# Patient Record
Sex: FEMALE | Race: WHITE | Employment: OTHER | URBAN - METROPOLITAN AREA
[De-identification: names, ages, dates, MRNs, and addresses within clinical notes are randomized per-mention and may not be internally consistent; named-entity substitution may affect disease eponyms.]

---

## 2017-03-20 RX ORDER — ENALAPRIL MALEATE AND HYDROCHLOROTHIAZIDE 10; 25 MG/1; MG/1
TABLET ORAL
Qty: 30 TABLET | Refills: 1 | Status: SHIPPED | OUTPATIENT
Start: 2017-03-20 | End: 2017-05-05 | Stop reason: SDUPTHER

## 2017-03-20 RX ORDER — ALLOPURINOL 300 MG/1
TABLET ORAL
Qty: 30 TABLET | Refills: 1 | Status: SHIPPED | OUTPATIENT
Start: 2017-03-20 | End: 2017-05-05 | Stop reason: SDUPTHER

## 2017-03-20 RX ORDER — METFORMIN HYDROCHLORIDE 500 MG/1
TABLET, EXTENDED RELEASE ORAL
Qty: 120 TABLET | Refills: 1 | Status: SHIPPED | OUTPATIENT
Start: 2017-03-20 | End: 2017-05-05 | Stop reason: SDUPTHER

## 2017-03-20 RX ORDER — ATORVASTATIN CALCIUM 40 MG/1
TABLET, FILM COATED ORAL
Qty: 30 TABLET | Refills: 1 | Status: SHIPPED | OUTPATIENT
Start: 2017-03-20 | End: 2017-05-05 | Stop reason: SDUPTHER

## 2017-05-05 ENCOUNTER — OFFICE VISIT (OUTPATIENT)
Dept: INTERNAL MEDICINE CLINIC | Age: 72
End: 2017-05-05

## 2017-05-05 VITALS — HEIGHT: 61 IN | BODY MASS INDEX: 26.43 KG/M2 | WEIGHT: 140 LBS

## 2017-05-05 DIAGNOSIS — M85.80 OSTEOPENIA: ICD-10-CM

## 2017-05-05 DIAGNOSIS — E11.69 HYPERLIPIDEMIA ASSOCIATED WITH TYPE 2 DIABETES MELLITUS (HCC): ICD-10-CM

## 2017-05-05 DIAGNOSIS — I10 ESSENTIAL HYPERTENSION, BENIGN: ICD-10-CM

## 2017-05-05 DIAGNOSIS — M19.041 PRIMARY OSTEOARTHRITIS OF BOTH HANDS: ICD-10-CM

## 2017-05-05 DIAGNOSIS — E78.5 HYPERLIPIDEMIA ASSOCIATED WITH TYPE 2 DIABETES MELLITUS (HCC): ICD-10-CM

## 2017-05-05 DIAGNOSIS — F33.1 MAJOR DEPRESSIVE DISORDER, RECURRENT EPISODE, MODERATE (HCC): ICD-10-CM

## 2017-05-05 DIAGNOSIS — F51.01 PRIMARY INSOMNIA: ICD-10-CM

## 2017-05-05 DIAGNOSIS — M1A.09X0 IDIOPATHIC CHRONIC GOUT OF MULTIPLE SITES WITHOUT TOPHUS: ICD-10-CM

## 2017-05-05 DIAGNOSIS — R11.0 NAUSEA: ICD-10-CM

## 2017-05-05 DIAGNOSIS — E11.29 TYPE 2 DIABETES MELLITUS WITH MICROALBUMINURIA, WITHOUT LONG-TERM CURRENT USE OF INSULIN (HCC): Primary | ICD-10-CM

## 2017-05-05 DIAGNOSIS — R80.9 TYPE 2 DIABETES MELLITUS WITH MICROALBUMINURIA, WITHOUT LONG-TERM CURRENT USE OF INSULIN (HCC): Primary | ICD-10-CM

## 2017-05-05 DIAGNOSIS — M19.042 PRIMARY OSTEOARTHRITIS OF BOTH HANDS: ICD-10-CM

## 2017-05-05 PROCEDURE — 99214 OFFICE O/P EST MOD 30 MIN: CPT | Performed by: INTERNAL MEDICINE

## 2017-05-05 RX ORDER — ATORVASTATIN CALCIUM 40 MG/1
TABLET, FILM COATED ORAL
Qty: 30 TABLET | Refills: 2 | Status: SHIPPED | OUTPATIENT
Start: 2017-05-05 | End: 2017-10-06 | Stop reason: SDUPTHER

## 2017-05-05 RX ORDER — ZOLPIDEM TARTRATE 5 MG/1
TABLET ORAL
Qty: 30 TABLET | Refills: 2 | Status: SHIPPED | OUTPATIENT
Start: 2017-05-05 | End: 2017-10-06 | Stop reason: SDUPTHER

## 2017-05-05 RX ORDER — METFORMIN HYDROCHLORIDE 500 MG/1
TABLET, EXTENDED RELEASE ORAL
Qty: 120 TABLET | Refills: 2 | Status: SHIPPED | OUTPATIENT
Start: 2017-05-05 | End: 2017-10-06 | Stop reason: SDUPTHER

## 2017-05-05 RX ORDER — ALLOPURINOL 300 MG/1
TABLET ORAL
Qty: 30 TABLET | Refills: 2 | Status: SHIPPED | OUTPATIENT
Start: 2017-05-05 | End: 2017-10-06 | Stop reason: SDUPTHER

## 2017-05-05 RX ORDER — INDOMETHACIN 50 MG/1
50 CAPSULE ORAL 3 TIMES DAILY
Qty: 30 CAPSULE | Refills: 2 | Status: ON HOLD | OUTPATIENT
Start: 2017-05-05 | End: 2017-06-01 | Stop reason: HOSPADM

## 2017-05-05 RX ORDER — ENALAPRIL MALEATE AND HYDROCHLOROTHIAZIDE 10; 25 MG/1; MG/1
TABLET ORAL
Qty: 30 TABLET | Refills: 2 | Status: SHIPPED | OUTPATIENT
Start: 2017-05-05 | End: 2017-10-06 | Stop reason: SDUPTHER

## 2017-05-05 ASSESSMENT — ENCOUNTER SYMPTOMS
ABDOMINAL PAIN: 0
BACK PAIN: 0
SHORTNESS OF BREATH: 0
WHEEZING: 0
RHINORRHEA: 0
VOMITING: 0

## 2017-06-05 ENCOUNTER — OFFICE VISIT (OUTPATIENT)
Dept: INTERNAL MEDICINE CLINIC | Age: 72
End: 2017-06-05

## 2017-06-05 VITALS
WEIGHT: 137 LBS | HEIGHT: 61 IN | HEART RATE: 90 BPM | BODY MASS INDEX: 25.86 KG/M2 | DIASTOLIC BLOOD PRESSURE: 70 MMHG | SYSTOLIC BLOOD PRESSURE: 140 MMHG | RESPIRATION RATE: 16 BRPM

## 2017-06-05 DIAGNOSIS — J18.9 PNEUMONIA DUE TO INFECTIOUS ORGANISM, UNSPECIFIED LATERALITY, UNSPECIFIED PART OF LUNG: Primary | ICD-10-CM

## 2017-06-05 DIAGNOSIS — Z09 HOSPITAL DISCHARGE FOLLOW-UP: ICD-10-CM

## 2017-06-05 PROCEDURE — 99213 OFFICE O/P EST LOW 20 MIN: CPT | Performed by: NURSE PRACTITIONER

## 2017-06-05 ASSESSMENT — ENCOUNTER SYMPTOMS
SHORTNESS OF BREATH: 0
NAUSEA: 0
COUGH: 1
VOMITING: 0
WHEEZING: 0
BACK PAIN: 1

## 2017-06-22 ENCOUNTER — HOSPITAL ENCOUNTER (OUTPATIENT)
Dept: CT IMAGING | Age: 72
Discharge: HOME | End: 2017-06-22
Attending: INTERNAL MEDICINE | Admitting: INTERNAL MEDICINE

## 2017-06-22 ENCOUNTER — OFFICE VISIT (OUTPATIENT)
Dept: PULMONOLOGY | Age: 72
End: 2017-06-22

## 2017-06-22 VITALS
WEIGHT: 139.6 LBS | SYSTOLIC BLOOD PRESSURE: 138 MMHG | HEART RATE: 81 BPM | DIASTOLIC BLOOD PRESSURE: 86 MMHG | RESPIRATION RATE: 17 BRPM | HEIGHT: 60 IN | BODY MASS INDEX: 27.41 KG/M2 | OXYGEN SATURATION: 99 %

## 2017-06-22 DIAGNOSIS — R91.1 SOLITARY PULMONARY NODULE: ICD-10-CM

## 2017-06-22 DIAGNOSIS — R91.1 PULMONARY NODULE: Primary | ICD-10-CM

## 2017-06-22 DIAGNOSIS — Z72.0 TOBACCO ABUSE: ICD-10-CM

## 2017-06-22 DIAGNOSIS — R91.1 PULMONARY NODULE: ICD-10-CM

## 2017-06-22 PROCEDURE — 99213 OFFICE O/P EST LOW 20 MIN: CPT | Performed by: INTERNAL MEDICINE

## 2017-07-26 ENCOUNTER — HOSPITAL ENCOUNTER (OUTPATIENT)
Dept: OTHER | Age: 72
Discharge: HOME | End: 2017-07-26
Attending: INTERNAL MEDICINE | Admitting: INTERNAL MEDICINE

## 2017-07-26 ENCOUNTER — OFFICE VISIT (OUTPATIENT)
Dept: INTERNAL MEDICINE CLINIC | Age: 72
End: 2017-07-26

## 2017-07-26 VITALS
SYSTOLIC BLOOD PRESSURE: 140 MMHG | WEIGHT: 145 LBS | HEART RATE: 70 BPM | BODY MASS INDEX: 28.47 KG/M2 | RESPIRATION RATE: 14 BRPM | HEIGHT: 60 IN | DIASTOLIC BLOOD PRESSURE: 80 MMHG

## 2017-07-26 DIAGNOSIS — M79.89 PAIN AND SWELLING OF LEFT LOWER LEG: ICD-10-CM

## 2017-07-26 DIAGNOSIS — M79.672 LEFT FOOT PAIN: Primary | ICD-10-CM

## 2017-07-26 DIAGNOSIS — M79.662 PAIN AND SWELLING OF LEFT LOWER LEG: ICD-10-CM

## 2017-07-26 DIAGNOSIS — M79.672 LEFT FOOT PAIN: ICD-10-CM

## 2017-07-26 LAB — URIC ACID, SERUM: 3.7 MG/DL (ref 2.6–6)

## 2017-07-26 PROCEDURE — 99213 OFFICE O/P EST LOW 20 MIN: CPT | Performed by: INTERNAL MEDICINE

## 2017-07-27 ENCOUNTER — TELEPHONE (OUTPATIENT)
Dept: INTERNAL MEDICINE CLINIC | Age: 72
End: 2017-07-27

## 2017-07-27 RX ORDER — DICLOFENAC SODIUM 75 MG/1
75 TABLET, DELAYED RELEASE ORAL 2 TIMES DAILY
Qty: 30 TABLET | Refills: 0 | Status: SHIPPED | OUTPATIENT
Start: 2017-07-27 | End: 2019-08-20

## 2017-08-01 ENCOUNTER — HOSPITAL ENCOUNTER (OUTPATIENT)
Dept: ULTRASOUND IMAGING | Age: 72
Discharge: HOME | End: 2017-08-01
Attending: INTERNAL MEDICINE | Admitting: INTERNAL MEDICINE

## 2017-08-01 DIAGNOSIS — M79.89 PAIN AND SWELLING OF LEFT LOWER LEG: ICD-10-CM

## 2017-08-01 DIAGNOSIS — M79.672 PAIN OF LEFT FOOT: ICD-10-CM

## 2017-08-01 DIAGNOSIS — M79.662 PAIN AND SWELLING OF LEFT LOWER LEG: ICD-10-CM

## 2017-10-06 ENCOUNTER — OFFICE VISIT (OUTPATIENT)
Dept: INTERNAL MEDICINE CLINIC | Age: 72
End: 2017-10-06

## 2017-10-06 VITALS
SYSTOLIC BLOOD PRESSURE: 130 MMHG | DIASTOLIC BLOOD PRESSURE: 80 MMHG | RESPIRATION RATE: 14 BRPM | HEART RATE: 80 BPM | HEIGHT: 60 IN | BODY MASS INDEX: 27.88 KG/M2 | WEIGHT: 142 LBS

## 2017-10-06 DIAGNOSIS — E11.69 HYPERLIPIDEMIA ASSOCIATED WITH TYPE 2 DIABETES MELLITUS (HCC): ICD-10-CM

## 2017-10-06 DIAGNOSIS — Z23 NEED FOR INFLUENZA VACCINATION: ICD-10-CM

## 2017-10-06 DIAGNOSIS — I10 ESSENTIAL HYPERTENSION, BENIGN: ICD-10-CM

## 2017-10-06 DIAGNOSIS — E11.29 TYPE 2 DIABETES MELLITUS WITH MICROALBUMINURIA, WITHOUT LONG-TERM CURRENT USE OF INSULIN (HCC): ICD-10-CM

## 2017-10-06 DIAGNOSIS — E11.29 TYPE 2 DIABETES MELLITUS WITH MICROALBUMINURIA, WITHOUT LONG-TERM CURRENT USE OF INSULIN (HCC): Primary | ICD-10-CM

## 2017-10-06 DIAGNOSIS — M75.82 ROTATOR CUFF TENDINITIS, LEFT: ICD-10-CM

## 2017-10-06 DIAGNOSIS — R80.9 TYPE 2 DIABETES MELLITUS WITH MICROALBUMINURIA, WITHOUT LONG-TERM CURRENT USE OF INSULIN (HCC): ICD-10-CM

## 2017-10-06 DIAGNOSIS — E78.5 HYPERLIPIDEMIA ASSOCIATED WITH TYPE 2 DIABETES MELLITUS (HCC): ICD-10-CM

## 2017-10-06 DIAGNOSIS — F33.1 MAJOR DEPRESSIVE DISORDER, RECURRENT EPISODE, MODERATE (HCC): ICD-10-CM

## 2017-10-06 DIAGNOSIS — M1A.09X0 IDIOPATHIC CHRONIC GOUT OF MULTIPLE SITES WITHOUT TOPHUS: ICD-10-CM

## 2017-10-06 DIAGNOSIS — R80.9 TYPE 2 DIABETES MELLITUS WITH MICROALBUMINURIA, WITHOUT LONG-TERM CURRENT USE OF INSULIN (HCC): Primary | ICD-10-CM

## 2017-10-06 DIAGNOSIS — F51.01 PRIMARY INSOMNIA: ICD-10-CM

## 2017-10-06 PROBLEM — M75.80 ROTATOR CUFF TENDINITIS: Status: ACTIVE | Noted: 2017-10-06

## 2017-10-06 LAB
BILIRUBIN, POC: NORMAL
BLOOD URINE, POC: NORMAL
CHOLESTEROL, TOTAL: 130 MG/DL (ref 0–199)
CLARITY, POC: NORMAL
COLOR, POC: NORMAL
CREATININE URINE: 156.4 MG/DL (ref 28–259)
GLUCOSE URINE, POC: NORMAL
HDLC SERPL-MCNC: 26 MG/DL (ref 40–60)
KETONES, POC: NORMAL
LDL CHOLESTEROL CALCULATED: ABNORMAL MG/DL
LDL CHOLESTEROL DIRECT: 42 MG/DL
LEUKOCYTE EST, POC: NORMAL
MICROALBUMIN UR-MCNC: 122.1 MG/DL
MICROALBUMIN/CREAT UR-RTO: 780.7 MG/G (ref 0–30)
NITRITE, POC: NORMAL
PH, POC: NORMAL
PROTEIN, POC: NORMAL
SPECIFIC GRAVITY, POC: NORMAL
TRIGL SERPL-MCNC: 411 MG/DL (ref 0–150)
URIC ACID, SERUM: 4.3 MG/DL (ref 2.6–6)
UROBILINOGEN, POC: NORMAL
VLDLC SERPL CALC-MCNC: ABNORMAL MG/DL

## 2017-10-06 PROCEDURE — 90471 IMMUNIZATION ADMIN: CPT | Performed by: INTERNAL MEDICINE

## 2017-10-06 PROCEDURE — 81002 URINALYSIS NONAUTO W/O SCOPE: CPT | Performed by: INTERNAL MEDICINE

## 2017-10-06 PROCEDURE — 90662 IIV NO PRSV INCREASED AG IM: CPT | Performed by: INTERNAL MEDICINE

## 2017-10-06 PROCEDURE — 99214 OFFICE O/P EST MOD 30 MIN: CPT | Performed by: INTERNAL MEDICINE

## 2017-10-06 RX ORDER — ATORVASTATIN CALCIUM 40 MG/1
TABLET, FILM COATED ORAL
Qty: 30 TABLET | Refills: 2 | Status: SHIPPED | OUTPATIENT
Start: 2017-10-06 | End: 2018-04-19 | Stop reason: SDUPTHER

## 2017-10-06 RX ORDER — ALLOPURINOL 300 MG/1
TABLET ORAL
Qty: 30 TABLET | Refills: 2 | Status: SHIPPED | OUTPATIENT
Start: 2017-10-06 | End: 2018-04-19 | Stop reason: SDUPTHER

## 2017-10-06 RX ORDER — ZOLPIDEM TARTRATE 5 MG/1
TABLET ORAL
Qty: 30 TABLET | Refills: 2 | Status: SHIPPED | OUTPATIENT
Start: 2017-10-06 | End: 2019-08-20

## 2017-10-06 RX ORDER — METFORMIN HYDROCHLORIDE 500 MG/1
TABLET, EXTENDED RELEASE ORAL
Qty: 120 TABLET | Refills: 2 | Status: SHIPPED | OUTPATIENT
Start: 2017-10-06 | End: 2018-04-19 | Stop reason: SDUPTHER

## 2017-10-06 RX ORDER — ENALAPRIL MALEATE AND HYDROCHLOROTHIAZIDE 10; 25 MG/1; MG/1
TABLET ORAL
Qty: 30 TABLET | Refills: 2 | Status: SHIPPED | OUTPATIENT
Start: 2017-10-06 | End: 2018-05-10 | Stop reason: SDUPTHER

## 2017-10-06 ASSESSMENT — ENCOUNTER SYMPTOMS
SHORTNESS OF BREATH: 0
BACK PAIN: 0
RHINORRHEA: 0
VOMITING: 0
ABDOMINAL PAIN: 0
WHEEZING: 0

## 2017-10-06 NOTE — MR AVS SNAPSHOT
Penicillins Anaphylaxis    Penicillin and derivatives      We Ordered/Performed the following           INFLUENZA, HIGH DOSE, 65 YRS +, IM, PF, PREFILL SYR, 0.5ML (FLUZONE HD)     Microalbumin / Creatinine Urine Ratio     POCT Urinalysis no Micro          Additional Information        Basic Information     Date Of Birth Sex Race Ethnicity Preferred Language    1945 Female White Non-/Non  English      Problem List as of 10/6/2017  Date Reviewed: 10/6/2017                Chest pain    Essential hypertension, benign    Backache    Osteoarthrosis involving lower leg    Rotator cuff tendinitis    Type 2 diabetes mellitus with microalbuminuria, without long-term current use of insulin ([address]    Primary insomnia    Hyperlipidemia associated with type 2 diabetes mellitus ([address]    Major depressive disorder, recurrent episode, moderate ([address]    Osteopenia    Osteoarthritis, hand    Gout      Immunizations as of 10/6/2017     Name Date    Influenza Virus Vaccine 10/2/2015, 9/15/2014, 8/27/2012, 11/11/2011    Influenza Whole 9/23/2010    Influenza, High Dose 10/6/2017, 10/10/2016    Pneumococcal Conjugate 7-valent 10/5/2005    Pneumococcal Polysaccharide ([id number]) 12/1/2015      Preventive Care        Date Due    Hepatitis C screening is recommended for all adults regardless of risk factors born between [facility] at least once (lifetime) who have never been tested. 1945    Tetanus Combination Vaccine (1 - Tdap) 12/16/1964    Zoster Vaccine 12/16/2005    Mammograms are recommended every 2 years for low/average risk patients aged 48 - 69, and every year for high risk patients per updated national guidelines. However these guidelines can be individualized by your provider.  5/1/2016    Cholesterol Screening 12/1/2016    Pneumococcal Vaccines (two) for all adults aged 72 and over (2 of 2 - PCV13) 12/1/2016    Eye Exam By An Eye Doctor 3/15/2017    Diabetic Foot Exam 5/5/2018 Hemoglobin A1C (Test For Long-Term Glucose Control) 5/30/2018    Colonoscopy 6/10/2020            MyChart Signup           [employer] allows you to send messages to your doctor, view your test results, renew your prescriptions, schedule appointments, view visit notes, and more. How Do I Sign Up? 1. In your Internet browser, go to [email or web address]  2. Click on the Sign Up Now link in the Sign In box. You will see the New Member Sign Up page. 3. Enter your [employer] Access Code exactly as it appears below. You will not need to use this code after youve completed the sign-up process. If you do not sign up before the expiration date, you must request a new code. [employer] Access Code: [email or web address]  Expires: 12/5/2017 10:25 AM    4. Enter your Social Security Number ([id number]) and Date of Birth (mm/dd/yyyy) as indicated and click Submit. You will be taken to the next sign-up page. 5. Create a [employer] ID. This will be your [employer] login ID and cannot be changed, so think of one that is secure and easy to remember. 6. Create a [employer] password. You can change your password at any time. 7. Enter your Password Reset Question and Answer. This can be used at a later time if you forget your password. 8. Enter your e-mail address. You will receive e-mail notification when new information is available in [id number]. 9. Click Sign Up. You can now view your medical record. Additional Information  If you have questions, please contact the physician practice where you receive care. Remember, [employer] is NOT to be used for urgent needs. For medical emergencies, dial 911. For questions regarding your [employer] account call [phone]. If you have a clinical question, please call your doctor's office.

## 2017-10-06 NOTE — PROGRESS NOTES
goal.  Depression: stable. Gout: no attacks recently. Uses [place] prn. Osteopenia on calcium and D. Left rotator cuff tendinitis: does not have time for PT. Will just live with it. Discussed use, benefit, and side effects of prescribed medications. Barriers to medication compliance addressed. All patient questions answered. Pt voiced understanding. Decrease calorie intake. Exercise,weight loss recommended. The current medical regimen is effective;  continue present plan and medications. See orders.

## 2017-10-07 LAB
ESTIMATED AVERAGE GLUCOSE: 185.8 MG/DL
HBA1C MFR BLD: 8.1 %

## 2017-10-10 NOTE — TELEPHONE ENCOUNTER
----- Message from Maria Del Carmen Hicks MD sent at 10/9/2017 10:46 PM EDT -----  Her sugars are up considerably

## 2018-02-09 ENCOUNTER — OFFICE VISIT (OUTPATIENT)
Dept: INTERNAL MEDICINE CLINIC | Age: 73
End: 2018-02-09

## 2018-02-09 VITALS
SYSTOLIC BLOOD PRESSURE: 140 MMHG | HEART RATE: 80 BPM | RESPIRATION RATE: 14 BRPM | BODY MASS INDEX: 26.9 KG/M2 | DIASTOLIC BLOOD PRESSURE: 70 MMHG | WEIGHT: 137 LBS | HEIGHT: 60 IN

## 2018-02-09 DIAGNOSIS — B35.3 TINEA PEDIS OF LEFT FOOT: Primary | ICD-10-CM

## 2018-02-09 PROCEDURE — 99213 OFFICE O/P EST LOW 20 MIN: CPT | Performed by: NURSE PRACTITIONER

## 2018-02-09 RX ORDER — CLOTRIMAZOLE 1 %
CREAM (GRAM) TOPICAL
Qty: 24 G | Refills: 1 | Status: SHIPPED | OUTPATIENT
Start: 2018-02-09 | End: 2018-02-16

## 2018-02-09 ASSESSMENT — ENCOUNTER SYMPTOMS
VOMITING: 0
SHORTNESS OF BREATH: 0
NAUSEA: 0
COUGH: 0

## 2018-02-09 NOTE — PROGRESS NOTES
facility-administered medications for this visit. Past Medical History  Past Medical History:   Diagnosis Date    Anemia     Backache, unspecified 3/11/08    Depressive disorder, not elsewhere classified 11/8/07    Essential hypertension, benign 11/8/07    Gout 8/11/2011    Hyperlipidemia associated with type 2 diabetes mellitus ([address] 12/14/2015    Major depressive disorder, recurrent episode, moderate ([address] 12/14/2015    Osteoarthritis, hand 2/27/2012    Osteoarthrosis, unspecified whether generalized or localized, lower leg 11/8/07    Osteopenia 8/5/2015    Other and unspecified hyperlipidemia 11/8/07    Pain in joint, lower leg 1/29/07    Pneumonia     Rotator cuff tendinitis 10/6/2017    Tear of medial cartilage or meniscus of knee, current 1/29/07    Type 2 diabetes mellitus with microalbuminuria, without long-term current use of insulin ([facility] 5/5/2017    Type 2 diabetes mellitus without complication ([facility] 89/4/9911    Type II or unspecified type diabetes mellitus without mention of complication, not stated as uncontrolled 11/8/07    foot exam 7/16/08 normal       Social History  Social History     Social History    Marital status:      Spouse name: N/A    Number of children: N/A    Years of education: N/A     Occupational History    Not on file.      Social History Main Topics    Smoking status: Former Smoker     Packs/day: 1.50     Years: 20.00     Quit date: 11/23/2015    Smokeless tobacco: Never Used    Alcohol use No    Drug use: No    Sexual activity: No     Other Topics Concern    Not on file     Social History Narrative    No narrative on file       Surgical History  Past Surgical History:   Procedure Laterality Date    APPENDECTOMY      CHOLECYSTECTOMY      HYSTERECTOMY      total ab hyst    PARATHYROID GLAND SURGERY      explore parathyroid glands    TOTAL KNEE ARTHROPLASTY  03/29/10    left knee       Physical Exam  Physical Exam   Constitutional: She is

## 2018-04-19 RX ORDER — METFORMIN HYDROCHLORIDE 500 MG/1
TABLET, EXTENDED RELEASE ORAL
Qty: 120 TABLET | Refills: 2 | Status: SHIPPED | OUTPATIENT
Start: 2018-04-19 | End: 2018-05-10 | Stop reason: SDUPTHER

## 2018-04-19 RX ORDER — ATORVASTATIN CALCIUM 40 MG/1
TABLET, FILM COATED ORAL
Qty: 30 TABLET | Refills: 2 | Status: SHIPPED | OUTPATIENT
Start: 2018-04-19 | End: 2018-05-10 | Stop reason: SDUPTHER

## 2018-04-19 RX ORDER — ALLOPURINOL 300 MG/1
TABLET ORAL
Qty: 30 TABLET | Refills: 2 | Status: SHIPPED | OUTPATIENT
Start: 2018-04-19 | End: 2018-05-10 | Stop reason: SDUPTHER

## 2018-05-10 ENCOUNTER — OFFICE VISIT (OUTPATIENT)
Dept: INTERNAL MEDICINE CLINIC | Age: 73
End: 2018-05-10

## 2018-05-10 VITALS
BODY MASS INDEX: 26.43 KG/M2 | HEART RATE: 70 BPM | DIASTOLIC BLOOD PRESSURE: 75 MMHG | WEIGHT: 140 LBS | SYSTOLIC BLOOD PRESSURE: 115 MMHG | HEIGHT: 61 IN | RESPIRATION RATE: 18 BRPM

## 2018-05-10 DIAGNOSIS — E11.29 TYPE 2 DIABETES MELLITUS WITH MICROALBUMINURIA, WITHOUT LONG-TERM CURRENT USE OF INSULIN (HCC): ICD-10-CM

## 2018-05-10 DIAGNOSIS — R80.9 TYPE 2 DIABETES MELLITUS WITH MICROALBUMINURIA, WITHOUT LONG-TERM CURRENT USE OF INSULIN (HCC): ICD-10-CM

## 2018-05-10 DIAGNOSIS — E11.69 HYPERLIPIDEMIA ASSOCIATED WITH TYPE 2 DIABETES MELLITUS (HCC): ICD-10-CM

## 2018-05-10 DIAGNOSIS — E78.5 HYPERLIPIDEMIA ASSOCIATED WITH TYPE 2 DIABETES MELLITUS (HCC): ICD-10-CM

## 2018-05-10 DIAGNOSIS — R60.0 LEG EDEMA, LEFT: Primary | ICD-10-CM

## 2018-05-10 DIAGNOSIS — F33.1 MAJOR DEPRESSIVE DISORDER, RECURRENT EPISODE, MODERATE (HCC): ICD-10-CM

## 2018-05-10 LAB
BASOPHILS ABSOLUTE: 0 K/UL (ref 0–0.2)
BASOPHILS RELATIVE PERCENT: 0.7 %
EOSINOPHILS ABSOLUTE: 0.5 K/UL (ref 0–0.6)
EOSINOPHILS RELATIVE PERCENT: 7.3 %
HCT VFR BLD CALC: 31 % (ref 36–48)
HEMOGLOBIN: 10.5 G/DL (ref 12–16)
LYMPHOCYTES ABSOLUTE: 1.6 K/UL (ref 1–5.1)
LYMPHOCYTES RELATIVE PERCENT: 24.1 %
MCH RBC QN AUTO: 28.1 PG (ref 26–34)
MCHC RBC AUTO-ENTMCNC: 33.8 G/DL (ref 31–36)
MCV RBC AUTO: 82.9 FL (ref 80–100)
MONOCYTES ABSOLUTE: 0.4 K/UL (ref 0–1.3)
MONOCYTES RELATIVE PERCENT: 5.7 %
NEUTROPHILS ABSOLUTE: 4.2 K/UL (ref 1.7–7.7)
NEUTROPHILS RELATIVE PERCENT: 62.2 %
PDW BLD-RTO: 16.5 % (ref 12.4–15.4)
PLATELET # BLD: 296 K/UL (ref 135–450)
PMV BLD AUTO: 7.7 FL (ref 5–10.5)
RBC # BLD: 3.74 M/UL (ref 4–5.2)
WBC # BLD: 6.7 K/UL (ref 4–11)

## 2018-05-10 PROCEDURE — 99213 OFFICE O/P EST LOW 20 MIN: CPT | Performed by: INTERNAL MEDICINE

## 2018-05-10 RX ORDER — ATORVASTATIN CALCIUM 40 MG/1
TABLET, FILM COATED ORAL
Qty: 30 TABLET | Refills: 2 | Status: SHIPPED | OUTPATIENT
Start: 2018-05-10 | End: 2018-08-10 | Stop reason: SDUPTHER

## 2018-05-10 RX ORDER — ENALAPRIL MALEATE AND HYDROCHLOROTHIAZIDE 10; 25 MG/1; MG/1
TABLET ORAL
Qty: 30 TABLET | Refills: 2 | Status: SHIPPED | OUTPATIENT
Start: 2018-05-10 | End: 2018-08-10 | Stop reason: SDUPTHER

## 2018-05-10 RX ORDER — METFORMIN HYDROCHLORIDE 500 MG/1
TABLET, EXTENDED RELEASE ORAL
Qty: 120 TABLET | Refills: 2 | Status: SHIPPED | OUTPATIENT
Start: 2018-05-10 | End: 2018-08-10 | Stop reason: SDUPTHER

## 2018-05-10 RX ORDER — ALLOPURINOL 300 MG/1
TABLET ORAL
Qty: 30 TABLET | Refills: 2 | Status: SHIPPED | OUTPATIENT
Start: 2018-05-10 | End: 2018-08-10 | Stop reason: SDUPTHER

## 2018-05-10 ASSESSMENT — ENCOUNTER SYMPTOMS
ABDOMINAL PAIN: 0
VOMITING: 0
WHEEZING: 0
RHINORRHEA: 0
SHORTNESS OF BREATH: 0
BACK PAIN: 0

## 2018-05-11 LAB
A/G RATIO: 1.5 (ref 1.1–2.2)
ALBUMIN SERPL-MCNC: 3.9 G/DL (ref 3.4–5)
ALP BLD-CCNC: 93 U/L (ref 40–129)
ALT SERPL-CCNC: 9 U/L (ref 10–40)
ANION GAP SERPL CALCULATED.3IONS-SCNC: 18 MMOL/L (ref 3–16)
AST SERPL-CCNC: 11 U/L (ref 15–37)
BILIRUB SERPL-MCNC: <0.2 MG/DL (ref 0–1)
BUN BLDV-MCNC: 17 MG/DL (ref 7–20)
CALCIUM SERPL-MCNC: 9.5 MG/DL (ref 8.3–10.6)
CHLORIDE BLD-SCNC: 105 MMOL/L (ref 99–110)
CO2: 19 MMOL/L (ref 21–32)
CREAT SERPL-MCNC: 1.1 MG/DL (ref 0.6–1.2)
ESTIMATED AVERAGE GLUCOSE: 180 MG/DL
GFR AFRICAN AMERICAN: 59
GFR NON-AFRICAN AMERICAN: 49
GLOBULIN: 2.6 G/DL
GLUCOSE BLD-MCNC: 152 MG/DL (ref 70–99)
HBA1C MFR BLD: 7.9 %
POTASSIUM SERPL-SCNC: 4.5 MMOL/L (ref 3.5–5.1)
SODIUM BLD-SCNC: 142 MMOL/L (ref 136–145)
TOTAL PROTEIN: 6.5 G/DL (ref 6.4–8.2)

## 2018-06-11 PROBLEM — J96.00 ACUTE RESPIRATORY FAILURE (HCC): Status: ACTIVE | Noted: 2018-06-11

## 2018-06-13 PROBLEM — J96.00 ACUTE RESPIRATORY FAILURE (HCC): Status: RESOLVED | Noted: 2018-06-11 | Resolved: 2018-06-13

## 2018-07-02 ENCOUNTER — TELEPHONE (OUTPATIENT)
Dept: CASE MANAGEMENT | Age: 73
End: 2018-07-02

## 2018-07-12 ENCOUNTER — TELEPHONE (OUTPATIENT)
Dept: PULMONOLOGY | Age: 73
End: 2018-07-12

## 2018-07-12 DIAGNOSIS — Z87.891 PERSONAL HISTORY OF TOBACCO USE: Primary | ICD-10-CM

## 2018-07-12 PROCEDURE — G0296 VISIT TO DETERM LDCT ELIG: HCPCS | Performed by: INTERNAL MEDICINE

## 2018-07-13 ENCOUNTER — HOSPITAL ENCOUNTER (OUTPATIENT)
Dept: CT IMAGING | Age: 73
Discharge: HOME | End: 2018-07-14
Attending: INTERNAL MEDICINE | Admitting: INTERNAL MEDICINE

## 2018-07-13 DIAGNOSIS — Z87.891 PERSONAL HISTORY OF TOBACCO USE: ICD-10-CM

## 2018-07-13 DIAGNOSIS — R91.1 SOLITARY PULMONARY NODULE: ICD-10-CM

## 2018-07-17 ENCOUNTER — TELEPHONE (OUTPATIENT)
Dept: CASE MANAGEMENT | Age: 73
End: 2018-07-17

## 2018-07-17 NOTE — TELEPHONE ENCOUNTER
Baseline lung screen on 7-13-18. LRAD2. Recommended screen in one year. Reviewed by ordering physician and ordering office contacts pt with results. Per MD ordering note: 30 pack year hx. Per EPIC, former smoker, quit 2015.

## 2018-08-10 ENCOUNTER — OFFICE VISIT (OUTPATIENT)
Dept: INTERNAL MEDICINE CLINIC | Age: 73
End: 2018-08-10

## 2018-08-10 VITALS
WEIGHT: 134 LBS | HEIGHT: 60 IN | RESPIRATION RATE: 14 BRPM | BODY MASS INDEX: 26.31 KG/M2 | HEART RATE: 80 BPM | SYSTOLIC BLOOD PRESSURE: 130 MMHG | DIASTOLIC BLOOD PRESSURE: 80 MMHG

## 2018-08-10 DIAGNOSIS — E11.29 TYPE 2 DIABETES MELLITUS WITH MICROALBUMINURIA, WITHOUT LONG-TERM CURRENT USE OF INSULIN (HCC): ICD-10-CM

## 2018-08-10 DIAGNOSIS — E11.29 TYPE 2 DIABETES MELLITUS WITH MICROALBUMINURIA, WITHOUT LONG-TERM CURRENT USE OF INSULIN (HCC): Primary | ICD-10-CM

## 2018-08-10 DIAGNOSIS — R80.9 TYPE 2 DIABETES MELLITUS WITH MICROALBUMINURIA, WITHOUT LONG-TERM CURRENT USE OF INSULIN (HCC): ICD-10-CM

## 2018-08-10 DIAGNOSIS — R80.9 TYPE 2 DIABETES MELLITUS WITH MICROALBUMINURIA, WITHOUT LONG-TERM CURRENT USE OF INSULIN (HCC): Primary | ICD-10-CM

## 2018-08-10 DIAGNOSIS — I10 ESSENTIAL HYPERTENSION, BENIGN: ICD-10-CM

## 2018-08-10 DIAGNOSIS — Z12.31 ENCOUNTER FOR SCREENING MAMMOGRAM FOR BREAST CANCER: ICD-10-CM

## 2018-08-10 DIAGNOSIS — E11.69 HYPERLIPIDEMIA ASSOCIATED WITH TYPE 2 DIABETES MELLITUS (HCC): ICD-10-CM

## 2018-08-10 DIAGNOSIS — F51.01 PRIMARY INSOMNIA: ICD-10-CM

## 2018-08-10 DIAGNOSIS — E78.5 HYPERLIPIDEMIA ASSOCIATED WITH TYPE 2 DIABETES MELLITUS (HCC): ICD-10-CM

## 2018-08-10 DIAGNOSIS — Z72.89 OTHER PROBLEMS RELATED TO LIFESTYLE: ICD-10-CM

## 2018-08-10 DIAGNOSIS — M1A.09X0 IDIOPATHIC CHRONIC GOUT OF MULTIPLE SITES WITHOUT TOPHUS: ICD-10-CM

## 2018-08-10 DIAGNOSIS — F33.1 MAJOR DEPRESSIVE DISORDER, RECURRENT EPISODE, MODERATE (HCC): ICD-10-CM

## 2018-08-10 LAB
BASOPHILS ABSOLUTE: 0.1 K/UL (ref 0–0.2)
BASOPHILS RELATIVE PERCENT: 1.1 %
EOSINOPHILS ABSOLUTE: 0.4 K/UL (ref 0–0.6)
EOSINOPHILS RELATIVE PERCENT: 5.2 %
HCT VFR BLD CALC: 32.2 % (ref 36–48)
HEMOGLOBIN: 10.6 G/DL (ref 12–16)
LYMPHOCYTES ABSOLUTE: 1.4 K/UL (ref 1–5.1)
LYMPHOCYTES RELATIVE PERCENT: 18.1 %
MCH RBC QN AUTO: 27.2 PG (ref 26–34)
MCHC RBC AUTO-ENTMCNC: 33 G/DL (ref 31–36)
MCV RBC AUTO: 82.4 FL (ref 80–100)
MONOCYTES ABSOLUTE: 0.4 K/UL (ref 0–1.3)
MONOCYTES RELATIVE PERCENT: 5 %
NEUTROPHILS ABSOLUTE: 5.6 K/UL (ref 1.7–7.7)
NEUTROPHILS RELATIVE PERCENT: 70.6 %
PDW BLD-RTO: 17.2 % (ref 12.4–15.4)
PLATELET # BLD: 312 K/UL (ref 135–450)
PMV BLD AUTO: 7.9 FL (ref 5–10.5)
RBC # BLD: 3.9 M/UL (ref 4–5.2)
WBC # BLD: 7.9 K/UL (ref 4–11)

## 2018-08-10 PROCEDURE — 90471 IMMUNIZATION ADMIN: CPT | Performed by: INTERNAL MEDICINE

## 2018-08-10 PROCEDURE — 99214 OFFICE O/P EST MOD 30 MIN: CPT | Performed by: INTERNAL MEDICINE

## 2018-08-10 PROCEDURE — 90670 PCV13 VACCINE IM: CPT | Performed by: INTERNAL MEDICINE

## 2018-08-10 RX ORDER — ALLOPURINOL 300 MG/1
TABLET ORAL
Qty: 30 TABLET | Refills: 2 | Status: SHIPPED | OUTPATIENT
Start: 2018-08-10 | End: 2018-12-05 | Stop reason: SDUPTHER

## 2018-08-10 RX ORDER — METFORMIN HYDROCHLORIDE 500 MG/1
TABLET, EXTENDED RELEASE ORAL
Qty: 120 TABLET | Refills: 2 | Status: SHIPPED | OUTPATIENT
Start: 2018-08-10 | End: 2018-12-05 | Stop reason: SDUPTHER

## 2018-08-10 RX ORDER — ENALAPRIL MALEATE AND HYDROCHLOROTHIAZIDE 10; 25 MG/1; MG/1
TABLET ORAL
Qty: 30 TABLET | Refills: 2 | Status: SHIPPED | OUTPATIENT
Start: 2018-08-10 | End: 2018-10-05

## 2018-08-10 RX ORDER — ATORVASTATIN CALCIUM 40 MG/1
TABLET, FILM COATED ORAL
Qty: 30 TABLET | Refills: 2 | Status: SHIPPED | OUTPATIENT
Start: 2018-08-10 | End: 2018-12-05 | Stop reason: SDUPTHER

## 2018-08-10 ASSESSMENT — ENCOUNTER SYMPTOMS
SHORTNESS OF BREATH: 0
BACK PAIN: 0
ABDOMINAL PAIN: 0
WHEEZING: 0
VOMITING: 0
RHINORRHEA: 0

## 2018-08-10 NOTE — PROGRESS NOTES
Take 500 mg by mouth daily, Disp: , Rfl:     aspirin 81 MG EC tablet, Take 81 mg by mouth daily. , Disp: , Rfl:        Review of Systems   Constitutional: Negative for appetite change and fatigue. HENT: Negative for postnasal drip and rhinorrhea. Respiratory: Negative for shortness of breath and wheezing. Cardiovascular: Negative for chest pain and palpitations. Gastrointestinal: Negative for abdominal pain and vomiting. Musculoskeletal: Negative for back pain and joint swelling. Skin: Negative for rash. Neurological: Negative for light-headedness. Psychiatric/Behavioral: Positive for sleep disturbance. Negative for agitation, behavioral problems and suicidal ideas. The patient is not nervous/anxious. There are no changes to past medical history, family history, social history or review of systems(except as noted in the history section) since prior note (all reviewed with patient). /80 (Site: Right Arm, Position: Sitting, Cuff Size: Medium Adult)   Pulse 80   Resp 14   Ht 5' (1.524 m)   Wt 134 lb (60.8 kg)   BMI 26.17 kg/m²      Objective:   Physical Exam   Constitutional: She is oriented to person, place, and time. She appears well-developed and well-nourished. HENT:   Head: Normocephalic. Eyes: Conjunctivae and EOM are normal. Pupils are equal, round, and reactive to light. Neck: Trachea normal and normal range of motion. Neck supple. No JVD present. Carotid bruit is not present. No thyroid mass and no thyromegaly present. Cardiovascular: Normal rate, regular rhythm and normal heart sounds. Exam reveals no gallop. No murmur heard. Pulmonary/Chest: Effort normal and breath sounds normal. No respiratory distress. She has no wheezes. She has no rales. Abdominal: Soft. Bowel sounds are normal. She exhibits no distension and no mass. There is no splenomegaly or hepatomegaly. There is no tenderness. Musculoskeletal: She exhibits no edema.    Impingement test +

## 2018-08-11 LAB — HEPATITIS C ANTIBODY INTERPRETATION: NORMAL

## 2018-08-17 ENCOUNTER — OFFICE VISIT (OUTPATIENT)
Dept: PULMONOLOGY | Age: 73
End: 2018-08-17

## 2018-08-17 VITALS
RESPIRATION RATE: 21 BRPM | BODY MASS INDEX: 24.91 KG/M2 | HEART RATE: 100 BPM | HEIGHT: 62 IN | SYSTOLIC BLOOD PRESSURE: 118 MMHG | OXYGEN SATURATION: 98 % | DIASTOLIC BLOOD PRESSURE: 72 MMHG

## 2018-08-17 DIAGNOSIS — R91.1 PULMONARY NODULE: ICD-10-CM

## 2018-08-17 DIAGNOSIS — Z87.891 PERSONAL HISTORY OF TOBACCO USE: Primary | ICD-10-CM

## 2018-08-17 PROCEDURE — 99213 OFFICE O/P EST LOW 20 MIN: CPT | Performed by: INTERNAL MEDICINE

## 2018-08-17 PROCEDURE — 1090F PRES/ABSN URINE INCON ASSESS: CPT | Performed by: INTERNAL MEDICINE

## 2018-08-17 PROCEDURE — 4040F PNEUMOC VAC/ADMIN/RCVD: CPT | Performed by: INTERNAL MEDICINE

## 2018-08-17 PROCEDURE — G8420 CALC BMI NORM PARAMETERS: HCPCS | Performed by: INTERNAL MEDICINE

## 2018-08-17 PROCEDURE — G0296 VISIT TO DETERM LDCT ELIG: HCPCS | Performed by: INTERNAL MEDICINE

## 2018-08-17 PROCEDURE — G8427 DOCREV CUR MEDS BY ELIG CLIN: HCPCS | Performed by: INTERNAL MEDICINE

## 2018-08-17 PROCEDURE — G8399 PT W/DXA RESULTS DOCUMENT: HCPCS | Performed by: INTERNAL MEDICINE

## 2018-08-17 PROCEDURE — 1123F ACP DISCUSS/DSCN MKR DOCD: CPT | Performed by: INTERNAL MEDICINE

## 2018-08-17 PROCEDURE — 1036F TOBACCO NON-USER: CPT | Performed by: INTERNAL MEDICINE

## 2018-08-17 PROCEDURE — 1101F PT FALLS ASSESS-DOCD LE1/YR: CPT | Performed by: INTERNAL MEDICINE

## 2018-08-17 PROCEDURE — 3017F COLORECTAL CA SCREEN DOC REV: CPT | Performed by: INTERNAL MEDICINE

## 2018-08-17 NOTE — PROGRESS NOTES
[facility] Pulmonary, Critical Care, and Sleep    Outpatient Follow Up Note    CC: lung nodules, lung cancer screening  Consulting provider: Jad Corbett*    Interval History: 67 y.o. female no f/c/sweats. No weight loss. We discussed her CT results. 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 Current Medications:    Current Outpatient Prescriptions:     metFORMIN (GLUCOPHAGE-XR) 500 MG extended release tablet, TAKE TWO TABLETS BY MOUTH TWICE A DAY, Disp: 120 tablet, Rfl: 2    atorvastatin (LIPITOR) 40 MG tablet, TAKE ONE TABLET BY MOUTH DAILY, Disp: 30 tablet, Rfl: 2    allopurinol (ZYLOPRIM) 300 MG tablet, TAKE ONE TABLET BY MOUTH DAILY, Disp: 30 tablet, Rfl: 2    enalapril-hydrochlorothiazide (VASERETIC) 10-25 MG per tablet, TAKE ONE TABLET BY MOUTH DAILY, Disp: 30 tablet, Rfl: 2    diclofenac (VOLTAREN) 75 MG EC tablet, Take 1 tablet by mouth 2 times daily, Disp: 30 tablet, Rfl: 0    lactobacillus (CULTURELLE) capsule, Take 1 capsule by mouth 2 times daily, Disp: 30 capsule, Rfl: 0    zolpidem (AMBIEN) 5 MG tablet, TAKE 1 TABLET BY MOUTH NIGHTLY AS NEEDED FOR SLEEP, Disp: 30 tablet, Rfl: 2    vitamin B-12 (CYANOCOBALAMIN) 500 MCG tablet, Take 500 mcg by mouth daily, Disp: , Rfl:     Omega-3 Fatty Acids (FISH OIL) 1000 MG CAPS, Take 1,000 mg by mouth daily, Disp: , Rfl:     calcium carbonate (OSCAL) 500 MG TABS tablet, Take 500 mg by mouth daily, Disp: , Rfl:     aspirin 81 MG EC tablet, Take 81 mg by mouth daily. , Disp: , Rfl:     Objective:   PHYSICAL EXAM:    VITALS:  /72 (Site: Right Arm, Position: Sitting, Cuff Size: Medium Adult)   Pulse 100   Resp 21   Ht 5' 1.5\" (1.562 m)   SpO2 98% Comment: on RA  BMI 24.91 kg/m²   Constitutional: In no acute distress. Appears stated age. Well developed and nourished  Eyes: PERRL. No sclera icterus. No conjunctival injection. ENT: Oropharynx clear. Neck: Trachea midline. No thyroid tenderness. Lymph: No cervical LAD.  No supraclavicular LAD. Resp: No accessory muscle use. No crackles. No wheezes. No rhonchi. CV: Regular rate. Regular rhythm. No murmur or rub. No lower extremity edema. Skin: Warm and dry. No nodules on exposed extremities. No rash on exposed extremities. Musc: No clubbing. No cyanosis. No synovitis or joint deformity in digits. Psych: Oriented x 3. Mood and affect normal. Intact judgment and insight. LABS:  Reviewed any pertinent new labs that are available. PFTs Date  FVC  (%) FEV1  (%) FEV1/FVC ratio    TLC  (%)  RV  (%)   DLCO (%) Bronchodilator response:    IMAGING:  I personally reviewed and interpreted the following today in the office:   11/30/15 CTPA: mild atelectasis. Small irregular RUL nodule with peripheral calcification. Probably unchanged from 2014. However difficult to compare given irregular borders, possible more dense. 5/31/16 chest CT:   Mediastinum: Calcification of the thoracic aorta. Coronary artery   calcification. Small pericardial effusion. Calcified mediastinal lymph   nodes, in keeping with granulomatous disease. Noncalcified mediastinal lymph   nodes are predominantly subcentimeter in short axis. Clips are seen at the   left thyroid bed, status post left thyroidectomy. No evidence of axillary   adenopathy bilaterally. Lungs/pleura: Elongated 7 - 8 mm nodular opacity within the right upper lobe   with peripheral calcification as seen on series 601, image 22 is not markedly   changed from prior given differences in technique. 7 mm right lower lobe   pulmonary nodule on image 35 is not significantly changed. 4 mm left upper   lobe pulmonary nodule on image 11 is unchanged. 5 mm left lower lobe nodule   on image 38 is unchanged. No confluent airspace disease. Minimal dependent   bilateral atelectasis. No evidence of pleural effusion. No evidence of   pneumothorax.      6/22/17 chest CT:   Stable scattered pulmonary nodules.  Nodules are likely unchanged dating back   to 2015, however that study was limited by respiratory motion     8/17/18 stable lung nodules    ASSESSMENT:   Pulmonary nodules - stable and probably related to prior granulomatous disease  Tobacco abuse in remission    PLAN:   Lung cancer screening LDCT in  scan in 12 months   Continue smoking cessation  Screening CT scan was considered in a lung cancer screening counseling and shared decision making visit today that included the following elements:   Eligibility: Age: 67. There are no signs or symptoms of lung cancer. Tobacco History 30 pack-years. quit 2 year ago  Verbal counseling has been performed by me to include benefits and harms of screening, follow-up diagnostic testing, over-diagnosis, false positive rate, and total radiation exposure;   I have counseled on the importance of adherence to annual lung cancer LDCT screening, the impact of comorbidities and patient is willing to undergo diagnosis and treatment;   I have provided counseling on the importance of maintaining cigarette smoking abstinence if former smoker; or the importance of smoking cessation if current smoker and, if appropriate, furnishing of information about tobacco cessation interventions; and   I have furnished a written order for lung cancer screening with LDCT. Order for Screening chest CT scan should be placed with documentation as below:  Beneficiary date of birth;    Actual pack - year smoking history (number) from above;   Current smoking status, and for former smokers, the number of years since quitting smoking from above  Beneficiary is asymptomatic   National Provider Identifier (NPI) for Dr. Sam Franklin

## 2018-10-05 RX ORDER — ENALAPRIL MALEATE AND HYDROCHLOROTHIAZIDE 10; 25 MG/1; MG/1
TABLET ORAL
Qty: 30 TABLET | Refills: 2 | Status: SHIPPED | OUTPATIENT
Start: 2018-10-05 | End: 2018-12-05 | Stop reason: SDUPTHER

## 2018-10-24 ENCOUNTER — OFFICE VISIT (OUTPATIENT)
Dept: INTERNAL MEDICINE CLINIC | Age: 73
End: 2018-10-24

## 2018-10-24 ENCOUNTER — NURSE ONLY (OUTPATIENT)
Dept: INTERNAL MEDICINE CLINIC | Age: 73
End: 2018-10-24

## 2018-10-24 VITALS
BODY MASS INDEX: 26.81 KG/M2 | WEIGHT: 142 LBS | HEIGHT: 61 IN | RESPIRATION RATE: 14 BRPM | HEART RATE: 80 BPM | DIASTOLIC BLOOD PRESSURE: 80 MMHG | SYSTOLIC BLOOD PRESSURE: 120 MMHG

## 2018-10-24 DIAGNOSIS — E78.5 HYPERLIPIDEMIA ASSOCIATED WITH TYPE 2 DIABETES MELLITUS (HCC): ICD-10-CM

## 2018-10-24 DIAGNOSIS — F33.1 MAJOR DEPRESSIVE DISORDER, RECURRENT EPISODE, MODERATE (HCC): ICD-10-CM

## 2018-10-24 DIAGNOSIS — E11.29 TYPE 2 DIABETES MELLITUS WITH MICROALBUMINURIA, WITHOUT LONG-TERM CURRENT USE OF INSULIN (HCC): ICD-10-CM

## 2018-10-24 DIAGNOSIS — Z23 NEED FOR INFLUENZA VACCINATION: Primary | ICD-10-CM

## 2018-10-24 DIAGNOSIS — E11.69 HYPERLIPIDEMIA ASSOCIATED WITH TYPE 2 DIABETES MELLITUS (HCC): ICD-10-CM

## 2018-10-24 DIAGNOSIS — Z01.818 PREOP EXAM FOR INTERNAL MEDICINE: Primary | ICD-10-CM

## 2018-10-24 DIAGNOSIS — I10 ESSENTIAL HYPERTENSION, BENIGN: ICD-10-CM

## 2018-10-24 DIAGNOSIS — M1A.09X0 IDIOPATHIC CHRONIC GOUT OF MULTIPLE SITES WITHOUT TOPHUS: ICD-10-CM

## 2018-10-24 DIAGNOSIS — R80.9 TYPE 2 DIABETES MELLITUS WITH MICROALBUMINURIA, WITHOUT LONG-TERM CURRENT USE OF INSULIN (HCC): ICD-10-CM

## 2018-10-24 PROCEDURE — 2022F DILAT RTA XM EVC RTNOPTHY: CPT | Performed by: INTERNAL MEDICINE

## 2018-10-24 PROCEDURE — 3045F PR MOST RECENT HEMOGLOBIN A1C LEVEL 7.0-9.0%: CPT | Performed by: INTERNAL MEDICINE

## 2018-10-24 PROCEDURE — 90662 IIV NO PRSV INCREASED AG IM: CPT | Performed by: INTERNAL MEDICINE

## 2018-10-24 PROCEDURE — 1036F TOBACCO NON-USER: CPT | Performed by: INTERNAL MEDICINE

## 2018-10-24 PROCEDURE — 1101F PT FALLS ASSESS-DOCD LE1/YR: CPT | Performed by: INTERNAL MEDICINE

## 2018-10-24 PROCEDURE — G8419 CALC BMI OUT NRM PARAM NOF/U: HCPCS | Performed by: INTERNAL MEDICINE

## 2018-10-24 PROCEDURE — 99214 OFFICE O/P EST MOD 30 MIN: CPT | Performed by: INTERNAL MEDICINE

## 2018-10-24 PROCEDURE — 4040F PNEUMOC VAC/ADMIN/RCVD: CPT | Performed by: INTERNAL MEDICINE

## 2018-10-24 PROCEDURE — G0008 ADMIN INFLUENZA VIRUS VAC: HCPCS | Performed by: INTERNAL MEDICINE

## 2018-10-24 PROCEDURE — 1090F PRES/ABSN URINE INCON ASSESS: CPT | Performed by: INTERNAL MEDICINE

## 2018-10-24 PROCEDURE — 3017F COLORECTAL CA SCREEN DOC REV: CPT | Performed by: INTERNAL MEDICINE

## 2018-10-24 PROCEDURE — G8482 FLU IMMUNIZE ORDER/ADMIN: HCPCS | Performed by: INTERNAL MEDICINE

## 2018-10-24 PROCEDURE — 1123F ACP DISCUSS/DSCN MKR DOCD: CPT | Performed by: INTERNAL MEDICINE

## 2018-10-24 PROCEDURE — G8399 PT W/DXA RESULTS DOCUMENT: HCPCS | Performed by: INTERNAL MEDICINE

## 2018-10-24 PROCEDURE — G8428 CUR MEDS NOT DOCUMENT: HCPCS | Performed by: INTERNAL MEDICINE

## 2018-12-05 ENCOUNTER — OFFICE VISIT (OUTPATIENT)
Dept: INTERNAL MEDICINE CLINIC | Age: 73
End: 2018-12-05

## 2018-12-05 VITALS
DIASTOLIC BLOOD PRESSURE: 80 MMHG | WEIGHT: 140 LBS | HEIGHT: 60 IN | HEART RATE: 70 BPM | SYSTOLIC BLOOD PRESSURE: 128 MMHG | RESPIRATION RATE: 14 BRPM | BODY MASS INDEX: 27.48 KG/M2

## 2018-12-05 DIAGNOSIS — E11.29 TYPE 2 DIABETES MELLITUS WITH MICROALBUMINURIA, WITHOUT LONG-TERM CURRENT USE OF INSULIN (HCC): Primary | ICD-10-CM

## 2018-12-05 DIAGNOSIS — E78.5 HYPERLIPIDEMIA ASSOCIATED WITH TYPE 2 DIABETES MELLITUS (HCC): ICD-10-CM

## 2018-12-05 DIAGNOSIS — E11.29 TYPE 2 DIABETES MELLITUS WITH MICROALBUMINURIA, WITHOUT LONG-TERM CURRENT USE OF INSULIN (HCC): ICD-10-CM

## 2018-12-05 DIAGNOSIS — M19.042 PRIMARY OSTEOARTHRITIS OF BOTH HANDS: ICD-10-CM

## 2018-12-05 DIAGNOSIS — E11.69 HYPERLIPIDEMIA ASSOCIATED WITH TYPE 2 DIABETES MELLITUS (HCC): ICD-10-CM

## 2018-12-05 DIAGNOSIS — R80.9 TYPE 2 DIABETES MELLITUS WITH MICROALBUMINURIA, WITHOUT LONG-TERM CURRENT USE OF INSULIN (HCC): Primary | ICD-10-CM

## 2018-12-05 DIAGNOSIS — F51.01 PRIMARY INSOMNIA: ICD-10-CM

## 2018-12-05 DIAGNOSIS — M19.041 PRIMARY OSTEOARTHRITIS OF BOTH HANDS: ICD-10-CM

## 2018-12-05 DIAGNOSIS — I10 ESSENTIAL HYPERTENSION, BENIGN: ICD-10-CM

## 2018-12-05 DIAGNOSIS — F33.1 MAJOR DEPRESSIVE DISORDER, RECURRENT EPISODE, MODERATE (HCC): ICD-10-CM

## 2018-12-05 DIAGNOSIS — M1A.09X0 IDIOPATHIC CHRONIC GOUT OF MULTIPLE SITES WITHOUT TOPHUS: ICD-10-CM

## 2018-12-05 DIAGNOSIS — R80.9 TYPE 2 DIABETES MELLITUS WITH MICROALBUMINURIA, WITHOUT LONG-TERM CURRENT USE OF INSULIN (HCC): ICD-10-CM

## 2018-12-05 LAB
BILIRUBIN, POC: NORMAL
BLOOD URINE, POC: NORMAL
CLARITY, POC: NORMAL
COLOR, POC: NORMAL
GLUCOSE URINE, POC: NORMAL
KETONES, POC: NORMAL
LEUKOCYTE EST, POC: NORMAL
NITRITE, POC: NORMAL
PH, POC: NORMAL
PROTEIN, POC: NORMAL
SPECIFIC GRAVITY, POC: NORMAL
UROBILINOGEN, POC: NORMAL

## 2018-12-05 PROCEDURE — 81002 URINALYSIS NONAUTO W/O SCOPE: CPT | Performed by: INTERNAL MEDICINE

## 2018-12-05 PROCEDURE — 3045F PR MOST RECENT HEMOGLOBIN A1C LEVEL 7.0-9.0%: CPT | Performed by: INTERNAL MEDICINE

## 2018-12-05 PROCEDURE — 1123F ACP DISCUSS/DSCN MKR DOCD: CPT | Performed by: INTERNAL MEDICINE

## 2018-12-05 PROCEDURE — 99214 OFFICE O/P EST MOD 30 MIN: CPT | Performed by: INTERNAL MEDICINE

## 2018-12-05 PROCEDURE — 2022F DILAT RTA XM EVC RTNOPTHY: CPT | Performed by: INTERNAL MEDICINE

## 2018-12-05 PROCEDURE — 1090F PRES/ABSN URINE INCON ASSESS: CPT | Performed by: INTERNAL MEDICINE

## 2018-12-05 PROCEDURE — 1036F TOBACCO NON-USER: CPT | Performed by: INTERNAL MEDICINE

## 2018-12-05 PROCEDURE — G8427 DOCREV CUR MEDS BY ELIG CLIN: HCPCS | Performed by: INTERNAL MEDICINE

## 2018-12-05 PROCEDURE — G8399 PT W/DXA RESULTS DOCUMENT: HCPCS | Performed by: INTERNAL MEDICINE

## 2018-12-05 PROCEDURE — G8419 CALC BMI OUT NRM PARAM NOF/U: HCPCS | Performed by: INTERNAL MEDICINE

## 2018-12-05 PROCEDURE — 1101F PT FALLS ASSESS-DOCD LE1/YR: CPT | Performed by: INTERNAL MEDICINE

## 2018-12-05 PROCEDURE — 3017F COLORECTAL CA SCREEN DOC REV: CPT | Performed by: INTERNAL MEDICINE

## 2018-12-05 PROCEDURE — G8482 FLU IMMUNIZE ORDER/ADMIN: HCPCS | Performed by: INTERNAL MEDICINE

## 2018-12-05 PROCEDURE — 4040F PNEUMOC VAC/ADMIN/RCVD: CPT | Performed by: INTERNAL MEDICINE

## 2018-12-05 RX ORDER — METFORMIN HYDROCHLORIDE 500 MG/1
TABLET, EXTENDED RELEASE ORAL
Qty: 120 TABLET | Refills: 2 | Status: SHIPPED | OUTPATIENT
Start: 2018-12-05 | End: 2019-05-14 | Stop reason: SDUPTHER

## 2018-12-05 RX ORDER — ENALAPRIL MALEATE AND HYDROCHLOROTHIAZIDE 10; 25 MG/1; MG/1
TABLET ORAL
Qty: 30 TABLET | Refills: 2 | Status: SHIPPED | OUTPATIENT
Start: 2018-12-05 | End: 2019-04-19 | Stop reason: SDUPTHER

## 2018-12-05 RX ORDER — ATORVASTATIN CALCIUM 40 MG/1
TABLET, FILM COATED ORAL
Qty: 30 TABLET | Refills: 2 | Status: SHIPPED | OUTPATIENT
Start: 2018-12-05 | End: 2019-05-14 | Stop reason: SDUPTHER

## 2018-12-05 RX ORDER — ALLOPURINOL 300 MG/1
TABLET ORAL
Qty: 30 TABLET | Refills: 2 | Status: SHIPPED | OUTPATIENT
Start: 2018-12-05 | End: 2019-05-14 | Stop reason: SDUPTHER

## 2018-12-05 ASSESSMENT — ENCOUNTER SYMPTOMS
WHEEZING: 0
ABDOMINAL PAIN: 0
BACK PAIN: 0
RHINORRHEA: 0
SHORTNESS OF BREATH: 0
VOMITING: 0

## 2018-12-05 NOTE — PROGRESS NOTES
Chronic Disease Visit Information    BP Readings from Last 3 Encounters:   10/24/18 120/80   08/17/18 118/72   08/10/18 130/80          Hemoglobin A1C (%)   Date Value   06/12/2018 7.7   05/10/2018 7.9   10/06/2017 8.1     Microscopic Examination (no units)   Date Value   05/30/2017 YES     Microalbumin, Random Urine (mg/dL)   Date Value   10/06/2017 122.10 (H)     LDL Calculated (mg/dL)   Date Value   10/06/2017 see below     HDL (mg/dL)   Date Value   10/06/2017 26 (L)     BUN (mg/dL)   Date Value   06/12/2018 26 (H)     CREATININE (mg/dL)   Date Value   06/12/2018 1.2     Glucose (mg/dL)   Date Value   06/12/2018 282 (H)   02/27/2012 128 (H)            Have you changed or started any medications since your last visit including any over-the-counter medicines, vitamins, or herbal medicines? no   Are you having any side effects from any of your medications? -  no  Have you stopped taking any of your medications? Is so, why? -  no    Have you seen any other physician or provider since your last visit? No  Have you had any other diagnostic tests since your last visit? No  Have you been seen in the emergency room and/or had an admission to a hospital since we last saw you? No  Have you had your annual diabetic retinal (eye) exam? Yes - Records Obtained  Have you had your routine dental cleaning in the past 6 months? No-dentures    Have you activated your [employer] account? If not, what are your barriers?  No:      Patient Care Team:  Aleida Juarez MD as PCP - [address] [place] [address] Street, MD as Consulting Physician (Pulmonology)  Heather Serrano RN as Nurse Navigator         Medical History Review  Past Medical, Family, and Social History reviewed and does not contribute to the patient presenting condition    Health Maintenance   Topic Date Due    DTaP/Tdap/Td vaccine (1 - Tdap) 12/16/1964    Shingles Vaccine (1 of 2 - 2 Dose Series) 12/16/1995    Breast cancer screen  05/01/2016    Lipid screen  10/06/2018    A1C
understanding. Decrease calorie intake. Exercise,weight loss recommended. The current medical regimen is effective;  continue present plan and medications. See orders.

## 2018-12-06 LAB
A/G RATIO: 1.6 (ref 1.1–2.2)
ALBUMIN SERPL-MCNC: 4 G/DL (ref 3.4–5)
ALP BLD-CCNC: 108 U/L (ref 40–129)
ALT SERPL-CCNC: 7 U/L (ref 10–40)
ANION GAP SERPL CALCULATED.3IONS-SCNC: 14 MMOL/L (ref 3–16)
AST SERPL-CCNC: 10 U/L (ref 15–37)
BASOPHILS ABSOLUTE: 0 K/UL (ref 0–0.2)
BASOPHILS RELATIVE PERCENT: 0.6 %
BILIRUB SERPL-MCNC: 0.3 MG/DL (ref 0–1)
BUN BLDV-MCNC: 17 MG/DL (ref 7–20)
CALCIUM SERPL-MCNC: 9.5 MG/DL (ref 8.3–10.6)
CHLORIDE BLD-SCNC: 102 MMOL/L (ref 99–110)
CHOLESTEROL, TOTAL: 142 MG/DL (ref 0–199)
CO2: 24 MMOL/L (ref 21–32)
CREAT SERPL-MCNC: 1 MG/DL (ref 0.6–1.2)
CREATININE URINE: 50.7 MG/DL (ref 28–259)
EOSINOPHILS ABSOLUTE: 0.3 K/UL (ref 0–0.6)
EOSINOPHILS RELATIVE PERCENT: 4.3 %
ESTIMATED AVERAGE GLUCOSE: 208.7 MG/DL
GFR AFRICAN AMERICAN: >60
GFR NON-AFRICAN AMERICAN: 54
GLOBULIN: 2.5 G/DL
GLUCOSE BLD-MCNC: 182 MG/DL (ref 70–99)
HBA1C MFR BLD: 8.9 %
HCT VFR BLD CALC: 32 % (ref 36–48)
HDLC SERPL-MCNC: 28 MG/DL (ref 40–60)
HEMOGLOBIN: 10.3 G/DL (ref 12–16)
LDL CHOLESTEROL CALCULATED: ABNORMAL MG/DL
LDL CHOLESTEROL DIRECT: 64 MG/DL
LYMPHOCYTES ABSOLUTE: 1.3 K/UL (ref 1–5.1)
LYMPHOCYTES RELATIVE PERCENT: 19.3 %
MCH RBC QN AUTO: 27 PG (ref 26–34)
MCHC RBC AUTO-ENTMCNC: 32.3 G/DL (ref 31–36)
MCV RBC AUTO: 83.6 FL (ref 80–100)
MICROALBUMIN UR-MCNC: 38.2 MG/DL
MICROALBUMIN/CREAT UR-RTO: 753.5 MG/G (ref 0–30)
MONOCYTES ABSOLUTE: 0.3 K/UL (ref 0–1.3)
MONOCYTES RELATIVE PERCENT: 4.7 %
NEUTROPHILS ABSOLUTE: 4.9 K/UL (ref 1.7–7.7)
NEUTROPHILS RELATIVE PERCENT: 71.1 %
PDW BLD-RTO: 17 % (ref 12.4–15.4)
PLATELET # BLD: 292 K/UL (ref 135–450)
PMV BLD AUTO: 8 FL (ref 5–10.5)
POTASSIUM SERPL-SCNC: 4.4 MMOL/L (ref 3.5–5.1)
RBC # BLD: 3.83 M/UL (ref 4–5.2)
SODIUM BLD-SCNC: 140 MMOL/L (ref 136–145)
TOTAL PROTEIN: 6.5 G/DL (ref 6.4–8.2)
TRIGL SERPL-MCNC: 322 MG/DL (ref 0–150)
VLDLC SERPL CALC-MCNC: ABNORMAL MG/DL
WBC # BLD: 6.9 K/UL (ref 4–11)

## 2018-12-15 ENCOUNTER — APPOINTMENT (OUTPATIENT)
Dept: GENERAL RADIOLOGY | Age: 73
End: 2018-12-15
Payer: MEDICARE

## 2018-12-15 ENCOUNTER — HOSPITAL ENCOUNTER (EMERGENCY)
Age: 73
Discharge: HOME | End: 2018-12-15
Attending: EMERGENCY MEDICINE
Payer: MEDICARE

## 2018-12-15 VITALS
RESPIRATION RATE: 16 BRPM | WEIGHT: 145 LBS | DIASTOLIC BLOOD PRESSURE: 71 MMHG | TEMPERATURE: 97.7 F | OXYGEN SATURATION: 100 % | SYSTOLIC BLOOD PRESSURE: 142 MMHG | HEIGHT: 60 IN | HEART RATE: 93 BPM | BODY MASS INDEX: 28.47 KG/M2

## 2018-12-15 DIAGNOSIS — M10.022 ACUTE IDIOPATHIC GOUT OF LEFT ELBOW: Primary | ICD-10-CM

## 2018-12-15 PROCEDURE — 73070 X-RAY EXAM OF ELBOW: CPT

## 2018-12-15 PROCEDURE — 99283 EMERGENCY DEPT VISIT LOW MDM: CPT

## 2018-12-15 PROCEDURE — 6370000000 HC RX 637 (ALT 250 FOR IP): Performed by: EMERGENCY MEDICINE

## 2018-12-15 RX ORDER — PREDNISONE 20 MG/1
40 TABLET ORAL DAILY
Qty: 20 TABLET | Refills: 0 | Status: SHIPPED | OUTPATIENT
Start: 2018-12-15 | End: 2018-12-18

## 2018-12-15 RX ORDER — COLCHICINE 0.6 MG/1
0.6 TABLET ORAL DAILY
Status: DISCONTINUED | OUTPATIENT
Start: 2018-12-15 | End: 2018-12-15 | Stop reason: HOSPADM

## 2018-12-15 RX ORDER — OXYCODONE HYDROCHLORIDE AND ACETAMINOPHEN 5; 325 MG/1; MG/1
1 TABLET ORAL ONCE
Status: COMPLETED | OUTPATIENT
Start: 2018-12-15 | End: 2018-12-15

## 2018-12-15 RX ORDER — PREDNISONE 20 MG/1
40 TABLET ORAL ONCE
Status: COMPLETED | OUTPATIENT
Start: 2018-12-15 | End: 2018-12-15

## 2018-12-15 RX ADMIN — COLCHICINE 0.6 MG: 0.6 TABLET, FILM COATED ORAL at 02:16

## 2018-12-15 RX ADMIN — PREDNISONE 40 MG: 20 TABLET ORAL at 02:02

## 2018-12-15 RX ADMIN — OXYCODONE AND ACETAMINOPHEN 1 TABLET: 5; 325 TABLET ORAL at 02:02

## 2018-12-15 ASSESSMENT — PAIN SCALES - GENERAL
PAINLEVEL_OUTOF10: 9
PAINLEVEL_OUTOF10: 7
PAINLEVEL_OUTOF10: 9

## 2018-12-15 ASSESSMENT — PAIN DESCRIPTION - LOCATION: LOCATION: ELBOW

## 2018-12-15 ASSESSMENT — PAIN DESCRIPTION - ORIENTATION: ORIENTATION: LEFT

## 2018-12-15 ASSESSMENT — PAIN DESCRIPTION - DESCRIPTORS: DESCRIPTORS: STABBING;THROBBING

## 2018-12-15 ASSESSMENT — PAIN DESCRIPTION - PROGRESSION: CLINICAL_PROGRESSION: NOT CHANGED

## 2018-12-15 ASSESSMENT — PAIN DESCRIPTION - PAIN TYPE: TYPE: ACUTE PAIN

## 2018-12-15 NOTE — ED PROVIDER NOTES
10-25 MG per tablet TAKE ONE TABLET BY MOUTH DAILY 30 tablet 2    allopurinol (ZYLOPRIM) 300 MG tablet TAKE ONE TABLET BY MOUTH DAILY 30 tablet 2    lactobacillus (CULTURELLE) capsule Take 1 capsule by mouth 2 times daily 30 capsule 0    zolpidem (AMBIEN) 5 MG tablet TAKE 1 TABLET BY MOUTH NIGHTLY AS NEEDED FOR SLEEP 30 tablet 2    diclofenac (VOLTAREN) 75 MG EC tablet Take 1 tablet by mouth 2 times daily 30 tablet 0    vitamin B-12 (CYANOCOBALAMIN) 500 MCG tablet Take 500 mcg by mouth daily      Omega-3 Fatty Acids (FISH OIL) 1000 MG CAPS Take 1,000 mg by mouth daily      calcium carbonate (OSCAL) 500 MG TABS tablet Take 500 mg by mouth daily      aspirin 81 MG EC tablet Take 81 mg by mouth daily. Allergies   Allergen Reactions    Penicillins Anaphylaxis     Penicillin and derivatives       REVIEW OF SYSTEMS  10 systems reviewed, pertinent positives per HPI otherwise noted to be negative     PHYSICAL EXAM  BP (!) 142/71   Pulse 93   Temp 97.7 °F (36.5 °C) (Oral)   Resp 16   Ht 5' (1.524 m)   Wt 145 lb (65.8 kg)   SpO2 100%   Breastfeeding? No   BMI 28.32 kg/m²   GENERAL APPEARANCE: Awake and alert. Cooperative. In mild distress. HEAD: Normocephalic. Atraumatic. EYES: PERRL. EOM's grossly intact. ENT: Mucous membranes are pink and moist.   NECK: Supple. HEART: RRR. No murmurs. LUNGS: Respirations unlabored. CTAB. Good air exchange. ABDOMEN: Soft. Non-distended. Non-tender. No masses. No organomegaly. No guarding or rebound. EXTREMITIES: No peripheral edema. Moves all extremities equally. All extremities neurovascularly intact. She is able wiggle her fingers on her left hand. She gets swelling noted to the left elbow which is painful to touch. It is not warm. SKIN: Warm and dry. No acute rashes. NEUROLOGICAL: Alert and oriented. Strength 5/5, sensation intact. Gait normal.   PSYCHIATRIC: Normal mood and affect. No HI or SI expressed to me.     RADIOLOGY    Effusion noted that

## 2019-04-19 RX ORDER — ENALAPRIL MALEATE AND HYDROCHLOROTHIAZIDE 10; 25 MG/1; MG/1
TABLET ORAL
Qty: 90 TABLET | Refills: 0 | Status: SHIPPED | OUTPATIENT
Start: 2019-04-19 | End: 2019-09-28

## 2019-05-14 ENCOUNTER — OFFICE VISIT (OUTPATIENT)
Dept: INTERNAL MEDICINE CLINIC | Age: 74
End: 2019-05-14

## 2019-05-14 VITALS
HEIGHT: 61 IN | RESPIRATION RATE: 14 BRPM | DIASTOLIC BLOOD PRESSURE: 80 MMHG | SYSTOLIC BLOOD PRESSURE: 120 MMHG | WEIGHT: 136 LBS | HEART RATE: 70 BPM | BODY MASS INDEX: 25.68 KG/M2

## 2019-05-14 DIAGNOSIS — E11.29 TYPE 2 DIABETES MELLITUS WITH MICROALBUMINURIA, WITHOUT LONG-TERM CURRENT USE OF INSULIN (HCC): ICD-10-CM

## 2019-05-14 DIAGNOSIS — R80.9 TYPE 2 DIABETES MELLITUS WITH MICROALBUMINURIA, WITHOUT LONG-TERM CURRENT USE OF INSULIN (HCC): ICD-10-CM

## 2019-05-14 DIAGNOSIS — Z00.00 ENCOUNTER FOR INITIAL PREVENTIVE PHYSICAL EXAMINATION COVERED BY MEDICARE: Primary | ICD-10-CM

## 2019-05-14 DIAGNOSIS — Z00.00 ROUTINE GENERAL MEDICAL EXAMINATION AT A HEALTH CARE FACILITY: ICD-10-CM

## 2019-05-14 DIAGNOSIS — Z12.31 ENCOUNTER FOR SCREENING MAMMOGRAM FOR BREAST CANCER: ICD-10-CM

## 2019-05-14 LAB
BASOPHILS ABSOLUTE: 0.1 K/UL (ref 0–0.2)
BASOPHILS RELATIVE PERCENT: 0.8 %
EOSINOPHILS ABSOLUTE: 0.3 K/UL (ref 0–0.6)
EOSINOPHILS RELATIVE PERCENT: 4.4 %
HCT VFR BLD CALC: 37.4 % (ref 36–48)
HEMOGLOBIN: 13 G/DL (ref 12–16)
LYMPHOCYTES ABSOLUTE: 1.3 K/UL (ref 1–5.1)
LYMPHOCYTES RELATIVE PERCENT: 21.1 %
MCH RBC QN AUTO: 34 PG (ref 26–34)
MCHC RBC AUTO-ENTMCNC: 34.7 G/DL (ref 31–36)
MCV RBC AUTO: 98.2 FL (ref 80–100)
MONOCYTES ABSOLUTE: 0.3 K/UL (ref 0–1.3)
MONOCYTES RELATIVE PERCENT: 4.8 %
NEUTROPHILS ABSOLUTE: 4.4 K/UL (ref 1.7–7.7)
NEUTROPHILS RELATIVE PERCENT: 68.9 %
PDW BLD-RTO: 14.3 % (ref 12.4–15.4)
PLATELET # BLD: 241 K/UL (ref 135–450)
PMV BLD AUTO: 8.1 FL (ref 5–10.5)
RBC # BLD: 3.81 M/UL (ref 4–5.2)
WBC # BLD: 6.3 K/UL (ref 4–11)

## 2019-05-14 PROCEDURE — G0438 PPPS, INITIAL VISIT: HCPCS | Performed by: INTERNAL MEDICINE

## 2019-05-14 RX ORDER — ALLOPURINOL 300 MG/1
TABLET ORAL
Qty: 30 TABLET | Refills: 2 | Status: SHIPPED | OUTPATIENT
Start: 2019-05-14 | End: 2019-08-28 | Stop reason: SDUPTHER

## 2019-05-14 RX ORDER — ATORVASTATIN CALCIUM 40 MG/1
TABLET, FILM COATED ORAL
Qty: 30 TABLET | Refills: 2 | Status: SHIPPED | OUTPATIENT
Start: 2019-05-14 | End: 2019-06-20 | Stop reason: SDUPTHER

## 2019-05-14 RX ORDER — METFORMIN HYDROCHLORIDE 500 MG/1
TABLET, EXTENDED RELEASE ORAL
Qty: 120 TABLET | Refills: 2 | Status: SHIPPED | OUTPATIENT
Start: 2019-05-14 | End: 2019-06-20 | Stop reason: SDUPTHER

## 2019-05-14 ASSESSMENT — ANXIETY QUESTIONNAIRES: GAD7 TOTAL SCORE: 0

## 2019-05-14 ASSESSMENT — PATIENT HEALTH QUESTIONNAIRE - PHQ9
SUM OF ALL RESPONSES TO PHQ QUESTIONS 1-9: 0
SUM OF ALL RESPONSES TO PHQ QUESTIONS 1-9: 0

## 2019-05-14 ASSESSMENT — LIFESTYLE VARIABLES: HOW OFTEN DO YOU HAVE A DRINK CONTAINING ALCOHOL: 0

## 2019-05-14 NOTE — PROGRESS NOTES
Medicare Annual Wellness Visit  Name: Miriam Dave Date: 6653   MRN: [id number] Sex: Female   Age: 68 y.o. Ethnicity: Non-/Non    : 1945 Race: Per Byrne is here for Medicare AWV    Screenings for behavioral, psychosocial and functional/safety risks, and cognitive dysfunction are all negative except as indicated below. These results, as well as other patient data from the [address] [facility] [address] form, are documented in Flowsheets linked to this Encounter. Allergies   Allergen Reactions    Penicillins Anaphylaxis     Penicillin and derivatives     Prior to Visit Medications    Medication Sig Taking? Authorizing Provider   allopurinol (ZYLOPRIM) 300 MG tablet TAKE ONE TABLET BY MOUTH DAILY Yes Alejandro López MD   atorvastatin (LIPITOR) 40 MG tablet TAKE ONE TABLET BY MOUTH DAILY Yes Alejandro López MD   metFORMIN (GLUCOPHAGE-XR) 500 MG extended release tablet TAKE TWO TABLETS BY MOUTH TWICE A DAY Yes Alejandro López MD   enalapril-hydrochlorothiazide (VASERETIC) 10-25 MG per tablet TAKE ONE TABLET BY MOUTH DAILY  Alejandro López MD   lactobacillus (CULTURELLE) capsule Take 1 capsule by mouth 2 times daily  Taurus Kenyon MD   zolpidem (AMBIEN) 5 MG tablet TAKE 1 TABLET BY MOUTH NIGHTLY AS NEEDED FOR SLEEP  Alejandro López MD   diclofenac (VOLTAREN) 75 MG EC tablet Take 1 tablet by mouth 2 times daily  Alejandro López MD   vitamin B-12 (CYANOCOBALAMIN) 500 MCG tablet Take 500 mcg by mouth daily  Historical Provider, MD   Omega-3 Fatty Acids (FISH OIL) 1000 MG CAPS Take 1,000 mg by mouth daily  Historical Provider, MD   calcium carbonate (OSCAL) 500 MG TABS tablet Take 500 mg by mouth daily  Historical Provider, MD   aspirin 81 MG EC tablet Take 81 mg by mouth daily.   Historical Provider, MD     Past Medical History:   Diagnosis Date    Anemia     Backache, unspecified 3/11/08    Depressive disorder, not elsewhere 120/80   Site: Right Upper Arm   Position: Sitting   Cuff Size: Medium Adult   Pulse: 70   Resp: 14   Weight: 136 lb (61.7 kg)   Height: 5' 0.5\" (1.537 m)     Body mass index is 26.12 kg/m². Based upon direct observation of the patient, evaluation of cognition reveals recent and remote memory intact. General Appearance: alert and oriented to person, place and time, well developed and well- nourished, in no acute distress  Skin: warm and dry, no rash or erythema  Head: normocephalic and atraumatic  Eyes: pupils equal, round, and reactive to light, extraocular eye movements intact, conjunctivae normal  ENT: tympanic membrane, external ear and ear canal normal bilaterally, nose without deformity, nasal mucosa and turbinates normal without polyps  Neck: supple and non-tender without mass, no thyromegaly or thyroid nodules, no cervical lymphadenopathy  Pulmonary/Chest: clear to auscultation bilaterally- no wheezes, rales or rhonchi, normal air movement, no respiratory distress  Cardiovascular: normal rate, regular rhythm, normal S1 and S2, no murmurs, rubs, clicks, or gallops, distal pulses intact, no carotid bruits  Abdomen: soft, non-tender, non-distended, normal bowel sounds, no masses or organomegaly  Extremities: no cyanosis, clubbing or edema  Musculoskeletal: normal range of motion, no joint swelling, deformity or tenderness  Neurologic: reflexes normal and symmetric, no cranial nerve deficit, gait, coordination and speech normal    Patient's complete Health Risk Assessment and screening values have been reviewed and are found in Flowsheets. The following problems were reviewed today and where indicated follow up appointments were made and/or referrals ordered.     Positive Risk Factor Screenings with Interventions:     Health Habits/Nutrition:  Health Habits/Nutrition  Do you exercise for at least 20 minutes 2-3 times per week?: Yes  Have you lost any weight without trying in the past 3 months?: No  Do you eat fewer than 2 meals per day?: No  Have you seen a dentist within the past year?: (dentures)  Body mass index is 26.12 kg/m². Health Habits/Nutrition Interventions:  · Nutritional issues:  educational materials for healthy, well-balanced diet provided    Safety:  Safety  Do you have working smoke detectors?: Yes  Have all throw rugs been removed or fastened?: (!) No  Do you have non-slip mats in all bathtubs?: Yes  Do all of your stairways have a railing or banister?: (n/a)  Are your doorways, halls and stairs free of clutter?: Yes  Do you always fasten your seatbelt when you are in a car?: Yes  Safety Interventions:  · Home safety tips provided    Personalized Preventive Plan   Current Health Maintenance Status  Immunization History   Administered Date(s) Administered    Influenza Virus Vaccine 11/11/2011, 08/27/2012, 09/15/2014, 10/02/2015    Influenza Whole 09/23/2010    Influenza, High Dose (Fluzone 65 yrs and older) 10/10/2016, 10/06/2017, 10/24/2018    Pneumococcal 13-valent Conjugate ([id number]) 08/10/2018    Pneumococcal Conjugate 7-valent 10/05/2005    Pneumococcal Polysaccharide ([id number]) 12/01/2015        Health Maintenance   Topic Date Due    DTaP/Tdap/Td vaccine (1 - Tdap) 12/16/1964    Shingles Vaccine (1 of 2) 12/16/1995    Colon cancer screen colonoscopy  06/10/2015    Breast cancer screen  05/01/2016    Low dose CT lung screening  07/13/2019    Diabetic foot exam  08/10/2019    A1C test (Diabetic or Prediabetic)  12/05/2019    Lipid screen  12/05/2019    Potassium monitoring  12/05/2019    Creatinine monitoring  12/05/2019    Diabetic retinal exam  04/16/2021    DEXA (modify frequency per FRAX score)  Completed    Flu vaccine  Completed    Pneumococcal 65+ years Vaccine  Completed    Hepatitis C screen  Completed     Recommendations for Preventive Services Due: see orders and patient instructions/AVS.  .   Recommended screening schedule for the next 5-10 years is provided to the patient in written form: see Patient Instructions/AVS.    She will go to see Dr Sheba Serrano for anemia. She is due for a colonoscopy.

## 2019-05-14 NOTE — PATIENT INSTRUCTIONS
Personalized Preventive Plan for Albert Madrigal - [birth year]  Medicare offers a range of preventive health benefits. Some of the tests and screenings are paid in full while other may be subject to a deductible, co-insurance, and/or copay. Some of these benefits include a comprehensive review of your medical history including lifestyle, illnesses that may run in your family, and various assessments and screenings as appropriate. After reviewing your medical record and screening and assessments performed today your provider may have ordered immunizations, labs, imaging, and/or referrals for you. A list of these orders (if applicable) as well as your Preventive Care list are included within your After Visit Summary for your review. Other Preventive Recommendations:    · A preventive eye exam performed by an eye specialist is recommended every 1-2 years to screen for glaucoma; cataracts, macular degeneration, and other eye disorders. · A preventive dental visit is recommended every 6 months. · Try to get at least 150 minutes of exercise per week or 10,000 steps per day on a pedometer . · Order or download the FREE \"Exercise & Physical Activity: Your Everyday Guide\" from [employer]. Call 8-817.362.8253 or search [employer] online. · You need 3573-2341 mg of calcium and 3983-6710 IU of vitamin D per day. It is possible to meet your calcium requirement with diet alone, but a vitamin D supplement is usually necessary to meet this goal.  · When exposed to the sun, use a sunscreen that protects against both UVA and UVB radiation with an SPF of 30 or greater. Reapply every 2 to 3 hours or after sweating, drying off with a towel, or swimming. · Always wear a seat belt when traveling in a car. Always wear a helmet when riding a bicycle or motorcycle.     Keep Your Memory [place]       Many factors can affect your ability to remembera hectic lifestyle, aging, stress, chronic disease, and certain medicines. But, there are steps you can take to sharpen your mind and help preserve your memory. Challenge Your Brain   Regularly challenging your mind may help keeps it in top shape. Good mental exercises include:   Crossword puzzlesUse a dictionary if you need it; you will learn more that way. Brainteasers Try some! Crafts, such as wood working and sewing   Hobbies, such as gardening and building model airplanes   SocializingVisit old friends or join groups to meet new ones. Reading   Learning a new language   Taking a class, whether it be art history or len chi   TravelingExperience the food, history, and culture of your destination   Learning to use a computer   Going to museums, the theater, or thought-provoking movies   Changing things in your daily life, such as reversing your pattern in the grocery store or brushing your teeth using your nondominant hand   Use Memory Aids   There is no need to remember every detail on your own. These memory aids can help:   Calendars and day planners   Electronic organizers to store all sorts of helpful informationThese devices can \"beep\" to remind you of appointments. A book of days to record birthdays, anniversaries, and other occasions that occur on the same date every year   Detailed \"to-do\" lists and strategically placed sticky notes   Quick \"study\" sessionsBefore a gathering, review who will be there so their names will be fresh in your mind. Establish routinesFor example, keep your keys, wallet, and umbrella in the same place all the time or take medicine with your 8:00 AM glass of juice   Live a Healthy Life   Many actions that will keep your body strong will do the same for your mind. For example:   Talk to Your Doctor About Herbs and Supplements    Malnutrition and vitamin deficiencies can impair your mental function. For example, vitamin B12 deficiency can cause a range of symptoms, including confusion.  But, what if your decreased muscle strength, slowed reflexes)   Higher incidence of chronic health problems (eg, arthritis, diabetes) that may limit mobility, agility or sensory awareness   Side effects of medicine (eg, dizziness, blurred vision)especially medicines like prescription pain medicines and drugs used to treat mental health conditions   Depending on the brittleness of your bones, the consequences of a fall can be serious and long lasting. Home Life   Research by the Association of Aging [facility]) shows that some home accidents among older adults can be prevented by making simple lifestyle changes and basic modifications and repairs to the home environment. Here are some lifestyle changes that experts recommend:   Have your hearing and vision checked regularly. Be sure to wear prescription glasses that are right for you. Speak to your doctor or pharmacist about the possible side effects of your medicines. A number of medicines can cause dizziness. If you have problems with sleep, talk to your doctor. Limit your intake of alcohol. If necessary, use a cane or walker to help maintain your balance. Wear supportive, rubber-soled shoes, even at home. If you live in a region that gets wintry weather, you may want to put special cleats on your shoes to prevent you from slipping on the snow and ice. Exercise regularly to help maintain muscle tone, agility, and balance. Always hold the banister when going up or down stairs. Also, use  bars when getting in or out of the bath or shower, or using the toilet. To avoid dizziness, get up slowly from a lying down position. Sit up first, dangling your legs for a minute or two before rising to a standing position. Overall Home Safety Check   According to the Consumer Product Safety Commision's \"Older Consumer Home Safety Checklist,\" it is important to check for potential hazards in each room. And remember, proper lighting is an essential factor in home safety.  If you they do not hang over the sink, range, or working areas. Look for coffee pots, kettles and toaster ovens with automatic shut-offs. Keep a mop handy in the kitchen so you can wipe up spills instantly. You should also have a small fire extinguisher. Arrange your kitchen with frequently used items on lower shelves to avoid the need to stand on a stepstool to reach them. Make sure countertops are well-lit to avoid injuries while cutting and preparing food. In the Bathroom    Use a non-slip mat or decals in the tub and shower, since wet, soapy tile or porcelain surfaces are extremely slippery. Make sure bathroom rugs are non-skid or tape them firmly to the floor. Bathtubs should have at least one, preferably two, grab bars, firmly attached to structural supports in the wall. (Do not use built-in soap holders or glass shower doors as grab bars.)    Tub seats fitted with non-slip material on the legs allow you to wash sitting down. For people with limited mobility, bathtub transfer benches allow you to slide safely into the tub. Raised toilet seats and toilet safety rails are helpful for those with knee or hip problems. In the [facility]    Make sure you use a nightlight and that the area around your bed is clear of potential obstacles. Be careful with electric blankets and never go to sleep with a heating pad, which can cause serious burns even if on a low setting. Use fire-resistant mattress covers and pillows, and NEVER smoke in bed. Keep a phone next to the bed that is programmed to dial 911 at the push of a button. If you have a chronic condition, you may want to sign on with an automatic call-in service. Typically the system includes a small pendant that connects directly to an emergency medical voice-response system. You should also make arrangements to stay in contact with someonefriend, neighbor, family memberon a regular schedule.    Fire Prevention   According to the [employer]Smoke Alarms for Every) [facility], senior citizens are one of the two highest risk groups for death and serious injuries due to residential fires. When cooking, wear short-sleeved items, never a bulky long-sleeved robe. The [facility]'s Safety Checklist for Older Consumers emphasizes the importance of checking basements, garages, workshops and storage areas for fire hazards, such as volatile liquids, piles of old rags or clothing and overloaded circuits. Never smoke in bed or when lying down on a couch or recliner chair. Small portable electric or kerosene heaters are responsible for many home fires and should be used cautiously if at all. If you do use one, be sure to keep them away from flammable materials. In case of fire, make sure you have a pre-established emergency exit plan. Have a professional check your fireplace and other fuel-burning appliances yearly. [employer]   Baby boomers entering the schmitt years will continue to see the development of new products to help older adults live safely and independently in spite of age-related changes. Making Life More Livable  , by [employer], lists over 1,000 products for \"living well in the mature years,\" such as bathing and mobility aids, household security devices, ergonomically designed knives and peelers, and faucet valves and knobs for temperature control. Medical supply stores and organizations are good sources of information about products that improve your quality of life and insure your safety.      Last Reviewed: November 2009 Miguel Resendiz MD   Updated: 3/7/2011     ·

## 2019-05-15 LAB
A/G RATIO: 1.4 (ref 1.1–2.2)
ALBUMIN SERPL-MCNC: 3.9 G/DL (ref 3.4–5)
ALP BLD-CCNC: 111 U/L (ref 40–129)
ALT SERPL-CCNC: 13 U/L (ref 10–40)
ANION GAP SERPL CALCULATED.3IONS-SCNC: 17 MMOL/L (ref 3–16)
AST SERPL-CCNC: 13 U/L (ref 15–37)
BILIRUB SERPL-MCNC: 0.4 MG/DL (ref 0–1)
BUN BLDV-MCNC: 32 MG/DL (ref 7–20)
CALCIUM SERPL-MCNC: 9.3 MG/DL (ref 8.3–10.6)
CHLORIDE BLD-SCNC: 105 MMOL/L (ref 99–110)
CO2: 19 MMOL/L (ref 21–32)
CREAT SERPL-MCNC: 1.4 MG/DL (ref 0.6–1.2)
ESTIMATED AVERAGE GLUCOSE: 177.2 MG/DL
GFR AFRICAN AMERICAN: 45
GFR NON-AFRICAN AMERICAN: 37
GLOBULIN: 2.7 G/DL
GLUCOSE BLD-MCNC: 186 MG/DL (ref 70–99)
HBA1C MFR BLD: 7.8 %
POTASSIUM SERPL-SCNC: 4.4 MMOL/L (ref 3.5–5.1)
SODIUM BLD-SCNC: 141 MMOL/L (ref 136–145)
TOTAL PROTEIN: 6.6 G/DL (ref 6.4–8.2)
URIC ACID, SERUM: 5.9 MG/DL (ref 2.6–6)

## 2019-05-21 NOTE — TELEPHONE ENCOUNTER
----- Message from Viraj Moffett MD sent at 5/21/2019  2:51 PM EDT -----  Contact: pt [phone]  Colcrys 0.6 mg po bid # 30  ----- Message -----  From: Horace Barksdale  Sent: 5/21/2019   1:06 PM  To: Viraj Moffett MD    Pt called and said she has gout in her right leg, knee and ankle, she said she is in pain and wants to know if you could prescribe her something. She would like a call back at [phone]. [facility]   Last appt. 5-14-19.

## 2019-05-22 RX ORDER — COLCHICINE 0.6 MG/1
0.6 TABLET ORAL 2 TIMES DAILY
Qty: 30 TABLET | Refills: 0 | Status: SHIPPED | OUTPATIENT
Start: 2019-05-22 | End: 2019-09-28

## 2019-05-24 NOTE — TELEPHONE ENCOUNTER
----- Message from Narcisa Abebe MD sent at 5/21/2019  7:51 AM EDT -----  Add Januvia 10 mg po daily

## 2019-06-13 PROBLEM — Z00.00 ENCOUNTER FOR INITIAL PREVENTIVE PHYSICAL EXAMINATION COVERED BY MEDICARE: Status: RESOLVED | Noted: 2019-05-14 | Resolved: 2019-06-13

## 2019-06-20 RX ORDER — METFORMIN HYDROCHLORIDE 500 MG/1
TABLET, EXTENDED RELEASE ORAL
Qty: 360 TABLET | Refills: 0 | Status: ON HOLD | OUTPATIENT
Start: 2019-06-20 | End: 2020-01-13 | Stop reason: SDUPTHER

## 2019-06-20 RX ORDER — ATORVASTATIN CALCIUM 40 MG/1
TABLET, FILM COATED ORAL
Qty: 90 TABLET | Refills: 0 | Status: ON HOLD | OUTPATIENT
Start: 2019-06-20 | End: 2020-01-13 | Stop reason: HOSPADM

## 2019-07-17 ENCOUNTER — TELEPHONE (OUTPATIENT)
Dept: PULMONOLOGY | Age: 74
End: 2019-07-17

## 2019-07-17 NOTE — TELEPHONE ENCOUNTER
She is currently out of town with dying sister. Will f/u in about a week if we have not heard back from her.

## 2019-08-09 NOTE — TELEPHONE ENCOUNTER
Called ranjit with sister states will have her call once she is able she is moving her sister. Will f/u in about 2 wks.

## 2019-08-20 ENCOUNTER — HOSPITAL ENCOUNTER (EMERGENCY)
Age: 74
Discharge: HOME | End: 2019-08-20
Attending: EMERGENCY MEDICINE
Payer: MEDICARE

## 2019-08-20 VITALS
WEIGHT: 134 LBS | RESPIRATION RATE: 16 BRPM | HEART RATE: 79 BPM | BODY MASS INDEX: 24.66 KG/M2 | SYSTOLIC BLOOD PRESSURE: 189 MMHG | HEIGHT: 62 IN | OXYGEN SATURATION: 99 % | DIASTOLIC BLOOD PRESSURE: 98 MMHG | TEMPERATURE: 98.2 F

## 2019-08-20 DIAGNOSIS — S05.02XA ABRASION OF LEFT CORNEA, INITIAL ENCOUNTER: Primary | ICD-10-CM

## 2019-08-20 PROCEDURE — 99283 EMERGENCY DEPT VISIT LOW MDM: CPT

## 2019-08-20 PROCEDURE — 6370000000 HC RX 637 (ALT 250 FOR IP): Performed by: EMERGENCY MEDICINE

## 2019-08-20 RX ORDER — ERYTHROMYCIN 5 MG/G
OINTMENT OPHTHALMIC ONCE
Status: COMPLETED | OUTPATIENT
Start: 2019-08-20 | End: 2019-08-20

## 2019-08-20 RX ADMIN — ERYTHROMYCIN: 5 OINTMENT OPHTHALMIC at 22:24

## 2019-08-20 ASSESSMENT — PAIN SCALES - GENERAL: PAINLEVEL_OUTOF10: 4

## 2019-08-21 NOTE — ED PROVIDER NOTES
Efforts were made to edit the dictations but occasionally words are mis-transcribed.)    MD Amina Mart., MD  08/20/19 2021

## 2019-08-28 ENCOUNTER — OFFICE VISIT (OUTPATIENT)
Dept: INTERNAL MEDICINE CLINIC | Age: 74
End: 2019-08-28

## 2019-08-28 VITALS
BODY MASS INDEX: 25.86 KG/M2 | SYSTOLIC BLOOD PRESSURE: 140 MMHG | HEART RATE: 70 BPM | DIASTOLIC BLOOD PRESSURE: 80 MMHG | HEIGHT: 61 IN | WEIGHT: 137 LBS | RESPIRATION RATE: 14 BRPM

## 2019-08-28 DIAGNOSIS — R80.9 TYPE 2 DIABETES MELLITUS WITH MICROALBUMINURIA, WITHOUT LONG-TERM CURRENT USE OF INSULIN (HCC): ICD-10-CM

## 2019-08-28 DIAGNOSIS — R80.9 TYPE 2 DIABETES MELLITUS WITH MICROALBUMINURIA, WITHOUT LONG-TERM CURRENT USE OF INSULIN (HCC): Primary | ICD-10-CM

## 2019-08-28 DIAGNOSIS — E11.29 TYPE 2 DIABETES MELLITUS WITH MICROALBUMINURIA, WITHOUT LONG-TERM CURRENT USE OF INSULIN (HCC): ICD-10-CM

## 2019-08-28 DIAGNOSIS — E78.5 HYPERLIPIDEMIA ASSOCIATED WITH TYPE 2 DIABETES MELLITUS (HCC): ICD-10-CM

## 2019-08-28 DIAGNOSIS — F51.01 PRIMARY INSOMNIA: ICD-10-CM

## 2019-08-28 DIAGNOSIS — F33.1 MAJOR DEPRESSIVE DISORDER, RECURRENT EPISODE, MODERATE (HCC): ICD-10-CM

## 2019-08-28 DIAGNOSIS — E11.69 HYPERLIPIDEMIA ASSOCIATED WITH TYPE 2 DIABETES MELLITUS (HCC): ICD-10-CM

## 2019-08-28 DIAGNOSIS — I10 ESSENTIAL HYPERTENSION, BENIGN: ICD-10-CM

## 2019-08-28 DIAGNOSIS — M1A.09X0 IDIOPATHIC CHRONIC GOUT OF MULTIPLE SITES WITHOUT TOPHUS: ICD-10-CM

## 2019-08-28 DIAGNOSIS — E11.29 TYPE 2 DIABETES MELLITUS WITH MICROALBUMINURIA, WITHOUT LONG-TERM CURRENT USE OF INSULIN (HCC): Primary | ICD-10-CM

## 2019-08-28 PROCEDURE — 2022F DILAT RTA XM EVC RTNOPTHY: CPT | Performed by: INTERNAL MEDICINE

## 2019-08-28 PROCEDURE — G8427 DOCREV CUR MEDS BY ELIG CLIN: HCPCS | Performed by: INTERNAL MEDICINE

## 2019-08-28 PROCEDURE — G8419 CALC BMI OUT NRM PARAM NOF/U: HCPCS | Performed by: INTERNAL MEDICINE

## 2019-08-28 PROCEDURE — 1123F ACP DISCUSS/DSCN MKR DOCD: CPT | Performed by: INTERNAL MEDICINE

## 2019-08-28 PROCEDURE — 4004F PT TOBACCO SCREEN RCVD TLK: CPT | Performed by: INTERNAL MEDICINE

## 2019-08-28 PROCEDURE — 4040F PNEUMOC VAC/ADMIN/RCVD: CPT | Performed by: INTERNAL MEDICINE

## 2019-08-28 PROCEDURE — 3017F COLORECTAL CA SCREEN DOC REV: CPT | Performed by: INTERNAL MEDICINE

## 2019-08-28 PROCEDURE — 3045F PR MOST RECENT HEMOGLOBIN A1C LEVEL 7.0-9.0%: CPT | Performed by: INTERNAL MEDICINE

## 2019-08-28 PROCEDURE — G8399 PT W/DXA RESULTS DOCUMENT: HCPCS | Performed by: INTERNAL MEDICINE

## 2019-08-28 PROCEDURE — 1090F PRES/ABSN URINE INCON ASSESS: CPT | Performed by: INTERNAL MEDICINE

## 2019-08-28 PROCEDURE — 99214 OFFICE O/P EST MOD 30 MIN: CPT | Performed by: INTERNAL MEDICINE

## 2019-08-28 RX ORDER — ALLOPURINOL 300 MG/1
TABLET ORAL
Qty: 30 TABLET | Refills: 2 | Status: SHIPPED | OUTPATIENT
Start: 2019-08-28 | End: 2020-09-23

## 2019-08-28 ASSESSMENT — ENCOUNTER SYMPTOMS
ABDOMINAL PAIN: 0
RHINORRHEA: 0
BACK PAIN: 0
SHORTNESS OF BREATH: 0
WHEEZING: 0
VOMITING: 0

## 2019-08-29 LAB
ANION GAP SERPL CALCULATED.3IONS-SCNC: 16 MMOL/L (ref 3–16)
BUN BLDV-MCNC: 17 MG/DL (ref 7–20)
CALCIUM SERPL-MCNC: 9.7 MG/DL (ref 8.3–10.6)
CHLORIDE BLD-SCNC: 100 MMOL/L (ref 99–110)
CO2: 23 MMOL/L (ref 21–32)
CREAT SERPL-MCNC: 1.1 MG/DL (ref 0.6–1.2)
ESTIMATED AVERAGE GLUCOSE: 171.4 MG/DL
GFR AFRICAN AMERICAN: 59
GFR NON-AFRICAN AMERICAN: 49
GLUCOSE BLD-MCNC: 221 MG/DL (ref 70–99)
HBA1C MFR BLD: 7.6 %
POTASSIUM SERPL-SCNC: 4.3 MMOL/L (ref 3.5–5.1)
SODIUM BLD-SCNC: 139 MMOL/L (ref 136–145)

## 2019-09-06 ENCOUNTER — TELEPHONE (OUTPATIENT)
Dept: INTERNAL MEDICINE CLINIC | Age: 74
End: 2019-09-06

## 2019-09-09 ENCOUNTER — TELEPHONE (OUTPATIENT)
Dept: INTERNAL MEDICINE CLINIC | Age: 74
End: 2019-09-09

## 2019-09-09 NOTE — TELEPHONE ENCOUNTER
----- Message from Maynor Garza MD sent at 9/6/2019 11:20 AM EDT -----  Contact: pt- [phone]  Check with her who gave it. Tell her I dont do pain pills. ?for her   ----- Message -----  From: Sal Hernandez  Sent: 9/6/2019  10:46 AM EDT  To: Maynor Garza MD    I don't see norco in [facility] and in chart was last filled in 2017.   Please advise.  ----- Message -----  From: Belkis Mejias  Sent: 9/6/2019  10:20 AM EDT  To: Sal Hernandez    She needs a refill on her vicodin called into [facility] in [facility] at [phone] appt- [id number]-next appt- 12-11-19-

## 2019-09-09 NOTE — TELEPHONE ENCOUNTER
----- Message from Carline Ladd MD sent at 9/6/2019 11:20 AM EDT -----  Contact: pt- [phone]  Check with her who gave it. Tell her I dont do pain pills. ?for her   ----- Message -----  From: Colleen Ross  Sent: 9/6/2019  10:46 AM EDT  To: Carline Ladd MD    I don't see norco in [facility] and in chart was last filled in 2017.   Please advise.  ----- Message -----  From: Eddie [place] Randell  Sent: 9/6/2019  10:20 AM EDT  To: Colleen Ross    She needs a refill on her vicodin called into [facility] in [facility] at [phone] appt- 1-80-14-next appt- 12-11-19-lr

## 2019-09-18 ENCOUNTER — OFFICE VISIT (OUTPATIENT)
Dept: INTERNAL MEDICINE CLINIC | Age: 74
End: 2019-09-18

## 2019-09-18 VITALS
BODY MASS INDEX: 25.68 KG/M2 | DIASTOLIC BLOOD PRESSURE: 80 MMHG | HEIGHT: 61 IN | HEART RATE: 70 BPM | RESPIRATION RATE: 14 BRPM | TEMPERATURE: 98.3 F | SYSTOLIC BLOOD PRESSURE: 130 MMHG | WEIGHT: 136 LBS

## 2019-09-18 DIAGNOSIS — J20.9 ACUTE BRONCHITIS, UNSPECIFIED ORGANISM: Primary | ICD-10-CM

## 2019-09-18 DIAGNOSIS — Z12.39 BREAST CANCER SCREENING: ICD-10-CM

## 2019-09-18 PROCEDURE — 99213 OFFICE O/P EST LOW 20 MIN: CPT | Performed by: INTERNAL MEDICINE

## 2019-09-18 RX ORDER — DOXYCYCLINE HYCLATE 100 MG
100 TABLET ORAL 2 TIMES DAILY
Qty: 14 TABLET | Refills: 0 | Status: SHIPPED | OUTPATIENT
Start: 2019-09-18 | End: 2019-09-25

## 2019-09-18 RX ORDER — ALBUTEROL SULFATE 90 UG/1
2 AEROSOL, METERED RESPIRATORY (INHALATION) EVERY 6 HOURS PRN
Qty: 1 INHALER | Refills: 0 | Status: SHIPPED
Start: 2019-09-18 | End: 2020-02-10 | Stop reason: ALTCHOICE

## 2019-09-18 RX ORDER — PREDNISONE 20 MG/1
20 TABLET ORAL DAILY
Qty: 5 TABLET | Refills: 0 | Status: SHIPPED | OUTPATIENT
Start: 2019-09-18 | End: 2019-09-23

## 2019-09-18 RX ORDER — BENZONATATE 200 MG/1
200 CAPSULE ORAL 3 TIMES DAILY PRN
Qty: 30 CAPSULE | Refills: 0 | Status: SHIPPED | OUTPATIENT
Start: 2019-09-18 | End: 2019-09-28

## 2019-09-18 NOTE — PROGRESS NOTES
Take 500 mg by mouth daily, Disp: , Rfl:     aspirin 81 MG EC tablet, Take 81 mg by mouth daily. , Disp: , Rfl:     /80 (Site: Left Upper Arm, Position: Sitting, Cuff Size: Medium Adult)   Pulse 70   Temp 98.3 °F (36.8 °C) (Oral)   Resp 14   Ht 5' 0.5\" (1.537 m)   Wt 136 lb (61.7 kg)   BMI 26.12 kg/m²     Objective:   Physical Exam   Constitutional: She appears well-developed and well-nourished. She has a sickly appearance. No distress. HENT:   Head: Normocephalic. Right Ear: Tympanic membrane is not injected and not erythematous. Left Ear: Tympanic membrane is not injected and not erythematous. Nose: Rhinorrhea present. No septal deviation. Right sinus exhibits no maxillary sinus tenderness and no frontal sinus tenderness. Left sinus exhibits no maxillary sinus tenderness and no frontal sinus tenderness. Mouth/Throat: Oropharynx is clear and moist.   Eyes: Pupils are equal, round, and reactive to light. Conjunctivae and EOM are normal.   Neck: Trachea normal. Neck supple. Cardiovascular: Normal rate, regular rhythm, S1 normal, S2 normal and normal heart sounds. Pulmonary/Chest: No respiratory distress. She has decreased breath sounds. She has wheezes. Assessment:       Diagnosis Orders   1. Acute bronchitis, unspecified organism     2. Breast cancer screening  Mammography screening bilateral          Plan:      #  Acute bronchitis. Treat with Doxy/Prednisone/Tessalon and Ventolin. #  Mammogram ordered.         Joselyn Crowe MD

## 2019-09-28 ENCOUNTER — APPOINTMENT (OUTPATIENT)
Dept: GENERAL RADIOLOGY | Age: 74
End: 2019-09-28
Payer: MEDICARE

## 2019-09-28 ENCOUNTER — HOSPITAL ENCOUNTER (EMERGENCY)
Age: 74
Discharge: HOME | End: 2019-09-28
Attending: EMERGENCY MEDICINE
Payer: MEDICARE

## 2019-09-28 VITALS
WEIGHT: 134 LBS | BODY MASS INDEX: 26.31 KG/M2 | RESPIRATION RATE: 18 BRPM | OXYGEN SATURATION: 99 % | HEIGHT: 60 IN | TEMPERATURE: 98.7 F | SYSTOLIC BLOOD PRESSURE: 134 MMHG | HEART RATE: 108 BPM | DIASTOLIC BLOOD PRESSURE: 78 MMHG

## 2019-09-28 DIAGNOSIS — J20.9 BRONCHOSPASM WITH BRONCHITIS, ACUTE: ICD-10-CM

## 2019-09-28 DIAGNOSIS — J06.9 ACUTE UPPER RESPIRATORY INFECTION: Primary | ICD-10-CM

## 2019-09-28 PROCEDURE — 6370000000 HC RX 637 (ALT 250 FOR IP): Performed by: EMERGENCY MEDICINE

## 2019-09-28 PROCEDURE — 71046 X-RAY EXAM CHEST 2 VIEWS: CPT

## 2019-09-28 PROCEDURE — 99283 EMERGENCY DEPT VISIT LOW MDM: CPT

## 2019-09-28 RX ORDER — PREDNISONE 10 MG/1
10 TABLET ORAL SEE ADMIN INSTRUCTIONS
Qty: 30 TABLET | Refills: 0 | Status: ON HOLD | OUTPATIENT
Start: 2019-09-29 | End: 2020-01-13 | Stop reason: HOSPADM

## 2019-09-28 RX ORDER — AZITHROMYCIN 250 MG/1
TABLET, FILM COATED ORAL
Qty: 1 PACKET | Refills: 0 | Status: SHIPPED | OUTPATIENT
Start: 2019-09-28 | End: 2019-12-11

## 2019-09-28 RX ORDER — ALBUTEROL SULFATE 90 UG/1
2 AEROSOL, METERED RESPIRATORY (INHALATION) EVERY 6 HOURS PRN
Qty: 1 INHALER | Refills: 3 | Status: ON HOLD | OUTPATIENT
Start: 2019-09-28 | End: 2020-01-13 | Stop reason: CLARIF

## 2019-09-28 RX ORDER — PREDNISONE 20 MG/1
40 TABLET ORAL ONCE
Status: COMPLETED | OUTPATIENT
Start: 2019-09-28 | End: 2019-09-28

## 2019-09-28 RX ORDER — GUAIFENESIN AND CODEINE PHOSPHATE 100; 10 MG/5ML; MG/5ML
10 SOLUTION ORAL 3 TIMES DAILY PRN
Qty: 100 ML | Refills: 0 | Status: SHIPPED | OUTPATIENT
Start: 2019-09-28 | End: 2019-10-05

## 2019-09-28 RX ADMIN — PREDNISONE 40 MG: 20 TABLET ORAL at 16:59

## 2019-09-28 ASSESSMENT — ENCOUNTER SYMPTOMS
SHORTNESS OF BREATH: 0
WHEEZING: 1
CHOKING: 0
CHEST TIGHTNESS: 0
SINUS PAIN: 0
COUGH: 1
ABDOMINAL PAIN: 0
BACK PAIN: 0
APNEA: 0
STRIDOR: 0
SINUS PRESSURE: 0

## 2019-09-28 ASSESSMENT — PAIN DESCRIPTION - PROGRESSION: CLINICAL_PROGRESSION: NOT CHANGED

## 2019-09-28 ASSESSMENT — PAIN DESCRIPTION - FREQUENCY: FREQUENCY: INTERMITTENT

## 2019-09-28 ASSESSMENT — PAIN DESCRIPTION - DESCRIPTORS: DESCRIPTORS: DISCOMFORT

## 2019-09-28 ASSESSMENT — PAIN DESCRIPTION - PAIN TYPE: TYPE: ACUTE PAIN

## 2019-09-28 ASSESSMENT — PAIN DESCRIPTION - LOCATION: LOCATION: CHEST

## 2019-09-28 ASSESSMENT — PAIN DESCRIPTION - ONSET: ONSET: GRADUAL

## 2019-09-28 ASSESSMENT — PAIN SCALES - GENERAL: PAINLEVEL_OUTOF10: 5

## 2019-10-07 RX ORDER — ENALAPRIL MALEATE AND HYDROCHLOROTHIAZIDE 10; 25 MG/1; MG/1
TABLET ORAL
Qty: 90 TABLET | Refills: 0 | Status: SHIPPED | OUTPATIENT
Start: 2019-10-07 | End: 2019-12-03

## 2019-10-23 ENCOUNTER — OFFICE VISIT (OUTPATIENT)
Dept: INTERNAL MEDICINE CLINIC | Age: 74
End: 2019-10-23

## 2019-10-23 VITALS
HEIGHT: 60 IN | DIASTOLIC BLOOD PRESSURE: 70 MMHG | HEART RATE: 108 BPM | TEMPERATURE: 97.4 F | WEIGHT: 142 LBS | BODY MASS INDEX: 27.88 KG/M2 | SYSTOLIC BLOOD PRESSURE: 130 MMHG

## 2019-10-23 DIAGNOSIS — M79.89 SWELLING OF LEFT FOOT: Primary | ICD-10-CM

## 2019-10-23 DIAGNOSIS — L03.032 CELLULITIS OF SMALL TOE OF LEFT FOOT: ICD-10-CM

## 2019-10-23 DIAGNOSIS — R60.0 BILATERAL LEG EDEMA: ICD-10-CM

## 2019-10-23 DIAGNOSIS — R22.43 LOCALIZED SWELLING OF BOTH LOWER LEGS: ICD-10-CM

## 2019-10-23 DIAGNOSIS — R63.5 WEIGHT GAIN: ICD-10-CM

## 2019-10-23 PROCEDURE — 99213 OFFICE O/P EST LOW 20 MIN: CPT | Performed by: PHYSICIAN ASSISTANT

## 2019-10-23 RX ORDER — CEPHALEXIN 500 MG/1
500 CAPSULE ORAL 2 TIMES DAILY
Qty: 20 CAPSULE | Refills: 0 | Status: SHIPPED | OUTPATIENT
Start: 2019-10-23 | End: 2019-11-02

## 2019-10-23 ASSESSMENT — ENCOUNTER SYMPTOMS
COLOR CHANGE: 1
SHORTNESS OF BREATH: 1
COUGH: 1

## 2019-10-29 ENCOUNTER — HOSPITAL ENCOUNTER (OUTPATIENT)
Dept: NON INVASIVE DIAGNOSTICS | Age: 74
Discharge: HOME | End: 2019-10-29
Payer: MEDICARE

## 2019-10-29 DIAGNOSIS — R22.43 LOCALIZED SWELLING OF BOTH LOWER LEGS: ICD-10-CM

## 2019-10-29 DIAGNOSIS — R60.0 BILATERAL LEG EDEMA: ICD-10-CM

## 2019-10-29 DIAGNOSIS — R63.5 WEIGHT GAIN: ICD-10-CM

## 2019-10-29 LAB
LV EF: 25 %
LVEF MODALITY: NORMAL

## 2019-10-29 PROCEDURE — C8929 TTE W OR WO FOL WCON,DOPPLER: HCPCS

## 2019-10-29 PROCEDURE — 6360000004 HC RX CONTRAST MEDICATION: Performed by: PHYSICIAN ASSISTANT

## 2019-10-29 RX ADMIN — PERFLUTREN 2.2 MG: 6.52 INJECTION, SUSPENSION INTRAVENOUS at 13:46

## 2019-10-30 DIAGNOSIS — I50.9 NEW ONSET OF CONGESTIVE HEART FAILURE (HCC): Primary | ICD-10-CM

## 2019-11-13 PROBLEM — I42.5 OTHER RESTRICTIVE CARDIOMYOPATHY (HCC): Status: ACTIVE | Noted: 2019-11-13

## 2019-12-03 ENCOUNTER — OFFICE VISIT (OUTPATIENT)
Dept: INTERNAL MEDICINE CLINIC | Age: 74
End: 2019-12-03

## 2019-12-03 VITALS
HEIGHT: 60 IN | BODY MASS INDEX: 28.27 KG/M2 | SYSTOLIC BLOOD PRESSURE: 130 MMHG | HEART RATE: 84 BPM | DIASTOLIC BLOOD PRESSURE: 80 MMHG | WEIGHT: 144 LBS | TEMPERATURE: 98.1 F

## 2019-12-03 DIAGNOSIS — M79.89 SWELLING OF LOWER EXTREMITY: ICD-10-CM

## 2019-12-03 DIAGNOSIS — I50.9 NEW ONSET OF CONGESTIVE HEART FAILURE (HCC): Primary | ICD-10-CM

## 2019-12-03 DIAGNOSIS — I50.9 NEW ONSET OF CONGESTIVE HEART FAILURE (HCC): ICD-10-CM

## 2019-12-03 PROCEDURE — 99213 OFFICE O/P EST LOW 20 MIN: CPT | Performed by: PHYSICIAN ASSISTANT

## 2019-12-03 RX ORDER — METOPROLOL SUCCINATE 25 MG/1
25 TABLET, EXTENDED RELEASE ORAL DAILY
Qty: 30 TABLET | Refills: 3 | Status: ON HOLD | OUTPATIENT
Start: 2019-12-03 | End: 2020-01-13 | Stop reason: HOSPADM

## 2019-12-03 RX ORDER — FUROSEMIDE 40 MG/1
40 TABLET ORAL DAILY
Qty: 30 TABLET | Refills: 0 | Status: ON HOLD | OUTPATIENT
Start: 2019-12-03 | End: 2020-01-13 | Stop reason: HOSPADM

## 2019-12-03 RX ORDER — ENALAPRIL MALEATE 10 MG/1
10 TABLET ORAL DAILY
Qty: 30 TABLET | Refills: 3 | Status: ON HOLD | OUTPATIENT
Start: 2019-12-03 | End: 2020-01-13 | Stop reason: HOSPADM

## 2019-12-03 ASSESSMENT — ENCOUNTER SYMPTOMS
SHORTNESS OF BREATH: 1
COUGH: 0
COLOR CHANGE: 0

## 2019-12-04 LAB
ANION GAP SERPL CALCULATED.3IONS-SCNC: 14 MMOL/L (ref 3–16)
BUN BLDV-MCNC: 16 MG/DL (ref 7–20)
CALCIUM SERPL-MCNC: 9.5 MG/DL (ref 8.3–10.6)
CHLORIDE BLD-SCNC: 106 MMOL/L (ref 99–110)
CO2: 21 MMOL/L (ref 21–32)
CREAT SERPL-MCNC: 1.2 MG/DL (ref 0.6–1.2)
GFR AFRICAN AMERICAN: 53
GFR NON-AFRICAN AMERICAN: 44
GLUCOSE BLD-MCNC: 206 MG/DL (ref 70–99)
POTASSIUM SERPL-SCNC: 4.7 MMOL/L (ref 3.5–5.1)
SODIUM BLD-SCNC: 141 MMOL/L (ref 136–145)

## 2019-12-06 ENCOUNTER — OFFICE VISIT (OUTPATIENT)
Dept: CARDIOLOGY CLINIC | Age: 74
End: 2019-12-06
Payer: MEDICARE

## 2019-12-06 VITALS
HEART RATE: 92 BPM | DIASTOLIC BLOOD PRESSURE: 64 MMHG | HEIGHT: 60 IN | WEIGHT: 142 LBS | OXYGEN SATURATION: 96 % | BODY MASS INDEX: 27.88 KG/M2 | SYSTOLIC BLOOD PRESSURE: 106 MMHG

## 2019-12-06 DIAGNOSIS — I50.21 ACUTE SYSTOLIC CONGESTIVE HEART FAILURE (HCC): ICD-10-CM

## 2019-12-06 DIAGNOSIS — R60.9 EDEMA, UNSPECIFIED TYPE: Primary | ICD-10-CM

## 2019-12-06 DIAGNOSIS — I34.0 NONRHEUMATIC MITRAL VALVE REGURGITATION: ICD-10-CM

## 2019-12-06 PROCEDURE — 99204 OFFICE O/P NEW MOD 45 MIN: CPT | Performed by: INTERNAL MEDICINE

## 2019-12-06 PROCEDURE — 93000 ELECTROCARDIOGRAM COMPLETE: CPT | Performed by: INTERNAL MEDICINE

## 2019-12-11 ENCOUNTER — OFFICE VISIT (OUTPATIENT)
Dept: INTERNAL MEDICINE CLINIC | Age: 74
End: 2019-12-11

## 2019-12-11 VITALS
BODY MASS INDEX: 26.06 KG/M2 | DIASTOLIC BLOOD PRESSURE: 80 MMHG | HEIGHT: 61 IN | HEART RATE: 70 BPM | WEIGHT: 138 LBS | RESPIRATION RATE: 14 BRPM | SYSTOLIC BLOOD PRESSURE: 110 MMHG

## 2019-12-11 DIAGNOSIS — E11.69 HYPERLIPIDEMIA ASSOCIATED WITH TYPE 2 DIABETES MELLITUS (HCC): ICD-10-CM

## 2019-12-11 DIAGNOSIS — R80.9 TYPE 2 DIABETES MELLITUS WITH MICROALBUMINURIA, WITHOUT LONG-TERM CURRENT USE OF INSULIN (HCC): Primary | ICD-10-CM

## 2019-12-11 DIAGNOSIS — E78.5 HYPERLIPIDEMIA ASSOCIATED WITH TYPE 2 DIABETES MELLITUS (HCC): ICD-10-CM

## 2019-12-11 DIAGNOSIS — E11.29 TYPE 2 DIABETES MELLITUS WITH MICROALBUMINURIA, WITHOUT LONG-TERM CURRENT USE OF INSULIN (HCC): Primary | ICD-10-CM

## 2019-12-11 DIAGNOSIS — R80.9 TYPE 2 DIABETES MELLITUS WITH MICROALBUMINURIA, WITHOUT LONG-TERM CURRENT USE OF INSULIN (HCC): ICD-10-CM

## 2019-12-11 DIAGNOSIS — I50.22 CHRONIC SYSTOLIC CHF (CONGESTIVE HEART FAILURE) (HCC): ICD-10-CM

## 2019-12-11 DIAGNOSIS — E11.29 TYPE 2 DIABETES MELLITUS WITH MICROALBUMINURIA, WITHOUT LONG-TERM CURRENT USE OF INSULIN (HCC): ICD-10-CM

## 2019-12-11 DIAGNOSIS — F33.1 MAJOR DEPRESSIVE DISORDER, RECURRENT EPISODE, MODERATE (HCC): ICD-10-CM

## 2019-12-11 DIAGNOSIS — I34.0 NONRHEUMATIC MITRAL VALVE REGURGITATION: ICD-10-CM

## 2019-12-11 DIAGNOSIS — I10 ESSENTIAL HYPERTENSION, BENIGN: ICD-10-CM

## 2019-12-11 PROCEDURE — 99214 OFFICE O/P EST MOD 30 MIN: CPT | Performed by: INTERNAL MEDICINE

## 2019-12-11 ASSESSMENT — ENCOUNTER SYMPTOMS
WHEEZING: 0
SHORTNESS OF BREATH: 0
VOMITING: 0
ABDOMINAL PAIN: 0
RHINORRHEA: 0
BACK PAIN: 0

## 2019-12-12 ENCOUNTER — HOSPITAL ENCOUNTER (OUTPATIENT)
Dept: NON INVASIVE DIAGNOSTICS | Age: 74
Discharge: HOME | End: 2019-12-12
Payer: MEDICARE

## 2019-12-12 ENCOUNTER — HOSPITAL ENCOUNTER (OUTPATIENT)
Dept: NUCLEAR MEDICINE | Age: 74
Discharge: HOME | End: 2019-12-12
Payer: MEDICARE

## 2019-12-12 ENCOUNTER — TELEPHONE (OUTPATIENT)
Dept: CARDIOLOGY CLINIC | Age: 74
End: 2019-12-12

## 2019-12-12 DIAGNOSIS — I50.21 ACUTE SYSTOLIC CONGESTIVE HEART FAILURE (HCC): ICD-10-CM

## 2019-12-12 LAB
ESTIMATED AVERAGE GLUCOSE: 205.9 MG/DL
HBA1C MFR BLD: 8.8 %
LV EF: 24 %
LVEF MODALITY: NORMAL

## 2019-12-12 PROCEDURE — A9502 TC99M TETROFOSMIN: HCPCS | Performed by: INTERNAL MEDICINE

## 2019-12-12 PROCEDURE — 93017 CV STRESS TEST TRACING ONLY: CPT

## 2019-12-12 PROCEDURE — 6360000002 HC RX W HCPCS: Performed by: INTERNAL MEDICINE

## 2019-12-12 PROCEDURE — 3430000000 HC RX DIAGNOSTIC RADIOPHARMACEUTICAL: Performed by: INTERNAL MEDICINE

## 2019-12-12 PROCEDURE — 78452 HT MUSCLE IMAGE SPECT MULT: CPT

## 2019-12-12 RX ORDER — AMINOPHYLLINE DIHYDRATE 25 MG/ML
50 INJECTION, SOLUTION INTRAVENOUS ONCE
Status: COMPLETED | OUTPATIENT
Start: 2019-12-12 | End: 2019-12-12

## 2019-12-12 RX ADMIN — AMINOPHYLLINE 50 MG: 25 INJECTION, SOLUTION INTRAVENOUS at 10:58

## 2019-12-12 RX ADMIN — REGADENOSON 0.4 MG: 0.08 INJECTION, SOLUTION INTRAVENOUS at 10:45

## 2019-12-12 RX ADMIN — TETROFOSMIN 33.1 MILLICURIE: 1.38 INJECTION, POWDER, LYOPHILIZED, FOR SOLUTION INTRAVENOUS at 10:45

## 2019-12-12 RX ADMIN — TETROFOSMIN 11.5 MILLICURIE: 1.38 INJECTION, POWDER, LYOPHILIZED, FOR SOLUTION INTRAVENOUS at 08:55

## 2020-01-06 ENCOUNTER — APPOINTMENT (OUTPATIENT)
Dept: GENERAL RADIOLOGY | Age: 75
DRG: 216 | End: 2020-01-06
Attending: INTERNAL MEDICINE
Payer: MEDICARE

## 2020-01-06 ENCOUNTER — HOSPITAL ENCOUNTER (INPATIENT)
Dept: CARDIAC CATH/INVASIVE PROCEDURES | Age: 75
LOS: 7 days | Discharge: HOME HEALTH | DRG: 216 | End: 2020-01-13
Attending: INTERNAL MEDICINE | Admitting: INTERNAL MEDICINE
Payer: MEDICARE

## 2020-01-06 PROBLEM — I25.5 ISCHEMIC CARDIOMYOPATHY: Status: ACTIVE | Noted: 2020-01-06

## 2020-01-06 PROBLEM — I25.10 CAD, MULTIPLE VESSEL: Status: ACTIVE | Noted: 2020-01-06

## 2020-01-06 LAB
ANION GAP SERPL CALCULATED.3IONS-SCNC: 13 MMOL/L (ref 3–16)
BASE EXCESS VENOUS: 1 (ref -3–3)
BASE EXCESS VENOUS: 1 (ref -3–3)
BASE EXCESS VENOUS: 2 (ref -3–3)
BUN BLDV-MCNC: 33 MG/DL (ref 7–20)
CALCIUM SERPL-MCNC: 9 MG/DL (ref 8.3–10.6)
CHLORIDE BLD-SCNC: 101 MMOL/L (ref 99–110)
CHOLESTEROL, TOTAL: 141 MG/DL (ref 0–199)
CO2: 25 MMOL/L (ref 21–32)
CREAT SERPL-MCNC: 1.4 MG/DL (ref 0.6–1.2)
EKG ATRIAL RATE: 80 BPM
EKG DIAGNOSIS: NORMAL
EKG P AXIS: 65 DEGREES
EKG P-R INTERVAL: 148 MS
EKG Q-T INTERVAL: 394 MS
EKG QRS DURATION: 98 MS
EKG QTC CALCULATION (BAZETT): 454 MS
EKG R AXIS: 56 DEGREES
EKG T AXIS: -59 DEGREES
EKG VENTRICULAR RATE: 80 BPM
GFR AFRICAN AMERICAN: 44
GFR NON-AFRICAN AMERICAN: 37
GLUCOSE BLD-MCNC: 168 MG/DL (ref 70–99)
GLUCOSE BLD-MCNC: 200 MG/DL (ref 70–99)
GLUCOSE BLD-MCNC: 205 MG/DL (ref 70–99)
HCO3 VENOUS: 27 MMOL/L (ref 23–29)
HCO3 VENOUS: 27.3 MMOL/L (ref 23–29)
HCO3 VENOUS: 27.8 MMOL/L (ref 23–29)
HCT VFR BLD CALC: 39.8 % (ref 36–48)
HDLC SERPL-MCNC: 29 MG/DL (ref 40–60)
HEMOGLOBIN: 13.3 G/DL (ref 12–16)
INR BLD: 0.95 (ref 0.86–1.14)
LDL CHOLESTEROL CALCULATED: 72 MG/DL
MCH RBC QN AUTO: 31.6 PG (ref 26–34)
MCHC RBC AUTO-ENTMCNC: 33.4 G/DL (ref 31–36)
MCV RBC AUTO: 94.8 FL (ref 80–100)
O2 SAT, VEN: 58 %
O2 SAT, VEN: 66 %
O2 SAT, VEN: 68 %
PCO2, VEN: 50.9 MM HG (ref 40–50)
PCO2, VEN: 51.3 MM HG (ref 40–50)
PCO2, VEN: 52 MM HG (ref 40–50)
PDW BLD-RTO: 13.6 % (ref 12.4–15.4)
PERFORMED ON: ABNORMAL
PH VENOUS: 7.33 (ref 7.35–7.45)
PH VENOUS: 7.33 (ref 7.35–7.45)
PH VENOUS: 7.35 (ref 7.35–7.45)
PLATELET # BLD: 183 K/UL (ref 135–450)
PMV BLD AUTO: 8.4 FL (ref 5–10.5)
PO2, VEN: 33 MM HG
PO2, VEN: 37 MM HG
PO2, VEN: 39 MM HG
POC SAMPLE TYPE: ABNORMAL
POTASSIUM REFLEX MAGNESIUM: 4.1 MMOL/L (ref 3.5–5.1)
PROTHROMBIN TIME: 11 SEC (ref 10–13.2)
RBC # BLD: 4.2 M/UL (ref 4–5.2)
SODIUM BLD-SCNC: 139 MMOL/L (ref 136–145)
TCO2 CALC VENOUS: 29 MMOL/L
TRIGL SERPL-MCNC: 201 MG/DL (ref 0–150)
VLDLC SERPL CALC-MCNC: 40 MG/DL
WBC # BLD: 5.2 K/UL (ref 4–11)

## 2020-01-06 PROCEDURE — 6360000002 HC RX W HCPCS

## 2020-01-06 PROCEDURE — 2580000003 HC RX 258: Performed by: INTERNAL MEDICINE

## 2020-01-06 PROCEDURE — 2580000003 HC RX 258

## 2020-01-06 PROCEDURE — 6370000000 HC RX 637 (ALT 250 FOR IP): Performed by: INTERNAL MEDICINE

## 2020-01-06 PROCEDURE — 6370000000 HC RX 637 (ALT 250 FOR IP)

## 2020-01-06 PROCEDURE — P9047 ALBUMIN (HUMAN), 25%, 50ML: HCPCS

## 2020-01-06 PROCEDURE — 71045 X-RAY EXAM CHEST 1 VIEW: CPT

## 2020-01-06 PROCEDURE — 2709999900 HC NON-CHARGEABLE SUPPLY

## 2020-01-06 PROCEDURE — 93005 ELECTROCARDIOGRAM TRACING: CPT | Performed by: INTERNAL MEDICINE

## 2020-01-06 PROCEDURE — 2500000003 HC RX 250 WO HCPCS

## 2020-01-06 PROCEDURE — 99153 MOD SED SAME PHYS/QHP EA: CPT

## 2020-01-06 PROCEDURE — 80061 LIPID PANEL: CPT

## 2020-01-06 PROCEDURE — 94150 VITAL CAPACITY TEST: CPT

## 2020-01-06 PROCEDURE — 99152 MOD SED SAME PHYS/QHP 5/>YRS: CPT

## 2020-01-06 PROCEDURE — 93460 R&L HRT ART/VENTRICLE ANGIO: CPT | Performed by: INTERNAL MEDICINE

## 2020-01-06 PROCEDURE — 85610 PROTHROMBIN TIME: CPT

## 2020-01-06 PROCEDURE — 2060000000 HC ICU INTERMEDIATE R&B

## 2020-01-06 PROCEDURE — 85027 COMPLETE CBC AUTOMATED: CPT

## 2020-01-06 PROCEDURE — 85347 COAGULATION TIME ACTIVATED: CPT

## 2020-01-06 PROCEDURE — C1894 INTRO/SHEATH, NON-LASER: HCPCS

## 2020-01-06 PROCEDURE — 93880 EXTRACRANIAL BILAT STUDY: CPT

## 2020-01-06 PROCEDURE — 6360000004 HC RX CONTRAST MEDICATION

## 2020-01-06 PROCEDURE — 82803 BLOOD GASES ANY COMBINATION: CPT

## 2020-01-06 PROCEDURE — 4A023N8 MEASUREMENT OF CARDIAC SAMPLING AND PRESSURE, BILATERAL, PERCUTANEOUS APPROACH: ICD-10-PCS | Performed by: INTERNAL MEDICINE

## 2020-01-06 PROCEDURE — 99223 1ST HOSP IP/OBS HIGH 75: CPT | Performed by: THORACIC SURGERY (CARDIOTHORACIC VASCULAR SURGERY)

## 2020-01-06 PROCEDURE — C1887 CATHETER, GUIDING: HCPCS

## 2020-01-06 PROCEDURE — B2111ZZ FLUOROSCOPY OF MULTIPLE CORONARY ARTERIES USING LOW OSMOLAR CONTRAST: ICD-10-PCS | Performed by: INTERNAL MEDICINE

## 2020-01-06 PROCEDURE — 93971 EXTREMITY STUDY: CPT

## 2020-01-06 PROCEDURE — C1769 GUIDE WIRE: HCPCS

## 2020-01-06 PROCEDURE — 93460 R&L HRT ART/VENTRICLE ANGIO: CPT

## 2020-01-06 PROCEDURE — 80048 BASIC METABOLIC PNL TOTAL CA: CPT

## 2020-01-06 RX ORDER — SODIUM CHLORIDE 0.9 % (FLUSH) 0.9 %
10 SYRINGE (ML) INJECTION PRN
Status: DISCONTINUED | OUTPATIENT
Start: 2020-01-06 | End: 2020-01-08

## 2020-01-06 RX ORDER — FENTANYL CITRATE 50 UG/ML
INJECTION, SOLUTION INTRAMUSCULAR; INTRAVENOUS
Status: COMPLETED | OUTPATIENT
Start: 2020-01-06 | End: 2020-01-06

## 2020-01-06 RX ORDER — DEXTROSE MONOHYDRATE 25 G/50ML
12.5 INJECTION, SOLUTION INTRAVENOUS PRN
Status: DISCONTINUED | OUTPATIENT
Start: 2020-01-06 | End: 2020-01-08

## 2020-01-06 RX ORDER — MIDAZOLAM HYDROCHLORIDE 5 MG/ML
INJECTION INTRAMUSCULAR; INTRAVENOUS
Status: COMPLETED | OUTPATIENT
Start: 2020-01-06 | End: 2020-01-06

## 2020-01-06 RX ORDER — ALBUTEROL SULFATE 90 UG/1
2 AEROSOL, METERED RESPIRATORY (INHALATION) EVERY 6 HOURS PRN
Status: DISCONTINUED | OUTPATIENT
Start: 2020-01-06 | End: 2020-01-08

## 2020-01-06 RX ORDER — OMEGA-3-ACID ETHYL ESTERS 1 G/1
1000 CAPSULE, LIQUID FILLED ORAL DAILY
Status: DISCONTINUED | OUTPATIENT
Start: 2020-01-07 | End: 2020-01-08

## 2020-01-06 RX ORDER — ATORVASTATIN CALCIUM 40 MG/1
40 TABLET, FILM COATED ORAL DAILY
Status: DISCONTINUED | OUTPATIENT
Start: 2020-01-07 | End: 2020-01-08

## 2020-01-06 RX ORDER — SODIUM CHLORIDE 9 MG/ML
1000 INJECTION, SOLUTION INTRAVENOUS CONTINUOUS
Status: DISCONTINUED | OUTPATIENT
Start: 2020-01-06 | End: 2020-01-08

## 2020-01-06 RX ORDER — ASPIRIN 81 MG/1
81 TABLET, CHEWABLE ORAL ONCE
Status: COMPLETED | OUTPATIENT
Start: 2020-01-06 | End: 2020-01-06

## 2020-01-06 RX ORDER — SODIUM CHLORIDE 0.9 % (FLUSH) 0.9 %
10 SYRINGE (ML) INJECTION EVERY 12 HOURS SCHEDULED
Status: DISCONTINUED | OUTPATIENT
Start: 2020-01-06 | End: 2020-01-08

## 2020-01-06 RX ORDER — ENALAPRIL MALEATE 10 MG/1
10 TABLET ORAL DAILY
Status: DISCONTINUED | OUTPATIENT
Start: 2020-01-07 | End: 2020-01-08

## 2020-01-06 RX ORDER — ACETAMINOPHEN 325 MG/1
650 TABLET ORAL EVERY 4 HOURS PRN
Status: DISCONTINUED | OUTPATIENT
Start: 2020-01-06 | End: 2020-01-08

## 2020-01-06 RX ORDER — METOPROLOL SUCCINATE 25 MG/1
25 TABLET, EXTENDED RELEASE ORAL DAILY
Status: DISCONTINUED | OUTPATIENT
Start: 2020-01-07 | End: 2020-01-08

## 2020-01-06 RX ORDER — CHOLECALCIFEROL (VITAMIN D3) 125 MCG
5 CAPSULE ORAL NIGHTLY PRN
Status: DISCONTINUED | OUTPATIENT
Start: 2020-01-06 | End: 2020-01-08

## 2020-01-06 RX ORDER — NICOTINE POLACRILEX 4 MG
15 LOZENGE BUCCAL PRN
Status: DISCONTINUED | OUTPATIENT
Start: 2020-01-06 | End: 2020-01-08

## 2020-01-06 RX ORDER — INSULIN GLARGINE 100 [IU]/ML
6 INJECTION, SOLUTION SUBCUTANEOUS NIGHTLY
Status: DISCONTINUED | OUTPATIENT
Start: 2020-01-06 | End: 2020-01-08

## 2020-01-06 RX ORDER — CHOLECALCIFEROL (VITAMIN D3) 125 MCG
5 CAPSULE ORAL NIGHTLY PRN
Status: DISCONTINUED | OUTPATIENT
Start: 2020-01-07 | End: 2020-01-06

## 2020-01-06 RX ORDER — ALLOPURINOL 300 MG/1
300 TABLET ORAL DAILY
Status: DISCONTINUED | OUTPATIENT
Start: 2020-01-07 | End: 2020-01-13 | Stop reason: HOSPADM

## 2020-01-06 RX ORDER — DEXTROSE MONOHYDRATE 50 MG/ML
100 INJECTION, SOLUTION INTRAVENOUS PRN
Status: DISCONTINUED | OUTPATIENT
Start: 2020-01-06 | End: 2020-01-08

## 2020-01-06 RX ORDER — ONDANSETRON 2 MG/ML
4 INJECTION INTRAMUSCULAR; INTRAVENOUS EVERY 6 HOURS PRN
Status: DISCONTINUED | OUTPATIENT
Start: 2020-01-06 | End: 2020-01-08

## 2020-01-06 RX ORDER — CHOLECALCIFEROL (VITAMIN D3) 125 MCG
500 CAPSULE ORAL DAILY
Status: DISCONTINUED | OUTPATIENT
Start: 2020-01-07 | End: 2020-01-08

## 2020-01-06 RX ORDER — ASPIRIN 81 MG/1
81 TABLET, CHEWABLE ORAL DAILY
Status: DISCONTINUED | OUTPATIENT
Start: 2020-01-07 | End: 2020-01-08

## 2020-01-06 RX ORDER — FUROSEMIDE 40 MG/1
40 TABLET ORAL DAILY
Status: DISCONTINUED | OUTPATIENT
Start: 2020-01-07 | End: 2020-01-08

## 2020-01-06 RX ADMIN — INSULIN GLARGINE 6 UNITS: 100 INJECTION, SOLUTION SUBCUTANEOUS at 21:23

## 2020-01-06 RX ADMIN — Medication 10 ML: at 18:14

## 2020-01-06 RX ADMIN — Medication 10 ML: at 21:23

## 2020-01-06 RX ADMIN — INSULIN LISPRO 2 UNITS: 100 INJECTION, SOLUTION INTRAVENOUS; SUBCUTANEOUS at 21:23

## 2020-01-06 RX ADMIN — ASPIRIN 81 MG: 81 TABLET, CHEWABLE ORAL at 07:38

## 2020-01-06 RX ADMIN — SODIUM CHLORIDE 1000 ML: 9 INJECTION, SOLUTION INTRAVENOUS at 18:10

## 2020-01-06 RX ADMIN — FENTANYL CITRATE 25 MCG: 50 INJECTION, SOLUTION INTRAMUSCULAR; INTRAVENOUS at 09:24

## 2020-01-06 RX ADMIN — MIDAZOLAM HYDROCHLORIDE 1 MG: 5 INJECTION INTRAMUSCULAR; INTRAVENOUS at 09:26

## 2020-01-06 RX ADMIN — INSULIN LISPRO 4 UNITS: 100 INJECTION, SOLUTION INTRAVENOUS; SUBCUTANEOUS at 18:14

## 2020-01-06 RX ADMIN — SODIUM CHLORIDE 1000 ML: 9 INJECTION, SOLUTION INTRAVENOUS at 07:38

## 2020-01-06 NOTE — CONSULTS
05/14/2019     Hepatic Function Panel:    Lab Results   Component Value Date    ALKPHOS 111 05/14/2019    ALT 13 05/14/2019    AST 13 05/14/2019    PROT 6.6 05/14/2019    PROT 7.0 03/04/2013    BILITOT 0.4 05/14/2019    BILIDIR 0.14 03/04/2013    IBILI 0.28 03/04/2013    LABALBU 3.9 05/14/2019     PT/INR:    Lab Results   Component Value Date    PROTIME 11.0 01/06/2020    INR 0.95 01/06/2020       ECHO: 10/29/19 with Dr. Viviane Calderon    Findings:   Left Ventricle   The left ventricular systolic function is severely reduced with an ejection   fraction of 25 %. There is akinesis of the inferior, apical septal and inferolateral walls. There is hypokinesis of the Inferoseptal & anteroseptal wall. Grade II diastolic dysfunction with elevated left ventricular filling   pressure. Normal left ventricular size and wall thickness. Mitral Valve   Mild mitral annular calcification. Mild thickening of the leaflets of mitral valve. Moderate mitral regurgitation. Left Atrium   The left atrium is severely dilated. Aortic Valve   Aortic valve is structurally normal.   No aortic regurgitation. Aorta   The aortic root is normal in size. Right Ventricle   The right ventricle is normal in size and function. TAPSE = 18 mm. S' prime velocity is measured at 11.1 cm/sec. Tricuspid Valve   Tricuspid valve is structurally normal.   Mild tricuspid regurgitation. Systolic pulmonary artery pressure (SPAP) is normal estimated at 30 mmHg   (Right atrial pressure of 3 mmHg). Right Atrium   The right atrial size is normal.   Right atrial area is 12.9 cm2. Pulmonic Valve   The pulmonic valve is structurally normal.   Trivial pulmonic regurgitation. Pericardial Effusion   No pericardial effusion noted. Pleural Effusion   No pleural effusion.         LHC: with Dr. Vane Bowen 1/06/20     Left heart catheterization     LVEDP  27   GRADIENT ACROSS AORTIC VALVE  none      CORONARY ARTERIES     LM

## 2020-01-06 NOTE — H&P
Diagnosis Date    Anemia     Backache, unspecified 3/11/08    Chronic systolic CHF (congestive heart failure) ([facility] [address] 12/11/2019    Depressive disorder, not elsewhere classified 11/8/07    Essential hypertension, benign 11/8/07    Gout 8/11/2011    Hyperlipidemia associated with type 2 diabetes mellitus ([address] 12/14/2015    Major depressive disorder, recurrent episode, moderate ([address] 12/14/2015    Osteoarthritis, hand 2/27/2012    Osteoarthrosis, unspecified whether generalized or localized, lower leg 11/8/07    Osteopenia 8/5/2015    Other and unspecified hyperlipidemia 11/8/07    Pain in joint, lower leg 1/29/07    Pneumonia     Rotator cuff tendinitis 10/6/2017    Tear of medial cartilage or meniscus of knee, current 1/29/07    Type 2 diabetes mellitus with microalbuminuria, without long-term current use of insulin ([facility] [address] 5/5/2017    Type 2 diabetes mellitus without complication ([facility] [address] 32/7/2763    Type II or unspecified type diabetes mellitus without mention of complication, not stated as uncontrolled 11/8/07    foot exam 7/16/08 normal         Surgical History:  Past Surgical History:   Procedure Laterality Date    APPENDECTOMY      CHOLECYSTECTOMY      HYSTERECTOMY      total ab hyst    PARATHYROID GLAND SURGERY      explore parathyroid glands    TOTAL KNEE ARTHROPLASTY  03/29/10    left knee         Medications:  Current Facility-Administered Medications   Medication Dose Route Frequency Provider Last Rate Last Dose    0.9 % sodium chloride infusion  1,000 mL Intravenous Continuous Miguelito Cooley MD 75 mL/hr at 01/06/20 0738 1,000 mL at 01/06/20 6644           Pre-Sedation:    Pre-Sedation Documentation and Exam:  I have personally completed a history, physical exam & review of systems for this patient (see notes).     Prior History of Anesthesia Complications:   none    Modified Mallampati:  III (soft palate, base of uvula visible)    ASA Classification:  Class 3 - A patient with severe

## 2020-01-06 NOTE — H&P
Historical Provider, MD   Omega-3 Fatty Acids (FISH OIL) 1000 MG CAPS Take 1,000 mg by mouth daily   Yes Historical Provider, MD   calcium carbonate (OSCAL) 500 MG TABS tablet Take 500 mg by mouth daily   Yes Historical Provider, MD   aspirin 81 MG EC tablet Take 81 mg by mouth daily. Yes Historical Provider, MD       Allergies:  Penicillins    Social History:    TOBACCO:   reports that she quit smoking about 4 years ago. She has a 10.00 pack-year smoking history. She has never used smokeless tobacco.  ETOH:   reports no history of alcohol use. E-Cigarettes Vaping or Juuling     Questions Responses    E-Cigarette Use Never User    Start Date     Does device contain nicotine? Never    Quit Date     E-Cigarette Type             Family History:          Problem Relation Age of Onset    Cancer Mother 68        colon    Diabetes Mother     Other Father         gun shot wound    Hypertension Sister     Diabetes Sister     High Blood Pressure Sister     High Cholesterol Sister     Cancer Brother 59        mets everywhere    Cancer Brother 77        colon    Diabetes Sister     Other Sister         bipolar       REVIEW OF SYSTEMS:   Pertinent positives as noted in the HPI. All other systems reviewed and negative. Physical Exam Performed:    /82   Pulse 88   Temp 98.1 °F (36.7 °C) (Oral)   Resp 16   Ht 5' 0.5\" (1.537 m)   Wt 133 lb 8 oz (60.6 kg)   SpO2 96%   BMI 25.64 kg/m²     General appearance: No apparent distress, appears stated age and cooperative. HEENT:  Normal cephalic, atraumatic without obvious deformity. Pupils equal, round, and reactive to light. Extra ocular muscles intact. Conjunctivae/corneas clear. Neck: Supple, no jugular venous distention. Trachea midline with full range of motion. Respiratory:  Normal respiratory effort. Clear to auscultation, bilaterally without Rales/Wheezes/Rhonchi.   Cardiovascular: Regular rate and rhythm with normal S1/S2 without murmurs, rubs or gallops. Abdomen: Soft, non-tender, non-distended with normal bowel sounds. Musculoskelatal: No clubbing, cyanosis or edema bilaterally. Full range of motion without deformity. Neurologic:  Neurovascularly intact without any focal sensory/motor deficits. Cranial nerves: II-XII intact, grossly non-focal.  Psychiatric: Alert and oriented, thought content appropriate, normal insight  Skin: Skin color, texture, turgor normal.  No rashes or lesions. Capillary Refill: Brisk,< 3 seconds   Peripheral Pulses: +2 palpable, equal bilaterally       Labs:     Recent Labs     01/06/20  0710   WBC 5.2   HGB 13.3   HCT 39.8        Recent Labs     01/06/20  0710      K 4.1      CO2 25   BUN 33*   CREATININE 1.4*   CALCIUM 9.0     No results for input(s): AST, ALT, BILIDIR, BILITOT, ALKPHOS in the last 72 hours. Recent Labs     01/06/20  0710   INR 0.95     No results for input(s): Vince Shall in the last 72 hours. Radiology:     CXR: I have reviewed the CXR with the following interpretation: Vascular congestion. EKG:  I have reviewed the EKG with the following interpretation: Sinus rhythm, with T wave inversions in the inferior leads. Xr Chest Portable    Result Date: 1/6/2020  EXAMINATION: ONE XRAY VIEW OF THE CHEST 1/6/2020 2:44 pm COMPARISON: Prior study(s) most recent 09/28/2019. HISTORY: ORDERING SYSTEM PROVIDED HISTORY: pre-op CABG TECHNOLOGIST PROVIDED HISTORY: Reason for exam:->pre-op CABG FINDINGS: The heart is upper normal to borderline enlarged in size. Pulmonary vessels are upper normal as well. There is patchy airspace disease right lower lung. This may represent atelectasis. Bones are unremarkable. Borderline cardiomegaly and vascular congestion.      Vl Dup Carotid Bilateral    Result Date: 1/6/2020  Vascular Carotid Procedure -- PRELIMINARY SONOGRAPHER REPORT --   Demographics   Patient Name       West Labella   Date of Study      01/06/2020         Gender Female   Patient Number     [id number]         Date of Birth       1945   Visit Number       [id number]          Age                 76 year(s)   Accession Number   [id number]          Room Number         [facility]   Corporate ID       [id number]           Sonographer         Toya Mora RVT   Ordering Physician Francoise Martinez Vascular                     APRN - CNP         Physician           Readers  Procedure Type of Study:   Cerebral:Carotid, VL CAROTID DUPLEX BILATERAL. Tech Comments Right The right internal carotid artery is within normal limits. The right vertebral artery demonstrates normal antegrade flow. The right subclavian artery is visualized and demonstrates multiphasic flow. Left The left internal carotid artery is within normal limits. The left vertebral artery demonstrates normal antegrade flow. The left subclavian artery is visualized and demonstrates multiphasic flow. There were no previous studies to use for comparison. Velocities are measured in cm/s ; Diameters are measured in mm Carotid Right Measurements +---------------+----+----+-----+----+ ! Location       ! PSV ! EDV ! Angle! RI  ! +---------------+----+----+-----+----+ ! Prox CCA       !65. 7!21. 8!60   !0.74! +---------------+----+----+-----+----+ ! Mid CCA        !57. 6!16. 5!60   !0.71! +---------------+----+----+-----+----+ ! Dist CCA       !50.5!13. 7!60   !0.73! +---------------+----+----+-----+----+ ! Prox ICA       !43.5!12. 5!60   !0.71! +---------------+----+----+-----+----+ ! Mid ICA        !51. 2!70. 4!60   !0.64! +---------------+----+----+-----+----+ ! Dist ICA       !57. 4!15. 4!54   !0.73! +---------------+----+----+-----+----+ ! Prox ECA       !68.6!    !60   !    ! +---------------+----+----+-----+----+ ! Vertebral      !38.8!12. 9!60   !0.67! +---------------+----+----+-----+----+ ! Prox Subclavian! 105 !    !60   !    ! +---------------+----+----+-----+----+   - Additional Measurements:ICAPSV/CCAPSV 1. ICAEDV/CCAEDV 1.1. Carotid Left Measurements +---------------+----+----+-----+----+ ! Location       ! PSV ! EDV ! Angle! RI  ! +---------------+----+----+-----+----+ ! Prox CCA       !62  !15. 4!60   !0.75! +---------------+----+----+-----+----+ ! Mid CCA        !46. 2!14. 3!60   !0.69! +---------------+----+----+-----+----+ ! Dist CCA       !54. 1!15. 7!60   !0.71! +---------------+----+----+-----+----+ ! Prox ICA       !45. 2!14. 3!60   !0.68! +---------------+----+----+-----+----+ ! Mid ICA        !50. 1!17. 2! 60   !0.66! +---------------+----+----+-----+----+ ! Dist ICA       !40. 7!16. 9!36   !0.58! +---------------+----+----+-----+----+ ! Prox ECA       ! 75  !    !60   !    ! +---------------+----+----+-----+----+ ! Vertebral      !36. 8!10. 2! 60   !0.72! +---------------+----+----+-----+----+ ! Prox Subclavian! 118 !    !60   !    ! +---------------+----+----+-----+----+   - Additional Measurements:ICAPSV/CCAPSV 1.08. ICAEDV/CCAEDV 1.12. Vl Pre Op Vein Mapping    Result Date: 1/6/2020  Vascular Lower Extremity Vein Mapping Procedure -- PRELIMINARY SONOGRAPHER REPORT --   Demographics   Patient Name       Naeem Tpaia   Date of Study      01/06/2020         Gender              Female   Patient Number     [id number]         Date of Birth       1945   Visit Number       [id number]          Age                 76 year(s)   Accession Number   [id number]          Room Number         [facility]   Corporate ID       [id number]           Sonographer         Jareth Nunn RVT   Ordering Physician Patti Gross Vascular                     APRN - CNP         Physician           Readers  Procedure Type of Study:   Veins:Lower Extremity Vein Mapping, [address] Tech Comments Right There is no evidence of superficial venous thrombosis in the greater saphenous vein. Left There is no evidence of superficial venous thrombosis in the greater saphenous vein.     Nm Cardiac Stress Test Nuclear MD    Thank you Leni Mclaughlin MD for the opportunity to be involved in this patient's care. If you have any questions or concerns please feel free to contact me at [phone].

## 2020-01-07 ENCOUNTER — ANESTHESIA (OUTPATIENT)
Dept: CARDIAC CATH/INVASIVE PROCEDURES | Age: 75
DRG: 216 | End: 2020-01-07
Payer: MEDICARE

## 2020-01-07 ENCOUNTER — ANESTHESIA EVENT (OUTPATIENT)
Dept: CARDIAC CATH/INVASIVE PROCEDURES | Age: 75
DRG: 216 | End: 2020-01-07
Payer: MEDICARE

## 2020-01-07 ENCOUNTER — APPOINTMENT (OUTPATIENT)
Dept: CARDIAC CATH/INVASIVE PROCEDURES | Age: 75
DRG: 216 | End: 2020-01-07
Attending: INTERNAL MEDICINE
Payer: MEDICARE

## 2020-01-07 ENCOUNTER — ANESTHESIA EVENT (OUTPATIENT)
Dept: OPERATING ROOM | Age: 75
DRG: 216 | End: 2020-01-07
Payer: MEDICARE

## 2020-01-07 VITALS — OXYGEN SATURATION: 100 % | DIASTOLIC BLOOD PRESSURE: 55 MMHG | SYSTOLIC BLOOD PRESSURE: 86 MMHG

## 2020-01-07 LAB
ABO/RH: NORMAL
ANION GAP SERPL CALCULATED.3IONS-SCNC: 11 MMOL/L (ref 3–16)
ANTIBODY SCREEN: NORMAL
BACTERIA: ABNORMAL /HPF
BILIRUBIN URINE: NEGATIVE
BLOOD, URINE: NEGATIVE
BUN BLDV-MCNC: 27 MG/DL (ref 7–20)
CALCIUM SERPL-MCNC: 8.7 MG/DL (ref 8.3–10.6)
CHLORIDE BLD-SCNC: 105 MMOL/L (ref 99–110)
CLARITY: CLEAR
CO2: 23 MMOL/L (ref 21–32)
COLOR: YELLOW
CREAT SERPL-MCNC: 1.2 MG/DL (ref 0.6–1.2)
EPITHELIAL CELLS, UA: ABNORMAL /HPF
GFR AFRICAN AMERICAN: 53
GFR NON-AFRICAN AMERICAN: 44
GLUCOSE BLD-MCNC: 119 MG/DL (ref 70–99)
GLUCOSE BLD-MCNC: 120 MG/DL (ref 70–99)
GLUCOSE BLD-MCNC: 126 MG/DL (ref 70–99)
GLUCOSE BLD-MCNC: 131 MG/DL (ref 70–99)
GLUCOSE BLD-MCNC: 216 MG/DL (ref 70–99)
GLUCOSE URINE: NEGATIVE MG/DL
HCT VFR BLD CALC: 39.7 % (ref 36–48)
HEMOGLOBIN: 13.3 G/DL (ref 12–16)
KETONES, URINE: NEGATIVE MG/DL
LEUKOCYTE ESTERASE, URINE: ABNORMAL
LV EF: 28 %
LVEF MODALITY: NORMAL
MCH RBC QN AUTO: 31.4 PG (ref 26–34)
MCHC RBC AUTO-ENTMCNC: 33.5 G/DL (ref 31–36)
MCV RBC AUTO: 93.8 FL (ref 80–100)
MICROSCOPIC EXAMINATION: YES
NITRITE, URINE: NEGATIVE
PDW BLD-RTO: 13.9 % (ref 12.4–15.4)
PERFORMED ON: ABNORMAL
PH UA: 6 (ref 5–8)
PLATELET # BLD: 184 K/UL (ref 135–450)
PMV BLD AUTO: 8.6 FL (ref 5–10.5)
POC ACT LR: 196 SEC
POTASSIUM REFLEX MAGNESIUM: 4.2 MMOL/L (ref 3.5–5.1)
PROTEIN UA: ABNORMAL MG/DL
RBC # BLD: 4.23 M/UL (ref 4–5.2)
RBC UA: ABNORMAL /HPF (ref 0–2)
SODIUM BLD-SCNC: 139 MMOL/L (ref 136–145)
SPECIFIC GRAVITY UA: 1.01 (ref 1–1.03)
URINE REFLEX TO CULTURE: YES
URINE TYPE: ABNORMAL
UROBILINOGEN, URINE: 0.2 E.U./DL
WBC # BLD: 5.4 K/UL (ref 4–11)
WBC UA: ABNORMAL /HPF (ref 0–5)

## 2020-01-07 PROCEDURE — 86900 BLOOD TYPING SEROLOGIC ABO: CPT

## 2020-01-07 PROCEDURE — 6360000002 HC RX W HCPCS: Performed by: NURSE ANESTHETIST, CERTIFIED REGISTERED

## 2020-01-07 PROCEDURE — 6370000000 HC RX 637 (ALT 250 FOR IP): Performed by: NURSE PRACTITIONER

## 2020-01-07 PROCEDURE — 93460 R&L HRT ART/VENTRICLE ANGIO: CPT | Performed by: INTERNAL MEDICINE

## 2020-01-07 PROCEDURE — 93315 ECHO TRANSESOPHAGEAL: CPT

## 2020-01-07 PROCEDURE — 87086 URINE CULTURE/COLONY COUNT: CPT

## 2020-01-07 PROCEDURE — P9016 RBC LEUKOCYTES REDUCED: HCPCS

## 2020-01-07 PROCEDURE — 3700000000 HC ANESTHESIA ATTENDED CARE

## 2020-01-07 PROCEDURE — 2580000003 HC RX 258: Performed by: NURSE ANESTHETIST, CERTIFIED REGISTERED

## 2020-01-07 PROCEDURE — 94060 EVALUATION OF WHEEZING: CPT

## 2020-01-07 PROCEDURE — 80048 BASIC METABOLIC PNL TOTAL CA: CPT

## 2020-01-07 PROCEDURE — 93320 DOPPLER ECHO COMPLETE: CPT

## 2020-01-07 PROCEDURE — 86850 RBC ANTIBODY SCREEN: CPT

## 2020-01-07 PROCEDURE — 3700000001 HC ADD 15 MINUTES (ANESTHESIA)

## 2020-01-07 PROCEDURE — 86923 COMPATIBILITY TEST ELECTRIC: CPT

## 2020-01-07 PROCEDURE — 6370000000 HC RX 637 (ALT 250 FOR IP): Performed by: THORACIC SURGERY (CARDIOTHORACIC VASCULAR SURGERY)

## 2020-01-07 PROCEDURE — 94726 PLETHYSMOGRAPHY LUNG VOLUMES: CPT

## 2020-01-07 PROCEDURE — 93325 DOPPLER ECHO COLOR FLOW MAPG: CPT

## 2020-01-07 PROCEDURE — 86901 BLOOD TYPING SEROLOGIC RH(D): CPT

## 2020-01-07 PROCEDURE — 87186 SC STD MICRODIL/AGAR DIL: CPT

## 2020-01-07 PROCEDURE — 2580000003 HC RX 258: Performed by: INTERNAL MEDICINE

## 2020-01-07 PROCEDURE — 6370000000 HC RX 637 (ALT 250 FOR IP): Performed by: INTERNAL MEDICINE

## 2020-01-07 PROCEDURE — 94760 N-INVAS EAR/PLS OXIMETRY 1: CPT

## 2020-01-07 PROCEDURE — 2580000003 HC RX 258

## 2020-01-07 PROCEDURE — 87077 CULTURE AEROBIC IDENTIFY: CPT

## 2020-01-07 PROCEDURE — 36415 COLL VENOUS BLD VENIPUNCTURE: CPT

## 2020-01-07 PROCEDURE — 81001 URINALYSIS AUTO W/SCOPE: CPT

## 2020-01-07 PROCEDURE — 85027 COMPLETE CBC AUTOMATED: CPT

## 2020-01-07 PROCEDURE — 94729 DIFFUSING CAPACITY: CPT

## 2020-01-07 PROCEDURE — 2060000000 HC ICU INTERMEDIATE R&B

## 2020-01-07 PROCEDURE — 2500000003 HC RX 250 WO HCPCS: Performed by: NURSE ANESTHETIST, CERTIFIED REGISTERED

## 2020-01-07 RX ORDER — ASPIRIN 81 MG/1
81 TABLET ORAL ONCE
Status: COMPLETED | OUTPATIENT
Start: 2020-01-08 | End: 2020-01-08

## 2020-01-07 RX ORDER — SODIUM CHLORIDE 0.9 % (FLUSH) 0.9 %
10 SYRINGE (ML) INJECTION PRN
Status: DISCONTINUED | OUTPATIENT
Start: 2020-01-07 | End: 2020-01-08

## 2020-01-07 RX ORDER — SODIUM CHLORIDE 9 MG/ML
INJECTION, SOLUTION INTRAVENOUS CONTINUOUS
Status: DISCONTINUED | OUTPATIENT
Start: 2020-01-08 | End: 2020-01-08

## 2020-01-07 RX ORDER — SODIUM CHLORIDE 9 MG/ML
INJECTION, SOLUTION INTRAVENOUS CONTINUOUS PRN
Status: DISCONTINUED | OUTPATIENT
Start: 2020-01-07 | End: 2020-01-07 | Stop reason: SDUPTHER

## 2020-01-07 RX ORDER — MIDAZOLAM HYDROCHLORIDE 5 MG/ML
2 INJECTION INTRAMUSCULAR; INTRAVENOUS ONCE
Status: COMPLETED | OUTPATIENT
Start: 2020-01-08 | End: 2020-01-08

## 2020-01-07 RX ORDER — CHLORHEXIDINE GLUCONATE 0.12 MG/ML
15 RINSE ORAL ONCE
Status: COMPLETED | OUTPATIENT
Start: 2020-01-07 | End: 2020-01-08

## 2020-01-07 RX ORDER — CHLORHEXIDINE GLUCONATE 4 G/100ML
SOLUTION TOPICAL SEE ADMIN INSTRUCTIONS
Status: DISCONTINUED | OUTPATIENT
Start: 2020-01-07 | End: 2020-01-08

## 2020-01-07 RX ORDER — BISACODYL 10 MG
10 SUPPOSITORY, RECTAL RECTAL ONCE
Status: DISCONTINUED | OUTPATIENT
Start: 2020-01-07 | End: 2020-01-08

## 2020-01-07 RX ORDER — ALBUTEROL SULFATE 90 UG/1
4 AEROSOL, METERED RESPIRATORY (INHALATION) ONCE
Status: COMPLETED | OUTPATIENT
Start: 2020-01-07 | End: 2020-01-07

## 2020-01-07 RX ORDER — MIDAZOLAM HYDROCHLORIDE 1 MG/ML
2 INJECTION INTRAMUSCULAR; INTRAVENOUS ONCE
Status: DISCONTINUED | OUTPATIENT
Start: 2020-01-08 | End: 2020-01-07 | Stop reason: SDUPTHER

## 2020-01-07 RX ORDER — PROPOFOL 10 MG/ML
INJECTION, EMULSION INTRAVENOUS PRN
Status: DISCONTINUED | OUTPATIENT
Start: 2020-01-07 | End: 2020-01-07 | Stop reason: SDUPTHER

## 2020-01-07 RX ORDER — LIDOCAINE HYDROCHLORIDE 20 MG/ML
INJECTION, SOLUTION INFILTRATION; PERINEURAL PRN
Status: DISCONTINUED | OUTPATIENT
Start: 2020-01-07 | End: 2020-01-07 | Stop reason: SDUPTHER

## 2020-01-07 RX ORDER — SODIUM CHLORIDE 0.9 % (FLUSH) 0.9 %
10 SYRINGE (ML) INJECTION EVERY 12 HOURS SCHEDULED
Status: DISCONTINUED | OUTPATIENT
Start: 2020-01-07 | End: 2020-01-08

## 2020-01-07 RX ADMIN — ANTISEPTIC SURGICAL SCRUB 237 ML: 0.04 SOLUTION TOPICAL at 21:56

## 2020-01-07 RX ADMIN — ENALAPRIL MALEATE 10 MG: 10 TABLET ORAL at 09:19

## 2020-01-07 RX ADMIN — Medication 10 ML: at 09:12

## 2020-01-07 RX ADMIN — PROPOFOL 50 MG: 10 INJECTION, EMULSION INTRAVENOUS at 15:11

## 2020-01-07 RX ADMIN — ALLOPURINOL 300 MG: 300 TABLET ORAL at 09:11

## 2020-01-07 RX ADMIN — FUROSEMIDE 40 MG: 40 TABLET ORAL at 09:11

## 2020-01-07 RX ADMIN — PROPOFOL 50 MG: 10 INJECTION, EMULSION INTRAVENOUS at 15:05

## 2020-01-07 RX ADMIN — INSULIN GLARGINE 6 UNITS: 100 INJECTION, SOLUTION SUBCUTANEOUS at 21:56

## 2020-01-07 RX ADMIN — Medication 4 PUFF: at 08:52

## 2020-01-07 RX ADMIN — METOPROLOL SUCCINATE 25 MG: 25 TABLET, EXTENDED RELEASE ORAL at 09:11

## 2020-01-07 RX ADMIN — SODIUM CHLORIDE 1000 ML: 9 INJECTION, SOLUTION INTRAVENOUS at 11:59

## 2020-01-07 RX ADMIN — OMEGA-3-ACID ETHYL ESTERS 1000 MG: 1 CAPSULE, LIQUID FILLED ORAL at 09:11

## 2020-01-07 RX ADMIN — LIDOCAINE HYDROCHLORIDE 60 MG: 20 INJECTION, SOLUTION INFILTRATION; PERINEURAL at 14:38

## 2020-01-07 RX ADMIN — ASPIRIN 81 MG 81 MG: 81 TABLET ORAL at 09:11

## 2020-01-07 RX ADMIN — PROPOFOL 50 MG: 10 INJECTION, EMULSION INTRAVENOUS at 15:17

## 2020-01-07 RX ADMIN — ATORVASTATIN CALCIUM 40 MG: 40 TABLET, FILM COATED ORAL at 09:11

## 2020-01-07 RX ADMIN — SODIUM CHLORIDE: 9 INJECTION, SOLUTION INTRAVENOUS at 14:36

## 2020-01-07 RX ADMIN — INSULIN LISPRO 2 UNITS: 100 INJECTION, SOLUTION INTRAVENOUS; SUBCUTANEOUS at 21:57

## 2020-01-07 RX ADMIN — CYANOCOBALAMIN TAB 500 MCG 500 MCG: 500 TAB at 09:11

## 2020-01-07 RX ADMIN — MUPIROCIN: 20 OINTMENT TOPICAL at 21:56

## 2020-01-07 ASSESSMENT — LIFESTYLE VARIABLES: SMOKING_STATUS: 1

## 2020-01-07 ASSESSMENT — PAIN SCALES - GENERAL
PAINLEVEL_OUTOF10: 0
PAINLEVEL_OUTOF10: 0

## 2020-01-07 NOTE — ANESTHESIA PRE PROCEDURE
hypertension, benign    Osteoarthrosis involving lower leg    Gout    Osteoarthritis, hand    Osteopenia    Chest pain    Hyperlipidemia associated with type 2 diabetes mellitus ([address]    Major depressive disorder, recurrent episode, moderate (HCC)    Primary insomnia    Type 2 diabetes mellitus with microalbuminuria, without long-term current use of insulin (HCC)    Rotator cuff tendinitis    Other restrictive cardiomyopathy ([address]    Acute systolic congestive heart failure (HCC)    Nonrheumatic mitral valve regurgitation    Chronic systolic CHF (congestive heart failure) ([address]    CAD, multiple vessel    Ischemic cardiomyopathy     Past Medical History:    Anemia     Backache, unspecified 3/11/08    CAD, multiple vessel 1/6/2020    Chronic systolic CHF (congestive heart failure) ([address] 12/11/2019    Depressive disorder, not elsewhere classified 11/8/07    Essential hypertension, benign 11/8/07    Gout 8/11/2011    Hyperlipidemia associated with type 2 diabetes mellitus ([address] 12/14/2015    Major depressive disorder, recurrent episode, moderate ([address] 12/14/2015    Osteoarthritis, hand 2/27/2012    Osteoarthrosis, unspecified whether generalized or localized, lower leg 11/8/07    Osteopenia 8/5/2015    Other and unspecified hyperlipidemia 11/8/07    Pain in joint, lower leg 1/29/07    Pneumonia     Rotator cuff tendinitis 10/6/2017    Tear of medial cartilage or meniscus of knee, current 1/29/07    Type 2 diabetes mellitus with microalbuminuria, without long-term current use of insulin ([address] 5/5/2017    Type 2 diabetes mellitus without complication ([address] 85/0/8769    Type II or unspecified type diabetes mellitus without mention of complication, not stated as uncontrolled 11/8/07    foot exam 7/16/08 normal     Past Surgical History:     APPENDECTOMY      CHOLECYSTECTOMY      HYSTERECTOMY      total ab hyst    PARATHYROID GLAND SURGERY      explore parathyroid glands    TOTAL KNEE ARTHROPLASTY  03/29/10    left knee     Social History:    Tobacco Use    Smoking status: Former Smoker     Packs/day: 0.50     Years: 20.00     Pack years: 10.00     Last attempt to quit: 2015     Years since quittin.1    Smokeless tobacco: Never Used   Substance Use Topics    Alcohol use: No     Alcohol/week: 0.0 standard drinks     Vital Signs (Current):    20 2327 20 0427 20 0745   BP: (!) 144/86 125/78 (!) 145/91 (!) 149/90   Pulse: 86 76 80 88   Resp: 16 16 16 16   Temp: 97.8 °F (36.6 °C) 97.9 °F (36.6 °C) 98.1 °F (36.7 °C) 98 °F (36.7 °C)   TempSrc: Oral Oral Oral Oral   SpO2: 97% 97% 96% 96%   Weight:       Height:                                     BP Readings from Last 3 Encounters:   20 (!) 149/90   19 110/80   19 106/64     NPO Status: >8 hrs                         CBC:    WBC 5.4 2020    HGB 13.3 2020    HCT 39.7 2020     2020     CMP:     2020    K 4.2 2020     2020    CO2 23 2020    BUN 27 2020    CREATININE 1.2 2020    GLUCOSE 119 2020    PROT 6.6 2019    CALCIUM 8.7 2020    BILITOT 0.4 2019    ALKPHOS 111 2019    AST 13 2019    ALT 13 2019     POC Tests:      20  1144   POCGLU 131*     Coags:    PROTIME 11.0 2020    INR 0.95 2020     Anesthesia Evaluation  Patient summary reviewed and Nursing notes reviewed  Airway: Mallampati: II  TM distance: >3 FB   Neck ROM: full  Mouth opening: > = 3 FB Dental:    (+) upper dentures and lower dentures      Pulmonary:   (+) current smoker    (-) COPD and asthma                          ROS comment: Quit smoking <7 days ago   Cardiovascular:  Exercise tolerance: good (>4 METS),   (+) hypertension:, CAD: obstructive, CHF: diastolic, hyperlipidemia      ECG reviewed                     ROS comment: EK2020 Normal sinus rhythm 80; TW inversions in inferior leads and anterolateral leads. When compared with ECG of 30-MAY-2017: QRS duration has increased; T wave inversion now     Neuro/Psych:   (+) depression/anxiety    (-) seizures, TIA and CVA           GI/Hepatic/Renal:        (-) GERD, liver disease and no renal disease       Endo/Other:    (+) Diabetes, : arthritis: OA., .    (-) hypothyroidism                ROS comment: S/P L TKA Abdominal:           Vascular:     - DVT and PE. Anesthesia Plan      general and TIVA     ASA 3       Induction: intravenous. MIPS: Prophylactic antiemetics administered. Anesthetic plan and risks discussed with patient. Plan discussed with CRNA.             Edith Martínez MD

## 2020-01-07 NOTE — PLAN OF CARE
Problem: Falls - Risk of:  Goal: Will remain free from falls  Description  Will remain free from falls  1/7/2020 0952 by Zina Edouard RN  Outcome: Ongoing  1/6/2020 2227 by Juhi Tadeo RN  Outcome: Ongoing  Goal: Absence of physical injury  Description  Absence of physical injury  1/6/2020 2227 by Juhi Tadeo RN  Outcome: Ongoing

## 2020-01-07 NOTE — PROGRESS NOTES
RESPIRATORY THERAPY ASSESSMENT    Name:  Anabella Abernathy  Medical Record Number:  [id number]  Age: 76 y.o. Gender: female  : 1945  Today's Date:  2020  Room:  [facility]2/0422-01    Assessment     Is the patient being admitted for a COPD or Asthma exacerbation? No   (If yes the patient will be seen every 4 hours for the first 24 hours and then reassessed)    Patient Admission Diagnosis      Allergies  Allergies   Allergen Reactions    Penicillins Anaphylaxis     Penicillin and derivatives       Minimum Predicted Vital Capacity:     na          Actual Vital Capacity:      1500              Pulmonary History:No history and smoker  Home Oxygen Therapy:  room air  Home Respiratory Therapy:Albuterol   Current Respiratory Therapy:  Albuterol 2p q6prn  Treatment Type: IS       Respiratory Severity Index(RSI)   Patients with orders for inhalation medications, oxygen, or any therapeutic treatment modality will be placed on Respiratory Protocol. They will be assessed with the first treatment and at least every 72 hours thereafter. The following severity scale will be used to determine frequency of treatment intervention.     Smoking History: Pulmonary Disease or Smoking History, Greater than 15 pack year = 2    Social History  Social History     Tobacco Use    Smoking status: Former Smoker     Packs/day: 0.50     Years: 20.00     Pack years: 10.00     Last attempt to quit: 2015     Years since quittin.1    Smokeless tobacco: Never Used   Substance Use Topics    Alcohol use: No     Alcohol/week: 0.0 standard drinks    Drug use: No       Recent Surgical History: None = 0  Past Surgical History  Past Surgical History:   Procedure Laterality Date    APPENDECTOMY      CHOLECYSTECTOMY      HYSTERECTOMY      total ab hyst    PARATHYROID GLAND SURGERY      explore parathyroid glands    TOTAL KNEE ARTHROPLASTY  03/29/10    left knee       Level of Consciousness: Alert, Oriented, and Cooperative = 0    Level of Activity: Mostly sedentary, minimal walking = 2    Respiratory Pattern: Regular Pattern; RR 8-20 = 0    Breath Sounds: Clear = 0    Sputum   ,  ,    Cough: Strong, spontaneous, non-productive = 0    Vital Signs   BP (!) 144/86   Pulse 86   Temp 97.8 °F (36.6 °C) (Oral)   Resp 16   Ht 5' 0.5\" (1.537 m)   Wt 133 lb 8 oz (60.6 kg)   SpO2 97%   BMI 25.64 kg/m²   SPO2 (COPD values may differ): Greater than or equal to 92% on room air = 0    Peak Flow (asthma only): not applicable = 0    RSI: 0-4 = See once and convert to home regimen or discontinue        Plan       Goals: medication delivery, mobilize retained secretions, volume expansion and improve oxygenation    Patient/caregiver was educated on the proper method of use for Respiratory Care Devices:  Yes      Level of patient/caregiver understanding able to:   ? Verbalize understanding   ? Demonstrate understanding       ? Teach back        ? Needs reinforcement       ? No available caregiver               ? Other:     Response to education:  Excellent     Is patient being placed on Home Treatment Regimen? Yes     Does the patient have everything they need prior to discharge? NA     Comments: chart reviewed    Plan of Care: Alb Q6PRN    Electronically signed by Khushbu Dave RCP on 1/6/2020 at 9:40 PM    Respiratory Protocol Guidelines     1. Assessment and treatment by Respiratory Therapy will be initiated for medication and therapeutic interventions upon initiation of aerosolized medication. 2. Physician will be contacted for respiratory rate (RR) greater than 35 breaths per minute. Therapy will be held for heart rate (HR) greater than 140 beats per minute, pending direction from physician. 3. Bronchodilators will be administered via Metered Dose Inhaler (MDI) with spacer when the following criteria are met:  a.  Alert and cooperative     b. HR < 140 bpm  c. RR < 30 bpm                d. Can demonstrate a 2-3 second inspiratory

## 2020-01-07 NOTE — CARE COORDINATION
CASE MANAGEMENT INITIAL ASSESSMENT      Reviewed chart and met with patient today, re: assessed Pt for DCP needs  Explained Case Management role/services Pt. Family present: none  Primary contact information: Pt spouse Calvin Simon     Admit date/status: 1/6/2020 in Patient   Diagnosis: CAD, multiple vessel  Is this a Readmission?:  no    Insurance: BCBS Medicare  Precert required for SNF - Y     3 night stay required - N    Living arrangements, Adls, care needs, prior to admission: Pt reported that she lives with her spouse in a mobile home with 5 stairs to enter. IPTA \"I am completely self sufficient\". Transportation: self and spouse    Durable Medical Equipment at home: Walker__Cane__RTS__ BSC__Shower Chair__  02__ HHN__ CPAP__  BiPap__  Hospital Bed__ W/C___ Other__________    Services in the home and/or outpatient, prior to admission: none    Dialysis Facility (if applicable) none  · Name:  · Address:  · Dialysis Schedule:  · Phone:  · Fax:    PT/OT recs: none noted    Hospital Exemption Notification (HEN): not completed    Barriers to discharge: none    Plan/comments: Pt reports she plans to return home with with Spouse with no needs at that time. Pt was encouraged to contact CM if needs to arise.    DEJAH Laura      ECOC on chart for MD signature

## 2020-01-07 NOTE — PROGRESS NOTES
Hospitalist Progress Note    Date of Admission: 1/6/2020    Chief Complaint: Cardiomyopathy    Hospital Course:   76 y.o. female who presented to [facility] with cardiomyopathy. PMHx significant for diabetes, hypertension. She was previously found to have new cardiomyopathy in the past few months after she presented to her PCP with bilateral lower extremity edema. Work-up revealed LVEF of 25%. She was referred to cardiology and a nuclear stress test was found to show scar but no ischemia. She presented on 1/6/2020 for elective angiogram and was found to have multivessel disease. CT surgery was consulted. The patient denies any current symptoms of cardiomyopathy or CAD. Denies any chest pain, shortness of breath, lower extremity edema. Subjective: asymptomatic. Labs:   Recent Labs     01/06/20  0710 01/07/20  0501   WBC 5.2 5.4   HGB 13.3 13.3   HCT 39.8 39.7    184     Recent Labs     01/06/20  0710 01/07/20  0501    139   K 4.1 4.2    105   CO2 25 23   BUN 33* 27*   CREATININE 1.4* 1.2   CALCIUM 9.0 8.7     No results for input(s): AST, ALT, BILIDIR, BILITOT, ALKPHOS in the last 72 hours. Recent Labs     01/06/20  0710   INR 0.95       Physical Exam Performed:    BP (!) 149/90   Pulse 88   Temp 98 °F (36.7 °C) (Oral)   Resp 16   Ht 5' 0.5\" (1.537 m)   Wt 133 lb 8 oz (60.6 kg)   SpO2 96%   BMI 25.64 kg/m²     General appearance: No apparent distress, appears stated age and cooperative. HEENT:  Normal cephalic, atraumatic without obvious deformity. Pupils equal, round, and reactive to light. Extra ocular muscles intact. Conjunctivae/corneas clear. Neck: Supple, no jugular venous distention. Trachea midline with full range of motion. Respiratory:  Normal respiratory effort. Clear to auscultation, bilaterally without Rales/Wheezes/Rhonchi. Cardiovascular: Regular rate and rhythm with normal S1/S2 without murmurs, rubs or gallops.   Abdomen: Soft, non-tender,

## 2020-01-07 NOTE — PROGRESS NOTES
Brief Pre-Op Note/Sedation Assessment      Anabella Abernathy  [birth year]  Cath Pool Rm/NONE      [id number]  3:00 PM    Planned Procedure: IVÁN  Post Procedure Plan: Return to same level of care    Consent: I have discussed with the patient and/or the patient representative the indication, alternatives, and the possible risks and/or complications of the planned procedure and the anesthesia methods. The patient and/or patient representative appear to understand and agree to proceed. Chief Complaint: Fatigue      Indications for Cath Procedure:  Cardiomyopathy      Vital Signs:  BP (!) 149/90   Pulse 88   Temp 98 °F (36.7 °C) (Oral)   Resp 16   Ht 5' 0.5\" (1.537 m)   Wt 133 lb 8 oz (60.6 kg)   SpO2 96%   BMI 25.64 kg/m²     Allergies:   Allergies   Allergen Reactions    Penicillins Anaphylaxis     Penicillin and derivatives       Past Medical History:  Past Medical History:   Diagnosis Date    Anemia     Backache, unspecified 3/11/08    CAD, multiple vessel 1/6/2020    Chronic systolic CHF (congestive heart failure) ([address] 12/11/2019    Depressive disorder, not elsewhere classified 11/8/07    Essential hypertension, benign 11/8/07    Gout 8/11/2011    Hyperlipidemia associated with type 2 diabetes mellitus ([address] 12/14/2015    Major depressive disorder, recurrent episode, moderate ([address] 12/14/2015    Osteoarthritis, hand 2/27/2012    Osteoarthrosis, unspecified whether generalized or localized, lower leg 11/8/07    Osteopenia 8/5/2015    Other and unspecified hyperlipidemia 11/8/07    Pain in joint, lower leg 1/29/07    Pneumonia     Rotator cuff tendinitis 10/6/2017    Tear of medial cartilage or meniscus of knee, current 1/29/07    Type 2 diabetes mellitus with microalbuminuria, without long-term current use of insulin ([address] 5/5/2017    Type 2 diabetes mellitus without complication ([address] 03/3/3013    Type II or unspecified type diabetes mellitus without mention of complication, not stated tablet 25 mg  25 mg Oral Daily Pedro Luis Patel MD   25 mg at 01/07/20 0911    omega-3 acid ethyl esters (LOVAZA) capsule 1,000 mg  1,000 mg Oral Daily Pedro Luis Patel MD   1,000 mg at 01/07/20 0911    vitamin B-12 (CYANOCOBALAMIN) tablet 500 mcg  500 mcg Oral Daily Pedro Luis Patel MD   500 mcg at 01/07/20 0911    sodium chloride flush 0.9 % injection 10 mL  10 mL Intravenous 2 times per day Pedro Luis Paetl MD   10 mL at 01/07/20 0912    sodium chloride flush 0.9 % injection 10 mL  10 mL Intravenous PRN Pedro Luis Patel MD        magnesium hydroxide (MILK OF MAGNESIA) 400 MG/5ML suspension 30 mL  30 mL Oral Daily PRN Pedro Luis Patel MD        ondansetron [facility]) injection 4 mg  4 mg Intravenous Q6H PRN Pedro Luis Patel MD        aspirin chewable tablet 81 mg  81 mg Oral Daily Pedro Luis Patel MD   81 mg at 01/07/20 0911    glucose (GLUTOSE) 40 % oral gel 15 g  15 g Oral PRN Pedro Luis Patel MD        dextrose 50 % IV solution  12.5 g Intravenous PRN Pedro Luis Patel MD        glucagon (rDNA) injection 1 mg  1 mg Intramuscular PRN Pedro Luis Paetl MD        dextrose 5 % solution  100 mL/hr Intravenous PRN Pedro Luis Patel MD        insulin glargine (LANTUS) injection vial 6 Units  6 Units Subcutaneous Nightly Pedro Luis Patel MD   6 Units at 01/06/20 2123    insulin lispro (HUMALOG) injection vial 0-12 Units  0-12 Units Subcutaneous TID WC Pedro Luis Patel MD   4 Units at 01/06/20 1814    insulin lispro (HUMALOG) injection vial 0-6 Units  0-6 Units Subcutaneous Nightly Pedro Luis Patel MD   2 Units at 01/06/20 2123    enoxaparin (LOVENOX) injection 30 mg  30 mg Subcutaneous Daily Pedro Luis Patel MD        melatonin tablet 5 mg  5 mg Oral Nightly PRN LADAN Wakefield NP         Facility-Administered Medications Ordered in Other Encounters   Medication Dose Route Frequency Provider Last Rate Last Dose    0.9 % sodium chloride infusion    Continuous PRN Martin APRN - CRNA        lidocaine 2 % injection    PRN Darin Abel, APRN - CRNA   60 mg at 01/07/20 0862           Pre-Sedation:    Pre-Sedation Documentation and Exam:  I have personally completed a history, physical exam & review of systems for this patient (see notes). Prior History of Anesthesia Complications:   none    Modified Mallampati:  II    ASA Classification:  Class 3 - A patient with severe systemic disease that limits activity but is not incapacitating      Nathan Scale: Activity:  2 - Able to move 4 extremities voluntarily on command  Respiration:  2 - Able to breathe deeply and cough freely  Circulation:  2 - BP+/- 20mmHg of normal  Consciousness:  2 - Fully awake  Oxygen Saturation (color):  2 - Able to maintain oxygen saturation >92% on room air    Sedation/Anesthesia Plan:  Guard the patient's safety and welfare. Minimize physical discomfort and pain. Minimize negative psychological responses to treatment by providing sedation and analgesia and maximize the potential amnesia. Patient to meet pre-procedure discharge plan. Medication Planned:  As per anesthesiology service.     Electronically signed by Eric Bustos MD on 1/7/2020 at 3:00 PM

## 2020-01-07 NOTE — ANESTHESIA PRE PROCEDURE
Historical Provider, MD   Omega-3 Fatty Acids (FISH OIL) 1000 MG CAPS Take 1,000 mg by mouth daily   Yes Historical Provider, MD   calcium carbonate (OSCAL) 500 MG TABS tablet Take 500 mg by mouth daily   Yes Historical Provider, MD   aspirin 81 MG EC tablet Take 81 mg by mouth daily.    Yes Historical Provider, MD       Current medications:    Current Facility-Administered Medications   Medication Dose Route Frequency Provider Last Rate Last Dose    [START ON 1/8/2020] insulin regular (HUMULIN R;NOVOLIN R) 100 Units in sodium chloride 0.9 % 100 mL infusion  0.5 Units/hr Intravenous On Call LADAN Mae  CNP        bisacodyl (DULCOLAX) suppository 10 mg  10 mg Rectal [facility] Heath - CNP        [START ON 1/8/2020] aspirin EC tablet 81 mg  81 mg Oral Once Belvin KumarLADAN tam - CNP        [START ON 1/8/2020] 0.9 % sodium chloride infusion   Intravenous Continuous Belvin KumarLADAN tam - CNP        sodium chloride flush 0.9 % injection 10 mL  10 mL Intravenous 2 times per day Miguelito Cooley MD        sodium chloride flush 0.9 % injection 10 mL  10 mL Intravenous PRN Miguelito Cooley MD        0.9 % sodium chloride infusion  1,000 mL Intravenous Continuous Miguelito Cooley MD 75 mL/hr at 01/07/20 1159 1,000 mL at 01/07/20 1159    sodium chloride flush 0.9 % injection 10 mL  10 mL Intravenous 2 times per day Miguelito Cooley MD   10 mL at 01/07/20 0912    sodium chloride flush 0.9 % injection 10 mL  10 mL Intravenous PRN Miguelito Cooley MD        acetaminophen (TYLENOL) tablet 650 mg  650 mg Oral Q4H PRN Miguelito Cooley MD        albuterol sulfate  (90 Base) MCG/ACT inhaler 2 puff  2 puff Inhalation Q6H PRN Raúl Alvarado MD        allopurinol (ZYLOPRIM) tablet 300 mg  300 mg Oral Daily Raúl Alvarado MD   300 mg at 01/07/20 0911    atorvastatin (LIPITOR) tablet 40 mg  40 mg Oral Daily Raúl Alvarado MD   40 mg at 01/07/20 0911    enalapril (VASOTEC) tablet 10 mg  10 mg Oral Daily without complication ([facility]     Type II or unspecified type diabetes mellitus without mention of complication, not stated as uncontrolled 07    foot exam 08 normal       Past Surgical History:        Procedure Laterality Date    APPENDECTOMY      CHOLECYSTECTOMY      HYSTERECTOMY      total ab hyst    PARATHYROID GLAND SURGERY      explore parathyroid glands    TOTAL KNEE ARTHROPLASTY  03/29/10    left knee       Social History:    Social History     Tobacco Use    Smoking status: Former Smoker     Packs/day: 0.50     Years: 20.00     Pack years: 10.00     Last attempt to quit: 2015     Years since quittin.1    Smokeless tobacco: Never Used   Substance Use Topics    Alcohol use: No     Alcohol/week: 0.0 standard drinks                                Counseling given: Not Answered      Vital Signs (Current):   Vitals:    20 2003 20 2327 20 0427 20 0745   BP: (!) 144/86 125/78 (!) 145/91 (!) 149/90   Pulse: 86 76 80 88   Resp: 16 16 16 16   Temp: 97.8 °F (36.6 °C) 97.9 °F (36.6 °C) 98.1 °F (36.7 °C) 98 °F (36.7 °C)   TempSrc: Oral Oral Oral Oral   SpO2: 97% 97% 96% 96%   Weight:       Height:                                                  BP Readings from Last 3 Encounters:   20 (!) 149/90   20 (!) 86/55   19 110/80       NPO Status:                                                                                 BMI:   Wt Readings from Last 3 Encounters:   20 133 lb 8 oz (60.6 kg)   19 138 lb (62.6 kg)   19 142 lb (64.4 kg)     Body mass index is 25.64 kg/m².     CBC:   Lab Results   Component Value Date    WBC 5.4 2020    RBC 4.23 2020    HGB 13.3 2020    HCT 39.7 2020    MCV 93.8 2020    RDW 13.9 2020     2020       CMP:   Lab Results   Component Value Date     2020    K 4.2 2020     2020    CO2 23 2020    BUN 27 2020 transesophageal echocardiography and post-operative ventilation including risks and alternatives with the patient . The patient has no further questions. Matt Zaragoza MD  January 7, 2020  4:08 PM)  Induction: inhalational and intravenous. arterial line, BIS, central line, PA catheter and IVÁN    Anesthetic plan and risks discussed with patient.                       Matt Zaragoza MD   1/7/2020

## 2020-01-08 ENCOUNTER — APPOINTMENT (OUTPATIENT)
Dept: GENERAL RADIOLOGY | Age: 75
DRG: 216 | End: 2020-01-08
Attending: INTERNAL MEDICINE
Payer: MEDICARE

## 2020-01-08 ENCOUNTER — ANESTHESIA (OUTPATIENT)
Dept: OPERATING ROOM | Age: 75
DRG: 216 | End: 2020-01-08
Payer: MEDICARE

## 2020-01-08 VITALS
SYSTOLIC BLOOD PRESSURE: 100 MMHG | RESPIRATION RATE: 12 BRPM | DIASTOLIC BLOOD PRESSURE: 67 MMHG | OXYGEN SATURATION: 100 % | TEMPERATURE: 98.7 F

## 2020-01-08 LAB
ACTIVATED CLOTTING TIME: 114 SEC (ref 99–130)
ACTIVATED CLOTTING TIME: 474 SEC (ref 99–130)
ACTIVATED CLOTTING TIME: 474 SEC (ref 99–130)
ACTIVATED CLOTTING TIME: 535 SEC (ref 99–130)
ACTIVATED CLOTTING TIME: 544 SEC (ref 99–130)
ACTIVATED CLOTTING TIME: 566 SEC (ref 99–130)
ACTIVATED CLOTTING TIME: 569 SEC (ref 99–130)
ACTIVATED CLOTTING TIME: 94 SEC (ref 99–130)
ANION GAP SERPL CALCULATED.3IONS-SCNC: 10 MMOL/L (ref 3–16)
ANION GAP SERPL CALCULATED.3IONS-SCNC: 12 MMOL/L (ref 3–16)
ANION GAP SERPL CALCULATED.3IONS-SCNC: 12 MMOL/L (ref 3–16)
APTT: 28.7 SEC (ref 24.2–36.2)
BASE EXCESS ARTERIAL: -1 (ref -3–3)
BASE EXCESS ARTERIAL: -2 (ref -3–3)
BASE EXCESS ARTERIAL: -3 (ref -3–3)
BASE EXCESS ARTERIAL: -3 (ref -3–3)
BASE EXCESS ARTERIAL: -4 (ref -3–3)
BASE EXCESS ARTERIAL: 0 (ref -3–3)
BASE EXCESS ARTERIAL: 1 (ref -3–3)
BASE EXCESS ARTERIAL: 1 (ref -3–3)
BLOOD BANK DISPENSE STATUS: NORMAL
BLOOD BANK DISPENSE STATUS: NORMAL
BLOOD BANK PRODUCT CODE: NORMAL
BLOOD BANK PRODUCT CODE: NORMAL
BPU ID: NORMAL
BPU ID: NORMAL
BUN BLDV-MCNC: 23 MG/DL (ref 7–20)
BUN BLDV-MCNC: 23 MG/DL (ref 7–20)
BUN BLDV-MCNC: 26 MG/DL (ref 7–20)
CALCIUM IONIZED: 1.09 MMOL/L (ref 1.12–1.32)
CALCIUM IONIZED: 1.1 MMOL/L (ref 1.12–1.32)
CALCIUM IONIZED: 1.12 MMOL/L (ref 1.12–1.32)
CALCIUM IONIZED: 1.13 MMOL/L (ref 1.12–1.32)
CALCIUM IONIZED: 1.13 MMOL/L (ref 1.12–1.32)
CALCIUM IONIZED: 1.16 MMOL/L (ref 1.12–1.32)
CALCIUM IONIZED: 1.18 MMOL/L (ref 1.12–1.32)
CALCIUM IONIZED: 1.21 MMOL/L (ref 1.12–1.32)
CALCIUM IONIZED: 1.22 MMOL/L (ref 1.12–1.32)
CALCIUM SERPL-MCNC: 7.8 MG/DL (ref 8.3–10.6)
CALCIUM SERPL-MCNC: 7.9 MG/DL (ref 8.3–10.6)
CALCIUM SERPL-MCNC: 9.3 MG/DL (ref 8.3–10.6)
CHLORIDE BLD-SCNC: 102 MMOL/L (ref 99–110)
CHLORIDE BLD-SCNC: 104 MMOL/L (ref 99–110)
CHLORIDE BLD-SCNC: 105 MMOL/L (ref 99–110)
CO2: 20 MMOL/L (ref 21–32)
CO2: 23 MMOL/L (ref 21–32)
CO2: 27 MMOL/L (ref 21–32)
CREAT SERPL-MCNC: 1.1 MG/DL (ref 0.6–1.2)
CREAT SERPL-MCNC: 1.2 MG/DL (ref 0.6–1.2)
CREAT SERPL-MCNC: 1.3 MG/DL (ref 0.6–1.2)
DESCRIPTION BLOOD BANK: NORMAL
DESCRIPTION BLOOD BANK: NORMAL
GFR AFRICAN AMERICAN: 48
GFR AFRICAN AMERICAN: 53
GFR AFRICAN AMERICAN: 59
GFR NON-AFRICAN AMERICAN: 40
GFR NON-AFRICAN AMERICAN: 44
GFR NON-AFRICAN AMERICAN: 49
GLUCOSE BLD-MCNC: 104 MG/DL (ref 70–99)
GLUCOSE BLD-MCNC: 108 MG/DL (ref 70–99)
GLUCOSE BLD-MCNC: 109 MG/DL (ref 70–99)
GLUCOSE BLD-MCNC: 111 MG/DL (ref 70–99)
GLUCOSE BLD-MCNC: 117 MG/DL (ref 70–99)
GLUCOSE BLD-MCNC: 117 MG/DL (ref 70–99)
GLUCOSE BLD-MCNC: 131 MG/DL (ref 70–99)
GLUCOSE BLD-MCNC: 131 MG/DL (ref 70–99)
GLUCOSE BLD-MCNC: 159 MG/DL (ref 70–99)
GLUCOSE BLD-MCNC: 160 MG/DL (ref 70–99)
GLUCOSE BLD-MCNC: 168 MG/DL (ref 70–99)
GLUCOSE BLD-MCNC: 170 MG/DL (ref 70–99)
GLUCOSE BLD-MCNC: 172 MG/DL (ref 70–99)
GLUCOSE BLD-MCNC: 181 MG/DL (ref 70–99)
GLUCOSE BLD-MCNC: 183 MG/DL (ref 70–99)
GLUCOSE BLD-MCNC: 194 MG/DL (ref 70–99)
GLUCOSE BLD-MCNC: 207 MG/DL (ref 70–99)
GLUCOSE BLD-MCNC: 81 MG/DL (ref 70–99)
GLUCOSE BLD-MCNC: 84 MG/DL (ref 70–99)
GLUCOSE BLD-MCNC: 90 MG/DL (ref 70–99)
GLUCOSE BLD-MCNC: 91 MG/DL (ref 70–99)
HCO3 ARTERIAL: 21.1 MMOL/L (ref 21–29)
HCO3 ARTERIAL: 22.4 MMOL/L (ref 21–29)
HCO3 ARTERIAL: 22.6 MMOL/L (ref 21–29)
HCO3 ARTERIAL: 22.8 MMOL/L (ref 21–29)
HCO3 ARTERIAL: 22.9 MMOL/L (ref 21–29)
HCO3 ARTERIAL: 23.2 MMOL/L (ref 21–29)
HCO3 ARTERIAL: 24.7 MMOL/L (ref 21–29)
HCO3 ARTERIAL: 25 MMOL/L (ref 21–29)
HCO3 ARTERIAL: 25.2 MMOL/L (ref 21–29)
HCO3 ARTERIAL: 25.2 MMOL/L (ref 21–29)
HCO3 ARTERIAL: 25.6 MMOL/L (ref 21–29)
HCO3 ARTERIAL: 25.6 MMOL/L (ref 21–29)
HCO3 ARTERIAL: 25.7 MMOL/L (ref 21–29)
HCT VFR BLD CALC: 23 % (ref 36–48)
HCT VFR BLD CALC: 26.7 % (ref 36–48)
HEMOGLOBIN: 12.3 GM/DL (ref 12–16)
HEMOGLOBIN: 12.5 GM/DL (ref 12–16)
HEMOGLOBIN: 6 GM/DL (ref 12–16)
HEMOGLOBIN: 6.7 GM/DL (ref 12–16)
HEMOGLOBIN: 7.5 GM/DL (ref 12–16)
HEMOGLOBIN: 7.5 GM/DL (ref 12–16)
HEMOGLOBIN: 7.7 G/DL (ref 12–16)
HEMOGLOBIN: 7.7 GM/DL (ref 12–16)
HEMOGLOBIN: 7.8 GM/DL (ref 12–16)
HEMOGLOBIN: 8 GM/DL (ref 12–16)
HEMOGLOBIN: 8.2 GM/DL (ref 12–16)
HEMOGLOBIN: 9 G/DL (ref 12–16)
INR BLD: 1.46 (ref 0.86–1.14)
LACTATE: 0.8 MMOL/L (ref 0.4–2)
LACTATE: 0.99 MMOL/L (ref 0.4–2)
LACTATE: 0.99 MMOL/L (ref 0.4–2)
LACTATE: 1.13 MMOL/L (ref 0.4–2)
LACTATE: 1.16 MMOL/L (ref 0.4–2)
LACTATE: 1.33 MMOL/L (ref 0.4–2)
LACTATE: 1.93 MMOL/L (ref 0.4–2)
LACTATE: 3.28 MMOL/L (ref 0.4–2)
LACTATE: 3.94 MMOL/L (ref 0.4–2)
MAGNESIUM: 3.1 MG/DL (ref 1.8–2.4)
MAGNESIUM: 3.6 MG/DL (ref 1.8–2.4)
MCH RBC QN AUTO: 31.8 PG (ref 26–34)
MCH RBC QN AUTO: 32.1 PG (ref 26–34)
MCHC RBC AUTO-ENTMCNC: 33.5 G/DL (ref 31–36)
MCHC RBC AUTO-ENTMCNC: 33.8 G/DL (ref 31–36)
MCV RBC AUTO: 94.8 FL (ref 80–100)
MCV RBC AUTO: 94.9 FL (ref 80–100)
O2 SAT, ARTERIAL: 100 % (ref 93–100)
O2 SAT, ARTERIAL: 97 % (ref 93–100)
O2 SAT, ARTERIAL: 98 % (ref 93–100)
O2 SAT, ARTERIAL: 98 % (ref 93–100)
O2 SAT, ARTERIAL: 99 % (ref 93–100)
PCO2 ARTERIAL: 36.3 MM HG (ref 35–45)
PCO2 ARTERIAL: 37.5 MM HG (ref 35–45)
PCO2 ARTERIAL: 37.7 MM HG (ref 35–45)
PCO2 ARTERIAL: 39.3 MM HG (ref 35–45)
PCO2 ARTERIAL: 40 MM HG (ref 35–45)
PCO2 ARTERIAL: 40 MM HG (ref 35–45)
PCO2 ARTERIAL: 41.3 MM HG (ref 35–45)
PCO2 ARTERIAL: 42.3 MM HG (ref 35–45)
PCO2 ARTERIAL: 42.8 MM HG (ref 35–45)
PCO2 ARTERIAL: 43.3 MM HG (ref 35–45)
PCO2 ARTERIAL: 43.6 MM HG (ref 35–45)
PCO2 ARTERIAL: 43.8 MM HG (ref 35–45)
PCO2 ARTERIAL: 62.3 MM HG (ref 35–45)
PDW BLD-RTO: 13.6 % (ref 12.4–15.4)
PDW BLD-RTO: 13.6 % (ref 12.4–15.4)
PERFORMED ON: ABNORMAL
PERFORMED ON: NORMAL
PERFORMED ON: NORMAL
PH ARTERIAL: 7.22 (ref 7.35–7.45)
PH ARTERIAL: 7.34 (ref 7.35–7.45)
PH ARTERIAL: 7.36 (ref 7.35–7.45)
PH ARTERIAL: 7.36 (ref 7.35–7.45)
PH ARTERIAL: 7.37 (ref 7.35–7.45)
PH ARTERIAL: 7.38 (ref 7.35–7.45)
PH ARTERIAL: 7.39 (ref 7.35–7.45)
PH ARTERIAL: 7.41 (ref 7.35–7.45)
PH ARTERIAL: 7.41 (ref 7.35–7.45)
PLATELET # BLD: 110 K/UL (ref 135–450)
PLATELET # BLD: 90 K/UL (ref 135–450)
PLATELET SLIDE REVIEW: ABNORMAL
PMV BLD AUTO: 7.9 FL (ref 5–10.5)
PMV BLD AUTO: 8.3 FL (ref 5–10.5)
PO2 ARTERIAL: 100.2 MM HG (ref 75–108)
PO2 ARTERIAL: 107.6 MM HG (ref 75–108)
PO2 ARTERIAL: 128.3 MM HG (ref 75–108)
PO2 ARTERIAL: 165.1 MM HG (ref 75–108)
PO2 ARTERIAL: 361.3 MM HG (ref 75–108)
PO2 ARTERIAL: 373.9 MM HG (ref 75–108)
PO2 ARTERIAL: 387 MM HG (ref 75–108)
PO2 ARTERIAL: 449.5 MM HG (ref 75–108)
PO2 ARTERIAL: 461.1 MM HG (ref 75–108)
PO2 ARTERIAL: 508.3 MM HG (ref 75–108)
PO2 ARTERIAL: 588.2 MM HG (ref 75–108)
PO2 ARTERIAL: 624.5 MM HG (ref 75–108)
PO2 ARTERIAL: 93.3 MM HG (ref 75–108)
POC HEMATOCRIT: 18 % (ref 36–48)
POC HEMATOCRIT: 20 % (ref 36–48)
POC HEMATOCRIT: 22 % (ref 36–48)
POC HEMATOCRIT: 22 % (ref 36–48)
POC HEMATOCRIT: 23 % (ref 36–48)
POC HEMATOCRIT: 24 % (ref 36–48)
POC HEMATOCRIT: 36 % (ref 36–48)
POC HEMATOCRIT: 37 % (ref 36–48)
POC POTASSIUM: 3.8 MMOL/L (ref 3.5–5.1)
POC POTASSIUM: 3.9 MMOL/L (ref 3.5–5.1)
POC POTASSIUM: 4.3 MMOL/L (ref 3.5–5.1)
POC POTASSIUM: 4.4 MMOL/L (ref 3.5–5.1)
POC POTASSIUM: 4.7 MMOL/L (ref 3.5–5.1)
POC POTASSIUM: 4.9 MMOL/L (ref 3.5–5.1)
POC POTASSIUM: 5.1 MMOL/L (ref 3.5–5.1)
POC POTASSIUM: 5.4 MMOL/L (ref 3.5–5.1)
POC POTASSIUM: 6.4 MMOL/L (ref 3.5–5.1)
POC SAMPLE TYPE: ABNORMAL
POC SAMPLE TYPE: NORMAL
POC SAMPLE TYPE: NORMAL
POC SODIUM: 137 MMOL/L (ref 136–145)
POC SODIUM: 139 MMOL/L (ref 136–145)
POC SODIUM: 140 MMOL/L (ref 136–145)
POC SODIUM: 140 MMOL/L (ref 136–145)
POC SODIUM: 141 MMOL/L (ref 136–145)
POC SODIUM: 141 MMOL/L (ref 136–145)
POTASSIUM REFLEX MAGNESIUM: 4.2 MMOL/L (ref 3.5–5.1)
POTASSIUM SERPL-SCNC: 4.2 MMOL/L (ref 3.5–5.1)
POTASSIUM SERPL-SCNC: 4.7 MMOL/L (ref 3.5–5.1)
PROTHROMBIN TIME: 17 SEC (ref 10–13.2)
RBC # BLD: 2.42 M/UL (ref 4–5.2)
RBC # BLD: 2.81 M/UL (ref 4–5.2)
SLIDE REVIEW: ABNORMAL
SODIUM BLD-SCNC: 137 MMOL/L (ref 136–145)
SODIUM BLD-SCNC: 139 MMOL/L (ref 136–145)
SODIUM BLD-SCNC: 139 MMOL/L (ref 136–145)
TCO2 ARTERIAL: 22 MMOL/L
TCO2 ARTERIAL: 24 MMOL/L
TCO2 ARTERIAL: 25 MMOL/L
TCO2 ARTERIAL: 26 MMOL/L
TCO2 ARTERIAL: 27 MMOL/L
TCO2 ARTERIAL: 28 MMOL/L
WBC # BLD: 8 K/UL (ref 4–11)
WBC # BLD: 9.5 K/UL (ref 4–11)

## 2020-01-08 PROCEDURE — 02L70CK OCCLUSION OF LEFT ATRIAL APPENDAGE WITH EXTRALUMINAL DEVICE, OPEN APPROACH: ICD-10-PCS | Performed by: THORACIC SURGERY (CARDIOTHORACIC VASCULAR SURGERY)

## 2020-01-08 PROCEDURE — 2100000000 HC CCU R&B

## 2020-01-08 PROCEDURE — 2780000006 HC MISC HEART VALVE: Performed by: THORACIC SURGERY (CARDIOTHORACIC VASCULAR SURGERY)

## 2020-01-08 PROCEDURE — 2580000003 HC RX 258: Performed by: ANESTHESIOLOGY

## 2020-01-08 PROCEDURE — 02100Z9 BYPASS CORONARY ARTERY, ONE ARTERY FROM LEFT INTERNAL MAMMARY, OPEN APPROACH: ICD-10-PCS | Performed by: THORACIC SURGERY (CARDIOTHORACIC VASCULAR SURGERY)

## 2020-01-08 PROCEDURE — 3700000001 HC ADD 15 MINUTES (ANESTHESIA): Performed by: THORACIC SURGERY (CARDIOTHORACIC VASCULAR SURGERY)

## 2020-01-08 PROCEDURE — 2580000003 HC RX 258: Performed by: NURSE PRACTITIONER

## 2020-01-08 PROCEDURE — 2580000003 HC RX 258: Performed by: THORACIC SURGERY (CARDIOTHORACIC VASCULAR SURGERY)

## 2020-01-08 PROCEDURE — 7100000010 HC PHASE II RECOVERY - FIRST 15 MIN

## 2020-01-08 PROCEDURE — 85014 HEMATOCRIT: CPT

## 2020-01-08 PROCEDURE — 5A1221Z PERFORMANCE OF CARDIAC OUTPUT, CONTINUOUS: ICD-10-PCS | Performed by: THORACIC SURGERY (CARDIOTHORACIC VASCULAR SURGERY)

## 2020-01-08 PROCEDURE — 2709999900 HC NON-CHARGEABLE SUPPLY: Performed by: THORACIC SURGERY (CARDIOTHORACIC VASCULAR SURGERY)

## 2020-01-08 PROCEDURE — 5A1223Z PERFORMANCE OF CARDIAC PACING, CONTINUOUS: ICD-10-PCS | Performed by: THORACIC SURGERY (CARDIOTHORACIC VASCULAR SURGERY)

## 2020-01-08 PROCEDURE — 2500000003 HC RX 250 WO HCPCS: Performed by: ANESTHESIOLOGY

## 2020-01-08 PROCEDURE — 84132 ASSAY OF SERUM POTASSIUM: CPT

## 2020-01-08 PROCEDURE — 85027 COMPLETE CBC AUTOMATED: CPT

## 2020-01-08 PROCEDURE — 33533 CABG ARTERIAL SINGLE: CPT | Performed by: THORACIC SURGERY (CARDIOTHORACIC VASCULAR SURGERY)

## 2020-01-08 PROCEDURE — P9045 ALBUMIN (HUMAN), 5%, 250 ML: HCPCS | Performed by: ANESTHESIOLOGY

## 2020-01-08 PROCEDURE — 6370000000 HC RX 637 (ALT 250 FOR IP): Performed by: THORACIC SURGERY (CARDIOTHORACIC VASCULAR SURGERY)

## 2020-01-08 PROCEDURE — 83605 ASSAY OF LACTIC ACID: CPT

## 2020-01-08 PROCEDURE — 33508 ENDOSCOPIC VEIN HARVEST: CPT | Performed by: THORACIC SURGERY (CARDIOTHORACIC VASCULAR SURGERY)

## 2020-01-08 PROCEDURE — 2700000000 HC OXYGEN THERAPY PER DAY

## 2020-01-08 PROCEDURE — 82947 ASSAY GLUCOSE BLOOD QUANT: CPT

## 2020-01-08 PROCEDURE — C9290 INJ, BUPIVACAINE LIPOSOME: HCPCS | Performed by: THORACIC SURGERY (CARDIOTHORACIC VASCULAR SURGERY)

## 2020-01-08 PROCEDURE — 94770 HC ETCO2 MONITOR DAILY: CPT

## 2020-01-08 PROCEDURE — P9016 RBC LEUKOCYTES REDUCED: HCPCS

## 2020-01-08 PROCEDURE — 80048 BASIC METABOLIC PNL TOTAL CA: CPT

## 2020-01-08 PROCEDURE — 33426 REPAIR OF MITRAL VALVE: CPT | Performed by: THORACIC SURGERY (CARDIOTHORACIC VASCULAR SURGERY)

## 2020-01-08 PROCEDURE — 36430 TRANSFUSION BLD/BLD COMPNT: CPT

## 2020-01-08 PROCEDURE — 6360000002 HC RX W HCPCS: Performed by: THORACIC SURGERY (CARDIOTHORACIC VASCULAR SURGERY)

## 2020-01-08 PROCEDURE — 94761 N-INVAS EAR/PLS OXIMETRY MLT: CPT

## 2020-01-08 PROCEDURE — 94640 AIRWAY INHALATION TREATMENT: CPT

## 2020-01-08 PROCEDURE — 71045 X-RAY EXAM CHEST 1 VIEW: CPT

## 2020-01-08 PROCEDURE — 02Q50ZZ REPAIR ATRIAL SEPTUM, OPEN APPROACH: ICD-10-PCS | Performed by: THORACIC SURGERY (CARDIOTHORACIC VASCULAR SURGERY)

## 2020-01-08 PROCEDURE — 6370000000 HC RX 637 (ALT 250 FOR IP): Performed by: NURSE PRACTITIONER

## 2020-01-08 PROCEDURE — 94750 HC PULMONARY COMPLIANCE STUDY: CPT

## 2020-01-08 PROCEDURE — B24BZZ4 ULTRASONOGRAPHY OF HEART WITH AORTA, TRANSESOPHAGEAL: ICD-10-PCS | Performed by: THORACIC SURGERY (CARDIOTHORACIC VASCULAR SURGERY)

## 2020-01-08 PROCEDURE — 85347 COAGULATION TIME ACTIVATED: CPT

## 2020-01-08 PROCEDURE — 36415 COLL VENOUS BLD VENIPUNCTURE: CPT

## 2020-01-08 PROCEDURE — 2500000003 HC RX 250 WO HCPCS: Performed by: THORACIC SURGERY (CARDIOTHORACIC VASCULAR SURGERY)

## 2020-01-08 PROCEDURE — 2780000010 HC IMPLANT OTHER: Performed by: THORACIC SURGERY (CARDIOTHORACIC VASCULAR SURGERY)

## 2020-01-08 PROCEDURE — 6360000002 HC RX W HCPCS: Performed by: ANESTHESIOLOGY

## 2020-01-08 PROCEDURE — 94002 VENT MGMT INPAT INIT DAY: CPT

## 2020-01-08 PROCEDURE — 33641 REPAIR HEART SEPTUM DEFECT: CPT | Performed by: THORACIC SURGERY (CARDIOTHORACIC VASCULAR SURGERY)

## 2020-01-08 PROCEDURE — 33518 CABG ARTERY-VEIN TWO: CPT | Performed by: THORACIC SURGERY (CARDIOTHORACIC VASCULAR SURGERY)

## 2020-01-08 PROCEDURE — 85730 THROMBOPLASTIN TIME PARTIAL: CPT

## 2020-01-08 PROCEDURE — 7100000011 HC PHASE II RECOVERY - ADDTL 15 MIN

## 2020-01-08 PROCEDURE — 3600000018 HC SURGERY OHS ADDTL 15MIN: Performed by: THORACIC SURGERY (CARDIOTHORACIC VASCULAR SURGERY)

## 2020-01-08 PROCEDURE — 82803 BLOOD GASES ANY COMBINATION: CPT

## 2020-01-08 PROCEDURE — 021109W BYPASS CORONARY ARTERY, TWO ARTERIES FROM AORTA WITH AUTOLOGOUS VENOUS TISSUE, OPEN APPROACH: ICD-10-PCS | Performed by: THORACIC SURGERY (CARDIOTHORACIC VASCULAR SURGERY)

## 2020-01-08 PROCEDURE — 6370000000 HC RX 637 (ALT 250 FOR IP): Performed by: ANESTHESIOLOGY

## 2020-01-08 PROCEDURE — 3700000000 HC ANESTHESIA ATTENDED CARE: Performed by: THORACIC SURGERY (CARDIOTHORACIC VASCULAR SURGERY)

## 2020-01-08 PROCEDURE — 83735 ASSAY OF MAGNESIUM: CPT

## 2020-01-08 PROCEDURE — 2720000010 HC SURG SUPPLY STERILE: Performed by: THORACIC SURGERY (CARDIOTHORACIC VASCULAR SURGERY)

## 2020-01-08 PROCEDURE — P9045 ALBUMIN (HUMAN), 5%, 250 ML: HCPCS | Performed by: THORACIC SURGERY (CARDIOTHORACIC VASCULAR SURGERY)

## 2020-01-08 PROCEDURE — 85610 PROTHROMBIN TIME: CPT

## 2020-01-08 PROCEDURE — 02UG0JZ SUPPLEMENT MITRAL VALVE WITH SYNTHETIC SUBSTITUTE, OPEN APPROACH: ICD-10-PCS | Performed by: THORACIC SURGERY (CARDIOTHORACIC VASCULAR SURGERY)

## 2020-01-08 PROCEDURE — 06BQ4ZZ EXCISION OF LEFT SAPHENOUS VEIN, PERCUTANEOUS ENDOSCOPIC APPROACH: ICD-10-PCS | Performed by: THORACIC SURGERY (CARDIOTHORACIC VASCULAR SURGERY)

## 2020-01-08 PROCEDURE — C1729 CATH, DRAINAGE: HCPCS | Performed by: THORACIC SURGERY (CARDIOTHORACIC VASCULAR SURGERY)

## 2020-01-08 PROCEDURE — 3600000008 HC SURGERY OHS BASE: Performed by: THORACIC SURGERY (CARDIOTHORACIC VASCULAR SURGERY)

## 2020-01-08 PROCEDURE — 85018 HEMOGLOBIN: CPT

## 2020-01-08 PROCEDURE — 6360000002 HC RX W HCPCS: Performed by: NURSE PRACTITIONER

## 2020-01-08 PROCEDURE — 82330 ASSAY OF CALCIUM: CPT

## 2020-01-08 PROCEDURE — 84295 ASSAY OF SERUM SODIUM: CPT

## 2020-01-08 DEVICE — ZINACTIVE USE 2540330 DEVICE OCCL CLP L35MM PLUNG GRP FLX SHFT FOR GILLINOV: Type: IMPLANTABLE DEVICE | Site: HEART | Status: FUNCTIONAL

## 2020-01-08 DEVICE — IMPLANTABLE DEVICE: Type: IMPLANTABLE DEVICE | Site: HEART | Status: FUNCTIONAL

## 2020-01-08 RX ORDER — OXYCODONE HYDROCHLORIDE 5 MG/1
5 TABLET ORAL EVERY 4 HOURS PRN
Status: DISCONTINUED | OUTPATIENT
Start: 2020-01-08 | End: 2020-01-13 | Stop reason: HOSPADM

## 2020-01-08 RX ORDER — INSULIN GLARGINE 100 [IU]/ML
0.25 INJECTION, SOLUTION SUBCUTANEOUS NIGHTLY
Status: DISPENSED | OUTPATIENT
Start: 2020-01-09 | End: 2020-01-10

## 2020-01-08 RX ORDER — SODIUM CHLORIDE 9 MG/ML
INJECTION, SOLUTION INTRAVENOUS
Status: DISPENSED
Start: 2020-01-08 | End: 2020-01-09

## 2020-01-08 RX ORDER — MEPERIDINE HYDROCHLORIDE 50 MG/ML
25 INJECTION INTRAMUSCULAR; INTRAVENOUS; SUBCUTANEOUS
Status: ACTIVE | OUTPATIENT
Start: 2020-01-08 | End: 2020-01-08

## 2020-01-08 RX ORDER — SODIUM CHLORIDE 9 MG/ML
INJECTION, SOLUTION INTRAVENOUS CONTINUOUS PRN
Status: DISCONTINUED | OUTPATIENT
Start: 2020-01-08 | End: 2020-01-08 | Stop reason: SDUPTHER

## 2020-01-08 RX ORDER — ACETAMINOPHEN 650 MG/1
650 SUPPOSITORY RECTAL EVERY 4 HOURS PRN
Status: DISCONTINUED | OUTPATIENT
Start: 2020-01-08 | End: 2020-01-13 | Stop reason: HOSPADM

## 2020-01-08 RX ORDER — ACETAMINOPHEN 500 MG
1000 TABLET ORAL EVERY 8 HOURS
Status: DISPENSED | OUTPATIENT
Start: 2020-01-09 | End: 2020-01-10

## 2020-01-08 RX ORDER — ALBUTEROL SULFATE 2.5 MG/3ML
2.5 SOLUTION RESPIRATORY (INHALATION)
Status: DISCONTINUED | OUTPATIENT
Start: 2020-01-08 | End: 2020-01-13 | Stop reason: HOSPADM

## 2020-01-08 RX ORDER — MIDAZOLAM HYDROCHLORIDE 1 MG/ML
1 INJECTION INTRAMUSCULAR; INTRAVENOUS
Status: DISCONTINUED | OUTPATIENT
Start: 2020-01-08 | End: 2020-01-10

## 2020-01-08 RX ORDER — DOCUSATE SODIUM 100 MG/1
100 CAPSULE, LIQUID FILLED ORAL 2 TIMES DAILY
Status: DISCONTINUED | OUTPATIENT
Start: 2020-01-09 | End: 2020-01-13 | Stop reason: HOSPADM

## 2020-01-08 RX ORDER — ASPIRIN 81 MG/1
81 TABLET ORAL DAILY
Status: DISCONTINUED | OUTPATIENT
Start: 2020-01-09 | End: 2020-01-13 | Stop reason: HOSPADM

## 2020-01-08 RX ORDER — POTASSIUM CHLORIDE 750 MG/1
10 TABLET, EXTENDED RELEASE ORAL
Status: DISCONTINUED | OUTPATIENT
Start: 2020-01-09 | End: 2020-01-10

## 2020-01-08 RX ORDER — FAMOTIDINE 20 MG/1
20 TABLET, FILM COATED ORAL DAILY
Status: DISCONTINUED | OUTPATIENT
Start: 2020-01-09 | End: 2020-01-13 | Stop reason: HOSPADM

## 2020-01-08 RX ORDER — DEXTROSE MONOHYDRATE 50 MG/ML
100 INJECTION, SOLUTION INTRAVENOUS PRN
Status: DISCONTINUED | OUTPATIENT
Start: 2020-01-08 | End: 2020-01-13 | Stop reason: HOSPADM

## 2020-01-08 RX ORDER — ATORVASTATIN CALCIUM 10 MG/1
20 TABLET, FILM COATED ORAL NIGHTLY
Status: DISCONTINUED | OUTPATIENT
Start: 2020-01-09 | End: 2020-01-13 | Stop reason: HOSPADM

## 2020-01-08 RX ORDER — SODIUM CHLORIDE 0.9 % (FLUSH) 0.9 %
10 SYRINGE (ML) INJECTION EVERY 12 HOURS SCHEDULED
Status: DISCONTINUED | OUTPATIENT
Start: 2020-01-08 | End: 2020-01-13 | Stop reason: HOSPADM

## 2020-01-08 RX ORDER — SODIUM CHLORIDE, SODIUM LACTATE, POTASSIUM CHLORIDE, CALCIUM CHLORIDE 600; 310; 30; 20 MG/100ML; MG/100ML; MG/100ML; MG/100ML
INJECTION, SOLUTION INTRAVENOUS CONTINUOUS PRN
Status: DISCONTINUED | OUTPATIENT
Start: 2020-01-08 | End: 2020-01-08 | Stop reason: SDUPTHER

## 2020-01-08 RX ORDER — ACETAMINOPHEN 10 MG/ML
1000 INJECTION, SOLUTION INTRAVENOUS EVERY 8 HOURS
Status: COMPLETED | OUTPATIENT
Start: 2020-01-08 | End: 2020-01-09

## 2020-01-08 RX ORDER — FENTANYL CITRATE 50 UG/ML
INJECTION, SOLUTION INTRAMUSCULAR; INTRAVENOUS PRN
Status: DISCONTINUED | OUTPATIENT
Start: 2020-01-08 | End: 2020-01-08 | Stop reason: SDUPTHER

## 2020-01-08 RX ORDER — ACETAMINOPHEN 325 MG/1
650 TABLET ORAL EVERY 4 HOURS PRN
Status: DISCONTINUED | OUTPATIENT
Start: 2020-01-08 | End: 2020-01-13 | Stop reason: HOSPADM

## 2020-01-08 RX ORDER — HYDRALAZINE HYDROCHLORIDE 20 MG/ML
5 INJECTION INTRAMUSCULAR; INTRAVENOUS EVERY 5 MIN PRN
Status: DISCONTINUED | OUTPATIENT
Start: 2020-01-08 | End: 2020-01-13 | Stop reason: HOSPADM

## 2020-01-08 RX ORDER — ALBUMIN, HUMAN INJ 5% 5 %
SOLUTION INTRAVENOUS PRN
Status: DISCONTINUED | OUTPATIENT
Start: 2020-01-08 | End: 2020-01-08 | Stop reason: SDUPTHER

## 2020-01-08 RX ORDER — DEXTROSE MONOHYDRATE 25 G/50ML
12.5 INJECTION, SOLUTION INTRAVENOUS PRN
Status: DISCONTINUED | OUTPATIENT
Start: 2020-01-08 | End: 2020-01-13 | Stop reason: HOSPADM

## 2020-01-08 RX ORDER — MORPHINE SULFATE 2 MG/ML
2 INJECTION, SOLUTION INTRAMUSCULAR; INTRAVENOUS
Status: DISCONTINUED | OUTPATIENT
Start: 2020-01-08 | End: 2020-01-13 | Stop reason: HOSPADM

## 2020-01-08 RX ORDER — EPINEPHRINE 1 MG/ML
INJECTION, SOLUTION, CONCENTRATE INTRAVENOUS PRN
Status: DISCONTINUED | OUTPATIENT
Start: 2020-01-08 | End: 2020-01-08 | Stop reason: SDUPTHER

## 2020-01-08 RX ORDER — KETAMINE HYDROCHLORIDE 100 MG/ML
INJECTION, SOLUTION INTRAMUSCULAR; INTRAVENOUS PRN
Status: DISCONTINUED | OUTPATIENT
Start: 2020-01-08 | End: 2020-01-08 | Stop reason: SDUPTHER

## 2020-01-08 RX ORDER — DEXTROSE AND SODIUM CHLORIDE 5; .45 G/100ML; G/100ML
INJECTION, SOLUTION INTRAVENOUS CONTINUOUS
Status: DISCONTINUED | OUTPATIENT
Start: 2020-01-08 | End: 2020-01-10

## 2020-01-08 RX ORDER — ONDANSETRON 2 MG/ML
4 INJECTION INTRAMUSCULAR; INTRAVENOUS EVERY 8 HOURS PRN
Status: DISCONTINUED | OUTPATIENT
Start: 2020-01-08 | End: 2020-01-13 | Stop reason: HOSPADM

## 2020-01-08 RX ORDER — SODIUM CHLORIDE 0.9 % (FLUSH) 0.9 %
10 SYRINGE (ML) INJECTION PRN
Status: DISCONTINUED | OUTPATIENT
Start: 2020-01-08 | End: 2020-01-13 | Stop reason: HOSPADM

## 2020-01-08 RX ORDER — CALCIUM CHLORIDE 100 MG/ML
INJECTION INTRAVENOUS; INTRAVENTRICULAR PRN
Status: DISCONTINUED | OUTPATIENT
Start: 2020-01-08 | End: 2020-01-08 | Stop reason: SDUPTHER

## 2020-01-08 RX ORDER — CHLORHEXIDINE GLUCONATE 0.12 MG/ML
15 RINSE ORAL 2 TIMES DAILY
Status: DISCONTINUED | OUTPATIENT
Start: 2020-01-08 | End: 2020-01-13 | Stop reason: HOSPADM

## 2020-01-08 RX ORDER — METOPROLOL TARTRATE 5 MG/5ML
INJECTION INTRAVENOUS PRN
Status: DISCONTINUED | OUTPATIENT
Start: 2020-01-08 | End: 2020-01-08 | Stop reason: SDUPTHER

## 2020-01-08 RX ORDER — 0.9 % SODIUM CHLORIDE 0.9 %
250 INTRAVENOUS SOLUTION INTRAVENOUS ONCE
Status: DISCONTINUED | OUTPATIENT
Start: 2020-01-08 | End: 2020-01-13 | Stop reason: HOSPADM

## 2020-01-08 RX ORDER — CLOPIDOGREL BISULFATE 75 MG/1
75 TABLET ORAL DAILY
Status: DISCONTINUED | OUTPATIENT
Start: 2020-01-09 | End: 2020-01-13 | Stop reason: HOSPADM

## 2020-01-08 RX ORDER — MILRINONE LACTATE 0.2 MG/ML
INJECTION, SOLUTION INTRAVENOUS CONTINUOUS PRN
Status: DISCONTINUED | OUTPATIENT
Start: 2020-01-08 | End: 2020-01-08 | Stop reason: SDUPTHER

## 2020-01-08 RX ORDER — METOPROLOL TARTRATE 5 MG/5ML
2.5 INJECTION INTRAVENOUS EVERY 10 MIN PRN
Status: DISCONTINUED | OUTPATIENT
Start: 2020-01-08 | End: 2020-01-13 | Stop reason: HOSPADM

## 2020-01-08 RX ORDER — CISATRACURIUM BESYLATE 2 MG/ML
INJECTION, SOLUTION INTRAVENOUS PRN
Status: DISCONTINUED | OUTPATIENT
Start: 2020-01-08 | End: 2020-01-08 | Stop reason: SDUPTHER

## 2020-01-08 RX ORDER — FUROSEMIDE 10 MG/ML
40 INJECTION INTRAMUSCULAR; INTRAVENOUS 2 TIMES DAILY
Status: DISCONTINUED | OUTPATIENT
Start: 2020-01-09 | End: 2020-01-09

## 2020-01-08 RX ORDER — PROTAMINE SULFATE 10 MG/ML
50 INJECTION, SOLUTION INTRAVENOUS
Status: ACTIVE | OUTPATIENT
Start: 2020-01-08 | End: 2020-01-08

## 2020-01-08 RX ORDER — ACETAMINOPHEN 10 MG/ML
INJECTION, SOLUTION INTRAVENOUS PRN
Status: DISCONTINUED | OUTPATIENT
Start: 2020-01-08 | End: 2020-01-08 | Stop reason: SDUPTHER

## 2020-01-08 RX ORDER — MIDAZOLAM HYDROCHLORIDE 1 MG/ML
INJECTION INTRAMUSCULAR; INTRAVENOUS PRN
Status: DISCONTINUED | OUTPATIENT
Start: 2020-01-08 | End: 2020-01-08 | Stop reason: SDUPTHER

## 2020-01-08 RX ORDER — PROTAMINE SULFATE 10 MG/ML
INJECTION, SOLUTION INTRAVENOUS PRN
Status: DISCONTINUED | OUTPATIENT
Start: 2020-01-08 | End: 2020-01-08 | Stop reason: SDUPTHER

## 2020-01-08 RX ORDER — MILRINONE LACTATE 0.2 MG/ML
0.12 INJECTION, SOLUTION INTRAVENOUS CONTINUOUS
Status: DISCONTINUED | OUTPATIENT
Start: 2020-01-08 | End: 2020-01-10

## 2020-01-08 RX ORDER — AMINOCAPROIC ACID 250 MG/ML
INJECTION, SOLUTION INTRAVENOUS PRN
Status: DISCONTINUED | OUTPATIENT
Start: 2020-01-08 | End: 2020-01-08 | Stop reason: SDUPTHER

## 2020-01-08 RX ORDER — ALBUMIN, HUMAN INJ 5% 5 %
25 SOLUTION INTRAVENOUS PRN
Status: DISCONTINUED | OUTPATIENT
Start: 2020-01-08 | End: 2020-01-10

## 2020-01-08 RX ORDER — ETOMIDATE 2 MG/ML
INJECTION INTRAVENOUS PRN
Status: DISCONTINUED | OUTPATIENT
Start: 2020-01-08 | End: 2020-01-08 | Stop reason: SDUPTHER

## 2020-01-08 RX ORDER — MORPHINE SULFATE 4 MG/ML
4 INJECTION, SOLUTION INTRAMUSCULAR; INTRAVENOUS
Status: DISCONTINUED | OUTPATIENT
Start: 2020-01-08 | End: 2020-01-13 | Stop reason: HOSPADM

## 2020-01-08 RX ORDER — NOREPINEPHRINE BITARTRATE 1 MG/ML
INJECTION, SOLUTION INTRAVENOUS PRN
Status: DISCONTINUED | OUTPATIENT
Start: 2020-01-08 | End: 2020-01-08 | Stop reason: SDUPTHER

## 2020-01-08 RX ORDER — MAGNESIUM SULFATE IN WATER 40 MG/ML
2 INJECTION, SOLUTION INTRAVENOUS PRN
Status: DISCONTINUED | OUTPATIENT
Start: 2020-01-08 | End: 2020-01-13 | Stop reason: HOSPADM

## 2020-01-08 RX ORDER — HEPARIN SODIUM 1000 [USP'U]/ML
INJECTION, SOLUTION INTRAVENOUS; SUBCUTANEOUS PRN
Status: DISCONTINUED | OUTPATIENT
Start: 2020-01-08 | End: 2020-01-08 | Stop reason: SDUPTHER

## 2020-01-08 RX ORDER — POTASSIUM CHLORIDE 29.8 MG/ML
20 INJECTION INTRAVENOUS PRN
Status: DISCONTINUED | OUTPATIENT
Start: 2020-01-08 | End: 2020-01-13 | Stop reason: HOSPADM

## 2020-01-08 RX ORDER — NICOTINE POLACRILEX 4 MG
15 LOZENGE BUCCAL PRN
Status: DISCONTINUED | OUTPATIENT
Start: 2020-01-08 | End: 2020-01-13 | Stop reason: HOSPADM

## 2020-01-08 RX ORDER — OXYCODONE HYDROCHLORIDE 5 MG/1
10 TABLET ORAL EVERY 4 HOURS PRN
Status: DISCONTINUED | OUTPATIENT
Start: 2020-01-08 | End: 2020-01-13 | Stop reason: HOSPADM

## 2020-01-08 RX ADMIN — METOPROLOL TARTRATE 2 MG: 5 INJECTION, SOLUTION INTRAVENOUS at 13:43

## 2020-01-08 RX ADMIN — SODIUM CHLORIDE: 9 INJECTION, SOLUTION INTRAVENOUS at 07:45

## 2020-01-08 RX ADMIN — EPINEPHRINE 20 MCG: 1 INJECTION, SOLUTION INTRAMUSCULAR; SUBCUTANEOUS at 07:55

## 2020-01-08 RX ADMIN — DEXTROSE MONOHYDRATE 2 G: 50 INJECTION, SOLUTION INTRAVENOUS at 07:30

## 2020-01-08 RX ADMIN — CISATRACURIUM BESYLATE 10 MG: 2 INJECTION INTRAVENOUS at 09:35

## 2020-01-08 RX ADMIN — DEXTROSE MONOHYDRATE 8 MCG/MIN: 50 INJECTION, SOLUTION INTRAVENOUS at 11:40

## 2020-01-08 RX ADMIN — FENTANYL CITRATE 125 MCG: 50 INJECTION, SOLUTION INTRAMUSCULAR; INTRAVENOUS at 07:51

## 2020-01-08 RX ADMIN — MILRINONE LACTATE IN DEXTROSE 0.12 MCG/KG/MIN: 200 INJECTION, SOLUTION INTRAVENOUS at 14:41

## 2020-01-08 RX ADMIN — SODIUM CHLORIDE: 9 INJECTION, SOLUTION INTRAVENOUS at 05:35

## 2020-01-08 RX ADMIN — EPINEPHRINE 10 MCG: 1 INJECTION, SOLUTION INTRAMUSCULAR; SUBCUTANEOUS at 07:47

## 2020-01-08 RX ADMIN — SODIUM CHLORIDE 4.41 UNITS/HR: 9 INJECTION, SOLUTION INTRAVENOUS at 09:18

## 2020-01-08 RX ADMIN — MORPHINE SULFATE 2 MG: 2 INJECTION, SOLUTION INTRAMUSCULAR; INTRAVENOUS at 18:11

## 2020-01-08 RX ADMIN — ALBUMIN (HUMAN) 250 ML: 12.5 INJECTION, SOLUTION INTRAVENOUS at 12:55

## 2020-01-08 RX ADMIN — ALBUMIN (HUMAN) 250 ML: 12.5 INJECTION, SOLUTION INTRAVENOUS at 13:20

## 2020-01-08 RX ADMIN — ETOMIDATE 10 MG: 2 INJECTION INTRAVENOUS at 07:35

## 2020-01-08 RX ADMIN — METOPROLOL TARTRATE 2 MG: 5 INJECTION, SOLUTION INTRAVENOUS at 13:26

## 2020-01-08 RX ADMIN — Medication 15 ML: at 20:22

## 2020-01-08 RX ADMIN — AMINOCAPROIC ACID 5000 MG: 250 INJECTION, SOLUTION INTRAVENOUS at 09:15

## 2020-01-08 RX ADMIN — MUPIROCIN: 20 OINTMENT TOPICAL at 20:18

## 2020-01-08 RX ADMIN — FENTANYL CITRATE 500 MCG: 50 INJECTION, SOLUTION INTRAMUSCULAR; INTRAVENOUS at 08:58

## 2020-01-08 RX ADMIN — ETOMIDATE 5 MG: 2 INJECTION INTRAVENOUS at 08:28

## 2020-01-08 RX ADMIN — DEXTROSE MONOHYDRATE 2 G: 50 INJECTION, SOLUTION INTRAVENOUS at 18:12

## 2020-01-08 RX ADMIN — MILRINONE LACTATE 0.25 MCG/KG/MIN: 0.2 INJECTION, SOLUTION INTRAVENOUS at 07:45

## 2020-01-08 RX ADMIN — Medication 10 ML: at 20:19

## 2020-01-08 RX ADMIN — FENTANYL CITRATE 125 MCG: 50 INJECTION, SOLUTION INTRAMUSCULAR; INTRAVENOUS at 08:20

## 2020-01-08 RX ADMIN — Medication 15 ML: at 05:34

## 2020-01-08 RX ADMIN — ASPIRIN 81 MG: 81 TABLET, COATED ORAL at 05:30

## 2020-01-08 RX ADMIN — FENTANYL CITRATE 125 MCG: 50 INJECTION, SOLUTION INTRAMUSCULAR; INTRAVENOUS at 07:35

## 2020-01-08 RX ADMIN — ALBUMIN (HUMAN) 250 ML: 12.5 INJECTION, SOLUTION INTRAVENOUS at 12:48

## 2020-01-08 RX ADMIN — PROTAMINE SULFATE 300 MG: 10 INJECTION, SOLUTION INTRAVENOUS at 12:41

## 2020-01-08 RX ADMIN — METOPROLOL TARTRATE 1 MG: 5 INJECTION, SOLUTION INTRAVENOUS at 13:11

## 2020-01-08 RX ADMIN — DEXTROSE 4 MCG/MIN: 5 SOLUTION INTRAVENOUS at 14:40

## 2020-01-08 RX ADMIN — NOREPINEPHRINE BITARTRATE 8 MCG: 1 INJECTION INTRAVENOUS at 07:47

## 2020-01-08 RX ADMIN — ETOMIDATE 5 MG: 2 INJECTION INTRAVENOUS at 08:58

## 2020-01-08 RX ADMIN — CISATRACURIUM BESYLATE 10 MG: 2 INJECTION INTRAVENOUS at 07:36

## 2020-01-08 RX ADMIN — DEXTROSE AND SODIUM CHLORIDE: 5; 450 INJECTION, SOLUTION INTRAVENOUS at 14:37

## 2020-01-08 RX ADMIN — ALBUMIN (HUMAN) 12.5 G: 12.5 INJECTION, SOLUTION INTRAVENOUS at 17:20

## 2020-01-08 RX ADMIN — ACETAMINOPHEN 1000 MG: 10 INJECTION, SOLUTION INTRAVENOUS at 22:59

## 2020-01-08 RX ADMIN — CALCIUM CHLORIDE 0.5 G: 100 INJECTION, SOLUTION INTRAVENOUS at 12:30

## 2020-01-08 RX ADMIN — METOPROLOL TARTRATE 12.5 MG: 25 TABLET ORAL at 05:34

## 2020-01-08 RX ADMIN — ALBUMIN (HUMAN) 12.5 G: 12.5 INJECTION, SOLUTION INTRAVENOUS at 14:38

## 2020-01-08 RX ADMIN — MIDAZOLAM HYDROCHLORIDE 2 MG: 5 INJECTION, SOLUTION INTRAMUSCULAR; INTRAVENOUS at 05:57

## 2020-01-08 RX ADMIN — ACETAMINOPHEN 1000 MG: 10 INJECTION, SOLUTION INTRAVENOUS at 07:30

## 2020-01-08 RX ADMIN — DEXTROSE 6 MCG/MIN: 5 SOLUTION INTRAVENOUS at 14:50

## 2020-01-08 RX ADMIN — CISATRACURIUM BESYLATE 10 MG: 2 INJECTION INTRAVENOUS at 10:33

## 2020-01-08 RX ADMIN — VANCOMYCIN HYDROCHLORIDE 1 G: 10 INJECTION, POWDER, LYOPHILIZED, FOR SOLUTION INTRAVENOUS at 07:30

## 2020-01-08 RX ADMIN — ALBUTEROL SULFATE 2.5 MG: 2.5 SOLUTION RESPIRATORY (INHALATION) at 20:11

## 2020-01-08 RX ADMIN — ACETAMINOPHEN 1000 MG: 10 INJECTION, SOLUTION INTRAVENOUS at 15:57

## 2020-01-08 RX ADMIN — SODIUM BICARBONATE 50 MEQ: 84 INJECTION, SOLUTION INTRAVENOUS at 14:30

## 2020-01-08 RX ADMIN — CISATRACURIUM BESYLATE 20 MG: 2 INJECTION INTRAVENOUS at 08:28

## 2020-01-08 RX ADMIN — ALBUMIN (HUMAN) 250 ML: 12.5 INJECTION, SOLUTION INTRAVENOUS at 12:58

## 2020-01-08 RX ADMIN — CISATRACURIUM BESYLATE 10 MG: 2 INJECTION INTRAVENOUS at 11:30

## 2020-01-08 RX ADMIN — CISATRACURIUM BESYLATE 10 MG: 2 INJECTION INTRAVENOUS at 12:30

## 2020-01-08 RX ADMIN — PROTAMINE SULFATE 25 MG: 10 INJECTION, SOLUTION INTRAVENOUS at 12:55

## 2020-01-08 RX ADMIN — SODIUM BICARBONATE 50 MEQ: 84 INJECTION, SOLUTION INTRAVENOUS at 16:04

## 2020-01-08 RX ADMIN — HEPARIN SODIUM 26000 UNITS: 1000 INJECTION, SOLUTION INTRAVENOUS; SUBCUTANEOUS at 09:09

## 2020-01-08 RX ADMIN — DEXTROSE MONOHYDRATE 1 G: 50 INJECTION, SOLUTION INTRAVENOUS at 10:42

## 2020-01-08 RX ADMIN — SODIUM CHLORIDE, POTASSIUM CHLORIDE, SODIUM LACTATE AND CALCIUM CHLORIDE: 600; 310; 30; 20 INJECTION, SOLUTION INTRAVENOUS at 07:30

## 2020-01-08 RX ADMIN — EPINEPHRINE 0.02 MCG/KG/MIN: 1 INJECTION, SOLUTION, CONCENTRATE INTRAVENOUS at 07:45

## 2020-01-08 RX ADMIN — KETAMINE HYDROCHLORIDE 50 MG: 100 INJECTION, SOLUTION INTRAMUSCULAR; INTRAVENOUS at 07:35

## 2020-01-08 RX ADMIN — NOREPINEPHRINE BITARTRATE 8 MCG: 1 INJECTION INTRAVENOUS at 07:55

## 2020-01-08 RX ADMIN — MORPHINE SULFATE 2 MG: 2 INJECTION, SOLUTION INTRAMUSCULAR; INTRAVENOUS at 20:13

## 2020-01-08 RX ADMIN — FAMOTIDINE 20 MG: 10 INJECTION, SOLUTION INTRAVENOUS at 20:18

## 2020-01-08 RX ADMIN — FENTANYL CITRATE 125 MCG: 50 INJECTION, SOLUTION INTRAMUSCULAR; INTRAVENOUS at 08:28

## 2020-01-08 RX ADMIN — MIDAZOLAM HYDROCHLORIDE 2 MG: 2 INJECTION, SOLUTION INTRAMUSCULAR; INTRAVENOUS at 09:34

## 2020-01-08 ASSESSMENT — PULMONARY FUNCTION TESTS
PIF_VALUE: 1
PIF_VALUE: 15
PIF_VALUE: 1
PIF_VALUE: 25
PIF_VALUE: 27
PIF_VALUE: 1
PIF_VALUE: 27
PIF_VALUE: 28
PIF_VALUE: 26
PIF_VALUE: 1
PIF_VALUE: 33
PIF_VALUE: 27
PIF_VALUE: 25
PIF_VALUE: 1
PIF_VALUE: 24
PIF_VALUE: 28
PIF_VALUE: 1
PIF_VALUE: 26
PIF_VALUE: 23
PIF_VALUE: 1
PIF_VALUE: 28
PIF_VALUE: 35
PIF_VALUE: 1
PIF_VALUE: 27
PIF_VALUE: 25
PIF_VALUE: 24
PIF_VALUE: 32
PIF_VALUE: 25
PIF_VALUE: 31
PIF_VALUE: 1
PIF_VALUE: 24
PIF_VALUE: 1
PIF_VALUE: 13
PIF_VALUE: 39
PIF_VALUE: 37
PIF_VALUE: 26
PIF_VALUE: 1
PIF_VALUE: 1
PIF_VALUE: 26
PIF_VALUE: 26
PIF_VALUE: 1
PIF_VALUE: 25
PIF_VALUE: 27
PIF_VALUE: 25
PIF_VALUE: 1
PIF_VALUE: 1
PIF_VALUE: 25
PIF_VALUE: 0
PIF_VALUE: 29
PIF_VALUE: 1
PIF_VALUE: 28
PIF_VALUE: 1
PIF_VALUE: 28
PIF_VALUE: 21
PIF_VALUE: 27
PIF_VALUE: 0
PIF_VALUE: 1
PIF_VALUE: 24
PIF_VALUE: 1
PIF_VALUE: 28
PIF_VALUE: 1
PIF_VALUE: 18
PIF_VALUE: 1
PIF_VALUE: 25
PIF_VALUE: 29
PIF_VALUE: 27
PIF_VALUE: 1
PIF_VALUE: 27
PIF_VALUE: 28
PIF_VALUE: 26
PIF_VALUE: 28
PIF_VALUE: 1
PIF_VALUE: 27
PIF_VALUE: 1
PIF_VALUE: 30
PIF_VALUE: 25
PIF_VALUE: 0
PIF_VALUE: 29
PIF_VALUE: 1
PIF_VALUE: 25
PIF_VALUE: 29
PIF_VALUE: 26
PIF_VALUE: 28
PIF_VALUE: 1
PIF_VALUE: 26
PIF_VALUE: 28
PIF_VALUE: 26
PIF_VALUE: 1
PIF_VALUE: 38
PIF_VALUE: 1
PIF_VALUE: 26
PIF_VALUE: 35
PIF_VALUE: 1
PIF_VALUE: 27
PIF_VALUE: 1
PIF_VALUE: 36
PIF_VALUE: 32
PIF_VALUE: 1
PIF_VALUE: 1
PIF_VALUE: 27
PIF_VALUE: 1
PIF_VALUE: 26
PIF_VALUE: 25
PIF_VALUE: 1
PIF_VALUE: 26
PIF_VALUE: 1
PIF_VALUE: 28
PIF_VALUE: 1
PIF_VALUE: 28
PIF_VALUE: 1
PIF_VALUE: 1
PIF_VALUE: 34
PIF_VALUE: 40
PIF_VALUE: 1
PIF_VALUE: 26
PIF_VALUE: 27
PIF_VALUE: 1
PIF_VALUE: 27
PIF_VALUE: 28
PIF_VALUE: 26
PIF_VALUE: 1
PIF_VALUE: 28
PIF_VALUE: 1
PIF_VALUE: 26
PIF_VALUE: 25
PIF_VALUE: 1
PIF_VALUE: 1
PIF_VALUE: 28
PIF_VALUE: 28
PIF_VALUE: 1
PIF_VALUE: 25
PIF_VALUE: 26
PIF_VALUE: 1
PIF_VALUE: 28
PIF_VALUE: 28
PIF_VALUE: 30
PIF_VALUE: 1
PIF_VALUE: 26
PIF_VALUE: 28
PIF_VALUE: 25
PIF_VALUE: 28
PIF_VALUE: 1
PIF_VALUE: 0
PIF_VALUE: 25
PIF_VALUE: 31
PIF_VALUE: 1
PIF_VALUE: 28
PIF_VALUE: 1
PIF_VALUE: 28
PIF_VALUE: 24
PIF_VALUE: 1
PIF_VALUE: 27
PIF_VALUE: 26
PIF_VALUE: 33
PIF_VALUE: 34
PIF_VALUE: 1
PIF_VALUE: 27
PIF_VALUE: 1
PIF_VALUE: 0
PIF_VALUE: 1
PIF_VALUE: 27
PIF_VALUE: 38
PIF_VALUE: 30
PIF_VALUE: 25
PIF_VALUE: 28
PIF_VALUE: 28
PIF_VALUE: 15
PIF_VALUE: 1
PIF_VALUE: 1
PIF_VALUE: 24
PIF_VALUE: 27
PIF_VALUE: 23
PIF_VALUE: 26
PIF_VALUE: 1
PIF_VALUE: 25
PIF_VALUE: 1
PIF_VALUE: 1
PIF_VALUE: 28
PIF_VALUE: 25
PIF_VALUE: 28
PIF_VALUE: 1
PIF_VALUE: 25
PIF_VALUE: 0
PIF_VALUE: 1
PIF_VALUE: 28
PIF_VALUE: 28
PIF_VALUE: 26
PIF_VALUE: 22
PIF_VALUE: 1
PIF_VALUE: 28
PIF_VALUE: 1
PIF_VALUE: 1
PIF_VALUE: 28
PIF_VALUE: 1
PIF_VALUE: 1
PIF_VALUE: 28
PIF_VALUE: 26
PIF_VALUE: 27
PIF_VALUE: 28
PIF_VALUE: 1
PIF_VALUE: 32
PIF_VALUE: 26
PIF_VALUE: 29
PIF_VALUE: 27
PIF_VALUE: 24
PIF_VALUE: 1
PIF_VALUE: 27
PIF_VALUE: 22
PIF_VALUE: 1
PIF_VALUE: 26
PIF_VALUE: 1
PIF_VALUE: 24
PIF_VALUE: 13
PIF_VALUE: 23
PIF_VALUE: 1
PIF_VALUE: 39
PIF_VALUE: 1
PIF_VALUE: 26
PIF_VALUE: 1
PIF_VALUE: 29
PIF_VALUE: 26
PIF_VALUE: 1
PIF_VALUE: 26
PIF_VALUE: 1
PIF_VALUE: 1
PIF_VALUE: 28
PIF_VALUE: 25
PIF_VALUE: 26
PIF_VALUE: 26
PIF_VALUE: 1
PIF_VALUE: 1
PIF_VALUE: 26
PIF_VALUE: 1
PIF_VALUE: 36
PIF_VALUE: 1
PIF_VALUE: 32
PIF_VALUE: 29
PIF_VALUE: 1
PIF_VALUE: 1
PIF_VALUE: 12
PIF_VALUE: 28
PIF_VALUE: 1
PIF_VALUE: 26
PIF_VALUE: 1
PIF_VALUE: 3
PIF_VALUE: 1
PIF_VALUE: 25
PIF_VALUE: 32
PIF_VALUE: 29
PIF_VALUE: 27
PIF_VALUE: 0
PIF_VALUE: 23
PIF_VALUE: 20
PIF_VALUE: 1
PIF_VALUE: 29
PIF_VALUE: 1
PIF_VALUE: 25
PIF_VALUE: 1
PIF_VALUE: 0
PIF_VALUE: 28
PIF_VALUE: 27
PIF_VALUE: 25
PIF_VALUE: 29
PIF_VALUE: 1
PIF_VALUE: 24
PIF_VALUE: 1
PIF_VALUE: 26
PIF_VALUE: 27
PIF_VALUE: 1
PIF_VALUE: 36
PIF_VALUE: 27
PIF_VALUE: 25
PIF_VALUE: 1
PIF_VALUE: 32
PIF_VALUE: 22
PIF_VALUE: 27
PIF_VALUE: 28
PIF_VALUE: 25
PIF_VALUE: 25
PIF_VALUE: 1
PIF_VALUE: 28
PIF_VALUE: 19
PIF_VALUE: 31
PIF_VALUE: 25
PIF_VALUE: 26
PIF_VALUE: 1
PIF_VALUE: 40
PIF_VALUE: 1
PIF_VALUE: 30
PIF_VALUE: 28
PIF_VALUE: 29
PIF_VALUE: 16
PIF_VALUE: 1
PIF_VALUE: 29
PIF_VALUE: 23
PIF_VALUE: 28
PIF_VALUE: 27
PIF_VALUE: 1
PIF_VALUE: 26
PIF_VALUE: 1
PIF_VALUE: 1
PIF_VALUE: 27
PIF_VALUE: 1
PIF_VALUE: 28
PIF_VALUE: 27
PIF_VALUE: 1
PIF_VALUE: 33
PIF_VALUE: 1
PIF_VALUE: 27
PIF_VALUE: 14
PIF_VALUE: 1
PIF_VALUE: 26
PIF_VALUE: 1
PIF_VALUE: 29
PIF_VALUE: 1
PIF_VALUE: 26
PIF_VALUE: 26
PIF_VALUE: 25
PIF_VALUE: 40
PIF_VALUE: 13
PIF_VALUE: 1
PIF_VALUE: 1
PIF_VALUE: 25
PIF_VALUE: 26
PIF_VALUE: 25
PIF_VALUE: 28
PIF_VALUE: 34
PIF_VALUE: 0
PIF_VALUE: 23
PIF_VALUE: 26
PIF_VALUE: 1
PIF_VALUE: 23
PIF_VALUE: 1
PIF_VALUE: 28

## 2020-01-08 ASSESSMENT — PAIN SCALES - GENERAL
PAINLEVEL_OUTOF10: 4
PAINLEVEL_OUTOF10: 6
PAINLEVEL_OUTOF10: 0

## 2020-01-08 ASSESSMENT — PAIN DESCRIPTION - DESCRIPTORS
DESCRIPTORS: ACHING;BURNING
DESCRIPTORS: ACHING;BURNING

## 2020-01-08 ASSESSMENT — PAIN DESCRIPTION - ORIENTATION
ORIENTATION: MID
ORIENTATION: MID

## 2020-01-08 ASSESSMENT — PAIN DESCRIPTION - LOCATION
LOCATION: CHEST
LOCATION: CHEST

## 2020-01-08 ASSESSMENT — PAIN DESCRIPTION - PAIN TYPE
TYPE: SURGICAL PAIN
TYPE: SURGICAL PAIN

## 2020-01-08 ASSESSMENT — PAIN DESCRIPTION - ONSET
ONSET: AWAKENED FROM SLEEP
ONSET: AWAKENED FROM SLEEP

## 2020-01-08 ASSESSMENT — PAIN DESCRIPTION - FREQUENCY
FREQUENCY: CONTINUOUS
FREQUENCY: CONTINUOUS

## 2020-01-08 NOTE — ANESTHESIA POSTPROCEDURE EVALUATION
Department of Anesthesiology  Postprocedure Note    Patient: Roslyn Hicks  MRN: [id number]  YOB: 1945  Date of evaluation: 1/8/2020  Time:  2:01 PM     Procedure Summary     Date:  01/08/20 Room / Location:  [address]    Anesthesia Start:  4096 Anesthesia Stop:      Procedure:  CORONARY ARTERY BYPASS GRAFTING X3, INTERNAL MAMMARY ARTERY, SAPHENOUS VEIN GRAFTING, ON PUMP MITRAL VALVE RING REPAIR USING 26MM PELAEZ PHYSIO II RING, WITH LEFT ATRIAL APPENDAGE CLIP, PFO CLOSURETEE, 5 LEVEL BILATERAL INTERCOSTAL NERVE BLOCK (N/A Chest) Diagnosis:  (-)    Surgeon:  Glenroy Boyer MD Responsible Provider:  Monica Sauceda MD    Anesthesia Type:  general ASA Status:  4          Anesthesia Type: general    Nathan Phase I:      Nathan Phase II:      Last vitals: Reviewed and per EMR flowsheets. Anesthesia Post Evaluation    Patient location during evaluation: ICU  Patient participation: waiting for patient participation  Level of consciousness: sedated and ventilated  Pain scale: see nsg notes. Airway patency: patent  Nausea: see nsg notes.   Complications: no  Cardiovascular status: hemodynamically stable and vasoactive/inotropes  Respiratory status: intubated and ventilator  Hydration status: stable  Comments: IVÁN : ef~ 20-25% ; mariella-septal , anterior and mariella lateral almost normal kinesis ; the remainder of the segments are moderate hypo-kinesis ; no MR ; no LUIS E ; ; probe out intact

## 2020-01-08 NOTE — BRIEF OP NOTE
Brief Postoperative Note  ______________________________________________________________    Patient: Raina Manuel  Date of Birth: [birth year]  MRN: [id number]  Date of Procedure: 1/8/2020    Pre-Op Diagnosis: -    Post-Op Diagnosis: Same       Procedure(s):  CORONARY ARTERY BYPASS GRAFTING X3, INTERNAL MAMMARY ARTERY, SAPHENOUS VEIN GRAFTING, ON PUMP MITRAL VALVE RING REPAIR USING 26MM PELAEZ PHYSIO II RING, WITH LEFT ATRIAL APPENDAGE CLIP, PFO CLOSURETEE, 5 LEVEL BILATERAL INTERCOSTAL NERVE BLOCK    Anesthesia: General    Surgeon(s):  MD Nerissa Vilchis MD    Assistant:     Estimated Blood Loss (mL): less than 50     Complications: None    Specimens:   * No specimens in log *    Implants:  Implant Name Type Inv. Item Serial No.  Lot No. LRB No. Used   IMPL RING ANNULO 26MM - [id number] Heart IMPL RING ANNULO 26MM [id number]  N/A 1   CLIP ATRICLIP FLEX HNDL 35MM Heart CLIP ATRICLIP FLEX HNDL 35MM  ATRICURE [id number] N/A 1         Drains:   Chest Tube 1 Mediastinal (Active)       Chest Tube 2 Left Pleural 28 Malay (Active)       Urethral Catheter Straight-tip; Temperature probe; Latex 16 fr (Active)       Findings:     Jacinto Chaudhary MD  Date: 1/8/2020  Time: 2:03 PM

## 2020-01-08 NOTE — PROGRESS NOTES
Patient admitted to CVU from [place] [address] and attached to ventilator and monitors. Report received from anesthesiologist.  Chest x-ray ordered. Labs drawn and sent. Assessment complete. Hemodymanics stable and will continue to monitor. Family let back to see patient. Visiting hours reviewed and all questions answered. Family aware of discharge class. Primary RN SHIVA Bowden RN/BRITTANI Curry RN.     [email or web address]

## 2020-01-08 NOTE — PROCEDURES
[address], [place] [address]                               PULMONARY FUNCTION    PATIENT NAME: Madi Munoz                       :        1945  MED REC NO:   [id number]                          ROOM:       0422  ACCOUNT NO:   [de-identified]                           ADMIT DATE: 2020  PROVIDER:     Zaina Caballero MD    DATE OF PROCEDURE:  2020    This is a 69-year-old female. TEST PERFORMED:  1. Spirometry of flow-volume loops obtained before and after  bronchodilation. 2.  Lung volumes by plethysmography. 3.  Diffusion capacity of carbon monoxide. Test meets ATS criteria and the quality of flow-volume loops is  sufficient for interpretation. Good patient effort. The FEV1 is 1.57 L or 84% predicted. The FEV1 to FVC ratio is 83. Postbronchodilator, the FEV1 changed to 1.62 L or 87% predicted. Total  lung capacity is 80% predicted and diffusion is 88% predicted. INTERPRETATION:  1. No obstruction, no significant postbronchodilator improvement. 2.  Mildly impaired lung volumes consistent with restrictive defect. 3.  Normal diffusion capacity. 4.  Clinical correlation recommended, differential diagnosis for  restrictive lung disease includes, neuromuscular weakness, obesity,  interstitial lung disease.         Trevon Duong MD    D: 2020 15:25:49       T: 2020 22:43:48     [id number]  Job#: [id number]     Doc#: [id number]    CC:

## 2020-01-08 NOTE — PROGRESS NOTES
Pt responds easily to voice and is able to follow commands. Repeat ABG performed to assess SBT. OK for extubation. RT called to perform NIF and RSBI before further plans to extubate.

## 2020-01-08 NOTE — PROGRESS NOTES
01/08/20 1400   Vent Information   $Ventilation $Initial Day   Ventilator Started Yes   Vent Type 840   Vt Ordered 550 mL   Rate Set 14 bmp   Peak Flow 60 L/min   Pressure Support 10 cmH20   FiO2  50 %   Sensitivity 3   PEEP/CPAP 5   Cuff Pressure (cm H2O) 30 cm H2O   Humidification Source HME   Vent Patient Data   Peak Inspiratory Pressure 27 cmH2O   Mean Airway Pressure 10 cmH20   Rate Measured 14 br/min   Vt Exhaled 521 mL   Minute Volume 7.33 Liters   I:E Ratio 1:3.30   Cough/Sputum   Sputum How Obtained Endotracheal;Suctioned   Cough Productive   Sputum Amount Small   Sputum Color Cloudy   Tenacity Thick   Spontaneous Breathing Trial (SBT) RT Doc   Pulse 100   SpO2 100 %   Breath Sounds   Right Upper Lobe Rhonchi   Right Middle Lobe Rhonchi   Right Lower Lobe Diminished   Left Upper Lobe Rhonchi   Left Lower Lobe Diminished   Additional Respiratory  Assessments   Resp 14   End Tidal CO2 28 (%)   Position Semi-Tristan's   Alarm Settings   High Pressure Alarm 40 cmH2O   Low Minute Volume Alarm 2 L/min   High Respiratory Rate 40 br/min   Low Exhaled Vt  200 mL   Patient Observation   Observations 7.5 ETT, ambu bag @ bedside   ETT (adult)   Placement Date/Time: 01/08/20 0700   Type: Cuffed  Tube Size: 7.5 mm  Secured at: 21 cm  Measured From: Lips   Secured at 22 cm   Measured From Lips   ET Placement Right   Secured By Twill tape   Cuff Pressure 30 cm H2O

## 2020-01-09 ENCOUNTER — APPOINTMENT (OUTPATIENT)
Dept: GENERAL RADIOLOGY | Age: 75
DRG: 216 | End: 2020-01-09
Attending: INTERNAL MEDICINE
Payer: MEDICARE

## 2020-01-09 LAB
ANION GAP SERPL CALCULATED.3IONS-SCNC: 10 MMOL/L (ref 3–16)
ANION GAP SERPL CALCULATED.3IONS-SCNC: 12 MMOL/L (ref 3–16)
BASE EXCESS ARTERIAL: -4 (ref -3–3)
BUN BLDV-MCNC: 23 MG/DL (ref 7–20)
BUN BLDV-MCNC: 23 MG/DL (ref 7–20)
CALCIUM IONIZED: 1.07 MMOL/L (ref 1.12–1.32)
CALCIUM IONIZED: 1.16 MMOL/L (ref 1.12–1.32)
CALCIUM SERPL-MCNC: 7.8 MG/DL (ref 8.3–10.6)
CALCIUM SERPL-MCNC: 8.3 MG/DL (ref 8.3–10.6)
CHLORIDE BLD-SCNC: 108 MMOL/L (ref 99–110)
CHLORIDE BLD-SCNC: 111 MMOL/L (ref 99–110)
CO2: 20 MMOL/L (ref 21–32)
CO2: 21 MMOL/L (ref 21–32)
CREAT SERPL-MCNC: 1.3 MG/DL (ref 0.6–1.2)
CREAT SERPL-MCNC: 1.3 MG/DL (ref 0.6–1.2)
EKG ATRIAL RATE: 77 BPM
EKG DIAGNOSIS: NORMAL
EKG P AXIS: 66 DEGREES
EKG P-R INTERVAL: 168 MS
EKG Q-T INTERVAL: 406 MS
EKG QRS DURATION: 96 MS
EKG QTC CALCULATION (BAZETT): 459 MS
EKG R AXIS: 73 DEGREES
EKG T AXIS: 93 DEGREES
EKG VENTRICULAR RATE: 77 BPM
GFR AFRICAN AMERICAN: 48
GFR AFRICAN AMERICAN: 48
GFR NON-AFRICAN AMERICAN: 40
GFR NON-AFRICAN AMERICAN: 40
GLUCOSE BLD-MCNC: 100 MG/DL (ref 70–99)
GLUCOSE BLD-MCNC: 101 MG/DL (ref 70–99)
GLUCOSE BLD-MCNC: 107 MG/DL (ref 70–99)
GLUCOSE BLD-MCNC: 112 MG/DL (ref 70–99)
GLUCOSE BLD-MCNC: 114 MG/DL (ref 70–99)
GLUCOSE BLD-MCNC: 115 MG/DL (ref 70–99)
GLUCOSE BLD-MCNC: 120 MG/DL (ref 70–99)
GLUCOSE BLD-MCNC: 121 MG/DL (ref 70–99)
GLUCOSE BLD-MCNC: 124 MG/DL (ref 70–99)
GLUCOSE BLD-MCNC: 126 MG/DL (ref 70–99)
GLUCOSE BLD-MCNC: 129 MG/DL (ref 70–99)
GLUCOSE BLD-MCNC: 130 MG/DL (ref 70–99)
GLUCOSE BLD-MCNC: 134 MG/DL (ref 70–99)
GLUCOSE BLD-MCNC: 136 MG/DL (ref 70–99)
GLUCOSE BLD-MCNC: 141 MG/DL (ref 70–99)
GLUCOSE BLD-MCNC: 145 MG/DL (ref 70–99)
GLUCOSE BLD-MCNC: 165 MG/DL (ref 70–99)
GLUCOSE BLD-MCNC: 179 MG/DL (ref 70–99)
GLUCOSE BLD-MCNC: 181 MG/DL (ref 70–99)
GLUCOSE BLD-MCNC: 95 MG/DL (ref 70–99)
HCO3 ARTERIAL: 22.2 MMOL/L (ref 21–29)
HCT VFR BLD CALC: 23.1 % (ref 36–48)
HCT VFR BLD CALC: 25.8 % (ref 36–48)
HCT VFR BLD CALC: 26.4 % (ref 36–48)
HCT VFR BLD CALC: 29.9 % (ref 36–48)
HEMOGLOBIN: 10.1 G/DL (ref 12–16)
HEMOGLOBIN: 7.7 G/DL (ref 12–16)
HEMOGLOBIN: 7.7 GM/DL (ref 12–16)
HEMOGLOBIN: 8.7 G/DL (ref 12–16)
HEMOGLOBIN: 8.9 G/DL (ref 12–16)
HEPARIN INDUCED PLATELET ANTIBODY: NEGATIVE
LACTATE: 0.52 MMOL/L (ref 0.4–2)
MAGNESIUM: 2.8 MG/DL (ref 1.8–2.4)
MCH RBC QN AUTO: 32 PG (ref 26–34)
MCHC RBC AUTO-ENTMCNC: 33.6 G/DL (ref 31–36)
MCV RBC AUTO: 95.2 FL (ref 80–100)
O2 SAT, ARTERIAL: 100 % (ref 93–100)
PCO2 ARTERIAL: 44.7 MM HG (ref 35–45)
PDW BLD-RTO: 14 % (ref 12.4–15.4)
PERFORMED ON: ABNORMAL
PERFORMED ON: NORMAL
PH ARTERIAL: 7.3 (ref 7.35–7.45)
PH VENOUS: 7.34 (ref 7.35–7.45)
PLATELET # BLD: 73 K/UL (ref 135–450)
PMV BLD AUTO: 9.1 FL (ref 5–10.5)
PO2 ARTERIAL: 182.9 MM HG (ref 75–108)
POC HEMATOCRIT: 23 % (ref 36–48)
POC POTASSIUM: 4.5 MMOL/L (ref 3.5–5.1)
POC SAMPLE TYPE: ABNORMAL
POC SODIUM: 144 MMOL/L (ref 136–145)
POTASSIUM REFLEX MAGNESIUM: 5.1 MMOL/L (ref 3.5–5.1)
POTASSIUM SERPL-SCNC: 4.7 MMOL/L (ref 3.5–5.1)
RBC # BLD: 2.77 M/UL (ref 4–5.2)
SODIUM BLD-SCNC: 141 MMOL/L (ref 136–145)
SODIUM BLD-SCNC: 141 MMOL/L (ref 136–145)
TCO2 ARTERIAL: 24 MMOL/L
WBC # BLD: 5.1 K/UL (ref 4–11)

## 2020-01-09 PROCEDURE — 97162 PT EVAL MOD COMPLEX 30 MIN: CPT

## 2020-01-09 PROCEDURE — 6360000002 HC RX W HCPCS: Performed by: THORACIC SURGERY (CARDIOTHORACIC VASCULAR SURGERY)

## 2020-01-09 PROCEDURE — 2140000000 HC CCU INTERMEDIATE R&B

## 2020-01-09 PROCEDURE — 2580000003 HC RX 258: Performed by: THORACIC SURGERY (CARDIOTHORACIC VASCULAR SURGERY)

## 2020-01-09 PROCEDURE — 94761 N-INVAS EAR/PLS OXIMETRY MLT: CPT

## 2020-01-09 PROCEDURE — 82330 ASSAY OF CALCIUM: CPT

## 2020-01-09 PROCEDURE — 97530 THERAPEUTIC ACTIVITIES: CPT

## 2020-01-09 PROCEDURE — 97116 GAIT TRAINING THERAPY: CPT

## 2020-01-09 PROCEDURE — 85018 HEMOGLOBIN: CPT

## 2020-01-09 PROCEDURE — 2700000000 HC OXYGEN THERAPY PER DAY

## 2020-01-09 PROCEDURE — 71045 X-RAY EXAM CHEST 1 VIEW: CPT

## 2020-01-09 PROCEDURE — 37799 UNLISTED PX VASCULAR SURGERY: CPT

## 2020-01-09 PROCEDURE — P9045 ALBUMIN (HUMAN), 5%, 250 ML: HCPCS | Performed by: THORACIC SURGERY (CARDIOTHORACIC VASCULAR SURGERY)

## 2020-01-09 PROCEDURE — 94669 MECHANICAL CHEST WALL OSCILL: CPT

## 2020-01-09 PROCEDURE — 93005 ELECTROCARDIOGRAM TRACING: CPT | Performed by: THORACIC SURGERY (CARDIOTHORACIC VASCULAR SURGERY)

## 2020-01-09 PROCEDURE — 86022 PLATELET ANTIBODIES: CPT

## 2020-01-09 PROCEDURE — 94640 AIRWAY INHALATION TREATMENT: CPT

## 2020-01-09 PROCEDURE — 36430 TRANSFUSION BLD/BLD COMPNT: CPT

## 2020-01-09 PROCEDURE — 80048 BASIC METABOLIC PNL TOTAL CA: CPT

## 2020-01-09 PROCEDURE — 2580000003 HC RX 258

## 2020-01-09 PROCEDURE — 83735 ASSAY OF MAGNESIUM: CPT

## 2020-01-09 PROCEDURE — 6370000000 HC RX 637 (ALT 250 FOR IP): Performed by: INTERNAL MEDICINE

## 2020-01-09 PROCEDURE — P9016 RBC LEUKOCYTES REDUCED: HCPCS

## 2020-01-09 PROCEDURE — 6370000000 HC RX 637 (ALT 250 FOR IP): Performed by: THORACIC SURGERY (CARDIOTHORACIC VASCULAR SURGERY)

## 2020-01-09 PROCEDURE — 97166 OT EVAL MOD COMPLEX 45 MIN: CPT

## 2020-01-09 PROCEDURE — 85014 HEMATOCRIT: CPT

## 2020-01-09 PROCEDURE — 85027 COMPLETE CBC AUTOMATED: CPT

## 2020-01-09 PROCEDURE — 2500000003 HC RX 250 WO HCPCS: Performed by: THORACIC SURGERY (CARDIOTHORACIC VASCULAR SURGERY)

## 2020-01-09 PROCEDURE — 99232 SBSQ HOSP IP/OBS MODERATE 35: CPT | Performed by: INTERNAL MEDICINE

## 2020-01-09 RX ORDER — SODIUM CHLORIDE 9 MG/ML
INJECTION, SOLUTION INTRAVENOUS
Status: COMPLETED
Start: 2020-01-09 | End: 2020-01-09

## 2020-01-09 RX ORDER — DOBUTAMINE HYDROCHLORIDE 200 MG/100ML
3 INJECTION INTRAVENOUS CONTINUOUS
Status: DISCONTINUED | OUTPATIENT
Start: 2020-01-09 | End: 2020-01-11

## 2020-01-09 RX ORDER — FUROSEMIDE 10 MG/ML
20 INJECTION INTRAMUSCULAR; INTRAVENOUS EVERY 6 HOURS SCHEDULED
Status: DISCONTINUED | OUTPATIENT
Start: 2020-01-09 | End: 2020-01-10

## 2020-01-09 RX ORDER — 0.9 % SODIUM CHLORIDE 0.9 %
250 INTRAVENOUS SOLUTION INTRAVENOUS ONCE
Status: COMPLETED | OUTPATIENT
Start: 2020-01-09 | End: 2020-01-09

## 2020-01-09 RX ORDER — FUROSEMIDE 10 MG/ML
20 INJECTION INTRAMUSCULAR; INTRAVENOUS ONCE
Status: COMPLETED | OUTPATIENT
Start: 2020-01-09 | End: 2020-01-09

## 2020-01-09 RX ADMIN — ALBUMIN (HUMAN) 12.5 G: 12.5 INJECTION, SOLUTION INTRAVENOUS at 03:50

## 2020-01-09 RX ADMIN — DEXTROSE MONOHYDRATE 2 G: 50 INJECTION, SOLUTION INTRAVENOUS at 11:34

## 2020-01-09 RX ADMIN — SODIUM CHLORIDE: 9 INJECTION, SOLUTION INTRAVENOUS at 13:24

## 2020-01-09 RX ADMIN — VANCOMYCIN HYDROCHLORIDE 1000 MG: 10 INJECTION, POWDER, LYOPHILIZED, FOR SOLUTION INTRAVENOUS at 06:30

## 2020-01-09 RX ADMIN — OXYCODONE 5 MG: 5 TABLET ORAL at 02:54

## 2020-01-09 RX ADMIN — ALBUTEROL SULFATE 2.5 MG: 2.5 SOLUTION RESPIRATORY (INHALATION) at 07:19

## 2020-01-09 RX ADMIN — CALCIUM CHLORIDE 1 G: 100 INJECTION INTRAVENOUS; INTRAVENTRICULAR at 00:56

## 2020-01-09 RX ADMIN — POTASSIUM CHLORIDE 10 MEQ: 20 TABLET, EXTENDED RELEASE ORAL at 09:42

## 2020-01-09 RX ADMIN — ACETAMINOPHEN 1000 MG: 500 TABLET ORAL at 13:36

## 2020-01-09 RX ADMIN — FUROSEMIDE 20 MG: 10 INJECTION, SOLUTION INTRAMUSCULAR; INTRAVENOUS at 08:53

## 2020-01-09 RX ADMIN — MORPHINE SULFATE 2 MG: 2 INJECTION, SOLUTION INTRAMUSCULAR; INTRAVENOUS at 04:22

## 2020-01-09 RX ADMIN — POTASSIUM CHLORIDE 10 MEQ: 20 TABLET, EXTENDED RELEASE ORAL at 16:37

## 2020-01-09 RX ADMIN — DEXTROSE MONOHYDRATE 2 G: 50 INJECTION, SOLUTION INTRAVENOUS at 20:04

## 2020-01-09 RX ADMIN — Medication 15 ML: at 20:14

## 2020-01-09 RX ADMIN — FAMOTIDINE 20 MG: 20 TABLET, FILM COATED ORAL at 09:41

## 2020-01-09 RX ADMIN — DOCUSATE SODIUM 100 MG: 100 CAPSULE, LIQUID FILLED ORAL at 09:42

## 2020-01-09 RX ADMIN — SODIUM CHLORIDE 4.25 UNITS/HR: 9 INJECTION, SOLUTION INTRAVENOUS at 09:55

## 2020-01-09 RX ADMIN — OXYCODONE 10 MG: 5 TABLET ORAL at 16:37

## 2020-01-09 RX ADMIN — DOCUSATE SODIUM 100 MG: 100 CAPSULE, LIQUID FILLED ORAL at 20:14

## 2020-01-09 RX ADMIN — DOBUTAMINE HYDROCHLORIDE 3 MCG/KG/MIN: 200 INJECTION INTRAVENOUS at 08:56

## 2020-01-09 RX ADMIN — ALLOPURINOL 300 MG: 300 TABLET ORAL at 09:42

## 2020-01-09 RX ADMIN — MORPHINE SULFATE 4 MG: 4 INJECTION, SOLUTION INTRAMUSCULAR; INTRAVENOUS at 13:50

## 2020-01-09 RX ADMIN — Medication 10 ML: at 20:14

## 2020-01-09 RX ADMIN — MUPIROCIN: 20 OINTMENT TOPICAL at 09:42

## 2020-01-09 RX ADMIN — CLOPIDOGREL BISULFATE 75 MG: 75 TABLET ORAL at 09:42

## 2020-01-09 RX ADMIN — FUROSEMIDE 20 MG: 10 INJECTION, SOLUTION INTRAMUSCULAR; INTRAVENOUS at 13:37

## 2020-01-09 RX ADMIN — ATORVASTATIN CALCIUM 20 MG: 10 TABLET, FILM COATED ORAL at 20:14

## 2020-01-09 RX ADMIN — ALBUTEROL SULFATE 2.5 MG: 2.5 SOLUTION RESPIRATORY (INHALATION) at 15:29

## 2020-01-09 RX ADMIN — POTASSIUM CHLORIDE 10 MEQ: 20 TABLET, EXTENDED RELEASE ORAL at 12:07

## 2020-01-09 RX ADMIN — ACETAMINOPHEN 1000 MG: 10 INJECTION, SOLUTION INTRAVENOUS at 06:29

## 2020-01-09 RX ADMIN — OXYCODONE 5 MG: 5 TABLET ORAL at 12:12

## 2020-01-09 RX ADMIN — DEXTROSE MONOHYDRATE 2 G: 50 INJECTION, SOLUTION INTRAVENOUS at 02:04

## 2020-01-09 RX ADMIN — ALBUMIN (HUMAN) 12.5 G: 12.5 INJECTION, SOLUTION INTRAVENOUS at 08:43

## 2020-01-09 RX ADMIN — Medication 15 ML: at 09:42

## 2020-01-09 RX ADMIN — ALBUTEROL SULFATE 2.5 MG: 2.5 SOLUTION RESPIRATORY (INHALATION) at 20:41

## 2020-01-09 RX ADMIN — MUPIROCIN: 20 OINTMENT TOPICAL at 20:14

## 2020-01-09 RX ADMIN — Medication 10 ML: at 09:42

## 2020-01-09 RX ADMIN — ALBUMIN (HUMAN) 12.5 G: 12.5 INJECTION, SOLUTION INTRAVENOUS at 07:30

## 2020-01-09 RX ADMIN — METOPROLOL TARTRATE 12.5 MG: 25 TABLET ORAL at 20:13

## 2020-01-09 RX ADMIN — ASPIRIN 81 MG: 81 TABLET, COATED ORAL at 09:42

## 2020-01-09 RX ADMIN — SODIUM CHLORIDE 250 ML: 9 INJECTION, SOLUTION INTRAVENOUS at 13:23

## 2020-01-09 ASSESSMENT — PAIN SCALES - GENERAL
PAINLEVEL_OUTOF10: 0
PAINLEVEL_OUTOF10: 8
PAINLEVEL_OUTOF10: 8
PAINLEVEL_OUTOF10: 4
PAINLEVEL_OUTOF10: 4
PAINLEVEL_OUTOF10: 6
PAINLEVEL_OUTOF10: 0
PAINLEVEL_OUTOF10: 4
PAINLEVEL_OUTOF10: 0

## 2020-01-09 ASSESSMENT — PAIN DESCRIPTION - FREQUENCY: FREQUENCY: CONTINUOUS

## 2020-01-09 ASSESSMENT — PAIN DESCRIPTION - PAIN TYPE: TYPE: SURGICAL PAIN

## 2020-01-09 ASSESSMENT — PAIN DESCRIPTION - ORIENTATION: ORIENTATION: MID

## 2020-01-09 ASSESSMENT — PAIN DESCRIPTION - LOCATION: LOCATION: STERNUM;CHEST

## 2020-01-09 ASSESSMENT — PAIN DESCRIPTION - DESCRIPTORS: DESCRIPTORS: ACHING

## 2020-01-09 NOTE — ADT AUTH CERT
Coronary Artery Bypass Graft (CABG) - Care Day 4 (1/9/2020) by Adrian Pinon RN         Review Status Review Entered   Completed 1/9/2020 13:07       Criteria Review      Care Day: 4 Care Date: 1/9/2020 Level of Care:    Guideline Day 4    Clinical Status    ( ) * No evidence of postoperative or surgical site infection    ( ) * Oxygenation normal or at preoperative baseline    Activity    ( ) * Progressive assisted ambulation    Routes    ( ) * Oral hydration, medications, and diet    Interventions    ( ) * Oxygen absent    * Milestone   Additional Notes   1/9/2020 Care day 4      Patient in ICU      VS: T 97.1 P 86 r 18 bp 84/53 spo2 99% NC3l      Labs:  Hemoglobin 7.7Low g/dL   Hematocrit 23.1Low    Sodium 141 mmol/L    Potassium 4.7 mmol/L   Chloride 111High mmol/L   CO2 20Low mmol/L   Anion Gap 10   Glucose 134High mg/dL   BUN 23High mg/dL   CREATININE 1.3High mg/dL   GFR Non- 40Abnormal    GFR  48Abnormal       Magnesium 2.80    Urine culture:  <10,000 CFU/ml mixed skin/urogenital jadon.  No further workupAbnormal    Organism Escherichia coliAbnormal             Orders:  Remove chest tube   PT/OT   Acapella q2h   POCT glucose q1h   Transfuse PRBC   Ofirmev 1000 mg IV q8h   Ancef IV q8h   Lasix 20 mg IV 4x daily   Vanco 44003 mg IV q24h   IV D5 1/2 NS 75 ml/h   IV Dobutrex gtt   IV regular insulin gtt   IV Primacor gtt   IV levophed gtt   IV Albumin 25 g prn given x3 doses    Calcium Cl g1 IV   Morphine q2h prn       Cardiology note:  Assessment and Plan           Patient Active Problem List   Diagnosis   · Backache   · Essential hypertension, benign   · Osteoarthrosis involving lower leg   · Gout   · Osteoarthritis, hand   · Osteopenia   · Chest pain   · Hyperlipidemia associated with type 2 diabetes mellitus (HCC)   · Major depressive disorder, recurrent episode, moderate (HCC)   · Primary insomnia   · Type 2 diabetes mellitus with microalbuminuria, without long-term current to D51/2NS 75 ml/h   Epinephrine gtt   Regular insulin gtt   Primacor gtt   Versed 2 mg IV once   Levophed gtt   Albumin 25 g IV prn given x2 doses    Morphine IV q2h prn given x2 doses   Sodium bicarb 50 meq IV given x2       Brief Op note:  Procedure(s):   CORONARY ARTERY BYPASS GRAFTING X3, INTERNAL MAMMARY ARTERY, SAPHENOUS VEIN GRAFTING, ON PUMP MITRAL VALVE RING REPAIR USING 26MM PELAEZ PHYSIO II RING, WITH LEFT ATRIAL APPENDAGE CLIP, PFO CLOSURETEE, 5 LEVEL BILATERAL INTERCOSTAL NERVE BLOCK       Anesthesia: General       Surgeon(s):   MD Lily Solorzano MD       Assistant:        Estimated Blood Loss (mL): less than 50        Complications: None       Specimens:    * No specimens in log *       Implants:   Implant Name Type Inv. Item Serial No.  Lot No. LRB No. Used   IMPL RING ANNULO 26MM - [id number] Heart IMPL RING ANNULO 26MM [id number]   N/A 1   CLIP ATRICLIP FLEX HNDL 35MM Heart CLIP ATRICLIP FLEX HNDL 35MM   ATRICURE [id number] N/A 1            Drains:    Chest Tube 1 Mediastinal (Active)       Chest Tube 2 Left Pleural 28 Greenlandic (Active)       Urethral Catheter Straight-tip; Temperature probe; Latex 16 fr (Active)

## 2020-01-09 NOTE — PROGRESS NOTES
No results for input(s): CKTOTAL, CKMB, CKMBINDEX, TROPONINI in the last 72 hours. Cardiac Studies:    Cath                         CARDIAC CATHETERIZATION REPORT      Procedure Date:  2020  Patient Name: Oneil Vizcarra  MRN: [id number]      : 1945        INDICATION      Cardiomyopathy     PROCEDURES PERFORMED      Right heart catheterization  Left heart catheterization  Coronary angiogam  Coronary cath                 PROCEDURE DESCRIPTION   Risks/benefits/alternatives/outcomes were discussed with patient and/or family and informed consent was obtained. Using the [facility] scale, the patient's right radial artery was found to be acceptable for cannulation. Patient was prepped draped in the usual sterile fashion. Local anaesthetic was applied over puncture site. A right antecubital peripheral IV was placed for the procedure and this was exchanged using a micropuncture kit for a 5 Western Suyapa sheath. 5 Western Su[place]apa PA catheter was used for hemodynamics. Venous sheath was removed at the end of the case. Using a front wall technique, a 4/5 Hungarian Terumo sheath was inserted into right radial artery. Verapamil, nitroglycerin, nicardipine were administered through the sheath. Heparin was administered. Diagnostic [employer] ultra catheter were used for diagnostic angiograms. Limited angiograms were obtained due to elevated creatinine. At the conclusion of the procedure, a TR band was placed over the puncture site and hemostasis was obtained. There were no immediate complications. I supervised sedation with versed 1 mg/fentanyl 25 Mcg during the procedure. 50 cc contrast was utilized. <20cc EBL.         FINDINGS      HEMODYNAMICS     CHAMBER PRESSURE SATURATION   RA  11  65 %   RV  43/13     PA  37/20  65 %   PW  24 with a V wave to 28     AORTA  117/63  97 %      ERNESTINA CARDIAC OUTPUT  3.6 L/min   SVR  1618   PVR  89               Left heart catheterization     LVEDP  27   GRADIENT ACROSS AORTIC VALVE  none

## 2020-01-09 NOTE — PROGRESS NOTES
were no encounter diagnoses. has a past medical history of Anemia, Backache, unspecified, CAD, multiple vessel, Chronic systolic CHF (congestive heart failure) ([address] Depressive disorder, not elsewhere classified, Essential hypertension, benign, Gout, Hyperlipidemia associated with type 2 diabetes mellitus ([address] Major depressive disorder, recurrent episode, moderate ([address] Osteoarthritis, hand, Osteoarthrosis, unspecified whether generalized or localized, lower leg, Osteopenia, Other and unspecified hyperlipidemia, Pain in joint, lower leg, Pneumonia, Rotator cuff tendinitis, Tear of medial cartilage or meniscus of knee, current, Type 2 diabetes mellitus with microalbuminuria, without long-term current use of insulin ([address] Type 2 diabetes mellitus without complication ([address] and Type II or unspecified type diabetes mellitus without mention of complication, not stated as uncontrolled. has a past surgical history that includes Parathyroid gland surgery; Total knee arthroplasty (03/29/10); Hysterectomy; Appendectomy; Cholecystectomy; and Coronary artery bypass graft (N/A, 1/8/2020).     Restrictions  Restrictions/Precautions  Restrictions/Precautions: General Precautions, Fall Risk, Cardiac  Position Activity Restriction  Sternal Precautions: No Pushing, No Pulling, 5# Lifting Restrictions  Other position/activity restrictions: High fall risk per nursing assessment, ambulate pt, 2L O2, 2 chest tubes, [place], telemetry with ICU monitoring, arterial line  Vision/Hearing  Vision: Impaired  Vision Exceptions: Wears glasses at all times  Hearing: Within functional limits     Subjective  General  Chart Reviewed: Yes  Patient assessed for rehabilitation services?: Yes  Family / Caregiver Present: No  Referring Practitioner: Dr. Tip Salas  Referral Date : 01/09/20  Diagnosis: CAD, s/p CABG x 3 on 1/08/20  Follows Commands: Within Functional Limits(extra cueing needed at times to safely follow commands & maintain sternal

## 2020-01-09 NOTE — PROGRESS NOTES
unspecified whether generalized or localized, lower leg, Osteopenia, Other and unspecified hyperlipidemia, Pain in joint, lower leg, Pneumonia, Rotator cuff tendinitis, Tear of medial cartilage or meniscus of knee, current, Type 2 diabetes mellitus with microalbuminuria, without long-term current use of insulin ([facility] Type 2 diabetes mellitus without complication ([facility] [address] and Type II or unspecified type diabetes mellitus without mention of complication, not stated as uncontrolled. has a past surgical history that includes Parathyroid gland surgery; Total knee arthroplasty (03/29/10); Hysterectomy; Appendectomy; Cholecystectomy; and Coronary artery bypass graft (N/A, 1/8/2020). Restrictions  Restrictions/Precautions  Restrictions/Precautions: General Precautions, Fall Risk, Cardiac  Position Activity Restriction  Sternal Precautions: No Pushing, No Pulling, 5# Lifting Restrictions  Other position/activity restrictions: High fall risk per nursing assessment, ambulate pt, 2L O2, 2 chest tubes, Gagnon, telemetry with ICU monitoring, arterial line    Subjective   General  Chart Reviewed: Yes  Patient assessed for rehabilitation services?: Yes  Family / Caregiver Present: No  Referring Practitioner: Ce Reyes MD 1/09/2020  Diagnosis: CAD, s/p CABG x 3 on 1/08/20  Subjective  Subjective: Pt initially declining OOB with therapy d/t fatigue. With encouragement from nurse and therapist patient agreeable. Pt perseverating on doing laundry during session. Patient Currently in Pain: Yes(pt did not rate on 0-10 scale)  Pain Assessment  Pain Level: 8  Pain Type: Surgical pain  Pain Location: Sternum; Chest  Pain Orientation: Mid  Pain Descriptors: Aching  Pain Frequency: Continuous  Non-Pharmaceutical Pain Intervention(s): Ambulation/Increased Activity;Repositioned; Therapeutic presence; Emotional support;Distraction    Social/Functional History  Social/Functional History  Lives With: Spouse  Type of Home: [facility] Layout: One level  Home Access: Stairs to enter with rails  Entrance Stairs - Number of Steps: 5 TAD  Entrance Stairs - Rails: Both  Bathroom Shower/Tub: Walk-in shower  Bathroom Toilet: Handicap height  Bathroom Equipment: Grab bars in shower, Shower chair, Hand-held shower  Home Equipment: 4 wheeled walker  ADL Assistance: [address]: Independent  Ambulation Assistance: Independent  Transfer Assistance: Independent  Active : Yes  Occupation: Part time employment  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. [place]  Leisure & Hobbies: laundry       Objective   Vision: Impaired  Vision Exceptions: Wears glasses at all times  Hearing: Within functional limits    Orientation  Overall Orientation Status: Within Functional Limits  Observation/Palpation  Posture: Fair  Observation: Kyphotic trunk with forward flexion at EOB, rounded shoulders. Balance  Sitting Balance: Supervision  Standing Balance: Dependent/Total(Izaiah of 2 with 4ww )    Functional Mobility  Functional - Mobility Device: 4-Wheeled Walker  Activity: Other(room to hallway)  Assist Level: Maximum assistance(Izaiah of 2)     ADL  Feeding: Setup  LE Dressing: Maximum assistance  Toileting: Maximum assistance     Tone RUE  RUE Tone: Normotonic  Tone LUE  LUE Tone: Normotonic  Coordination  Movements Are Fluid And Coordinated: Yes     Bed mobility  Supine to Sit: 2 Person assistance;Maximum assistance(to L with HOB elevated, cues for sternal precautions)  Sit to Supine: Unable to assess(up to chair at end of session)     Transfers  Sit to stand: 2 Person assistance;Minimal assistance(up to 4ww)  Stand to sit: Minimal assistance;2 Person assistance     Cognition  Overall Cognitive Status: Exceptions  Arousal/Alertness: Delayed responses to stimuli  Following Commands:  Follows one step commands with repetition  Memory: Decreased short term memory  Safety Judgement: Decreased awareness of need for assistance;Decreased awareness of need for safety  Initiation: Requires cues for some  Sequencing: Requires cues for some     LUE AROM (degrees)  LUE AROM : WFL  RUE AROM (degrees)  RUE AROM : WFL  LUE Strength  LUE Strength Comment: NT formally appears at least 3+/5 grossly as pt to hold onto 4ww  RUE Strength  RUE Strength Comment: NT formally appears at least 3+/5 grossly as pt able to feed self      Plan   Plan  Times per week: 4-5x's a week while in ICU      AM-PAC Score        AM-PAC Inpatient Daily Activity Raw Score: 15 (01/09/20 1651)  AM-PAC Inpatient ADL T-Scale Score : 34.69 (01/09/20 1651)  ADL Inpatient CMS 0-100% Score: 56.46 (01/09/20 1651)  ADL Inpatient CMS G-Code Modifier : CK (01/09/20 1651)    Goals  Short term goals  Time Frame for Short term goals: 1 week unless otherwise specified 1/16/20  Short term goal 1: Pt. will complete LE dressing with Izaiah  Short term goal 2: Pt will complete toilet transfers with CGA by 1/14  Short term goal 3: Pt will complete standing level ADLs with CGA for balnace  Patient Goals   Patient goals : \"to get back to work\"       Therapy Time   Individual Concurrent Group Co-treatment   Time In 1434         Time Out 1507         Minutes 33         Timed Code Treatment Minutes: 23 Minutes       Autumn Damon OTR/L

## 2020-01-09 NOTE — PROGRESS NOTES
CVTS Cardiothoracic Progress Note:                                CC:  Post op follow up     Surgery: 1/08/2020 CORONARY ARTERY BYPASS GRAFTING X3, INTERNAL MAMMARY ARTERY, SAPHENOUS VEIN GRAFTING, ON PUMP MITRAL VALVE RING REPAIR USING 26MM PELAEZ PHYSIO II RING, WITH LEFT ATRIAL APPENDAGE CLIP, PFO CLOSURETEE, [address] course:   1/09 sitting up in bed, awake and talkative, in NAD    Past Medical History:   Diagnosis Date    Anemia     Backache, unspecified 3/11/08    CAD, multiple vessel 1/6/2020    Chronic systolic CHF (congestive heart failure) ([address] 12/11/2019    Depressive disorder, not elsewhere classified 11/8/07    Essential hypertension, benign 11/8/07    Gout 8/11/2011    Hyperlipidemia associated with type 2 diabetes mellitus ([address] 12/14/2015    Major depressive disorder, recurrent episode, moderate ([address] 12/14/2015    Osteoarthritis, hand 2/27/2012    Osteoarthrosis, unspecified whether generalized or localized, lower leg 11/8/07    Osteopenia 8/5/2015    Other and unspecified hyperlipidemia 11/8/07    Pain in joint, lower leg 1/29/07    Pneumonia     Rotator cuff tendinitis 10/6/2017    Tear of medial cartilage or meniscus of knee, current 1/29/07    Type 2 diabetes mellitus with microalbuminuria, without long-term current use of insulin ([address] 5/5/2017    Type 2 diabetes mellitus without complication ([address] 15/8/0420    Type II or unspecified type diabetes mellitus without mention of complication, not stated as uncontrolled 11/8/07    foot exam 7/16/08 normal        Past Surgical History:   Procedure Laterality Date    APPENDECTOMY      CHOLECYSTECTOMY      HYSTERECTOMY      total ab hyst    PARATHYROID GLAND SURGERY      explore parathyroid glands    TOTAL KNEE ARTHROPLASTY  03/29/10    left knee        Allergies as of 01/06/2020 - Review Complete 01/06/2020   Allergen Reaction Noted    Penicillins Anaphylaxis 01/30/2011 Recent Labs     01/08/20  1400 01/08/20  1828 01/09/20  0533   MG 3.60* 3.10* 2.80*      PT/INR:   Recent Labs     01/08/20  1400   PROTIME 17.0*   INR 1.46*     APTT:   Recent Labs     01/08/20  1400   APTT 28.7     CXR: 1/09/20 pulmonary edema with increasing left basilar atelectasis  _______________________________________________________________    Subjective:   Dietary Intake: improving   no Nausea   Pain Control: controlled, prn meds  Complaints: none  Bowels: have not moved     Objective:   General appearance: awake and joking, sitting up in bed  Lungs: diminished  Heart: S1S2 RRR; SR on monitor  Chest: symmetrical expansion with inspiration and expirations; No rocking of sternum noted   Abdomen: soft, nontender  Bowel sounds: hypoactive  Kidneys: /595/215= 1460 ml in 24 hrs; Cr   Wound/Incisions: Midsternal incision with dressing CDI; RLE incisions with dressings CDI; Pacing wires taped and secured   Extremities: BLE pulses palpable; minimal tibial swelling noted   Neurological: intact, non focal exam, speech strong  Chest tubes/Drains: Chest tube # 1 with 130/110/40= 280 ml serosanguinous drainage in 24 hours overnight; Chest tube # 2 with 76/47/20= 143 ml serosanguinous drainage in 24 hours overnight; no airleaks noted in either tube   _______________________________________________________________    SCIP Measures:     · Gagnon catheter for:  ? IV Diuresis  ? Accurate I/O  ? D/C today  ·   · Antibiotics:  ? Have been stopped  ? Prophylaxis (POD #1 only)  ? Continued for:   ________________________________________________________________________    Assessment:   Post-op: 1 days. Condition: In stable condition. Plan:   1. Cardiovascular: s/p CABG- BB, ASA, Plavix, Statin  Remains SR. Keeping arterial line at this time. Will change from milrinone to dobutamine, parked at 3 mck/kg/hr, and transition patient an hour later.     2. Pulmonary: needs aggressive pulmonary expansion maneuvers- IS, post-op  ________________________________________________________________________    Adelaida Olea CNP  1/9/2020  11:02 AM

## 2020-01-09 NOTE — PROGRESS NOTES
Results for Adiel Singh (MRN [id number]) as of 1/9/2020 00:27   Ref.  Range 1/8/2020 23:55   Hemoglobin Quant Latest Ref Range: 12.0 - 16.0 g/dL 8.7 (L)   Hematocrit Latest Ref Range: 36.0 - 48.0 % 25.8 (L)

## 2020-01-10 ENCOUNTER — APPOINTMENT (OUTPATIENT)
Dept: GENERAL RADIOLOGY | Age: 75
DRG: 216 | End: 2020-01-10
Attending: INTERNAL MEDICINE
Payer: MEDICARE

## 2020-01-10 LAB
ANION GAP SERPL CALCULATED.3IONS-SCNC: 11 MMOL/L (ref 3–16)
BLOOD BANK DISPENSE STATUS: NORMAL
BLOOD BANK PRODUCT CODE: NORMAL
BPU ID: NORMAL
BUN BLDV-MCNC: 23 MG/DL (ref 7–20)
CALCIUM SERPL-MCNC: 9.3 MG/DL (ref 8.3–10.6)
CHLORIDE BLD-SCNC: 106 MMOL/L (ref 99–110)
CO2: 21 MMOL/L (ref 21–32)
CREAT SERPL-MCNC: 1.7 MG/DL (ref 0.6–1.2)
DESCRIPTION BLOOD BANK: NORMAL
GFR AFRICAN AMERICAN: 36
GFR NON-AFRICAN AMERICAN: 29
GLUCOSE BLD-MCNC: 112 MG/DL (ref 70–99)
GLUCOSE BLD-MCNC: 129 MG/DL (ref 70–99)
GLUCOSE BLD-MCNC: 129 MG/DL (ref 70–99)
GLUCOSE BLD-MCNC: 135 MG/DL (ref 70–99)
GLUCOSE BLD-MCNC: 139 MG/DL (ref 70–99)
GLUCOSE BLD-MCNC: 149 MG/DL (ref 70–99)
GLUCOSE BLD-MCNC: 180 MG/DL (ref 70–99)
GLUCOSE BLD-MCNC: 188 MG/DL (ref 70–99)
GLUCOSE BLD-MCNC: 191 MG/DL (ref 70–99)
GLUCOSE BLD-MCNC: 215 MG/DL (ref 70–99)
HCT VFR BLD CALC: 31.5 % (ref 36–48)
HEMOGLOBIN: 10.6 G/DL (ref 12–16)
MAGNESIUM: 2.2 MG/DL (ref 1.8–2.4)
MCH RBC QN AUTO: 31.3 PG (ref 26–34)
MCHC RBC AUTO-ENTMCNC: 33.6 G/DL (ref 31–36)
MCV RBC AUTO: 93.2 FL (ref 80–100)
ORGANISM: ABNORMAL
PDW BLD-RTO: 14.6 % (ref 12.4–15.4)
PERFORMED ON: ABNORMAL
PLATELET # BLD: 69 K/UL (ref 135–450)
PMV BLD AUTO: 8.7 FL (ref 5–10.5)
POTASSIUM REFLEX MAGNESIUM: 4.2 MMOL/L (ref 3.5–5.1)
POTASSIUM SERPL-SCNC: 4.2 MMOL/L (ref 3.5–5.1)
RBC # BLD: 3.38 M/UL (ref 4–5.2)
SODIUM BLD-SCNC: 138 MMOL/L (ref 136–145)
URINE CULTURE, ROUTINE: ABNORMAL
URINE CULTURE, ROUTINE: ABNORMAL
WBC # BLD: 6.7 K/UL (ref 4–11)

## 2020-01-10 PROCEDURE — 6370000000 HC RX 637 (ALT 250 FOR IP): Performed by: THORACIC SURGERY (CARDIOTHORACIC VASCULAR SURGERY)

## 2020-01-10 PROCEDURE — 6370000000 HC RX 637 (ALT 250 FOR IP): Performed by: NURSE PRACTITIONER

## 2020-01-10 PROCEDURE — 2140000000 HC CCU INTERMEDIATE R&B

## 2020-01-10 PROCEDURE — 97116 GAIT TRAINING THERAPY: CPT

## 2020-01-10 PROCEDURE — 6370000000 HC RX 637 (ALT 250 FOR IP): Performed by: INTERNAL MEDICINE

## 2020-01-10 PROCEDURE — 6360000002 HC RX W HCPCS: Performed by: NURSE PRACTITIONER

## 2020-01-10 PROCEDURE — 97530 THERAPEUTIC ACTIVITIES: CPT

## 2020-01-10 PROCEDURE — 2580000003 HC RX 258: Performed by: THORACIC SURGERY (CARDIOTHORACIC VASCULAR SURGERY)

## 2020-01-10 PROCEDURE — 71045 X-RAY EXAM CHEST 1 VIEW: CPT

## 2020-01-10 PROCEDURE — 99024 POSTOP FOLLOW-UP VISIT: CPT | Performed by: THORACIC SURGERY (CARDIOTHORACIC VASCULAR SURGERY)

## 2020-01-10 PROCEDURE — 94640 AIRWAY INHALATION TREATMENT: CPT

## 2020-01-10 PROCEDURE — 80048 BASIC METABOLIC PNL TOTAL CA: CPT

## 2020-01-10 PROCEDURE — 6360000002 HC RX W HCPCS: Performed by: THORACIC SURGERY (CARDIOTHORACIC VASCULAR SURGERY)

## 2020-01-10 PROCEDURE — 94669 MECHANICAL CHEST WALL OSCILL: CPT

## 2020-01-10 PROCEDURE — 83735 ASSAY OF MAGNESIUM: CPT

## 2020-01-10 PROCEDURE — 2580000003 HC RX 258: Performed by: NURSE PRACTITIONER

## 2020-01-10 PROCEDURE — 85027 COMPLETE CBC AUTOMATED: CPT

## 2020-01-10 RX ORDER — HYDRALAZINE HYDROCHLORIDE 10 MG/1
10 TABLET, FILM COATED ORAL EVERY 8 HOURS SCHEDULED
Status: DISCONTINUED | OUTPATIENT
Start: 2020-01-10 | End: 2020-01-12

## 2020-01-10 RX ORDER — POLYETHYLENE GLYCOL 3350 17 G/17G
17 POWDER, FOR SOLUTION ORAL DAILY
Status: DISCONTINUED | OUTPATIENT
Start: 2020-01-10 | End: 2020-01-13 | Stop reason: HOSPADM

## 2020-01-10 RX ORDER — FUROSEMIDE 10 MG/ML
20 INJECTION INTRAMUSCULAR; INTRAVENOUS 2 TIMES DAILY
Status: DISCONTINUED | OUTPATIENT
Start: 2020-01-10 | End: 2020-01-10

## 2020-01-10 RX ORDER — FUROSEMIDE 10 MG/ML
20 INJECTION INTRAMUSCULAR; INTRAVENOUS 2 TIMES DAILY
Status: DISCONTINUED | OUTPATIENT
Start: 2020-01-11 | End: 2020-01-13

## 2020-01-10 RX ADMIN — ALBUTEROL SULFATE 2.5 MG: 2.5 SOLUTION RESPIRATORY (INHALATION) at 16:02

## 2020-01-10 RX ADMIN — DOCUSATE SODIUM 100 MG: 100 CAPSULE, LIQUID FILLED ORAL at 21:16

## 2020-01-10 RX ADMIN — ACETAMINOPHEN 1000 MG: 500 TABLET ORAL at 05:03

## 2020-01-10 RX ADMIN — ALLOPURINOL 300 MG: 300 TABLET ORAL at 09:39

## 2020-01-10 RX ADMIN — Medication 10 ML: at 10:06

## 2020-01-10 RX ADMIN — METOPROLOL TARTRATE 12.5 MG: 25 TABLET ORAL at 09:35

## 2020-01-10 RX ADMIN — Medication 15 ML: at 10:05

## 2020-01-10 RX ADMIN — FUROSEMIDE 20 MG: 10 INJECTION, SOLUTION INTRAMUSCULAR; INTRAVENOUS at 05:03

## 2020-01-10 RX ADMIN — FUROSEMIDE 20 MG: 10 INJECTION, SOLUTION INTRAMUSCULAR; INTRAVENOUS at 00:16

## 2020-01-10 RX ADMIN — OXYCODONE 10 MG: 5 TABLET ORAL at 18:18

## 2020-01-10 RX ADMIN — INSULIN LISPRO 1 UNITS: 100 INJECTION, SOLUTION INTRAVENOUS; SUBCUTANEOUS at 21:17

## 2020-01-10 RX ADMIN — POTASSIUM CHLORIDE 10 MEQ: 20 TABLET, EXTENDED RELEASE ORAL at 09:37

## 2020-01-10 RX ADMIN — ALBUTEROL SULFATE 2.5 MG: 2.5 SOLUTION RESPIRATORY (INHALATION) at 12:22

## 2020-01-10 RX ADMIN — POLYETHYLENE GLYCOL 3350 17 G: 17 POWDER, FOR SOLUTION ORAL at 10:05

## 2020-01-10 RX ADMIN — METOPROLOL TARTRATE 12.5 MG: 25 TABLET ORAL at 21:16

## 2020-01-10 RX ADMIN — DEXTROSE AND SODIUM CHLORIDE: 5; 450 INJECTION, SOLUTION INTRAVENOUS at 02:53

## 2020-01-10 RX ADMIN — HYDRALAZINE HYDROCHLORIDE 10 MG: 10 TABLET, FILM COATED ORAL at 15:16

## 2020-01-10 RX ADMIN — ATORVASTATIN CALCIUM 20 MG: 10 TABLET, FILM COATED ORAL at 21:16

## 2020-01-10 RX ADMIN — MUPIROCIN: 20 OINTMENT TOPICAL at 10:05

## 2020-01-10 RX ADMIN — Medication 10 ML: at 21:17

## 2020-01-10 RX ADMIN — INSULIN LISPRO 1 UNITS: 100 INJECTION, SOLUTION INTRAVENOUS; SUBCUTANEOUS at 17:09

## 2020-01-10 RX ADMIN — OXYCODONE 10 MG: 5 TABLET ORAL at 05:03

## 2020-01-10 RX ADMIN — DEXTROSE MONOHYDRATE 2 G: 50 INJECTION, SOLUTION INTRAVENOUS at 02:53

## 2020-01-10 RX ADMIN — Medication 15 ML: at 21:16

## 2020-01-10 RX ADMIN — INSULIN LISPRO 1 UNITS: 100 INJECTION, SOLUTION INTRAVENOUS; SUBCUTANEOUS at 12:02

## 2020-01-10 RX ADMIN — DOCUSATE SODIUM 100 MG: 100 CAPSULE, LIQUID FILLED ORAL at 09:38

## 2020-01-10 RX ADMIN — FAMOTIDINE 20 MG: 20 TABLET, FILM COATED ORAL at 09:38

## 2020-01-10 RX ADMIN — HYDRALAZINE HYDROCHLORIDE 10 MG: 10 TABLET, FILM COATED ORAL at 23:00

## 2020-01-10 RX ADMIN — CEFTRIAXONE SODIUM 1 G: 1 INJECTION, POWDER, FOR SOLUTION INTRAMUSCULAR; INTRAVENOUS at 09:57

## 2020-01-10 RX ADMIN — CLOPIDOGREL BISULFATE 75 MG: 75 TABLET ORAL at 09:53

## 2020-01-10 RX ADMIN — ASPIRIN 81 MG: 81 TABLET, COATED ORAL at 09:53

## 2020-01-10 ASSESSMENT — PAIN SCALES - GENERAL
PAINLEVEL_OUTOF10: 0
PAINLEVEL_OUTOF10: 2
PAINLEVEL_OUTOF10: 8
PAINLEVEL_OUTOF10: 0
PAINLEVEL_OUTOF10: 7

## 2020-01-10 NOTE — PROGRESS NOTES
CVTS Cardiothoracic Progress Note:                                CC:  Post op follow up     Surgery: 1/08/2020 CORONARY ARTERY BYPASS GRAFTING X3, INTERNAL MAMMARY ARTERY, SAPHENOUS VEIN GRAFTING, ON PUMP MITRAL VALVE RING REPAIR USING 26MM PELAEZ PHYSIO II RING, WITH LEFT ATRIAL APPENDAGE CLIP, PFO CLOSURETEE, [address] course:   1/09 sitting up in bed, awake and talkative, in NAD  1/10 up in chair, eating breakfast, had some mild confusion this morning.     Past Medical History:   Diagnosis Date    Anemia     Backache, unspecified 3/11/08    CAD, multiple vessel 1/6/2020    Chronic systolic CHF (congestive heart failure) ([address] 12/11/2019    Depressive disorder, not elsewhere classified 11/8/07    Essential hypertension, benign 11/8/07    Gout 8/11/2011    Hyperlipidemia associated with type 2 diabetes mellitus ([address] 12/14/2015    Major depressive disorder, recurrent episode, moderate ([address] 12/14/2015    Osteoarthritis, hand 2/27/2012    Osteoarthrosis, unspecified whether generalized or localized, lower leg 11/8/07    Osteopenia 8/5/2015    Other and unspecified hyperlipidemia 11/8/07    Pain in joint, lower leg 1/29/07    Pneumonia     Rotator cuff tendinitis 10/6/2017    Tear of medial cartilage or meniscus of knee, current 1/29/07    Type 2 diabetes mellitus with microalbuminuria, without long-term current use of insulin ([address] 5/5/2017    Type 2 diabetes mellitus without complication ([address] 89/1/3666    Type II or unspecified type diabetes mellitus without mention of complication, not stated as uncontrolled 11/8/07    foot exam 7/16/08 normal        Past Surgical History:   Procedure Laterality Date    APPENDECTOMY      CHOLECYSTECTOMY      CORONARY ARTERY BYPASS GRAFT N/A 1/8/2020    CORONARY ARTERY BYPASS GRAFTING X3, INTERNAL MAMMARY ARTERY, SAPHENOUS VEIN GRAFTING, ON PUMP MITRAL VALVE RING REPAIR USING 26MM PELAEZ PHYSIO II RING, WITH LEFT ATRIAL APPENDAGE CLIP, PFO CLOSURETEE, 5 LEVEL BILATERAL INTERCOSTAL NERVE BLOCK performed by Boby Pompa MD at [address]      total ab hyst    PARATHYROID GLAND SURGERY      explore parathyroid glands    TOTAL KNEE ARTHROPLASTY  03/29/10    left knee        Allergies as of 01/06/2020 - Review Complete 01/06/2020   Allergen Reaction Noted    Penicillins Anaphylaxis 01/30/2011        Patient Active Problem List   Diagnosis    Backache    Essential hypertension, benign    Osteoarthrosis involving lower leg    Gout    Osteoarthritis, hand    Osteopenia    Chest pain    Hyperlipidemia associated with type 2 diabetes mellitus ([facility]    Major depressive disorder, recurrent episode, moderate (HCC)    Primary insomnia    Type 2 diabetes mellitus with microalbuminuria, without long-term current use of insulin (HCC)    Rotator cuff tendinitis    Other restrictive cardiomyopathy (HCC)    Acute systolic congestive heart failure (HCC)    Nonrheumatic mitral valve regurgitation    Chronic systolic CHF (congestive heart failure) (HCC)    CAD, multiple vessel    Ischemic cardiomyopathy        Vital Signs: /70   Pulse 93   Temp 98.9 °F (37.2 °C) (Oral)   Resp 16   Ht 5' 1.5\" (1.562 m)   Wt 143 lb 14.4 oz (65.3 kg)   SpO2 97%   BMI 26.75 kg/m²  O2 Flow Rate (L/min): 1 L/min     Admission Weight: Weight: 133 lb 8 oz (60.6 kg)    Weight on 1/08 (59.4 kg) Pre-op   Weight on 1/09 (58.9 kg)  1/10  65.3 kg    Intake/Output:     Intake/Output Summary (Last 24 hours) at 1/10/2020 1252  Last data filed at 1/10/2020 1112  Gross per 24 hour   Intake 2434.75 ml   Output 2650 ml   Net -215.25 ml        GTTS: Dobutamine at 2 mcg/kg/hr    Extubation Time: 1/08 @ 16:24  Transition Time: 1/09 @ 13:00    LABORATORY DATA:     CBC:   Recent Labs     01/08/20  1828  01/09/20  0533 01/09/20  1210 01/09/20  1747 01/10/20  0510   WBC 8.0  --  5.1  --   --  6.7   HGB 7.7*   < > 8.9* 7.7* 10.1* 10.6* HCT 23.0*   < > 26.4* 23.1* 29.9* 31.5*   MCV 94.9  --  95.2  --   --  93.2   PLT 90*  --  73*  --   --  69*    < > = values in this interval not displayed. BMP:   Recent Labs     01/08/20  2355 01/09/20  0533 01/10/20  0510    141 138   K 5.1 4.7 4.2  4.2    111* 106   CO2 21 20* 21   BUN 23* 23* 23*   CREATININE 1.3* 1.3* 1.7*     MG:    Recent Labs     01/08/20  1828 01/09/20  0533 01/10/20  0510   MG 3.10* 2.80* 2.20      PT/INR:   Recent Labs     01/08/20  1400   PROTIME 17.0*   INR 1.46*     APTT:   Recent Labs     01/08/20  1400   APTT 28.7     CXR: 1/09/20 pulmonary edema with increasing left basilar atelectasis  _______________________________________________________________    Subjective:   Dietary Intake: improving   no Nausea   Pain Control: controlled, prn meds  Complaints: none  Bowels: have not moved     Objective:   General appearance: awake, alert, up in chair  Lungs: diminished in bases  Heart: S1S2 RRR; SR on monitor  Chest: symmetrical expansion with inspiration and expirations; No rocking of sternum noted   Abdomen: soft, nontender  Bowel sounds: normoactive  Kidneys: /1020/705= 2425 ml in 24 hrs; Cr 1.7  Wound/Incisions: Midsternal incision with dressing CDI; RLE incisions with dressings CDI; Pacing wires removed 1/09  Extremities: BLE pulses palpable; minimal tibial swelling noted   Neurological: intact, non focal exam, speech strong  Chest tubes/Drains: Chest tube # 1 with 130/100/50= 280 ml serosanguinous drainage in 24 hours overnight; Chest tube # 2 with 20/90/20= 130 ml serosanguinous drainage in 24 hours overnight; no airleaks noted in either tube   _______________________________________________________________    SCIP Measures:     · Gagnon catheter for:  ? IV Diuresis  ? Accurate I/O  ? D/C today  ·   · Antibiotics:  ? Have been stopped  ? Prophylaxis (POD #1 only)  ?  Continued for:

## 2020-01-10 NOTE — PROGRESS NOTES
Occupational Therapy  Facility/Department: [facility] C2 CARD TELEMETRY  Daily Treatment Note  NAME: Tonny Lopes  :   MRN: [id number]    Date of Service: 1/10/2020    Discharge Recommendations:  IP Rehab       Assessment   Performance deficits / Impairments: Decreased functional mobility ; Decreased ADL status; Decreased balance;Decreased strength;Decreased safe awareness;Decreased endurance  Assessment: Pt pleasently confused at times this date. Pt displays safety concerns due to not following precautions and for mobility. Pt required verbal cues to perform AROM this date. Pt performed functional mobility in the vivas this date then attempted to use the restroom. Once in the restroom pt became very fatigued and unable to complete toilet transfer due to pt closing eyes and and posterior sway. Prognosis: Good    REQUIRES OT FOLLOW UP: Yes  Activity Tolerance  Activity Tolerance: Patient Tolerated treatment well  Safety Devices  Type of devices: Left in chair;Call light within reach;Nurse notified;Gait belt         Patient Diagnosis(es): There were no encounter diagnoses.       has a past medical history of Anemia, Backache, unspecified, CAD, multiple vessel, Chronic systolic CHF (congestive heart failure) ([address] Depressive disorder, not elsewhere classified, Essential hypertension, benign, Gout, Hyperlipidemia associated with type 2 diabetes mellitus ([address] Major depressive disorder, recurrent episode, moderate ([address] Osteoarthritis, hand, Osteoarthrosis, unspecified whether generalized or localized, lower leg, Osteopenia, Other and unspecified hyperlipidemia, Pain in joint, lower leg, Pneumonia, Rotator cuff tendinitis, Tear of medial cartilage or meniscus of knee, current, Type 2 diabetes mellitus with microalbuminuria, without long-term current use of insulin ([address] Type 2 diabetes mellitus without complication ([address] and Type II or unspecified type diabetes mellitus without mention of complication, not stated as uncontrolled. has a past surgical history that includes Parathyroid gland surgery; Total knee arthroplasty (03/29/10); Hysterectomy; Appendectomy; Cholecystectomy; and Coronary artery bypass graft (N/A, 1/8/2020). Restrictions  Restrictions/Precautions  Restrictions/Precautions: General Precautions, Fall Risk, Cardiac  Position Activity Restriction  Sternal Precautions: No Pushing, No Pulling, 5# Lifting Restrictions  Other position/activity restrictions: High fall risk per nursing assessment, ambulate pt, 2L O2, 2 chest tubes, Gagnon, telemetry with ICU monitoring, arterial line     Subjective   General  Chart Reviewed: Yes  Patient assessed for rehabilitation services?: Yes  Response to previous treatment: Patient with no complaints from previous session  Family / Caregiver Present: No  Referring Practitioner: Glenroy Boyer MD 1/09/2020  Diagnosis: CAD, s/p CABG x 3 on 1/08/20  Subjective  Subjective: Pt willing to participate  General Comment  Comments: RN cleared    Vital Signs  Patient Currently in Pain: Denies     Orientation  Orientation  Overall Orientation Status: Impaired  Orientation Level: Oriented to person;Oriented to situation;Disoriented to time;Disoriented to place     Objective     Balance  Sitting Balance: Supervision  Standing Balance: Minimal assistance(CGA-Min A)  Functional Mobility  Functional - Mobility Device: 4-Wheeled Walker  Activity: Other(within room and hallway)  Assist Level: Minimal assistance  Functional Mobility Comments: CGA for straight aways, Min A for obsticals as pt was running into the wall, chair, and door. Pt required assistance to move the walker.     Bed mobility  Supine to Sit: Unable to assess  Sit to Supine: Unable to assess  Comment: Pt was in the chair and returned to the chair at the end of session    Transfers  Sit to stand: Minimal assistance  Stand to sit: Minimal assistance     Cognition  Overall Cognitive Status: Exceptions  Arousal/Alertness: Delayed responses to stimuli  Following Commands: Follows one step commands with repetition  Memory: Decreased short term memory;Decreased recall of precautions  Safety Judgement: Decreased awareness of need for assistance;Decreased awareness of need for safety  Problem Solving: Assistance required to identify errors made;Assistance required to generate solutions;Assistance required to implement solutions;Assistance required to correct errors made  Insights: Not aware of deficits  Initiation: Requires cues for some     Type of ROM/Therapeutic Exercise  Type of ROM/Therapeutic Exercise: AROM  Comment: B UE seated   Exercises  Wrist Flexion: x10  Wrist Extension: x10  Grasp/Release: x10  Other: Pt required constant verbal cues to stay on task this date.       Plan   Plan  Times per week: 4-5x's a week while in ICU    AM-PAC Score  AM-PAC Inpatient Daily Activity Raw Score: 15 (01/10/20 0906)  AM-PAC Inpatient ADL T-Scale Score : 34.69 (01/10/20 0906)  ADL Inpatient CMS 0-100% Score: 56.46 (01/10/20 0906)  ADL Inpatient CMS G-Code Modifier : CK (01/10/20 0906)    Goals  Short term goals  Time Frame for Short term goals: 1 week unless otherwise specified 1/16/20  Short term goal 1: Pt. will complete LE dressing with Izaiah  Short term goal 2: Pt will complete toilet transfers with CGA by 1/14  Short term goal 3: Pt will complete standing level ADLs with CGA for balnace  Patient Goals   Patient goals : \"to get back to work\"       Therapy Time   Individual Concurrent Group Co-treatment   Time In 0835         Time Out 0902         Minutes 27         Timed Code Treatment Minutes: [address], [facility]

## 2020-01-10 NOTE — CONSULTS
26MM PELAEZ PHYSIO II RING, WITH LEFT ATRIAL APPENDAGE CLIP, PFO CLOSURETEE, 5 LEVEL BILATERAL INTERCOSTAL NERVE BLOCK performed by Namrata Jones MD at [address] [facility] [address]      total ab hyst    PARATHYROID GLAND SURGERY      explore parathyroid glands    TOTAL KNEE ARTHROPLASTY  03/29/10    left knee       Home Medications:    No current facility-administered medications on file prior to encounter. Current Outpatient Medications on File Prior to Encounter   Medication Sig Dispense Refill    dapagliflozin (FARXIGA) 5 MG tablet Take 5 mg by mouth every morning      furosemide (LASIX) 40 MG tablet Take 1 tablet by mouth daily 30 tablet 0    enalapril (VASOTEC) 10 MG tablet Take 1 tablet by mouth daily 30 tablet 3    metoprolol succinate (TOPROL XL) 25 MG extended release tablet Take 1 tablet by mouth daily 30 tablet 3    albuterol sulfate HFA (PROAIR HFA) 108 (90 Base) MCG/ACT inhaler Inhale 2 puffs into the lungs every 6 hours as needed for Wheezing 1 Inhaler 3    predniSONE (DELTASONE) 10 MG tablet Take 1 tablet by mouth See Admin Instructions Take 4 tablets ×4 days  Take 3 tablets ×3 days  Take 2 tablets ×2 days  Take 1 tablet ×1 day 30 tablet 0    albuterol sulfate HFA (VENTOLIN HFA) 108 (90 Base) MCG/ACT inhaler Inhale 2 puffs into the lungs every 6 hours as needed for Wheezing 1 Inhaler 0    allopurinol (ZYLOPRIM) 300 MG tablet TAKE ONE TABLET BY MOUTH DAILY 30 tablet 2    atorvastatin (LIPITOR) 40 MG tablet TAKE ONE TABLET BY MOUTH DAILY 90 tablet 0    metFORMIN (GLUCOPHAGE-XR) 500 MG extended release tablet TAKE TWO TABLETS BY MOUTH TWICE A  tablet 0    vitamin B-12 (CYANOCOBALAMIN) 500 MCG tablet Take 500 mcg by mouth daily      Omega-3 Fatty Acids (FISH OIL) 1000 MG CAPS Take 1,000 mg by mouth daily      calcium carbonate (OSCAL) 500 MG TABS tablet Take 500 mg by mouth daily      aspirin 81 MG EC tablet Take 81 mg by mouth daily.          Allergies:  Penicillins    Social strict i/o  -renal dose medications   -avoid nephrotoxins        Thank you for the consultation. Please do not hesitate to call with questions.     Magdalene Kurtz  [facility]  Office: [phone]  Fax:    [phone]

## 2020-01-10 NOTE — PROGRESS NOTES
Physical Therapy  Facility/Department: [facility] C2 CARD TELEMETRY  Daily Treatment Note  NAME: Oneil Vizcarra  :   MRN: [id number]    Date of Service: 1/10/2020    Discharge Recommendations:  IP Rehab        Assessment   Body structures, Functions, Activity limitations: Decreased functional mobility ; Decreased strength;Decreased balance;Decreased endurance;Decreased posture;Decreased safe awareness  Assessment: Pt confused this AM, but progressing with mobility, ambulate 180 ft with 4WW and CGA to Izaiah with adddtional assist for line management. Pt did need some VC's to maintain sternal precautions, with poor safety awareness. Treatment Diagnosis: Impaired balance, decreased (I) with mobility  Specific instructions for Next Treatment: Progress ther ex and mobility as tolerated  Prognosis: Good  Decision Making: Medium Complexity  PT Education: Goals;PT Role;Plan of Care;Gait Training;Functional Mobility Training;Disease Specific Education;Equipment;Transfer Training;Precautions; Energy Conservation;Weight-bearing Education;General Safety;Orientation  Patient Education: Pt verbalizes understanding but requires reinforcement. Barriers to Learning: confusion, difficulty following commands for ther ex  REQUIRES PT FOLLOW UP: Yes  Activity Tolerance  Activity Tolerance: Patient limited by endurance; Patient limited by fatigue  Activity Tolerance: Vital signs stable during therapy session. Pt on RA this date     Patient Diagnosis(es): There were no encounter diagnoses.      has a past medical history of Anemia, Backache, unspecified, CAD, multiple vessel, Chronic systolic CHF (congestive heart failure) ([address] Depressive disorder, not elsewhere classified, Essential hypertension, benign, Gout, Hyperlipidemia associated with type 2 diabetes mellitus ([address] Major depressive disorder, recurrent episode, moderate ([address] Osteoarthritis, hand, Osteoarthrosis, unspecified whether generalized or localized, lower leg, Osteopenia, Other and unspecified hyperlipidemia, Pain in joint, lower leg, Pneumonia, Rotator cuff tendinitis, Tear of medial cartilage or meniscus of knee, current, Type 2 diabetes mellitus with microalbuminuria, without long-term current use of insulin ([facility] [address] Type 2 diabetes mellitus without complication ([facility] [address] and Type II or unspecified type diabetes mellitus without mention of complication, not stated as uncontrolled. has a past surgical history that includes Parathyroid gland surgery; Total knee arthroplasty (03/29/10); Hysterectomy; Appendectomy; Cholecystectomy; and Coronary artery bypass graft (N/A, 1/8/2020). Restrictions  Restrictions/Precautions  Restrictions/Precautions: General Precautions, Fall Risk, Cardiac  Position Activity Restriction  Sternal Precautions: No Pushing, No Pulling, 5# Lifting Restrictions  Other position/activity restrictions: High fall risk per nursing assessment, ambulate pt, 2L O2, 2 chest tubes, Gagnon, telemetry with ICU monitoring, arterial line  Subjective   General  Response To Previous Treatment: Patient with no complaints from previous session. Family / Caregiver Present: No  Referring Practitioner: Dr. Ren Brito: Pt sittng up in chair upon entry, RN cleared pt for therapy, pt pleasantly confused  General Comment  Comments: RN cleared pt for therapy, pt agreeable   Pain Screening  Patient Currently in Pain: Denies  Vital Signs  Patient Currently in Pain: Denies       Orientation  Orientation  Overall Orientation Status: Impaired  Orientation Level: Oriented to situation;Oriented to person;Disoriented to place; Disoriented to time(was able to be redirected and become aware that she was in the hospital )        Objective   Bed mobility  Supine to Sit: Unable to assess(pt up in chair upon entry and at EOS)  Sit to Supine: Unable to assess  Scooting: Stand by assistance(to edge of chair)  Transfers  Sit to Stand: Minimal Assistance  Stand to sit: Minimal Assistance  Ambulation  Ambulation?: Yes  Ambulation 1  Surface: level tile  Device: Rollator  Assistance: Minimal assistance;Contact guard assistance(CGA with additional person for line management in open straight a way, Izaiah x 2 in close areas with obstacles, therapist guiding walker away from obstacles)  Quality of Gait: Pt with improved tolerance for gait this date with improved posture, became fatigued last 10 to 20 ft in room, closing eyes. VSS throughout  Gait Deviations: Slow Nohemi;Decreased step length;Decreased step height;Shuffles;Decreased head and trunk rotation;Deviated path  Distance: 180 ft     Balance  Posture: Fair  Sitting - Static: Good;-  Sitting - Dynamic: Fair  Standing - Static: Fair;+  Standing - Dynamic: Fair  Exercises  Knee Long Arc Quad: x 10 B with redirection needed  Ankle Pumps: x 20 B with constant redirection  Comments: Pt confused, distracted, difficulty following commands for ther ex                       AM-PAC Score  AM-PAC Inpatient Mobility Raw Score : 14 (01/10/20 1117)  AM-PAC Inpatient T-Scale Score : 38.1 (01/10/20 1117)  Mobility Inpatient CMS 0-100% Score: 61.29 (01/10/20 1117)  Mobility Inpatient CMS G-Code Modifier : CL (01/10/20 1117)          Goals  Short term goals  Time Frame for Short term goals: 1 week, 1/16/20 (unless otherwise specified)  Short term goal 1: Pt will transfer supine <-> sit with Izaiah x 1  Short term goal 2: Pt will transfer sit <-> stand and bed>chair using 4WW/RW with CGA x 1  Short term goal 3: Pt will ambulate x 150 feet using 4WW/RW with CGA x 1  Short term goal 4: By 1/11/20: Pt will tolerate 12-15 reps BLE exercise for strengthening, balance, and endurance  Patient Goals   Patient goals : \"To go home if I can. \"    Plan    Plan  Times per week: 5-7x/week in acute care  Times per day: Daily  Specific instructions for Next Treatment: Progress ther ex and mobility as tolerated  Current Treatment Recommendations: Strengthening, Gait

## 2020-01-10 NOTE — FLOWSHEET NOTE
Incision  Location: Leg  Wound Location Orientation: Right  Wound Description (Comments): ENDOVEIN HARVEST X 3   Wound Assessment Other (Comment)  (christiano)   Aparna-wound Assessment Other (Comment)  (christiano)   Drainage Amount None   Odor None   Dressing/Treatment Dry dressing   Dressing Status Clean;Dry; Intact   Dressing Change Due 01/10/20   Incision 01/08/20 Sternum   Date First Assessed/Time First Assessed: 01/08/20 0858   Primary Wound Type: Incision  Location: Sternum   Wound Assessment Clean;Dry; Intact   Aparna-wound Assessment Clean;Dry; Intact   Drainage Amount None   Odor None   Dressing/Treatment Open to air   Dressing Status Clean;Dry; Intact   Chest Tube 1 Mediastinal   Placement Date/Time: 01/08/20 1259   Inserted by: Tres Carr MD  Tube Number: 1  Chest Tube Location: Mediastinal   Suction -20 cm H2O   Chest Tube Airleak No   Drainage Description Serosanguinous   Dressing Status Clean; Intact;Dry   Dressing Type Dry dressing   Dressing Change Due 01/10/20   Site Assessment Not assessed   Surrounding Skin Unable to view   Chest Tube 2 Left Pleural 28 [place]   Placement Date/Time: 01/08/20 1259   Inserted by: Tres Carr MD  Tube Number: 2  Chest Tube Orientation: Left  Chest Tube Location: Pleural  Size (fr): 28 [place]  Chest Tube Drainage System: Suction   Suction -20 cm H2O   Chest Tube Airleak No   Drainage Description Serosanguinous   Dressing Status Clean;Dry; Intact   Dressing Type Dry dressing   Dressing Change Due 01/10/20   Site Assessment Not assessed   Surrounding Skin Unable to view   Psychosocial   Psychosocial (WDL) WDL

## 2020-01-11 LAB
ANION GAP SERPL CALCULATED.3IONS-SCNC: 13 MMOL/L (ref 3–16)
BUN BLDV-MCNC: 29 MG/DL (ref 7–20)
CALCIUM SERPL-MCNC: 9.1 MG/DL (ref 8.3–10.6)
CHLORIDE BLD-SCNC: 100 MMOL/L (ref 99–110)
CO2: 23 MMOL/L (ref 21–32)
CREAT SERPL-MCNC: 1.2 MG/DL (ref 0.6–1.2)
GFR AFRICAN AMERICAN: 53
GFR NON-AFRICAN AMERICAN: 44
GLUCOSE BLD-MCNC: 169 MG/DL (ref 70–99)
GLUCOSE BLD-MCNC: 184 MG/DL (ref 70–99)
GLUCOSE BLD-MCNC: 247 MG/DL (ref 70–99)
GLUCOSE BLD-MCNC: 298 MG/DL (ref 70–99)
GLUCOSE BLD-MCNC: 308 MG/DL (ref 70–99)
HCT VFR BLD CALC: 33.1 % (ref 36–48)
HEMOGLOBIN: 11.2 G/DL (ref 12–16)
MAGNESIUM: 1.8 MG/DL (ref 1.8–2.4)
MCH RBC QN AUTO: 31.6 PG (ref 26–34)
MCHC RBC AUTO-ENTMCNC: 33.8 G/DL (ref 31–36)
MCV RBC AUTO: 93.5 FL (ref 80–100)
PDW BLD-RTO: 14.3 % (ref 12.4–15.4)
PERFORMED ON: ABNORMAL
PLATELET # BLD: 84 K/UL (ref 135–450)
PMV BLD AUTO: 8.7 FL (ref 5–10.5)
POTASSIUM REFLEX MAGNESIUM: 3.8 MMOL/L (ref 3.5–5.1)
POTASSIUM SERPL-SCNC: 3.8 MMOL/L (ref 3.5–5.1)
RBC # BLD: 3.54 M/UL (ref 4–5.2)
SODIUM BLD-SCNC: 136 MMOL/L (ref 136–145)
WBC # BLD: 8.7 K/UL (ref 4–11)

## 2020-01-11 PROCEDURE — 6360000002 HC RX W HCPCS: Performed by: INTERNAL MEDICINE

## 2020-01-11 PROCEDURE — 6360000002 HC RX W HCPCS: Performed by: THORACIC SURGERY (CARDIOTHORACIC VASCULAR SURGERY)

## 2020-01-11 PROCEDURE — 83735 ASSAY OF MAGNESIUM: CPT

## 2020-01-11 PROCEDURE — 2580000003 HC RX 258: Performed by: THORACIC SURGERY (CARDIOTHORACIC VASCULAR SURGERY)

## 2020-01-11 PROCEDURE — 6370000000 HC RX 637 (ALT 250 FOR IP): Performed by: INTERNAL MEDICINE

## 2020-01-11 PROCEDURE — 97530 THERAPEUTIC ACTIVITIES: CPT

## 2020-01-11 PROCEDURE — 6370000000 HC RX 637 (ALT 250 FOR IP): Performed by: NURSE PRACTITIONER

## 2020-01-11 PROCEDURE — 6360000002 HC RX W HCPCS: Performed by: NURSE PRACTITIONER

## 2020-01-11 PROCEDURE — 2140000000 HC CCU INTERMEDIATE R&B

## 2020-01-11 PROCEDURE — 97116 GAIT TRAINING THERAPY: CPT

## 2020-01-11 PROCEDURE — 80048 BASIC METABOLIC PNL TOTAL CA: CPT

## 2020-01-11 PROCEDURE — 6370000000 HC RX 637 (ALT 250 FOR IP): Performed by: THORACIC SURGERY (CARDIOTHORACIC VASCULAR SURGERY)

## 2020-01-11 PROCEDURE — 85027 COMPLETE CBC AUTOMATED: CPT

## 2020-01-11 PROCEDURE — 99024 POSTOP FOLLOW-UP VISIT: CPT | Performed by: THORACIC SURGERY (CARDIOTHORACIC VASCULAR SURGERY)

## 2020-01-11 PROCEDURE — 94640 AIRWAY INHALATION TREATMENT: CPT

## 2020-01-11 PROCEDURE — 94669 MECHANICAL CHEST WALL OSCILL: CPT

## 2020-01-11 PROCEDURE — 2580000003 HC RX 258: Performed by: NURSE PRACTITIONER

## 2020-01-11 RX ORDER — POTASSIUM CHLORIDE 20 MEQ/1
40 TABLET, EXTENDED RELEASE ORAL ONCE
Status: COMPLETED | OUTPATIENT
Start: 2020-01-11 | End: 2020-01-11

## 2020-01-11 RX ADMIN — ASPIRIN 81 MG: 81 TABLET, COATED ORAL at 09:29

## 2020-01-11 RX ADMIN — CLOPIDOGREL BISULFATE 75 MG: 75 TABLET ORAL at 09:29

## 2020-01-11 RX ADMIN — MUPIROCIN: 20 OINTMENT TOPICAL at 10:03

## 2020-01-11 RX ADMIN — OXYCODONE 10 MG: 5 TABLET ORAL at 03:41

## 2020-01-11 RX ADMIN — MORPHINE SULFATE 2 MG: 2 INJECTION, SOLUTION INTRAMUSCULAR; INTRAVENOUS at 04:30

## 2020-01-11 RX ADMIN — DOBUTAMINE HYDROCHLORIDE 2 MCG/KG/MIN: 200 INJECTION INTRAVENOUS at 05:53

## 2020-01-11 RX ADMIN — METOPROLOL TARTRATE 12.5 MG: 25 TABLET ORAL at 09:29

## 2020-01-11 RX ADMIN — METOPROLOL TARTRATE 12.5 MG: 25 TABLET ORAL at 20:28

## 2020-01-11 RX ADMIN — HYDRALAZINE HYDROCHLORIDE 5 MG: 20 INJECTION INTRAMUSCULAR; INTRAVENOUS at 03:34

## 2020-01-11 RX ADMIN — ALLOPURINOL 300 MG: 300 TABLET ORAL at 09:29

## 2020-01-11 RX ADMIN — Medication 10 ML: at 09:33

## 2020-01-11 RX ADMIN — ALBUTEROL SULFATE 2.5 MG: 2.5 SOLUTION RESPIRATORY (INHALATION) at 20:15

## 2020-01-11 RX ADMIN — ALBUTEROL SULFATE 2.5 MG: 2.5 SOLUTION RESPIRATORY (INHALATION) at 12:08

## 2020-01-11 RX ADMIN — ALBUTEROL SULFATE 2.5 MG: 2.5 SOLUTION RESPIRATORY (INHALATION) at 16:18

## 2020-01-11 RX ADMIN — Medication 15 ML: at 20:29

## 2020-01-11 RX ADMIN — HYDRALAZINE HYDROCHLORIDE 10 MG: 10 TABLET, FILM COATED ORAL at 22:23

## 2020-01-11 RX ADMIN — HYDRALAZINE HYDROCHLORIDE 10 MG: 10 TABLET, FILM COATED ORAL at 06:07

## 2020-01-11 RX ADMIN — DOCUSATE SODIUM 100 MG: 100 CAPSULE, LIQUID FILLED ORAL at 20:28

## 2020-01-11 RX ADMIN — OXYCODONE 5 MG: 5 TABLET ORAL at 20:28

## 2020-01-11 RX ADMIN — ATORVASTATIN CALCIUM 20 MG: 10 TABLET, FILM COATED ORAL at 20:28

## 2020-01-11 RX ADMIN — MUPIROCIN: 20 OINTMENT TOPICAL at 20:29

## 2020-01-11 RX ADMIN — DOCUSATE SODIUM 100 MG: 100 CAPSULE, LIQUID FILLED ORAL at 09:29

## 2020-01-11 RX ADMIN — FUROSEMIDE 20 MG: 10 INJECTION, SOLUTION INTRAMUSCULAR; INTRAVENOUS at 09:30

## 2020-01-11 RX ADMIN — FAMOTIDINE 20 MG: 20 TABLET, FILM COATED ORAL at 09:29

## 2020-01-11 RX ADMIN — FUROSEMIDE 20 MG: 10 INJECTION, SOLUTION INTRAMUSCULAR; INTRAVENOUS at 18:18

## 2020-01-11 RX ADMIN — CEFTRIAXONE SODIUM 1 G: 1 INJECTION, POWDER, FOR SOLUTION INTRAMUSCULAR; INTRAVENOUS at 09:30

## 2020-01-11 RX ADMIN — INSULIN LISPRO 1 UNITS: 100 INJECTION, SOLUTION INTRAVENOUS; SUBCUTANEOUS at 08:57

## 2020-01-11 RX ADMIN — INSULIN LISPRO 2 UNITS: 100 INJECTION, SOLUTION INTRAVENOUS; SUBCUTANEOUS at 20:34

## 2020-01-11 RX ADMIN — ALBUTEROL SULFATE 2.5 MG: 2.5 SOLUTION RESPIRATORY (INHALATION) at 08:34

## 2020-01-11 RX ADMIN — INSULIN LISPRO 4 UNITS: 100 INJECTION, SOLUTION INTRAVENOUS; SUBCUTANEOUS at 17:26

## 2020-01-11 RX ADMIN — INSULIN LISPRO 2 UNITS: 100 INJECTION, SOLUTION INTRAVENOUS; SUBCUTANEOUS at 12:31

## 2020-01-11 RX ADMIN — MORPHINE SULFATE 2 MG: 2 INJECTION, SOLUTION INTRAMUSCULAR; INTRAVENOUS at 10:55

## 2020-01-11 RX ADMIN — POTASSIUM CHLORIDE 40 MEQ: 20 TABLET, EXTENDED RELEASE ORAL at 17:10

## 2020-01-11 RX ADMIN — Medication 10 ML: at 20:28

## 2020-01-11 ASSESSMENT — PAIN DESCRIPTION - PROGRESSION: CLINICAL_PROGRESSION: RAPIDLY WORSENING

## 2020-01-11 ASSESSMENT — PAIN SCALES - GENERAL
PAINLEVEL_OUTOF10: 2
PAINLEVEL_OUTOF10: 8
PAINLEVEL_OUTOF10: 3
PAINLEVEL_OUTOF10: 0
PAINLEVEL_OUTOF10: 8
PAINLEVEL_OUTOF10: 6
PAINLEVEL_OUTOF10: 4
PAINLEVEL_OUTOF10: 0
PAINLEVEL_OUTOF10: 9
PAINLEVEL_OUTOF10: 8
PAINLEVEL_OUTOF10: 8

## 2020-01-11 ASSESSMENT — PAIN DESCRIPTION - ONSET: ONSET: AWAKENED FROM SLEEP

## 2020-01-11 ASSESSMENT — PAIN DESCRIPTION - PAIN TYPE
TYPE: SURGICAL PAIN

## 2020-01-11 ASSESSMENT — PAIN DESCRIPTION - FREQUENCY: FREQUENCY: INTERMITTENT

## 2020-01-11 ASSESSMENT — PAIN DESCRIPTION - DESCRIPTORS
DESCRIPTORS: ACHING;DISCOMFORT
DESCRIPTORS: ACHING

## 2020-01-11 ASSESSMENT — PAIN DESCRIPTION - LOCATION
LOCATION: STERNUM
LOCATION: STERNUM

## 2020-01-11 ASSESSMENT — PAIN DESCRIPTION - ORIENTATION: ORIENTATION: MID

## 2020-01-11 NOTE — PLAN OF CARE
Excellent effort with cough/deep breathe. Progressive improvement with ambulation. Patient appears very sensitive to pain medications. Problem: Falls - Risk of:  Goal: Will remain free from falls  Description  Will remain free from falls  1/11/2020 1232 by Kylee Rosales  Outcome: Ongoing     Problem: Falls - Risk of:  Goal: Absence of physical injury  Description  Absence of physical injury  1/11/2020 1232 by Kylee Rosales  Outcome: Ongoing     Problem: Activity Intolerance:  Goal: Able to perform prescribed physical activity  Description  Able to perform prescribed physical activity  1/11/2020 1232 by Kylee Rosales  Outcome: Ongoing     Problem:  Activity Intolerance:  Goal: Ability to tolerate increased activity will improve  Description  Ability to tolerate increased activity will improve  1/11/2020 1232 by Kylee Rosales  Outcome: Ongoing     Problem: Cardiac Output - Decreased:  Goal: Cardiac output within specified parameters  Description  Cardiac output within specified parameters  Outcome: Ongoing     Problem: Cardiac Output - Decreased:  Goal: Hemodynamic stability will improve  Description  Hemodynamic stability will improve  Outcome: Ongoing     Problem: Gas Exchange - Impaired:  Goal: Levels of oxygenation will improve  Description  Levels of oxygenation will improve  Outcome: Ongoing     Problem: Pain:  Goal: Pain level will decrease  Description  Pain level will decrease  Outcome: Ongoing

## 2020-01-11 NOTE — CONSULTS
Distance 08+33+06+25+99   /69      O2 92% on RA      Time: 35 Minutes    Discussed with RN to see patient. Per RN-okay to work with pt. Assisted pt from sitting to standing position from chair. Pt ambulated in hallway with stand by assist and 4ww assistive device. Assisted pt back to chair and placed phone and call light within reach. Pt has no needs at this time. Touched base with RN after pt's session, discussed pt toleration. Will continue to follow up during the duration of the CR order.      JOSSELINE Ellis 01/11/20 10:00 AM    Exercise Physiologist    Phone: [phone]

## 2020-01-11 NOTE — PROGRESS NOTES
MITRAL VALVE RING REPAIR USING 26MM PELAEZ PHYSIO II RING, WITH LEFT ATRIAL APPENDAGE CLIP, PFO CLOSURETEE, 5 LEVEL BILATERAL INTERCOSTAL NERVE BLOCK performed by Thuan Briseno MD at [id number] [facility]      total ab hyst    PARATHYROID GLAND SURGERY      explore parathyroid glands    TOTAL KNEE ARTHROPLASTY  03/29/10    left knee        Allergies as of 01/06/2020 - Review Complete 01/06/2020   Allergen Reaction Noted    Penicillins Anaphylaxis 01/30/2011        Patient Active Problem List   Diagnosis    Backache    Essential hypertension, benign    Osteoarthrosis involving lower leg    Gout    Osteoarthritis, hand    Osteopenia    Chest pain    Hyperlipidemia associated with type 2 diabetes mellitus ([facility] [address]    Major depressive disorder, recurrent episode, moderate (HCC)    Primary insomnia    Type 2 diabetes mellitus with microalbuminuria, without long-term current use of insulin (HCC)    Rotator cuff tendinitis    Other restrictive cardiomyopathy (HCC)    Acute systolic congestive heart failure (HCC)    Nonrheumatic mitral valve regurgitation    Chronic systolic CHF (congestive heart failure) (HCC)    CAD, multiple vessel    Ischemic cardiomyopathy        Vital Signs: BP (!) 142/73   Pulse 102   Temp 98.9 °F (37.2 °C)   Resp 20   Ht 5' 1.5\" (1.562 m)   Wt 140 lb 3.4 oz (63.6 kg)   SpO2 96%   BMI 26.06 kg/m²  O2 Flow Rate (L/min): 1 L/min     Admission Weight: Weight: 133 lb 8 oz (60.6 kg)    Weight on 1/08 (59.4 kg) Pre-op   Weight on 1/09 (58.9 kg)  1/10  65.3 kg    Intake/Output:     Intake/Output Summary (Last 24 hours) at 1/11/2020 0924  Last data filed at 1/11/2020 0750  Gross per 24 hour   Intake 1208.6 ml   Output 1595 ml   Net -386.4 ml        GTTS: Dobutamine at 2 mcg/kg/hr    Extubation Time: 1/08 @ 16:24  Transition Time: 1/09 @ 13:00    LABORATORY DATA:     CBC:   Recent Labs     01/09/20  0533  01/09/20  1747 01/10/20  0510 01/11/20  0730   WBC 5.1  --   --  6.7 8.7 HGB 8.9*   < > 10.1* 10.6* 11.2*   HCT 26.4*   < > 29.9* 31.5* 33.1*   MCV 95.2  --   --  93.2 93.5   PLT 73*  --   --  69* 84*    < > = values in this interval not displayed. BMP:   Recent Labs     01/09/20  0533 01/10/20  0510 01/11/20  0730    138 136   K 4.7 4.2  4.2 3.8  3.8   * 106 100   CO2 20* 21 23   BUN 23* 23* 29*   CREATININE 1.3* 1.7* 1.2     MG:    Recent Labs     01/09/20  0533 01/10/20  0510 01/11/20  0730   MG 2.80* 2.20 1.80      PT/INR:   Recent Labs     01/08/20  1400   PROTIME 17.0*   INR 1.46*     APTT:   Recent Labs     01/08/20  1400   APTT 28.7     CXR: 1/09/20 pulmonary edema with increasing left basilar atelectasis  _______________________________________________________________    Subjective:   Dietary Intake: improving   no Nausea   Pain Control: controlled, prn meds  Complaints: none  Bowels: have not moved     Objective:   General appearance: awake, alert, up in chair  Lungs: diminished in bases  Heart: S1S2 RRR; SR on monitor  Chest: symmetrical expansion with inspiration and expirations; No rocking of sternum noted   Abdomen: soft, nontender  Bowel sounds: normoactive  Kidneys: /1020/705= 2425 ml in 24 hrs; Cr 1.7  Wound/Incisions: Midsternal incision with dressing CDI; RLE incisions with dressings CDI; Pacing wires removed 1/09  Extremities: BLE pulses palpable; minimal tibial swelling noted   Neurological: intact, non focal exam, speech strong  Chest tubes/Drains: Chest tube # 1 with 130/100/50= 280 ml serosanguinous drainage in 24 hours overnight; Chest tube # 2 with 20/90/20= 130 ml serosanguinous drainage in 24 hours overnight; no airleaks noted in either tube   _______________________________________________________________    SCIP Measures:     · Gagnon catheter for:  ? IV Diuresis  ? Accurate I/O  ? D/C today  ·   · Antibiotics:  ? Have been stopped  ? Prophylaxis (POD #1 only)  ?  Continued for:

## 2020-01-11 NOTE — PROGRESS NOTES
or meniscus of knee, current, Type 2 diabetes mellitus with microalbuminuria, without long-term current use of insulin ([facility] [address] Type 2 diabetes mellitus without complication ([facility] [address] and Type II or unspecified type diabetes mellitus without mention of complication, not stated as uncontrolled. has a past surgical history that includes Parathyroid gland surgery; Total knee arthroplasty (03/29/10); Hysterectomy; Appendectomy; Cholecystectomy; and Coronary artery bypass graft (N/A, 1/8/2020). Restrictions  Restrictions/Precautions  Restrictions/Precautions: General Precautions, Fall Risk, Cardiac  Position Activity Restriction  Sternal Precautions: No Pushing, No Pulling, 5# Lifting Restrictions  Other position/activity restrictions: High fall risk per nursing assessment, ambulate pt, 2L O2, 2 chest tubes, Gagnon, telemetry with ICU monitoring, arterial line  Subjective   General  Chart Reviewed: Yes  Patient assessed for rehabilitation services?: Yes  Response to previous treatment: Patient with no complaints from previous session  Family / Caregiver Present: No  Referring Practitioner: Apolonia Avery MD 1/09/2020  Diagnosis: CAD, s/p CABG x 3 on 1/08/20  Subjective  Subjective: Patient up in chair and agreeable to OT with encouragement. Patient declining ADLs this date. General Comment  Comments: RN cleared  Pain Assessment  Pain Assessment: 0-10  Pain Level: 8  Pain Type: Surgical pain  Pre Treatment Pain Screening  Intervention List: Nurse/Physician notified; Patient able to continue with treatment;Patient declined any intervention  Vital Signs  Patient Currently in Pain: Yes        Objective    ADL  Additional Comments: Declined the need for ADLs        Balance  Sitting Balance: Supervision  Standing Balance: Contact guard assistance  Standing Balance  Activity: functional mobility in hallway   Functional Mobility  Functional - Mobility Device: 4-Wheeled Walker  Activity: (250ft; hallway)  Assist Level: Minimal assistance  Functional Mobility Comments: Min A for cueing due to obstacles; Toilet Transfers  Toilet Transfers Comments: Declined the need  Bed mobility  Supine to Sit: Unable to assess  Sit to Supine: Moderate assistance  Comment: Patient unable to safely maintain sternal precautions   Transfers  Sit to stand: Contact guard assistance  Stand to sit: Contact guard assistance         Cognition  Overall Cognitive Status: Exceptions  Arousal/Alertness: Delayed responses to stimuli  Following Commands:  Follows one step commands with repetition  Memory: Decreased short term memory;Decreased recall of precautions  Safety Judgement: Decreased awareness of need for assistance;Decreased awareness of need for safety  Problem Solving: Assistance required to identify errors made;Assistance required to generate solutions;Assistance required to implement solutions;Assistance required to correct errors made  Insights: Not aware of deficits  Initiation: Requires cues for some  Sequencing: Requires cues for some  Cognition Comment: VC to safely maintain sternal precautions             Plan   Plan  Times per week: 4-5x's a week while in ICU  Plan Comment: no longer needs a cotx    AM-PAC Score        AM-PAC Inpatient Daily Activity Raw Score: 17 (01/11/20 1406)  AM-PAC Inpatient ADL T-Scale Score : 37.26 (01/11/20 1406)  ADL Inpatient CMS 0-100% Score: 50.11 (01/11/20 1406)  ADL Inpatient CMS G-Code Modifier : CK (01/11/20 1406)    Goals  Short term goals  Time Frame for Short term goals: 1 week unless otherwise specified 1/16/20  Short term goal 1: Pt. will complete LE dressing with Izaiah  Short term goal 2: Pt will complete toilet transfers with CGA by 1/14- GOAL MET 1/11/20  Short term goal 3: Pt will complete standing level ADLs with CGA for balnace  Patient Goals   Patient goals : \"to get back to work\"       Therapy Time   Individual Concurrent Group Co-treatment   Time In 1307         Time Out 1337         Minutes 30 Timed Code Treatment Minutes: [address] OTR/L

## 2020-01-11 NOTE — CONSULTS
Department of Cardiovascular & Thoracic Surgery  History and Physical          DIAGNOSIS: multivessel CAD    CHIEF COMPLAINT:  Shortness of breath    History Obtained From:  patient, spouse, family member - son, electronic medical record    HISTORY OF PRESENT ILLNESS:      The patient is a 76 y.o. female smoker with significant past medical history of HTN, T2DM, OA, arthritis, Gout, depression and newly diagnosed systolic HF who presented 1/06 for a scheduled cardiac cath with Dr. Magdi Bowman. In October of 2019 she had new swelling to BLE and went to her PCP. She was then referred for an echocardiogram (10/29/19) that revealed a reduced EF of 25%. Her cath revealed multivessel CAD and we have been consulted for surgical intervention.      Past Medical History:        Diagnosis Date    Anemia     Backache, unspecified 3/11/08    CAD, multiple vessel 1/6/2020    Chronic systolic CHF (congestive heart failure) ([address] 12/11/2019    Depressive disorder, not elsewhere classified 11/8/07    Essential hypertension, benign 11/8/07    Gout 8/11/2011    Hyperlipidemia associated with type 2 diabetes mellitus ([address] 12/14/2015    Major depressive disorder, recurrent episode, moderate ([address] 12/14/2015    Osteoarthritis, hand 2/27/2012    Osteoarthrosis, unspecified whether generalized or localized, lower leg 11/8/07    Osteopenia 8/5/2015    Other and unspecified hyperlipidemia 11/8/07    Pain in joint, lower leg 1/29/07    Pneumonia     Rotator cuff tendinitis 10/6/2017    Tear of medial cartilage or meniscus of knee, current 1/29/07    Type 2 diabetes mellitus with microalbuminuria, without long-term current use of insulin ([address] 5/5/2017    Type 2 diabetes mellitus without complication ([address] 84/1/6881    Type II or unspecified type diabetes mellitus without mention of complication, not stated as uncontrolled 11/8/07    foot exam 7/16/08 normal       Past Surgical History:        Procedure Laterality Date    APPENDECTOMY      CHOLECYSTECTOMY      CORONARY ARTERY BYPASS GRAFT N/A 1/8/2020    CORONARY ARTERY BYPASS GRAFTING X3, INTERNAL MAMMARY ARTERY, SAPHENOUS VEIN GRAFTING, ON PUMP MITRAL VALVE RING REPAIR USING 26MM PELAEZ PHYSIO II RING, WITH LEFT ATRIAL APPENDAGE CLIP, PFO CLOSURETEE, 5 LEVEL BILATERAL INTERCOSTAL NERVE BLOCK performed by Zita Draper MD at [address] [place] [address]      total ab hyst    PARATHYROID GLAND SURGERY      explore parathyroid glands    TOTAL KNEE ARTHROPLASTY  03/29/10    left knee       Medications Prior to Admission:   Medications Prior to Admission: dapagliflozin (FARXIGA) 5 MG tablet, Take 5 mg by mouth every morning  furosemide (LASIX) 40 MG tablet, Take 1 tablet by mouth daily  enalapril (VASOTEC) 10 MG tablet, Take 1 tablet by mouth daily  metoprolol succinate (TOPROL XL) 25 MG extended release tablet, Take 1 tablet by mouth daily  albuterol sulfate HFA (PROAIR HFA) 108 (90 Base) MCG/ACT inhaler, Inhale 2 puffs into the lungs every 6 hours as needed for Wheezing  predniSONE (DELTASONE) 10 MG tablet, Take 1 tablet by mouth See Admin Instructions Take 4 tablets ×4 days Take 3 tablets ×3 days Take 2 tablets ×2 days Take 1 tablet ×1 day  albuterol sulfate HFA (VENTOLIN HFA) 108 (90 Base) MCG/ACT inhaler, Inhale 2 puffs into the lungs every 6 hours as needed for Wheezing  allopurinol (ZYLOPRIM) 300 MG tablet, TAKE ONE TABLET BY MOUTH DAILY  atorvastatin (LIPITOR) 40 MG tablet, TAKE ONE TABLET BY MOUTH DAILY  metFORMIN (GLUCOPHAGE-XR) 500 MG extended release tablet, TAKE TWO TABLETS BY MOUTH TWICE A DAY  vitamin B-12 (CYANOCOBALAMIN) 500 MCG tablet, Take 500 mcg by mouth daily  Omega-3 Fatty Acids (FISH OIL) 1000 MG CAPS, Take 1,000 mg by mouth daily  calcium carbonate (OSCAL) 500 MG TABS tablet, Take 500 mg by mouth daily  aspirin 81 MG EC tablet, Take 81 mg by mouth daily. Allergies:  Penicillins    Social History:    TOBACCO:  Currently smokes.   Type of tobacco used: lymphadenopathy, no jugular venous distension, no carotid bruits and MASSES:  no masses. No thyromegaly. Lungs:  no increased work of breathing, good air exchange, no retractions and clear to auscultation, no crackles or wheezing. No tactile fremitus. Cardiovascular: RRR, S1, S2, normal, no R/G. +1/6 FRANCIE. Apical impulse in 5th intercostal space. Pulses:  Right dorsalis pedis 2, Left dorsalis pedis 2, Right posterior tibial 1, Left Posterior tibial 1, Right radial 2, and Left radial 2. Abdomen:  normal bowel sounds, non-tender, aorta normal and bruits absent. No hepatosplenomegaly or masses. Musculoskeletal:  Back is straight and non-tender, full ROM of upper and lower extremities. No kyphosis or scoliosis. Extremities:  Warm, pink, no clubbing, cyanosis, petechiae, ischemia, or deformities. No peripheral edema.     Skin: no rashes, no ecchymoses, no petechiae, no nodules, no jaundice    Neurological/Psychiatric: oriented, normal mood, CN II-XII intact    DATA:  EK20 Normal sinus rhythmT wave abnormality, consider inferior ischemiaAbnormal ECGWhen compared with ECG of 30-MAY-2017 21:16,QRS duration has increasedT wave inversion now evident in Inferior leadsNonspecific T wave abnormality now evident in Lateral leads      CBC with Differential:    Lab Results   Component Value Date    WBC 8.7 2020    RBC 3.54 2020    HGB 11.2 2020    HCT 33.1 2020    PLT 84 2020    MCV 93.5 2020    MCH 31.6 2020    MCHC 33.8 2020    RDW 14.3 2020    SEGSPCT 53.4 2015    SEGSPCT 53.4 2015    LYMPHOPCT 21.1 2019    MONOPCT 4.8 2019    EOSPCT 7.6 2012    BASOPCT 0.8 2019    MONOSABS 0.3 2019    LYMPHSABS 1.3 2019    EOSABS 0.3 2019    BASOSABS 0.1 2019    DIFFTYPE Auto 2011     CMP:    Lab Results   Component Value Date     2020    K 3.8 2020    K 3.8 2020    CL 100 01/11/2020    CO2 23 01/11/2020    BUN 29 01/11/2020    CREATININE 1.2 01/11/2020    GFRAA 53 01/11/2020    GFRAA >60 06/04/2011    AGRATIO 1.4 05/14/2019    LABGLOM 44 01/11/2020    LABGLOM 59 04/29/2015    GLUCOSE 169 01/11/2020    GLUCOSE 128 02/27/2012    PROT 6.6 05/14/2019    PROT 7.0 03/04/2013    LABALBU 3.9 05/14/2019    CALCIUM 9.1 01/11/2020    BILITOT 0.4 05/14/2019    ALKPHOS 111 05/14/2019    AST 13 05/14/2019    ALT 13 05/14/2019     Hepatic Function Panel:    Lab Results   Component Value Date    ALKPHOS 111 05/14/2019    ALT 13 05/14/2019    AST 13 05/14/2019    PROT 6.6 05/14/2019    PROT 7.0 03/04/2013    BILITOT 0.4 05/14/2019    BILIDIR 0.14 03/04/2013    IBILI 0.28 03/04/2013    LABALBU 3.9 05/14/2019     PT/INR:    Lab Results   Component Value Date    PROTIME 17.0 01/08/2020    INR 1.46 01/08/2020       ECHO: 10/29/19 with Dr. Nikki Novak    Findings:   Left Ventricle   The left ventricular systolic function is severely reduced with an ejection   fraction of 25 %. There is akinesis of the inferior, apical septal and inferolateral walls. There is hypokinesis of the Inferoseptal & anteroseptal wall. Grade II diastolic dysfunction with elevated left ventricular filling   pressure. Normal left ventricular size and wall thickness. Mitral Valve   Mild mitral annular calcification. Mild thickening of the leaflets of mitral valve. Moderate mitral regurgitation. Left Atrium   The left atrium is severely dilated. Aortic Valve   Aortic valve is structurally normal.   No aortic regurgitation. Aorta   The aortic root is normal in size. Right Ventricle   The right ventricle is normal in size and function. TAPSE = 18 mm. S' prime velocity is measured at 11.1 cm/sec. Tricuspid Valve   Tricuspid valve is structurally normal.   Mild tricuspid regurgitation.    Systolic pulmonary artery pressure (SPAP) is normal estimated at 30 mmHg   (Right atrial pressure of 3 testing and risk stratification. Plan for surgery Wednesday morning. Will discuss IVÁN, for further evaluation of MV, with cardiology. Carotid duplex. Vein mapping. PFTs.   CXR  Urine    Olivia Hoffmann CNP   1/11/2020  9:22 AM

## 2020-01-11 NOTE — PROGRESS NOTES
Physical Therapy  Facility/Department: [facility] C2 CARD TELEMETRY  Daily Treatment Note  NAME: Alma Delia Fonseca  : [birth year]  MRN: [id number]    Date of Service: 2020    Discharge Recommendations:  IP Rehab   PT Equipment Recommendations  Equipment Needed: No(TBD at facility)    Assessment   Body structures, Functions, Activity limitations: Decreased functional mobility ; Decreased strength;Decreased balance;Decreased endurance;Decreased posture;Decreased safe awareness  Assessment: Pt seen in pm for PT tx, pleasant and agreeable to tx but fatigued and drowsy during session. Pt demonstrates good progress towards stated goals completing bed mobility with modA, t/f with CGA and ambulates up to 250' with 4WW and CGA to Izaiah. Pt demonstrates decreased safety awareness and inconsistently follows sternal precautions despite maximal cues throughout session. Pt will benefit from continued skilled PT in acute care setting to address above deficits. Gait training provided to increase safety and I within the home and community and decrease gait deviations. Therapeutic activities completed to increase safety and I with functional mobility and decrase risk of falls. Treatment Diagnosis: Impaired balance, decreased (I) with mobility  Specific instructions for Next Treatment: Progress ther ex and mobility as tolerated  Prognosis: Good  Decision Making: Medium Complexity  PT Education: Goals;PT Role;Plan of Care;Gait Training;Functional Mobility Training;Disease Specific Education;Equipment;Transfer Training;Precautions; Energy Conservation;Weight-bearing Education;General Safety  Patient Education: Pt verbalizes understanding but requires reinforcement. Barriers to Learning: confusion, difficulty following commands for ther ex  REQUIRES PT FOLLOW UP: Yes  Activity Tolerance  Activity Tolerance: Patient limited by endurance; Patient limited by fatigue  Activity Tolerance: BP 98/63, HR up to 96 with mobility, SpO2 maintained >96% on RA     Patient Diagnosis(es): There were no encounter diagnoses. has a past medical history of Anemia, Backache, unspecified, CAD, multiple vessel, Chronic systolic CHF (congestive heart failure) ([address] Depressive disorder, not elsewhere classified, Essential hypertension, benign, Gout, Hyperlipidemia associated with type 2 diabetes mellitus ([address] Major depressive disorder, recurrent episode, moderate ([address] Osteoarthritis, hand, Osteoarthrosis, unspecified whether generalized or localized, lower leg, Osteopenia, Other and unspecified hyperlipidemia, Pain in joint, lower leg, Pneumonia, Rotator cuff tendinitis, Tear of medial cartilage or meniscus of knee, current, Type 2 diabetes mellitus with microalbuminuria, without long-term current use of insulin ([address] Type 2 diabetes mellitus without complication ([address] and Type II or unspecified type diabetes mellitus without mention of complication, not stated as uncontrolled. has a past surgical history that includes Parathyroid gland surgery; Total knee arthroplasty (03/29/10); Hysterectomy; Appendectomy; Cholecystectomy; and Coronary artery bypass graft (N/A, 1/8/2020). Restrictions  Restrictions/Precautions  Restrictions/Precautions: General Precautions, Fall Risk, Cardiac  Position Activity Restriction  Sternal Precautions: No Pushing, No Pulling, 5# Lifting Restrictions  Other position/activity restrictions: High fall risk per nursing assessment, ambulate pt, telemetry with ICU monitoring, IJ  Subjective   General  Chart Reviewed: Yes  Response To Previous Treatment: Patient with no complaints from previous session.   Family / Caregiver Present: Yes(, son)  Referring Practitioner: Dr. Wolfgang Kelley: Pt sitting in chair on arrival, drowsy/fatigued but pleasant and agreeable to PT and wanting to ambulate  General Comment  Comments: RN cleared pt for therapy, pt agreeable   Pain Screening  Patient Currently in Pain: Yes  Pain Assessment  Pain Assessment: 0-10  Pain Level: 8  Pain Type: Surgical pain  Pain Location: Sternum  Pain Descriptors: Aching  Non-Pharmaceutical Pain Intervention(s): Ambulation/Increased Activity;Repositioned; Therapeutic presence  Vital Signs  Patient Currently in Pain: Yes       Orientation  Orientation  Orientation Level: Oriented to situation;Oriented to person;Disoriented to place; Disoriented to time    Objective   Bed mobility  Supine to Sit: Unable to assess(Pt seated in chair at start of session)  Sit to Supine: Moderate assistance(modA for BLE, cues for sequence)  Scooting: Stand by assistance  Comment: Consistent tactile and verbal cues with bed mobility to maintain sternal precautions with poor carryover     Transfers  Sit to Stand: Contact guard assistance  Stand to sit: Contact guard assistance  Comment: Sit to stand recliner to 4WW CGA, increased time to complete and cues to maintain sternal precautions     Ambulation 1  Surface: level tile  Device: Rollator  Other Apparatus: (RA)  Assistance: Minimal assistance;Contact guard assistance  Quality of Gait: Pt ambulates with decreased piedad, narrow MINDY, B decreased step length/height, B decreased toe clearance, LLE slight decreased stance time, forward flexed trunk. Pt requires Izaiah at times for management of RW, CGA for balance and safety. Gait Deviations: Slow Piedad;Decreased step length;Decreased step height;Shuffles;Decreased head and trunk rotation;Deviated path  Distance: 250'  Comments: Pt takes two standing rest breaks with gait to recover from fatigue. HR up to 96 with gait, Spo2 96% on RA.  Seated rest break to recover from fatigue following gait activities  Stairs/Curb  Stairs?: No(Deferred d/t safety)        Balance  Posture: Fair  Sitting - Static: Good;-  Sitting - Dynamic: Fair;+  Standing - Static: Fair;+  Standing - Dynamic: Fair                           AM-PAC Score  AM-PAC Inpatient Mobility Raw Score : 14 (01/11/20 6194)  AM-PAC

## 2020-01-11 NOTE — PROGRESS NOTES
[facility]    Follow up  Note           Reason for Consult:  LANCE on CKD  Requesting Physician:  Dr. Draper Sender    Chief Complaint:  Gabriele Ann history  Pt is resting on bed  Off dobutamine gtt this am  Received 20 mg iv lasix and hydralazine this am  BP since then has been 110s    Last 24 h uop 1.8 l, 560 ml since  wt 140 lbs (admission wt 129)    ROS: No chest pain/shortness of breath/fever/nausea/vomiting  PSFH: No visitor    Scheduled Meds:   potassium chloride  40 mEq Oral Once    polyethylene glycol  17 g Oral Daily    cefTRIAXone (ROCEPHIN) IV  1 g Intravenous Q24H    furosemide  20 mg Intravenous BID    hydrALAZINE  10 mg Oral 3 times per day    sodium chloride flush  10 mL Intravenous 2 times per day    docusate sodium  100 mg Oral BID    famotidine  20 mg Oral Daily    metoprolol tartrate  12.5 mg Oral BID    chlorhexidine  15 mL Mouth/Throat BID    mupirocin   Nasal BID    atorvastatin  20 mg Oral Nightly    aspirin  81 mg Oral Daily    clopidogrel  75 mg Oral Daily    albuterol  2.5 mg Nebulization Q4H WA    insulin lispro  0-6 Units Subcutaneous TID WC    insulin lispro  0-3 Units Subcutaneous Nightly    sodium chloride  250 mL Intravenous Once    allopurinol  300 mg Oral Daily     Continuous Infusions:   dextrose Stopped (01/10/20 0745)     PRN Meds:.sodium chloride flush, potassium chloride, magnesium sulfate, acetaminophen, acetaminophen, oxyCODONE **OR** oxyCODONE, morphine **OR** morphine, ondansetron, hydrALAZINE, metoprolol, glucose, dextrose, glucagon (rDNA), dextrose      History of Present Illness on 1/9/20:    76 y.o. yo female with PMH of DM, CHFrEF , Gout , HTN who is admitted for cath  Pt had elective cath on 1/7 and showed MV CAD and underwent cabg on 1/8  ckd w cr 1-1.4 in the past  Cr pre CABG was 1.3 and increased to 1.7 on 1/10 and nephrology has been consulted  Pt on dobutamine gtt w good pressors; was on lasix 20 mg iv q6h on 1/8    Physical exam:   Constitutional:  VITALS:  /69   Pulse 93   Temp 99 °F (37.2 °C) (Oral)   Resp 14   Ht 5' 1.5\" (1.562 m)   Wt 140 lb 3.4 oz (63.6 kg)   SpO2 96%   BMI 26.06 kg/m²   Gen: alert, awake, nad  Skin: no rash, turgor wnl  Heent:  eomi, mmm  Neck: no bruits or jvd noted, thyroid normal  Cardiovascular:  S1, S2 without m/r/g  Respiratory: CTA B without w/r/r; respiratory effort normal  Abdomen:  +bs, soft, nt, nd, no hepatosplenomegaly  Ext: trace lower extremity edema  Neuro/Psy: AAoriented times 3 ; moves all 4 ext  Musculoskeletal:  Rom, muscular strength intact; digits, nails normal    Data/  Recent Labs     01/09/20  0533  01/09/20  1747 01/10/20  0510 01/11/20  0730   WBC 5.1  --   --  6.7 8.7   HGB 8.9*   < > 10.1* 10.6* 11.2*   HCT 26.4*   < > 29.9* 31.5* 33.1*   MCV 95.2  --   --  93.2 93.5   PLT 73*  --   --  69* 84*    < > = values in this interval not displayed. Recent Labs     01/09/20  0533 01/10/20  0510 01/11/20  0730    138 136   K 4.7 4.2  4.2 3.8  3.8   * 106 100   CO2 20* 21 23   GLUCOSE 134* 129* 169*   MG 2.80* 2.20 1.80   BUN 23* 23* 29*   CREATININE 1.3* 1.7* 1.2   LABGLOM 40* 29* 44*   GFRAA 48* 36* 53*     UA w micro on 1/7 trace protein 10-20 wbc 5-10 rbcs UC 75 k ecoli   on 12/2018    Assessment  -LANCE    Etiology:  post CABG , cardiorenal syndrome   -CKD III likely from DM   Baseline cr of low 1-1.4 and has micoralbuminuria  -CAD s/p 3v CABG on 1/8/20  -DM  -HTN  -CHFrEF, acute on chronic  -ecoli UTI on rocephin    Plan  -Keep SBP> 110  -hold BP meds if SBP<110  -kcl 40 meq po times one  -lasix 20 mg BID IV  -CKD work up soon  -Serial renal panel  -daily wts and strict i/o  -renal dose medications   -avoid nephrotoxins        Thank you for the consultation. Please do not hesitate to call with questions.     Kelvin Keith  [facility]  Office: [phone]  Fax:    [phone]

## 2020-01-11 NOTE — PROGRESS NOTES
Chest tubes meet criteria to remove per open heart protocol. If chest tubes do not meet criteria, a specific order has been entered to remove. No air leak. No crepitus. Pt instructed on procedure. Pt premedicated with 2mg morphine see MAR. Site cleansed and prepped per protocol. Chest tube 2 removed without difficulty. Dry sterile dressing and vaseline gauze applied. Bilateral breath sounds audible. O2Sats 96% on room air. Incision site within normal limits. Patient tolerated well. Gagnon catheter discontinued at this time.

## 2020-01-11 NOTE — PROGRESS NOTES
Paired Blood Cultures and lactic acid drawn and sent to lab by Charge Yashira MCCLOUD; IVF increased to 100mL/hr; Oral Temperature rechecked and 99.4 so Tylenol not given at this time; Nephrostomy tube drained of 300mL and flushed with 5mL normal saline per MD order; Will obtain a complete set of vitals within one hour; Patient is in bed resting quietly with no complaints at this time.  
 
Grant Alejandro RN 
10/19/18 
10:52 PM 
 
 Pt refuses to get up at this time, even after explaining Dr Eduardo Lake wants pts up at 0600. Pt states \"I don't feel good right now and I'm not getting up. He doesn't want to p*ss me off. \"  Informed Charge RN, who stated to leave pt in bed at this time. Told pt we will try again in a little while.

## 2020-01-12 LAB
ANION GAP SERPL CALCULATED.3IONS-SCNC: 14 MMOL/L (ref 3–16)
BUN BLDV-MCNC: 41 MG/DL (ref 7–20)
CALCIUM SERPL-MCNC: 9.3 MG/DL (ref 8.3–10.6)
CHLORIDE BLD-SCNC: 100 MMOL/L (ref 99–110)
CO2: 24 MMOL/L (ref 21–32)
CREAT SERPL-MCNC: 1.3 MG/DL (ref 0.6–1.2)
GFR AFRICAN AMERICAN: 48
GFR NON-AFRICAN AMERICAN: 40
GLUCOSE BLD-MCNC: 182 MG/DL (ref 70–99)
GLUCOSE BLD-MCNC: 185 MG/DL (ref 70–99)
GLUCOSE BLD-MCNC: 262 MG/DL (ref 70–99)
GLUCOSE BLD-MCNC: 284 MG/DL (ref 70–99)
GLUCOSE BLD-MCNC: 299 MG/DL (ref 70–99)
HCT VFR BLD CALC: 33.2 % (ref 36–48)
HEMOGLOBIN: 11.2 G/DL (ref 12–16)
MAGNESIUM: 1.8 MG/DL (ref 1.8–2.4)
MCH RBC QN AUTO: 31.2 PG (ref 26–34)
MCHC RBC AUTO-ENTMCNC: 33.6 G/DL (ref 31–36)
MCV RBC AUTO: 92.8 FL (ref 80–100)
PDW BLD-RTO: 13.9 % (ref 12.4–15.4)
PERFORMED ON: ABNORMAL
PLATELET # BLD: 126 K/UL (ref 135–450)
PMV BLD AUTO: 8.8 FL (ref 5–10.5)
POTASSIUM REFLEX MAGNESIUM: 4.1 MMOL/L (ref 3.5–5.1)
POTASSIUM SERPL-SCNC: 4.1 MMOL/L (ref 3.5–5.1)
RBC # BLD: 3.58 M/UL (ref 4–5.2)
SODIUM BLD-SCNC: 138 MMOL/L (ref 136–145)
WBC # BLD: 7.3 K/UL (ref 4–11)

## 2020-01-12 PROCEDURE — 6370000000 HC RX 637 (ALT 250 FOR IP): Performed by: INTERNAL MEDICINE

## 2020-01-12 PROCEDURE — 99024 POSTOP FOLLOW-UP VISIT: CPT | Performed by: THORACIC SURGERY (CARDIOTHORACIC VASCULAR SURGERY)

## 2020-01-12 PROCEDURE — 2140000000 HC CCU INTERMEDIATE R&B

## 2020-01-12 PROCEDURE — 6370000000 HC RX 637 (ALT 250 FOR IP): Performed by: THORACIC SURGERY (CARDIOTHORACIC VASCULAR SURGERY)

## 2020-01-12 PROCEDURE — 6360000002 HC RX W HCPCS: Performed by: THORACIC SURGERY (CARDIOTHORACIC VASCULAR SURGERY)

## 2020-01-12 PROCEDURE — 94669 MECHANICAL CHEST WALL OSCILL: CPT

## 2020-01-12 PROCEDURE — 6360000002 HC RX W HCPCS: Performed by: NURSE PRACTITIONER

## 2020-01-12 PROCEDURE — 85027 COMPLETE CBC AUTOMATED: CPT

## 2020-01-12 PROCEDURE — 2580000003 HC RX 258: Performed by: THORACIC SURGERY (CARDIOTHORACIC VASCULAR SURGERY)

## 2020-01-12 PROCEDURE — 6360000002 HC RX W HCPCS: Performed by: INTERNAL MEDICINE

## 2020-01-12 PROCEDURE — 2580000003 HC RX 258: Performed by: NURSE PRACTITIONER

## 2020-01-12 PROCEDURE — 97110 THERAPEUTIC EXERCISES: CPT

## 2020-01-12 PROCEDURE — 80048 BASIC METABOLIC PNL TOTAL CA: CPT

## 2020-01-12 PROCEDURE — 83735 ASSAY OF MAGNESIUM: CPT

## 2020-01-12 PROCEDURE — 94640 AIRWAY INHALATION TREATMENT: CPT

## 2020-01-12 PROCEDURE — 97116 GAIT TRAINING THERAPY: CPT

## 2020-01-12 RX ORDER — LISINOPRIL 2.5 MG/1
2.5 TABLET ORAL DAILY
Status: DISCONTINUED | OUTPATIENT
Start: 2020-01-12 | End: 2020-01-13 | Stop reason: HOSPADM

## 2020-01-12 RX ADMIN — INSULIN LISPRO 2 UNITS: 100 INJECTION, SOLUTION INTRAVENOUS; SUBCUTANEOUS at 21:14

## 2020-01-12 RX ADMIN — ALBUTEROL SULFATE 2.5 MG: 2.5 SOLUTION RESPIRATORY (INHALATION) at 08:39

## 2020-01-12 RX ADMIN — METOPROLOL TARTRATE 25 MG: 25 TABLET ORAL at 21:14

## 2020-01-12 RX ADMIN — ASPIRIN 81 MG: 81 TABLET, COATED ORAL at 08:37

## 2020-01-12 RX ADMIN — Medication 10 ML: at 17:54

## 2020-01-12 RX ADMIN — Medication 10 ML: at 21:22

## 2020-01-12 RX ADMIN — CEFTRIAXONE SODIUM 1 G: 1 INJECTION, POWDER, FOR SOLUTION INTRAMUSCULAR; INTRAVENOUS at 08:37

## 2020-01-12 RX ADMIN — FAMOTIDINE 20 MG: 20 TABLET, FILM COATED ORAL at 08:37

## 2020-01-12 RX ADMIN — ATORVASTATIN CALCIUM 20 MG: 10 TABLET, FILM COATED ORAL at 21:14

## 2020-01-12 RX ADMIN — INSULIN LISPRO 1 UNITS: 100 INJECTION, SOLUTION INTRAVENOUS; SUBCUTANEOUS at 08:42

## 2020-01-12 RX ADMIN — INSULIN LISPRO 3 UNITS: 100 INJECTION, SOLUTION INTRAVENOUS; SUBCUTANEOUS at 17:49

## 2020-01-12 RX ADMIN — METFORMIN HYDROCHLORIDE 500 MG: 500 TABLET ORAL at 17:48

## 2020-01-12 RX ADMIN — ALLOPURINOL 300 MG: 300 TABLET ORAL at 08:37

## 2020-01-12 RX ADMIN — INSULIN LISPRO 3 UNITS: 100 INJECTION, SOLUTION INTRAVENOUS; SUBCUTANEOUS at 13:01

## 2020-01-12 RX ADMIN — Medication 15 ML: at 09:56

## 2020-01-12 RX ADMIN — CLOPIDOGREL BISULFATE 75 MG: 75 TABLET ORAL at 08:37

## 2020-01-12 RX ADMIN — ALBUTEROL SULFATE 2.5 MG: 2.5 SOLUTION RESPIRATORY (INHALATION) at 16:01

## 2020-01-12 RX ADMIN — ALBUTEROL SULFATE 2.5 MG: 2.5 SOLUTION RESPIRATORY (INHALATION) at 20:25

## 2020-01-12 RX ADMIN — MUPIROCIN: 20 OINTMENT TOPICAL at 09:56

## 2020-01-12 RX ADMIN — LISINOPRIL 2.5 MG: 2.5 TABLET ORAL at 16:07

## 2020-01-12 RX ADMIN — Medication 10 ML: at 09:57

## 2020-01-12 RX ADMIN — Medication 10 ML: at 18:47

## 2020-01-12 RX ADMIN — Medication 15 ML: at 21:14

## 2020-01-12 RX ADMIN — METOPROLOL TARTRATE 12.5 MG: 25 TABLET ORAL at 08:37

## 2020-01-12 RX ADMIN — FUROSEMIDE 20 MG: 10 INJECTION, SOLUTION INTRAMUSCULAR; INTRAVENOUS at 17:54

## 2020-01-12 RX ADMIN — MUPIROCIN: 20 OINTMENT TOPICAL at 21:14

## 2020-01-12 RX ADMIN — ALBUTEROL SULFATE 2.5 MG: 2.5 SOLUTION RESPIRATORY (INHALATION) at 12:25

## 2020-01-12 RX ADMIN — DOCUSATE SODIUM 100 MG: 100 CAPSULE, LIQUID FILLED ORAL at 21:14

## 2020-01-12 RX ADMIN — FUROSEMIDE 20 MG: 10 INJECTION, SOLUTION INTRAMUSCULAR; INTRAVENOUS at 09:29

## 2020-01-12 ASSESSMENT — PAIN SCALES - GENERAL
PAINLEVEL_OUTOF10: 2
PAINLEVEL_OUTOF10: 0
PAINLEVEL_OUTOF10: 2

## 2020-01-12 ASSESSMENT — PAIN DESCRIPTION - LOCATION: LOCATION: STERNUM

## 2020-01-12 ASSESSMENT — PAIN DESCRIPTION - PAIN TYPE: TYPE: SURGICAL PAIN

## 2020-01-12 NOTE — CARE COORDINATION
CM met with patient and her  regarding ARU recommendations. Patient does not want to go to the ARU and does not want home care. She states \" I can get along on my own and my  is always home if I need assistance\".

## 2020-01-12 NOTE — PLAN OF CARE
[address] REHAB PHASE I  CABG x3    Thalia Lund   [birth year]  [birth year]    Previous cardiac rehab notes: ambulate 175*ft\"      Activity limitations:  shortness of breath  fatigue  needing several rest breaks d/t fatigue    Patient and family's stated goal of care prior to discharge:  Patient goal(s): reduce shortness of breath  increase activity tolerance  complete cardiac rehabilitation program  ambulate independently without any assist device      History:  Past Medical History:   Diagnosis Date    Anemia     Backache, unspecified 3/11/08    CAD, multiple vessel 1/6/2020    Chronic systolic CHF (congestive heart failure) ([address] 12/11/2019    Depressive disorder, not elsewhere classified 11/8/07    Essential hypertension, benign 11/8/07    Gout 8/11/2011    Hyperlipidemia associated with type 2 diabetes mellitus ([address] 12/14/2015    Major depressive disorder, recurrent episode, moderate ([address] 12/14/2015    Osteoarthritis, hand 2/27/2012    Osteoarthrosis, unspecified whether generalized or localized, lower leg 11/8/07    Osteopenia 8/5/2015    Other and unspecified hyperlipidemia 11/8/07    Pain in joint, lower leg 1/29/07    Pneumonia     Rotator cuff tendinitis 10/6/2017    Tear of medial cartilage or meniscus of knee, current 1/29/07    Type 2 diabetes mellitus with microalbuminuria, without long-term current use of insulin ([address] 5/5/2017    Type 2 diabetes mellitus without complication ([address] 29/7/8549    Type II or unspecified type diabetes mellitus without mention of complication, not stated as uncontrolled 11/8/07    foot exam 7/16/08 normal         ACTIVITY TOLERANCE    Patient's baseline reported from RN prior to activity:  RPE: 6/20   RPD: 1/10   O2: 96% on RA   HR:  95   BP:  131/88       Patient's tolerance during activity:  Stage 3  Ambulate:   Patient assistance level: stand by   Assist Devices: four-wheel walker       RPE: 14/20   RPD: 3/10   Rest breaks 2

## 2020-01-12 NOTE — PLAN OF CARE
Problem: Falls - Risk of:  Goal: Will remain free from falls  Description  Will remain free from falls  Outcome: Ongoing  Goal: Absence of physical injury  Description  Absence of physical injury  Outcome: Ongoing     Problem:  Activity Intolerance:  Goal: Able to perform prescribed physical activity  Description  Able to perform prescribed physical activity  Outcome: Ongoing  Goal: Ability to tolerate increased activity will improve  Description  Ability to tolerate increased activity will improve  Outcome: Ongoing     Problem: ACTIVITY INTOLERANCE/IMPAIRED MOBILITY  Goal: Mobility/activity is maintained at optimum level for patient  Outcome: Ongoing

## 2020-01-12 NOTE — PLAN OF CARE
[address] REHAB PHASE I  CABG x3    Debbi Marcos   [birth year]    Previous cardiac rehab notes: ambulate 200+175 feet    Activity limitations:  shortness of breath  fatigue  needing several rest breaks d/t fatigue    Patient and family's stated goal of care prior to discharge:  Patient goal(s): reduce shortness of breath  increase activity tolerance  complete cardiac rehabilitation program  ambulate independently without any assist device      History:  Past Medical History:   Diagnosis Date    Anemia     Backache, unspecified 3/11/08    CAD, multiple vessel 1/6/2020    Chronic systolic CHF (congestive heart failure) ([address] 12/11/2019    Depressive disorder, not elsewhere classified 11/8/07    Essential hypertension, benign 11/8/07    Gout 8/11/2011    Hyperlipidemia associated with type 2 diabetes mellitus ([address] 12/14/2015    Major depressive disorder, recurrent episode, moderate ([address] 12/14/2015    Osteoarthritis, hand 2/27/2012    Osteoarthrosis, unspecified whether generalized or localized, lower leg 11/8/07    Osteopenia 8/5/2015    Other and unspecified hyperlipidemia 11/8/07    Pain in joint, lower leg 1/29/07    Pneumonia     Rotator cuff tendinitis 10/6/2017    Tear of medial cartilage or meniscus of knee, current 1/29/07    Type 2 diabetes mellitus with microalbuminuria, without long-term current use of insulin ([address] 5/5/2017    Type 2 diabetes mellitus without complication ([address] 38/7/6248    Type II or unspecified type diabetes mellitus without mention of complication, not stated as uncontrolled 11/8/07    foot exam 7/16/08 normal         ACTIVITY TOLERANCE    Patient's baseline reported from RN prior to activity:  RPE: 6/20   RPD: 1/10   O2: 97% on RA   HR:  104   BP:  124/78       Patient's tolerance during activity:  Stage 3  Ambulate:   Patient assistance level: 1x assist  Assist Devices: four-wheel walker       RPE: 14/20   RPD: 3/10   Rest breaks 3 standing rest breaks   Distance 200+175   Steps 5 steps   /60      O2 93% on RA      Time: 25+20 Minutes    Discussed with RN to see patient. Per RN-okay to work with pt. Assisted pt from sitting to standing position from chair. Pt ambulated in hallway with 1x assist and 4ww assistive device. Pt needed 3 standing rest breaks d/t increased fatigue. Assisted pt back to bed and placed phone and call light within reach. Pt has no needs at this time. Touched base with RN after pt's session, discussed pt toleration. Will continue to follow up during the duration of the CR order.       JOSSELINE Ellis 01/12/20 2:00 PM    Exercise Physiologist    Phone: [phone]

## 2020-01-12 NOTE — PLAN OF CARE
Patient tolerating activity. Just up in halls and bathroom. Steady on feet. No SOB with activity. Back in bed now for AM nap.

## 2020-01-12 NOTE — PROGRESS NOTES
Physical Therapy  Facility/Department: [facility] C2 CARD TELEMETRY  Daily Treatment Note  NAME: Magdi Crespo  :   MRN: [id number]    Date of Service: 2020    Discharge Recommendations:  24 hour supervision or assist, Outpatient PT(Cardiac rehab when appropriate)   PT Equipment Recommendations  Equipment Needed: No  Other: Pt has 4WW at home, recommend use of 4WW for all gait activities, pt agreeable    Assessment   Body structures, Functions, Activity limitations: Decreased functional mobility ; Decreased strength;Decreased balance;Decreased endurance;Decreased posture;Decreased safe awareness  Assessment: Pt seen in PM for PT tx, pleasant and agreeable and much more alert today as well as no confusion noted during this session. Pt demonstrates significant progress with mobility and good progres towards stated goals. Pt completes t/f with SBA, ambulates up to 475' with 4WW with SBA and without AD with CGA to Iza[place]. Pt also completes stair activities with CGA to SBA. VSS throughout session although HR running high today. Pt requires intermittent cues to maintain strenal precautions. D/t pts progress with mobility and decreased assistance required recommend pt d/c home with 24 hr assist and OP PT/Cardiac rehab when appropriate. Gait training provided to increase safety and I within the home and community and decrease risk of falls. Therapeutic exercises completed to increase strength and increase safety and I with functional mobility. Treatment Diagnosis: Impaired balance, decreased (I) with mobility  Specific instructions for Next Treatment: Progress ther ex and mobility as tolerated  Prognosis: Good  Decision Making: Medium Complexity  PT Education: Goals;PT Role;Plan of Care;Gait Training;Functional Mobility Training;Disease Specific Education;Equipment;Transfer Training;Precautions; Energy Conservation;Weight-bearing Education;General Safety  Patient Education: Pt verbalizes understanding   Barriers to Goals   Patient goals : \"To go home if I can. \"    Plan    Plan  Times per week: 5-7x/week in acute care  Times per day: Daily  Specific instructions for Next Treatment: Progress ther ex and mobility as tolerated  Current Treatment Recommendations: Strengthening, Gait Training, Balance Training, Functional Mobility Training, Endurance Training, Transfer Training, Safety Education & Training, Patient/Caregiver Education & Training, Equipment Evaluation, Education, & procurement, Positioning  Safety Devices  Type of devices:  All fall risk precautions in place, Call light within reach, Nurse notified, Gait belt, Patient at risk for falls, Left in bed  Restraints  Initially in place: No     Therapy Time   Individual Concurrent Group Co-treatment   Time In 1503         Time Out 1542         Minutes 39         Timed Code Treatment Minutes: Hilton 106, PT, DPT

## 2020-01-12 NOTE — PLAN OF CARE
[address] REHAB PHASE I  CABG x3    Karuna Cedeño   [birth year]  [birth year]    Previous cardiac rehab notes: ambulate 375 feet and several rest breaks    Activity limitations:  shortness of breath  fatigue  needing several rest breaks d/t fatigue    Patient and family's stated goal of care prior to discharge:  Patient goal(s): reduce shortness of breath  increase activity tolerance  complete cardiac rehabilitation program  ambulate independently without any assist device      History:  Past Medical History:   Diagnosis Date    Anemia     Backache, unspecified 3/11/08    CAD, multiple vessel 1/6/2020    Chronic systolic CHF (congestive heart failure) ([address] 12/11/2019    Depressive disorder, not elsewhere classified 11/8/07    Essential hypertension, benign 11/8/07    Gout 8/11/2011    Hyperlipidemia associated with type 2 diabetes mellitus ([address] 12/14/2015    Major depressive disorder, recurrent episode, moderate ([address] 12/14/2015    Osteoarthritis, hand 2/27/2012    Osteoarthrosis, unspecified whether generalized or localized, lower leg 11/8/07    Osteopenia 8/5/2015    Other and unspecified hyperlipidemia 11/8/07    Pain in joint, lower leg 1/29/07    Pneumonia     Rotator cuff tendinitis 10/6/2017    Tear of medial cartilage or meniscus of knee, current 1/29/07    Type 2 diabetes mellitus with microalbuminuria, without long-term current use of insulin ([address] 5/5/2017    Type 2 diabetes mellitus without complication ([address] 03/3/6746    Type II or unspecified type diabetes mellitus without mention of complication, not stated as uncontrolled 11/8/07    foot exam 7/16/08 normal         ACTIVITY TOLERANCE    Patient's baseline reported from RN prior to activity:  RPE: 6/20   RPD: 0/10   O2: 97% on RA   HR:  99   BP:  122/66       Patient's tolerance during activity:  Stage 3  Ambulate:   Patient assistance level: stand by   Assist Devices: four-wheel walker       RPE: 13/20

## 2020-01-13 VITALS
RESPIRATION RATE: 16 BRPM | WEIGHT: 134.9 LBS | DIASTOLIC BLOOD PRESSURE: 66 MMHG | SYSTOLIC BLOOD PRESSURE: 123 MMHG | BODY MASS INDEX: 24.82 KG/M2 | HEIGHT: 62 IN | HEART RATE: 102 BPM | TEMPERATURE: 98.8 F | OXYGEN SATURATION: 97 %

## 2020-01-13 PROBLEM — I25.5 ISCHEMIC CARDIOMYOPATHY: Status: RESOLVED | Noted: 2020-01-06 | Resolved: 2020-01-13

## 2020-01-13 LAB
ANION GAP SERPL CALCULATED.3IONS-SCNC: 12 MMOL/L (ref 3–16)
BUN BLDV-MCNC: 51 MG/DL (ref 7–20)
CALCIUM SERPL-MCNC: 9.1 MG/DL (ref 8.3–10.6)
CHLORIDE BLD-SCNC: 97 MMOL/L (ref 99–110)
CO2: 27 MMOL/L (ref 21–32)
CREAT SERPL-MCNC: 1.2 MG/DL (ref 0.6–1.2)
GFR AFRICAN AMERICAN: 53
GFR NON-AFRICAN AMERICAN: 44
GLUCOSE BLD-MCNC: 181 MG/DL (ref 70–99)
GLUCOSE BLD-MCNC: 192 MG/DL (ref 70–99)
HCT VFR BLD CALC: 33.6 % (ref 36–48)
HEMOGLOBIN: 11.4 G/DL (ref 12–16)
MAGNESIUM: 1.7 MG/DL (ref 1.8–2.4)
MCH RBC QN AUTO: 31.2 PG (ref 26–34)
MCHC RBC AUTO-ENTMCNC: 34 G/DL (ref 31–36)
MCV RBC AUTO: 91.8 FL (ref 80–100)
PDW BLD-RTO: 14.2 % (ref 12.4–15.4)
PERFORMED ON: ABNORMAL
PLATELET # BLD: 155 K/UL (ref 135–450)
PMV BLD AUTO: 8.2 FL (ref 5–10.5)
POTASSIUM REFLEX MAGNESIUM: 4 MMOL/L (ref 3.5–5.1)
POTASSIUM SERPL-SCNC: 4 MMOL/L (ref 3.5–5.1)
RBC # BLD: 3.66 M/UL (ref 4–5.2)
SODIUM BLD-SCNC: 136 MMOL/L (ref 136–145)
WBC # BLD: 7 K/UL (ref 4–11)

## 2020-01-13 PROCEDURE — 83735 ASSAY OF MAGNESIUM: CPT

## 2020-01-13 PROCEDURE — 6370000000 HC RX 637 (ALT 250 FOR IP): Performed by: NURSE PRACTITIONER

## 2020-01-13 PROCEDURE — 97530 THERAPEUTIC ACTIVITIES: CPT

## 2020-01-13 PROCEDURE — 97535 SELF CARE MNGMENT TRAINING: CPT

## 2020-01-13 PROCEDURE — 97110 THERAPEUTIC EXERCISES: CPT

## 2020-01-13 PROCEDURE — 6370000000 HC RX 637 (ALT 250 FOR IP): Performed by: INTERNAL MEDICINE

## 2020-01-13 PROCEDURE — 80048 BASIC METABOLIC PNL TOTAL CA: CPT

## 2020-01-13 PROCEDURE — 99024 POSTOP FOLLOW-UP VISIT: CPT | Performed by: THORACIC SURGERY (CARDIOTHORACIC VASCULAR SURGERY)

## 2020-01-13 PROCEDURE — 94669 MECHANICAL CHEST WALL OSCILL: CPT

## 2020-01-13 PROCEDURE — 6370000000 HC RX 637 (ALT 250 FOR IP): Performed by: THORACIC SURGERY (CARDIOTHORACIC VASCULAR SURGERY)

## 2020-01-13 PROCEDURE — 6360000002 HC RX W HCPCS: Performed by: THORACIC SURGERY (CARDIOTHORACIC VASCULAR SURGERY)

## 2020-01-13 PROCEDURE — 6360000002 HC RX W HCPCS: Performed by: INTERNAL MEDICINE

## 2020-01-13 PROCEDURE — 2580000003 HC RX 258: Performed by: NURSE PRACTITIONER

## 2020-01-13 PROCEDURE — 6360000002 HC RX W HCPCS: Performed by: NURSE PRACTITIONER

## 2020-01-13 PROCEDURE — 94640 AIRWAY INHALATION TREATMENT: CPT

## 2020-01-13 PROCEDURE — 85027 COMPLETE CBC AUTOMATED: CPT

## 2020-01-13 RX ORDER — CLOPIDOGREL BISULFATE 75 MG/1
75 TABLET ORAL DAILY
Qty: 30 TABLET | Refills: 0 | Status: SHIPPED | OUTPATIENT
Start: 2020-01-14 | End: 2020-01-13

## 2020-01-13 RX ORDER — LISINOPRIL 2.5 MG/1
2.5 TABLET ORAL DAILY
Qty: 30 TABLET | Refills: 0 | Status: SHIPPED | OUTPATIENT
Start: 2020-01-14 | End: 2020-01-27 | Stop reason: SDUPTHER

## 2020-01-13 RX ORDER — FUROSEMIDE 20 MG/1
20 TABLET ORAL DAILY
Status: DISCONTINUED | OUTPATIENT
Start: 2020-01-14 | End: 2020-01-13 | Stop reason: HOSPADM

## 2020-01-13 RX ORDER — CEFUROXIME AXETIL 250 MG/1
250 TABLET ORAL EVERY 12 HOURS SCHEDULED
Qty: 2 TABLET | Refills: 0 | Status: SHIPPED | OUTPATIENT
Start: 2020-01-14 | End: 2020-01-15

## 2020-01-13 RX ORDER — FUROSEMIDE 20 MG/1
20 TABLET ORAL DAILY
Qty: 5 TABLET | Refills: 0 | Status: SHIPPED | OUTPATIENT
Start: 2020-01-14 | End: 2020-01-27 | Stop reason: ALTCHOICE

## 2020-01-13 RX ORDER — CLOPIDOGREL BISULFATE 75 MG/1
75 TABLET ORAL DAILY
Qty: 30 TABLET | Refills: 0 | Status: SHIPPED | OUTPATIENT
Start: 2020-01-14 | End: 2020-02-10 | Stop reason: SDUPTHER

## 2020-01-13 RX ORDER — ADHESIVE TAPE 3"X 2.3 YD
200 TAPE, NON-MEDICATED TOPICAL DAILY
Qty: 5 TABLET | Refills: 0 | Status: SHIPPED | OUTPATIENT
Start: 2020-01-13 | End: 2020-01-27 | Stop reason: ALTCHOICE

## 2020-01-13 RX ORDER — POTASSIUM CHLORIDE 750 MG/1
10 TABLET, EXTENDED RELEASE ORAL DAILY
Qty: 5 TABLET | Refills: 0 | Status: SHIPPED | OUTPATIENT
Start: 2020-01-13 | End: 2020-01-27 | Stop reason: ALTCHOICE

## 2020-01-13 RX ORDER — OXYCODONE HYDROCHLORIDE 5 MG/1
5 TABLET ORAL EVERY 6 HOURS PRN
Qty: 28 TABLET | Refills: 0 | Status: SHIPPED | OUTPATIENT
Start: 2020-01-13 | End: 2020-01-20

## 2020-01-13 RX ORDER — CEFUROXIME AXETIL 250 MG/1
250 TABLET ORAL EVERY 12 HOURS SCHEDULED
Status: DISCONTINUED | OUTPATIENT
Start: 2020-01-13 | End: 2020-01-13

## 2020-01-13 RX ORDER — FAMOTIDINE 20 MG/1
20 TABLET, FILM COATED ORAL DAILY
Qty: 30 TABLET | Refills: 0 | Status: SHIPPED | OUTPATIENT
Start: 2020-01-14 | End: 2020-02-17

## 2020-01-13 RX ORDER — ATORVASTATIN CALCIUM 20 MG/1
20 TABLET, FILM COATED ORAL NIGHTLY
Qty: 30 TABLET | Refills: 0 | Status: ON HOLD | OUTPATIENT
Start: 2020-01-13 | End: 2020-07-06

## 2020-01-13 RX ORDER — CEFUROXIME AXETIL 250 MG/1
250 TABLET ORAL ONCE
Status: COMPLETED | OUTPATIENT
Start: 2020-01-13 | End: 2020-01-13

## 2020-01-13 RX ORDER — CEFUROXIME AXETIL 250 MG/1
250 TABLET ORAL EVERY 12 HOURS SCHEDULED
Status: DISCONTINUED | OUTPATIENT
Start: 2020-01-14 | End: 2020-01-13 | Stop reason: HOSPADM

## 2020-01-13 RX ORDER — PSEUDOEPHEDRINE HCL 30 MG
100 TABLET ORAL 2 TIMES DAILY
Qty: 14 CAPSULE | Refills: 0 | Status: SHIPPED | OUTPATIENT
Start: 2020-01-13 | End: 2020-01-16 | Stop reason: SINTOL

## 2020-01-13 RX ORDER — METFORMIN HYDROCHLORIDE 500 MG/1
TABLET, EXTENDED RELEASE ORAL
Qty: 360 TABLET | Refills: 0 | Status: SHIPPED | OUTPATIENT
Start: 2020-01-13 | End: 2020-05-19

## 2020-01-13 RX ADMIN — CEFUROXIME AXETIL 250 MG: 250 TABLET ORAL at 11:44

## 2020-01-13 RX ADMIN — ALBUTEROL SULFATE 2.5 MG: 2.5 SOLUTION RESPIRATORY (INHALATION) at 11:49

## 2020-01-13 RX ADMIN — ACETAMINOPHEN 650 MG: 325 TABLET ORAL at 03:12

## 2020-01-13 RX ADMIN — ALLOPURINOL 300 MG: 300 TABLET ORAL at 07:58

## 2020-01-13 RX ADMIN — METOPROLOL TARTRATE 25 MG: 25 TABLET ORAL at 07:58

## 2020-01-13 RX ADMIN — METFORMIN HYDROCHLORIDE 500 MG: 500 TABLET ORAL at 07:58

## 2020-01-13 RX ADMIN — FUROSEMIDE 20 MG: 10 INJECTION, SOLUTION INTRAMUSCULAR; INTRAVENOUS at 08:27

## 2020-01-13 RX ADMIN — LISINOPRIL 2.5 MG: 2.5 TABLET ORAL at 07:58

## 2020-01-13 RX ADMIN — ASPIRIN 81 MG: 81 TABLET, COATED ORAL at 07:58

## 2020-01-13 RX ADMIN — ALBUTEROL SULFATE 2.5 MG: 2.5 SOLUTION RESPIRATORY (INHALATION) at 07:21

## 2020-01-13 RX ADMIN — CLOPIDOGREL BISULFATE 75 MG: 75 TABLET ORAL at 07:58

## 2020-01-13 RX ADMIN — INSULIN LISPRO 1 UNITS: 100 INJECTION, SOLUTION INTRAVENOUS; SUBCUTANEOUS at 07:57

## 2020-01-13 RX ADMIN — FAMOTIDINE 20 MG: 20 TABLET, FILM COATED ORAL at 07:58

## 2020-01-13 RX ADMIN — CEFTRIAXONE SODIUM 1 G: 1 INJECTION, POWDER, FOR SOLUTION INTRAMUSCULAR; INTRAVENOUS at 08:27

## 2020-01-13 ASSESSMENT — PAIN SCALES - GENERAL
PAINLEVEL_OUTOF10: 0
PAINLEVEL_OUTOF10: 3
PAINLEVEL_OUTOF10: 0
PAINLEVEL_OUTOF10: 0

## 2020-01-13 ASSESSMENT — PAIN DESCRIPTION - ORIENTATION: ORIENTATION: MID

## 2020-01-13 ASSESSMENT — PAIN DESCRIPTION - LOCATION: LOCATION: INCISION

## 2020-01-13 ASSESSMENT — PAIN DESCRIPTION - DESCRIPTORS: DESCRIPTORS: ACHING

## 2020-01-13 ASSESSMENT — PAIN DESCRIPTION - FREQUENCY: FREQUENCY: CONTINUOUS

## 2020-01-13 ASSESSMENT — PAIN DESCRIPTION - PAIN TYPE: TYPE: SURGICAL PAIN

## 2020-01-13 ASSESSMENT — PAIN DESCRIPTION - ONSET: ONSET: GRADUAL

## 2020-01-13 ASSESSMENT — PAIN DESCRIPTION - PROGRESSION: CLINICAL_PROGRESSION: GRADUALLY WORSENING

## 2020-01-13 NOTE — PLAN OF CARE
Problem: Falls - Risk of:  Goal: Will remain free from falls  Description  Will remain free from falls  1/13/2020 0020 by Ian Panchal RN  Outcome: Ongoing  1/12/2020 1115 by Angie Rosales  Outcome: Ongoing  Goal: Absence of physical injury  Description  Absence of physical injury  1/13/2020 0020 by Ian Panchal RN  Outcome: Ongoing  1/12/2020 1115 by Angie Rosales  Outcome: Ongoing     Problem: Cardiac:  Goal: Ability to maintain vital signs within normal range will improve  Description  Ability to maintain vital signs within normal range will improve  Outcome: Ongoing  Goal: Cardiovascular alteration will improve  Description  Cardiovascular alteration will improve  Outcome: Ongoing     Problem: Health Behavior:  Goal: Will modify at least one risk factor affecting health status  Description  Will modify at least one risk factor affecting health status  Outcome: Ongoing  Goal: Identification of resources available to assist in meeting health care needs will improve  Description  Identification of resources available to assist in meeting health care needs will improve  Outcome: Ongoing     Problem: Physical Regulation:  Goal: Complications related to the disease process, condition or treatment will be avoided or minimized  Description  Complications related to the disease process, condition or treatment will be avoided or minimized  Outcome: Ongoing     Problem: Discharge Planning:  Goal: Discharged to appropriate level of care  Description  Discharged to appropriate level of care  Outcome: Ongoing     Problem:  Activity Intolerance:  Goal: Able to perform prescribed physical activity  Description  Able to perform prescribed physical activity  1/13/2020 0020 by Ian Panchal RN  Outcome: Ongoing  1/12/2020 1124 by Ulysses Aguirre RN  Outcome: Met This Shift  1/12/2020 1115 by Angie Rosales  Outcome: Ongoing  Goal: Ability to tolerate increased activity will improve  Description  Ability to tolerate increased

## 2020-01-13 NOTE — CARE COORDINATION
Writer met with patient and  again at bedside to discuss [place] [phone] needs. Patient agreeable and without preference of [place] [phone] agency. CASE MANAGEMENT DISCHARGE SUMMARY      Discharge to: home    New Durable Medical Equipment ordered/agency: none    Transportation: private   Family/car:  to transport home. Notified: patient aware of discharge plans and in agreement. Family: at bedside and aware of discharge plans. Facility/Agency: [facility] faxed to  Cinthya Stuart    RN: Allie Ruiz RN aware of discharge plans.         Hieu Callejas RN

## 2020-01-13 NOTE — PROGRESS NOTES
[facility]       Progress Note           Subjective/   76y.o. year old female who we are seeing in consultation for A/CKD. HPI:  Renal function stable with diuresis, urine output of 3.7 liters over last 24 hours. Intake improved. ROS:  No sob or weakness.     Objective/   GEN:  Chronically ill, /66   Pulse 102   Temp 98.8 °F (37.1 °C) (Oral)   Resp 16   Ht 5' 1.5\" (1.562 m)   Wt 134 lb 14.4 oz (61.2 kg)   SpO2 97%   BMI 25.08 kg/m²   HEENT: non-icteric, no JVD  CV: S1, S2 without m/r/g; no LE edema  RESP: CTA B without w/r/r; breathing wnl  ABD: +bs, soft, nt, no hsm  SKIN: warm, no rashes    Data/  Recent Labs     01/11/20  0730 01/12/20  0345 01/13/20  0407   WBC 8.7 7.3 7.0   HGB 11.2* 11.2* 11.4*   HCT 33.1* 33.2* 33.6*   MCV 93.5 92.8 91.8   PLT 84* 126* 155     Recent Labs     01/11/20  0730 01/12/20  0345 01/13/20  0407    138 136   K 3.8  3.8 4.1  4.1 4.0  4.0    100 97*   CO2 23 24 27   GLUCOSE 169* 185* 192*   MG 1.80 1.80 1.70*   BUN 29* 41* 51*   CREATININE 1.2 1.3* 1.2   LABGLOM 44* 40* 40*   GFRAA 53* 48* 53*       Assessment/     - LANCE               Etiology: ATN post CABG +/- Cardiorenal syndrome     -CKD III likely from DM              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 Kia Ibarra arranged

## 2020-01-13 NOTE — PROGRESS NOTES
Dr. Jazmine Dawn recommended that pt make an appointment with PCP and have another BMP drawn and reviewed. Also noted that CTS would like one drawn prior to appointment on January 20th. Instructed pt that she should have this done by Friday this week.  Pooja Carrasquillo

## 2020-01-13 NOTE — DISCHARGE SUMMARY
well while inpatient. Will discharge home on Ceftin (initial dose given and monitored inpatient - she tolerated well) to complete 5 day course of abx. She discharged home on 1/13/20 with [facility]. Discharged Condition: stable    Disposition:  Home with home health     Medications:  Aspirin:start  ADP Inhibitor (plavix/brillinta/effient):start  ACE/ARB:changed to low dose lisinopril  Betablocker:increase  Statin:start    Discharge Medications:   Janet Gallardo   Home Medication Instructions [id number]    Printed on:01/13/20 1250   Medication Information                      albuterol sulfate HFA (VENTOLIN HFA) 108 (90 Base) MCG/ACT inhaler  Inhale 2 puffs into the lungs every 6 hours as needed for Wheezing             allopurinol (ZYLOPRIM) 300 MG tablet  TAKE ONE TABLET BY MOUTH DAILY             aspirin 81 MG EC tablet  Take 81 mg by mouth daily.              atorvastatin (LIPITOR) 20 MG tablet  Take 1 tablet by mouth nightly             calcium carbonate (OSCAL) 500 MG TABS tablet  Take 500 mg by mouth daily             cefUROXime (CEFTIN) 250 MG tablet  Take 1 tablet by mouth every 12 hours for 1 day             clopidogrel (PLAVIX) 75 MG tablet  Take 1 tablet by mouth daily             dapagliflozin (FARXIGA) 5 MG tablet  Take 5 mg by mouth every morning             docusate sodium (COLACE, DULCOLAX) 100 MG CAPS  Take 100 mg by mouth 2 times daily for 7 days             famotidine (PEPCID) 20 MG tablet  Take 1 tablet by mouth daily             furosemide (LASIX) 20 MG tablet  Take 1 tablet by mouth daily for 5 days             lisinopril (PRINIVIL;ZESTRIL) 2.5 MG tablet  Take 1 tablet by mouth daily Hold this medication for systolic BP <639             Magnesium Oxide (MAG-OXIDE) 200 MG TABS  Take 200 mg by mouth daily for 5 days             metFORMIN (GLUCOPHAGE-XR) 500 MG extended release tablet  TAKE ONE TABLET BY MOUTH TWICE A DAY             metoprolol tartrate (LOPRESSOR) 25 MG tablet  Take 1 tablet by mouth 2 times daily Hold this medication for pulse <13, systolic BP <095             oxyCODONE (ROXICODONE) 5 MG immediate release tablet  Take 1 tablet by mouth every 6 hours as needed (acute post op pain) for up to 7 days. potassium chloride (KLOR-CON M) 10 MEQ extended release tablet  Take 1 tablet by mouth daily for 5 days             vitamin B-12 (CYANOCOBALAMIN) 500 MCG tablet  Take 500 mcg by mouth daily                 Patient Instructions: Activity: DO NOT LIFT, PUSH, OR PULL ANYTHING OVER 5 POUNDS FOR 6 WEEK from the day of surgery  Diet:  cardiac diet and diabetic diet  Wound Care:  [facility] YOUR INCISIONS DAILY WITH A CLEAN WASHCLOTH AND ANTIBACTERIAL SOAP. Do not wash your incisions after you have cleansed other parts of your body    Follow up with Cardiothoracic Surgeon, Dr. Ed Larson in one week. Follow up with Cardiology as directed.      Torey Larios, APRN-CNP

## 2020-01-13 NOTE — PROGRESS NOTES
Met by Chalo Gilliam NP on way out the door to discharge home. CTS would like for pt to have ceftin pill and wait 1 hour prior to leaving hospital. Medicated with 250mg ceftin po. Pt waiting for d/c in room. Declined lunch.  Aretha Gonzalez

## 2020-01-13 NOTE — DISCHARGE INSTR - COC
Continuity of Care Form    Patient Name: Karuna Cedeño   :    MRN:  [id number]    Admit date:  2020  Discharge date:  2019    Code Status Order: Full Code   Advance Directives:   Advance Care Flowsheet Documentation     Date/Time Healthcare Directive Type of Healthcare Directive Copy in [address] Agent's Name Healthcare Agent's Phone Number    [phone]  Yes, patient has an advance directive for healthcare treatment  Living will  --  --  --  --          Admitting Physician:  Boby Pompa MD  PCP: Julee Moon MD    Discharging Nurse: [address] Unit/Room#: 7803/1838-70  Discharging Unit Phone Number: [phone]    Emergency Contact:   Extended Emergency Contact Information  Primary Emergency Contact: Stanford Fuentes  [address] [facility] [address] Phone: [phone]  Relation: Spouse  Secondary Emergency Contact: Nikolai Sequeira  Home Phone: [phone]  Mobile Phone: [phone]  Relation: Child    Past Surgical History:  Past Surgical History:   Procedure Laterality Date    APPENDECTOMY      CHOLECYSTECTOMY      CORONARY ARTERY BYPASS GRAFT N/A 2020    CORONARY ARTERY BYPASS GRAFTING X3, INTERNAL MAMMARY ARTERY, SAPHENOUS VEIN GRAFTING, ON PUMP MITRAL VALVE RING REPAIR USING 26MM PELAEZ PHYSIO II RING, WITH LEFT ATRIAL APPENDAGE CLIP, [place], 5 LEVEL BILATERAL INTERCOSTAL NERVE BLOCK performed by Boby Pompa MD at [address]      total ab hyst    PARATHYROID GLAND SURGERY      explore parathyroid glands    TOTAL KNEE ARTHROPLASTY  03/29/10    left knee       Immunization History:   Immunization History   Administered Date(s) Administered    Influenza 2011, 2012    Influenza Virus Vaccine 09/15/2014, 10/02/2015    Influenza Whole 2010    Influenza, High Dose (Fluzone 65 yrs and older) 10/10/2016, 10/06/2017, 10/24/2018    Pneumococcal Conjugate 13-valent ([id number]) 08/10/2018    Pneumococcal Conjugate 7-valent (Prevnar7) 10/05/2005    Pneumococcal Polysaccharide ([id number]) 12/01/2015       Active Problems:  Patient Active Problem List   Diagnosis Code    Backache M54.9    Essential hypertension, benign I10    Osteoarthrosis involving lower leg M17.10    Gout M10.9    Osteoarthritis, hand M19.049    Osteopenia M85.80    Chest pain R07.9    Hyperlipidemia associated with type 2 diabetes mellitus ([facility] E11.69, E78.5    Major depressive disorder, recurrent episode, moderate ([facility]) F33.1    Primary insomnia F51.01    Type 2 diabetes mellitus with microalbuminuria, without long-term current use of insulin ([facility]) E11.29, R80.9    Rotator cuff tendinitis M75.80    Other restrictive cardiomyopathy ([facility]) O84.7    Acute systolic congestive heart failure ([facility]) I50.21    Nonrheumatic mitral valve regurgitation I34.0    Chronic systolic CHF (congestive heart failure) ([facility]) I50.22    CAD, multiple vessel I25.10    Ischemic cardiomyopathy I25.5       Isolation/Infection:   Isolation          No Isolation        Patient Infection Status     None to display          Nurse Assessment:  Last Vital Signs: /66   Pulse 102   Temp 98.8 °F (37.1 °C) (Oral)   Resp 16   Ht 5' 1.5\" (1.562 m)   Wt 134 lb 14.4 oz (61.2 kg)   SpO2 97%   BMI 25.08 kg/m²     Last documented pain score (0-10 scale): Pain Level: 0  Last Weight:   Wt Readings from Last 1 Encounters:   01/13/20 134 lb 14.4 oz (61.2 kg)     Mental Status:  oriented, alert, coherent, logical, thought processes intact and able to concentrate and follow conversation    IV Access:  - None    Nursing Mobility/ADLs:  Walking   Independent  Transfer  Independent  Bathing  Independent  Dressing  Independent  Toileting  Independent  Feeding  Independent  Med Admin  Independent  Med Delivery   none    Wound Care Documentation and Therapy: days.     Update Admission H&P: No change in H&P    PHYSICIAN SIGNATURE:  Electronically signed by Alexx Guevara MD on 1/13/20 at 8:53 AM

## 2020-01-13 NOTE — PROGRESS NOTES
current, Type 2 diabetes mellitus with microalbuminuria, without long-term current use of insulin ([facility] [address] Type 2 diabetes mellitus without complication ([facility] [address] and Type II or unspecified type diabetes mellitus without mention of complication, not stated as uncontrolled. has a past surgical history that includes Parathyroid gland surgery; Total knee arthroplasty (03/29/10); Hysterectomy; Appendectomy; Cholecystectomy; and Coronary artery bypass graft (N/A, 1/8/2020). Restrictions  Restrictions/Precautions  Restrictions/Precautions: General Precautions, Fall Risk, Cardiac  Position Activity Restriction  Sternal Precautions: No Pushing, No Pulling, 5# Lifting Restrictions  Other position/activity restrictions: High fall risk per nursing assessment, ambulate pt, telemetry with ICU monitoring, IJ  Subjective   General  Chart Reviewed: Yes  Patient assessed for rehabilitation services?: Yes  Response to previous treatment: Patient with no complaints from previous session  Family / Caregiver Present: No  Referring Practitioner: Pat Gomez MD 1/09/2020  Diagnosis: CAD, s/p CABG x 3 on 1/08/20  Subjective  Subjective: Pt walking out of bathroom upon entry, stool stuck on leg. General Comment  Comments: RN cleared  Vital Signs  Patient Currently in Pain: Denies   Orientation  Orientation  Overall Orientation Status: Impaired  Orientation Level: Oriented to person;Oriented to situation;Disoriented to time  Objective    ADL  Grooming: Supervision(at sink for hand hygiene)  LE Dressing: Stand by assistance(donning briefs/ vic/doff socks)  Toileting: Maximum assistance(for thoroughness )  Additional Comments: Pt with stool down legs as patient ambulating out of bathroom. Pt with no awareness that she was dirty.      Balance  Sitting Balance: Supervision  Standing Balance: Stand by assistance  Functional Mobility  Functional - Mobility Device: No device  Activity: To/from bathroom  Assist Level: Stand by assistance  Functional Mobility Comments: holding onto IV pole    Toilet Transfers  Toilet - Technique: Ambulating  Equipment Used: Standard toilet  Toilet Transfer: Stand by assistance    Bed mobility  Supine to Sit: Unable to assess(in bathroom upon entry)  Sit to Supine: Unable to assess(up to chair at end of session)  Transfers  Sit to stand: Stand by assistance  Stand to sit: Stand by assistance      Cognition  Overall Cognitive Status: Exceptions  Arousal/Alertness: Delayed responses to stimuli  Following Commands:  Follows one step commands with repetition  Memory: Decreased short term memory;Decreased recall of precautions  Safety Judgement: Decreased awareness of need for assistance;Decreased awareness of need for safety  Problem Solving: Assistance required to identify errors made;Assistance required to generate solutions;Assistance required to implement solutions;Assistance required to correct errors made  Insights: Decreased awareness of deficits  Initiation: Requires cues for some  Sequencing: Requires cues for some        Type of ROM/Therapeutic Exercise  Type of ROM/Therapeutic Exercise: AROM  Comment: B UE seated   Exercises  Shoulder Elevation: x 12 reps  Shoulder Flexion: x 12 reps to 90*  Shoulder ABduction: x 12 reps  Shoulder ADduction: x 12 reps  Elbow Flexion: x 12 reps  Elbow Extension: x 12 reps  Supination: x 12 reps  Pronation: x 12 reps  Wrist Flexion: x 12 reps  Wrist Extension: x 12 reps  Finger Flexion: x 12 reps  Finger Extension: x 12 reps         Plan   Plan  Times per week: 4-5x's a week while in ICU  Plan Comment: no longer needs a cotx    AM-PAC Score        AM-PAC Inpatient Daily Activity Raw Score: 18 (01/13/20 1039)  AM-PAC Inpatient ADL T-Scale Score : 38.66 (01/13/20 1039)  ADL Inpatient CMS 0-100% Score: 46.65 (01/13/20 1039)  ADL Inpatient CMS G-Code Modifier : CK (01/13/20 1039)    Goals  Short term goals  Time Frame for Short term goals: 1 week unless otherwise specified 1/16/20  Short term goal 1: Pt. will complete LE dressing with Izaiah-STG met 1/13/2020  Short term goal 2: Pt will complete toilet transfers with CGA by 1/14- GOAL MET 1/11/20  Short term goal 3: Pt will complete standing level ADLs with CGA for balnace-STG met 1/13/2020  Short term goal 4: Pt will complete item retrival with SBA for balance  Short term goal 5: Pt will complete UE dressing with SBA  Patient Goals   Patient goals : \"to get back to work\"       Therapy Time   Individual Concurrent Group Co-treatment   Time In 0835         Time Out 0914         Minutes 39         Timed Code Treatment Minutes: [address]       Janene Michael OTR/L

## 2020-01-13 NOTE — PROGRESS NOTES
Assumed care of pt. Received report via off going RN. VSS. Pt currently on no gtt's. Lines verified. Resting comfortably in the bed and expresses no needs at this time. Shift assessment to follow, see flowsheets for details. Will continue to monitor.

## 2020-01-13 NOTE — CARE COORDINATION
[facility]  Spoke with patient regarding SN homecare services. Demographics verified, and patient is agreeable to home care services. Will continue to follow patient for needs. Home care to see patient by: 24-48 hr post hosp DC. DC order noted, all docs needed have been faxed to [facility] for home care services.     Kusum Garces RN, BSN CTN  [facility] [phone]

## 2020-01-13 NOTE — PROGRESS NOTES
MITRAL VALVE RING REPAIR USING 26MM PELAEZ PHYSIO II RING, WITH LEFT ATRIAL APPENDAGE CLIP, PFO CLOSURETEE, 5 LEVEL BILATERAL INTERCOSTAL NERVE BLOCK performed by Syd Reid MD at [address] [facility] [address]      total ab hyst    PARATHYROID GLAND SURGERY      explore parathyroid glands    TOTAL KNEE ARTHROPLASTY  03/29/10    left knee        Allergies as of 01/06/2020 - Review Complete 01/06/2020   Allergen Reaction Noted    Penicillins Anaphylaxis 01/30/2011        Patient Active Problem List   Diagnosis    Backache    Essential hypertension, benign    Osteoarthrosis involving lower leg    Gout    Osteoarthritis, hand    Osteopenia    Chest pain    Hyperlipidemia associated with type 2 diabetes mellitus ([facility] [address]    Major depressive disorder, recurrent episode, moderate (HCC)    Primary insomnia    Type 2 diabetes mellitus with microalbuminuria, without long-term current use of insulin (HCC)    Rotator cuff tendinitis    Other restrictive cardiomyopathy (HCC)    Acute systolic congestive heart failure (HCC)    Nonrheumatic mitral valve regurgitation    Chronic systolic CHF (congestive heart failure) (HCC)    CAD, multiple vessel    Ischemic cardiomyopathy        Vital Signs: /66   Pulse 102   Temp 98.8 °F (37.1 °C) (Oral)   Resp 16   Ht 5' 1.5\" (1.562 m)   Wt 134 lb 14.4 oz (61.2 kg)   SpO2 97%   BMI 25.08 kg/m²  O2 Flow Rate (L/min): 1 L/min     Admission Weight: Weight: 133 lb 8 oz (60.6 kg)    Weight on 1/08 (59.4 kg) Pre-op   Weight on 1/09 (58.9 kg)  1/10  65.3 kg  1/11  63.6 kg  1/12  Not recorded  1/13  61.2 kg     Intake/Output:     Intake/Output Summary (Last 24 hours) at 1/13/2020 0833  Last data filed at 1/13/2020 0600  Gross per 24 hour   Intake 2018 ml   Output 3100 ml   Net -1082 ml      Extubation Time: 1/08 @ 16:24  Transition Time: 1/09 @ 13:00    LABORATORY DATA:     CBC:   Recent Labs     01/11/20  0730 01/12/20  0345 01/13/20  0407   WBC 8.7 7.3 7.0   HGB 11.2* 11.2* 11.4*   HCT 33.1* 33.2* 33.6*   MCV 93.5 92.8 91.8   PLT 84* 126* 155     BMP:   Recent Labs     01/11/20  0730 01/12/20  0345 01/13/20  0407    138 136   K 3.8  3.8 4.1  4.1 4.0  4.0    100 97*   CO2 23 24 27   BUN 29* 41* 51*   CREATININE 1.2 1.3* 1.2     MG:    Recent Labs     01/11/20  0730 01/12/20 0345 01/13/20  0407   MG 1.80 1.80 1.70*      CXR: 1/09/20 pulmonary edema with increasing left basilar atelectasis  _______________________________________________________________    Subjective:   Dietary Intake: improving   no Nausea   Pain Control: controlled, prn meds  Complaints: none  Bowels: have moved     Objective:   General appearance: awake, alert, up in chair  Lungs: diminished in bases  Heart: S1S2 RRR; SR on monitor  Chest: symmetrical expansion with inspiration and expirations; No rocking of sternum noted   Abdomen: soft, nontender  Bowel sounds: normoactive  Kidneys: UOP 1900/1200/600= 3700 ml in 24 hrs; Cr 1.2  Wound/Incisions: Midsternal incision with dressing CDI; RLE incisions with dressings CDI; Pacing wires removed 1/09  Extremities: BLE pulses palpable; minimal tibial swelling noted   Neurological: intact, non focal exam, speech strong  Chest tubes/Drains: all out  _______________________________________________________________    SCIP Measures:     · Gagnon catheter for:  ? IV Diuresis  ? Accurate I/O  ? D/C today  ·   · Antibiotics:  ? Have been stopped  ? Prophylaxis (POD #1 only)  ? Continued for:   ________________________________________________________________________    Assessment:   Post-op: 5 days. Condition: In stable condition. Plan:   1. Cardiovascular: s/p CABG- BB, ASA, Plavix, Statin  Remains SR.     2. Pulmonary: needs aggressive pulmonary expansion maneuvers- IS, acapella, oobtc and PT/OT. 3. Neurology: analgesia as needed. 4. Nephrology: fluid management- diurese as tolerated. Changing lasix to PO  LANCE- resolved. Nephrology following.  Cr Moderate (prealbumin <15, albumin <3)  ? SIRS due to non-infectious process (temp > 100.5, wbc > 12, HR > 90, RR>20)  ? Coagulopathy (consumptive, HIT, fibrinolysis) ?  FFP or platelets given post-op  ________________________________________________________________________    Slim Cabrera CNP  1/13/2020  8:33 AM

## 2020-01-13 NOTE — PROGRESS NOTES
Pt walked 400 ft around the unit along with completing 5 steps. Pt tolerated well. Pt cleaned up for the day with a gown and socks. Chest tube dressing changed. Pt currently in the chair resting. Will continue to monitor.

## 2020-01-14 ENCOUNTER — TELEPHONE (OUTPATIENT)
Dept: INTERNAL MEDICINE CLINIC | Age: 75
End: 2020-01-14

## 2020-01-14 NOTE — ADT AUTH CERT
use of insulin (HCC)   · Rotator cuff tendinitis   · Other restrictive cardiomyopathy (HCC)   · Acute systolic congestive heart failure (HCC)   · Nonrheumatic mitral valve regurgitation   · Chronic systolic CHF (congestive heart failure) (HCC)   · CAD, multiple vessel   · Ischemic cardiomyopathy       ICM, cad/ashd, s/p cabg, plans/mgtment as per ct surgery, will defer to surgical svc.              Coronary Artery Bypass Graft (CABG) - Care Day 3 (1/8/2020) by Anjum Jordan RN         Review Status Review Entered   Completed 1/9/2020 12:52       Criteria Review      Care Day: 3 Care Date: 1/8/2020 Level of Care:    Guideline Day 3    Clinical Status    ( ) * Dangerous arrhythmia absent    ( ) * Pain absent or managed    Activity    ( ) * Minimal ambulatory activity    Routes    ( ) * Oral medications    ( ) * Advance diet    Interventions    ( ) * Chest tube absent    ( ) * Central lines absent    Medications    ( ) * Epidural analgesia absent [M]    * Milestone   Additional Notes   1/8/2020 Care day 3      Patient in ICU      VS: T 98 P 100 r 16 bp 93/47 spo2 99 Ventilator         Labs:  Glucose 181High mg/dL    BUN 23High mg/dL   CREATININE 1.3High mg/dL   GFR Non- 40Abnormal    GFR  48Abnormal    Calcium 7.8Low       Hemoglobin 8.7Low g/dL   Hematocrit 25.8Low       POC Sodium 144 mmol/L    POC Potassium 4.5 mmol/L   POC Glucose 165High mg/dl   Calcium, Ion 1.07Low mmol/L   pH, Arterial 7.305Low    pCO2, Arterial 44.7 mm Hg   pO2, Arterial 182. 9High mm Hg   HCO3, Arterial 22.2 mmol/L   Base Excess, Arterial -4Low    O2 Sat, Arterial 100 %   TCO2, Arterial 24 mmol/L   Lactate 0.52 mmol/L   POC Hematocrit 23. 0Low %   Hemoglobin 7.7Low       Orders:   Mechanical Vent-extubated on 1/8  POCT glucose q hour   Daily labs   Chest XR   Telemetry   Consult cardiothoracic surgery   Orifmev  1000 mg IV q8h   Albuterol neb q4h   Ancef g2 IV q8h   Vanco 1000 mg IV    IV NS 50 ml/h changed

## 2020-01-14 NOTE — TELEPHONE ENCOUNTER
[facility]in [facility] Transitions Initial Follow Up Call    Outreach made within 2 business days of discharge: Yes    Patient: Salvador Haro Patient :    MRN: [id number]  Reason for Admission: There are no discharge diagnoses documented for the most recent discharge. Discharge Date: 20       Spoke with: Patient     Discharge department/facility: [facility] Interactive Patient Contact:  Was patient able to fill all prescriptions: Yes  Was patient instructed to bring all medications to the follow-up visit: Yes  Is patient taking all medications as directed in the discharge summary?  Yes  Does patient understand their discharge instructions: Yes  Does patient have questions or concerns that need addressed prior to 7-14 day follow up office visit: no    Scheduled appointment with PCP within 7-14 days    Follow Up  Future Appointments   Date Time Provider Dimas Curry   2020 11:30 AM CHARLETTE Benitez Int None   2020  2:15 PM Olivia Rodriguez MD [facility]   2/10/2020 11:00 AM LADAN Duron - CNP [facility] CLER CAR [facility]   3/17/2020 10:20 AM Makeda Franklin MD [facility] Int None       Mare Georges

## 2020-01-16 ENCOUNTER — OFFICE VISIT (OUTPATIENT)
Dept: INTERNAL MEDICINE CLINIC | Age: 75
End: 2020-01-16

## 2020-01-16 VITALS
WEIGHT: 131 LBS | HEIGHT: 60 IN | DIASTOLIC BLOOD PRESSURE: 65 MMHG | BODY MASS INDEX: 25.72 KG/M2 | TEMPERATURE: 97.6 F | HEART RATE: 80 BPM | SYSTOLIC BLOOD PRESSURE: 120 MMHG

## 2020-01-16 PROBLEM — I50.21 ACUTE SYSTOLIC CONGESTIVE HEART FAILURE (HCC): Status: RESOLVED | Noted: 2019-12-06 | Resolved: 2020-01-16

## 2020-01-16 PROCEDURE — 99496 TRANSJ CARE MGMT HIGH F2F 7D: CPT | Performed by: PHYSICIAN ASSISTANT

## 2020-01-16 NOTE — PATIENT INSTRUCTIONS
Please check these medications: You told me she is taking metoprolol succinate (Metoprolol XL) 25 mg daily. If the bottle says metoprolol tartrate 25 mg twice daily- please call us so we can update her med list    You told me she is not taking pepcid.   If you find that she is taking pepcid at home, please call us so we can update the med list [id number]

## 2020-01-16 NOTE — PROGRESS NOTES
Adult   Pulse: 80   Temp: 97.6 °F (36.4 °C)   Weight: 131 lb (59.4 kg)   Height: 5' (1.524 m)     Body mass index is 25.58 kg/m². Wt Readings from Last 3 Encounters:   01/16/20 131 lb (59.4 kg)   01/13/20 134 lb 14.4 oz (61.2 kg)   12/11/19 138 lb (62.6 kg)     BP Readings from Last 3 Encounters:   01/16/20 120/65   01/13/20 123/66   01/08/20 100/67        Patient was admitted to [facility] from 1/6/2020 to 1/13/2020 for CAD with CABG. Inpatient course: Discharge summary reviewed- see chart. Ms. Rafi Samano underwent left heart catheterization on January 6, 2020 to evaluate systolic heart failure. She was found to have multivessel coronary artery disease and was admitted to inpatient for cardiothoracic surgery evaluation and CABG. She subsequently underwent CABG x3. Her postop course was complicated by CKD and UTI. She was seen by nephrology. She was treated for E. coli UTI with Rocephin and discharged on Ceftin. She is seeing nephrology in follow-up tomorrow. She has an appointment with her cardiothoracic surgeon on January 20 for follow-up. Since returning to home she has felt fatigued and tired but otherwise she is doing well. She denies chest pain, shortness of breath, nausea vomiting. She had some diarrhea when given stool softener but that resolved after discontinuing the stool softener. Swelling in her legs is at baseline. She is compliant with her medications. She has [place] [phone] coming to the house. Current status: Stable     Review of Systems:  A comprehensive review of systems was negative except for: Constitutional: positive for fatigue    Physical Exam:    Gen: No distress. Alert. Eyes: PERRL. No sclera icterus. No conjunctival injection. ENT: No discharge. Pharynx clear. Neck: No JVD. Trachea midline. Resp: No accessory muscle use. No crackles. No wheezes. No rhonchi. CV: Regular rate. Regular rhythm. No murmur. No rub. L>R BLE pitting edema.    Sternotomy

## 2020-01-17 ENCOUNTER — TELEPHONE (OUTPATIENT)
Dept: INTERNAL MEDICINE CLINIC | Age: 75
End: 2020-01-17

## 2020-01-17 NOTE — TELEPHONE ENCOUNTER
----- Message from Ayah Dennis MD sent at 1/17/2020  8:48 AM EST -----  Contact: [phone]  Ok then have them give the tartrate and not the succinate  ----- Message -----  From: Raven Arias  Sent: 1/17/2020   8:43 AM EST  To: Ayah Dennis MD    Spouse wants to make sure that is correct. States the heart doctor prescribed her the tartrate and she is suppose to take it twice a day, and to hold it if her pulse rate is less than 70 and her systolic bp is 055 or less. Please advise.   ----- Message -----  From: Ayah Dennis MD  Sent: 1/16/2020   5:34 PM EST  To: Valenciajared Oliverossona    Take metoprolol succinate.  ----- Message -----  From: Dav Neal  Sent: 1/16/2020   2:14 PM EST  To: Ayah Dennis MD    Spouse called because pt was given metoprolol tartrate 25 mg and metoprolol succinate 25 mg. She wants to know if she should be taking both? If not which one should she be taking? Please advise.     Spouse Brigida Daughters [phone]

## 2020-01-17 NOTE — TELEPHONE ENCOUNTER
----- Message from Chrystine Aschoff, MD sent at 1/16/2020  5:34 PM EST -----  Contact: [phone]  Take metoprolol succinate.  ----- Message -----  From: Macho Neal  Sent: 1/16/2020   2:14 PM EST  To: Chrystine Aschoff, MD    Spouse called because pt was given metoprolol tartrate 25 mg and metoprolol succinate 25 mg. She wants to know if she should be taking both? If not which one should she be taking? Please advise.     Spouse Shun Edward [phone]

## 2020-01-20 ENCOUNTER — TELEPHONE (OUTPATIENT)
Dept: CARDIOTHORACIC SURGERY | Age: 75
End: 2020-01-20

## 2020-01-20 RX ORDER — ENALAPRIL MALEATE 10 MG/1
10 TABLET ORAL DAILY
Qty: 30 TABLET | Refills: 1 | Status: SHIPPED | OUTPATIENT
Start: 2020-01-20 | End: 2020-01-27

## 2020-01-23 NOTE — OP NOTE
[facility]                                OPERATIVE REPORT    PATIENT NAME: Trinity Mendoza                       :        1945  MED REC NO:   [id number]                          ROOM:       2915  ACCOUNT NO:   [de-identified]                           ADMIT DATE: 2020  PROVIDER:     Apolonia Avery MD    DATE OF PROCEDURE:  20    PREOPERATIVE DIAGNOSES:  1. Complex coronary artery disease. 2.  Severe mitral regurge. 3.  Diabetes mellitus. 4.  Hyperlipidemia. 5.  Depression. 6.  Gout. 7.  Essential hypertension. 8.  Chronic systolic congestive heart failure. OPERATION PERFORMED:  1. Coronary artery bypass grafting x3, LIMA to LAD, reverse saphenous  vein to OM, reverse saphenous vein to diagonal.  2.  Mitral valve repair using Physio ring size 26.  3.  Left atrial appendage clip. 4.  Closure of the PFO. 5.  Intercostal nerve block. 6.  Transesophageal echo. 7.  Placement of temporary atrial and ventricular pacing wire. SURGEON:  Apolonia Avery MD    FIRST ASSIST:  Dr. Martha Petersen requested due to the complexity of the  procedure and assessment and help with mitral valve repair, request of  Dr. Beverley Hodges to be present for his expertise. ANESTHESIA:  General.    Transesophageal echo was performed preoperatively showed severe mitral  regurge with hypokinetic dilated ventricle. Postop showed zero leak  with hyperdynamic ventricle, improvement of ventricular function to 30%. PROCEDURE DESCRIPTION:  The patient was taken to the operating room  after informed written consent was obtained, lying supine under general  anesthesia. ET tube was placed with no difficulty. [place] catheter,  arterial line was placed with no complication. Next, two-team approach  was started. Endo vein harvest was obtained through the left leg. Tunnel was developed.   All side branches were transected and vein was  delivered through the encounter incision. Good quality vein was seen. A midline incision was made. Bovie was used to cut through the  subcutaneous tissue and fat. Sternotomy was performed. Mammary was  harvested and good quality mammary with good flow was seen. The patient  was placed on cardiopulmonary bypass through an ascending aortic cannula  and superior vena cava cannula and inferior vena cava cannula. Cardioplegia was administered in antegrade and retrograde fashion for  total time of 178 pump time and 132 cross-clamp time. Next, reverse  saphenous vein was used to create anastomosis in an end-to-side fashion  to the posterior descending to the PDA. Good quality vein was seen. Cardioplegia was administered through the vein to the entire procedure. Proximal was performed after the mitral valve repair. Next, reverse  saphenous vein was used to reconstruct the anastomosis to OM. Good flow  with no leakage was seen and hooked to the cardioplegia line for  continuous administration of cardioplegia. Next, snare was pulled up  and right atrium was opened. Good size PFO was seen. We extended  cephalad and caudad. Traction was performed. We could clearly see  dilations of the mitral annulus. Measurement with assessment of  transesophageal echo preoperatively and intraoperatively, we decided to  place a 26 Physio ring II. Interrupted suture using nonpledgeted small  needle 2-0 Ethibond suture through the annulus surrounding _____ suture. Twelve sutures were placed, cinched to the frame. Ring was seated well  and cinched and was positioned by using Cor-Knot. Next, transatrial  septum was closed by using 4-0 Prolene in two layers. Atrium was closed  by using 4-0 Prolene in two layers.   Next LIMA was used to bypass the LAD   in end-to-side fashion using 7-0 Prolene good flow with no leak was seen   next, two proximal was performed to the ascending aorta by using 6-0 Prolene in

## 2020-01-24 ENCOUNTER — HOSPITAL ENCOUNTER (OUTPATIENT)
Age: 75
Setting detail: SPECIMEN
Discharge: HOME | End: 2020-01-24
Payer: MEDICARE

## 2020-01-24 LAB
ANION GAP SERPL CALCULATED.3IONS-SCNC: 13 MMOL/L (ref 3–16)
BUN BLDV-MCNC: 17 MG/DL (ref 7–20)
CALCIUM SERPL-MCNC: 9.4 MG/DL (ref 8.3–10.6)
CHLORIDE BLD-SCNC: 105 MMOL/L (ref 99–110)
CO2: 24 MMOL/L (ref 21–32)
CREAT SERPL-MCNC: 1.1 MG/DL (ref 0.6–1.2)
GFR AFRICAN AMERICAN: 59
GFR NON-AFRICAN AMERICAN: 49
GLUCOSE BLD-MCNC: 104 MG/DL (ref 70–99)
POTASSIUM SERPL-SCNC: 3.9 MMOL/L (ref 3.5–5.1)
SODIUM BLD-SCNC: 142 MMOL/L (ref 136–145)

## 2020-01-24 PROCEDURE — 36415 COLL VENOUS BLD VENIPUNCTURE: CPT

## 2020-01-24 PROCEDURE — 80048 BASIC METABOLIC PNL TOTAL CA: CPT

## 2020-01-27 ENCOUNTER — OFFICE VISIT (OUTPATIENT)
Dept: CARDIOTHORACIC SURGERY | Age: 75
End: 2020-01-27

## 2020-01-27 VITALS
OXYGEN SATURATION: 96 % | DIASTOLIC BLOOD PRESSURE: 86 MMHG | HEIGHT: 61 IN | SYSTOLIC BLOOD PRESSURE: 138 MMHG | WEIGHT: 127 LBS | BODY MASS INDEX: 23.98 KG/M2 | HEART RATE: 90 BPM

## 2020-01-27 PROCEDURE — 99024 POSTOP FOLLOW-UP VISIT: CPT | Performed by: THORACIC SURGERY (CARDIOTHORACIC VASCULAR SURGERY)

## 2020-01-27 RX ORDER — FUROSEMIDE 20 MG/1
20 TABLET ORAL DAILY
Qty: 60 TABLET | Refills: 3 | Status: SHIPPED | OUTPATIENT
Start: 2020-01-27 | End: 2020-05-13 | Stop reason: SDUPTHER

## 2020-01-27 RX ORDER — OXYCODONE HYDROCHLORIDE 5 MG/1
5 TABLET ORAL EVERY 6 HOURS PRN
COMMUNITY
End: 2020-02-17 | Stop reason: SDUPTHER

## 2020-01-27 RX ORDER — OXYCODONE HYDROCHLORIDE AND ACETAMINOPHEN 5; 325 MG/1; MG/1
1 TABLET ORAL EVERY 6 HOURS PRN
Qty: 28 TABLET | Refills: 0 | Status: SHIPPED | OUTPATIENT
Start: 2020-01-27 | End: 2020-02-03

## 2020-01-27 RX ORDER — LISINOPRIL 2.5 MG/1
5 TABLET ORAL DAILY
Qty: 30 TABLET | Refills: 0 | Status: SHIPPED | OUTPATIENT
Start: 2020-01-27 | End: 2020-01-29 | Stop reason: DRUGHIGH

## 2020-01-29 RX ORDER — LISINOPRIL 2.5 MG/1
2.5 TABLET ORAL 2 TIMES DAILY
COMMUNITY
End: 2021-12-03

## 2020-02-10 ENCOUNTER — OFFICE VISIT (OUTPATIENT)
Dept: CARDIOLOGY CLINIC | Age: 75
End: 2020-02-10
Payer: MEDICARE

## 2020-02-10 VITALS
DIASTOLIC BLOOD PRESSURE: 68 MMHG | HEART RATE: 78 BPM | BODY MASS INDEX: 24.94 KG/M2 | HEIGHT: 60 IN | WEIGHT: 127 LBS | OXYGEN SATURATION: 98 % | SYSTOLIC BLOOD PRESSURE: 126 MMHG

## 2020-02-10 PROCEDURE — 99214 OFFICE O/P EST MOD 30 MIN: CPT | Performed by: NURSE PRACTITIONER

## 2020-02-10 RX ORDER — CLOPIDOGREL BISULFATE 75 MG/1
75 TABLET ORAL DAILY
Qty: 30 TABLET | Refills: 0 | Status: ON HOLD | OUTPATIENT
Start: 2020-02-10 | End: 2022-06-03

## 2020-02-10 RX ORDER — METOPROLOL SUCCINATE 50 MG/1
50 TABLET, EXTENDED RELEASE ORAL DAILY
Qty: 30 TABLET | Refills: 5 | Status: SHIPPED | OUTPATIENT
Start: 2020-02-10 | End: 2020-07-09 | Stop reason: SDUPTHER

## 2020-02-10 RX ORDER — METOPROLOL SUCCINATE 25 MG/1
50 TABLET, EXTENDED RELEASE ORAL DAILY
Qty: 30 TABLET | Refills: 5 | Status: SHIPPED | OUTPATIENT
Start: 2020-02-10 | End: 2020-02-10

## 2020-02-10 ASSESSMENT — ENCOUNTER SYMPTOMS
COUGH: 0
SHORTNESS OF BREATH: 0
CHEST TIGHTNESS: 0

## 2020-02-10 NOTE — LETTER
INDICATION      Cardiomyopathy     PROCEDURES PERFORMED      Right heart catheterization  Left heart catheterization  Coronary angiogam  Coronary cath                 PROCEDURE DESCRIPTION   Risks/benefits/alternatives/outcomes were discussed with patient and/or family and informed consent was obtained. Using the [employer] scale, the patient's right radial artery was found to be acceptable for cannulation. Patient was prepped draped in the usual sterile fashion. Local anaesthetic was applied over puncture site. A right antecubital peripheral IV was placed for the procedure and this was exchanged using a micropuncture kit for a 5 [employer] [place] sheath. 5 [employer] [place] PA catheter was used for hemodynamics. Venous sheath was removed at the end of the case. Using a front wall technique, a 4/5 [place] sheath was inserted into right radial artery. Verapamil, nitroglycerin, nicardipine were administered through the sheath. Heparin was administered. Diagnostic [employer] ultra catheter were used for diagnostic angiograms. Limited angiograms were obtained due to elevated creatinine. At the conclusion of the procedure, a TR band was placed over the puncture site and hemostasis was obtained. There were no immediate complications. I supervised sedation with versed 1 mg/fentanyl 25 Mcg during the procedure. 50 cc contrast was utilized. <20cc EBL. FINDINGS      HEMODYNAMICS     CHAMBER PRESSURE SATURATION   RA  11  65 %   RV  43/13     PA  37/20  65 %   PW  24 with a V wave to 28     AORTA  117/63  97 %      ERNESTINA CARDIAC OUTPUT  3.6 L/min   SVR  1618   PVR  89               Left heart catheterization     LVEDP  27   GRADIENT ACROSS AORTIC VALVE  none            CORONARY ARTERIES     LM  Calcified, proximal less than 10% stenosis, mid to distal 40% stenosis. LAD  Heavily calcified in the proximal and mid segments with 99% stenosis. D1 is a very large vessel and is larger than the LAD.   There is

## 2020-02-10 NOTE — PROGRESS NOTES
[address]   Cardiology Note              Date:  February 10, 2020  Patientname: Esvin Sousa  Date of Birth: [birth year]   Chief Complaint   Patient presents with    Follow-Up from Hospital     S/P 1/08/2020 CABG Dr. Guerrero Swenson        Primary Care physician: Kylah Gamble MD    HISTORY OF PRESENT ILLNESS: Esvin Sousa is a 76 y.o. female who had elective cath on 1/6/2020 that showed multivessel CAD. She underwent a IVÁN on 1/7. CABG x3 with mitral valve ring repair, ZAHRAA clip, and PFO closure on 1/8/19. Today she presents for follow up as mentioned above. This is the first time seeing this patient. Overall she is feeling well. She stated that her energy level has improved. She plans on going back to work. She has not been weighing herself due not having a scale. Blood pressures at home have been running 128-130/64-82. HR has been running 80-90. Denies chest pain, shortness of breath, palpitations, or fatigue. Only complains of dizziness when she gets up too fast.  Some mid sternal chest pain from incision. Denies any edema to BLE. Taking all medications as prescribed, and needs refills needed at this time. Esvin Sousa describes symptoms including fatigue (improved) but denies chest pain, dyspnea, palpitations, orthopnea, PND, exertional chest pressure/discomfort, early saiety, edema, syncope.      Past Medical History:   has a past medical history of Anemia, Backache, unspecified, CAD, multiple vessel, Chronic systolic CHF (congestive heart failure) ([address] Depressive disorder, not elsewhere classified, Essential hypertension, benign, Gout, Hyperlipidemia associated with type 2 diabetes mellitus ([address] Major depressive disorder, recurrent episode, moderate ([address] Osteoarthritis, hand, Osteoarthrosis, unspecified whether generalized or localized, lower leg, Osteopenia, Other and unspecified hyperlipidemia, Pain in joint, lower leg, Pneumonia, Rotator cuff tendinitis, Tear of medial cartilage or meniscus of knee, current, Type 2 diabetes mellitus with microalbuminuria, without long-term current use of insulin ([facility] Type 2 diabetes mellitus without complication ([facility] [address] and Type II or unspecified type diabetes mellitus without mention of complication, not stated as uncontrolled. Past Surgical History:   has a past surgical history that includes Parathyroid gland surgery; Total knee arthroplasty (03/29/10); Hysterectomy; Appendectomy; Cholecystectomy; and Coronary artery bypass graft (N/A, 1/8/2020). Home Medications:    Prior to Admission medications    Medication Sig Start Date End Date Taking? Authorizing Provider   lisinopril (PRINIVIL;ZESTRIL) 5 MG tablet Take 2.5 mg by mouth 2 times daily    Yes Historical Provider, MD   oxyCODONE (ROXICODONE) 5 MG immediate release tablet Take 5 mg by mouth every 6 hours as needed for Pain.    Yes Historical Provider, MD   furosemide (LASIX) 20 MG tablet Take 1 tablet by mouth daily 1/27/20  Yes Namrata Jones MD   metoprolol tartrate (LOPRESSOR) 25 MG tablet Take 1 tablet by mouth 2 times daily Take 25 mg by mouth 2 times daily 1/20/20  Yes Bell Johansen MD   metFORMIN (GLUCOPHAGE-XR) 500 MG extended release tablet TAKE ONE TABLET BY MOUTH TWICE A DAY 1/13/20  Yes LADAN Palacio CNP   atorvastatin (LIPITOR) 20 MG tablet Take 1 tablet by mouth nightly 1/13/20  Yes LADAN Palacio CNP   famotidine (PEPCID) 20 MG tablet Take 1 tablet by mouth daily 1/14/20 2/13/20 Yes LADAN Palacio CNP   clopidogrel (PLAVIX) 75 MG tablet Take 1 tablet by mouth daily 1/14/20  Yes LADAN Palacio CNP   dapagliflozin (FARXIGA) 5 MG tablet Take 5 mg by mouth every morning   Yes Historical Provider, MD   allopurinol (ZYLOPRIM) 300 MG tablet TAKE ONE TABLET BY MOUTH DAILY 8/28/19  Yes Bell Johansen MD   vitamin B-12 (CYANOCOBALAMIN) 500 MCG tablet Take 500 mcg by mouth daily   Yes Historical Provider, MD   calcium carbonate 2020:  Summary   Ejection fraction is visually estimated to be 25-30 %. Moderate mitral regurgitation. There is no evidence of mass or thrombus in the left atrium or appendage. Mild tricuspid regurgitation. ECHO 10/29/2019: The left ventricular systolic function is severely reduced with an ejection   fraction of 25 %. There is akinesis of the inferior, apical septal and inferolateral walls. There is hypokinesis of the Inferoseptal & anteroseptal wall. Grade II diastolic dysfunction with elevated left ventricular filling   pressure. Normal left ventricular size and wall thickness. Mild mitral annular calcification. Mild thickening of the leaflets of mitral valve. Moderate mitral regurgitation. 2020:  OPERATION PERFORMED:  1. Coronary artery bypass grafting x3, LIMA to LAD, reverse saphenous  vein to OM, reverse saphenous vein to diagonal.  2.  Mitral valve repair using Physio ring size 26.  3.  Left atrial appendage clip. 4.  Closure of the PFO. 5.  Intercostal nerve block. 6.  Transesophageal echo. 7.  Placement of temporary atrial and ventricular pacing wire. Coronary angiogram 2020:  CARDIAC CATHETERIZATION REPORT      Procedure Date:  2020  Patient Name: Derick Varner  MRN: [id number]      : 1945        INDICATION      Cardiomyopathy     PROCEDURES PERFORMED      Right heart catheterization  Left heart catheterization  Coronary angiogam  Coronary cath                 PROCEDURE DESCRIPTION   Risks/benefits/alternatives/outcomes were discussed with patient and/or family and informed consent was obtained. Using the [facility] scale, the patient's right radial artery was found to be acceptable for cannulation. Patient was prepped draped in the usual sterile fashion. Local anaesthetic was applied over puncture site. A right antecubital peripheral IV was placed for the procedure and this was exchanged using a micropuncture kit for a 5 [employer] sheath.   5 for primary prevention of sudden cardiac death is recommended if EF remains 35% or less   3. HTN: controlled  4. HLD: almost at goal, LDL 72  5. Mitral valve repair with Physio ring size 26  6. DM      Plan:   1. Change metoprolol tartrate 25 mg twice a day to evidenced BB Toprol XL 50 mg daily continue to monitor blood pressure at home  2. Clarify Plavix with Dr. Chantell Cai- will send in refill for 30 days  3. Cardiac rehab order placed once released from Dr. Chantell Cai  4. Daily weights, low sodium diet, 2000 ml fluid restriction, if you gain 3 lbs in a day 5 lbs in a week, increased swelling, or increased shortness of breath please call the office. 5.  Follow up with Dr. Barrett Casillas in 3 months- echocardiogram in 3 months call  [phone])     LADAN Voss-SHADI  [facility]  [phone]      QUALITY MEASURES  1. Tobacco Cessation Counseling: NA  2. Retake of BP if >140/90:   NA  3. Documentation to PCP/referring for new patient:  Sent to PCP at close of office visit  4. CAD patient on anti-platelet: Yes  5. CAD patient on STATIN therapy:  Yes  6.  Patient with CHF and aFib on anticoagulation:  NA

## 2020-02-11 ENCOUNTER — HOSPITAL ENCOUNTER (OUTPATIENT)
Age: 75
Discharge: HOME | End: 2020-02-11
Payer: MEDICARE

## 2020-02-11 LAB
ALBUMIN SERPL-MCNC: 3.6 G/DL (ref 3.4–5)
ANION GAP SERPL CALCULATED.3IONS-SCNC: 12 MMOL/L (ref 3–16)
BUN BLDV-MCNC: 19 MG/DL (ref 7–20)
CALCIUM SERPL-MCNC: 9.1 MG/DL (ref 8.3–10.6)
CHLORIDE BLD-SCNC: 103 MMOL/L (ref 99–110)
CO2: 26 MMOL/L (ref 21–32)
CREAT SERPL-MCNC: 1.1 MG/DL (ref 0.6–1.2)
GFR AFRICAN AMERICAN: 59
GFR NON-AFRICAN AMERICAN: 49
GLUCOSE BLD-MCNC: 163 MG/DL (ref 70–99)
PHOSPHORUS: 3.3 MG/DL (ref 2.5–4.9)
POTASSIUM SERPL-SCNC: 4.2 MMOL/L (ref 3.5–5.1)
SODIUM BLD-SCNC: 141 MMOL/L (ref 136–145)

## 2020-02-11 PROCEDURE — 36415 COLL VENOUS BLD VENIPUNCTURE: CPT

## 2020-02-11 PROCEDURE — 80069 RENAL FUNCTION PANEL: CPT

## 2020-02-17 ENCOUNTER — OFFICE VISIT (OUTPATIENT)
Dept: CARDIOTHORACIC SURGERY | Age: 75
End: 2020-02-17

## 2020-02-17 VITALS
BODY MASS INDEX: 24.92 KG/M2 | DIASTOLIC BLOOD PRESSURE: 80 MMHG | WEIGHT: 132 LBS | SYSTOLIC BLOOD PRESSURE: 146 MMHG | OXYGEN SATURATION: 98 % | HEIGHT: 61 IN | HEART RATE: 96 BPM | TEMPERATURE: 97.4 F

## 2020-02-17 PROCEDURE — 99024 POSTOP FOLLOW-UP VISIT: CPT | Performed by: THORACIC SURGERY (CARDIOTHORACIC VASCULAR SURGERY)

## 2020-02-17 RX ORDER — OXYCODONE HYDROCHLORIDE 5 MG/1
5 TABLET ORAL EVERY 6 HOURS PRN
Qty: 28 TABLET | Refills: 0 | Status: SHIPPED | OUTPATIENT
Start: 2020-02-17 | End: 2020-02-24

## 2020-02-17 NOTE — PROGRESS NOTES
[facility]  Progress Note    2/17/2020 12:37 PM  Surgeon:  Dr. Tip Salas       S/P :  2nd post op CABGx 3 repair, ZAHRAA clip and PFO closure on 1/8//2020    Chief complaint:NONE    Subjective:  Ms. Destiny Blackman   Complain: NONE  Event: NONE  Pain: Well-controlled   Bowel movements: positive    Vital Signs: BP (!) 146/80 (Site: Left Upper Arm, Position: Sitting, Cuff Size: Small Adult)   Pulse 96   Temp 97.4 °F (36.3 °C)   Ht 5' 0.5\" (1.537 m)   Wt 132 lb (59.9 kg)   SpO2 98%   BMI 25.36 kg/m²        I/O:  No intake or output data in the 24 hours ending 02/17/20 1237    Exam:   Cardiovascular: S1 + S2 + 0  Pulmonary: bilateral clear  CT: Output              airleak    Lower extremity: none  Incision:clean and dry     Chest X-Ray:  normal     Labs:   CBC: No results for input(s): WBC, HGB, HCT, MCV, PLT in the last 72 hours. BMP: No results for input(s): NA, K, CL, CO2, PHOS, BUN, CREATININE in the last 72 hours. Invalid input(s): CA  PT/INR: No results for input(s): PROTIME, INR in the last 72 hours. APTT: No results for input(s): APTT in the last 72 hours. Patient Active Problem List   Diagnosis    Backache    Essential hypertension, benign    Osteoarthrosis involving lower leg    Gout    Osteoarthritis, hand    Osteopenia    Chest pain    Hyperlipidemia associated with type 2 diabetes mellitus ([facility]    Major depressive disorder, recurrent episode, moderate (HCC)    Primary insomnia    Type 2 diabetes mellitus with microalbuminuria, without long-term current use of insulin (HCC)    Rotator cuff tendinitis    Other restrictive cardiomyopathy (HCC)    Nonrheumatic mitral valve regurgitation    Chronic systolic CHF (congestive heart failure) (HCC)    CAD, multiple vessel    Ischemic cardiomyopathy         Assessment/Plan:     1. The patient was instructed to follow up as needed. 2. Prescription for pain was given today.   3. Patient is cleared to go to cardiac rehab.      Elizabeth Epperson MA am scribing for and in the presence of Namrata Jones MD on this date of 02/17/20 at 12:37 PM    Cydney Godwin MD personally performed the services described in this documentation as scribed by the Certified Medical Assistant Jignesh Montez in my presence and it is both accurate and complete.     Jennifer Butt MD [place] [id number] [facility]

## 2020-05-12 NOTE — PROGRESS NOTES
(LIPITOR) 20 MG tablet Take 1 tablet by mouth nightly 1/13/20  Yes [employer], APRN - CNP   allopurinol (ZYLOPRIM) 300 MG tablet TAKE ONE TABLET BY MOUTH DAILY 8/28/19  Yes Wanda Calvillo MD   vitamin B-12 (CYANOCOBALAMIN) 500 MCG tablet Take 500 mcg by mouth daily   Yes Historical Provider, MD   calcium carbonate (OSCAL) 500 MG TABS tablet Take 500 mg by mouth daily   Yes Historical Provider, MD   aspirin 81 MG EC tablet Take 81 mg by mouth daily. Yes Historical Provider, MD   clopidogrel (PLAVIX) 75 MG tablet Take 1 tablet by mouth daily  Patient not taking: Reported on 5/13/2020 2/10/20   LADAN Matrinez - CNP   dapagliflozin (FARXIGA) 5 MG tablet Take 5 mg by mouth every morning    Historical Provider, MD          Allergies:  Penicillins     Review of Systems:   A 14 point review of symptoms completed. Pertinent positives identified in the HPI, all other review of symptoms negative as below.       Objective   PHYSICAL EXAM:    Vitals:    05/13/20 0855   BP: 124/82   Pulse: 99   SpO2: 98%    Weight: 144 lb 8 oz (65.5 kg)         General Appearance:  Alert, cooperative, no distress, appears stated age   Head:  Normocephalic, without obvious abnormality, atraumatic   Eyes:  PERRL, conjunctiva/corneas clear   Nose: Nares normal, no drainage or sinus tenderness   Throat: Lips, mucosa, and tongue normal   Neck: Supple, symmetrical, trachea midline, no adenopathy, thyroid: not enlarged, symmetric, no tenderness/mass/nodules, elev jvp   Lungs:   Clear to auscultation bilaterally, respirations unlabored   Chest Wall:  No deformity or tenderness   Heart:  Regular rate and rhythm, S1, S2 normal, 2-3/6 sm   Abdomen:   Soft, non-tender, bowel sounds active all four quadrants,  no masses, no organomegaly   Extremities: Extremities normal, atraumatic, no cyanosis, + 1 to +2 BLE edema   Pulses: 2+ and symmetric   Skin: Skin color, texture, turgor normal, no rashes or lesions   Pysch: Normal mood and affect ischemia 2. Ejection fraction of 67% 3. No focal wall motion abnormality     Stress test: 12/12/19  Summary Severely reduced LV function. Inferior, apical, lateral scar  No ischemia      Cath: 01/06/20  FINDINGS      HEMODYNAMICS     CHAMBER PRESSURE SATURATION   RA  11  65 %   RV  43/13     PA  37/20  65 %   PW  24 with a V wave to 28     AORTA  117/63  97 %      ERNESTINA CARDIAC OUTPUT  3.6 L/min   SVR  1618   PVR  89               Left heart catheterization     LVEDP  27   GRADIENT ACROSS AORTIC VALVE  none            CORONARY ARTERIES     LM  Calcified, proximal less than 10% stenosis, mid to distal 40% stenosis. LAD  Heavily calcified in the proximal and mid segments with 99% stenosis. D1 is a very large vessel and is larger than the LAD. There is proximal-mid 30 to 40% stenosis within D1. There is a branch of D1 which is a small to medium size vessel and has an ostial 99% stenosis. LCX  Proximal-mid 75% stenosis. Mid distal 80 to 90% stenosis. OM1 and OM 2 are small vessels, OM 3 is the largest OM and is free of disease. RI Proximal-mid 50% stenosis. Distal less than 10% stenosis. RCA Dominant, calcified, subtotally occluded with 99% proximal-mid stenosis followed by mid distal 60% stenosis. There are well-developed left to right collaterals. CONCLUSIONS:      Multivessel CAD/ASHD  Refer for consideration of CABG  Elevated filling pressures, will need diuresis pending recovery from the catheterization and will likely need further assessment of mitral regurgitation with IVÁN pending further evaluation. Most likely mitral regurgitation is functional            Studies:     CTA chest 2015  Mediastinum: Coronary arterial calcifications are identified. A tear tiny pericardial effusion is present. The thoracic and visualized abdominal aorta are within normal limits in caliber with out evidence of dissection. Calcified mediastinal lymph nodes are noted.

## 2020-05-13 ENCOUNTER — HOSPITAL ENCOUNTER (OUTPATIENT)
Dept: GENERAL RADIOLOGY | Age: 75
Discharge: HOME | End: 2020-05-13
Payer: MEDICARE

## 2020-05-13 ENCOUNTER — OFFICE VISIT (OUTPATIENT)
Dept: CARDIOLOGY CLINIC | Age: 75
End: 2020-05-13
Payer: MEDICARE

## 2020-05-13 ENCOUNTER — HOSPITAL ENCOUNTER (OUTPATIENT)
Age: 75
Discharge: HOME | End: 2020-05-13
Payer: MEDICARE

## 2020-05-13 VITALS
WEIGHT: 144.5 LBS | BODY MASS INDEX: 28.37 KG/M2 | OXYGEN SATURATION: 98 % | HEIGHT: 60 IN | SYSTOLIC BLOOD PRESSURE: 124 MMHG | HEART RATE: 99 BPM | DIASTOLIC BLOOD PRESSURE: 82 MMHG

## 2020-05-13 PROCEDURE — 71046 X-RAY EXAM CHEST 2 VIEWS: CPT

## 2020-05-13 PROCEDURE — 99214 OFFICE O/P EST MOD 30 MIN: CPT | Performed by: INTERNAL MEDICINE

## 2020-05-13 RX ORDER — FUROSEMIDE 40 MG/1
40 TABLET ORAL DAILY
Qty: 30 TABLET | Refills: 5 | Status: SHIPPED | OUTPATIENT
Start: 2020-05-13 | End: 2021-01-11 | Stop reason: SDUPTHER

## 2020-05-13 NOTE — LETTER
[address]   CARDIAC EVALUATION NOTE  [phone]      PCP:  Priscilla Zhu MD    Reason for Consultation/Chief Complaint: follow up    Subjective   History of Present Illness:  Cayla Villegas is a 76 y.o. patient who presents by referral of Priscilla Zhu MD for SOB, edema, and an abnormal echocardiogram (EF 25%) on 10/29/19 showing an EF of 25% moderate mitral regurgitation. On 12/06/19 she states her SOB has decreased since she was started on Toprol. 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 No significant family history of heart disease. She smokes daily. She has ankle swelling  That has decreased since she started Lasix. Patient currently denies any  palpitations, chest pain,  dizziness, and syncope. She smokes daily. Her stress test from 12/12/19 showed no ischemia. Her carotid doppler from 01/06/20 showed normal arteries. Her cardiac cath from 01/06/20 showed multivessel CAD. Refer to CVTS. Her IVÁN 01/07/20 showed her EF 25-30% with moderate MR. No evidence of mass or thrombus. Mild TR. She underwent CABG x3, mitral valve repair, ZAHRAA clip, and closure of the PFO. Today she reports she had had weakness and increased SOB. She believes she had the flu about 5 days ago. Her surgical site is healing well. She reports she has BLE edema. Her weight is up about 10 lbs. She is still taking lasix 20 mg daily. She states she is not active. She states she does not eat sodium. She denies CP, palpitations, dizziness or syncope.       Past Medical History:   has a past medical history of Anemia, Backache, unspecified, CAD, multiple vessel, Chronic systolic CHF (congestive heart failure) ([address] Depressive disorder, not elsewhere classified, Essential hypertension, benign, Gout, Hyperlipidemia associated with type 2 diabetes mellitus ([address] Major depressive disorder, recurrent episode, moderate ([address] Skin: Skin color, texture, turgor normal, no rashes or lesions   Pysch: Normal mood and affect   Neurologic: Normal gross motor and sensory exam.         Labs   CBC:   Lab Results   Component Value Date    WBC 7.0 01/13/2020    RBC 3.66 01/13/2020    HGB 11.4 01/13/2020    HCT 33.6 01/13/2020    MCV 91.8 01/13/2020    RDW 14.2 01/13/2020     01/13/2020     CMP:  Lab Results   Component Value Date     02/11/2020    K 4.2 02/11/2020    K 4.0 01/13/2020     02/11/2020    CO2 26 02/11/2020    BUN 19 02/11/2020    CREATININE 1.1 02/11/2020    GFRAA 59 02/11/2020    GFRAA >60 06/04/2011    AGRATIO 1.4 05/14/2019    LABGLOM 49 02/11/2020    LABGLOM 59 04/29/2015    GLUCOSE 163 02/11/2020    GLUCOSE 128 02/27/2012    PROT 6.6 05/14/2019    PROT 7.0 03/04/2013    CALCIUM 9.1 02/11/2020    BILITOT 0.4 05/14/2019    ALKPHOS 111 05/14/2019    AST 13 05/14/2019    ALT 13 05/14/2019     PT/INR:  No results found for: PTINR  HgBA1c:  Lab Results   Component Value Date    LABA1C 8.8 12/11/2019     Lab Results   Component Value Date    TROPONINI <0.01 05/30/2017         Cardiac Data     Last EKG: Sinus tachycardia 1/2020  Pac  ivcd  nonspec twi, anterolat, infer leads     Echo: 10/29/19  Summary   The left ventricular systolic function is severely reduced with an ejection   fraction of 25 %. There is akinesis of the inferior, apical septal and inferolateral walls. There is hypokinesis of the Inferoseptal & anteroseptal wall. Grade II diastolic dysfunction with elevated left ventricular filling   pressure. Normal left ventricular size and wall thickness. Mild mitral annular calcification. Mild thickening of the leaflets of mitral valve. Moderate mitral regurgitation. IVÁN: 01/07/20  Summary   Ejection fraction is visually estimated to be 25-30 %. Moderate mitral regurgitation. There is no evidence of mass or thrombus in the left atrium or appendage. Mild tricuspid regurgitation. aorta are within normal limits in caliber with out evidence of dissection. Calcified mediastinal lymph nodes are noted. Carotid: 01/06/20   The internal carotid arteries are within normal limits bilaterally. The vertebral arteries are patent with antegrade flow bilaterally. CABGx3 01/13/20  OPERATION PERFORMED:  1. Coronary artery bypass grafting x3, LIMA to LAD, reverse saphenous  vein to OM, reverse saphenous vein to diagonal.  2.  Mitral valve repair using Physio ring size 26.  3.  Left atrial appendage clip. 4.  Closure of the PFO. 5.  Intercostal nerve block. 6.  Transesophageal echo. 7.  Placement of temporary atrial and ventricular pacing wire. I have reviewed labs and imaging/xray/diagnostic testing in this note. Assessment        Patient Active Problem List   Diagnosis    Backache    Essential hypertension, benign    Osteoarthrosis involving lower leg    Gout    Osteoarthritis, hand    Osteopenia    Chest pain    Hyperlipidemia associated with type 2 diabetes mellitus ([facility]    Major depressive disorder, recurrent episode, moderate (HCC)    Primary insomnia    Type 2 diabetes mellitus with microalbuminuria, without long-term current use of insulin (HCC)    Rotator cuff tendinitis    Other restrictive cardiomyopathy (HCC)    Nonrheumatic mitral valve regurgitation    Chronic systolic CHF (congestive heart failure) (HCC)    CAD, multiple vessel    Ischemic cardiomyopathy                Plan      1. Increase lasix to 40 mg daily  2. Liver, lipids, BNP and BMP in 1 week  3. Echo to evaluate mitral valve, suspect recurrent MR s/p MVR  4. Lexiscan myoview for shortness of breath and abnl ekg   5. Chest xray for shortness of breath   6. Cardiac Rehab referral  7. Follow up with Anthony Wise in 2 months    D/w pt and  may need f/u cath/surgery pending above       Scribe's attestation:   This note was scribed in the presence of Dr. Iris Chavarria by Magdy Marks RN

## 2020-05-14 ENCOUNTER — TELEPHONE (OUTPATIENT)
Dept: CARDIOLOGY CLINIC | Age: 75
End: 2020-05-14

## 2020-05-15 ENCOUNTER — HOSPITAL ENCOUNTER (OUTPATIENT)
Age: 75
Discharge: HOME | End: 2020-05-15
Payer: MEDICARE

## 2020-05-15 ENCOUNTER — HOSPITAL ENCOUNTER (OUTPATIENT)
Dept: GENERAL RADIOLOGY | Age: 75
Discharge: HOME | End: 2020-05-15
Payer: MEDICARE

## 2020-05-15 LAB
ALBUMIN SERPL-MCNC: 3.2 G/DL (ref 3.4–5)
ALP BLD-CCNC: 170 U/L (ref 40–129)
ALT SERPL-CCNC: 54 U/L (ref 10–40)
ANION GAP SERPL CALCULATED.3IONS-SCNC: 12 MMOL/L (ref 3–16)
AST SERPL-CCNC: 31 U/L (ref 15–37)
BILIRUB SERPL-MCNC: 1 MG/DL (ref 0–1)
BILIRUBIN DIRECT: 0.3 MG/DL (ref 0–0.3)
BILIRUBIN, INDIRECT: 0.7 MG/DL (ref 0–1)
BUN BLDV-MCNC: 30 MG/DL (ref 7–20)
CALCIUM SERPL-MCNC: 8.4 MG/DL (ref 8.3–10.6)
CHLORIDE BLD-SCNC: 107 MMOL/L (ref 99–110)
CHOLESTEROL, TOTAL: 79 MG/DL (ref 0–199)
CO2: 21 MMOL/L (ref 21–32)
CREAT SERPL-MCNC: 1.1 MG/DL (ref 0.6–1.2)
GFR AFRICAN AMERICAN: 59
GFR NON-AFRICAN AMERICAN: 48
GLUCOSE BLD-MCNC: 235 MG/DL (ref 70–99)
HDLC SERPL-MCNC: 25 MG/DL (ref 40–60)
LDL CHOLESTEROL CALCULATED: 26 MG/DL
POTASSIUM SERPL-SCNC: 3.9 MMOL/L (ref 3.5–5.1)
PRO-BNP: ABNORMAL PG/ML (ref 0–449)
SODIUM BLD-SCNC: 140 MMOL/L (ref 136–145)
TOTAL PROTEIN: 5.7 G/DL (ref 6.4–8.2)
TRIGL SERPL-MCNC: 140 MG/DL (ref 0–150)
VLDLC SERPL CALC-MCNC: 28 MG/DL

## 2020-05-15 PROCEDURE — 80048 BASIC METABOLIC PNL TOTAL CA: CPT

## 2020-05-15 PROCEDURE — 80076 HEPATIC FUNCTION PANEL: CPT

## 2020-05-15 PROCEDURE — 71046 X-RAY EXAM CHEST 2 VIEWS: CPT

## 2020-05-15 PROCEDURE — 80061 LIPID PANEL: CPT

## 2020-05-15 PROCEDURE — 83880 ASSAY OF NATRIURETIC PEPTIDE: CPT

## 2020-05-15 PROCEDURE — 36415 COLL VENOUS BLD VENIPUNCTURE: CPT

## 2020-05-19 RX ORDER — METFORMIN HYDROCHLORIDE 500 MG/1
TABLET, EXTENDED RELEASE ORAL
Qty: 360 TABLET | Refills: 0 | Status: ON HOLD | OUTPATIENT
Start: 2020-05-19 | End: 2020-07-06 | Stop reason: SDUPTHER

## 2020-05-20 ENCOUNTER — TELEPHONE (OUTPATIENT)
Dept: CARDIOLOGY CLINIC | Age: 75
End: 2020-05-20

## 2020-05-20 RX ORDER — METOLAZONE 2.5 MG/1
2.5 TABLET ORAL WEEKLY
Qty: 20 TABLET | Refills: 1 | Status: SHIPPED
Start: 2020-05-20 | End: 2021-01-19 | Stop reason: CLARIF

## 2020-05-26 ENCOUNTER — TELEPHONE (OUTPATIENT)
Dept: CARDIOLOGY CLINIC | Age: 75
End: 2020-05-26

## 2020-05-26 NOTE — TELEPHONE ENCOUNTER
Pt states she took furosemide and then a half hr later she took two lasix and the next day (Friday), her leg was very swollen and has continued to be. She states her toes are painful and she has to use a cane. Pt states she took lasix again this morning, but that is the only other time she has taken it. Please call to advise.

## 2020-05-26 NOTE — TELEPHONE ENCOUNTER
Lets have her get a BMP and BNP and take extra metolazone with her taking the 5 mg metolazone dosage Monday Wednesdays and Fridays (30 minutes before Lasix dose that day), lets have her check her vital signs including blood pressure, heart rate and weight (she should call us with those numbers) and also she should get a follow-up chest x-ray as well as bilateral venous Dopplers. Thank you.

## 2020-05-27 ENCOUNTER — TELEPHONE (OUTPATIENT)
Dept: CARDIOLOGY CLINIC | Age: 75
End: 2020-05-27

## 2020-05-28 ENCOUNTER — HOSPITAL ENCOUNTER (OUTPATIENT)
Age: 75
Discharge: HOME | End: 2020-05-28
Payer: MEDICARE

## 2020-05-28 ENCOUNTER — HOSPITAL ENCOUNTER (OUTPATIENT)
Dept: GENERAL RADIOLOGY | Age: 75
Discharge: HOME | End: 2020-05-28
Payer: MEDICARE

## 2020-05-28 LAB
ANION GAP SERPL CALCULATED.3IONS-SCNC: 10 MMOL/L (ref 3–16)
BUN BLDV-MCNC: 28 MG/DL (ref 7–20)
CALCIUM SERPL-MCNC: 9 MG/DL (ref 8.3–10.6)
CHLORIDE BLD-SCNC: 97 MMOL/L (ref 99–110)
CO2: 31 MMOL/L (ref 21–32)
CREAT SERPL-MCNC: 1.2 MG/DL (ref 0.6–1.2)
GFR AFRICAN AMERICAN: 53
GFR NON-AFRICAN AMERICAN: 44
GLUCOSE BLD-MCNC: 254 MG/DL (ref 70–99)
POTASSIUM SERPL-SCNC: 2.8 MMOL/L (ref 3.5–5.1)
PRO-BNP: ABNORMAL PG/ML (ref 0–449)
SODIUM BLD-SCNC: 138 MMOL/L (ref 136–145)

## 2020-05-28 PROCEDURE — 36415 COLL VENOUS BLD VENIPUNCTURE: CPT

## 2020-05-28 PROCEDURE — 83880 ASSAY OF NATRIURETIC PEPTIDE: CPT

## 2020-05-28 PROCEDURE — 80048 BASIC METABOLIC PNL TOTAL CA: CPT

## 2020-05-28 PROCEDURE — 71046 X-RAY EXAM CHEST 2 VIEWS: CPT

## 2020-05-29 ENCOUNTER — TELEPHONE (OUTPATIENT)
Dept: CARDIOLOGY CLINIC | Age: 75
End: 2020-05-29

## 2020-05-29 RX ORDER — POTASSIUM CHLORIDE 750 MG/1
40 TABLET, EXTENDED RELEASE ORAL 2 TIMES DAILY
Qty: 240 TABLET | Refills: 3 | Status: SHIPPED | OUTPATIENT
Start: 2020-05-29 | End: 2021-12-28 | Stop reason: ALTCHOICE

## 2020-05-31 ENCOUNTER — HOSPITAL ENCOUNTER (OUTPATIENT)
Age: 75
Discharge: HOME | End: 2020-05-31
Payer: MEDICARE

## 2020-05-31 DIAGNOSIS — I50.22 CHRONIC SYSTOLIC CHF (CONGESTIVE HEART FAILURE) (HCC): ICD-10-CM

## 2020-05-31 PROCEDURE — 83880 ASSAY OF NATRIURETIC PEPTIDE: CPT

## 2020-05-31 PROCEDURE — 36415 COLL VENOUS BLD VENIPUNCTURE: CPT

## 2020-05-31 PROCEDURE — 80048 BASIC METABOLIC PNL TOTAL CA: CPT

## 2020-06-01 LAB
ANION GAP SERPL CALCULATED.3IONS-SCNC: 12 MMOL/L (ref 3–16)
BUN BLDV-MCNC: 35 MG/DL (ref 7–20)
CALCIUM SERPL-MCNC: 8.9 MG/DL (ref 8.3–10.6)
CHLORIDE BLD-SCNC: 98 MMOL/L (ref 99–110)
CO2: 28 MMOL/L (ref 21–32)
CREAT SERPL-MCNC: 1.3 MG/DL (ref 0.6–1.2)
GFR AFRICAN AMERICAN: 48
GFR NON-AFRICAN AMERICAN: 40
GLUCOSE BLD-MCNC: 197 MG/DL (ref 70–99)
POTASSIUM SERPL-SCNC: 4.3 MMOL/L (ref 3.5–5.1)
PRO-BNP: ABNORMAL PG/ML (ref 0–449)
SODIUM BLD-SCNC: 138 MMOL/L (ref 136–145)

## 2020-06-02 ENCOUNTER — TELEPHONE (OUTPATIENT)
Dept: CARDIOLOGY CLINIC | Age: 75
End: 2020-06-02

## 2020-06-02 NOTE — TELEPHONE ENCOUNTER
Spoke with Korina Arteaga, relayed message per [facility]. Pt v/u, and will get labs and chest x ray later this week. She is scheduled for echo and stress test later this month.

## 2020-06-05 ENCOUNTER — HOSPITAL ENCOUNTER (OUTPATIENT)
Age: 75
Discharge: HOME | End: 2020-06-05
Payer: MEDICARE

## 2020-06-05 ENCOUNTER — TELEPHONE (OUTPATIENT)
Dept: CARDIOLOGY CLINIC | Age: 75
End: 2020-06-05

## 2020-06-05 ENCOUNTER — HOSPITAL ENCOUNTER (OUTPATIENT)
Dept: GENERAL RADIOLOGY | Age: 75
Discharge: HOME | End: 2020-06-05
Payer: MEDICARE

## 2020-06-05 LAB
ANION GAP SERPL CALCULATED.3IONS-SCNC: 11 MMOL/L (ref 3–16)
BUN BLDV-MCNC: 43 MG/DL (ref 7–20)
CALCIUM SERPL-MCNC: 9 MG/DL (ref 8.3–10.6)
CHLORIDE BLD-SCNC: 100 MMOL/L (ref 99–110)
CO2: 29 MMOL/L (ref 21–32)
CREAT SERPL-MCNC: 1.4 MG/DL (ref 0.6–1.2)
GFR AFRICAN AMERICAN: 44
GFR NON-AFRICAN AMERICAN: 37
GLUCOSE BLD-MCNC: 136 MG/DL (ref 70–99)
POTASSIUM SERPL-SCNC: 4.3 MMOL/L (ref 3.5–5.1)
PRO-BNP: ABNORMAL PG/ML (ref 0–449)
SODIUM BLD-SCNC: 140 MMOL/L (ref 136–145)

## 2020-06-05 PROCEDURE — 83880 ASSAY OF NATRIURETIC PEPTIDE: CPT

## 2020-06-05 PROCEDURE — 71046 X-RAY EXAM CHEST 2 VIEWS: CPT

## 2020-06-05 PROCEDURE — 80048 BASIC METABOLIC PNL TOTAL CA: CPT

## 2020-06-05 PROCEDURE — 36415 COLL VENOUS BLD VENIPUNCTURE: CPT

## 2020-06-08 ENCOUNTER — TELEPHONE (OUTPATIENT)
Dept: CARDIOLOGY CLINIC | Age: 75
End: 2020-06-08

## 2020-06-08 NOTE — TELEPHONE ENCOUNTER
Created telephone encounter. Spoke with Alvaro Mitchell relayed message per [facility] regarding chest x ray. Pt verbalized understanding.

## 2020-06-11 ENCOUNTER — TELEPHONE (OUTPATIENT)
Dept: INTERNAL MEDICINE CLINIC | Age: 75
End: 2020-06-11

## 2020-06-16 ENCOUNTER — TELEPHONE (OUTPATIENT)
Dept: CARDIOLOGY CLINIC | Age: 75
End: 2020-06-16

## 2020-06-16 ENCOUNTER — HOSPITAL ENCOUNTER (OUTPATIENT)
Dept: VASCULAR LAB | Age: 75
Discharge: HOME | End: 2020-06-16
Payer: MEDICARE

## 2020-06-16 ENCOUNTER — HOSPITAL ENCOUNTER (OUTPATIENT)
Dept: NON INVASIVE DIAGNOSTICS | Age: 75
Discharge: HOME | End: 2020-06-16
Payer: MEDICARE

## 2020-06-16 ENCOUNTER — HOSPITAL ENCOUNTER (OUTPATIENT)
Dept: VASCULAR LAB | Age: 75
End: 2020-06-16
Payer: MEDICARE

## 2020-06-16 LAB
LV EF: 18 %
LVEF MODALITY: NORMAL

## 2020-06-16 PROCEDURE — 93306 TTE W/DOPPLER COMPLETE: CPT

## 2020-06-16 PROCEDURE — 93970 EXTREMITY STUDY: CPT

## 2020-06-16 NOTE — TELEPHONE ENCOUNTER
Hussain Medina from vascular called pt has acute dvt. Please call Hussain Medina so he can know what to do with pt. Hussain Medina can be reached at [phone].

## 2020-06-17 ENCOUNTER — HOSPITAL ENCOUNTER (OUTPATIENT)
Dept: NUCLEAR MEDICINE | Age: 75
Discharge: HOME | End: 2020-06-17
Payer: MEDICARE

## 2020-06-17 ENCOUNTER — TELEPHONE (OUTPATIENT)
Dept: CARDIOLOGY CLINIC | Age: 75
End: 2020-06-17

## 2020-06-17 ENCOUNTER — HOSPITAL ENCOUNTER (OUTPATIENT)
Dept: NON INVASIVE DIAGNOSTICS | Age: 75
Discharge: HOME | End: 2020-06-17
Payer: MEDICARE

## 2020-06-17 LAB
LV EF: 18 %
LVEF MODALITY: NORMAL

## 2020-06-17 PROCEDURE — 6360000002 HC RX W HCPCS: Performed by: INTERNAL MEDICINE

## 2020-06-17 PROCEDURE — 3430000000 HC RX DIAGNOSTIC RADIOPHARMACEUTICAL: Performed by: INTERNAL MEDICINE

## 2020-06-17 PROCEDURE — 93017 CV STRESS TEST TRACING ONLY: CPT

## 2020-06-17 PROCEDURE — A9502 TC99M TETROFOSMIN: HCPCS | Performed by: INTERNAL MEDICINE

## 2020-06-17 PROCEDURE — 78452 HT MUSCLE IMAGE SPECT MULT: CPT

## 2020-06-17 RX ADMIN — REGADENOSON 0.4 MG: 0.08 INJECTION, SOLUTION INTRAVENOUS at 10:32

## 2020-06-17 RX ADMIN — TETROFOSMIN 10.3 MILLICURIE: 1.38 INJECTION, POWDER, LYOPHILIZED, FOR SOLUTION INTRAVENOUS at 09:21

## 2020-06-17 RX ADMIN — TETROFOSMIN 30.2 MILLICURIE: 1.38 INJECTION, POWDER, LYOPHILIZED, FOR SOLUTION INTRAVENOUS at 10:31

## 2020-06-17 NOTE — TELEPHONE ENCOUNTER
Created telephone encounter. [facility] requesting a call back to the office. Will relay echo results and VL extremity venous bilateral. I placed lab orders in epic. Let patient know echo test shows worsening heart function, rec: rt/lt ht cath, lets set that up if pt willing to proceed.

## 2020-06-17 NOTE — TELEPHONE ENCOUNTER
Spoke to pt. Relayed message. Labs ordered. Will need to ask about jackelineis once DR Lucia Neither is back in office. Silvia, please call to schedule.

## 2020-06-17 NOTE — TELEPHONE ENCOUNTER
----- Message from Odalis Johansen MD sent at 6/16/2020  4:18 PM EDT -----  Sarah Pierce make sure patient started eliquis 10mg po bid for first week and then should take 5mg po bid, should have enough for at least 3mo total.  Also lets get bmp, cbc next week, Thank you.

## 2020-06-18 ENCOUNTER — TELEPHONE (OUTPATIENT)
Dept: CARDIOLOGY CLINIC | Age: 75
End: 2020-06-18

## 2020-06-19 NOTE — TELEPHONE ENCOUNTER
Attempted to call pt. Phone only rings. Ran eliquis through [employer] and she does not need a prior auth.

## 2020-06-19 NOTE — TELEPHONE ENCOUNTER
Spoke with patient. Patient is scheduled with Dr. Usama Yarbrough for Right and Left Heart Cath on 7/6/20 at [facility], arrival time of 7:30am to the Cath Lab. Please have patient arrive to the main entrance of [facility] and check in with the registration desk. Please call patient regarding medication instructions. Remind patient to be NPO after midnight (8 hours prior). Do not apply lotions/creams on skin the day of procedure. COVID scheduled 7/1.

## 2020-06-22 NOTE — TELEPHONE ENCOUNTER
Pt is scheduled for BLE venous doppler on 07/02/20. Will discuss with Dr Jennifer Graf regarding whether or not to hold the Eliquis for the cardiac cath procedure.

## 2020-06-24 ENCOUNTER — TELEPHONE (OUTPATIENT)
Dept: CARDIOLOGY CLINIC | Age: 75
End: 2020-06-24

## 2020-06-24 NOTE — TELEPHONE ENCOUNTER
Pt was called and offered 2 different appointment dates with Dr. Bhumika Srinivasan. She didn't want either, she is requesting that someone contact her to explain why she needs to see the CHF specialist.  She is upset with all the different appointments and tests. Please call to advise. Thank you.

## 2020-06-24 NOTE — TELEPHONE ENCOUNTER
----- Message from Ludy Ortiz MD sent at 6/18/2020  5:15 PM EDT -----  Please inform the patient that the test is abnormal.   The test shows -for severe cardiomyopathy and scar tissue within the heart.     I recommend that the patient referred to our heart failure program.  She will need advanced heart failure care

## 2020-07-01 ENCOUNTER — OFFICE VISIT (OUTPATIENT)
Dept: PRIMARY CARE CLINIC | Age: 75
End: 2020-07-01
Payer: MEDICARE

## 2020-07-01 PROCEDURE — 99211 OFF/OP EST MAY X REQ PHY/QHP: CPT | Performed by: NURSE PRACTITIONER

## 2020-07-01 NOTE — TELEPHONE ENCOUNTER
Spoke to pt. Hold metformin 48 hours prior to procedure and am of procedure. Hold lasix the am of procedure. Per DR Brien Raymond, hold eliquis the night prior and the am of procedure.  Pt VU

## 2020-07-01 NOTE — PROGRESS NOTES
Patient is having a/an heart cath with Dr. Belen Chavez on 7/6/2020 and was seen at clinic for pre op covid test. . Patient instructed to self quarantine until the procedure.

## 2020-07-02 ENCOUNTER — HOSPITAL ENCOUNTER (OUTPATIENT)
Dept: VASCULAR LAB | Age: 75
Discharge: HOME | End: 2020-07-02
Payer: MEDICARE

## 2020-07-02 PROCEDURE — 93970 EXTREMITY STUDY: CPT

## 2020-07-03 LAB
SARS-COV-2: NOT DETECTED
SOURCE: NORMAL

## 2020-07-06 ENCOUNTER — HOSPITAL ENCOUNTER (OUTPATIENT)
Dept: CARDIAC CATH/INVASIVE PROCEDURES | Age: 75
Discharge: HOME | End: 2020-07-06
Attending: INTERNAL MEDICINE | Admitting: INTERNAL MEDICINE
Payer: MEDICARE

## 2020-07-06 ENCOUNTER — TELEPHONE (OUTPATIENT)
Dept: CARDIOLOGY | Age: 75
End: 2020-07-06

## 2020-07-06 VITALS — WEIGHT: 125 LBS | HEIGHT: 60 IN | BODY MASS INDEX: 24.54 KG/M2

## 2020-07-06 LAB
ANION GAP SERPL CALCULATED.3IONS-SCNC: 11 MMOL/L (ref 3–16)
BUN BLDV-MCNC: 26 MG/DL (ref 7–20)
CALCIUM SERPL-MCNC: 10 MG/DL (ref 8.3–10.6)
CHLORIDE BLD-SCNC: 103 MMOL/L (ref 99–110)
CHOLESTEROL, TOTAL: 130 MG/DL (ref 0–199)
CO2: 27 MMOL/L (ref 21–32)
CREAT SERPL-MCNC: 1.1 MG/DL (ref 0.6–1.2)
EKG ATRIAL RATE: 96 BPM
EKG DIAGNOSIS: NORMAL
EKG P AXIS: 70 DEGREES
EKG P-R INTERVAL: 152 MS
EKG Q-T INTERVAL: 366 MS
EKG QRS DURATION: 108 MS
EKG QTC CALCULATION (BAZETT): 462 MS
EKG R AXIS: 46 DEGREES
EKG T AXIS: 56 DEGREES
EKG VENTRICULAR RATE: 96 BPM
GFR AFRICAN AMERICAN: 59
GFR NON-AFRICAN AMERICAN: 48
GLUCOSE BLD-MCNC: 166 MG/DL (ref 70–99)
HCT VFR BLD CALC: 37.8 % (ref 36–48)
HDLC SERPL-MCNC: 40 MG/DL (ref 40–60)
HEMOGLOBIN: 12.4 G/DL (ref 12–16)
INR BLD: 0.99 (ref 0.86–1.14)
LDL CHOLESTEROL CALCULATED: 63 MG/DL
MCH RBC QN AUTO: 30.6 PG (ref 26–34)
MCHC RBC AUTO-ENTMCNC: 32.8 G/DL (ref 31–36)
MCV RBC AUTO: 93.3 FL (ref 80–100)
PDW BLD-RTO: 15.5 % (ref 12.4–15.4)
PLATELET # BLD: 196 K/UL (ref 135–450)
PMV BLD AUTO: 8.4 FL (ref 5–10.5)
POTASSIUM SERPL-SCNC: 4 MMOL/L (ref 3.5–5.1)
PROTHROMBIN TIME: 11.5 SEC (ref 10–13.2)
RBC # BLD: 4.05 M/UL (ref 4–5.2)
SODIUM BLD-SCNC: 141 MMOL/L (ref 136–145)
TRIGL SERPL-MCNC: 136 MG/DL (ref 0–150)
VLDLC SERPL CALC-MCNC: 27 MG/DL
WBC # BLD: 6.2 K/UL (ref 4–11)

## 2020-07-06 PROCEDURE — 93010 ELECTROCARDIOGRAM REPORT: CPT | Performed by: INTERNAL MEDICINE

## 2020-07-06 PROCEDURE — 99153 MOD SED SAME PHYS/QHP EA: CPT

## 2020-07-06 PROCEDURE — 85610 PROTHROMBIN TIME: CPT

## 2020-07-06 PROCEDURE — 80061 LIPID PANEL: CPT

## 2020-07-06 PROCEDURE — 6360000002 HC RX W HCPCS

## 2020-07-06 PROCEDURE — 99152 MOD SED SAME PHYS/QHP 5/>YRS: CPT

## 2020-07-06 PROCEDURE — 80048 BASIC METABOLIC PNL TOTAL CA: CPT

## 2020-07-06 PROCEDURE — 85027 COMPLETE CBC AUTOMATED: CPT

## 2020-07-06 PROCEDURE — 93461 R&L HRT ART/VENTRICLE ANGIO: CPT

## 2020-07-06 PROCEDURE — 2709999900 HC NON-CHARGEABLE SUPPLY

## 2020-07-06 PROCEDURE — 6370000000 HC RX 637 (ALT 250 FOR IP)

## 2020-07-06 PROCEDURE — 93005 ELECTROCARDIOGRAM TRACING: CPT | Performed by: INTERNAL MEDICINE

## 2020-07-06 PROCEDURE — C1894 INTRO/SHEATH, NON-LASER: HCPCS

## 2020-07-06 PROCEDURE — 93567 NJX CAR CTH SPRVLV AORTGRPHY: CPT

## 2020-07-06 PROCEDURE — 6360000004 HC RX CONTRAST MEDICATION

## 2020-07-06 PROCEDURE — 2580000003 HC RX 258

## 2020-07-06 PROCEDURE — 2500000003 HC RX 250 WO HCPCS

## 2020-07-06 PROCEDURE — C1769 GUIDE WIRE: HCPCS

## 2020-07-06 PROCEDURE — 85347 COAGULATION TIME ACTIVATED: CPT

## 2020-07-06 PROCEDURE — 93461 R&L HRT ART/VENTRICLE ANGIO: CPT | Performed by: INTERNAL MEDICINE

## 2020-07-06 PROCEDURE — C1887 CATHETER, GUIDING: HCPCS

## 2020-07-06 RX ORDER — ASPIRIN 81 MG/1
81 TABLET, CHEWABLE ORAL ONCE
Status: COMPLETED | OUTPATIENT
Start: 2020-07-06 | End: 2020-07-06

## 2020-07-06 RX ORDER — SODIUM CHLORIDE 0.9 % (FLUSH) 0.9 %
10 SYRINGE (ML) INJECTION PRN
Status: CANCELLED | OUTPATIENT
Start: 2020-07-06

## 2020-07-06 RX ORDER — FENTANYL CITRATE 50 UG/ML
INJECTION, SOLUTION INTRAMUSCULAR; INTRAVENOUS
Status: COMPLETED | OUTPATIENT
Start: 2020-07-06 | End: 2020-07-06

## 2020-07-06 RX ORDER — SODIUM CHLORIDE 0.9 % (FLUSH) 0.9 %
10 SYRINGE (ML) INJECTION EVERY 12 HOURS SCHEDULED
Status: CANCELLED | OUTPATIENT
Start: 2020-07-06

## 2020-07-06 RX ORDER — ACETAMINOPHEN 325 MG/1
650 TABLET ORAL EVERY 4 HOURS PRN
Status: CANCELLED | OUTPATIENT
Start: 2020-07-06

## 2020-07-06 RX ORDER — CLOPIDOGREL BISULFATE 75 MG/1
75 TABLET ORAL ONCE
Status: COMPLETED | OUTPATIENT
Start: 2020-07-06 | End: 2020-07-06

## 2020-07-06 RX ORDER — SODIUM CHLORIDE 9 MG/ML
INJECTION INTRAVENOUS
Status: COMPLETED | OUTPATIENT
Start: 2020-07-06 | End: 2020-07-06

## 2020-07-06 RX ORDER — MIDAZOLAM HYDROCHLORIDE 5 MG/ML
INJECTION INTRAMUSCULAR; INTRAVENOUS
Status: COMPLETED | OUTPATIENT
Start: 2020-07-06 | End: 2020-07-06

## 2020-07-06 RX ORDER — METFORMIN HYDROCHLORIDE 500 MG/1
TABLET, EXTENDED RELEASE ORAL
Qty: 360 TABLET | Refills: 0 | Status: SHIPPED | OUTPATIENT
Start: 2020-07-06 | End: 2020-09-21

## 2020-07-06 RX ORDER — SODIUM CHLORIDE 9 MG/ML
INJECTION, SOLUTION INTRAVENOUS CONTINUOUS
Status: DISCONTINUED | OUTPATIENT
Start: 2020-07-06 | End: 2020-07-06 | Stop reason: HOSPADM

## 2020-07-06 RX ADMIN — FENTANYL CITRATE 25 MCG: 50 INJECTION, SOLUTION INTRAMUSCULAR; INTRAVENOUS at 09:50

## 2020-07-06 RX ADMIN — CLOPIDOGREL BISULFATE 75 MG: 75 TABLET ORAL at 08:08

## 2020-07-06 RX ADMIN — FENTANYL CITRATE 25 MCG: 50 INJECTION, SOLUTION INTRAMUSCULAR; INTRAVENOUS at 09:42

## 2020-07-06 RX ADMIN — ASPIRIN 81 MG: 81 TABLET, CHEWABLE ORAL at 08:08

## 2020-07-06 RX ADMIN — MIDAZOLAM HYDROCHLORIDE 1 MG: 5 INJECTION INTRAMUSCULAR; INTRAVENOUS at 09:42

## 2020-07-06 RX ADMIN — SODIUM CHLORIDE 250 ML: 9 INJECTION INTRAVENOUS at 09:50

## 2020-07-06 RX ADMIN — SODIUM CHLORIDE: 9 INJECTION, SOLUTION INTRAVENOUS at 07:39

## 2020-07-06 NOTE — TELEPHONE ENCOUNTER
----- Message from Salazar Zavala MD sent at 7/2/2020 11:59 AM EDT -----  Let patient know that patient has cyst in rt leg, needs to f/u w/ pcp for this. No clots noted, Thank you.

## 2020-07-06 NOTE — TELEPHONE ENCOUNTER
Silvia can you please schedule the patient for a PCI of LAD and CX with [employer] rep and anesthesia with Dr. Jimy Rome. Thank You.

## 2020-07-06 NOTE — H&P
Medical History:  Past Medical History:   Diagnosis Date    Anemia     Backache, unspecified 3/11/08    CAD, multiple vessel 1/6/2020    Chronic systolic CHF (congestive heart failure) ([address] 12/11/2019    Depressive disorder, not elsewhere classified 11/8/07    Essential hypertension, benign 11/8/07    Gout 8/11/2011    Hyperlipidemia associated with type 2 diabetes mellitus ([address] 12/14/2015    Major depressive disorder, recurrent episode, moderate ([address] 12/14/2015    Osteoarthritis, hand 2/27/2012    Osteoarthrosis, unspecified whether generalized or localized, lower leg 11/8/07    Osteopenia 8/5/2015    Other and unspecified hyperlipidemia 11/8/07    Pain in joint, lower leg 1/29/07    Pneumonia     Rotator cuff tendinitis 10/6/2017    Tear of medial cartilage or meniscus of knee, current 1/29/07    Type 2 diabetes mellitus with microalbuminuria, without long-term current use of insulin ([address] 5/5/2017    Type 2 diabetes mellitus without complication ([facility] 50/3/3881    Type II or unspecified type diabetes mellitus without mention of complication, not stated as uncontrolled 11/8/07    foot exam 7/16/08 normal         Surgical History:  Past Surgical History:   Procedure Laterality Date    APPENDECTOMY      CHOLECYSTECTOMY      CORONARY ARTERY BYPASS GRAFT N/A 1/8/2020    CORONARY ARTERY BYPASS GRAFTING X3, INTERNAL MAMMARY ARTERY, SAPHENOUS VEIN GRAFTING, ON PUMP MITRAL VALVE RING REPAIR USING 26MM PELAEZ PHYSIO II RING, WITH LEFT ATRIAL APPENDAGE CLIP, PFO CLOSURETEE, 5 LEVEL BILATERAL INTERCOSTAL NERVE BLOCK performed by Bushra Braxton MD at [address] [place] [address]      total ab hyst    PARATHYROID GLAND SURGERY      explore parathyroid glands    TOTAL KNEE ARTHROPLASTY  03/29/10    left knee         Medications:  Current Facility-Administered Medications   Medication Dose Route Frequency Provider Last Rate Last Dose    0.9 % sodium chloride infusion   Intravenous Continuous Aida Williamson Humera Booker MD 75 mL/hr at 07/06/20 3730             Pre-Sedation:    Pre-Sedation Documentation and Exam:  I have personally completed a history, physical exam & review of systems for this patient (see notes). Saw pt in office recently. Pt seen in office, had wt gain, diuresed, also had noninvasive w/u which was abnl for CM. Has improved w/ regard to wt and swelling, f/u u/s was neg for clot. Prior History of Anesthesia Complications:   none    Modified Mallampati:  III (soft palate, base of uvula visible)    ASA Classification:  Class 3 - A patient with severe systemic disease that limits activity but is not incapacitating      Nathan Scale: Activity:  2 - Able to move 4 extremities voluntarily on command  Respiration:  2 - Able to breathe deeply and cough freely  Circulation:  2 - BP+/- 20mmHg of normal  Consciousness:  2 - Fully awake  Oxygen Saturation (color):  2 - Able to maintain oxygen saturation >92% on room air    Sedation/Anesthesia Plan:  Guard the patient's safety and welfare. Minimize physical discomfort and pain. Minimize negative psychological responses to treatment by providing sedation and analgesia and maximize the potential amnesia. Patient to meet pre-procedure discharge plan.     Medication Planned:  midazolam intravenously and fentanyl intravenously    Patient is an appropriate candidate for plan of sedation: yes      Electronically signed by Jayla Avery MD on 7/6/2020 at 9:09 AM

## 2020-07-06 NOTE — PROCEDURES
CARDIAC CATHETERIZATION REPORT     Procedure Date:  2020  Patient Name: Simran Fenton  MRN: [id number] : 1945      INDICATION     Cardiomyopathy    PROCEDURES PERFORMED     Right heart catheterization  Left heart catheterization  LVgram  Aortogram  Coronary angiogam  Coronary cath  SVG angiogram  LIMA angiogram          PROCEDURE DESCRIPTION   Risks/benefits/alternatives/outcomes were discussed with patient and/or family and informed consent was obtained. Using the [facility] scale, the patient's right radial artery was found to be a level B. Patient was prepped draped in the usual sterile fashion. Local anaesthetic was applied over puncture site. Using a front wall technique, a 4/5 [place] Terumo sheath was inserted into right radial artery. Verapamil, nitroglycerin were administered through the sheath. Heparin was administered. Diagnostic 5 [place] Medtronic pigtail, ultra, B-C catheters were used for diagnostic angiograms. At the conclusion of the procedure, a TR band was placed over the puncture site and hemostasis was obtained. There were no immediate complications. I supervised sedation with versed 1 mg/fentanyl 50 Mcg during the procedure. 220 cc contrast was utilized. <20cc EBL.       FINDINGS     HEMODYNAMICS    CHAMBER PRESSURE SATURATION   RA  1  65 %   RV  40/2    PA  40/12  62 %   PW  11  95 %   AORTA  158/66  95 %     ERNESTINA CARDIAC OUTPUT  4 L/min   SVR  14 Wood units   PVR  4.5 Wood units            LVGRAM    LVEDP  9   GRADIENT ACROSS AORTIC VALVE  none   LV FUNCTION EF 15 %   WALL MOTION  severe global hypokinesis with regional variation   MITRAL REGURGITATION  mild to moderate     AORTAGRAM    CALIBER  normal caliber   AORTIC INSUFFICIENCY  artificial AI was created with catheter projecting slightly across aortic valve   BYPASS GRAFTS  no bypass grafts are visualized         CORONARY ARTERIES    LM  Proximal less than 10% stenosis, mid-distal 20% stenosis       LAD Medium sized vessel, calcified, proximal 40% stenosis followed by mid vessel 99% stenosis, distal 20% stenosis. There are left-to-right collaterals noted. These extend up to the crux of the RCA. LCX Sharply angulated as it originates from left main, vessel is calcified with ostial/proximal 50 to 60% stenosis followed by mid 80% stenosis that extends into a medium to large sized first obtuse marginal branch. RI  Large vessel, calcified, proximal-mid 60% stenosis, distal less than 10% stenosis, there is a small branch proximally from the ramus that has a ostial/proximal 99% stenosis. RCA Large vessel, dominant, proximal 60 to 70% stenosis followed by mid-distal 99% subtotal occlusion with faint antegrade flow. Vessel is heavily calcified. There are left to right collaterals noted. BYPASS GRAFTS    GUO-LAD  Atretic, 100% eweyqeif-vby-iojebg stenosis, chronic total occlusion       SVG-OM  100% proximal        SVG-PDA  100% proximal                CONCLUSIONS:     Normal filling pressures  Occluded bypass grafts    Severe LV dysfunction, continue medical therapy with beta-blocker and ACE inhibitor and diuretics, will need EP referral for consideration of ICD    Given occluded bypass grafts, will plan staged PCI, high risk, with Impella support as well as anesthesia support for multivessel PCI. Likely will need atherectomy as well.     Continue dual antiplatelet therapy

## 2020-07-06 NOTE — TELEPHONE ENCOUNTER
Patient is scheduled with Dr. James Walker for PCI LAD & CX with Impella and anesthesia on 7/23/20 at [facility], arrival time of 7:30am to the Cath Lab. Please have patient arrive to the main entrance of [facility] and check in with the registration desk. Please review medication instructions. Remind patient to be NPO after midnight (8 hours prior). Do not apply lotions/creams on skin the day of procedure.     COVID test - 7/17 @ 9:55    [address] [employer] [facility]  (Via [address], [address] [place] [address])

## 2020-07-06 NOTE — TELEPHONE ENCOUNTER
Did you still want the patient to only hold Eliquis the night prior and am of procedure or do you want her to hold for 48 hours as per protocol?

## 2020-07-07 LAB
LV EF: 15 %
LVEF MODALITY: NORMAL
POC ACT LR: 206 SEC
POC ACT LR: 219 SEC

## 2020-07-07 NOTE — TELEPHONE ENCOUNTER
She wasn't sure of doses of aspirin, plavix, elquis this week when she came in for cath, lets call to re-reconcile meds. If she is taking eliquis, would hold it for 48hrs before ht cath procedure, Thank you.

## 2020-07-08 ENCOUNTER — TELEPHONE (OUTPATIENT)
Dept: CARDIOLOGY CLINIC | Age: 75
End: 2020-07-08

## 2020-07-08 NOTE — TELEPHONE ENCOUNTER
Per DR James Walker, ok for pt to be off eliquis. Need repeat venous dopplers to assure the DVT has resolved. Pt should be taking metoprolol succinate 50 mg every day. Continue aspirin and plavix.

## 2020-07-08 NOTE — TELEPHONE ENCOUNTER
Patient called requesting a new prescription reflecting the new dosage of one tablet 3x a week for metoprolol to be sent to [facility]

## 2020-07-09 RX ORDER — METOPROLOL SUCCINATE 50 MG/1
50 TABLET, EXTENDED RELEASE ORAL DAILY
Qty: 30 TABLET | Refills: 5 | Status: ON HOLD
Start: 2020-07-09 | End: 2020-07-25 | Stop reason: HOSPADM

## 2020-07-14 ENCOUNTER — HOSPITAL ENCOUNTER (OUTPATIENT)
Dept: VASCULAR LAB | Age: 75
Discharge: HOME | End: 2020-07-14
Payer: MEDICARE

## 2020-07-14 PROCEDURE — 93970 EXTREMITY STUDY: CPT

## 2020-07-15 ENCOUNTER — TELEPHONE (OUTPATIENT)
Dept: CARDIOLOGY CLINIC | Age: 75
End: 2020-07-15

## 2020-07-15 NOTE — TELEPHONE ENCOUNTER
Created telephone encounter. Spoke with Kalani Chapin relayed message per [facility] regarding leg US. Pt verbalized understanding. Pt has PCP appt in August and will discuss it at that appt.

## 2020-07-15 NOTE — TELEPHONE ENCOUNTER
----- Message from Salazar Zavala MD sent at 7/14/2020  3:41 PM EDT -----  Let patient know that Doppler of legs did not show any clots but does show a Baker's cyst, she should follow-up with her PCP for that. If she would like, it would be reasonable to refer her to orthopedics for this as well. Thank you.

## 2020-07-20 ENCOUNTER — ANESTHESIA EVENT (OUTPATIENT)
Dept: CARDIAC CATH/INVASIVE PROCEDURES | Age: 75
DRG: 215 | End: 2020-07-20
Payer: MEDICARE

## 2020-07-20 RX ORDER — SODIUM CHLORIDE, SODIUM LACTATE, POTASSIUM CHLORIDE, CALCIUM CHLORIDE 600; 310; 30; 20 MG/100ML; MG/100ML; MG/100ML; MG/100ML
INJECTION, SOLUTION INTRAVENOUS CONTINUOUS
Status: CANCELLED | OUTPATIENT
Start: 2020-07-20

## 2020-07-20 RX ORDER — SODIUM CHLORIDE 0.9 % (FLUSH) 0.9 %
10 SYRINGE (ML) INJECTION PRN
Status: CANCELLED | OUTPATIENT
Start: 2020-07-20

## 2020-07-20 RX ORDER — SODIUM CHLORIDE 0.9 % (FLUSH) 0.9 %
10 SYRINGE (ML) INJECTION EVERY 12 HOURS SCHEDULED
Status: CANCELLED | OUTPATIENT
Start: 2020-07-20

## 2020-07-21 NOTE — TELEPHONE ENCOUNTER
LORETTA for return call. Patient did not get her COVID test done. Please transfer call to me when she returns call.

## 2020-07-23 ENCOUNTER — HOSPITAL ENCOUNTER (INPATIENT)
Dept: CARDIAC CATH/INVASIVE PROCEDURES | Age: 75
LOS: 2 days | Discharge: HOME | DRG: 215 | End: 2020-07-25
Attending: INTERNAL MEDICINE
Payer: MEDICARE

## 2020-07-23 ENCOUNTER — ANESTHESIA (OUTPATIENT)
Dept: CARDIAC CATH/INVASIVE PROCEDURES | Age: 75
DRG: 215 | End: 2020-07-23
Payer: MEDICARE

## 2020-07-23 VITALS — DIASTOLIC BLOOD PRESSURE: 69 MMHG | SYSTOLIC BLOOD PRESSURE: 112 MMHG | TEMPERATURE: 98.4 F | OXYGEN SATURATION: 98 %

## 2020-07-23 PROBLEM — Z95.820 S/P ANGIOPLASTY WITH STENT: Status: ACTIVE | Noted: 2020-07-23

## 2020-07-23 LAB
A/G RATIO: 1.3 (ref 1.1–2.2)
ALBUMIN SERPL-MCNC: 3.9 G/DL (ref 3.4–5)
ALP BLD-CCNC: 115 U/L (ref 40–129)
ALT SERPL-CCNC: 19 U/L (ref 10–40)
ANION GAP SERPL CALCULATED.3IONS-SCNC: 9 MMOL/L (ref 3–16)
AST SERPL-CCNC: 15 U/L (ref 15–37)
BILIRUB SERPL-MCNC: 0.6 MG/DL (ref 0–1)
BUN BLDV-MCNC: 23 MG/DL (ref 7–20)
CALCIUM SERPL-MCNC: 9.8 MG/DL (ref 8.3–10.6)
CHLORIDE BLD-SCNC: 106 MMOL/L (ref 99–110)
CHOLESTEROL, TOTAL: 95 MG/DL (ref 0–199)
CO2: 24 MMOL/L (ref 21–32)
CREAT SERPL-MCNC: 1.1 MG/DL (ref 0.6–1.2)
EKG ATRIAL RATE: 83 BPM
EKG DIAGNOSIS: NORMAL
EKG P AXIS: 79 DEGREES
EKG P-R INTERVAL: 150 MS
EKG Q-T INTERVAL: 384 MS
EKG QRS DURATION: 112 MS
EKG QTC CALCULATION (BAZETT): 451 MS
EKG R AXIS: 71 DEGREES
EKG T AXIS: 124 DEGREES
EKG VENTRICULAR RATE: 83 BPM
GFR AFRICAN AMERICAN: 59
GFR NON-AFRICAN AMERICAN: 48
GLOBULIN: 2.9 G/DL
GLUCOSE BLD-MCNC: 154 MG/DL (ref 70–99)
GLUCOSE BLD-MCNC: 240 MG/DL (ref 70–99)
GLUCOSE BLD-MCNC: 252 MG/DL (ref 70–99)
GLUCOSE BLD-MCNC: 256 MG/DL (ref 70–99)
HCT VFR BLD CALC: 34.5 % (ref 36–48)
HDLC SERPL-MCNC: 29 MG/DL (ref 40–60)
HEMOGLOBIN: 11.5 G/DL (ref 12–16)
INR BLD: 1.04 (ref 0.86–1.14)
LDL CHOLESTEROL CALCULATED: 39 MG/DL
MCH RBC QN AUTO: 31.5 PG (ref 26–34)
MCHC RBC AUTO-ENTMCNC: 33.3 G/DL (ref 31–36)
MCV RBC AUTO: 94.6 FL (ref 80–100)
PDW BLD-RTO: 16.7 % (ref 12.4–15.4)
PERFORMED ON: ABNORMAL
PLATELET # BLD: 206 K/UL (ref 135–450)
PMV BLD AUTO: 7.9 FL (ref 5–10.5)
POC ACT LR: 226 SEC
POC ACT LR: 227 SEC
POC ACT LR: 230 SEC
POC ACT LR: 232 SEC
POC ACT LR: 243 SEC
POC ACT LR: 267 SEC
POC ACT LR: 268 SEC
POC ACT LR: 270 SEC
POC ACT LR: 275 SEC
POC ACT LR: 289 SEC
POC ACT LR: 297 SEC
POC ACT LR: 307 SEC
POC ACT LR: 308 SEC
POC ACT LR: 315 SEC
POC ACT LR: 322 SEC
POC ACT LR: 323 SEC
POC ACT LR: 325 SEC
POC ACT LR: 339 SEC
POC ACT LR: 380 SEC
POTASSIUM SERPL-SCNC: 4.2 MMOL/L (ref 3.5–5.1)
PROTHROMBIN TIME: 12.1 SEC (ref 10–13.2)
RBC # BLD: 3.65 M/UL (ref 4–5.2)
SODIUM BLD-SCNC: 139 MMOL/L (ref 136–145)
TOTAL PROTEIN: 6.8 G/DL (ref 6.4–8.2)
TRIGL SERPL-MCNC: 133 MG/DL (ref 0–150)
VLDLC SERPL CALC-MCNC: 27 MG/DL
WBC # BLD: 6 K/UL (ref 4–11)

## 2020-07-23 PROCEDURE — 85027 COMPLETE CBC AUTOMATED: CPT

## 2020-07-23 PROCEDURE — C1760 CLOSURE DEV, VASC: HCPCS

## 2020-07-23 PROCEDURE — 027137Z DILATION OF CORONARY ARTERY, TWO ARTERIES WITH FOUR OR MORE DRUG-ELUTING INTRALUMINAL DEVICES, PERCUTANEOUS APPROACH: ICD-10-PCS | Performed by: INTERNAL MEDICINE

## 2020-07-23 PROCEDURE — 2580000003 HC RX 258

## 2020-07-23 PROCEDURE — 6370000000 HC RX 637 (ALT 250 FOR IP): Performed by: INTERNAL MEDICINE

## 2020-07-23 PROCEDURE — 80053 COMPREHEN METABOLIC PANEL: CPT

## 2020-07-23 PROCEDURE — 2500000003 HC RX 250 WO HCPCS: Performed by: NURSE ANESTHETIST, CERTIFIED REGISTERED

## 2020-07-23 PROCEDURE — 02C03ZZ EXTIRPATION OF MATTER FROM CORONARY ARTERY, ONE ARTERY, PERCUTANEOUS APPROACH: ICD-10-PCS | Performed by: INTERNAL MEDICINE

## 2020-07-23 PROCEDURE — 2709999900 HC NON-CHARGEABLE SUPPLY

## 2020-07-23 PROCEDURE — 2700000000 HC OXYGEN THERAPY PER DAY

## 2020-07-23 PROCEDURE — C9602 PERC D-E COR STENT ATHER S: HCPCS

## 2020-07-23 PROCEDURE — 85347 COAGULATION TIME ACTIVATED: CPT

## 2020-07-23 PROCEDURE — 3700000001 HC ADD 15 MINUTES (ANESTHESIA)

## 2020-07-23 PROCEDURE — C1753 CATH, INTRAVAS ULTRASOUND: HCPCS

## 2020-07-23 PROCEDURE — 2720000010 HC SURG SUPPLY STERILE

## 2020-07-23 PROCEDURE — 80061 LIPID PANEL: CPT

## 2020-07-23 PROCEDURE — 2060000000 HC ICU INTERMEDIATE R&B

## 2020-07-23 PROCEDURE — 92978 ENDOLUMINL IVUS OCT C 1ST: CPT

## 2020-07-23 PROCEDURE — 92933 PRQ TRLML C ATHRC ST ANGIOP1: CPT | Performed by: INTERNAL MEDICINE

## 2020-07-23 PROCEDURE — 6360000002 HC RX W HCPCS

## 2020-07-23 PROCEDURE — 2500000003 HC RX 250 WO HCPCS

## 2020-07-23 PROCEDURE — 3700000000 HC ANESTHESIA ATTENDED CARE

## 2020-07-23 PROCEDURE — 7100000001 HC PACU RECOVERY - ADDTL 15 MIN

## 2020-07-23 PROCEDURE — 92978 ENDOLUMINL IVUS OCT C 1ST: CPT | Performed by: INTERNAL MEDICINE

## 2020-07-23 PROCEDURE — 7100000000 HC PACU RECOVERY - FIRST 15 MIN

## 2020-07-23 PROCEDURE — C1724 CATH, TRANS ATHEREC,ROTATION: HCPCS

## 2020-07-23 PROCEDURE — 2580000003 HC RX 258: Performed by: NURSE ANESTHETIST, CERTIFIED REGISTERED

## 2020-07-23 PROCEDURE — B240ZZ3 ULTRASONOGRAPHY OF SINGLE CORONARY ARTERY, INTRAVASCULAR: ICD-10-PCS | Performed by: INTERNAL MEDICINE

## 2020-07-23 PROCEDURE — 92928 PRQ TCAT PLMT NTRAC ST 1 LES: CPT | Performed by: INTERNAL MEDICINE

## 2020-07-23 PROCEDURE — 2580000003 HC RX 258: Performed by: INTERNAL MEDICINE

## 2020-07-23 PROCEDURE — C1769 GUIDE WIRE: HCPCS

## 2020-07-23 PROCEDURE — C1894 INTRO/SHEATH, NON-LASER: HCPCS

## 2020-07-23 PROCEDURE — 33990 INSJ PERQ VAD L HRT ARTERIAL: CPT

## 2020-07-23 PROCEDURE — C1887 CATHETER, GUIDING: HCPCS

## 2020-07-23 PROCEDURE — C1725 CATH, TRANSLUMIN NON-LASER: HCPCS

## 2020-07-23 PROCEDURE — C1874 STENT, COATED/COV W/DEL SYS: HCPCS

## 2020-07-23 PROCEDURE — 94761 N-INVAS EAR/PLS OXIMETRY MLT: CPT

## 2020-07-23 PROCEDURE — 92979 ENDOLUMINL IVUS OCT C EA: CPT

## 2020-07-23 PROCEDURE — 5A0221D ASSISTANCE WITH CARDIAC OUTPUT USING IMPELLER PUMP, CONTINUOUS: ICD-10-PCS | Performed by: INTERNAL MEDICINE

## 2020-07-23 PROCEDURE — C9600 PERC DRUG-EL COR STENT SING: HCPCS

## 2020-07-23 PROCEDURE — 6360000002 HC RX W HCPCS: Performed by: NURSE ANESTHETIST, CERTIFIED REGISTERED

## 2020-07-23 PROCEDURE — 85610 PROTHROMBIN TIME: CPT

## 2020-07-23 PROCEDURE — 02HA3RJ INSERTION OF SHORT-TERM EXTERNAL HEART ASSIST SYSTEM INTO HEART, INTRAOPERATIVE, PERCUTANEOUS APPROACH: ICD-10-PCS | Performed by: INTERNAL MEDICINE

## 2020-07-23 PROCEDURE — 6360000004 HC RX CONTRAST MEDICATION

## 2020-07-23 RX ORDER — SODIUM CHLORIDE 0.9 % (FLUSH) 0.9 %
10 SYRINGE (ML) INJECTION PRN
Status: DISCONTINUED | OUTPATIENT
Start: 2020-07-23 | End: 2020-07-25 | Stop reason: HOSPADM

## 2020-07-23 RX ORDER — ACETAMINOPHEN 650 MG/1
650 SUPPOSITORY RECTAL EVERY 6 HOURS PRN
Status: DISCONTINUED | OUTPATIENT
Start: 2020-07-23 | End: 2020-07-25 | Stop reason: HOSPADM

## 2020-07-23 RX ORDER — OXYCODONE HYDROCHLORIDE AND ACETAMINOPHEN 5; 325 MG/1; MG/1
2 TABLET ORAL PRN
Status: ACTIVE | OUTPATIENT
Start: 2020-07-23 | End: 2020-07-23

## 2020-07-23 RX ORDER — CLOPIDOGREL BISULFATE 75 MG/1
75 TABLET ORAL DAILY
Status: DISCONTINUED | OUTPATIENT
Start: 2020-07-24 | End: 2020-07-25 | Stop reason: HOSPADM

## 2020-07-23 RX ORDER — SODIUM CHLORIDE 9 MG/ML
INJECTION, SOLUTION INTRAVENOUS ONCE
Status: COMPLETED | OUTPATIENT
Start: 2020-07-23 | End: 2020-07-23

## 2020-07-23 RX ORDER — SODIUM CHLORIDE 0.9 % (FLUSH) 0.9 %
10 SYRINGE (ML) INJECTION EVERY 12 HOURS SCHEDULED
Status: DISCONTINUED | OUTPATIENT
Start: 2020-07-23 | End: 2020-07-25 | Stop reason: HOSPADM

## 2020-07-23 RX ORDER — HEPARIN SODIUM 1000 [USP'U]/ML
INJECTION, SOLUTION INTRAVENOUS; SUBCUTANEOUS
Status: COMPLETED | OUTPATIENT
Start: 2020-07-23 | End: 2020-07-23

## 2020-07-23 RX ORDER — ONDANSETRON 2 MG/ML
4 INJECTION INTRAMUSCULAR; INTRAVENOUS EVERY 10 MIN PRN
Status: DISCONTINUED | OUTPATIENT
Start: 2020-07-23 | End: 2020-07-25 | Stop reason: HOSPADM

## 2020-07-23 RX ORDER — ATORVASTATIN CALCIUM 40 MG/1
40 TABLET, FILM COATED ORAL DAILY
Status: DISCONTINUED | OUTPATIENT
Start: 2020-07-23 | End: 2020-07-25 | Stop reason: HOSPADM

## 2020-07-23 RX ORDER — ROCURONIUM BROMIDE 10 MG/ML
INJECTION, SOLUTION INTRAVENOUS PRN
Status: DISCONTINUED | OUTPATIENT
Start: 2020-07-23 | End: 2020-07-23 | Stop reason: SDUPTHER

## 2020-07-23 RX ORDER — FENTANYL CITRATE 50 UG/ML
INJECTION, SOLUTION INTRAMUSCULAR; INTRAVENOUS PRN
Status: DISCONTINUED | OUTPATIENT
Start: 2020-07-23 | End: 2020-07-23 | Stop reason: SDUPTHER

## 2020-07-23 RX ORDER — MEPERIDINE HYDROCHLORIDE 50 MG/ML
12.5 INJECTION INTRAMUSCULAR; INTRAVENOUS; SUBCUTANEOUS EVERY 5 MIN PRN
Status: DISCONTINUED | OUTPATIENT
Start: 2020-07-23 | End: 2020-07-25 | Stop reason: HOSPADM

## 2020-07-23 RX ORDER — SODIUM CHLORIDE 9 MG/ML
INJECTION, SOLUTION INTRAVENOUS
Status: COMPLETED
Start: 2020-07-23 | End: 2020-07-23

## 2020-07-23 RX ORDER — ONDANSETRON 2 MG/ML
INJECTION INTRAMUSCULAR; INTRAVENOUS PRN
Status: DISCONTINUED | OUTPATIENT
Start: 2020-07-23 | End: 2020-07-23 | Stop reason: SDUPTHER

## 2020-07-23 RX ORDER — DEXAMETHASONE SODIUM PHOSPHATE 4 MG/ML
INJECTION, SOLUTION INTRA-ARTICULAR; INTRALESIONAL; INTRAMUSCULAR; INTRAVENOUS; SOFT TISSUE PRN
Status: DISCONTINUED | OUTPATIENT
Start: 2020-07-23 | End: 2020-07-23 | Stop reason: SDUPTHER

## 2020-07-23 RX ORDER — ASPIRIN 81 MG/1
81 TABLET ORAL DAILY
Status: DISCONTINUED | OUTPATIENT
Start: 2020-07-24 | End: 2020-07-25 | Stop reason: HOSPADM

## 2020-07-23 RX ORDER — PROMETHAZINE HYDROCHLORIDE 25 MG/1
12.5 TABLET ORAL EVERY 6 HOURS PRN
Status: DISCONTINUED | OUTPATIENT
Start: 2020-07-23 | End: 2020-07-25 | Stop reason: HOSPADM

## 2020-07-23 RX ORDER — ACETAMINOPHEN 325 MG/1
650 TABLET ORAL EVERY 4 HOURS PRN
Status: DISCONTINUED | OUTPATIENT
Start: 2020-07-23 | End: 2020-07-25 | Stop reason: HOSPADM

## 2020-07-23 RX ORDER — METOPROLOL SUCCINATE 50 MG/1
50 TABLET, EXTENDED RELEASE ORAL DAILY
Status: DISCONTINUED | OUTPATIENT
Start: 2020-07-23 | End: 2020-07-24

## 2020-07-23 RX ORDER — ACETAMINOPHEN 325 MG/1
650 TABLET ORAL EVERY 6 HOURS PRN
Status: DISCONTINUED | OUTPATIENT
Start: 2020-07-23 | End: 2020-07-25 | Stop reason: HOSPADM

## 2020-07-23 RX ORDER — ALLOPURINOL 300 MG/1
300 TABLET ORAL DAILY
Status: DISCONTINUED | OUTPATIENT
Start: 2020-07-24 | End: 2020-07-25 | Stop reason: HOSPADM

## 2020-07-23 RX ORDER — POTASSIUM CHLORIDE 20 MEQ/1
40 TABLET, EXTENDED RELEASE ORAL 2 TIMES DAILY
Status: DISCONTINUED | OUTPATIENT
Start: 2020-07-23 | End: 2020-07-25

## 2020-07-23 RX ORDER — EPHEDRINE SULFATE 50 MG/ML
INJECTION INTRAVENOUS PRN
Status: DISCONTINUED | OUTPATIENT
Start: 2020-07-23 | End: 2020-07-23 | Stop reason: SDUPTHER

## 2020-07-23 RX ORDER — SODIUM CHLORIDE 9 MG/ML
INJECTION, SOLUTION INTRAVENOUS CONTINUOUS PRN
Status: DISCONTINUED | OUTPATIENT
Start: 2020-07-23 | End: 2020-07-23 | Stop reason: SDUPTHER

## 2020-07-23 RX ORDER — HYDRALAZINE HYDROCHLORIDE 20 MG/ML
5 INJECTION INTRAMUSCULAR; INTRAVENOUS EVERY 10 MIN PRN
Status: DISCONTINUED | OUTPATIENT
Start: 2020-07-23 | End: 2020-07-25 | Stop reason: HOSPADM

## 2020-07-23 RX ORDER — LABETALOL HYDROCHLORIDE 5 MG/ML
5 INJECTION, SOLUTION INTRAVENOUS EVERY 10 MIN PRN
Status: DISCONTINUED | OUTPATIENT
Start: 2020-07-23 | End: 2020-07-25 | Stop reason: HOSPADM

## 2020-07-23 RX ORDER — DEXTROSE MONOHYDRATE 25 G/50ML
12.5 INJECTION, SOLUTION INTRAVENOUS PRN
Status: DISCONTINUED | OUTPATIENT
Start: 2020-07-23 | End: 2020-07-25 | Stop reason: HOSPADM

## 2020-07-23 RX ORDER — NICOTINE POLACRILEX 4 MG
15 LOZENGE BUCCAL PRN
Status: DISCONTINUED | OUTPATIENT
Start: 2020-07-23 | End: 2020-07-25 | Stop reason: HOSPADM

## 2020-07-23 RX ORDER — NITROGLYCERIN 20 MG/100ML
INJECTION INTRAVENOUS CONTINUOUS PRN
Status: DISCONTINUED | OUTPATIENT
Start: 2020-07-23 | End: 2020-07-23

## 2020-07-23 RX ORDER — PROPOFOL 10 MG/ML
INJECTION, EMULSION INTRAVENOUS PRN
Status: DISCONTINUED | OUTPATIENT
Start: 2020-07-23 | End: 2020-07-23 | Stop reason: SDUPTHER

## 2020-07-23 RX ORDER — ONDANSETRON 2 MG/ML
4 INJECTION INTRAMUSCULAR; INTRAVENOUS EVERY 6 HOURS PRN
Status: DISCONTINUED | OUTPATIENT
Start: 2020-07-23 | End: 2020-07-25 | Stop reason: HOSPADM

## 2020-07-23 RX ORDER — FUROSEMIDE 40 MG/1
40 TABLET ORAL DAILY
Status: DISCONTINUED | OUTPATIENT
Start: 2020-07-23 | End: 2020-07-24

## 2020-07-23 RX ORDER — POLYETHYLENE GLYCOL 3350 17 G/17G
17 POWDER, FOR SOLUTION ORAL DAILY PRN
Status: DISCONTINUED | OUTPATIENT
Start: 2020-07-23 | End: 2020-07-25 | Stop reason: HOSPADM

## 2020-07-23 RX ORDER — LIDOCAINE HYDROCHLORIDE 20 MG/ML
INJECTION, SOLUTION INFILTRATION; PERINEURAL PRN
Status: DISCONTINUED | OUTPATIENT
Start: 2020-07-23 | End: 2020-07-23 | Stop reason: SDUPTHER

## 2020-07-23 RX ORDER — OXYCODONE HYDROCHLORIDE AND ACETAMINOPHEN 5; 325 MG/1; MG/1
1 TABLET ORAL PRN
Status: ACTIVE | OUTPATIENT
Start: 2020-07-23 | End: 2020-07-23

## 2020-07-23 RX ORDER — DEXTROSE MONOHYDRATE 50 MG/ML
100 INJECTION, SOLUTION INTRAVENOUS PRN
Status: DISCONTINUED | OUTPATIENT
Start: 2020-07-23 | End: 2020-07-25 | Stop reason: HOSPADM

## 2020-07-23 RX ADMIN — HEPARIN SODIUM 2000 UNITS: 1000 INJECTION, SOLUTION INTRAVENOUS; SUBCUTANEOUS at 10:36

## 2020-07-23 RX ADMIN — EPHEDRINE SULFATE 5 MG: 50 INJECTION INTRAVENOUS at 09:17

## 2020-07-23 RX ADMIN — ATORVASTATIN CALCIUM 40 MG: 40 TABLET, FILM COATED ORAL at 22:45

## 2020-07-23 RX ADMIN — POTASSIUM CHLORIDE 40 MEQ: 20 TABLET, EXTENDED RELEASE ORAL at 22:45

## 2020-07-23 RX ADMIN — DEXAMETHASONE SODIUM PHOSPHATE 8 MG: 4 INJECTION, SOLUTION INTRAMUSCULAR; INTRAVENOUS at 09:29

## 2020-07-23 RX ADMIN — ROCURONIUM BROMIDE 10 MG: 10 SOLUTION INTRAVENOUS at 13:07

## 2020-07-23 RX ADMIN — SODIUM CHLORIDE: 9 INJECTION, SOLUTION INTRAVENOUS at 09:18

## 2020-07-23 RX ADMIN — HEPARIN SODIUM 2000 UNITS: 1000 INJECTION, SOLUTION INTRAVENOUS; SUBCUTANEOUS at 09:45

## 2020-07-23 RX ADMIN — LIDOCAINE HYDROCHLORIDE 30 MG: 20 INJECTION, SOLUTION INFILTRATION; PERINEURAL at 09:11

## 2020-07-23 RX ADMIN — ROCURONIUM BROMIDE 10 MG: 10 SOLUTION INTRAVENOUS at 11:06

## 2020-07-23 RX ADMIN — SUGAMMADEX 200 MG: 100 INJECTION, SOLUTION INTRAVENOUS at 13:50

## 2020-07-23 RX ADMIN — EPHEDRINE SULFATE 5 MG: 50 INJECTION INTRAVENOUS at 09:19

## 2020-07-23 RX ADMIN — ONDANSETRON 4 MG: 2 INJECTION INTRAMUSCULAR; INTRAVENOUS at 09:28

## 2020-07-23 RX ADMIN — ROCURONIUM BROMIDE 10 MG: 10 SOLUTION INTRAVENOUS at 12:02

## 2020-07-23 RX ADMIN — FENTANYL CITRATE 100 MCG: 50 INJECTION INTRAMUSCULAR; INTRAVENOUS at 09:10

## 2020-07-23 RX ADMIN — SODIUM CHLORIDE: 9 INJECTION, SOLUTION INTRAVENOUS at 08:40

## 2020-07-23 RX ADMIN — Medication 10 ML: at 20:22

## 2020-07-23 RX ADMIN — PHENYLEPHRINE HYDROCHLORIDE 50 MCG/MIN: 10 INJECTION INTRAVENOUS at 09:17

## 2020-07-23 RX ADMIN — HEPARIN SODIUM 4000 UNITS: 1000 INJECTION, SOLUTION INTRAVENOUS; SUBCUTANEOUS at 09:37

## 2020-07-23 RX ADMIN — PROPOFOL 40 MG: 10 INJECTION, EMULSION INTRAVENOUS at 09:11

## 2020-07-23 RX ADMIN — METOPROLOL SUCCINATE 50 MG: 50 TABLET, EXTENDED RELEASE ORAL at 22:45

## 2020-07-23 RX ADMIN — HEPARIN SODIUM 2000 UNITS: 1000 INJECTION, SOLUTION INTRAVENOUS; SUBCUTANEOUS at 10:47

## 2020-07-23 RX ADMIN — HEPARIN SODIUM 1000 UNITS: 1000 INJECTION, SOLUTION INTRAVENOUS; SUBCUTANEOUS at 10:48

## 2020-07-23 RX ADMIN — ROCURONIUM BROMIDE 10 MG: 10 SOLUTION INTRAVENOUS at 10:08

## 2020-07-23 RX ADMIN — HEPARIN SODIUM 2000 UNITS: 1000 INJECTION, SOLUTION INTRAVENOUS; SUBCUTANEOUS at 12:11

## 2020-07-23 RX ADMIN — SODIUM CHLORIDE 1000 ML: 9 INJECTION, SOLUTION INTRAVENOUS at 20:36

## 2020-07-23 RX ADMIN — ROCURONIUM BROMIDE 50 MG: 10 SOLUTION INTRAVENOUS at 09:11

## 2020-07-23 RX ADMIN — FUROSEMIDE 40 MG: 40 TABLET ORAL at 22:44

## 2020-07-23 RX ADMIN — Medication 10 ML: at 20:10

## 2020-07-23 RX ADMIN — HEPARIN SODIUM 2000 UNITS: 1000 INJECTION, SOLUTION INTRAVENOUS; SUBCUTANEOUS at 09:53

## 2020-07-23 RX ADMIN — HEPARIN SODIUM 2000 UNITS: 1000 INJECTION, SOLUTION INTRAVENOUS; SUBCUTANEOUS at 11:12

## 2020-07-23 RX ADMIN — PROPOFOL 20 MG: 10 INJECTION, EMULSION INTRAVENOUS at 09:13

## 2020-07-23 RX ADMIN — HEPARIN SODIUM 3000 UNITS: 1000 INJECTION, SOLUTION INTRAVENOUS; SUBCUTANEOUS at 12:23

## 2020-07-23 RX ADMIN — HEPARIN SODIUM 1000 UNITS: 1000 INJECTION, SOLUTION INTRAVENOUS; SUBCUTANEOUS at 10:01

## 2020-07-23 RX ADMIN — HEPARIN SODIUM 1000 UNITS: 1000 INJECTION, SOLUTION INTRAVENOUS; SUBCUTANEOUS at 10:59

## 2020-07-23 ASSESSMENT — PULMONARY FUNCTION TESTS
PIF_VALUE: 20
PIF_VALUE: 23
PIF_VALUE: 24
PIF_VALUE: 22
PIF_VALUE: 24
PIF_VALUE: 22
PIF_VALUE: 25
PIF_VALUE: 0
PIF_VALUE: 24
PIF_VALUE: 22
PIF_VALUE: 23
PIF_VALUE: 24
PIF_VALUE: 24
PIF_VALUE: 0
PIF_VALUE: 24
PIF_VALUE: 23
PIF_VALUE: 22
PIF_VALUE: 0
PIF_VALUE: 23
PIF_VALUE: 23
PIF_VALUE: 24
PIF_VALUE: 23
PIF_VALUE: 23
PIF_VALUE: 22
PIF_VALUE: 24
PIF_VALUE: 22
PIF_VALUE: 24
PIF_VALUE: 23
PIF_VALUE: 22
PIF_VALUE: 23
PIF_VALUE: 22
PIF_VALUE: 24
PIF_VALUE: 22
PIF_VALUE: 23
PIF_VALUE: 24
PIF_VALUE: 24
PIF_VALUE: 0
PIF_VALUE: 23
PIF_VALUE: 23
PIF_VALUE: 24
PIF_VALUE: 23
PIF_VALUE: 22
PIF_VALUE: 24
PIF_VALUE: 22
PIF_VALUE: 0
PIF_VALUE: 13
PIF_VALUE: 23
PIF_VALUE: 24
PIF_VALUE: 20
PIF_VALUE: 22
PIF_VALUE: 23
PIF_VALUE: 24
PIF_VALUE: 22
PIF_VALUE: 23
PIF_VALUE: 23
PIF_VALUE: 2
PIF_VALUE: 23
PIF_VALUE: 24
PIF_VALUE: 23
PIF_VALUE: 23
PIF_VALUE: 24
PIF_VALUE: 24
PIF_VALUE: 22
PIF_VALUE: 24
PIF_VALUE: 0
PIF_VALUE: 23
PIF_VALUE: 20
PIF_VALUE: 23
PIF_VALUE: 24
PIF_VALUE: 23
PIF_VALUE: 23
PIF_VALUE: 24
PIF_VALUE: 23
PIF_VALUE: 2
PIF_VALUE: 23
PIF_VALUE: 20
PIF_VALUE: 23
PIF_VALUE: 23
PIF_VALUE: 22
PIF_VALUE: 24
PIF_VALUE: 23
PIF_VALUE: 23
PIF_VALUE: 26
PIF_VALUE: 23
PIF_VALUE: 20
PIF_VALUE: 23
PIF_VALUE: 23
PIF_VALUE: 1
PIF_VALUE: 23
PIF_VALUE: 27
PIF_VALUE: 23
PIF_VALUE: 24
PIF_VALUE: 24
PIF_VALUE: 23
PIF_VALUE: 22
PIF_VALUE: 24
PIF_VALUE: 23
PIF_VALUE: 22
PIF_VALUE: 24
PIF_VALUE: 2
PIF_VALUE: 23
PIF_VALUE: 24
PIF_VALUE: 23
PIF_VALUE: 0
PIF_VALUE: 23
PIF_VALUE: 23
PIF_VALUE: 24
PIF_VALUE: 22
PIF_VALUE: 23
PIF_VALUE: 24
PIF_VALUE: 22
PIF_VALUE: 18
PIF_VALUE: 23
PIF_VALUE: 24
PIF_VALUE: 22
PIF_VALUE: 23
PIF_VALUE: 24
PIF_VALUE: 22
PIF_VALUE: 23
PIF_VALUE: 22
PIF_VALUE: 23
PIF_VALUE: 19
PIF_VALUE: 2
PIF_VALUE: 22
PIF_VALUE: 24
PIF_VALUE: 23
PIF_VALUE: 23
PIF_VALUE: 24
PIF_VALUE: 23
PIF_VALUE: 20
PIF_VALUE: 23
PIF_VALUE: 22
PIF_VALUE: 23
PIF_VALUE: 24
PIF_VALUE: 23
PIF_VALUE: 24
PIF_VALUE: 23
PIF_VALUE: 22
PIF_VALUE: 23
PIF_VALUE: 22
PIF_VALUE: 22
PIF_VALUE: 24
PIF_VALUE: 23
PIF_VALUE: 22
PIF_VALUE: 23
PIF_VALUE: 0
PIF_VALUE: 23
PIF_VALUE: 22
PIF_VALUE: 23
PIF_VALUE: 23
PIF_VALUE: 24
PIF_VALUE: 23
PIF_VALUE: 20
PIF_VALUE: 23
PIF_VALUE: 25
PIF_VALUE: 23
PIF_VALUE: 23
PIF_VALUE: 24
PIF_VALUE: 23
PIF_VALUE: 23
PIF_VALUE: 22
PIF_VALUE: 23
PIF_VALUE: 24
PIF_VALUE: 23
PIF_VALUE: 24
PIF_VALUE: 22
PIF_VALUE: 22
PIF_VALUE: 24
PIF_VALUE: 20
PIF_VALUE: 23
PIF_VALUE: 23
PIF_VALUE: 22
PIF_VALUE: 24
PIF_VALUE: 30
PIF_VALUE: 21
PIF_VALUE: 24
PIF_VALUE: 24
PIF_VALUE: 23
PIF_VALUE: 20
PIF_VALUE: 23
PIF_VALUE: 22
PIF_VALUE: 24
PIF_VALUE: 24
PIF_VALUE: 23
PIF_VALUE: 0
PIF_VALUE: 0
PIF_VALUE: 23
PIF_VALUE: 23
PIF_VALUE: 24
PIF_VALUE: 22
PIF_VALUE: 23
PIF_VALUE: 23
PIF_VALUE: 24
PIF_VALUE: 24
PIF_VALUE: 23
PIF_VALUE: 22
PIF_VALUE: 23
PIF_VALUE: 24
PIF_VALUE: 24
PIF_VALUE: 22
PIF_VALUE: 23
PIF_VALUE: 24
PIF_VALUE: 24
PIF_VALUE: 23
PIF_VALUE: 19
PIF_VALUE: 22
PIF_VALUE: 23
PIF_VALUE: 23
PIF_VALUE: 24
PIF_VALUE: 24
PIF_VALUE: 22
PIF_VALUE: 23
PIF_VALUE: 24
PIF_VALUE: 23
PIF_VALUE: 23
PIF_VALUE: 19
PIF_VALUE: 23
PIF_VALUE: 23
PIF_VALUE: 24
PIF_VALUE: 22
PIF_VALUE: 22

## 2020-07-23 ASSESSMENT — PAIN SCALES - GENERAL
PAINLEVEL_OUTOF10: 0

## 2020-07-23 NOTE — ANESTHESIA PRE PROCEDURE
Department of Anesthesiology  Preprocedure Note       Name:  Esdras Hayden   Age:  76 y.o.  :  1945                                          MRN:  [id number]         Date:  2020      Surgeon: * Surgery not found *    Procedure:     Medications prior to admission:   Prior to Admission medications    Medication Sig Start Date End Date Taking? Authorizing Provider   metoprolol succinate (TOPROL XL) 50 MG extended release tablet Take 1 tablet by mouth daily 20  Yes Jarrell Reed MD   metFORMIN (GLUCOPHAGE-XR) 500 MG extended release tablet TAKE TWO TABLETS BY MOUTH TWICE A DAY    Hold med, resume it 2020  Yes Jarrell Reed MD   atorvastatin (LIPITOR) 40 MG tablet TAKE ONE TABLET BY MOUTH DAILY 20  Yes Lamont Hall MD   potassium chloride (KLOR-CON M) 10 MEQ extended release tablet Take 4 tablets by mouth 2 times daily 20  Yes Jarrell Reed MD   metOLazone (ZAROXOLYN) 2.5 MG tablet Take 1 tablet by mouth once a week Take once a week 30 minutes prior to furosemide 20  Yes Jarrell Reed MD   furosemide (LASIX) 40 MG tablet Take 1 tablet by mouth daily 20  Yes Jarrell Reed MD   clopidogrel (PLAVIX) 75 MG tablet Take 1 tablet by mouth daily 2/10/20  Yes LADAN Tavarez CNP   lisinopril (PRINIVIL;ZESTRIL) 2.5 MG tablet Take 2.5 mg by mouth 2 times daily    Yes Historical Provider, MD   dapagliflozin (FARXIGA) 5 MG tablet Take 5 mg by mouth every morning   Yes Historical Provider, MD   allopurinol (ZYLOPRIM) 300 MG tablet TAKE ONE TABLET BY MOUTH DAILY 19  Yes Lamont Hall MD   vitamin B-12 (CYANOCOBALAMIN) 500 MCG tablet Take 500 mcg by mouth daily   Yes Historical Provider, MD   calcium carbonate (OSCAL) 500 MG TABS tablet Take 500 mg by mouth daily   Yes Historical Provider, MD   aspirin 81 MG EC tablet Take 81 mg by mouth daily.    Yes Historical Provider, MD       Current medications:    Current Facility-Administered Medications Medication Dose Route Frequency Provider Last Rate Last Dose    0.9 % sodium chloride infusion   Intravenous Once Robin Alegre MD           Allergies:     Allergies   Allergen Reactions    Penicillins Anaphylaxis     Penicillin and derivatives       Problem List:    Patient Active Problem List   Diagnosis Code    Backache M54.9    Essential hypertension, benign I10    Osteoarthrosis involving lower leg M17.10    Gout M10.9    Osteoarthritis, hand M19.049    Osteopenia M85.80    Chest pain R07.9    Hyperlipidemia associated with type 2 diabetes mellitus ([facility] E11.69, E78.5    Major depressive disorder, recurrent episode, moderate ([facility]) F33.1    Primary insomnia F51.01    Type 2 diabetes mellitus with microalbuminuria, without long-term current use of insulin ([facility]) E11.29, R80.9    Rotator cuff tendinitis M75.80    Other restrictive cardiomyopathy ([facility] I42.5    Nonrheumatic mitral valve regurgitation I34.0    Chronic systolic CHF (congestive heart failure) ([facility]) I50.22    CAD, multiple vessel I25.10    Ischemic cardiomyopathy I25.5       Past Medical History:        Diagnosis Date    Anemia     Backache, unspecified 3/11/08    CAD, multiple vessel 1/6/2020    Chronic systolic CHF (congestive heart failure) ([facility] 12/11/2019    Depressive disorder, not elsewhere classified 11/8/07    Essential hypertension, benign 11/8/07    Gout 8/11/2011    Hyperlipidemia associated with type 2 diabetes mellitus ([address] 12/14/2015    Major depressive disorder, recurrent episode, moderate ([facility] 12/14/2015    Osteoarthritis, hand 2/27/2012    Osteoarthrosis, unspecified whether generalized or localized, lower leg 11/8/07    Osteopenia 8/5/2015    Other and unspecified hyperlipidemia 11/8/07    Pain in joint, lower leg 1/29/07    Pneumonia     Rotator cuff tendinitis 10/6/2017    Tear of medial cartilage or meniscus of knee, current 1/29/07    Type 2 diabetes mellitus with microalbuminuria, without long-term current use of insulin ([facility] 2017    Type 2 diabetes mellitus without complication ([facility]     Type II or unspecified type diabetes mellitus without mention of complication, not stated as uncontrolled 07    foot exam 08 normal       Past Surgical History:        Procedure Laterality Date    APPENDECTOMY      CHOLECYSTECTOMY      CORONARY ARTERY BYPASS GRAFT N/A 2020    CORONARY ARTERY BYPASS GRAFTING X3, INTERNAL MAMMARY ARTERY, SAPHENOUS VEIN GRAFTING, ON PUMP MITRAL VALVE RING REPAIR USING 26MM PELAEZ PHYSIO II RING, WITH LEFT ATRIAL APPENDAGE CLIP, PFO CLOSURETEE, 5 LEVEL BILATERAL INTERCOSTAL NERVE BLOCK performed by Beryl Soliman MD at [address] [facility] [address]      total ab hyst    PARATHYROID GLAND SURGERY      explore parathyroid glands    TOTAL KNEE ARTHROPLASTY  03/29/10    left knee       Social History:    Social History     Tobacco Use    Smoking status: Former Smoker     Packs/day: 0.50     Years: 20.00     Pack years: 10.00     Last attempt to quit: 2015     Years since quittin.6    Smokeless tobacco: Never Used   Substance Use Topics    Alcohol use: No     Alcohol/week: 0.0 standard drinks                                Counseling given: Not Answered      Vital Signs (Current):   Vitals:    20 0730   Weight: 125 lb 12.8 oz (57.1 kg)   Height: 5' (1.524 m)                                              BP Readings from Last 3 Encounters:   20 124/82   20 (!) 146/80   02/10/20 126/68       NPO Status:                                                                                 BMI:   Wt Readings from Last 3 Encounters:   20 125 lb 12.8 oz (57.1 kg)   20 125 lb (56.7 kg)   20 144 lb 8 oz (65.5 kg)     Body mass index is 24.57 kg/m².     CBC:   Lab Results   Component Value Date    WBC 6.0 2020    RBC 3.65 2020    HGB 11.5 2020    HCT 34.5 2020    MCV 94.6 2020    RDW 16.7 GERD, liver disease and no renal disease       Endo/Other:    (+) DiabetesType II DM, , .                 Abdominal:           Vascular: negative vascular ROS. Anesthesia Plan      general     ASA 4     (I discussed with the patient the risks and benefits of PIV, general anesthesia, IV Narcotics, PACU. All questions were answered the patient agrees with the plan)  Induction: intravenous. MIPS: Prophylactic antiemetics administered. Anesthetic plan and risks discussed with patient and spouse. Plan discussed with CRNA.                   Nayla Li MD   7/23/2020

## 2020-07-23 NOTE — PROCEDURES
CARDIAC CATHETERIZATION REPORT     Procedure Date:  2020  Patient Name: Baldomero Wyatt  MRN: [id number] : 1945      INDICATION     Ischemic  Cardiomyopathy    High risk ([facility]) PCI    PROCEDURES PERFORMED     Left heart catheterization  Coronary angiogam  Coronary cath  Temporary transvenous pacer insertion removal    Insertion of short term external heart assist system into heart, intraoperative, percutaneous approach  Assistance with cardiac output using impeller pump, continuous    Rotational atherectomy of LAD  IVUS of LAD  PCI of LAD with single drug-eluting stent    PCI of circumflex with 3 drug-eluting stents          PROCEDURE DESCRIPTION   Risks/benefits/alternatives/outcomes were discussed with patient and/or family and informed consent was obtained. Using ultrasound and fluoroscopic guidance, a 7 Algerian sheath was inserted into both common femoral arteries and an 8 Algerian sheath was inserted into the left common femoral vein. In the left femoral artery the short 7 [place]ian sheath was exchanged for a 45 cm destination 7 [place] sheath. Temporary transvenous pacer was inserted into the 8 [place]ian venous sheath and capture was demonstrated this was placed in backup mode for the procedure and then this was then removed at the end of the case. Attention turned towards the right groin where a pre-close technique was used with 2 Perclose devices into the right common femoral artery and then the right femoral artery was exchanged for the Impella CP sheath. A pigtail was advanced across the left ventricle and left heart catheterization was performed and then a 0.018 inch wire was advanced into the left ventricle and using this wire, the Impella device was inserted into the left ventricle and good cardiac outputs were noted. Attention turned towards PCI as noted below.   At the end of the case the Impella was removed and then the Impella sheath was also removed using a dry close technique with an 8 and then 9 mm balloon. .  There were no immediate complications. Sedation was provided by the anesthesiology service. 410 cc contrast was utilized. <20cc EBL. FINDINGS     Please see prior diagnostic cath report for findings, would add that there is severe LAD tortuosity and that the proximal circumflex was also to be severely diseased with 80 to 90% stenosis that extends in the mid vessel. Additionally would add that VIOLETTA was performed of the distal left main of the proximal LAD and this area was noted be diseased with a 50% stenosis. PERCUTANEOUS INTERVENTION DESCRIPTION     Heparin was used for anticoagulation, initially a 7 [place] XB 3.5 guide cath was used to intubate the left main. The LAD lesion was crossed with a [employer]  balloon catheter which was an over-the-wire system along with a choice floppy wire. The  balloon catheter was then advanced distally and the choice wire was removed in favor of a Rotafloppy wire. Rotational atherectomy was then performed with a 1.5 mm eugene. There was difficulty advancing the eugene and this was ultimately accomplished using Sammie glide mode. Lesion was then assessed with IVUS. It was felt to be a 2.5 mm vessel and as such a [employer] synergy 2.5 x 16 mm drug-eluting stent was implanted across the mid LAD lesion. Stent was then postdilated with a 2.5 mm noncompliant balloon. There was 0% residual stenosis. There was HAYDEN 3 flow before and after PCI. Attention then turned towards the circumflex and a choice floppy wire was taken but the circumflex could not be entered a lesion cannot be crossed due to severe angulation at the distal left main into the circumflex which was felt to be a 90 degree bend. Ultimately using a thread or microcatheter along with a PT2 mod support wire, the circumflex was entered and lesion was crossed.   Circumflex lesions were then dilated with a 2.5 mm balloon and then lesions in the proximal and mid circumflex were then stented with a [employer] synergy stents, these were from distal to proximal with 2.75 x 16 mm, 3 x 12 mm and 2.5 x 12 mm drug-eluting stents. There was significant difficulty with advancing stents into the circumflex due to the severe angulation beyond the left main and 2 stents did not cross and were discarded. Initially a 7 [place] guide extension catheter was utilized to assist with stent delivery but ultimately a 6 [place] guide extension catheter was utilized and this allowed for advancement of stents. Stents were postdilated with 3 mm noncompliant balloon. There was 0% residual stenosis. There was HAYDEN 3 flow before and after PCI. Attention then turned towards the RCA and a 7 [place] AL 0.75 guiding catheter was used to engage the RCA. RCA lesion could not be crossed using multiple wires including Fielder XT, PT 2 months support and  200 wires. It was felt that this lesion would require  intervention as a staged PCI.               CONCLUSIONS:     Successful rotational atherectomy and PCI of LAD with single drug-eluting stent  Successful PCI of circumflex with 3 drug-eluting stents

## 2020-07-23 NOTE — H&P
Hospital Medicine History & Physical      PCP: Ev Cardenas MD    Date of Admission: 7/23/2020    Date of Service: Pt seen/examined on 07/23/20 and Admitted to Inpatient with expected LOS greater than two midnights due to medical therapy. Chief Complaint:  [facility] for staged PCI      History Of Present Illness:    76 y.o. female who presented to [facility] for planned [facility]. Patient had a C several weeks ago with severe disease. A stage PCI was planned for today due to need for arterectomy. She had a total of 4 LULY placed. Patient had a CABG in January and has had declining heart function since then which prompted the [facility] a few weeks ago. Other than worsening WILKINSON, she has been largely asymptomatic. She denies any chest pain, nausea or diaphoresis.      Past Medical History:          Diagnosis Date    Anemia     Backache, unspecified 3/11/08    CAD, multiple vessel 1/6/2020    Chronic systolic CHF (congestive heart failure) ([address] 12/11/2019    Depressive disorder, not elsewhere classified 11/8/07    Essential hypertension, benign 11/8/07    Gout 8/11/2011    Hyperlipidemia associated with type 2 diabetes mellitus ([address] 12/14/2015    Major depressive disorder, recurrent episode, moderate ([address] 12/14/2015    Osteoarthritis, hand 2/27/2012    Osteoarthrosis, unspecified whether generalized or localized, lower leg 11/8/07    Osteopenia 8/5/2015    Other and unspecified hyperlipidemia 11/8/07    Pain in joint, lower leg 1/29/07    Pneumonia     Rotator cuff tendinitis 10/6/2017    Tear of medial cartilage or meniscus of knee, current 1/29/07    Type 2 diabetes mellitus with microalbuminuria, without long-term current use of insulin ([address] 5/5/2017    Type 2 diabetes mellitus without complication ([address] 48/0/5307    Type II or unspecified type diabetes mellitus without mention of complication, not stated as uncontrolled 11/8/07    foot exam 7/16/08 normal       Past Surgical History: Procedure Laterality Date    APPENDECTOMY      CHOLECYSTECTOMY      CORONARY ARTERY BYPASS GRAFT N/A 1/8/2020    CORONARY ARTERY BYPASS GRAFTING X3, INTERNAL MAMMARY ARTERY, SAPHENOUS VEIN GRAFTING, ON PUMP MITRAL VALVE RING REPAIR USING 26MM PELAEZ PHYSIO II RING, WITH LEFT ATRIAL APPENDAGE CLIP, PFO CLOSURETEE, 5 LEVEL BILATERAL INTERCOSTAL NERVE BLOCK performed by Gabino Thakur MD at [id number] [place] [address]      total ab hyst    PARATHYROID GLAND SURGERY      explore parathyroid glands    TOTAL KNEE ARTHROPLASTY  03/29/10    left knee       Medications Prior to Admission:      Prior to Admission medications    Medication Sig Start Date End Date Taking?  Authorizing Provider   metoprolol succinate (TOPROL XL) 50 MG extended release tablet Take 1 tablet by mouth daily 7/9/20  Yes Payal Gamlbe MD   metFORMIN (GLUCOPHAGE-XR) 500 MG extended release tablet TAKE TWO TABLETS BY MOUTH TWICE A DAY    Hold med, resume it 7/9/2020 7/6/20  Yes Payal Gamble MD   atorvastatin (LIPITOR) 40 MG tablet TAKE ONE TABLET BY MOUTH DAILY 6/22/20  Yes Maurice Ceron MD   potassium chloride (KLOR-CON M) 10 MEQ extended release tablet Take 4 tablets by mouth 2 times daily 5/29/20  Yes Payal Gamble MD   metOLazone (ZAROXOLYN) 2.5 MG tablet Take 1 tablet by mouth once a week Take once a week 30 minutes prior to furosemide 5/20/20  Yes Payal Gamble MD   furosemide (LASIX) 40 MG tablet Take 1 tablet by mouth daily 5/13/20  Yes Payal Gamble MD   clopidogrel (PLAVIX) 75 MG tablet Take 1 tablet by mouth daily 2/10/20  Yes LADAN Ashby CNP   lisinopril (PRINIVIL;ZESTRIL) 2.5 MG tablet Take 2.5 mg by mouth 2 times daily    Yes Historical Provider, MD   dapagliflozin (FARXIGA) 5 MG tablet Take 5 mg by mouth every morning   Yes Historical Provider, MD   allopurinol (ZYLOPRIM) 300 MG tablet TAKE ONE TABLET BY MOUTH DAILY 8/28/19  Yes Maurice Ceron MD   vitamin B-12 (CYANOCOBALAMIN) 500 MCG tablet Take 500 mcg by mouth daily   Yes Historical Provider, MD   calcium carbonate (OSCAL) 500 MG TABS tablet Take 500 mg by mouth daily   Yes Historical Provider, MD   aspirin 81 MG EC tablet Take 81 mg by mouth daily. Yes Historical Provider, MD       Allergies:  Penicillins    Social History:      The patient currently lives at home    TOBACCO:   reports that she quit smoking about 4 years ago. She has a 10.00 pack-year smoking history. She has never used smokeless tobacco.  ETOH:   reports no history of alcohol use. E-Cigarettes Vaping or Juuling     Questions Responses    Vaping Use Never User    Start Date     Does device contain nicotine? Never    Quit Date     Vaping Type             Family History:      Reviewed in detail and negative for CAD, CVA. Positive as follows:        Problem Relation Age of Onset   Donald Cancer Mother 68        colon    Diabetes Mother     Other Father         gun shot wound    Hypertension Sister     Diabetes Sister     High Blood Pressure Sister     High Cholesterol Sister     Cancer Brother 59        mets everywhere    Cancer Brother 77        colon    Diabetes Sister     Other Sister         bipolar       REVIEW OF SYSTEMS:   Pertinent positives as noted in the HPI. All other systems reviewed and negative. PHYSICAL EXAM PERFORMED:    /70   Pulse 94   Temp 97.2 °F (36.2 °C) (Temporal)   Resp (!) 0   Ht 5' (1.524 m)   Wt 125 lb 12.8 oz (57.1 kg)   SpO2 99%   BMI 24.57 kg/m²     General appearance:  No apparent distress, appears stated age and cooperative. HEENT:  Normal cephalic, atraumatic without obvious deformity. Pupils equal, round, and reactive to light. Extra ocular muscles intact. Conjunctivae/corneas clear. Neck: Supple, with full range of motion. No jugular venous distention. Trachea midline. Respiratory:  Normal respiratory effort. Clear to auscultation, bilaterally without Rales/Wheezes/Rhonchi.   Cardiovascular:  Regular rate and rhythm with normal S1/S2 without murmurs, rubs or gallops. Abdomen: Soft, non-tender, non-distended with normal bowel sounds. Musculoskeletal:  No clubbing, cyanosis or edema bilaterally. Full range of motion without deformity. Skin: Skin color, texture, turgor normal.  No rashes or lesions. Neurologic:  Neurovascularly intact without any focal sensory/motor deficits. Cranial nerves: II-XII intact, grossly non-focal.  Psychiatric:  Alert and oriented, thought content appropriate, normal insight  Capillary Refill: Brisk,< 3 seconds   Peripheral Pulses: +2 palpable, equal bilaterally       Labs:     Recent Labs     07/23/20  0732   WBC 6.0   HGB 11.5*   HCT 34.5*        Recent Labs     07/23/20  0732      K 4.2      CO2 24   BUN 23*   CREATININE 1.1   CALCIUM 9.8     Recent Labs     07/23/20  0732   AST 15   ALT 19   BILITOT 0.6   ALKPHOS 115     Recent Labs     07/23/20  0732   INR 1.04     No results for input(s): Towana Jose Maria in the last 72 hours. Urinalysis:      Lab Results   Component Value Date    NITRU Negative 01/07/2020    WBCUA 10-20 01/07/2020    BACTERIA 1+ 01/07/2020    RBCUA 5-10 01/07/2020    BLOODU Negative 01/07/2020    SPECGRAV 1.015 01/07/2020    GLUCOSEU Negative 01/07/2020       Radiology:     CXR: I have reviewed the CXR with the following interpretation: none  EKG:  I have reviewed the EKG with the following interpretation: NSR, LBBB    No orders to display       ASSESSMENT:    Active Hospital Problems    Diagnosis Date Noted    S/P angioplasty with stent [Z95.820] 07/23/2020         PLAN:  CAD  - LHC today with 4 LULY  - cardiology consulted  - prior CABG in January  - continue ASA, plavix, statin, BB    Chronic systolic heart failure  - appears euvolemic  - last echo 6/20 with EF 15-20%, inferior akinesis  - continue home lasix, metoprolol. Holding ACE due to recent LHC.  Will likely restart tomorrow if Cr normal    DMII  - well controlled  - holding home oral meds  - SSI ordered    HTN  - well controlled  - continue home regimen    HLD  - continue home statin    Gout  - no flare noted  - continue allopurinol    DVT Prophylaxis: lovenox  Diet: DIET CARDIAC;  Code Status: Full Code    PT/OT Eval Status: not ordered    Dispo - 1-2 days       Azeem Reyes MD    Thank you Fabby Roche MD for the opportunity to be involved in this patient's care. If you have any questions or concerns please feel free to contact me at [phone].

## 2020-07-23 NOTE — PROGRESS NOTES
4 Eyes Skin Assessment     The patient is being assess for   Admission    I agree that 2 RN's have performed a thorough Head to Toe Skin Assessment on the patient. ALL assessment sites listed below have been assessed. Areas assessed by both nurses:   [x]   Head, Face, and Ears   [x]   Shoulders, Back, and Chest, Abdomen  [x]   Arms, Elbows, and Hands   [x]   Coccyx, Sacrum, and Ischium  [x]   Legs, Feet, and Heels        ****    **SHARE this note so that the co-signing nurse is able to place an eSignature**    Co-signer eSignature: {Esignature:[id number]}    Does the Patient have Skin Breakdown?   No          Alek Prevention initiated:  No   Wound Care Orders initiated:  No      WOC nurse consulted for Pressure Injury (Stage 3,4, Unstageable, DTI, NWPT, Complex wounds)and New or Established Ostomies:  No      Primary Nurse eSignature: Electronically signed by Joann Mcbride RN on 7/23/20 at 6:26 PM EDT

## 2020-07-23 NOTE — H&P
Brief Pre-Op Note/Sedation Assessment      Cheryll Sandifer  [birth year]  Cath Pool Rm/NONE      [id number]  7:39 AM    Planned Procedure: Cardiac Catheterization Procedure    Post Procedure Plan: Return to same level of care    Consent: I have discussed with the patient and/or the patient representative the indication, alternatives, and the possible risks and/or complications of the planned procedure and the anesthesia methods. The patient and/or patient representative appear to understand and agree to proceed. DISCUSSION OF CARDIAC CATHETERIZATION PROCEDURES: The procedures, indications, risks and alternatives have been discussed with the patient and, as appropriate, with the patient's guardian . Risks discussed included, but are not limited to, bleeding, development of blood clots/emboli, damage to blood vessels, renal failure, malignant cardiac arrhythmias, stroke, heart attack, emergent coronary bypass surgery, death, dye allergy. The patient (and guardian as appropriate) expressed understanding of the aforementioned and wished to proceed. Pt and family understand this is a high risk procedure. Requiring specialized cardiac devices/intervention such as atherectomy and anaesthesia support and that there are high risks for complications, they understand r/b/a/o and wish to proceed. Pt and family understand this is an urgent procedure, she has advanced HF/cad, ongoing sob, I do not think her procedure is elective but rather is urgent due to these findings. Chief Complaint: Dyspnea      Chief Complaint: Dyspnea, may be anginal equivalent        Indications for Cath Procedure:  Cardiomyopathy  Anginal Classification within 2 weeks:  CCS III - Symptoms with everyday living activities, i.e., moderate limitation  NYHA Heart Failure Class within 2 weeks: Class III - Symptoms of HF on less-than-ordinary exertion, Newly Diagnosed?  No, Heart Failure Type: Systolic  Is Cath Lab Visit Valve-related?: INTERCOSTAL NERVE BLOCK performed by Levon Drpaer MD at [address]      total ab hyst    PARATHYROID GLAND SURGERY      explore parathyroid glands    TOTAL KNEE ARTHROPLASTY  03/29/10    left knee         Medications:  Current Facility-Administered Medications   Medication Dose Route Frequency Provider Last Rate Last Dose    0.9 % sodium chloride infusion   Intravenous Once Sierra Meredith MD             Pre-Sedation:     Pre-Sedation Documentation and Exam:  I have personally completed a history, physical exam & review of systems for this patient (see notes). Saw pt in office recently.     Pt seen in office, had wt gain, diuresed, also had noninvasive w/u which was abnl for CM. Has improved w/ regard to wt and swelling, f/u u/s was neg for clot.        Prior History of Anesthesia Complications:   none     Modified Mallampati:  III (soft palate, base of uvula visible)     ASA Classification:  Class 3 - A patient with severe systemic disease that limits activity but is not incapacitating        Nathan Scale: Activity:  2 - Able to move 4 extremities voluntarily on command  Respiration:  2 - Able to breathe deeply and cough freely  Circulation:  2 - BP+/- 20mmHg of normal  Consciousness:  2 - Fully awake  Oxygen Saturation (color):  2 - Able to maintain oxygen saturation >92% on room air     Sedation/Anesthesia Plan:  Guard the patient's safety and welfare. Minimize physical discomfort and pain. Minimize negative psychological responses to treatment by providing sedation and analgesia and maximize the potential amnesia. Patient to meet pre-procedure discharge plan.     Medication Planned:  As per anaesthesia svc    Electronically signed by Sierra Meredith MD on 7/23/2020 at 7:39 AM

## 2020-07-23 NOTE — PROGRESS NOTES
Received report from Sandro Morales CRNA and Dinora Yañez, [address] [place] [address]. VSS. Sp02 100 %. Rt groin with dressing c,d, & I. Left groin with arterial and venous sheaths intact with dressing c,d, & I. Pulses doppler to rt/lt post tibs and pedals. Will continue to monitor.

## 2020-07-23 NOTE — FLOWSHEET NOTE
07/23/20 1630   Assessment   Charting Type Admission   Neurological   Neuro (WDL) WDL   Level of Consciousness 0   Elio Coma Scale   Eye Opening 4   Best Verbal Response 5   Best Motor Response 6   [place] Coma Scale Score 15   HEENT   HEENT (WDL) X   Right Eye Intact; Impaired vision   Left Eye Intact; Impaired vision   Mucous Membrane Moist;Pink; Intact   Teeth Dentures upper;Dentures lower;Edentulous   Voice Normal   Respiratory   Respiratory (WDL) WDL   Cardiac   Cardiac (WDL) X  (Staged PCI)   Cardiac Regularity Regular   Heart Sounds S1, S2   Cardiac Rhythm NSR   Gastrointestinal   Abdominal (WDL) WDL   Peripheral Vascular   Peripheral Vascular (WDL) WDL   Edema None   RLE Neurovascular Assessment   Capillary Refill Less than/equal to 3 seconds   Color Appropriate for ethnicity   Temperature Warm   R Pedal Pulse Doppler   LLE Neurovascular Assessment   Capillary Refill Less than/equal to 3 seconds   Color Appropriate for ethnicity   Temperature Warm   L Pedal Pulse Doppler   Puncture Site Assessment 1   Location Femoral - right   Site Assessment No redness, drainage, swelling or hematoma   Dressing Applied Transparent occlusive dressing   Multiple puncture sites Yes   Location 2 Femoral-left   Site Assessment 2 Oozing  (Slow, arterial and venous sheaths in place)   Skin Color/Condition   Skin Color/Condition (WDL) WDL   Skin Integrity   Skin Integrity (WDL) WDL   Musculoskeletal   Musculoskeletal (WDL) WDL   Genitourinary   Genitourinary (WDL) X  (Gagnon)   Anus/Rectum   Anus/Rectum (WDL) WDL   Psychosocial   Psychosocial (WDL) WDL

## 2020-07-23 NOTE — PROGRESS NOTES
Report given to Néstor Barrow RN for transfer of care. Pt had oozing site to left groin. No  Hematoma present. Manual pressure placed over site. R/L doppler to pedals and post tib. Pt has no c/o any.

## 2020-07-23 NOTE — PROGRESS NOTES
Patient admitted to room 441 from PACU, report received from [facility] in cath lab and Sue Jc RN in PACU. Pt in supine position with left arterial and venous sheaths in place. ACT to be checked. Pt in stable condition. Patient oriented to room, call light, bed rails, phone, lights and bathroom. Patient instructed about the schedule of the day including: vital sign frequency, lab draws, possible tests, frequency of MD and staff rounds, including RN/MD rounding together at bedside, daily weights, and I &O's. Patient instructed about prescribed diet, how to use 8MENU, and television. Bed alarm in place, patient aware of placement and reason. Telemetry box in place, patient aware of placement and reason. Bed locked, in lowest position, side rails up 2/4, call light within reach. Will continue to monitor.

## 2020-07-23 NOTE — PROGRESS NOTES
. Will continue to monitor. Groin sites soft, no evidence of oozing, bruising, hematoma, or swelling.

## 2020-07-24 LAB
ANION GAP SERPL CALCULATED.3IONS-SCNC: 10 MMOL/L (ref 3–16)
ANION GAP SERPL CALCULATED.3IONS-SCNC: 11 MMOL/L (ref 3–16)
BUN BLDV-MCNC: 28 MG/DL (ref 7–20)
BUN BLDV-MCNC: 31 MG/DL (ref 7–20)
CALCIUM SERPL-MCNC: 8.8 MG/DL (ref 8.3–10.6)
CALCIUM SERPL-MCNC: 8.8 MG/DL (ref 8.3–10.6)
CHLORIDE BLD-SCNC: 106 MMOL/L (ref 99–110)
CHLORIDE BLD-SCNC: 110 MMOL/L (ref 99–110)
CO2: 17 MMOL/L (ref 21–32)
CO2: 19 MMOL/L (ref 21–32)
CREAT SERPL-MCNC: 1.3 MG/DL (ref 0.6–1.2)
CREAT SERPL-MCNC: 1.5 MG/DL (ref 0.6–1.2)
EKG ATRIAL RATE: 99 BPM
EKG DIAGNOSIS: NORMAL
EKG P AXIS: 78 DEGREES
EKG P-R INTERVAL: 150 MS
EKG Q-T INTERVAL: 362 MS
EKG QRS DURATION: 114 MS
EKG QTC CALCULATION (BAZETT): 464 MS
EKG R AXIS: 42 DEGREES
EKG T AXIS: 155 DEGREES
EKG VENTRICULAR RATE: 99 BPM
ESTIMATED AVERAGE GLUCOSE: 157.1 MG/DL
GFR AFRICAN AMERICAN: 41
GFR AFRICAN AMERICAN: 48
GFR NON-AFRICAN AMERICAN: 34
GFR NON-AFRICAN AMERICAN: 40
GLUCOSE BLD-MCNC: 160 MG/DL (ref 70–99)
GLUCOSE BLD-MCNC: 175 MG/DL (ref 70–99)
GLUCOSE BLD-MCNC: 195 MG/DL (ref 70–99)
GLUCOSE BLD-MCNC: 197 MG/DL (ref 70–99)
GLUCOSE BLD-MCNC: 203 MG/DL (ref 70–99)
GLUCOSE BLD-MCNC: 277 MG/DL (ref 70–99)
HBA1C MFR BLD: 7.1 %
HCT VFR BLD CALC: 29.5 % (ref 36–48)
HEMOGLOBIN: 9.8 G/DL (ref 12–16)
MCH RBC QN AUTO: 31.8 PG (ref 26–34)
MCHC RBC AUTO-ENTMCNC: 33.2 G/DL (ref 31–36)
MCV RBC AUTO: 95.6 FL (ref 80–100)
PDW BLD-RTO: 17.2 % (ref 12.4–15.4)
PERFORMED ON: ABNORMAL
PLATELET # BLD: 191 K/UL (ref 135–450)
PMV BLD AUTO: 8.1 FL (ref 5–10.5)
POC ACT LR: 152 SEC
POC ACT LR: 185 SEC
POC ACT LR: 194 SEC
POC ACT LR: 195 SEC
POC ACT LR: >400 SEC
POTASSIUM REFLEX MAGNESIUM: 5.7 MMOL/L (ref 3.5–5.1)
POTASSIUM SERPL-SCNC: 5.3 MMOL/L (ref 3.5–5.1)
POTASSIUM SERPL-SCNC: 5.7 MMOL/L (ref 3.5–5.1)
RBC # BLD: 3.08 M/UL (ref 4–5.2)
SODIUM BLD-SCNC: 136 MMOL/L (ref 136–145)
SODIUM BLD-SCNC: 137 MMOL/L (ref 136–145)
WBC # BLD: 9.5 K/UL (ref 4–11)

## 2020-07-24 PROCEDURE — 2580000003 HC RX 258: Performed by: INTERNAL MEDICINE

## 2020-07-24 PROCEDURE — 99232 SBSQ HOSP IP/OBS MODERATE 35: CPT | Performed by: INTERNAL MEDICINE

## 2020-07-24 PROCEDURE — 36415 COLL VENOUS BLD VENIPUNCTURE: CPT

## 2020-07-24 PROCEDURE — 80048 BASIC METABOLIC PNL TOTAL CA: CPT

## 2020-07-24 PROCEDURE — 6370000000 HC RX 637 (ALT 250 FOR IP): Performed by: INTERNAL MEDICINE

## 2020-07-24 PROCEDURE — 83036 HEMOGLOBIN GLYCOSYLATED A1C: CPT

## 2020-07-24 PROCEDURE — 6360000002 HC RX W HCPCS: Performed by: INTERNAL MEDICINE

## 2020-07-24 PROCEDURE — 93005 ELECTROCARDIOGRAM TRACING: CPT | Performed by: INTERNAL MEDICINE

## 2020-07-24 PROCEDURE — 85027 COMPLETE CBC AUTOMATED: CPT

## 2020-07-24 PROCEDURE — 2060000000 HC ICU INTERMEDIATE R&B

## 2020-07-24 RX ORDER — SODIUM CHLORIDE 9 MG/ML
INJECTION, SOLUTION INTRAVENOUS CONTINUOUS
Status: DISCONTINUED | OUTPATIENT
Start: 2020-07-24 | End: 2020-07-25 | Stop reason: HOSPADM

## 2020-07-24 RX ORDER — SODIUM CHLORIDE 9 MG/ML
INJECTION, SOLUTION INTRAVENOUS CONTINUOUS
Status: DISPENSED | OUTPATIENT
Start: 2020-07-24 | End: 2020-07-25

## 2020-07-24 RX ORDER — DIGOXIN 125 MCG
125 TABLET ORAL EVERY OTHER DAY
Status: DISCONTINUED | OUTPATIENT
Start: 2020-07-24 | End: 2020-07-25 | Stop reason: HOSPADM

## 2020-07-24 RX ADMIN — INSULIN LISPRO 1 UNITS: 100 INJECTION, SOLUTION INTRAVENOUS; SUBCUTANEOUS at 20:28

## 2020-07-24 RX ADMIN — ENOXAPARIN SODIUM 40 MG: 40 INJECTION SUBCUTANEOUS at 09:39

## 2020-07-24 RX ADMIN — ALLOPURINOL 300 MG: 300 TABLET ORAL at 09:33

## 2020-07-24 RX ADMIN — DIGOXIN 125 MCG: 125 TABLET ORAL at 09:33

## 2020-07-24 RX ADMIN — INSULIN LISPRO 3 UNITS: 100 INJECTION, SOLUTION INTRAVENOUS; SUBCUTANEOUS at 12:00

## 2020-07-24 RX ADMIN — Medication 10 ML: at 20:27

## 2020-07-24 RX ADMIN — INSULIN LISPRO 1 UNITS: 100 INJECTION, SOLUTION INTRAVENOUS; SUBCUTANEOUS at 09:39

## 2020-07-24 RX ADMIN — ASPIRIN 81 MG: 81 TABLET ORAL at 09:33

## 2020-07-24 RX ADMIN — Medication 10 ML: at 09:38

## 2020-07-24 RX ADMIN — CLOPIDOGREL BISULFATE 75 MG: 75 TABLET ORAL at 09:33

## 2020-07-24 RX ADMIN — SODIUM CHLORIDE: 9 INJECTION, SOLUTION INTRAVENOUS at 16:38

## 2020-07-24 RX ADMIN — INSULIN LISPRO 1 UNITS: 100 INJECTION, SOLUTION INTRAVENOUS; SUBCUTANEOUS at 16:39

## 2020-07-24 RX ADMIN — ATORVASTATIN CALCIUM 40 MG: 40 TABLET, FILM COATED ORAL at 09:33

## 2020-07-24 RX ADMIN — POTASSIUM CHLORIDE 40 MEQ: 20 TABLET, EXTENDED RELEASE ORAL at 20:27

## 2020-07-24 RX ADMIN — METOPROLOL SUCCINATE 75 MG: 50 TABLET, EXTENDED RELEASE ORAL at 09:33

## 2020-07-24 RX ADMIN — Medication 10 ML: at 20:28

## 2020-07-24 RX ADMIN — Medication 10 ML: at 09:37

## 2020-07-24 ASSESSMENT — PAIN SCALES - GENERAL
PAINLEVEL_OUTOF10: 0

## 2020-07-24 NOTE — PROGRESS NOTES
BMI 23.30 kg/m²     General appearance:  No apparent distress, appears stated age and cooperative. HEENT:  Normal cephalic, atraumatic without obvious deformity. Pupils equal, round, and reactive to light. Extra ocular muscles intact. Conjunctivae/corneas clear. Neck: Supple, with full range of motion. No jugular venous distention. Trachea midline. Respiratory:  Normal respiratory effort. Clear to auscultation, bilaterally without Rales/Wheezes/Rhonchi. Cardiovascular:  Regular rate and rhythm with normal S1/S2 without murmurs, rubs or gallops. Abdomen: Soft, non-tender, non-distended with normal bowel sounds. Musculoskeletal:  No clubbing, cyanosis or edema bilaterally. Full range of motion without deformity. Skin: Skin color, texture, turgor normal.  No rashes or lesions. Neurologic:  Neurovascularly intact without any focal sensory/motor deficits. Cranial nerves: II-XII intact, grossly non-focal.  Psychiatric:  Alert and oriented, thought content appropriate, normal insight  Capillary Refill: Brisk,< 3 seconds   Peripheral Pulses: +2 palpable, equal bilaterally     Labs:   Recent Labs     07/23/20  0732 07/24/20  0529   WBC 6.0 9.5   HGB 11.5* 9.8*   HCT 34.5* 29.5*    191     Recent Labs     07/23/20  0732 07/24/20  0529 07/24/20  1400    137 136   K 4.2 5.7*  5.7* 5.3*    110 106   CO2 24 17* 19*   BUN 23* 28* 31*   CREATININE 1.1 1.3* 1.5*   CALCIUM 9.8 8.8 8.8     Recent Labs     07/23/20  0732   AST 15   ALT 19   BILITOT 0.6   ALKPHOS 115     Recent Labs     07/23/20  0732   INR 1.04     No results for input(s): CKTOTAL, TROPONINI in the last 72 hours.     Urinalysis:      Lab Results   Component Value Date    NITRU Negative 01/07/2020    WBCUA 10-20 01/07/2020    BACTERIA 1+ 01/07/2020    RBCUA 5-10 01/07/2020    BLOODU Negative 01/07/2020    SPECGRAV 1.015 01/07/2020    GLUCOSEU Negative 01/07/2020       Radiology:  No orders to display           Assessment/Plan:    Active Hospital Problems    Diagnosis    S/P angioplasty with stent [Z95.820]     CAD  - [facility] today with 4 LULY  - cardiology consulted  - prior CABG in January  - continue ASA, plavix, statin, BB     LANCE  - mild, 2/2 contrast from [facility]  - restarted IVF at low rate  - holding home lasix, lisinopril    Chronic systolic heart failure  - appears euvolemic  - last echo 6/20 with EF 15-20%, inferior akinesis  - continue home metoprolol.  Holding ACE, lasix due to LANCE     DMII  - well controlled  - holding home oral meds  - SSI ordered     HTN  - well controlled  - continue home regimen     HLD  - continue home statin     Gout  - no flare noted  - continue allopurinol    DVT Prophylaxis: lovenox  Diet: DIET CARDIAC;  Code Status: Full Code    PT/OT Eval Status: not ordered    Dispo - home tomorrow if Cr improved    Azeem Reyes MD

## 2020-07-24 NOTE — CARE COORDINATION
CASE MANAGEMENT INITIAL ASSESSMENT      Reviewed chart and completed assessment with: patient   Explained Case Management role/services. Primary contact information: Yas Bryan [phone]    Admit date/status: 7/23/20 Inpatient   Diagnosis: LHC for staged PCI  Is this a Readmission?: no    Insurance: [address] required for SNF - Y        3 night stay required - N    Living arrangements, Adls, care needs, prior to admission: lives in a mobile home with her      Transportation: patient     [address] at home: Walker__Cane_X (only uses occasionally due to hip pain)_RTS__ BSC__Shower Chair__  02__ HHN__ CPAP__  BiPap__  Hospital Bed__ W/C___ Other__________    Services in the home and/or outpatient, prior to admission: none    Dialysis Facility (if applicable)   · Name:  · Address:  · Dialysis Schedule:  · Phone:  · Fax:    PT/OT recs: none    Hospital Exemption Notification (HEN): not initiated     Barriers to discharge: none    Plan/comments: Patient is independent at home and normally still works at [employer]. She states she has had home care in the past but does not feel the need to have it again. Will continue to follow should any needs arise.

## 2020-07-24 NOTE — PROGRESS NOTES
Pt did not eat any of her breakfast today. She drank her coffee and milk. I offered to order her something else and she declined stating that she is not hungry and she will eat when she is.

## 2020-07-24 NOTE — PROGRESS NOTES
[facility]   Daily Cardiovascular Progress Note    Admit Date: 7/23/2020    Chief complaint: Dyspnea  HPI:     Patient presented for heart catheterization yesterday on July 23, 2020, this was a high risk PCI due to severe LV dysfunction and she required Impella support. She underwent two-vessel intervention successfully and today has no complaints of chest pain or shortness of breath and feels well and wants to go home.       Medications/Labs all Reviewed:  Patient Active Problem List   Diagnosis    Backache    Essential hypertension, benign    Osteoarthrosis involving lower leg    Gout    Osteoarthritis, hand    Osteopenia    Chest pain    Hyperlipidemia associated with type 2 diabetes mellitus ([facility] [address]    Major depressive disorder, recurrent episode, moderate ([facility])    Primary insomnia    Type 2 diabetes mellitus with microalbuminuria, without long-term current use of insulin ([facility])    Rotator cuff tendinitis    Other restrictive cardiomyopathy ([facility])    Nonrheumatic mitral valve regurgitation    Chronic systolic CHF (congestive heart failure) ([facility])    CAD, multiple vessel    Ischemic cardiomyopathy    S/P angioplasty with stent       Medications:  metoprolol succinate (TOPROL XL) extended release tablet 75 mg, Daily  digoxin (LANOXIN) tablet 125 mcg, Every Other Day  HYDROmorphone (DILAUDID) injection 0.25 mg, Q5 Min PRN  HYDROmorphone (DILAUDID) injection 0.5 mg, Q5 Min PRN  HYDROmorphone (DILAUDID) injection 0.25 mg, Q5 Min PRN  HYDROmorphone (DILAUDID) injection 0.5 mg, Q5 Min PRN  ondansetron (ZOFRAN) injection 4 mg, Q10 Min PRN  labetalol (NORMODYNE;TRANDATE) injection 5 mg, Q10 Min PRN  hydrALAZINE (APRESOLINE) injection 5 mg, Q10 Min PRN  meperidine (DEMEROL) injection 12.5 mg, Q5 Min PRN  sodium chloride flush 0.9 % injection 10 mL, 2 times per day  sodium chloride flush 0.9 % injection 10 mL, PRN  acetaminophen (TYLENOL) tablet 650 mg, Q4H PRN  allopurinol (ZYLOPRIM) tablet 300 mg, Daily  aspirin EC tablet 81 mg, Daily  atorvastatin (LIPITOR) tablet 40 mg, Daily  clopidogrel (PLAVIX) tablet 75 mg, Daily  potassium chloride (KLOR-CON M) extended release tablet 40 mEq, BID  sodium chloride flush 0.9 % injection 10 mL, 2 times per day  sodium chloride flush 0.9 % injection 10 mL, PRN  acetaminophen (TYLENOL) tablet 650 mg, Q6H PRN    Or  acetaminophen (TYLENOL) suppository 650 mg, Q6H PRN  polyethylene glycol (GLYCOLAX) packet 17 g, Daily PRN  promethazine (PHENERGAN) tablet 12.5 mg, Q6H PRN    Or  ondansetron (ZOFRAN) injection 4 mg, Q6H PRN  enoxaparin (LOVENOX) injection 40 mg, Daily  glucose (GLUTOSE) 40 % oral gel 15 g, PRN  dextrose 50 % IV solution, PRN  glucagon (rDNA) injection 1 mg, PRN  dextrose 5 % solution, PRN  insulin lispro (HUMALOG) injection vial 0-6 Units, TID WC  insulin lispro (HUMALOG) injection vial 0-3 Units, Nightly           PHYSICAL EXAM   /71   Pulse 101   Temp 98.1 °F (36.7 °C) (Oral)   Resp 16   Ht 5' (1.524 m)   Wt 119 lb 4.8 oz (54.1 kg)   SpO2 97%   BMI 23.30 kg/m²    Vitals:    07/24/20 0130 07/24/20 0215 07/24/20 0230 07/24/20 0516   BP: 111/74 115/74 112/71    Pulse: 98 97 101    Resp: 16 16 16    Temp:       TempSrc:       SpO2: 99% 97% 97%    Weight:    119 lb 4.8 oz (54.1 kg)   Height:             Intake/Output Summary (Last 24 hours) at 7/24/2020 0849  Last data filed at 7/24/2020 0804  Gross per 24 hour   Intake 1883.93 ml   Output 1550 ml   Net 333.93 ml     Wt Readings from Last 3 Encounters:   07/24/20 119 lb 4.8 oz (54.1 kg)   07/06/20 125 lb (56.7 kg)   05/13/20 144 lb 8 oz (65.5 kg)         Gen: Patient in NAD, resting comfortably  Neck: No JVD or bruits  Respiratory: CTAB no WRR  Chest: normal without deformity  Cardiovascular:RRR, S1S2, distant heart sounds  Abdomen: Soft, NTND, Normal BS  Extremities: No clubbing, cyanosis, or edema  Neurological/Psychiatric:  AxO x4, No gross motors/sensory deficits  Skin:  Warm and dry  Bilateral groin sites are bandaged, there is no hematoma or bleeding noted, pulses are normal in groins, distal pulses are strongly dopplerable    Labs:  CBC:   Recent Labs     20  0732 20  0529   WBC 6.0 9.5   HGB 11.5* 9.8*   HCT 34.5* 29.5*   MCV 94.6 95.6    191     BMP:   Recent Labs     20  0732 20  0529    137   K 4.2 5.7*  5.7*    110   CO2 24 17*   BUN 23* 28*   CREATININE 1.1 1.3*     MG:  No results for input(s): MG in the last 72 hours. PT/INR:   Recent Labs     20  0732   PROTIME 12.1   INR 1.04     APTT: No results for input(s): APTT in the last 72 hours. Cardiac Enzymes: No results for input(s): CKTOTAL, CKMB, CKMBINDEX, TROPONINI in the last 72 hours. Cardiac Studies:    ekg today nsr ivcd, lat st changes    Cardiac catheterization 2020:      CARDIAC CATHETERIZATION REPORT     Procedure Date:  2020   Patient Name: Denae Preciado   MRN: [id number] : 1945       INDICATION     Ischemic  Cardiomyopathy     High risk (CHIP) PCI     PROCEDURES PERFORMED     Left heart catheterization   Coronary angiogam   Coronary cath   Temporary transvenous pacer insertion removal     Insertion of short term external heart assist system into heart,   intraoperative, percutaneous approach   Assistance with cardiac output using impeller pump, continuous     Rotational atherectomy of LAD   IVUS of LAD   PCI of LAD with single drug-eluting stent     PCI of circumflex with 3 drug-eluting stents           PROCEDURE DESCRIPTION   Risks/benefits/alternatives/outcomes were discussed with patient   and/or family and informed consent was obtained.  Using   ultrasound and fluoroscopic guidance, a 7 Thai sheath was   inserted into both common femoral arteries and an 8 [employer] [place] sheath   was inserted into the left common femoral vein.  In the left   femoral artery the short 7 Thai sheath was exchanged for a 45   cm destination 7 [employer] [place] sheath. with a 1.5 mm eugene.     There was difficulty advancing the eugene and this was ultimately   accomplished using Sammie glide mode.  Lesion was then assessed   with IVUS.   It was felt to be a 2.5 mm vessel and as such a   [employer] synergy 2.5 x 16 mm drug-eluting stent was   implanted across the mid LAD lesion.  Stent was then postdilated   with a 2.5 mm noncompliant balloon. Luis Fernando Lapping was 0% residual   stenosis.  There was HAYDEN 3 flow before and after PCI.       Attention then turned towards the circumflex and a choice floppy   wire was taken but the circumflex could not be entered a lesion   cannot be crossed due to severe angulation at the distal left   main into the circumflex which was felt to be a 90 degree bend.     Ultimately using a thread or microcatheter along with a PT2 mod   support wire, the circumflex was entered and lesion was crossed.     Circumflex lesions were then dilated with a 2.5 mm balloon and   then lesions in the proximal and mid circumflex were then stented   with a [employer] synergy stents, these were from distal   to proximal with 2.75 x 16 mm, 3 x 12 mm and 2.5 x 12 mm   drug-eluting stents.  There was significant difficulty with   advancing stents into the circumflex due to the severe angulation   beyond the left main and 2 stents did not cross and were   discarded.  Initially a 7 [employer] [place] guide extension catheter was   utilized to assist with stent delivery but ultimately a 6 [employer] [place]   guide extension catheter was utilized and this allowed for   advancement of stents.  Stents were postdilated with 3 mm   noncompliant balloon. Luis Fernando Lapping was 0% residual stenosis.  There was   HAYDEN 3 flow before and after PCI.         Attention then turned towards the RCA and a 7 [employer] [place] AL 0.75   guiding catheter was used to engage the RCA.  RCA lesion could   not be crossed using multiple wires including Fielder XT, PT 2   months support and  200 wires.  It was felt that this lesion   would require  intervention as a staged PCI. CONCLUSIONS:     Successful rotational atherectomy and PCI of LAD with single   drug-eluting stent   Successful PCI of circumflex with 3 drug-eluting stents              Last Resulted: 07/23/20 13:48  Order Details View Encounter Lab and Collection Details Routing Result               I have reviewed labs and imaging/xray/diagnostic testing in this note. Assessment and Plan       Patient Active Problem List   Diagnosis    Backache    Essential hypertension, benign    Osteoarthrosis involving lower leg    Gout    Osteoarthritis, hand    Osteopenia    Chest pain    Hyperlipidemia associated with type 2 diabetes mellitus ([facility] [address]    Major depressive disorder, recurrent episode, moderate ([facility])    Primary insomnia    Type 2 diabetes mellitus with microalbuminuria, without long-term current use of insulin ([facility])    Rotator cuff tendinitis    Other restrictive cardiomyopathy ([facility])    Nonrheumatic mitral valve regurgitation    Chronic systolic CHF (congestive heart failure) ([facility])    CAD, multiple vessel    Ischemic cardiomyopathy    S/P angioplasty with stent     CAD/ASHD, multivessel disease noted with successful high risk PCI with Impella support performed on July 23, 2020, continue dual antiplatelet therapy, will plan on staged RCA intervention pending recovery from this admission    Ischemic cardiomyopathy, continue beta-blocker, holding lisinopril due to elevated potassium and elevated BUN and creatinine today. We will also hold Lasix for this reason and have encouraged patient to increase fluid intake. Tachycardia noted so will increase Toprol from 50 mg daily to 75 mg daily and add digoxin 0.125 mg every other day. Hypercholesterolemia, continue statin    Hyperkalemia with azotemia, will order repeat labs for this afternoon and if these are stable, patient can likely be discharged later today.   If they remain significantly abnormal, patient will need to be observed overnight. Patient is anxious to be discharged today as her 's birthday is this weekend. At discharge, I would have her hold Lasix and lisinopril through the weekend and only resume those on Monday, July 27, 2020. Addend:    F/u labs show azotemia, will add iv labs, and check bmp tomorrow, expect if similar values tomorrow, can be dc'ed and would plan on f/u labs this coming week to reassess    Thank you for allowing me to participate in the care of your patient. Please call me with any questions [phone].       Lady Clancy MD, [facility]   Interventional Cardiologist  [facility]  [phone] [facility]  [phone] [address] [place] [address]  7/24/2020 8:49 AM

## 2020-07-24 NOTE — PROGRESS NOTES
Nutrition Education    Patient and  stated watching sodium very closely and avoid large amounts of meat. RD provided CHF nutrition therapy to review further. Patient and  verbalized understanding and appreciated the visit. · Verbally reviewed information with Patient  · Educated on heart healthy diet   · Written educational materials provided. · Contact name and number provided. · Refer to Patient Education activity for more details.     Electronically signed by Carson Ryder [address] [place] [address] on 7/24/20 at 3:37 PM EDT    Contact: Office: [phone]; [address]: [phone]

## 2020-07-25 VITALS
SYSTOLIC BLOOD PRESSURE: 115 MMHG | OXYGEN SATURATION: 99 % | WEIGHT: 129.6 LBS | HEART RATE: 81 BPM | HEIGHT: 60 IN | TEMPERATURE: 97.7 F | DIASTOLIC BLOOD PRESSURE: 70 MMHG | RESPIRATION RATE: 18 BRPM | BODY MASS INDEX: 25.45 KG/M2

## 2020-07-25 LAB
ANION GAP SERPL CALCULATED.3IONS-SCNC: 7 MMOL/L (ref 3–16)
BUN BLDV-MCNC: 34 MG/DL (ref 7–20)
CALCIUM SERPL-MCNC: 8.6 MG/DL (ref 8.3–10.6)
CHLORIDE BLD-SCNC: 111 MMOL/L (ref 99–110)
CO2: 20 MMOL/L (ref 21–32)
CREAT SERPL-MCNC: 1.5 MG/DL (ref 0.6–1.2)
GFR AFRICAN AMERICAN: 41
GFR NON-AFRICAN AMERICAN: 34
GLUCOSE BLD-MCNC: 175 MG/DL (ref 70–99)
GLUCOSE BLD-MCNC: 188 MG/DL (ref 70–99)
GLUCOSE BLD-MCNC: 289 MG/DL (ref 70–99)
PERFORMED ON: ABNORMAL
PERFORMED ON: ABNORMAL
POTASSIUM REFLEX MAGNESIUM: 5.5 MMOL/L (ref 3.5–5.1)
SODIUM BLD-SCNC: 138 MMOL/L (ref 136–145)

## 2020-07-25 PROCEDURE — 6360000002 HC RX W HCPCS: Performed by: INTERNAL MEDICINE

## 2020-07-25 PROCEDURE — 6370000000 HC RX 637 (ALT 250 FOR IP): Performed by: INTERNAL MEDICINE

## 2020-07-25 PROCEDURE — 36415 COLL VENOUS BLD VENIPUNCTURE: CPT

## 2020-07-25 PROCEDURE — 2580000003 HC RX 258: Performed by: INTERNAL MEDICINE

## 2020-07-25 PROCEDURE — 80048 BASIC METABOLIC PNL TOTAL CA: CPT

## 2020-07-25 RX ORDER — METOPROLOL SUCCINATE 25 MG/1
75 TABLET, EXTENDED RELEASE ORAL DAILY
Qty: 90 TABLET | Refills: 3 | Status: SHIPPED | OUTPATIENT
Start: 2020-07-26 | End: 2021-01-11 | Stop reason: SDUPTHER

## 2020-07-25 RX ORDER — DIGOXIN 125 MCG
125 TABLET ORAL EVERY OTHER DAY
Qty: 15 TABLET | Refills: 3 | Status: SHIPPED | OUTPATIENT
Start: 2020-07-26 | End: 2021-01-11 | Stop reason: SDUPTHER

## 2020-07-25 RX ADMIN — ATORVASTATIN CALCIUM 40 MG: 40 TABLET, FILM COATED ORAL at 09:15

## 2020-07-25 RX ADMIN — Medication 10 ML: at 09:16

## 2020-07-25 RX ADMIN — ALLOPURINOL 300 MG: 300 TABLET ORAL at 09:15

## 2020-07-25 RX ADMIN — ASPIRIN 81 MG: 81 TABLET ORAL at 09:15

## 2020-07-25 RX ADMIN — ENOXAPARIN SODIUM 40 MG: 40 INJECTION SUBCUTANEOUS at 09:15

## 2020-07-25 RX ADMIN — CLOPIDOGREL BISULFATE 75 MG: 75 TABLET ORAL at 09:15

## 2020-07-25 RX ADMIN — INSULIN LISPRO 1 UNITS: 100 INJECTION, SOLUTION INTRAVENOUS; SUBCUTANEOUS at 09:22

## 2020-07-25 RX ADMIN — METOPROLOL SUCCINATE 75 MG: 50 TABLET, EXTENDED RELEASE ORAL at 09:15

## 2020-07-25 RX ADMIN — INSULIN LISPRO 3 UNITS: 100 INJECTION, SOLUTION INTRAVENOUS; SUBCUTANEOUS at 11:55

## 2020-07-25 ASSESSMENT — PAIN SCALES - GENERAL: PAINLEVEL_OUTOF10: 0

## 2020-07-25 NOTE — DISCHARGE SUMMARY
Hospital Medicine Discharge Summary    Patient ID: Tin Ramon      Patient's PCP: Fabby Roche MD    Admit Date: 7/23/2020     Discharge Date: 7/25/2020      Admitting Physician: No admitting provider for patient encounter. Discharge Physician: Azeem Reyes MD     Discharge Diagnoses: Active Hospital Problems    Diagnosis    S/P angioplasty with stent [Z95.820]       The patient was seen and examined on day of discharge and this discharge summary is in conjunction with any daily progress note from day of discharge. Hospital Course:   76 y. o. female who presented to [facility] for planned [address]. Patient had a LHC several weeks ago with severe disease. A stage PCI was planned for today due to need for arterectomy. She had a total of 4 LULY placed. Patient had a CABG in January and has had declining heart function since then which prompted the [address] a few weeks ago. Other than worsening WILKINSON, she has been largely asymptomatic. She denies any chest pain, nausea or diaphoresis.     CAD  - s/p LHC with 4 LULY  - cardiology consulted  - prior CABG in January  - continue ASA, plavix, statin, BB     LANCE  - mild, 2/2 contrast from [address]  - stabilized with gentle IVF  - continue to hold home lasix, lisinopril til Monday  - repeat BMP next week     Chronic systolic heart failure  - appears euvolemic  - last echo 6/20 with EF 15-20%, inferior akinesis  - continue home metoprolol. Holding ACE, lasix due to LANCE     DMII  - well controlled  - resume home regimen     HTN  - well controlled  - continue home regimen     HLD  - continue home statin     Gout  - no flare noted  - continue allopurinol    Physical Exam Performed:     /70   Pulse 81   Temp 97.7 °F (36.5 °C)   Resp 18   Ht 5' (1.524 m)   Wt 129 lb 9.6 oz (58.8 kg)   SpO2 99%   BMI 25.31 kg/m²       General appearance:  No apparent distress, appears stated age and cooperative.   HEENT:  Normal cephalic, atraumatic without obvious deformity. Pupils equal, round, and reactive to light.  Extra ocular muscles intact. Conjunctivae/corneas clear. Neck: Supple, with full range of motion. No jugular venous distention. Trachea midline. Respiratory:  Normal respiratory effort. Clear to auscultation, bilaterally without Rales/Wheezes/Rhonchi. Cardiovascular:  Regular rate and rhythm with normal S1/S2 without murmurs, rubs or gallops. Abdomen: Soft, non-tender, non-distended with normal bowel sounds. Musculoskeletal:  No clubbing, cyanosis or edema bilaterally.  Full range of motion without deformity. Skin: Skin color, texture, turgor normal.  No rashes or lesions. Neurologic:  Neurovascularly intact without any focal sensory/motor deficits. Cranial nerves: II-XII intact, grossly non-focal.  Psychiatric:  Alert and oriented, thought content appropriate, normal insight  Capillary Refill: Brisk,< 3 seconds   Peripheral Pulses: +2 palpable, equal bilaterally       Labs:  For convenience and continuity at follow-up the following most recent labs are provided:      CBC:    Lab Results   Component Value Date    WBC 9.5 07/24/2020    HGB 9.8 07/24/2020    HCT 29.5 07/24/2020     07/24/2020       Renal:    Lab Results   Component Value Date     07/25/2020    K 5.5 07/25/2020     07/25/2020    CO2 20 07/25/2020    BUN 34 07/25/2020    CREATININE 1.5 07/25/2020    CALCIUM 8.6 07/25/2020    PHOS 3.3 02/11/2020         Significant Diagnostic Studies    Radiology:   No orders to display          Consults:     IP CONSULT TO DIETITIAN    Disposition:  home     Condition at Discharge: Stable    Discharge Instructions/Follow-up:  Follow up with PCP within 1-2 weeks    Code Status:  Full Code     Activity: activity as tolerated    Diet: cardiac diet      Discharge Medications:     Discharge Medication List as of 7/25/2020  1:15 PM           Details   digoxin (LANOXIN) 125 MCG tablet Take 1 tablet by mouth every other day, Disp-15 tablet,R-3Normal Details   metoprolol succinate (TOPROL XL) 25 MG extended release tablet Take 3 tablets by mouth daily, Disp-90 tablet,R-3Normal              Details   metFORMIN (GLUCOPHAGE-XR) 500 MG extended release tablet TAKE TWO TABLETS BY MOUTH TWICE A DAY    Hold med, resume it 7/9/2020, Disp-360 tablet, R-0Print      atorvastatin (LIPITOR) 40 MG tablet TAKE ONE TABLET BY MOUTH DAILY, Disp-30 tablet, R-2Normal      potassium chloride (KLOR-CON M) 10 MEQ extended release tablet Take 4 tablets by mouth 2 times daily, Disp-240 tablet, R-3Normal      metOLazone (ZAROXOLYN) 2.5 MG tablet Take 1 tablet by mouth once a week Take once a week 30 minutes prior to furosemide, Disp-20 tablet, R-1Normal      furosemide (LASIX) 40 MG tablet Take 1 tablet by mouth daily, Disp-30 tablet, R-5Normal      clopidogrel (PLAVIX) 75 MG tablet Take 1 tablet by mouth daily, Disp-30 tablet, R-0Normal      lisinopril (PRINIVIL;ZESTRIL) 2.5 MG tablet Take 2.5 mg by mouth 2 times daily Historical Med      dapagliflozin (FARXIGA) 5 MG tablet Take 5 mg by mouth every morningHistorical Med      allopurinol (ZYLOPRIM) 300 MG tablet TAKE ONE TABLET BY MOUTH DAILY, Disp-30 tablet, R-2Normal      vitamin B-12 (CYANOCOBALAMIN) 500 MCG tablet Take 500 mcg by mouth dailyHistorical Med      calcium carbonate (OSCAL) 500 MG TABS tablet Take 500 mg by mouth daily      aspirin 81 MG EC tablet Take 81 mg by mouth daily. Time Spent on discharge is more than 30 minutes in the examination, evaluation, counseling and review of medications and discharge plan. Signed:    Spenser Nathan MD   7/25/2020      Thank you Saralyn Denver, MD for the opportunity to be involved in this patient's care. If you have any questions or concerns please feel free to contact me at [phone].

## 2020-07-25 NOTE — PROGRESS NOTES
Reviewed avs with pt,  at bedside, pt verbalized understanding and was able to teach back. Pt packed dressed and ready to go. She is eager to get home to her fur babies.

## 2020-07-25 NOTE — FLOWSHEET NOTE
07/24/20 2027   Assessment   Charting Type Shift assessment   Neurological   Neuro (WDL) WDL   Level of Consciousness 0   [place] Coma Scale   Eye Opening 4   Best Verbal Response 5   Best Motor Response 6   Elio Coma Scale Score 15   NIH/MNHISS Stroke Scale   NIH/MNIHSS Stroke Scale Assessed No   HEENT   HEENT (WDL) X   Right Eye Impaired vision   Left Eye Impaired vision   Mucous Membrane Moist;Pink; Intact   Teeth Dentures upper;Dentures lower   Voice Normal   Respiratory   Respiratory (WDL) WDL   Cardiac   Cardiac (WDL) WDL   Cardiac Regularity Regular   Heart Sounds S1, S2   Cardiac Rhythm NSR   Rhythm Interpretation   Pulse 88   Cardiac Monitor   Telemetry Monitor On Yes   Telemetry Audible Yes   Telemetry Alarms Set Yes   Telemetry Box Number 30   Gastrointestinal   Abdominal (WDL) WDL   Abdomen Inspection Soft;Rounded   Tenderness No guarding;Nontender   RUQ Bowel Sounds Active   LUQ Bowel Sounds Active   RLQ Bowel Sounds Active   LLQ Bowel Sounds Active   Peripheral Vascular   Peripheral Vascular (WDL) WDL   Edema None   RLE Neurovascular Assessment   Capillary Refill Less than/equal to 3 seconds   Color Appropriate for ethnicity   Temperature Cool   Sensation RLE Full sensation; No numbness; No pain; No tingling   R Femoral Pulse +2   R Post Tibial Pulse +1;Doppler   R Pedal Pulse +1;Doppler   R Calf Tenderness  Negative   LLE Neurovascular Assessment   Capillary Refill Less than/equal to 3 seconds   Color Appropriate for ethnicity   Temperature Warm   Sensation LLE Full sensation; No numbness; No pain; No tingling   L Femoral Pulse +3   L Post Tibial Pulse +1;Doppler   L Pedal Pulse +1;Doppler   L Calf Tenderness Negative   Puncture Site Assessment 1   Location Femoral - right   Site Assessment No redness, drainage, swelling or hematoma   Hemostasis Intervention Discontinued   Dressing Applied Transparent occlusive dressing   Multiple puncture sites Yes   Location 2 Femoral-left   Site Assessment 2 No redness,drainage,swelling or hematoma   Location 3 Femoral-left   Site Assessment 3 Bruising; No hematoma; No oozing; No redness   Hemostasis Intervention 2 Discontinued    Dressing Applied 2 Transparent occlusive   Hemostasis Intervention 3 Discontinued   Skin Color/Condition   Skin Color/Condition (WDL) WDL   Skin Integrity   Skin Integrity (WDL) WDL   Musculoskeletal   Musculoskeletal (WDL) WDL   Genitourinary   Genitourinary (WDL) WDL   Flank Tenderness No   Suprapubic Tenderness No   Dysuria No   Urine Assessment   Incontinence No   Urine Color Yellow/straw   Urine Appearance Clear   Urine Odor No odor   Anus/Rectum   Anus/Rectum (WDL) WDL   Psychosocial   Psychosocial (WDL) WDL   Patient Behaviors Appropriate for age;Calm; Cooperative

## 2020-07-25 NOTE — PLAN OF CARE
Problem: Falls - Risk of:  Goal: Will remain free from falls  Description: Will remain free from falls  7/25/2020 1105 by Rolando Dempsey RN  Outcome: Ongoing  7/24/2020 2228 by Spenser Bloom RN  Outcome: Ongoing  Goal: Absence of physical injury  Description: Absence of physical injury  7/25/2020 1105 by Rolando Dempsey RN  Outcome: Ongoing  7/24/2020 2228 by Spenser Bloom RN  Outcome: Ongoing     Problem: Tissue Perfusion - Renal, Altered:  Goal: Electrolytes within specified parameters  Description: Electrolytes within specified parameters  Outcome: Ongoing  Goal: Urine creatinine clearance will be within specified parameters  Description: Urine creatinine clearance will be within specified parameters  Outcome: Ongoing  Goal: Serum creatinine will be within specified parameters  Description: Serum creatinine will be within specified parameters  Outcome: Ongoing  Goal: Ability to achieve a balanced intake and output will improve  Description: Ability to achieve a balanced intake and output will improve  Outcome: Ongoing

## 2020-07-27 ENCOUNTER — TELEPHONE (OUTPATIENT)
Dept: INTERNAL MEDICINE CLINIC | Age: 75
End: 2020-07-27

## 2020-07-27 NOTE — TELEPHONE ENCOUNTER
[facility] Initial Follow Up Call    Outreach made within 2 business days of discharge: Yes    Patient: Nirav Jaime Patient :    MRN: [id number]  Reason for Admission: There are no discharge diagnoses documented for the most recent discharge. Discharge Date: 20       Spoke with: Patient     Discharge department/facility: [facility] Interactive Patient Contact:  Was patient able to fill all prescriptions: Yes  Was patient instructed to bring all medications to the follow-up visit: Yes  Is patient taking all medications as directed in the discharge summary?  Yes  Does patient understand their discharge instructions: Yes  Does patient have questions or concerns that need addressed prior to 7-14 day follow up office visit: no    Scheduled appointment with PCP within 7-14 days    Follow Up  Future Appointments   Date Time Provider Dimas Curry   2020  3:50 Ellie Cutler MD [facility] None   2020  8:30 AM MD Alma Estradatravis Dee

## 2020-07-29 ENCOUNTER — TELEPHONE (OUTPATIENT)
Dept: CARDIOLOGY CLINIC | Age: 75
End: 2020-07-29

## 2020-07-29 DIAGNOSIS — I25.10 CAD, MULTIPLE VESSEL: ICD-10-CM

## 2020-07-29 DIAGNOSIS — I50.22 CHRONIC SYSTOLIC CHF (CONGESTIVE HEART FAILURE) (HCC): Primary | ICD-10-CM

## 2020-07-29 NOTE — TELEPHONE ENCOUNTER
----- Message from Ruslan Cobb MD sent at 7/27/2020  1:09 PM EDT -----  Lets have pt do f/u labs this week, cbc and bmp and bnp and lets get her set up for RCA  PCI, Thank you.

## 2020-08-17 ENCOUNTER — OFFICE VISIT (OUTPATIENT)
Dept: INTERNAL MEDICINE CLINIC | Age: 75
End: 2020-08-17

## 2020-08-17 VITALS
SYSTOLIC BLOOD PRESSURE: 110 MMHG | DIASTOLIC BLOOD PRESSURE: 70 MMHG | BODY MASS INDEX: 23.41 KG/M2 | HEIGHT: 61 IN | RESPIRATION RATE: 12 BRPM | HEART RATE: 70 BPM | WEIGHT: 124 LBS

## 2020-08-17 PROCEDURE — 99214 OFFICE O/P EST MOD 30 MIN: CPT | Performed by: INTERNAL MEDICINE

## 2020-08-17 PROCEDURE — 3051F HG A1C>EQUAL 7.0%<8.0%: CPT | Performed by: INTERNAL MEDICINE

## 2020-08-17 PROCEDURE — 1111F DSCHRG MED/CURRENT MED MERGE: CPT | Performed by: INTERNAL MEDICINE

## 2020-08-17 RX ORDER — ATORVASTATIN CALCIUM 40 MG/1
TABLET, FILM COATED ORAL
Qty: 30 TABLET | Refills: 2 | Status: SHIPPED | OUTPATIENT
Start: 2020-08-17 | End: 2021-01-11 | Stop reason: SDUPTHER

## 2020-08-17 ASSESSMENT — ENCOUNTER SYMPTOMS
ABDOMINAL PAIN: 0
NAUSEA: 1
RHINORRHEA: 0
WHEEZING: 0
SHORTNESS OF BREATH: 1
VOMITING: 0

## 2020-08-17 NOTE — PROGRESS NOTES
MOUTH DAILY             calcium carbonate (OSCAL) 500 MG TABS tablet  Take 500 mg by mouth daily             clopidogrel (PLAVIX) 75 MG tablet  Take 1 tablet by mouth daily             dapagliflozin (FARXIGA) 5 MG tablet  Take 5 mg by mouth every morning             digoxin (LANOXIN) 125 MCG tablet  Take 1 tablet by mouth every other day             furosemide (LASIX) 40 MG tablet  Take 1 tablet by mouth daily             lisinopril (PRINIVIL;ZESTRIL) 2.5 MG tablet  Take 2.5 mg by mouth 2 times daily              metFORMIN (GLUCOPHAGE-XR) 500 MG extended release tablet  TAKE TWO TABLETS BY MOUTH TWICE A DAY    Hold med, resume it 7/9/2020             metOLazone (ZAROXOLYN) 2.5 MG tablet  Take 1 tablet by mouth once a week Take once a week 30 minutes prior to furosemide             metoprolol succinate (TOPROL XL) 25 MG extended release tablet  Take 3 tablets by mouth daily             potassium chloride (KLOR-CON M) 10 MEQ extended release tablet  Take 4 tablets by mouth 2 times daily             vitamin B-12 (CYANOCOBALAMIN) 500 MCG tablet  Take 500 mcg by mouth daily                   Medications marked \"taking\" at this time  No outpatient medications have been marked as taking for the 8/17/20 encounter (Office Visit) with Mayte Patino MD.        Medications patient taking as of now reconciled against medications ordered at time of hospital discharge: Yes    Chief Complaint   Patient presents with    Follow-Up from Hospital       HPI     Patient is here for follow up on diabetes, hypertension, hyperlipidemia, depression and arthritis. She was in the hospital in July 202. She has CAD and had CABG. She had chest pain and then had a left cath and 4 LULY. She has chronic systolic CHF. She is better. No pedal edema. She is due to see Dr Harleen Martins for CHF. Patient's BGs range between low 100s mg/dl. She does not have hypoglycemia, increase appetite, paresthesia of the feet, polydipsia and polyuria.  It is well controlled. She has had her eye exam.   Her depression has been stable. Patient's BP is controlled on current medications. No chest pain or dyspnea. Her arthritis is stable. She gets occasional attacks of gout. No recent issues. She has osteopenia. She is taking calcium and vitamin d but could not tolerate Fosamax. Inpatient course: Discharge summary reviewed- see chart. Interval history/Current status: improved    Review of Systems   Constitutional: Negative for appetite change and fatigue. HENT: Negative for postnasal drip and rhinorrhea. Respiratory: Positive for shortness of breath. Negative for wheezing. Cardiovascular: Negative for chest pain and palpitations. Gastrointestinal: Positive for nausea. Negative for abdominal pain and vomiting. Musculoskeletal: Negative for joint swelling. Skin: Negative for rash. Neurological: Negative for light-headedness. Psychiatric/Behavioral: Negative for sleep disturbance. Vitals:    08/17/20 1533   BP: 110/70   Site: Left Upper Arm   Position: Sitting   Cuff Size: Medium Adult   Pulse: 70   Resp: 12   Weight: 124 lb (56.2 kg)   Height: 5' 1\" (1.549 m)     Body mass index is 24.22 kg/m². Wt Readings from Last 3 Encounters:   08/17/20 124 lb (56.2 kg)   07/25/20 129 lb 9.6 oz (58.8 kg)   07/06/20 125 lb (56.7 kg)     BP Readings from Last 3 Encounters:   07/25/20 115/70   07/23/20 112/69   05/13/20 124/82       Physical Exam  Constitutional:       Appearance: She is well-developed. HENT:      Head: Normocephalic. Eyes:      Conjunctiva/sclera: Conjunctivae normal.      Pupils: Pupils are equal, round, and reactive to light. Neck:      Musculoskeletal: Normal range of motion and neck supple. Thyroid: No thyroid mass or thyromegaly. Vascular: No carotid bruit or JVD. Trachea: Trachea normal.   Cardiovascular:      Rate and Rhythm: Normal rate and regular rhythm. Heart sounds: Normal heart sounds.  No murmur. No gallop. Pulmonary:      Effort: Pulmonary effort is normal. No respiratory distress. Breath sounds: Normal breath sounds. No wheezing or rales. Abdominal:      General: Bowel sounds are normal. There is no distension. Palpations: Abdomen is soft. There is no hepatomegaly, splenomegaly or mass. Tenderness: There is no abdominal tenderness. Musculoskeletal: Normal range of motion. Lymphadenopathy:      Cervical: No cervical adenopathy. Skin:     General: Skin is warm and dry. Findings: No rash. Neurological:      Mental Status: She is alert and oriented to person, place, and time. Cranial Nerves: No cranial nerve deficit. Deep Tendon Reflexes: Reflexes are normal and symmetric. Psychiatric:         Behavior: Behavior normal.         Thought Content: Thought content normal.         Judgment: Judgment normal.             Assessment/Plan:  1. Hospital discharge follow-up  - doing well. 2. Essential hypertension, benign  - controlled. 3. Type 2 diabetes mellitus with microalbuminuria, without long-term current use of insulin ([facility])  A1C 7.1 in July 2020. 4. Chronic systolic CHF (congestive heart failure) ([address]  - will see CHF specialist.    5. CAD, multiple vessel  - stable    6. Primary insomnia  - stable. 7. Hyperlipidemia associated with type 2 diabetes mellitus ([address]  - on Lipitor.         Medical Decision Making: moderate complexity

## 2020-08-25 ENCOUNTER — OFFICE VISIT (OUTPATIENT)
Dept: CARDIOLOGY CLINIC | Age: 75
End: 2020-08-25
Payer: MEDICARE

## 2020-08-25 ENCOUNTER — HOSPITAL ENCOUNTER (OUTPATIENT)
Age: 75
Discharge: HOME | End: 2020-08-25
Payer: MEDICARE

## 2020-08-25 VITALS
SYSTOLIC BLOOD PRESSURE: 110 MMHG | BODY MASS INDEX: 23.75 KG/M2 | OXYGEN SATURATION: 98 % | WEIGHT: 125.8 LBS | HEIGHT: 61 IN | HEART RATE: 82 BPM | DIASTOLIC BLOOD PRESSURE: 56 MMHG

## 2020-08-25 LAB
ANION GAP SERPL CALCULATED.3IONS-SCNC: 15 MMOL/L (ref 3–16)
BUN BLDV-MCNC: 28 MG/DL (ref 7–20)
CALCIUM SERPL-MCNC: 9.7 MG/DL (ref 8.3–10.6)
CHLORIDE BLD-SCNC: 98 MMOL/L (ref 99–110)
CO2: 24 MMOL/L (ref 21–32)
CREAT SERPL-MCNC: 1.2 MG/DL (ref 0.6–1.2)
GFR AFRICAN AMERICAN: 53
GFR NON-AFRICAN AMERICAN: 44
GLUCOSE BLD-MCNC: 121 MG/DL (ref 70–99)
HCT VFR BLD CALC: 33.7 % (ref 36–48)
HEMOGLOBIN: 11.3 G/DL (ref 12–16)
MCH RBC QN AUTO: 31.1 PG (ref 26–34)
MCHC RBC AUTO-ENTMCNC: 33.4 G/DL (ref 31–36)
MCV RBC AUTO: 92.9 FL (ref 80–100)
PDW BLD-RTO: 16 % (ref 12.4–15.4)
PLATELET # BLD: 256 K/UL (ref 135–450)
PMV BLD AUTO: 8.8 FL (ref 5–10.5)
POTASSIUM SERPL-SCNC: 4.4 MMOL/L (ref 3.5–5.1)
PRO-BNP: 8548 PG/ML (ref 0–449)
RBC # BLD: 3.63 M/UL (ref 4–5.2)
SODIUM BLD-SCNC: 137 MMOL/L (ref 136–145)
WBC # BLD: 6.8 K/UL (ref 4–11)

## 2020-08-25 PROCEDURE — 83880 ASSAY OF NATRIURETIC PEPTIDE: CPT

## 2020-08-25 PROCEDURE — 85027 COMPLETE CBC AUTOMATED: CPT

## 2020-08-25 PROCEDURE — 36415 COLL VENOUS BLD VENIPUNCTURE: CPT

## 2020-08-25 PROCEDURE — 80048 BASIC METABOLIC PNL TOTAL CA: CPT

## 2020-08-25 PROCEDURE — 99214 OFFICE O/P EST MOD 30 MIN: CPT | Performed by: INTERNAL MEDICINE

## 2020-08-25 NOTE — PROGRESS NOTES
[address]  Advanced CHF/Pulmonary Hypertension   Cardiac Evaluation      Wiliam Daniel  YOB: 1945    Date of Visit:  8/25/20      Chief Complaint   Patient presents with    New Patient    Fatigue        History of Present Illness:  Wiliam Daniel is a 76 y.o. female who presents from referral from Dr James Walker for consultation and management of cardiomyopathy and CHF. She has a history of CAD with CABG, CHF, cardiomyopathy, and HLD. She had an abnormal echo on 10/29/19 showing an EF of 25% moderate mitral regurgitation. Her stress test from 12/12/19 showed no ischemia. Her carotid doppler from 01/06/20 showed normal arteries. Her cardiac cath from 01/06/20 showed multivessel CAD. Refer to CVTS. Her IVÁN 01/07/20 showed her EF 25-30% with moderate MR. No evidence of mass or thrombus. Mild TR. She underwent CABG x3 01/13/20, mitral valve repair, ZAHRAA clip, and closure of the PFO. On 07/23/20 her cardiac cath resulted in PCI and atherectomy with single LULY and PCI of circumflex with 3 LULY. She states she takes metolazone once a week. She is now urinating more frequently. She reports she has occasional weakness and dizziness with position changes from laying to sitting. She reports she has some vision changes with the dizziness. She denies CP, SOB, palpitations or syncope.            Allergies   Allergen Reactions    Penicillins Anaphylaxis     Penicillin and derivatives     Current Outpatient Medications   Medication Sig Dispense Refill    Ferrous Sulfate (IRON PO) Take by mouth      atorvastatin (LIPITOR) 40 MG tablet TAKE ONE TABLET BY MOUTH DAILY 30 tablet 2    digoxin (LANOXIN) 125 MCG tablet Take 1 tablet by mouth every other day 15 tablet 3    metoprolol succinate (TOPROL XL) 25 MG extended release tablet Take 3 tablets by mouth daily 90 tablet 3    metFORMIN (GLUCOPHAGE-XR) 500 MG extended release tablet TAKE TWO TABLETS BY MOUTH TWICE A DAY    Hold med, resume it 7/9/2020 360 tablet 0  metOLazone (ZAROXOLYN) 2.5 MG tablet Take 1 tablet by mouth once a week Take once a week 30 minutes prior to furosemide 20 tablet 1    furosemide (LASIX) 40 MG tablet Take 1 tablet by mouth daily 30 tablet 5    clopidogrel (PLAVIX) 75 MG tablet Take 1 tablet by mouth daily 30 tablet 0    lisinopril (PRINIVIL;ZESTRIL) 2.5 MG tablet Take 2.5 mg by mouth 2 times daily       allopurinol (ZYLOPRIM) 300 MG tablet TAKE ONE TABLET BY MOUTH DAILY 30 tablet 2    vitamin B-12 (CYANOCOBALAMIN) 500 MCG tablet Take 500 mcg by mouth daily      calcium carbonate (OSCAL) 500 MG TABS tablet Take 500 mg by mouth daily      aspirin 81 MG EC tablet Take 81 mg by mouth daily.  potassium chloride (KLOR-CON M) 10 MEQ extended release tablet Take 4 tablets by mouth 2 times daily 240 tablet 3    dapagliflozin (FARXIGA) 5 MG tablet Take 5 mg by mouth every morning       No current facility-administered medications for this visit.         Past Medical History:   Diagnosis Date    Anemia     Backache, unspecified 3/11/08    CAD, multiple vessel 1/6/2020    Chronic systolic CHF (congestive heart failure) ([address] 12/11/2019    Depressive disorder, not elsewhere classified 11/8/07    Essential hypertension, benign 11/8/07    Gout 8/11/2011    Hyperlipidemia associated with type 2 diabetes mellitus ([address] 12/14/2015    Major depressive disorder, recurrent episode, moderate ([address] 12/14/2015    Osteoarthritis, hand 2/27/2012    Osteoarthrosis, unspecified whether generalized or localized, lower leg 11/8/07    Osteopenia 8/5/2015    Other and unspecified hyperlipidemia 11/8/07    Pain in joint, lower leg 1/29/07    Pneumonia     Rotator cuff tendinitis 10/6/2017    Tear of medial cartilage or meniscus of knee, current 1/29/07    Type 2 diabetes mellitus with microalbuminuria, without long-term current use of insulin ([address] 5/5/2017    Type 2 diabetes mellitus without complication ([address] 54/9/7471    Type II or activity     Days per week: Not on file     Minutes per session: Not on file    Stress: Not on file   Relationships    Social connections     Talks on phone: Not on file     Gets together: Not on file     Attends Muslim service: Not on file     Active member of club or organization: Not on file     Attends meetings of clubs or organizations: Not on file     Relationship status: Not on file    Intimate partner violence     Fear of current or ex partner: Not on file     Emotionally abused: Not on file     Physically abused: Not on file     Forced sexual activity: Not on file   Other Topics Concern    Not on file   Social History Narrative    Not on file       Review of Systems:   · Constitutional: there has been no unanticipated weight loss. There's been no change in energy level, sleep pattern, or activity level. · Eyes: No visual changes or diplopia. No scleral icterus. · ENT: No Headaches, hearing loss or vertigo. No mouth sores or sore throat. · Cardiovascular: Reviewed in HPI  · Respiratory: No cough or wheezing, no sputum production. No hematemesis. · Gastrointestinal: No abdominal pain, appetite loss, blood in stools. No change in bowel or bladder habits. · Genitourinary: No dysuria, trouble voiding, or hematuria. · Musculoskeletal:  No gait disturbance, weakness or joint complaints. · Integumentary: No rash or pruritis. · Neurological: No headache, diplopia, change in muscle strength, numbness or tingling. No change in gait, balance, coordination, mood, affect, memory, mentation, behavior. · Psychiatric: No anxiety, no depression. · Endocrine: No malaise, fatigue or temperature intolerance. No excessive thirst, fluid intake, or urination. No tremor. · Hematologic/Lymphatic: No abnormal bruising or bleeding, blood clots or swollen lymph nodes. · Allergic/Immunologic: No nasal congestion or hives.     Physical Examination:    Vitals:    08/25/20 0829 08/25/20 0833   BP: (!) 110/54 (!) 110/56   Pulse: 82    SpO2: 98%    Weight: 125 lb 12.8 oz (57.1 kg)    Height: 5' 1\" (1.549 m)      Body mass index is 23.77 kg/m². Wt Readings from Last 3 Encounters:   08/25/20 125 lb 12.8 oz (57.1 kg)   08/17/20 124 lb (56.2 kg)   07/25/20 129 lb 9.6 oz (58.8 kg)     BP Readings from Last 3 Encounters:   08/25/20 (!) 110/56   08/17/20 110/70   07/25/20 115/70     Constitutional and General Appearance:   WD/WN in NAD  HEENT:  NC/AT  TRISTAN  No problems with hearing  Skin:  Warm, dry  Respiratory:  · Normal excursion and expansion without use of accessory muscles  · Resp Auscultation: Normal breath sounds without dullness  Cardiovascular:  · The apical impulses not displaced  · Heart tones are crisp and normal  · Cervical veins are not engorged  · The carotid upstroke is normal in amplitude and contour without delay or bruit  · JVP less than 8 cm H2O  RRR with nl S1 and S2 without m,r,g  · Peripheral pulses are symmetrical and full  · There is no clubbing, cyanosis of the extremities. · No edema  · Femoral Arteries: 2+ and equal  · Pedal Pulses: 2+ and equal   Neck:  · No thyromegaly  Abdomen:  · No masses or tenderness  · Liver/Spleen: No Abnormalities Noted  Neurological/Psychiatric:  · Alert and oriented in all spheres  · Moves all extremities well  · Exhibits normal gait balance and coordination  · No abnormalities of mood, affect, memory, mentation, or behavior are noted    Echo: 06/16/20   Summary   The left ventricular systolic function is severely reduced with an ejection   fraction of 15-20%. There is akinesis of the inferior and inferolateral walls. Left ventricular cavity size is mildly dilated. There is mild concentric left ventricular hypertrophy. Indeterminate left ventricular filling pressure secondary to mitral valve   prosthesis. The left atrium is moderately dilated. Right ventricular systolic function is mildly reduced . An annuloplasty ring is seen in the mitral position. Mild to moderate mitral regurgitation. Mild tricuspid regurgitation. Systolic pulmonary artery pressure (SPAP) is estimated at 45 mmHg consistent   with mild pulmonary hypertension (Right atrial pressure of 3 mmHg). Cardiac cath: 07/06/20  FINDINGS      HEMODYNAMICS     CHAMBER PRESSURE SATURATION   RA  1  65 %   RV  40/2     PA  40/12  62 %   PW  11  95 %   AORTA  158/66  95 %      ERNESTINA CARDIAC OUTPUT  4 L/min   SVR  14 Wood units   PVR  4.5 Wood units                LVGRAM     LVEDP  9   GRADIENT ACROSS AORTIC VALVE  none   LV FUNCTION EF 15 %   WALL MOTION  severe global hypokinesis with regional variation   MITRAL REGURGITATION  mild to moderate      AORTAGRAM     CALIBER  normal caliber   AORTIC INSUFFICIENCY  artificial AI was created with catheter projecting slightly across aortic valve   BYPASS GRAFTS  no bypass grafts are visualized            CORONARY ARTERIES     LM  Proximal less than 10% stenosis, mid-distal 20% stenosis         LAD Medium sized vessel, calcified, proximal 40% stenosis followed by mid vessel 99% stenosis, distal 20% stenosis. There are left-to-right collaterals noted. These extend up to the crux of the RCA. LCX Sharply angulated as it originates from left main, vessel is calcified with ostial/proximal 50 to 60% stenosis followed by mid 80% stenosis that extends into a medium to large sized first obtuse marginal branch. RI  Large vessel, calcified, proximal-mid 60% stenosis, distal less than 10% stenosis, there is a small branch proximally from the ramus that has a ostial/proximal 99% stenosis. RCA Large vessel, dominant, proximal 60 to 70% stenosis followed by mid-distal 99% subtotal occlusion with faint antegrade flow. Vessel is heavily calcified. There are left to right collaterals noted.             BYPASS GRAFTS     GUO-LAD  Atretic, 100% nybvonpc-mwy-dxhkji stenosis, chronic total occlusion         SVG-OM  100% proximal          SVG-PDA 100% proximal                      CONCLUSIONS:      Normal filling pressures  Occluded bypass grafts     Severe LV dysfunction, continue medical therapy with beta-blocker and ACE inhibitor and diuretics, will need EP referral for consideration of ICD     Given occluded bypass grafts, will plan staged PCI, high risk, with Impella support as well as anesthesia support for multivessel PCI. Likely will need atherectomy as well. Continue dual antiplatelet therapy       Cardiac cath: 07/23/20  FINDINGS      Please see prior diagnostic cath report for findings, would add that there is severe LAD tortuosity and that the proximal circumflex was also to be severely diseased with 80 to 90% stenosis that extends in the mid vessel. Additionally would add that VIOLETTA was performed of the distal left main of the proximal LAD and this area was noted be diseased with a 50% stenosis. PERCUTANEOUS INTERVENTION DESCRIPTION      Heparin was used for anticoagulation, initially a 7 [employer] XB 3.5 guide cath was used to intubate the left main. The LAD lesion was crossed with a [employer]  balloon catheter which was an over-the-wire system along with a choice floppy wire. The  balloon catheter was then advanced distally and the choice wire was removed in favor of a Rotafloppy wire. Rotational atherectomy was then performed with a 1.5 mm eugene. There was difficulty advancing the eugene and this was ultimately accomplished using Sammie glide mode. Lesion was then assessed with IVUS. It was felt to be a 2.5 mm vessel and as such a [employer] synergy 2.5 x 16 mm drug-eluting stent was implanted across the mid LAD lesion. Stent was then postdilated with a 2.5 mm noncompliant balloon. There was 0% residual stenosis. There was HAYDEN 3 flow before and after PCI.         Attention then turned towards the circumflex and a choice floppy wire was taken but the circumflex could not be entered a lesion cannot be hospital systems through [facility]. Cardiac testing was reviewed including echos, nuclear scans, cardiac catheterization, including from other hospital systems through [facility]. Assessment:    1. Chronic systolic CHF (congestive heart failure) ([facility]    2. Deep vein thrombosis (DVT) of proximal vein of both lower extremities, unspecified chronicity ([facility]    3. Ischemic cardiomyopathy    4. Nonrheumatic mitral valve regurgitation    5. Essential hypertension, benign    6. CAD, multiple vessel         Plan:  No change in meds  1. BNP, BMP and CBC today  2. BMP and BNP prior to next visit  3. Follow up in 2-3 months      QUALITY MEASURES  1. Tobacco Cessation Counseling: NA  2. Retake of BP if >140/90:   NA  3. Documentation to PCP/referring for new patient:  Sent to PCP at close of office visit  4. CAD patient on anti-platelet: Yes  5. CAD patient on STATIN therapy:  Yes  6. Patient with CHF and aFib on anticoagulation:  NA     Scribe's attestation: This note was scribed in the presence of Marsha Chambers M.D. By Desmond Blackwood RN     The scribe's documentation has been prepared under my direction and personally reviewed by me in its entirety. I confirm that the note above accurately reflects all work, treatment, procedures, and medical decision making performed by me. I appreciate the opportunity of cooperating in the care of this patient.     Marsha Chambers M.D., [facility]

## 2020-08-27 ENCOUNTER — TELEPHONE (OUTPATIENT)
Dept: CARDIOLOGY CLINIC | Age: 75
End: 2020-08-27

## 2020-08-27 NOTE — TELEPHONE ENCOUNTER
Spoke to pt. Relayed message. Echo scheduled. Central Scheduling number given. Please schedule RCA  with Dr Alen Phoenix. Informed pt to hold metformin for 2 days prior and day of procedure. Hold lasix and all DM meds the am of procedure. Will need COVID testing.

## 2020-08-27 NOTE — TELEPHONE ENCOUNTER
Spoke with patient. Patient is scheduled with Dr. Myron Paez for  RCA with [facility] and anesthesia on 10/27/20 at [facility], arrival time of 6:30am to the Cath Lab. Please have patient arrive to the main entrance of [facility] and check in with the registration desk. Remind patient to be NPO after midnight (8 hours prior). Do not apply lotions/creams on skin the day of procedure. COVID testing 10/22.

## 2020-09-02 ENCOUNTER — HOSPITAL ENCOUNTER (OUTPATIENT)
Dept: NON INVASIVE DIAGNOSTICS | Age: 75
Discharge: HOME | End: 2020-09-02
Payer: MEDICARE

## 2020-09-02 LAB
LV EF: 38 %
LVEF MODALITY: NORMAL

## 2020-09-02 PROCEDURE — 93306 TTE W/DOPPLER COMPLETE: CPT

## 2020-09-03 ENCOUNTER — TELEPHONE (OUTPATIENT)
Dept: CARDIOLOGY CLINIC | Age: 75
End: 2020-09-03

## 2020-09-03 NOTE — TELEPHONE ENCOUNTER
----- Message from Endy Penn MD sent at 9/3/2020 10:35 AM EDT -----  Let patient know echo test shows improved heart function, no new orders or changes at this time. Thanks.

## 2020-09-21 RX ORDER — METFORMIN HYDROCHLORIDE 500 MG/1
TABLET, EXTENDED RELEASE ORAL
Qty: 360 TABLET | Refills: 0 | Status: SHIPPED | OUTPATIENT
Start: 2020-09-21 | End: 2021-01-11 | Stop reason: SDUPTHER

## 2020-09-23 RX ORDER — ALLOPURINOL 300 MG/1
TABLET ORAL
Qty: 30 TABLET | Refills: 1 | Status: SHIPPED | OUTPATIENT
Start: 2020-09-23 | End: 2020-12-30

## 2020-10-12 ENCOUNTER — TELEPHONE (OUTPATIENT)
Dept: CARDIOLOGY CLINIC | Age: 75
End: 2020-10-12

## 2020-10-12 NOTE — TELEPHONE ENCOUNTER
Spoke with patient.  is rescheduled for 11/13 @ 8am, 6:30am arrival to [facility].  COVID-19 rescheduled for 11/9 @ 12pm.

## 2020-10-23 ENCOUNTER — TELEPHONE (OUTPATIENT)
Dept: CARDIOLOGY CLINIC | Age: 75
End: 2020-10-23

## 2020-10-23 NOTE — TELEPHONE ENCOUNTER
Spoke with patient and informed her due to a scheduling change her  on 11/13 is cancelled at this time. Will call when able to reschedule.

## 2020-11-03 ENCOUNTER — TELEPHONE (OUTPATIENT)
Dept: CARDIOLOGY CLINIC | Age: 75
End: 2020-11-03

## 2020-11-05 NOTE — TELEPHONE ENCOUNTER
Yes, she is likely interested in scheduling the RCA  PCI, we can try to do it this year but I would suggest scheduling it in January. Thanks.

## 2020-12-30 RX ORDER — ENALAPRIL MALEATE 10 MG/1
TABLET ORAL
Qty: 30 TABLET | Refills: 2 | Status: SHIPPED | OUTPATIENT
Start: 2020-12-30 | End: 2021-06-21

## 2021-01-11 ENCOUNTER — OFFICE VISIT (OUTPATIENT)
Dept: INTERNAL MEDICINE CLINIC | Age: 76
End: 2021-01-11

## 2021-01-11 VITALS — BODY MASS INDEX: 23.79 KG/M2 | HEIGHT: 61 IN | WEIGHT: 126 LBS | TEMPERATURE: 97.2 F

## 2021-01-11 DIAGNOSIS — E11.69 HYPERLIPIDEMIA ASSOCIATED WITH TYPE 2 DIABETES MELLITUS (HCC): ICD-10-CM

## 2021-01-11 DIAGNOSIS — Z95.1 S/P CABG X 3: ICD-10-CM

## 2021-01-11 DIAGNOSIS — I25.10 CAD, MULTIPLE VESSEL: ICD-10-CM

## 2021-01-11 DIAGNOSIS — E11.29 TYPE 2 DIABETES MELLITUS WITH MICROALBUMINURIA, WITHOUT LONG-TERM CURRENT USE OF INSULIN (HCC): ICD-10-CM

## 2021-01-11 DIAGNOSIS — I50.22 CHRONIC SYSTOLIC CHF (CONGESTIVE HEART FAILURE) (HCC): ICD-10-CM

## 2021-01-11 DIAGNOSIS — E11.29 TYPE 2 DIABETES MELLITUS WITH MICROALBUMINURIA, WITHOUT LONG-TERM CURRENT USE OF INSULIN (HCC): Primary | ICD-10-CM

## 2021-01-11 DIAGNOSIS — R80.9 TYPE 2 DIABETES MELLITUS WITH MICROALBUMINURIA, WITHOUT LONG-TERM CURRENT USE OF INSULIN (HCC): Primary | ICD-10-CM

## 2021-01-11 DIAGNOSIS — R80.9 TYPE 2 DIABETES MELLITUS WITH MICROALBUMINURIA, WITHOUT LONG-TERM CURRENT USE OF INSULIN (HCC): ICD-10-CM

## 2021-01-11 DIAGNOSIS — F51.01 PRIMARY INSOMNIA: ICD-10-CM

## 2021-01-11 DIAGNOSIS — F33.1 MAJOR DEPRESSIVE DISORDER, RECURRENT EPISODE, MODERATE (HCC): ICD-10-CM

## 2021-01-11 DIAGNOSIS — I10 ESSENTIAL HYPERTENSION, BENIGN: ICD-10-CM

## 2021-01-11 DIAGNOSIS — E78.5 HYPERLIPIDEMIA ASSOCIATED WITH TYPE 2 DIABETES MELLITUS (HCC): ICD-10-CM

## 2021-01-11 LAB
BASOPHILS ABSOLUTE: 0.1 K/UL (ref 0–0.2)
BASOPHILS RELATIVE PERCENT: 0.8 %
BILIRUBIN, POC: NORMAL
BLOOD URINE, POC: NORMAL
CLARITY, POC: NORMAL
COLOR, POC: NORMAL
EOSINOPHILS ABSOLUTE: 0.4 K/UL (ref 0–0.6)
EOSINOPHILS RELATIVE PERCENT: 5.7 %
GLUCOSE URINE, POC: NORMAL
HCT VFR BLD CALC: 32.5 % (ref 36–48)
HEMOGLOBIN: 10.7 G/DL (ref 12–16)
KETONES, POC: NORMAL
LEUKOCYTE EST, POC: NORMAL
LYMPHOCYTES ABSOLUTE: 1.2 K/UL (ref 1–5.1)
LYMPHOCYTES RELATIVE PERCENT: 18.6 %
MCH RBC QN AUTO: 32 PG (ref 26–34)
MCHC RBC AUTO-ENTMCNC: 33 G/DL (ref 31–36)
MCV RBC AUTO: 97.2 FL (ref 80–100)
MONOCYTES ABSOLUTE: 0.4 K/UL (ref 0–1.3)
MONOCYTES RELATIVE PERCENT: 5.8 %
NEUTROPHILS ABSOLUTE: 4.5 K/UL (ref 1.7–7.7)
NEUTROPHILS RELATIVE PERCENT: 69.1 %
NITRITE, POC: NORMAL
PDW BLD-RTO: 16.4 % (ref 12.4–15.4)
PH, POC: NORMAL
PLATELET # BLD: 259 K/UL (ref 135–450)
PMV BLD AUTO: 8.4 FL (ref 5–10.5)
PROTEIN, POC: NORMAL
RBC # BLD: 3.35 M/UL (ref 4–5.2)
SPECIFIC GRAVITY, POC: NORMAL
UROBILINOGEN, POC: NORMAL
WBC # BLD: 6.5 K/UL (ref 4–11)

## 2021-01-11 PROCEDURE — 81002 URINALYSIS NONAUTO W/O SCOPE: CPT | Performed by: INTERNAL MEDICINE

## 2021-01-11 PROCEDURE — 99214 OFFICE O/P EST MOD 30 MIN: CPT | Performed by: INTERNAL MEDICINE

## 2021-01-11 RX ORDER — METOPROLOL SUCCINATE 25 MG/1
75 TABLET, EXTENDED RELEASE ORAL DAILY
Qty: 90 TABLET | Refills: 3 | Status: SHIPPED | OUTPATIENT
Start: 2021-01-11 | End: 2021-08-02 | Stop reason: SDUPTHER

## 2021-01-11 RX ORDER — FUROSEMIDE 40 MG/1
40 TABLET ORAL DAILY
Qty: 30 TABLET | Refills: 5 | Status: SHIPPED | OUTPATIENT
Start: 2021-01-11 | End: 2021-08-02 | Stop reason: SDUPTHER

## 2021-01-11 RX ORDER — METFORMIN HYDROCHLORIDE 500 MG/1
TABLET, EXTENDED RELEASE ORAL
Qty: 360 TABLET | Refills: 5 | Status: SHIPPED
Start: 2021-01-11 | End: 2021-04-16 | Stop reason: ALTCHOICE

## 2021-01-11 RX ORDER — ATORVASTATIN CALCIUM 40 MG/1
TABLET, FILM COATED ORAL
Qty: 90 TABLET | Refills: 3 | Status: SHIPPED | OUTPATIENT
Start: 2021-01-11 | End: 2021-08-02 | Stop reason: SDUPTHER

## 2021-01-11 RX ORDER — DIGOXIN 125 MCG
125 TABLET ORAL EVERY OTHER DAY
Qty: 15 TABLET | Refills: 5 | Status: SHIPPED | OUTPATIENT
Start: 2021-01-11 | End: 2021-08-02 | Stop reason: SDUPTHER

## 2021-01-11 ASSESSMENT — ENCOUNTER SYMPTOMS
BACK PAIN: 0
SHORTNESS OF BREATH: 0
RHINORRHEA: 0
ABDOMINAL PAIN: 0
VOMITING: 0
WHEEZING: 0

## 2021-01-11 NOTE — PROGRESS NOTES
Subjective:      Patient ID: Ancil Bence is a 76 y.o. female. HPI  Patient is here for follow up on diabetes, hypertension, hyperlipidemia, depression and arthritis. She has chronic systolic CHF. She is doing well. Her last EF was improved at 35-40 %. She had a mitral valve repair, left atrial appendage clip, closure of PFO and a 3 way bypass. Patient's BGs range between low 100s mg/dl. She does not have hypoglycemia, increase appetite, paresthesia of the feet, polydipsia and polyuria. It is well controlled. She has had her eye exam. No issues with her feet. Her depression has been stable. Patient's BP is controlled on current medications. No chest pain or dyspnea. Her arthritis is stable. She gets occasional attacks of gout. No recent issues. She has osteopenia. She is taking calcium and vitamin d but could not tolerate Fosamax.           Current Outpatient Medications:     metoprolol succinate (TOPROL XL) 25 MG extended release tablet, Take 3 tablets by mouth daily, Disp: 90 tablet, Rfl: 3    digoxin (LANOXIN) 125 MCG tablet, Take 1 tablet by mouth every other day, Disp: 15 tablet, Rfl: 5    atorvastatin (LIPITOR) 40 MG tablet, TAKE ONE TABLET BY MOUTH DAILY, Disp: 90 tablet, Rfl: 3    metFORMIN (GLUCOPHAGE-XR) 500 MG extended release tablet, TAKE TWO TABLETS BY MOUTH TWICE A DAY, Disp: 360 tablet, Rfl: 5    furosemide (LASIX) 40 MG tablet, Take 1 tablet by mouth daily, Disp: 30 tablet, Rfl: 5    allopurinol (ZYLOPRIM) 300 MG tablet, TAKE ONE TABLET BY MOUTH DAILY, Disp: 30 tablet, Rfl: 2    enalapril (VASOTEC) 10 MG tablet, TAKE ONE TABLET BY MOUTH DAILY, Disp: 30 tablet, Rfl: 2    Ferrous Sulfate (IRON PO), Take by mouth, Disp: , Rfl:     potassium chloride (KLOR-CON M) 10 MEQ extended release tablet, Take 4 tablets by mouth 2 times daily (Patient not taking: Reported on 8/25/2020), Disp: 240 tablet, Rfl: 3    metOLazone (ZAROXOLYN) 2.5 MG tablet, Take 1 tablet by mouth once a week Take once a week 30 minutes prior to furosemide, Disp: 20 tablet, Rfl: 1    clopidogrel (PLAVIX) 75 MG tablet, Take 1 tablet by mouth daily, Disp: 30 tablet, Rfl: 0    lisinopril (PRINIVIL;ZESTRIL) 2.5 MG tablet, Take 2.5 mg by mouth 2 times daily , Disp: , Rfl:     dapagliflozin (FARXIGA) 5 MG tablet, Take 5 mg by mouth every morning, Disp: , Rfl:     vitamin B-12 (CYANOCOBALAMIN) 500 MCG tablet, Take 500 mcg by mouth daily, Disp: , Rfl:     calcium carbonate (OSCAL) 500 MG TABS tablet, Take 500 mg by mouth daily, Disp: , Rfl:     aspirin 81 MG EC tablet, Take 81 mg by mouth daily. , Disp: , Rfl:        Review of Systems   Constitutional: Negative for appetite change and fatigue. HENT: Negative for postnasal drip and rhinorrhea. Respiratory: Negative for shortness of breath and wheezing. Cardiovascular: Negative for chest pain and palpitations. Gastrointestinal: Negative for abdominal pain and vomiting. Musculoskeletal: Negative for back pain and joint swelling. Skin: Negative for rash. Neurological: Negative for light-headedness. Psychiatric/Behavioral: Positive for sleep disturbance. Negative for agitation, behavioral problems and suicidal ideas. The patient is not nervous/anxious. There are no changes to past medical history, family history, social history or review of systems(except as noted in the history section) since prior note (all reviewed with patient). Temp 97.2 °F (36.2 °C)   Ht 5' 1\" (1.549 m)   Wt 126 lb (57.2 kg)   BMI 23.81 kg/m²      Objective:   Physical Exam   Constitutional: She is oriented to person, place, and time. She appears well-developed and well-nourished. HENT:   Head: Normocephalic. Eyes: Pupils are equal, round, and reactive to light. Conjunctivae and EOM are normal.   Neck: Trachea normal and normal range of motion. Neck supple. No JVD present. Carotid bruit is not present. No thyroid mass and no thyromegaly present.    Cardiovascular: Normal rate, regular rhythm and normal heart sounds. Exam reveals no gallop. No murmur heard. Pulmonary/Chest: Effort normal and breath sounds normal. No respiratory distress. She has no wheezes. She has no rales. Abdominal: Soft. Bowel sounds are normal. She exhibits no distension and no mass. There is no splenomegaly or hepatomegaly. There is no abdominal tenderness. Musculoskeletal:         General: No edema. Comments: Impingement test +   Lymphadenopathy:     She has no cervical adenopathy. Neurological: She is alert and oriented to person, place, and time. Skin: Skin is warm and dry. Psychiatric: She has a normal mood and affect. Her behavior is normal. Judgment and thought content normal.     ECHO DONE ON 12/1/15  Conclusions    Summary  Normal left ventricle size and systolic function with an estimated ejection  fraction of 55%. Mild concentric left ventricular hypertrophy. No regional  wall motion abnormalities are seen. There is reversal of E/A inflow velocities across the mitral valve  suggesting impaired left ventricular relaxation. E/A 0.7, IVRT 107msec. Mitral valve is structurally normal.  Mitral valve leaflets appear to open adequately. Trivial mitral regurgitation is present. The aortic valve appears tricuspid . The aortic leaflets appear to open adequately. No evidence of aortic valve regurgitation. No evidence of aortic valve stenosis. Normal right ventricular size and function. TAPSE 1.9cm. STRESS TEST DONE 12/1/15     IMPRESSION:   1. No pharmcologically induced reversible perfusion defects to suggest   ischemia   2. Ejection fraction of 67%   3. No focal wall motion abnormality. ECHO 10/29/19  Conclusions      Summary   The left ventricular systolic function is severely reduced with an ejection   fraction of 25 %. There is akinesis of the inferior, apical septal and inferolateral walls. There is hypokinesis of the Inferoseptal & anteroseptal wall.    Grade II Jardiance  Chronic systolic CHF. On Lasix,. Toprol Xl and Enalapril. HTN: at goal  Hyperlipidemia: at goal.  Depression: stable. Gout: no attacks recently. Uses [place] prn. Osteopenia on calcium and D. Discussed use, benefit, and side effects of prescribed medications. Barriers to medication compliance addressed. All patient questions answered. Pt voiced understanding. Decrease calorie intake. Exercise,weight loss recommended. The current medical regimen is effective;  continue present plan and medications. See orders.

## 2021-01-12 LAB
A/G RATIO: 1.5 (ref 1.1–2.2)
ALBUMIN SERPL-MCNC: 4 G/DL (ref 3.4–5)
ALP BLD-CCNC: 127 U/L (ref 40–129)
ALT SERPL-CCNC: 13 U/L (ref 10–40)
ANION GAP SERPL CALCULATED.3IONS-SCNC: 15 MMOL/L (ref 3–16)
AST SERPL-CCNC: 16 U/L (ref 15–37)
BILIRUB SERPL-MCNC: 0.4 MG/DL (ref 0–1)
BUN BLDV-MCNC: 29 MG/DL (ref 7–20)
CALCIUM SERPL-MCNC: 9.6 MG/DL (ref 8.3–10.6)
CHLORIDE BLD-SCNC: 101 MMOL/L (ref 99–110)
CO2: 22 MMOL/L (ref 21–32)
CREAT SERPL-MCNC: 1.6 MG/DL (ref 0.6–1.2)
CREATININE URINE: 112.4 MG/DL (ref 28–259)
ESTIMATED AVERAGE GLUCOSE: 157.1 MG/DL
GFR AFRICAN AMERICAN: 38
GFR NON-AFRICAN AMERICAN: 31
GLOBULIN: 2.6 G/DL
GLUCOSE BLD-MCNC: 167 MG/DL (ref 70–99)
HBA1C MFR BLD: 7.1 %
MICROALBUMIN UR-MCNC: 14.7 MG/DL
MICROALBUMIN/CREAT UR-RTO: 130.8 MG/G (ref 0–30)
POTASSIUM SERPL-SCNC: 4.7 MMOL/L (ref 3.5–5.1)
SODIUM BLD-SCNC: 138 MMOL/L (ref 136–145)
TOTAL PROTEIN: 6.6 G/DL (ref 6.4–8.2)
URIC ACID, SERUM: 5.1 MG/DL (ref 2.6–6)
VITAMIN B-12: 255 PG/ML (ref 211–911)

## 2021-01-15 ENCOUNTER — TELEPHONE (OUTPATIENT)
Dept: INTERNAL MEDICINE CLINIC | Age: 76
End: 2021-01-15

## 2021-01-15 NOTE — TELEPHONE ENCOUNTER
----- Message from Leroy Barr MD sent at 1/15/2021 12:56 PM EST -----  I don't think so but it is ok to skip the water pill for one day. Call if the pain does not resolve  ----- Message -----  From: Db Worthy  Sent: 1/15/2021  10:46 AM EST  To: Leroy Barr MD    Pt states she has a constant ache on her left side by her kidney that started around 3 am this morning. She wants to know if the water pill could be causing the pain and if she can go without the medication today. Please advise.

## 2021-01-19 ENCOUNTER — OFFICE VISIT (OUTPATIENT)
Dept: CARDIOLOGY CLINIC | Age: 76
End: 2021-01-19
Payer: MEDICARE

## 2021-01-19 VITALS
OXYGEN SATURATION: 99 % | BODY MASS INDEX: 24.07 KG/M2 | HEART RATE: 75 BPM | DIASTOLIC BLOOD PRESSURE: 68 MMHG | WEIGHT: 127.5 LBS | HEIGHT: 61 IN | SYSTOLIC BLOOD PRESSURE: 132 MMHG

## 2021-01-19 DIAGNOSIS — E78.5 HYPERLIPIDEMIA ASSOCIATED WITH TYPE 2 DIABETES MELLITUS (HCC): ICD-10-CM

## 2021-01-19 DIAGNOSIS — I10 ESSENTIAL HYPERTENSION, BENIGN: ICD-10-CM

## 2021-01-19 DIAGNOSIS — I25.10 CAD, MULTIPLE VESSEL: ICD-10-CM

## 2021-01-19 DIAGNOSIS — I50.22 CHRONIC SYSTOLIC CHF (CONGESTIVE HEART FAILURE) (HCC): Primary | ICD-10-CM

## 2021-01-19 DIAGNOSIS — I34.0 NONRHEUMATIC MITRAL VALVE REGURGITATION: ICD-10-CM

## 2021-01-19 DIAGNOSIS — I25.5 ISCHEMIC CARDIOMYOPATHY: ICD-10-CM

## 2021-01-19 DIAGNOSIS — E11.69 HYPERLIPIDEMIA ASSOCIATED WITH TYPE 2 DIABETES MELLITUS (HCC): ICD-10-CM

## 2021-01-19 PROCEDURE — 99214 OFFICE O/P EST MOD 30 MIN: CPT | Performed by: INTERNAL MEDICINE

## 2021-01-19 PROCEDURE — 3051F HG A1C>EQUAL 7.0%<8.0%: CPT | Performed by: INTERNAL MEDICINE

## 2021-01-19 NOTE — LETTER
Penicillin and derivatives     Current Outpatient Medications   Medication Sig Dispense Refill    metoprolol succinate (TOPROL XL) 25 MG extended release tablet Take 3 tablets by mouth daily 90 tablet 3    digoxin (LANOXIN) 125 MCG tablet Take 1 tablet by mouth every other day 15 tablet 5    atorvastatin (LIPITOR) 40 MG tablet TAKE ONE TABLET BY MOUTH DAILY 90 tablet 3    metFORMIN (GLUCOPHAGE-XR) 500 MG extended release tablet TAKE TWO TABLETS BY MOUTH TWICE A  tablet 5    furosemide (LASIX) 40 MG tablet Take 1 tablet by mouth daily 30 tablet 5    allopurinol (ZYLOPRIM) 300 MG tablet TAKE ONE TABLET BY MOUTH DAILY 30 tablet 2    enalapril (VASOTEC) 10 MG tablet TAKE ONE TABLET BY MOUTH DAILY 30 tablet 2    Ferrous Sulfate (IRON PO) Take by mouth      potassium chloride (KLOR-CON M) 10 MEQ extended release tablet Take 4 tablets by mouth 2 times daily (Patient taking differently: Take 10 mEq by mouth 2 times daily ) 240 tablet 3    clopidogrel (PLAVIX) 75 MG tablet Take 1 tablet by mouth daily 30 tablet 0    lisinopril (PRINIVIL;ZESTRIL) 2.5 MG tablet Take 2.5 mg by mouth 2 times daily       vitamin B-12 (CYANOCOBALAMIN) 500 MCG tablet Take 500 mcg by mouth daily      calcium carbonate (OSCAL) 500 MG TABS tablet Take 500 mg by mouth daily      aspirin 81 MG EC tablet Take 81 mg by mouth daily.  dapagliflozin (FARXIGA) 5 MG tablet Take 5 mg by mouth every morning       No current facility-administered medications for this visit.         Past Medical History:   Diagnosis Date    Anemia     Backache, unspecified 3/11/08    CAD, multiple vessel 1/6/2020    Chronic systolic CHF (congestive heart failure) ([facility] 12/11/2019    Depressive disorder, not elsewhere classified 11/8/07    Essential hypertension, benign 11/8/07    Gout 8/11/2011    Hyperlipidemia associated with type 2 diabetes mellitus ([facility] 12/14/2015    Major depressive disorder, recurrent episode, moderate [facility] 12/14/2015    Osteoarthritis, hand 2/27/2012    Osteoarthrosis, unspecified whether generalized or localized, lower leg 11/8/07    Osteopenia 8/5/2015    Other and unspecified hyperlipidemia 11/8/07    Pain in joint, lower leg 1/29/07    Pneumonia     Rotator cuff tendinitis 10/6/2017    Tear of medial cartilage or meniscus of knee, current 1/29/07    Type 2 diabetes mellitus with microalbuminuria, without long-term current use of insulin ([facility] 5/5/2017    Type 2 diabetes mellitus without complication ([facility] 66/2/6382    Type II or unspecified type diabetes mellitus without mention of complication, not stated as uncontrolled 11/8/07    foot exam 7/16/08 normal     Past Surgical History:   Procedure Laterality Date    APPENDECTOMY      CHOLECYSTECTOMY      CORONARY ARTERY BYPASS GRAFT N/A 1/8/2020    CORONARY ARTERY BYPASS GRAFTING X3, INTERNAL MAMMARY ARTERY, SAPHENOUS VEIN GRAFTING, ON PUMP MITRAL VALVE RING REPAIR USING 26MM PELAEZ PHYSIO II RING, WITH LEFT ATRIAL APPENDAGE CLIP, PFO CLOSURETEE, 5 LEVEL BILATERAL INTERCOSTAL NERVE BLOCK performed by Ce Maurer MD at [id number]      total ab hyst    PARATHYROID GLAND SURGERY      explore parathyroid glands    TOTAL KNEE ARTHROPLASTY  03/29/10    left knee     Family History   Problem Relation Age of Onset    Cancer Mother 68        colon    Diabetes Mother     Other Father         gun shot wound    Hypertension Sister     Diabetes Sister     High Blood Pressure Sister     High Cholesterol Sister     Cancer Brother 59        mets everywhere    Cancer Brother 77        colon    Diabetes Sister     Other Sister         bipolar     Social History     Socioeconomic History    Marital status:      Spouse name: Not on file    Number of children: Not on file    Years of education: Not on file    Highest education level: Not on file   Occupational History    Not on file   Social Needs    Financial resource strain: Not on file   [place] Food insecurity     Worry: Not on file     Inability: Not on file    Transportation needs     Medical: Not on file     Non-medical: Not on file   Tobacco Use    Smoking status: Former Smoker     Packs/day: 0.50     Years: 20.00     Pack years: 10.00     Quit date: 2015     Years since quittin.1    Smokeless tobacco: Never Used   Substance and Sexual Activity    Alcohol use: No     Alcohol/week: 0.0 standard drinks    Drug use: No    Sexual activity: Yes     Partners: Male   Lifestyle    Physical activity     Days per week: Not on file     Minutes per session: Not on file    Stress: Not on file   Relationships    Social connections     Talks on phone: Not on file     Gets together: Not on file     Attends Taoism service: Not on file     Active member of club or organization: Not on file     Attends meetings of clubs or organizations: Not on file     Relationship status: Not on file    Intimate partner violence     Fear of current or ex partner: Not on file     Emotionally abused: Not on file     Physically abused: Not on file     Forced sexual activity: Not on file   Other Topics Concern    Not on file   Social History Narrative    Not on file       Review of Systems:   · Constitutional: there has been no unanticipated weight loss. There's been no change in energy level, sleep pattern, or activity level. · Eyes: No visual changes or diplopia. No scleral icterus. · ENT: No Headaches, hearing loss or vertigo. No mouth sores or sore throat. · Cardiovascular: Reviewed in HPI  · Respiratory: No cough or wheezing, no sputum production. No hematemesis. · Gastrointestinal: No abdominal pain, appetite loss, blood in stools. No change in bowel or bladder habits. · Genitourinary: No dysuria, trouble voiding, or hematuria. · Musculoskeletal:  No gait disturbance, weakness or joint complaints. · Integumentary: No rash or pruritis.   · Neurological: No headache, diplopia, change in muscle strength, numbness or tingling. No change in gait, balance, coordination, mood, affect, memory, mentation, behavior. · Psychiatric: No anxiety, no depression. · Endocrine: No malaise, fatigue or temperature intolerance. No excessive thirst, fluid intake, or urination. No tremor. · Hematologic/Lymphatic: No abnormal bruising or bleeding, blood clots or swollen lymph nodes. · Allergic/Immunologic: No nasal congestion or hives. Physical Examination:    Vitals:    01/19/21 1541   BP: 132/68   Pulse: 75   SpO2: 99%   Weight: 127 lb 8 oz (57.8 kg)   Height: 5' 1\" (1.549 m)     Body mass index is 24.09 kg/m². Wt Readings from Last 3 Encounters:   01/19/21 127 lb 8 oz (57.8 kg)   01/11/21 126 lb (57.2 kg)   08/25/20 125 lb 12.8 oz (57.1 kg)     BP Readings from Last 3 Encounters:   01/19/21 132/68   08/25/20 (!) 110/56   08/17/20 110/70          Constitutional and General Appearance:   WD/WN in NAD  HEENT:  NC/AT  TRISTAN  No problems with hearing  Skin:  Warm, dry  Respiratory:  · Normal excursion and expansion without use of accessory muscles  · Resp Auscultation: Normal breath sounds without dullness  Cardiovascular:  · The apical impulses not displaced  · Heart tones are crisp and normal  · Cervical veins are not engorged  · The carotid upstroke is normal in amplitude and contour without delay or bruit  · JVP less than 8 cm H2O  RRR with nl S1 and S2 without m,r,g  · Peripheral pulses are symmetrical and full  · There is no clubbing, cyanosis of the extremities.   · No edema  · Femoral Arteries: 2+ and equal  · Pedal Pulses: 2+ and equal   Neck:  · No thyromegaly  Abdomen:  · No masses or tenderness  · Liver/Spleen: No Abnormalities Noted  Neurological/Psychiatric:  · Alert and oriented in all spheres  · Moves all extremities well  · Exhibits normal gait balance and coordination  · No abnormalities of mood, affect, memory, mentation, or behavior are noted    Echo: 06/16/20   Summary   The left ventricular systolic function is severely reduced with an ejection   fraction of 15-20%. There is akinesis of the inferior and inferolateral walls. Left ventricular cavity size is mildly dilated. There is mild concentric left ventricular hypertrophy. Indeterminate left ventricular filling pressure secondary to mitral valve   prosthesis. The left atrium is moderately dilated. Right ventricular systolic function is mildly reduced . An annuloplasty ring is seen in the mitral position. Mild to moderate mitral regurgitation. Mild tricuspid regurgitation. Systolic pulmonary artery pressure (SPAP) is estimated at 45 mmHg consistent   with mild pulmonary hypertension (Right atrial pressure of 3 mmHg). Cardiac cath: 07/06/20  FINDINGS      HEMODYNAMICS     CHAMBER PRESSURE SATURATION   RA  1  65 %   RV  40/2     PA  40/12  62 %   PW  11  95 %   AORTA  158/66  95 %      ERNESTINA CARDIAC OUTPUT  4 L/min   SVR  14 Wood units   PVR  4.5 Wood units                LVGRAM     LVEDP  9   GRADIENT ACROSS AORTIC VALVE  none   LV FUNCTION EF 15 %   WALL MOTION  severe global hypokinesis with regional variation   MITRAL REGURGITATION  mild to moderate      AORTAGRAM     CALIBER  normal caliber   AORTIC INSUFFICIENCY  artificial AI was created with catheter projecting slightly across aortic valve   BYPASS GRAFTS  no bypass grafts are visualized            CORONARY ARTERIES     LM  Proximal less than 10% stenosis, mid-distal 20% stenosis         LAD Medium sized vessel, calcified, proximal 40% stenosis followed by mid vessel 99% stenosis, distal 20% stenosis. There are left-to-right collaterals noted. These extend up to the crux of the RCA. LCX Sharply angulated as it originates from left main, vessel is calcified with ostial/proximal 50 to 60% stenosis followed by mid 80% stenosis that extends into a medium to large sized first obtuse marginal branch.            RI  Large vessel, calcified, proximal-mid 60% stenosis, distal less than 10% stenosis, there is a small branch proximally from the ramus that has a ostial/proximal 99% stenosis. RCA Large vessel, dominant, proximal 60 to 70% stenosis followed by mid-distal 99% subtotal occlusion with faint antegrade flow. Vessel is heavily calcified. There are left to right collaterals noted. BYPASS GRAFTS     GUO-LAD  Atretic, 100% mdwzcdht-nba-iocaux stenosis, chronic total occlusion         SVG-OM  100% proximal          SVG-PDA  100% proximal                      CONCLUSIONS:      Normal filling pressures  Occluded bypass grafts     Severe LV dysfunction, continue medical therapy with beta-blocker and ACE inhibitor and diuretics, will need EP referral for consideration of ICD     Given occluded bypass grafts, will plan staged PCI, high risk, with Impella support as well as anesthesia support for multivessel PCI. Likely will need atherectomy as well. Continue dual antiplatelet therapy       Cardiac cath: 07/23/20  FINDINGS      Please see prior diagnostic cath report for findings, would add that there is severe LAD tortuosity and that the proximal circumflex was also to be severely diseased with 80 to 90% stenosis that extends in the mid vessel. Additionally would add that VIOLETTA was performed of the distal left main of the proximal LAD and this area was noted be diseased with a 50% stenosis. PERCUTANEOUS INTERVENTION DESCRIPTION      Heparin was used for anticoagulation, initially a 7 [employer] XB 3.5 guide cath was used to intubate the left main. The LAD lesion was crossed with a [employer]  balloon catheter which was an over-the-wire system along with a choice floppy wire. The  balloon catheter was then advanced distally and the choice wire was removed in favor of a Rotafloppy wire. Rotational atherectomy was then performed with a 1.5 mm eugene.   There was difficulty advancing the eugene and this was ultimately accomplished using Sammie glide mode. Lesion was then assessed with IVUS. It was felt to be a 2.5 mm vessel and as such a [employer] synergy 2.5 x 16 mm drug-eluting stent was implanted across the mid LAD lesion. Stent was then postdilated with a 2.5 mm noncompliant balloon. There was 0% residual stenosis. There was HAYDEN 3 flow before and after PCI. Attention then turned towards the circumflex and a choice floppy wire was taken but the circumflex could not be entered a lesion cannot be crossed due to severe angulation at the distal left main into the circumflex which was felt to be a 90 degree bend. Ultimately using a thread or microcatheter along with a PT2 mod support wire, the circumflex was entered and lesion was crossed. Circumflex lesions were then dilated with a 2.5 mm balloon and then lesions in the proximal and mid circumflex were then stented with a [employer] synergy stents, these were from distal to proximal with 2.75 x 16 mm, 3 x 12 mm and 2.5 x 12 mm drug-eluting stents. There was significant difficulty with advancing stents into the circumflex due to the severe angulation beyond the left main and 2 stents did not cross and were discarded. Initially a 7 [employer] [place] guide extension catheter was utilized to assist with stent delivery but ultimately a 6 [employer] guide extension catheter was utilized and this allowed for advancement of stents. Stents were postdilated with 3 mm noncompliant balloon. There was 0% residual stenosis. There was HAYDEN 3 flow before and after PCI. Attention then turned towards the RCA and a 7 [employer] AL 0.75 guiding catheter was used to engage the RCA. RCA lesion could not be crossed using multiple wires including Fielder XT, PT 2 months support and  200 wires. It was felt that this lesion would require  intervention as a staged PCI.                     CONCLUSIONS:      Successful rotational atherectomy and PCI of LAD with single drug-eluting stent  Successful PCI of circumflex with 3 drug-eluting stents     01/13/20  OPERATION PERFORMED:  1. Coronary artery bypass grafting x3, LIMA to LAD, reverse saphenous  vein to OM, reverse saphenous vein to diagonal.  2.  Mitral valve repair using Physio ring size 26.  3.  Left atrial appendage clip. 4.  Closure of the PFO. 5.  Intercostal nerve block. 6.  Transesophageal echo. 7.  Placement of temporary atrial and ventricular pacing wire. Echo: 09/02/20  Summary   Left ventricular systolic function is moderately reduced with a visually   estimated ejection fraction of 35-40 %. EF estimated by Hernandez's method at 40%. There is mild global hypokinesis with more prominent in the basal and mid   inferoseptal, and inferior walls. The left ventricle is mildly dilated in size with mild hypertrophy. Indeterminate diastolic function due to mitral valve prosthesis. The left atrium is moderately dilated. Right ventricular systolic function is reduced. An annuloplasty ring is seen in the mitral position. Mild mitral stenosis. Mild mitral, tricuspid and pulmonic regurgitation. Systolic pulmonary artery pressure (SPAP) is elevated and estimated at 43   mmHg (right atrial pressure 3 mmHg) consistent with mild pulmonary   hypertension. Compared to last echo on 6/16/20 (EF 15-20%), left ventricle systolic   function has improved. Labs were reviewed including labs from other hospital systems through [facility]. Cardiac testing was reviewed including echos, nuclear scans, cardiac catheterization, including from other hospital systems through [facility]. Assessment:    1. Chronic systolic CHF (congestive heart failure) ([facility]    2. CAD, multiple vessel    3. Essential hypertension, benign    4. Hyperlipidemia associated with type 2 diabetes mellitus ([address]    5. Ischemic cardiomyopathy    6. Nonrheumatic mitral valve regurgitation          Plan:  1.  Adjust lasix to 40 mg M,W,F and 20 mg all other days  2. Follow up in 4 months      Scribe's attestation: This note was scribed in the presence of Jluis Osegurea M.D. By Ev Welsh RN     The scribe's documentation has been prepared under my direction and personally reviewed by me in its entirety. I confirm that the note above accurately reflects all work, treatment, procedures, and medical decision making performed by me. Time Based Itemization  A total of 30 minutes was spent on today's patient encounter. If applicable, non-patient-facing activities:  ( x)Preparing to see the patient and reviewing records  ( ) Individual interpretation of results  ( ) Discussion or coordination of care with other health care professionals  ( x) Ordering of unique tests, medications, or procedures  ( x) Documentation within the EHR      I appreciate the opportunity of cooperating in the care of this patient.     Jluis Oseguera M.D., [facility]

## 2021-01-19 NOTE — PROGRESS NOTES
Penicillin and derivatives     Current Outpatient Medications   Medication Sig Dispense Refill    metoprolol succinate (TOPROL XL) 25 MG extended release tablet Take 3 tablets by mouth daily 90 tablet 3    digoxin (LANOXIN) 125 MCG tablet Take 1 tablet by mouth every other day 15 tablet 5    atorvastatin (LIPITOR) 40 MG tablet TAKE ONE TABLET BY MOUTH DAILY 90 tablet 3    metFORMIN (GLUCOPHAGE-XR) 500 MG extended release tablet TAKE TWO TABLETS BY MOUTH TWICE A  tablet 5    furosemide (LASIX) 40 MG tablet Take 1 tablet by mouth daily 30 tablet 5    allopurinol (ZYLOPRIM) 300 MG tablet TAKE ONE TABLET BY MOUTH DAILY 30 tablet 2    enalapril (VASOTEC) 10 MG tablet TAKE ONE TABLET BY MOUTH DAILY 30 tablet 2    Ferrous Sulfate (IRON PO) Take by mouth      potassium chloride (KLOR-CON M) 10 MEQ extended release tablet Take 4 tablets by mouth 2 times daily (Patient taking differently: Take 10 mEq by mouth 2 times daily ) 240 tablet 3    clopidogrel (PLAVIX) 75 MG tablet Take 1 tablet by mouth daily 30 tablet 0    lisinopril (PRINIVIL;ZESTRIL) 2.5 MG tablet Take 2.5 mg by mouth 2 times daily       vitamin B-12 (CYANOCOBALAMIN) 500 MCG tablet Take 500 mcg by mouth daily      calcium carbonate (OSCAL) 500 MG TABS tablet Take 500 mg by mouth daily      aspirin 81 MG EC tablet Take 81 mg by mouth daily.  dapagliflozin (FARXIGA) 5 MG tablet Take 5 mg by mouth every morning       No current facility-administered medications for this visit.         Past Medical History:   Diagnosis Date    Anemia     Backache, unspecified 3/11/08    CAD, multiple vessel 1/6/2020    Chronic systolic CHF (congestive heart failure) ([facility] 12/11/2019    Depressive disorder, not elsewhere classified 11/8/07    Essential hypertension, benign 11/8/07    Gout 8/11/2011    Hyperlipidemia associated with type 2 diabetes mellitus [facility] 12/14/2015    Major depressive disorder, recurrent episode[facility] ([facility] 12/14/2015    Food insecurity     Worry: Not on file     Inability: Not on file    Transportation needs     Medical: Not on file     Non-medical: Not on file   Tobacco Use    Smoking status: Former Smoker     Packs/day: 0.50     Years: 20.00     Pack years: 10.00     Quit date: 2015     Years since quittin.1    Smokeless tobacco: Never Used   Substance and Sexual Activity    Alcohol use: No     Alcohol/week: 0.0 standard drinks    Drug use: No    Sexual activity: Yes     Partners: Male   Lifestyle    Physical activity     Days per week: Not on file     Minutes per session: Not on file    Stress: Not on file   Relationships    Social connections     Talks on phone: Not on file     Gets together: Not on file     Attends Faith service: Not on file     Active member of club or organization: Not on file     Attends meetings of clubs or organizations: Not on file     Relationship status: Not on file    Intimate partner violence     Fear of current or ex partner: Not on file     Emotionally abused: Not on file     Physically abused: Not on file     Forced sexual activity: Not on file   Other Topics Concern    Not on file   Social History Narrative    Not on file       Review of Systems:   · Constitutional: there has been no unanticipated weight loss. There's been no change in energy level, sleep pattern, or activity level. · Eyes: No visual changes or diplopia. No scleral icterus. · ENT: No Headaches, hearing loss or vertigo. No mouth sores or sore throat. · Cardiovascular: Reviewed in HPI  · Respiratory: No cough or wheezing, no sputum production. No hematemesis. · Gastrointestinal: No abdominal pain, appetite loss, blood in stools. No change in bowel or bladder habits. · Genitourinary: No dysuria, trouble voiding, or hematuria. · Musculoskeletal:  No gait disturbance, weakness or joint complaints. · Integumentary: No rash or pruritis.   · Neurological: No headache, diplopia, change in muscle strength, numbness or tingling. No change in gait, balance, coordination, mood, affect, memory, mentation, behavior. · Psychiatric: No anxiety, no depression. · Endocrine: No malaise, fatigue or temperature intolerance. No excessive thirst, fluid intake, or urination. No tremor. · Hematologic/Lymphatic: No abnormal bruising or bleeding, blood clots or swollen lymph nodes. · Allergic/Immunologic: No nasal congestion or hives. Physical Examination:    Vitals:    01/19/21 1541   BP: 132/68   Pulse: 75   SpO2: 99%   Weight: 127 lb 8 oz (57.8 kg)   Height: 5' 1\" (1.549 m)     Body mass index is 24.09 kg/m². Wt Readings from Last 3 Encounters:   01/19/21 127 lb 8 oz (57.8 kg)   01/11/21 126 lb (57.2 kg)   08/25/20 125 lb 12.8 oz (57.1 kg)     BP Readings from Last 3 Encounters:   01/19/21 132/68   08/25/20 (!) 110/56   08/17/20 110/70          Constitutional and General Appearance:   WD/WN in NAD  HEENT:  NC/AT  TRISTAN  No problems with hearing  Skin:  Warm, dry  Respiratory:  · Normal excursion and expansion without use of accessory muscles  · Resp Auscultation: Normal breath sounds without dullness  Cardiovascular:  · The apical impulses not displaced  · Heart tones are crisp and normal  · Cervical veins are not engorged  · The carotid upstroke is normal in amplitude and contour without delay or bruit  · JVP less than 8 cm H2O  RRR with nl S1 and S2 without m,r,g  · Peripheral pulses are symmetrical and full  · There is no clubbing, cyanosis of the extremities.   · No edema  · Femoral Arteries: 2+ and equal  · Pedal Pulses: 2+ and equal   Neck:  · No thyromegaly  Abdomen:  · No masses or tenderness  · Liver/Spleen: No Abnormalities Noted  Neurological/Psychiatric:  · Alert and oriented in all spheres  · Moves all extremities well  · Exhibits normal gait balance and coordination  · No abnormalities of mood, affect, memory, mentation, or behavior are noted    Echo: 06/16/20   Summary   The left ventricular systolic function is severely reduced with an ejection   fraction of 15-20%. There is akinesis of the inferior and inferolateral walls. Left ventricular cavity size is mildly dilated. There is mild concentric left ventricular hypertrophy. Indeterminate left ventricular filling pressure secondary to mitral valve   prosthesis. The left atrium is moderately dilated. Right ventricular systolic function is mildly reduced . An annuloplasty ring is seen in the mitral position. Mild to moderate mitral regurgitation. Mild tricuspid regurgitation. Systolic pulmonary artery pressure (SPAP) is estimated at 45 mmHg consistent   with mild pulmonary hypertension (Right atrial pressure of 3 mmHg). Cardiac cath: 07/06/20  FINDINGS      HEMODYNAMICS     CHAMBER PRESSURE SATURATION   RA  1  65 %   RV  40/2     PA  40/12  62 %   PW  11  95 %   AORTA  158/66  95 %      ERNESTINA CARDIAC OUTPUT  4 L/min   SVR  14 Wood units   PVR  4.5 Wood units                LVGRAM     LVEDP  9   GRADIENT ACROSS AORTIC VALVE  none   LV FUNCTION EF 15 %   WALL MOTION  severe global hypokinesis with regional variation   MITRAL REGURGITATION  mild to moderate      AORTAGRAM     CALIBER  normal caliber   AORTIC INSUFFICIENCY  artificial AI was created with catheter projecting slightly across aortic valve   BYPASS GRAFTS  no bypass grafts are visualized            CORONARY ARTERIES     LM  Proximal less than 10% stenosis, mid-distal 20% stenosis         LAD Medium sized vessel, calcified, proximal 40% stenosis followed by mid vessel 99% stenosis, distal 20% stenosis. There are left-to-right collaterals noted. These extend up to the crux of the RCA. LCX Sharply angulated as it originates from left main, vessel is calcified with ostial/proximal 50 to 60% stenosis followed by mid 80% stenosis that extends into a medium to large sized first obtuse marginal branch.            RI  Large vessel, calcified, proximal-mid 60% stenosis, distal less than 10% stenosis, there is a small branch proximally from the ramus that has a ostial/proximal 99% stenosis. RCA Large vessel, dominant, proximal 60 to 70% stenosis followed by mid-distal 99% subtotal occlusion with faint antegrade flow. Vessel is heavily calcified. There are left to right collaterals noted. BYPASS GRAFTS     GUO-LAD  Atretic, 100% vjxdfhfm-swg-kftnix stenosis, chronic total occlusion         SVG-OM  100% proximal          SVG-PDA  100% proximal                      CONCLUSIONS:      Normal filling pressures  Occluded bypass grafts     Severe LV dysfunction, continue medical therapy with beta-blocker and ACE inhibitor and diuretics, will need EP referral for consideration of ICD     Given occluded bypass grafts, will plan staged PCI, high risk, with Impella support as well as anesthesia support for multivessel PCI. Likely will need atherectomy as well. Continue dual antiplatelet therapy       Cardiac cath: 07/23/20  FINDINGS      Please see prior diagnostic cath report for findings, would add that there is severe LAD tortuosity and that the proximal circumflex was also to be severely diseased with 80 to 90% stenosis that extends in the mid vessel. Additionally would add that VIOLETTA was performed of the distal left main of the proximal LAD and this area was noted be diseased with a 50% stenosis. PERCUTANEOUS INTERVENTION DESCRIPTION      Heparin was used for anticoagulation, initially a 7 [employer] XB 3.5 guide cath was used to intubate the left main. The LAD lesion was crossed with a [employer]  balloon catheter which was an over-the-wire system along with a choice floppy wire. The  balloon catheter was then advanced distally and the choice wire was removed in favor of a Rotafloppy wire. Rotational atherectomy was then performed with a 1.5 mm eugene.   There was difficulty advancing the eugene and this was ultimately accomplished stent  Successful PCI of circumflex with 3 drug-eluting stents     01/13/20  OPERATION PERFORMED:  1. Coronary artery bypass grafting x3, LIMA to LAD, reverse saphenous  vein to OM, reverse saphenous vein to diagonal.  2.  Mitral valve repair using Physio ring size 26.  3.  Left atrial appendage clip. 4.  Closure of the PFO. 5.  Intercostal nerve block. 6.  Transesophageal echo. 7.  Placement of temporary atrial and ventricular pacing wire. Echo: 09/02/20  Summary   Left ventricular systolic function is moderately reduced with a visually   estimated ejection fraction of 35-40 %. EF estimated by Hernandez's method at 40%. There is mild global hypokinesis with more prominent in the basal and mid   inferoseptal, and inferior walls. The left ventricle is mildly dilated in size with mild hypertrophy. Indeterminate diastolic function due to mitral valve prosthesis. The left atrium is moderately dilated. Right ventricular systolic function is reduced. An annuloplasty ring is seen in the mitral position. Mild mitral stenosis. Mild mitral, tricuspid and pulmonic regurgitation. Systolic pulmonary artery pressure (SPAP) is elevated and estimated at 43   mmHg (right atrial pressure 3 mmHg) consistent with mild pulmonary   hypertension. Compared to last echo on 6/16/20 (EF 15-20%), left ventricle systolic   function has improved. Labs were reviewed including labs from other hospital systems through [facility]. Cardiac testing was reviewed including echos, nuclear scans, cardiac catheterization, including from other hospital systems through [facility]. Assessment:    1. Chronic systolic CHF (congestive heart failure) ([facility]    2. CAD, multiple vessel    3. Essential hypertension, benign    4. Hyperlipidemia associated with type 2 diabetes mellitus ([facility]    5. Ischemic cardiomyopathy    6. Nonrheumatic mitral valve regurgitation          Plan:  1.  Adjust lasix to 40 mg M,W,F and 20 mg all other days  2. Follow up in 4 months      Scribe's attestation: This note was scribed in the presence of Neelam Rico M.D. By Meg Norwood RN     The scribe's documentation has been prepared under my direction and personally reviewed by me in its entirety. I confirm that the note above accurately reflects all work, treatment, procedures, and medical decision making performed by me. Time Based Itemization  A total of 30 minutes was spent on today's patient encounter. If applicable, non-patient-facing activities:  ( x)Preparing to see the patient and reviewing records  ( ) Individual interpretation of results  ( ) Discussion or coordination of care with other health care professionals  ( x) Ordering of unique tests, medications, or procedures  ( x) Documentation within the EHR      I appreciate the opportunity of cooperating in the care of this patient.     Neelam Rico M.D., [facility]

## 2021-02-01 ENCOUNTER — NURSE ONLY (OUTPATIENT)
Dept: INTERNAL MEDICINE CLINIC | Age: 76
End: 2021-02-01

## 2021-02-01 DIAGNOSIS — R79.89 CREATININE ELEVATION: ICD-10-CM

## 2021-02-01 DIAGNOSIS — R79.89 CREATININE ELEVATION: Primary | ICD-10-CM

## 2021-02-02 LAB
ANION GAP SERPL CALCULATED.3IONS-SCNC: 11 MMOL/L (ref 3–16)
BUN BLDV-MCNC: 30 MG/DL (ref 7–20)
CALCIUM SERPL-MCNC: 9.9 MG/DL (ref 8.3–10.6)
CHLORIDE BLD-SCNC: 104 MMOL/L (ref 99–110)
CO2: 24 MMOL/L (ref 21–32)
CREAT SERPL-MCNC: 1.7 MG/DL (ref 0.6–1.2)
GFR AFRICAN AMERICAN: 35
GFR NON-AFRICAN AMERICAN: 29
GLUCOSE BLD-MCNC: 230 MG/DL (ref 70–99)
POTASSIUM SERPL-SCNC: 4.7 MMOL/L (ref 3.5–5.1)
SODIUM BLD-SCNC: 139 MMOL/L (ref 136–145)

## 2021-02-08 ENCOUNTER — TELEPHONE (OUTPATIENT)
Dept: INTERNAL MEDICINE CLINIC | Age: 76
End: 2021-02-08

## 2021-02-08 RX ORDER — PREDNISONE 10 MG/1
TABLET ORAL
Qty: 32 TABLET | Refills: 0 | Status: SHIPPED | OUTPATIENT
Start: 2021-02-08 | End: 2021-04-16 | Stop reason: ALTCHOICE

## 2021-02-08 NOTE — TELEPHONE ENCOUNTER
----- Message from Isa Ocampo MD sent at 2/8/2021 11:12 AM EST -----  Contact: Valentina Edmond [phone]  Prednisone 40 mg for 5 days, 30 mg for 2 days, 20 mg for 2 days and 10 mg for 2 days  Call in 10 mg tablets  ----- Message -----  From: [facility]  Sent: 2/8/2021  10:14 AM EST  To: Isa Ocampo MD    Patient states she has a gout flare up in her L wrist. She has extreme pain, swelling, hot to touch and pain upon movement. She is asking for medication. Obed Tanner.  Teagan

## 2021-02-12 ENCOUNTER — IMMUNIZATION (OUTPATIENT)
Dept: PRIMARY CARE CLINIC | Age: 76
End: 2021-02-12
Payer: MEDICARE

## 2021-02-12 PROCEDURE — 91301 COVID-19, MODERNA VACCINE 100MCG/0.5ML DOSE: CPT | Performed by: FAMILY MEDICINE

## 2021-02-12 PROCEDURE — 0011A PR IMM ADMN SARSCOV2 100 MCG/0.5 ML 1ST DOSE: CPT | Performed by: FAMILY MEDICINE

## 2021-03-12 ENCOUNTER — IMMUNIZATION (OUTPATIENT)
Dept: PRIMARY CARE CLINIC | Age: 76
End: 2021-03-12
Payer: MEDICARE

## 2021-03-12 PROCEDURE — 91301 COVID-19, MODERNA VACCINE 100MCG/0.5ML DOSE: CPT | Performed by: FAMILY MEDICINE

## 2021-03-12 PROCEDURE — 0012A COVID-19, MODERNA VACCINE 100MCG/0.5ML DOSE: CPT | Performed by: FAMILY MEDICINE

## 2021-04-09 ENCOUNTER — TELEPHONE (OUTPATIENT)
Dept: INTERNAL MEDICINE CLINIC | Age: 76
End: 2021-04-09

## 2021-04-09 RX ORDER — PREDNISONE 20 MG/1
20 TABLET ORAL DAILY
Qty: 5 TABLET | Refills: 0 | Status: SHIPPED | OUTPATIENT
Start: 2021-04-09 | End: 2021-04-14

## 2021-04-09 NOTE — TELEPHONE ENCOUNTER
----- Message from Dee Robert sent at 4/9/2021  3:41 PM EDT -----  Contact: Pt- [phone]    ----- Message -----  From: Jacqui De Souza  Sent: 4/9/2021   9:18 AM EDT  To: Chaney Nissen, MD    Pt called stating that she has gout in her right thumb and wants to know if you would prescribe her something for it. Please advise.     Pt- [phone]    Pharmacy: [facility], [address] [facility] [phone] Yessy Bond [phone]    Future appt- 4/16/2021  Past appt- 1/11/2021

## 2021-04-16 ENCOUNTER — OFFICE VISIT (OUTPATIENT)
Dept: INTERNAL MEDICINE CLINIC | Age: 76
End: 2021-04-16

## 2021-04-16 VITALS
HEART RATE: 70 BPM | BODY MASS INDEX: 24.92 KG/M2 | TEMPERATURE: 98 F | SYSTOLIC BLOOD PRESSURE: 138 MMHG | DIASTOLIC BLOOD PRESSURE: 70 MMHG | RESPIRATION RATE: 12 BRPM | WEIGHT: 132 LBS | HEIGHT: 61 IN

## 2021-04-16 DIAGNOSIS — I50.22 CHRONIC SYSTOLIC CHF (CONGESTIVE HEART FAILURE) (HCC): ICD-10-CM

## 2021-04-16 DIAGNOSIS — E11.29 TYPE 2 DIABETES MELLITUS WITH MICROALBUMINURIA, WITHOUT LONG-TERM CURRENT USE OF INSULIN (HCC): ICD-10-CM

## 2021-04-16 DIAGNOSIS — N18.32 CHRONIC KIDNEY DISEASE (CKD) STAGE G3B/A1, MODERATELY DECREASED GLOMERULAR FILTRATION RATE (GFR) BETWEEN 30-44 ML/MIN/1.73 SQUARE METER AND ALBUMINURIA CREATININE RATIO LESS THAN 30 MG/G (HCC): ICD-10-CM

## 2021-04-16 DIAGNOSIS — Z00.00 MEDICARE ANNUAL WELLNESS VISIT, SUBSEQUENT: Primary | ICD-10-CM

## 2021-04-16 DIAGNOSIS — E11.69 HYPERLIPIDEMIA ASSOCIATED WITH TYPE 2 DIABETES MELLITUS (HCC): ICD-10-CM

## 2021-04-16 DIAGNOSIS — I25.5 ISCHEMIC CARDIOMYOPATHY: ICD-10-CM

## 2021-04-16 DIAGNOSIS — E78.5 HYPERLIPIDEMIA ASSOCIATED WITH TYPE 2 DIABETES MELLITUS (HCC): ICD-10-CM

## 2021-04-16 DIAGNOSIS — F33.1 MAJOR DEPRESSIVE DISORDER, RECURRENT EPISODE, MODERATE (HCC): ICD-10-CM

## 2021-04-16 DIAGNOSIS — R80.9 TYPE 2 DIABETES MELLITUS WITH MICROALBUMINURIA, WITHOUT LONG-TERM CURRENT USE OF INSULIN (HCC): ICD-10-CM

## 2021-04-16 DIAGNOSIS — I25.10 CAD, MULTIPLE VESSEL: ICD-10-CM

## 2021-04-16 DIAGNOSIS — I42.5 OTHER RESTRICTIVE CARDIOMYOPATHY (HCC): ICD-10-CM

## 2021-04-16 DIAGNOSIS — I10 ESSENTIAL HYPERTENSION, BENIGN: ICD-10-CM

## 2021-04-16 DIAGNOSIS — M1A.09X0 IDIOPATHIC CHRONIC GOUT OF MULTIPLE SITES WITHOUT TOPHUS: ICD-10-CM

## 2021-04-16 DIAGNOSIS — Z00.00 ROUTINE GENERAL MEDICAL EXAMINATION AT A HEALTH CARE FACILITY: ICD-10-CM

## 2021-04-16 PROCEDURE — 3051F HG A1C>EQUAL 7.0%<8.0%: CPT | Performed by: INTERNAL MEDICINE

## 2021-04-16 PROCEDURE — G0439 PPPS, SUBSEQ VISIT: HCPCS | Performed by: INTERNAL MEDICINE

## 2021-04-16 RX ORDER — METFORMIN HYDROCHLORIDE 500 MG/1
TABLET, EXTENDED RELEASE ORAL
Qty: 360 TABLET | Refills: 5 | Status: CANCELLED | OUTPATIENT
Start: 2021-04-16

## 2021-04-16 ASSESSMENT — PATIENT HEALTH QUESTIONNAIRE - PHQ9
2. FEELING DOWN, DEPRESSED OR HOPELESS: 0
SUM OF ALL RESPONSES TO PHQ9 QUESTIONS 1 & 2: 0
SUM OF ALL RESPONSES TO PHQ QUESTIONS 1-9: 0

## 2021-04-16 NOTE — PROGRESS NOTES
Medicare Annual Wellness Visit  Name: Justine Session Date:    MRN: [id number] Sex: Female   Age: 76 y.o. Ethnicity: Non-/Non    : 1945 Race: Samantha Crawford is here for Medicare AWV    Screenings for behavioral, psychosocial and functional/safety risks, and cognitive dysfunction are all negative except as indicated below. These results, as well as other patient data from the [address] form, are documented in Flowsheets linked to this Encounter. She is doing well. She has recovered from her heart surgery. She has diabetes. It is under good control. She has CAD. It is stable. She has CKD stage 3 b. She denies any angina. Allergies   Allergen Reactions    Penicillins Anaphylaxis     Penicillin and derivatives         Prior to Visit Medications    Medication Sig Taking?  Authorizing Provider   metoprolol succinate (TOPROL XL) 25 MG extended release tablet Take 3 tablets by mouth daily  Chaney Nissen, MD   digoxin (LANOXIN) 125 MCG tablet Take 1 tablet by mouth every other day  Chaney Nissen, MD   atorvastatin (LIPITOR) 40 MG tablet TAKE ONE TABLET BY MOUTH DAILY  Chaney Nissen, MD   furosemide (LASIX) 40 MG tablet Take 1 tablet by mouth daily  Chaney Nissen, MD   allopurinol (ZYLOPRIM) 300 MG tablet TAKE ONE TABLET BY MOUTH DAILY  Chaney Nissen, MD   enalapril (VASOTEC) 10 MG tablet TAKE ONE TABLET BY MOUTH DAILY  LUKE Bradley   Ferrous Sulfate (IRON PO) Take by mouth  Historical Provider, MD   potassium chloride (KLOR-CON M) 10 MEQ extended release tablet Take 4 tablets by mouth 2 times daily  Patient taking differently: Take 10 mEq by mouth 2 times daily   Sammi Artis MD   clopidogrel (PLAVIX) 75 MG tablet Take 1 tablet by mouth daily  LADAN Oneal - CNP   lisinopril (PRINIVIL;ZESTRIL) 2.5 MG tablet Take 2.5 mg by mouth 2 times daily   Historical Provider, MD   dapagliflozin (FARXIGA) 5 MG USING 26MM PELAEZ PHYSIO II RING, WITH LEFT ATRIAL APPENDAGE CLIP, PFO CLOSURETEE, 5 LEVEL BILATERAL INTERCOSTAL NERVE BLOCK performed by Kevin Mcbride MD at [id number]      total ab hyst    PARATHYROID GLAND SURGERY      explore parathyroid glands    TOTAL KNEE ARTHROPLASTY  03/29/10    left knee         Family History   Problem Relation Age of Onset    Cancer Mother 68        colon    Diabetes Mother     Other Father         gun shot wound    Hypertension Sister     Diabetes Sister     High Blood Pressure Sister     High Cholesterol Sister     Cancer Brother 59        mets everywhere    Cancer Brother 77        colon    Diabetes Sister     Other Sister         bipolar       CareTeam (Including outside providers/suppliers regularly involved in providing care):   Patient Care Team:  Neo Villagomez MD as PCP - Hadley Champion MD as PCP - [address] [place] [address] [facility]  Matt Morales MD as Consulting Physician (Pulmonology)  Ivonne Cortez RN as Nurse Navigator  Patt Downing MD as Consulting Physician (Interventional Cardiology)  Kevin Mcbride MD as Consulting Physician (Thoracic Surgery)    Wt Readings from Last 3 Encounters:   04/16/21 132 lb (59.9 kg)   01/19/21 127 lb 8 oz (57.8 kg)   01/11/21 126 lb (57.2 kg)     Vitals:    04/16/21 1326   BP: 138/70   Site: Right Upper Arm   Position: Sitting   Cuff Size: Medium Adult   Pulse: 70   Resp: 12   Temp: 98 °F (36.7 °C)   Weight: 132 lb (59.9 kg)   Height: 5' 1\" (1.549 m)     Body mass index is 24.94 kg/m². Based upon direct observation of the patient, evaluation of cognition reveals recent and remote memory intact.     General Appearance: alert and oriented to person, place and time, well developed and well- nourished, in no acute distress  Skin: warm and dry, no rash or erythema  Head: normocephalic and atraumatic  Eyes: pupils equal, round, and reactive to light, extraocular eye movements intact, conjunctivae normal  ENT: tympanic membrane, external ear and ear canal normal bilaterally, nose without deformity, nasal mucosa and turbinates normal without polyps  Neck: supple and non-tender without mass, no thyromegaly or thyroid nodules, no cervical lymphadenopathy  Pulmonary/Chest: clear to auscultation bilaterally- no wheezes, rales or rhonchi, normal air movement, no respiratory distress  Cardiovascular: normal rate, regular rhythm, normal S1 and S2, no murmurs, rubs, clicks, or gallops, distal pulses intact, no carotid bruits  Abdomen: soft, non-tender, non-distended, normal bowel sounds, no masses or organomegaly  Extremities: no cyanosis, clubbing or edema  Musculoskeletal: normal range of motion, no joint swelling, deformity or tenderness  Neurologic: reflexes normal and symmetric, no cranial nerve deficit, gait, coordination and speech normal    Patient's complete Health Risk Assessment and screening values have been reviewed and are found in Flowsheets. The following problems were reviewed today and where indicated follow up appointments were made and/or referrals ordered. Positive Risk Factor Screenings with Interventions:            General Health and ACP:  General  In general, how would you say your health is?: Good  In the past 7 days, have you experienced any of the following?  New or Increased Pain, New or Increased Fatigue, Loneliness, Social Isolation, Stress or Anger?: None of These  Do you get the social and emotional support that you need?: Yes  Do you have a Living Will?: Yes  Advance Directives     Power of [address] [place] [address] Will ACP-Advance Directive ACP-Power of     Not on File Not on File Not on File Not on File      General Health Risk Interventions:  · she will get us a copy of her living will    Health Habits/Nutrition:  Health Habits/Nutrition  Do you exercise for at least 20 minutes 2-3 times per week?: Yes  Have you lost any weight without trying in the past 3 months?: (!) Yes  Do you  Meningococcal (ACWY) vaccine  Aged Out     Recommendations for Preventive Services Due: see orders and patient instructions/AVS.  . Recommended screening schedule for the next 5-10 years is provided to the patient in written form: see Patient Trena Choudhary was seen today for medicare awv. Diagnoses and all orders for this visit:    Medicare annual wellness visit, subsequent    Type 2 diabetes mellitus with microalbuminuria, without long-term current use of insulin ([facility]  -     Chelle Snow MD, Ophthalmology, [facility]  -     Uric Acid; Future  -     Hemoglobin A1C; Future  -     CBC Auto Differential; Future  -     Basic Metabolic Panel; Future    CAD, multiple vessel    Ischemic cardiomyopathy    Essential hypertension, benign    Idiopathic chronic gout of multiple sites without tophus    Major depressive disorder, recurrent episode, moderate (HCC)    Other restrictive cardiomyopathy ([facility]    Hyperlipidemia associated with type 2 diabetes mellitus (HCC)    Chronic systolic CHF (congestive heart failure) (HCC)    Chronic kidney disease (CKD) stage G3b/A1, moderately decreased glomerular filtration rate (GFR) between 30-44 mL/min/1.73 square meter and albuminuria creatinine ratio less than 30 mg/g    Other orders  -     linagliptin (TRADJENTA) 5 MG tablet; Take 1 tablet by mouth daily               Her DM is well controlled. CAD stable. No angina. No coag defects or thrombocytopenia    CKD stage 3b. Stop metformin. Start Tradjenta 5 mg po daily.

## 2021-04-17 LAB
ANION GAP SERPL CALCULATED.3IONS-SCNC: 10 MMOL/L (ref 3–16)
BASOPHILS ABSOLUTE: 0.1 K/UL (ref 0–0.2)
BASOPHILS RELATIVE PERCENT: 1.2 %
BUN BLDV-MCNC: 22 MG/DL (ref 7–20)
CALCIUM SERPL-MCNC: 9.1 MG/DL (ref 8.3–10.6)
CHLORIDE BLD-SCNC: 101 MMOL/L (ref 99–110)
CO2: 25 MMOL/L (ref 21–32)
CREAT SERPL-MCNC: 1.6 MG/DL (ref 0.6–1.2)
EOSINOPHILS ABSOLUTE: 0.4 K/UL (ref 0–0.6)
EOSINOPHILS RELATIVE PERCENT: 6.2 %
ESTIMATED AVERAGE GLUCOSE: 180 MG/DL
GFR AFRICAN AMERICAN: 38
GFR NON-AFRICAN AMERICAN: 31
GLUCOSE BLD-MCNC: 267 MG/DL (ref 70–99)
HBA1C MFR BLD: 7.9 %
HCT VFR BLD CALC: 33 % (ref 36–48)
HEMOGLOBIN: 11 G/DL (ref 12–16)
LYMPHOCYTES ABSOLUTE: 1 K/UL (ref 1–5.1)
LYMPHOCYTES RELATIVE PERCENT: 15.9 %
MCH RBC QN AUTO: 33.5 PG (ref 26–34)
MCHC RBC AUTO-ENTMCNC: 33.4 G/DL (ref 31–36)
MCV RBC AUTO: 100.1 FL (ref 80–100)
MONOCYTES ABSOLUTE: 0.3 K/UL (ref 0–1.3)
MONOCYTES RELATIVE PERCENT: 4.9 %
NEUTROPHILS ABSOLUTE: 4.7 K/UL (ref 1.7–7.7)
NEUTROPHILS RELATIVE PERCENT: 71.8 %
PDW BLD-RTO: 16.5 % (ref 12.4–15.4)
PLATELET # BLD: 197 K/UL (ref 135–450)
PMV BLD AUTO: 8.6 FL (ref 5–10.5)
POTASSIUM SERPL-SCNC: 4.6 MMOL/L (ref 3.5–5.1)
RBC # BLD: 3.3 M/UL (ref 4–5.2)
SODIUM BLD-SCNC: 136 MMOL/L (ref 136–145)
URIC ACID, SERUM: 3.6 MG/DL (ref 2.6–6)
WBC # BLD: 6.6 K/UL (ref 4–11)

## 2021-04-19 ENCOUNTER — TELEPHONE (OUTPATIENT)
Dept: INTERNAL MEDICINE CLINIC | Age: 76
End: 2021-04-19

## 2021-04-19 RX ORDER — GLIMEPIRIDE 2 MG/1
2 TABLET ORAL EVERY MORNING
Qty: 30 TABLET | Refills: 2 | Status: SHIPPED | OUTPATIENT
Start: 2021-04-19 | End: 2021-08-02 | Stop reason: SDUPTHER

## 2021-04-19 NOTE — TELEPHONE ENCOUNTER
----- Message from Evelia Sellers MD sent at 4/18/2021  7:26 PM EDT -----  Sugars are elevated. Watch diet. Cut back on carbs.    Add Amaryl 2 mg po before BF

## 2021-04-26 ENCOUNTER — TELEPHONE (OUTPATIENT)
Dept: CARDIOLOGY CLINIC | Age: 76
End: 2021-04-26

## 2021-04-26 NOTE — TELEPHONE ENCOUNTER
Pt calling she forgot to get her COVID test and has a procedure 04/27/2021 in the AM needs to know if she needs to [place] or can she still come?  Pls call to advise Thank you

## 2021-04-26 NOTE — TELEPHONE ENCOUNTER
LMOVM letting pt know that per SRJ we can do a rapid COVID test the day of the procedure. Pt does NOT have to   re schedule.      TY

## 2021-04-26 NOTE — TELEPHONE ENCOUNTER
SRJ please advise. Pt forgot to get her Covid test. Can she get a rapid day of the procedure on 4/27/21? Or should she reschedule? SRJ please advise. Pt can be reached at [phone].       TY

## 2021-06-14 ENCOUNTER — TELEPHONE (OUTPATIENT)
Dept: INTERNAL MEDICINE CLINIC | Age: 76
End: 2021-06-14

## 2021-06-14 NOTE — TELEPHONE ENCOUNTER
----- Message from Ivory Rae MD sent at 6/14/2021  3:11 PM EDT -----  Contact: Gi Anisa [phone]  Check BP at home  ----- Message -----  From: Erinn Nicole  Sent: 6/14/2021   2:40 PM EDT  To: Ivory Rae MD    Patient said she is no longer feeling dizzy. Please advise.   ----- Message -----  From: Ivory Rae MD  Sent: 6/14/2021   2:37 PM EDT  To: Erinn Nicole    ER  ----- Message -----  From: Mason Mcfarland MA  Sent: 6/14/2021   1:10 PM EDT  To: MD Dr. Brissa Salazar Patient    Patient woke up feeling very dizzy, disoriented and feeling exhausted. Patient is asking if she should be seen or go to ER. Patient had open heart surgery January of 2020. Patient states she is feeling worse by the hour.  Please advise

## 2021-06-15 ENCOUNTER — TELEPHONE (OUTPATIENT)
Dept: INTERNAL MEDICINE CLINIC | Age: 76
End: 2021-06-15

## 2021-06-15 NOTE — TELEPHONE ENCOUNTER
----- Message from Rai Ybarra MD sent at 6/14/2021  3:11 PM EDT -----  Contact: Raymundo King [phone]  Check BP at home  ----- Message -----  From: Ese Joshi  Sent: 6/14/2021   2:40 PM EDT  To: Rai Ybarra MD    Patient said she is no longer feeling dizzy. Please advise.   ----- Message -----  From: Rai Ybarra MD  Sent: 6/14/2021   2:37 PM EDT  To: Ese Joshi    ER  ----- Message -----  From: Leonardo Crabtree MA  Sent: 6/14/2021   1:10 PM EDT  To: MD Dr. Jered Borrero Patient    Patient woke up feeling very dizzy, disoriented and feeling exhausted. Patient is asking if she should be seen or go to ER. Patient had open heart surgery January of 2020. Patient states she is feeling worse by the hour.  Please advise Posterior Auricular Interpolation Flap Text: A decision was made to reconstruct the defect utilizing an interpolation axial flap and a staged reconstruction.  A telfa template was made of the defect.  This telfa template was then used to outline the posterior auricular interpolation flap.  The donor area for the pedicle flap was then injected with anesthesia.  The flap was excised through the skin and subcutaneous tissue down to the layer of the underlying musculature.  The pedicle flap was carefully excised within this deep plane to maintain its blood supply.  The edges of the donor site were undermined.   The donor site was closed in a primary fashion.  The pedicle was then rotated into position and sutured.  Once the tube was sutured into place, adequate blood supply was confirmed with blanching and refill.  The pedicle was then wrapped with xeroform gauze and dressed appropriately with a telfa and gauze bandage to ensure continued blood supply and protect the attached pedicle.

## 2021-06-18 NOTE — PROGRESS NOTES
[facility]  Advanced CHF/Pulmonary Hypertension   Cardiac Evaluation      Matt Angel  YOB: 1945    Date of Visit:  21       Chief Complaint   Patient presents with    Follow-up    Congestive Heart Failure         History of Present Illness:  Matt Angel is a 76 y.o. female who presents from referral from Dr Lianet Morales for consultation and management of cardiomyopathy and CHF. She has a history of CAD with CABG, CHF, cardiomyopathy, and HLD. She had an abnormal echo on 10/29/19 showing an EF of 25% moderate mitral regurgitation. Her stress test from 19 showed no ischemia. Her carotid doppler from 20 showed normal arteries. Her cardiac cath from 20 showed multivessel CAD. Refer to CVTS. Her IVÁN 20 showed her EF 25-30% with moderate MR. No evidence of mass or thrombus. Mild TR. She underwent CABG x3 20, mitral valve repair, ZAHRAA clip, and closure of the PFO. On 20 her cardiac cath resulted in PCI and atherectomy with single LULY and PCI of circumflex with 3 LULY. She states she takes metolazone once a week. She is now urinating more frequently. She reports she has occasional weakness and dizziness with position changes from laying to sitting. She reports she has some vision changes with the dizziness. Her echo from 20 showed his EF was 35-40%. Mild global HK. The left atrium is moderately dilated. Mild mitral stenosis. Mild MR, TR and CO. Her brother  in 2021 and did not have her LHC for  with DR Lianet Morales. 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 She denies CP, SOB, dizziness or syncope. She was started on farxiga.          Allergies   Allergen Reactions    Penicillins Anaphylaxis     Penicillin and derivatives     Current Outpatient Medications   Medication Sig Dispense Refill    allopurinol (ZYLOPRIM) 300 MG tablet TAKE ONE TABLET BY MOUTH DAILY 30 tablet 1    enalapril (VASOTEC) 10 MG tablet TAKE ONE TABLET BY MOUTH DAILY (Patient taking differently: Take 10 mg by mouth 2 times daily ) 30 tablet 1    glimepiride (AMARYL) 2 MG tablet Take 1 tablet by mouth every morning 30 tablet 2    linagliptin (TRADJENTA) 5 MG tablet Take 1 tablet by mouth daily 30 tablet 5    metoprolol succinate (TOPROL XL) 25 MG extended release tablet Take 3 tablets by mouth daily 90 tablet 3    digoxin (LANOXIN) 125 MCG tablet Take 1 tablet by mouth every other day 15 tablet 5    atorvastatin (LIPITOR) 40 MG tablet TAKE ONE TABLET BY MOUTH DAILY 90 tablet 3    furosemide (LASIX) 40 MG tablet Take 1 tablet by mouth daily 30 tablet 5    Ferrous Sulfate (IRON PO) Take by mouth      potassium chloride (KLOR-CON M) 10 MEQ extended release tablet Take 4 tablets by mouth 2 times daily (Patient taking differently: Take 10 mEq by mouth 2 times daily ) 240 tablet 3    lisinopril (PRINIVIL;ZESTRIL) 2.5 MG tablet Take 2.5 mg by mouth 2 times daily       dapagliflozin (FARXIGA) 5 MG tablet Take 5 mg by mouth every morning      vitamin B-12 (CYANOCOBALAMIN) 500 MCG tablet Take 500 mcg by mouth daily      calcium carbonate (OSCAL) 500 MG TABS tablet Take 500 mg by mouth daily      aspirin 81 MG EC tablet Take 81 mg by mouth daily.  clopidogrel (PLAVIX) 75 MG tablet Take 1 tablet by mouth daily (Patient not taking: Reported on 6/22/2021) 30 tablet 0     No current facility-administered medications for this visit.        Past Medical History:   Diagnosis Date    Anemia     Backache, unspecified 3/11/08    CAD, multiple vessel 1/6/2020    Chronic kidney disease (CKD) stage G3b/A1, moderately decreased glomerular filtration rate (GFR) between 30-44 mL/min/1.73 square meter and albuminuria creatinine ratio less than 30 mg/g ([facility]) 4/16/2021    Chronic systolic CHF (congestive heart failure) ([facility] [address] 12/11/2019    Depressive disorder, not elsewhere classified 11/8/07    Essential hypertension, benign 11/8/07    Gout 8/11/2011    Hyperlipidemia associated with type 2 diabetes mellitus ([address] 12/14/2015    Major depressive disorder, recurrent episode, moderate ([address] 12/14/2015    Osteoarthritis, hand 2/27/2012    Osteoarthrosis, unspecified whether generalized or localized, lower leg 11/8/07    Osteopenia 8/5/2015    Other and unspecified hyperlipidemia 11/8/07    Pain in joint, lower leg 1/29/07    Pneumonia     Rotator cuff tendinitis 10/6/2017    Tear of medial cartilage or meniscus of knee, current 1/29/07    Type 2 diabetes mellitus with microalbuminuria, without long-term current use of insulin ([address] 5/5/2017    Type 2 diabetes mellitus without complication ([facility] [address] 89/2/7375    Type II or unspecified type diabetes mellitus without mention of complication, not stated as uncontrolled 11/8/07    foot exam 7/16/08 normal     Past Surgical History:   Procedure Laterality Date    APPENDECTOMY      CHOLECYSTECTOMY      CORONARY ARTERY BYPASS GRAFT N/A 1/8/2020    CORONARY ARTERY BYPASS GRAFTING X3, INTERNAL MAMMARY ARTERY, SAPHENOUS VEIN GRAFTING, ON PUMP MITRAL VALVE RING REPAIR USING 26MM PELAEZ PHYSIO II RING, WITH LEFT ATRIAL APPENDAGE CLIP, PFO CLOSURETEE, 5 LEVEL BILATERAL INTERCOSTAL NERVE BLOCK performed by Gavino Trevino MD at [id number]      total ab hyst    PARATHYROID GLAND SURGERY      explore parathyroid glands    TOTAL KNEE ARTHROPLASTY  03/29/10    left knee     Family History   Problem Relation Age of Onset    Cancer Mother 68        colon    Diabetes Mother     Other Father         gun shot wound    Hypertension Sister     Diabetes Sister     High Blood Pressure Sister     High Cholesterol Sister     Cancer Brother 59        mets everywhere    Cancer Brother 77        colon    Diabetes Sister     Other Sister         bipolar     Social History     Socioeconomic History    Marital status:      Spouse name: Not on file    Number of children: Not on file    Years of education: Not on file    Highest education level: Not on file   Occupational History    Not on file   Tobacco Use    Smoking status: Former Smoker     Packs/day: 0.50     Years: 20.00     Pack years: 10.00     Quit date: 2015     Years since quittin.5    Smokeless tobacco: Never Used   Vaping Use    Vaping Use: Never used   Substance and Sexual Activity    Alcohol use: No     Alcohol/week: 0.0 standard drinks    Drug use: No    Sexual activity: Yes     Partners: Male   Other Topics Concern    Not on file   Social History Narrative    Not on file     Social Determinants of Health     Financial Resource Strain:     Difficulty of Paying Living Expenses:    Food Insecurity:     Worried About Running Out of Food in the Last Year:     Ran Out of Food in the Last Year:    Transportation Needs:     Lack of Transportation (Medical):  Lack of Transportation (Non-Medical):    Physical Activity:     Days of Exercise per Week:     Minutes of Exercise per Session:    Stress:     Feeling of Stress :    Social Connections:     Frequency of Communication with Friends and Family:     Frequency of Social Gatherings with Friends and Family:     Attends Holiness Services:     Active Member of Clubs or Organizations:     Attends Club or Organization Meetings:     Marital Status:    Intimate Partner Violence:     Fear of Current or Ex-Partner:     Emotionally Abused:     Physically Abused:     Sexually Abused:        Review of Systems:   · Constitutional: there has been no unanticipated weight loss. There's been no change in energy level, sleep pattern, or activity level. · Eyes: No visual changes or diplopia. No scleral icterus. · ENT: No Headaches, hearing loss or vertigo. No mouth sores or sore throat. · Cardiovascular: Reviewed in HPI  · Respiratory: No cough or wheezing, no sputum production. No hematemesis. · Gastrointestinal: No abdominal pain, appetite loss, blood in stools. No change in bowel or bladder habits. · Genitourinary: No dysuria, trouble voiding, or hematuria. · Musculoskeletal:  No gait disturbance, weakness or joint complaints. · Integumentary: No rash or pruritis. · Neurological: No headache, diplopia, change in muscle strength, numbness or tingling. No change in gait, balance, coordination, mood, affect, memory, mentation, behavior. · Psychiatric: No anxiety, no depression. · Endocrine: No malaise, fatigue or temperature intolerance. No excessive thirst, fluid intake, or urination. No tremor. · Hematologic/Lymphatic: No abnormal bruising or bleeding, blood clots or swollen lymph nodes. · Allergic/Immunologic: No nasal congestion or hives. Physical Examination:      Vitals:    06/22/21 1328 06/22/21 1332   BP: (!) 122/58 122/60   Pulse: 83    SpO2: 98%    Weight: 128 lb (58.1 kg)    Height: 5' 1\" (1.549 m)      Body mass index is 24.19 kg/m². Wt Readings from Last 3 Encounters:   06/22/21 128 lb (58.1 kg)   04/16/21 132 lb (59.9 kg)   01/19/21 127 lb 8 oz (57.8 kg)     BP Readings from Last 3 Encounters:   06/22/21 122/60   04/16/21 138/70   01/19/21 132/68            Constitutional and General Appearance:   WD/WN in NAD  HEENT:  NC/AT  TRISTAN  No problems with hearing  Skin:  Warm, dry  Respiratory:  · Normal excursion and expansion without use of accessory muscles  · Resp Auscultation: Normal breath sounds without dullness  Cardiovascular:  · The apical impulses not displaced  · Heart tones are crisp and normal  · Cervical veins are not engorged  · The carotid upstroke is normal in amplitude and contour without delay or bruit  · JVP less than 8 cm H2O  RRR with nl S1 and S2 without m,r,g  · Peripheral pulses are symmetrical and full  · There is no clubbing, cyanosis of the extremities.   · No edema  · Femoral Arteries: 2+ and equal  · Pedal Pulses: 2+ and equal   Neck:  · No thyromegaly  Abdomen:  · No masses or tenderness  · Liver/Spleen: No Abnormalities Noted  Neurological/Psychiatric:  · Alert and oriented in all spheres  · Moves all extremities well  · Exhibits normal gait balance and coordination  · No abnormalities of mood, affect, memory, mentation, or behavior are noted    Echo: 06/16/20   Summary   The left ventricular systolic function is severely reduced with an ejection   fraction of 15-20%. There is akinesis of the inferior and inferolateral walls. Left ventricular cavity size is mildly dilated. There is mild concentric left ventricular hypertrophy. Indeterminate left ventricular filling pressure secondary to mitral valve   prosthesis. The left atrium is moderately dilated. Right ventricular systolic function is mildly reduced . An annuloplasty ring is seen in the mitral position. Mild to moderate mitral regurgitation. Mild tricuspid regurgitation. Systolic pulmonary artery pressure (SPAP) is estimated at 45 mmHg consistent   with mild pulmonary hypertension (Right atrial pressure of 3 mmHg). Cardiac cath: 07/06/20  FINDINGS      HEMODYNAMICS     CHAMBER PRESSURE SATURATION   RA  1  65 %   RV  40/2     PA  40/12  62 %   PW  11  95 %   AORTA  158/66  95 %      ERNESTINA CARDIAC OUTPUT  4 L/min   SVR  14 Wood units   PVR  4.5 Wood units                LVGRAM     LVEDP  9   GRADIENT ACROSS AORTIC VALVE  none   LV FUNCTION EF 15 %   WALL MOTION  severe global hypokinesis with regional variation   MITRAL REGURGITATION  mild to moderate      AORTAGRAM     CALIBER  normal caliber   AORTIC INSUFFICIENCY  artificial AI was created with catheter projecting slightly across aortic valve   BYPASS GRAFTS  no bypass grafts are visualized            CORONARY ARTERIES     LM  Proximal less than 10% stenosis, mid-distal 20% stenosis         LAD Medium sized vessel, calcified, proximal 40% stenosis followed by mid vessel 99% stenosis, distal 20% stenosis. There are left-to-right collaterals noted. These extend up to the crux of the RCA.          LCX Sharply angulated as it originates from left main, vessel is calcified with ostial/proximal 50 to 60% stenosis followed by mid 80% stenosis that extends into a medium to large sized first obtuse marginal branch. RI  Large vessel, calcified, proximal-mid 60% stenosis, distal less than 10% stenosis, there is a small branch proximally from the ramus that has a ostial/proximal 99% stenosis. RCA Large vessel, dominant, proximal 60 to 70% stenosis followed by mid-distal 99% subtotal occlusion with faint antegrade flow. Vessel is heavily calcified. There are left to right collaterals noted. BYPASS GRAFTS     GUO-LAD  Atretic, 100% lmtbtusk-gus-wltopf stenosis, chronic total occlusion         SVG-OM  100% proximal          SVG-PDA  100% proximal                      CONCLUSIONS:      Normal filling pressures  Occluded bypass grafts     Severe LV dysfunction, continue medical therapy with beta-blocker and ACE inhibitor and diuretics, will need EP referral for consideration of ICD     Given occluded bypass grafts, will plan staged PCI, high risk, with Impella support as well as anesthesia support for multivessel PCI. Likely will need atherectomy as well. Continue dual antiplatelet therapy       Cardiac cath: 07/23/20  FINDINGS      Please see prior diagnostic cath report for findings, would add that there is severe LAD tortuosity and that the proximal circumflex was also to be severely diseased with 80 to 90% stenosis that extends in the mid vessel. Additionally would add that VIOLETTA was performed of the distal left main of the proximal LAD and this area was noted be diseased with a 50% stenosis. PERCUTANEOUS INTERVENTION DESCRIPTION      Heparin was used for anticoagulation, initially a 7 [employer] XB 3.5 guide cath was used to intubate the left main.   The LAD lesion was crossed with a [employer]  balloon catheter which was an over-the-wire system along with a choice floppy wire. The  balloon catheter was then advanced distally and the choice wire was removed in favor of a Rotafloppy wire. Rotational atherectomy was then performed with a 1.5 mm eugene. There was difficulty advancing the eugene and this was ultimately accomplished using Sammie glide mode. Lesion was then assessed with IVUS. It was felt to be a 2.5 mm vessel and as such a C2C [employer] synergy 2.5 x 16 mm drug-eluting stent was implanted across the mid LAD lesion. Stent was then postdilated with a 2.5 mm noncompliant balloon. There was 0% residual stenosis. There was HAYDEN 3 flow before and after PCI. Attention then turned towards the circumflex and a choice floppy wire was taken but the circumflex could not be entered a lesion cannot be crossed due to severe angulation at the distal left main into the circumflex which was felt to be a 90 degree bend. Ultimately using a thread or microcatheter along with a PT2 mod support wire, the circumflex was entered and lesion was crossed. Circumflex lesions were then dilated with a 2.5 mm balloon and then lesions in the proximal and mid circumflex were then stented with a [employer] synergy stents, these were from distal to proximal with 2.75 x 16 mm, 3 x 12 mm and 2.5 x 12 mm drug-eluting stents. There was significant difficulty with advancing stents into the circumflex due to the severe angulation beyond the left main and 2 stents did not cross and were discarded. Initially a 7 [employer] guide extension catheter was utilized to assist with stent delivery but ultimately a 6 [employer] guide extension catheter was utilized and this allowed for advancement of stents. Stents were postdilated with 3 mm noncompliant balloon. There was 0% residual stenosis. There was HAYDEN 3 flow before and after PCI. Attention then turned towards the RCA and a 7 [employer] AL 0.75 guiding catheter was used to engage the RCA.   RCA lesion could not be crossed using multiple wires including Fielder XT, PT 2 months support and  200 wires. It was felt that this lesion would require  intervention as a staged PCI. CONCLUSIONS:      Successful rotational atherectomy and PCI of LAD with single drug-eluting stent  Successful PCI of circumflex with 3 drug-eluting stents     01/13/20  OPERATION PERFORMED:  1. Coronary artery bypass grafting x3, LIMA to LAD, reverse saphenous  vein to OM, reverse saphenous vein to diagonal.  2.  Mitral valve repair using Physio ring size 26.  3.  Left atrial appendage clip. 4.  Closure of the PFO. 5.  Intercostal nerve block. 6.  Transesophageal echo. 7.  Placement of temporary atrial and ventricular pacing wire. Echo: 09/02/20  Summary   Left ventricular systolic function is moderately reduced with a visually   estimated ejection fraction of 35-40 %. EF estimated by Hernandez's method at 40%. There is mild global hypokinesis with more prominent in the basal and mid   inferoseptal, and inferior walls. The left ventricle is mildly dilated in size with mild hypertrophy. Indeterminate diastolic function due to mitral valve prosthesis. The left atrium is moderately dilated. Right ventricular systolic function is reduced. An annuloplasty ring is seen in the mitral position. Mild mitral stenosis. Mild mitral, tricuspid and pulmonic regurgitation. Systolic pulmonary artery pressure (SPAP) is elevated and estimated at 43   mmHg (right atrial pressure 3 mmHg) consistent with mild pulmonary   hypertension. Compared to last echo on 6/16/20 (EF 15-20%), left ventricle systolic   function has improved. Labs were reviewed including labs from other hospital systems through [facility]. Cardiac testing was reviewed including echos, nuclear scans, cardiac catheterization, including from other hospital systems through [facility]. Assessment:    1.  Chronic systolic CHF (congestive heart failure) ([facility] [address]    2. Essential hypertension, benign    3. Ischemic cardiomyopathy    4. Nonrheumatic mitral valve regurgitation    5. SOB (shortness of breath)          Plan:  1. Recommend dermatologist for skin marking on your face  2. Echo in 6 months  3. CMP, BNP, CBC in August  4. Follow up in 6 months      Scribe's attestation: This note was scribed in the presence of Trenton Parada M.D. By Stephenie Tate RN    The scribe's documentation has been prepared under my direction and personally reviewed by me in its entirety. I confirm that the note above accurately reflects all work, treatment, procedures, and medical decision making performed by me. Time Based Itemization  A total of 30 minutes was spent on today's patient encounter. If applicable, non-patient-facing activities:  ( x)Preparing to see the patient and reviewing records  ( ) Individual interpretation of results  ( ) Discussion or coordination of care with other health care professionals  ( x) Ordering of unique tests, medications, or procedures  ( x) Documentation within the EHR     I appreciate the opportunity of cooperating in the care of this patient.     Trenton Parada M.D., [address]

## 2021-06-21 RX ORDER — ENALAPRIL MALEATE 10 MG/1
TABLET ORAL
Qty: 30 TABLET | Refills: 1 | Status: SHIPPED | OUTPATIENT
Start: 2021-06-21 | End: 2021-06-24 | Stop reason: SDUPTHER

## 2021-06-21 RX ORDER — ALLOPURINOL 300 MG/1
TABLET ORAL
Qty: 30 TABLET | Refills: 1 | Status: SHIPPED | OUTPATIENT
Start: 2021-06-21 | End: 2021-08-02 | Stop reason: SDUPTHER

## 2021-06-22 ENCOUNTER — OFFICE VISIT (OUTPATIENT)
Dept: CARDIOLOGY CLINIC | Age: 76
End: 2021-06-22
Payer: MEDICARE

## 2021-06-22 VITALS
OXYGEN SATURATION: 98 % | HEART RATE: 83 BPM | WEIGHT: 128 LBS | BODY MASS INDEX: 24.17 KG/M2 | SYSTOLIC BLOOD PRESSURE: 122 MMHG | HEIGHT: 61 IN | DIASTOLIC BLOOD PRESSURE: 60 MMHG

## 2021-06-22 DIAGNOSIS — I10 ESSENTIAL HYPERTENSION, BENIGN: ICD-10-CM

## 2021-06-22 DIAGNOSIS — I25.5 ISCHEMIC CARDIOMYOPATHY: ICD-10-CM

## 2021-06-22 DIAGNOSIS — I50.22 CHRONIC SYSTOLIC CHF (CONGESTIVE HEART FAILURE) (HCC): Primary | ICD-10-CM

## 2021-06-22 DIAGNOSIS — R06.02 SOB (SHORTNESS OF BREATH): ICD-10-CM

## 2021-06-22 DIAGNOSIS — I34.0 NONRHEUMATIC MITRAL VALVE REGURGITATION: ICD-10-CM

## 2021-06-22 PROCEDURE — 99214 OFFICE O/P EST MOD 30 MIN: CPT | Performed by: INTERNAL MEDICINE

## 2021-06-22 NOTE — PATIENT INSTRUCTIONS
Plan:  1. Recommend dermatologist for skin marking on your face  2. Echo in 6 months  3. CMP, BNP, CBC in August  4.  Follow up in 6 months

## 2021-06-24 RX ORDER — ENALAPRIL MALEATE 10 MG/1
TABLET ORAL
Qty: 60 TABLET | Refills: 2 | Status: SHIPPED | OUTPATIENT
Start: 2021-06-24 | End: 2021-08-02 | Stop reason: SDUPTHER

## 2021-07-16 NOTE — PROGRESS NOTES
Dr Severo Raman notified of BS of 252.   No intervention given Post-Op Assessment Note    CV Status:  Stable  Pain Score: 0    Pain management: adequate     Mental Status:  Alert, awake and sleepy   Hydration Status:  Euvolemic   PONV Controlled:  Controlled   Airway Patency:  Patent      Post Op Vitals Reviewed: Yes      Staff: Anesthesiologist         No complications documented      BP (P) 140/83 (07/16/21 1105)    Temp (!) (P) 97 3 °F (36 3 °C) (07/16/21 1105)    Pulse (P) 102 (07/16/21 1105)   Resp (P) 20 (07/16/21 1105)    SpO2 (P) 99 % (07/16/21 1105)

## 2021-08-02 ENCOUNTER — OFFICE VISIT (OUTPATIENT)
Dept: INTERNAL MEDICINE CLINIC | Age: 76
End: 2021-08-02

## 2021-08-02 VITALS — BODY MASS INDEX: 23.41 KG/M2 | WEIGHT: 124 LBS | HEIGHT: 61 IN

## 2021-08-02 DIAGNOSIS — F33.1 MAJOR DEPRESSIVE DISORDER, RECURRENT EPISODE, MODERATE (HCC): ICD-10-CM

## 2021-08-02 DIAGNOSIS — Z95.1 S/P CABG X 3: ICD-10-CM

## 2021-08-02 DIAGNOSIS — I50.22 CHRONIC SYSTOLIC CHF (CONGESTIVE HEART FAILURE) (HCC): ICD-10-CM

## 2021-08-02 DIAGNOSIS — R80.9 TYPE 2 DIABETES MELLITUS WITH MICROALBUMINURIA, WITHOUT LONG-TERM CURRENT USE OF INSULIN (HCC): ICD-10-CM

## 2021-08-02 DIAGNOSIS — I25.10 CAD, MULTIPLE VESSEL: ICD-10-CM

## 2021-08-02 DIAGNOSIS — M1A.09X0 IDIOPATHIC CHRONIC GOUT OF MULTIPLE SITES WITHOUT TOPHUS: ICD-10-CM

## 2021-08-02 DIAGNOSIS — E11.69 HYPERLIPIDEMIA ASSOCIATED WITH TYPE 2 DIABETES MELLITUS (HCC): ICD-10-CM

## 2021-08-02 DIAGNOSIS — E78.5 HYPERLIPIDEMIA ASSOCIATED WITH TYPE 2 DIABETES MELLITUS (HCC): ICD-10-CM

## 2021-08-02 DIAGNOSIS — N18.32 CHRONIC KIDNEY DISEASE (CKD) STAGE G3B/A1, MODERATELY DECREASED GLOMERULAR FILTRATION RATE (GFR) BETWEEN 30-44 ML/MIN/1.73 SQUARE METER AND ALBUMINURIA CREATININE RATIO LESS THAN 30 MG/G (HCC): ICD-10-CM

## 2021-08-02 DIAGNOSIS — E11.29 TYPE 2 DIABETES MELLITUS WITH MICROALBUMINURIA, WITHOUT LONG-TERM CURRENT USE OF INSULIN (HCC): Primary | ICD-10-CM

## 2021-08-02 DIAGNOSIS — E11.29 TYPE 2 DIABETES MELLITUS WITH MICROALBUMINURIA, WITHOUT LONG-TERM CURRENT USE OF INSULIN (HCC): ICD-10-CM

## 2021-08-02 DIAGNOSIS — R80.9 TYPE 2 DIABETES MELLITUS WITH MICROALBUMINURIA, WITHOUT LONG-TERM CURRENT USE OF INSULIN (HCC): Primary | ICD-10-CM

## 2021-08-02 DIAGNOSIS — I10 ESSENTIAL HYPERTENSION, BENIGN: ICD-10-CM

## 2021-08-02 DIAGNOSIS — I25.5 ISCHEMIC CARDIOMYOPATHY: ICD-10-CM

## 2021-08-02 LAB
A/G RATIO: 1.3 (ref 1.1–2.2)
ALBUMIN SERPL-MCNC: 3.9 G/DL (ref 3.4–5)
ALP BLD-CCNC: 137 U/L (ref 40–129)
ALT SERPL-CCNC: 7 U/L (ref 10–40)
ANION GAP SERPL CALCULATED.3IONS-SCNC: 16 MMOL/L (ref 3–16)
AST SERPL-CCNC: 17 U/L (ref 15–37)
BASOPHILS ABSOLUTE: 0.1 K/UL (ref 0–0.2)
BASOPHILS RELATIVE PERCENT: 0.9 %
BILIRUB SERPL-MCNC: 0.3 MG/DL (ref 0–1)
BUN BLDV-MCNC: 57 MG/DL (ref 7–20)
CALCIUM SERPL-MCNC: 9.6 MG/DL (ref 8.3–10.6)
CHLORIDE BLD-SCNC: 104 MMOL/L (ref 99–110)
CHOLESTEROL, TOTAL: 133 MG/DL (ref 0–199)
CO2: 17 MMOL/L (ref 21–32)
CREAT SERPL-MCNC: 2.2 MG/DL (ref 0.6–1.2)
EOSINOPHILS ABSOLUTE: 0.4 K/UL (ref 0–0.6)
EOSINOPHILS RELATIVE PERCENT: 5.1 %
GFR AFRICAN AMERICAN: 26
GFR NON-AFRICAN AMERICAN: 22
GLOBULIN: 3.1 G/DL
GLUCOSE BLD-MCNC: 201 MG/DL (ref 70–99)
HCT VFR BLD CALC: 34.4 % (ref 36–48)
HDLC SERPL-MCNC: 27 MG/DL (ref 40–60)
HEMOGLOBIN: 11.6 G/DL (ref 12–16)
LDL CHOLESTEROL CALCULATED: ABNORMAL MG/DL
LDL CHOLESTEROL DIRECT: 45 MG/DL
LYMPHOCYTES ABSOLUTE: 1.1 K/UL (ref 1–5.1)
LYMPHOCYTES RELATIVE PERCENT: 14.2 %
MCH RBC QN AUTO: 33.3 PG (ref 26–34)
MCHC RBC AUTO-ENTMCNC: 33.8 G/DL (ref 31–36)
MCV RBC AUTO: 98.6 FL (ref 80–100)
MONOCYTES ABSOLUTE: 0.4 K/UL (ref 0–1.3)
MONOCYTES RELATIVE PERCENT: 4.7 %
NEUTROPHILS ABSOLUTE: 5.9 K/UL (ref 1.7–7.7)
NEUTROPHILS RELATIVE PERCENT: 75.1 %
PDW BLD-RTO: 15 % (ref 12.4–15.4)
PLATELET # BLD: 231 K/UL (ref 135–450)
PMV BLD AUTO: 9 FL (ref 5–10.5)
POTASSIUM SERPL-SCNC: 5.2 MMOL/L (ref 3.5–5.1)
RBC # BLD: 3.49 M/UL (ref 4–5.2)
SODIUM BLD-SCNC: 137 MMOL/L (ref 136–145)
TOTAL PROTEIN: 7 G/DL (ref 6.4–8.2)
TRIGL SERPL-MCNC: 375 MG/DL (ref 0–150)
VLDLC SERPL CALC-MCNC: ABNORMAL MG/DL
WBC # BLD: 7.9 K/UL (ref 4–11)

## 2021-08-02 PROCEDURE — 3051F HG A1C>EQUAL 7.0%<8.0%: CPT | Performed by: INTERNAL MEDICINE

## 2021-08-02 PROCEDURE — 99214 OFFICE O/P EST MOD 30 MIN: CPT | Performed by: INTERNAL MEDICINE

## 2021-08-02 RX ORDER — ENALAPRIL MALEATE 10 MG/1
TABLET ORAL
Qty: 180 TABLET | Refills: 1 | Status: SHIPPED | OUTPATIENT
Start: 2021-08-02 | End: 2022-03-24 | Stop reason: SDUPTHER

## 2021-08-02 RX ORDER — FUROSEMIDE 40 MG/1
40 TABLET ORAL DAILY
Qty: 90 TABLET | Refills: 1 | Status: SHIPPED | OUTPATIENT
Start: 2021-08-02 | End: 2022-05-26

## 2021-08-02 RX ORDER — GLIMEPIRIDE 2 MG/1
2 TABLET ORAL EVERY MORNING
Qty: 90 TABLET | Refills: 1 | Status: SHIPPED | OUTPATIENT
Start: 2021-08-02 | End: 2022-07-13

## 2021-08-02 RX ORDER — METOPROLOL SUCCINATE 25 MG/1
75 TABLET, EXTENDED RELEASE ORAL DAILY
Qty: 270 TABLET | Refills: 1 | Status: SHIPPED | OUTPATIENT
Start: 2021-08-02 | End: 2022-02-22

## 2021-08-02 RX ORDER — ATORVASTATIN CALCIUM 40 MG/1
TABLET, FILM COATED ORAL
Qty: 90 TABLET | Refills: 1 | Status: SHIPPED | OUTPATIENT
Start: 2021-08-02 | End: 2022-04-13

## 2021-08-02 RX ORDER — ALLOPURINOL 300 MG/1
TABLET ORAL
Qty: 90 TABLET | Refills: 1 | Status: SHIPPED | OUTPATIENT
Start: 2021-08-02 | End: 2022-07-13 | Stop reason: SDUPTHER

## 2021-08-02 RX ORDER — DIGOXIN 125 MCG
125 TABLET ORAL EVERY OTHER DAY
Qty: 45 TABLET | Refills: 1 | Status: SHIPPED | OUTPATIENT
Start: 2021-08-02 | End: 2022-02-22

## 2021-08-02 ASSESSMENT — ENCOUNTER SYMPTOMS
BACK PAIN: 0
RHINORRHEA: 0
ABDOMINAL PAIN: 0
WHEEZING: 0
VOMITING: 0
SHORTNESS OF BREATH: 0

## 2021-08-02 NOTE — PROGRESS NOTES
Subjective:      Patient ID: Luke Badillo is a 76 y.o. female. HPI  Patient is here for follow up on diabetes, hypertension, hyperlipidemia, depression and arthritis. She has chronic systolic CHF. She is doing well. Her last EF was improved at 35-40 %. She has history of mitral valve repair, left atrial appendage clip, closure of PFO and a 3 way bypass. She is doing well. She gets a little lightheaded at times. Patient's BGs range between 140-180 mg/dl. She does not have hypoglycemia, increase appetite, paresthesia of the feet, polydipsia and polyuria. It is under fair control. She has had her eye exam. No issues with her feet. Her depression has been stable. Patient's BP is controlled on current medications. No chest pain or dyspnea. Her arthritis is stable. She gets occasional attacks of gout. No recent issues. She has osteopenia. She is taking calcium and vitamin d but could not tolerate Fosamax.           Current Outpatient Medications:     furosemide (LASIX) 40 MG tablet, Take 1 tablet by mouth daily, Disp: 90 tablet, Rfl: 1    atorvastatin (LIPITOR) 40 MG tablet, TAKE ONE TABLET BY MOUTH DAILY, Disp: 90 tablet, Rfl: 1    digoxin (LANOXIN) 125 MCG tablet, Take 1 tablet by mouth every other day, Disp: 45 tablet, Rfl: 1    metoprolol succinate (TOPROL XL) 25 MG extended release tablet, Take 3 tablets by mouth daily, Disp: 270 tablet, Rfl: 1    linagliptin (TRADJENTA) 5 MG tablet, Take 1 tablet by mouth daily, Disp: 90 tablet, Rfl: 1    glimepiride (AMARYL) 2 MG tablet, Take 1 tablet by mouth every morning, Disp: 90 tablet, Rfl: 1    allopurinol (ZYLOPRIM) 300 MG tablet, TAKE ONE TABLET BY MOUTH DAILY, Disp: 90 tablet, Rfl: 1    dapagliflozin (FARXIGA) 5 MG tablet, Take 1 tablet by mouth every morning, Disp: 90 tablet, Rfl: 1    enalapril (VASOTEC) 10 MG tablet, TAKE ONE TABLET BY MOUTH TWICE DAILY, Disp: 180 tablet, Rfl: 1    Ferrous Sulfate (IRON PO), Take by mouth, Disp: , Rfl:    potassium chloride (KLOR-CON M) 10 MEQ extended release tablet, Take 4 tablets by mouth 2 times daily (Patient taking differently: Take 10 mEq by mouth 2 times daily ), Disp: 240 tablet, Rfl: 3    clopidogrel (PLAVIX) 75 MG tablet, Take 1 tablet by mouth daily (Patient not taking: Reported on 6/22/2021), Disp: 30 tablet, Rfl: 0    lisinopril (PRINIVIL;ZESTRIL) 2.5 MG tablet, Take 2.5 mg by mouth 2 times daily , Disp: , Rfl:     vitamin B-12 (CYANOCOBALAMIN) 500 MCG tablet, Take 500 mcg by mouth daily, Disp: , Rfl:     calcium carbonate (OSCAL) 500 MG TABS tablet, Take 500 mg by mouth daily, Disp: , Rfl:     aspirin 81 MG EC tablet, Take 81 mg by mouth daily. , Disp: , Rfl:        Review of Systems   Constitutional: Negative for appetite change and fatigue. HENT: Negative for postnasal drip and rhinorrhea. Respiratory: Negative for shortness of breath and wheezing. Cardiovascular: Negative for chest pain and palpitations. Gastrointestinal: Negative for abdominal pain and vomiting. Musculoskeletal: Negative for back pain and joint swelling. Skin: Negative for rash. Neurological: Negative for light-headedness. Psychiatric/Behavioral: Positive for sleep disturbance. Negative for agitation, behavioral problems and suicidal ideas. The patient is not nervous/anxious. There are no changes to past medical history, family history, social history or review of systems(except as noted in the history section) since prior note (all reviewed with patient). Ht 5' 1\" (1.549 m)   Wt 124 lb (56.2 kg)   BMI 23.43 kg/m²      Objective:   Physical Exam  Constitutional:       Appearance: She is well-developed. HENT:      Head: Normocephalic. Eyes:      Conjunctiva/sclera: Conjunctivae normal.      Pupils: Pupils are equal, round, and reactive to light. Neck:      Thyroid: No thyroid mass or thyromegaly. Vascular: No carotid bruit or JVD.       Trachea: Trachea normal.   Cardiovascular:      Rate and Rhythm: Normal rate and regular rhythm. Heart sounds: Normal heart sounds. No murmur heard. No gallop. Pulmonary:      Effort: Pulmonary effort is normal. No respiratory distress. Breath sounds: Normal breath sounds. No wheezing or rales. Abdominal:      General: Bowel sounds are normal. There is no distension. Palpations: Abdomen is soft. There is no hepatomegaly, splenomegaly or mass. Tenderness: There is no abdominal tenderness. Musculoskeletal:      Cervical back: Normal range of motion and neck supple. Comments: Impingement test +   Lymphadenopathy:      Cervical: No cervical adenopathy. Skin:     General: Skin is warm and dry. Neurological:      Mental Status: She is alert and oriented to person, place, and time. Psychiatric:         Behavior: Behavior normal.         Thought Content: Thought content normal.         Judgment: Judgment normal.       ECHO DONE ON 12/1/15  Conclusions    Summary  Normal left ventricle size and systolic function with an estimated ejection  fraction of 55%. Mild concentric left ventricular hypertrophy. No regional  wall motion abnormalities are seen. There is reversal of E/A inflow velocities across the mitral valve  suggesting impaired left ventricular relaxation. E/A 0.7, IVRT 107msec. Mitral valve is structurally normal.  Mitral valve leaflets appear to open adequately. Trivial mitral regurgitation is present. The aortic valve appears tricuspid . The aortic leaflets appear to open adequately. No evidence of aortic valve regurgitation. No evidence of aortic valve stenosis. Normal right ventricular size and function. TAPSE 1.9cm. STRESS TEST DONE 12/1/15     IMPRESSION:   1. No pharmcologically induced reversible perfusion defects to suggest   ischemia   2. Ejection fraction of 67%   3. No focal wall motion abnormality.        ECHO 10/29/19  Conclusions      Summary   The left ventricular systolic function is severely reduced with an ejection   fraction of 25 %. There is akinesis of the inferior, apical septal and inferolateral walls. There is hypokinesis of the Inferoseptal & anteroseptal wall. Grade II diastolic dysfunction with elevated left ventricular filling   pressure. Normal left ventricular size and wall thickness. Mild mitral annular calcification. Mild thickening of the leaflets of mitral valve. Moderate mitral regurgitation. ECHO 9/2/20   Conclusions      Summary   Left ventricular systolic function is moderately reduced with a visually   estimated ejection fraction of 35-40 %. EF estimated by Hernandez's method at 40%. There is mild global hypokinesis with more prominent in the basal and mid   inferoseptal, and inferior walls. The left ventricle is mildly dilated in size with mild hypertrophy. Indeterminate diastolic function due to mitral valve prosthesis. The left atrium is moderately dilated. Right ventricular systolic function is reduced. An annuloplasty ring is seen in the mitral position. Mild mitral stenosis. Mild mitral, tricuspid and pulmonic regurgitation. Systolic pulmonary artery pressure (SPAP) is elevated and estimated at 43   mmHg (right atrial pressure 3 mmHg) consistent with mild pulmonary   hypertension. Compared to last echo on 6/16/20 (EF 15-20%), left ventricle systolic   function has improved. Assessment:       Diagnosis Orders   1. Type 2 diabetes mellitus with microalbuminuria, without long-term current use of insulin (HCC)  Hemoglobin A1C    CBC Auto Differential    Comprehensive Metabolic Panel    Lipid Panel   2. Chronic systolic CHF (congestive heart failure) ([address]     3. CAD, multiple vessel     4. Essential hypertension, benign     5. Idiopathic chronic gout of multiple sites without tophus     6. Hyperlipidemia associated with type 2 diabetes mellitus ([address]     7.  Chronic kidney disease (CKD) stage G3b/A1, moderately decreased glomerular filtration rate (GFR) between 30-44 mL/min/1.73 square meter and albuminuria creatinine ratio less than 30 mg/g (HCC)     8. S/P CABG x 3     9. Major depressive disorder, recurrent episode, moderate (HCC)     10. Ischemic cardiomyopathy            Plan:      Check labs. DM: check labs. She is doing better now. Chronic systolic CHF. On Lasix,. Toprol Xl and Enalapril. HTN: at goal  Hyperlipidemia: at goal.  Depression: stable. Gout: no attacks recently. Uses [place] prn. Osteopenia on calcium and D. Discussed use, benefit, and side effects of prescribed medications. Barriers to medication compliance addressed. All patient questions answered. Pt voiced understanding. Decrease calorie intake. Exercise,weight loss recommended. The current medical regimen is effective;  continue present plan and medications. See orders.

## 2021-08-03 LAB
ESTIMATED AVERAGE GLUCOSE: 214.5 MG/DL
HBA1C MFR BLD: 9.1 %

## 2021-08-04 ENCOUNTER — TELEPHONE (OUTPATIENT)
Dept: CARDIOLOGY CLINIC | Age: 76
End: 2021-08-04

## 2021-08-04 DIAGNOSIS — I50.22 CHRONIC SYSTOLIC CHF (CONGESTIVE HEART FAILURE) (HCC): ICD-10-CM

## 2021-08-04 DIAGNOSIS — I25.5 ISCHEMIC CARDIOMYOPATHY: ICD-10-CM

## 2021-08-04 DIAGNOSIS — N18.32 CHRONIC KIDNEY DISEASE (CKD) STAGE G3B/A1, MODERATELY DECREASED GLOMERULAR FILTRATION RATE (GFR) BETWEEN 30-44 ML/MIN/1.73 SQUARE METER AND ALBUMINURIA CREATININE RATIO LESS THAN 30 MG/G (HCC): Primary | ICD-10-CM

## 2021-08-04 DIAGNOSIS — I10 ESSENTIAL HYPERTENSION, BENIGN: ICD-10-CM

## 2021-08-04 NOTE — TELEPHONE ENCOUNTER
Created telephone encounter. Spoke with Raghav Marroquin relayed message per JEANETTE regarding labs. Pt verbalized understanding.

## 2021-08-04 NOTE — TELEPHONE ENCOUNTER
----- Message from Camilo Soria MD sent at 8/3/2021  4:58 PM EDT -----  Call patient. Her kidneys look very dry. Have her stop her lasix. Repeat bmp and bnp in 1 month.   JEANETTE

## 2021-08-09 ENCOUNTER — TELEPHONE (OUTPATIENT)
Dept: INTERNAL MEDICINE CLINIC | Age: 76
End: 2021-08-09

## 2021-08-09 RX ORDER — INSULIN GLARGINE 100 [IU]/ML
10 INJECTION, SOLUTION SUBCUTANEOUS NIGHTLY
Qty: 3 PEN | Refills: 0 | Status: SHIPPED | OUTPATIENT
Start: 2021-08-09 | End: 2022-07-13

## 2021-08-09 NOTE — TELEPHONE ENCOUNTER
----- Message from Carroll Alexis MD sent at 8/9/2021  1:42 PM EDT -----  Contact: [phone] (H)  Her 3-month sugar average has nothing to do with the birthday party that she attended. ----- Message -----  From: Sherila Jessica  Sent: 8/9/2021  12:55 PM EDT  To: Carroll Alexis MD    Pt is worried that her blood test results were affected due to her attending a birthday party the night before. She ate a lot of foods that she normally does not eat. She wants to know if she can get more blood work done to see if the birthday party affected it or not.     Thank you

## 2021-08-09 NOTE — TELEPHONE ENCOUNTER
----- Message from Carroll Alexis MD sent at 8/4/2021  7:22 AM EDT -----  Her sugars are very high  Start Lantus 10 units at hs    Make sure she will take insulin before sending it in.

## 2021-11-23 ENCOUNTER — OFFICE VISIT (OUTPATIENT)
Dept: INTERNAL MEDICINE CLINIC | Age: 76
End: 2021-11-23

## 2021-11-23 VITALS
RESPIRATION RATE: 12 BRPM | SYSTOLIC BLOOD PRESSURE: 120 MMHG | BODY MASS INDEX: 23.98 KG/M2 | HEART RATE: 70 BPM | WEIGHT: 127 LBS | DIASTOLIC BLOOD PRESSURE: 80 MMHG | HEIGHT: 61 IN

## 2021-11-23 DIAGNOSIS — N18.32 CHRONIC KIDNEY DISEASE (CKD) STAGE G3B/A1, MODERATELY DECREASED GLOMERULAR FILTRATION RATE (GFR) BETWEEN 30-44 ML/MIN/1.73 SQUARE METER AND ALBUMINURIA CREATININE RATIO LESS THAN 30 MG/G (HCC): ICD-10-CM

## 2021-11-23 DIAGNOSIS — E11.29 TYPE 2 DIABETES MELLITUS WITH MICROALBUMINURIA, WITHOUT LONG-TERM CURRENT USE OF INSULIN (HCC): ICD-10-CM

## 2021-11-23 DIAGNOSIS — I50.22 CHRONIC SYSTOLIC CHF (CONGESTIVE HEART FAILURE) (HCC): ICD-10-CM

## 2021-11-23 DIAGNOSIS — E78.5 HYPERLIPIDEMIA ASSOCIATED WITH TYPE 2 DIABETES MELLITUS (HCC): ICD-10-CM

## 2021-11-23 DIAGNOSIS — F51.01 PRIMARY INSOMNIA: ICD-10-CM

## 2021-11-23 DIAGNOSIS — I25.10 CAD, MULTIPLE VESSEL: ICD-10-CM

## 2021-11-23 DIAGNOSIS — E11.69 HYPERLIPIDEMIA ASSOCIATED WITH TYPE 2 DIABETES MELLITUS (HCC): ICD-10-CM

## 2021-11-23 DIAGNOSIS — E11.29 TYPE 2 DIABETES MELLITUS WITH MICROALBUMINURIA, WITHOUT LONG-TERM CURRENT USE OF INSULIN (HCC): Primary | ICD-10-CM

## 2021-11-23 DIAGNOSIS — R80.9 TYPE 2 DIABETES MELLITUS WITH MICROALBUMINURIA, WITHOUT LONG-TERM CURRENT USE OF INSULIN (HCC): ICD-10-CM

## 2021-11-23 DIAGNOSIS — I10 ESSENTIAL HYPERTENSION, BENIGN: ICD-10-CM

## 2021-11-23 DIAGNOSIS — R80.9 TYPE 2 DIABETES MELLITUS WITH MICROALBUMINURIA, WITHOUT LONG-TERM CURRENT USE OF INSULIN (HCC): Primary | ICD-10-CM

## 2021-11-23 DIAGNOSIS — F33.1 MAJOR DEPRESSIVE DISORDER, RECURRENT EPISODE, MODERATE (HCC): ICD-10-CM

## 2021-11-23 LAB
ANION GAP SERPL CALCULATED.3IONS-SCNC: 17 MMOL/L (ref 3–16)
BASOPHILS ABSOLUTE: 0.1 K/UL (ref 0–0.2)
BASOPHILS RELATIVE PERCENT: 0.8 %
BUN BLDV-MCNC: 27 MG/DL (ref 7–20)
CALCIUM SERPL-MCNC: 9.4 MG/DL (ref 8.3–10.6)
CHLORIDE BLD-SCNC: 108 MMOL/L (ref 99–110)
CO2: 15 MMOL/L (ref 21–32)
CREAT SERPL-MCNC: 1.7 MG/DL (ref 0.6–1.2)
EOSINOPHILS ABSOLUTE: 0.6 K/UL (ref 0–0.6)
EOSINOPHILS RELATIVE PERCENT: 7.5 %
GFR AFRICAN AMERICAN: 35
GFR NON-AFRICAN AMERICAN: 29
GLUCOSE BLD-MCNC: 57 MG/DL (ref 70–99)
HCT VFR BLD CALC: 36.8 % (ref 36–48)
HEMOGLOBIN: 11.7 G/DL (ref 12–16)
LYMPHOCYTES ABSOLUTE: 1.2 K/UL (ref 1–5.1)
LYMPHOCYTES RELATIVE PERCENT: 15.7 %
MCH RBC QN AUTO: 31.5 PG (ref 26–34)
MCHC RBC AUTO-ENTMCNC: 31.7 G/DL (ref 31–36)
MCV RBC AUTO: 99.4 FL (ref 80–100)
MONOCYTES ABSOLUTE: 0.4 K/UL (ref 0–1.3)
MONOCYTES RELATIVE PERCENT: 5.7 %
NEUTROPHILS ABSOLUTE: 5.4 K/UL (ref 1.7–7.7)
NEUTROPHILS RELATIVE PERCENT: 70.3 %
PDW BLD-RTO: 16.3 % (ref 12.4–15.4)
PLATELET # BLD: 259 K/UL (ref 135–450)
PMV BLD AUTO: 8.6 FL (ref 5–10.5)
POTASSIUM SERPL-SCNC: 5.8 MMOL/L (ref 3.5–5.1)
RBC # BLD: 3.7 M/UL (ref 4–5.2)
SODIUM BLD-SCNC: 140 MMOL/L (ref 136–145)
WBC # BLD: 7.7 K/UL (ref 4–11)

## 2021-11-23 PROCEDURE — 99214 OFFICE O/P EST MOD 30 MIN: CPT | Performed by: INTERNAL MEDICINE

## 2021-11-23 ASSESSMENT — ENCOUNTER SYMPTOMS
WHEEZING: 0
BACK PAIN: 0
ABDOMINAL PAIN: 0
SHORTNESS OF BREATH: 0
RHINORRHEA: 0
VOMITING: 0

## 2021-11-23 NOTE — PROGRESS NOTES
Subjective:      Patient ID: Vale Mancia is a 76 y.o. female. HPI  Patient is here for follow up on diabetes, hypertension, hyperlipidemia, depression and arthritis. She has chronic systolic CHF. She is doing well. Her last EF was improved at 35-40 %. She has history of mitral valve repair, left atrial appendage clip, closure of PFO and a 3 way bypass. She is doing well. She has known CAD and had CABG. Patient's BGs range between 140-180 mg/dl. She does not have hypoglycemia, increase appetite, paresthesia of the feet, polydipsia and polyuria. It is under fair control. She has had her eye exam. No issues with her feet. Her depression has been stable. Patient's BP is controlled on current medications. No chest pain or dyspnea. Her arthritis is stable. She gets occasional attacks of gout. No recent issues. She has osteopenia. She is taking calcium and vitamin d but could not tolerate Fosamax.           Current Outpatient Medications:     insulin glargine (LANTUS SOLOSTAR) 100 UNIT/ML injection pen, Inject 10 Units into the skin nightly, Disp: 3 pen, Rfl: 0    furosemide (LASIX) 40 MG tablet, Take 1 tablet by mouth daily (Patient not taking: Reported on 8/4/2021), Disp: 90 tablet, Rfl: 1    atorvastatin (LIPITOR) 40 MG tablet, TAKE ONE TABLET BY MOUTH DAILY, Disp: 90 tablet, Rfl: 1    digoxin (LANOXIN) 125 MCG tablet, Take 1 tablet by mouth every other day, Disp: 45 tablet, Rfl: 1    metoprolol succinate (TOPROL XL) 25 MG extended release tablet, Take 3 tablets by mouth daily, Disp: 270 tablet, Rfl: 1    linagliptin (TRADJENTA) 5 MG tablet, Take 1 tablet by mouth daily, Disp: 90 tablet, Rfl: 1    glimepiride (AMARYL) 2 MG tablet, Take 1 tablet by mouth every morning, Disp: 90 tablet, Rfl: 1    allopurinol (ZYLOPRIM) 300 MG tablet, TAKE ONE TABLET BY MOUTH DAILY, Disp: 90 tablet, Rfl: 1    dapagliflozin (FARXIGA) 5 MG tablet, Take 1 tablet by mouth every morning, Disp: 90 tablet, Rfl: 1   enalapril (VASOTEC) 10 MG tablet, TAKE ONE TABLET BY MOUTH TWICE DAILY, Disp: 180 tablet, Rfl: 1    Ferrous Sulfate (IRON PO), Take by mouth, Disp: , Rfl:     potassium chloride (KLOR-CON M) 10 MEQ extended release tablet, Take 4 tablets by mouth 2 times daily (Patient taking differently: Take 10 mEq by mouth 2 times daily ), Disp: 240 tablet, Rfl: 3    clopidogrel (PLAVIX) 75 MG tablet, Take 1 tablet by mouth daily (Patient not taking: Reported on 6/22/2021), Disp: 30 tablet, Rfl: 0    lisinopril (PRINIVIL;ZESTRIL) 2.5 MG tablet, Take 2.5 mg by mouth 2 times daily , Disp: , Rfl:     vitamin B-12 (CYANOCOBALAMIN) 500 MCG tablet, Take 500 mcg by mouth daily, Disp: , Rfl:     calcium carbonate (OSCAL) 500 MG TABS tablet, Take 500 mg by mouth daily, Disp: , Rfl:     aspirin 81 MG EC tablet, Take 81 mg by mouth daily. , Disp: , Rfl:        Review of Systems   Constitutional: Negative for appetite change and fatigue. HENT: Negative for postnasal drip and rhinorrhea. Respiratory: Negative for shortness of breath and wheezing. Cardiovascular: Negative for chest pain and palpitations. Gastrointestinal: Negative for abdominal pain and vomiting. Musculoskeletal: Negative for back pain and joint swelling. Skin: Negative for rash. Neurological: Negative for light-headedness. Psychiatric/Behavioral: Positive for sleep disturbance. Negative for agitation, behavioral problems and suicidal ideas. The patient is not nervous/anxious. There are no changes to past medical history, family history, social history or review of systems(except as noted in the history section) since prior note (all reviewed with patient). /80 (Site: Right Upper Arm, Position: Sitting, Cuff Size: Medium Adult)   Pulse 70   Resp 12   Ht 5' 1\" (1.549 m)   Wt 127 lb (57.6 kg)   BMI 24.00 kg/m²      Objective:   Physical Exam  Constitutional:       Appearance: She is well-developed. HENT:      Head: Normocephalic. Eyes:      Conjunctiva/sclera: Conjunctivae normal.      Pupils: Pupils are equal, round, and reactive to light. Neck:      Thyroid: No thyroid mass or thyromegaly. Vascular: No carotid bruit or JVD. Trachea: Trachea normal.   Cardiovascular:      Rate and Rhythm: Normal rate and regular rhythm. Heart sounds: Murmur heard. No gallop. Pulmonary:      Effort: Pulmonary effort is normal. No respiratory distress. Breath sounds: Normal breath sounds. No wheezing or rales. Abdominal:      General: Bowel sounds are normal. There is no distension. Palpations: Abdomen is soft. There is no hepatomegaly, splenomegaly or mass. Tenderness: There is no abdominal tenderness. Musculoskeletal:      Cervical back: Normal range of motion and neck supple. Lymphadenopathy:      Cervical: No cervical adenopathy. Skin:     General: Skin is warm and dry. Neurological:      Mental Status: She is alert and oriented to person, place, and time. Psychiatric:         Behavior: Behavior normal.         Thought Content: Thought content normal.         Judgment: Judgment normal.       ECHO DONE ON 12/1/15  Conclusions    Summary  Normal left ventricle size and systolic function with an estimated ejection  fraction of 55%. Mild concentric left ventricular hypertrophy. No regional  wall motion abnormalities are seen. There is reversal of E/A inflow velocities across the mitral valve  suggesting impaired left ventricular relaxation. E/A 0.7, IVRT 107msec. Mitral valve is structurally normal.  Mitral valve leaflets appear to open adequately. Trivial mitral regurgitation is present. The aortic valve appears tricuspid . The aortic leaflets appear to open adequately. No evidence of aortic valve regurgitation. No evidence of aortic valve stenosis. Normal right ventricular size and function. TAPSE 1.9cm. STRESS TEST DONE 12/1/15     IMPRESSION:   1.  No pharmcologically induced reversible perfusion ([facility]     5. Primary insomnia     6. Chronic systolic CHF (congestive heart failure) ([facility]     7. CAD, multiple vessel     8. Chronic kidney disease (CKD) stage G3b/A1, moderately decreased glomerular filtration rate (GFR) between 30-44 mL/min/1.73 square meter and albuminuria creatinine ratio less than 30 mg/g (HCC)            Plan:      Check labs. DM: check labs. Chronic systolic CHF. On Lasix,. Toprol Xl and Enalapril. HTN: at goal  Hyperlipidemia: at goal.  Depression: stable. Gout: no attacks recently. Uses [place] prn. Osteopenia on calcium and D.  CKD stage 3b stable. Discussed use, benefit, and side effects of prescribed medications. Barriers to medication compliance addressed. All patient questions answered. Pt voiced understanding. Decrease calorie intake. Exercise,weight loss recommended. The current medical regimen is effective;  continue present plan and medications. See orders.

## 2021-11-24 DIAGNOSIS — E87.5 HYPERKALEMIA: Primary | ICD-10-CM

## 2021-11-24 LAB
ESTIMATED AVERAGE GLUCOSE: 116.9 MG/DL
HBA1C MFR BLD: 5.7 %

## 2021-11-30 ENCOUNTER — HOSPITAL ENCOUNTER (OUTPATIENT)
Dept: CARDIOLOGY | Age: 76
Discharge: HOME | End: 2021-11-30
Payer: MEDICARE

## 2021-11-30 DIAGNOSIS — I50.22 CHRONIC SYSTOLIC CHF (CONGESTIVE HEART FAILURE) (HCC): ICD-10-CM

## 2021-11-30 LAB
LV EF: 28 %
LVEF MODALITY: NORMAL

## 2021-11-30 PROCEDURE — 93306 TTE W/DOPPLER COMPLETE: CPT

## 2021-12-03 ENCOUNTER — TELEPHONE (OUTPATIENT)
Dept: CARDIOLOGY CLINIC | Age: 76
End: 2021-12-03

## 2021-12-14 DIAGNOSIS — E87.5 HYPERKALEMIA: ICD-10-CM

## 2021-12-14 LAB
ANION GAP SERPL CALCULATED.3IONS-SCNC: 13 MMOL/L (ref 3–16)
BUN BLDV-MCNC: 21 MG/DL (ref 7–20)
CALCIUM SERPL-MCNC: 9.2 MG/DL (ref 8.3–10.6)
CHLORIDE BLD-SCNC: 107 MMOL/L (ref 99–110)
CO2: 19 MMOL/L (ref 21–32)
CREAT SERPL-MCNC: 1.5 MG/DL (ref 0.6–1.2)
GFR AFRICAN AMERICAN: 41
GFR NON-AFRICAN AMERICAN: 34
GLUCOSE BLD-MCNC: 82 MG/DL (ref 70–99)
POTASSIUM SERPL-SCNC: 5.6 MMOL/L (ref 3.5–5.1)
SODIUM BLD-SCNC: 139 MMOL/L (ref 136–145)

## 2021-12-28 DIAGNOSIS — E87.5 HYPERKALEMIA: Primary | ICD-10-CM

## 2022-02-23 RX ORDER — METFORMIN HYDROCHLORIDE 500 MG/1
TABLET, EXTENDED RELEASE ORAL
Qty: 360 TABLET | Refills: 0 | Status: ON HOLD
Start: 2022-02-23 | End: 2022-06-05 | Stop reason: HOSPADM

## 2022-02-23 RX ORDER — METOPROLOL SUCCINATE 25 MG/1
TABLET, EXTENDED RELEASE ORAL
Qty: 270 TABLET | Refills: 0 | Status: SHIPPED | OUTPATIENT
Start: 2022-02-23 | End: 2022-06-28 | Stop reason: SDUPTHER

## 2022-02-23 RX ORDER — DIGOXIN 125 MCG
TABLET ORAL
Qty: 45 TABLET | Refills: 0 | Status: SHIPPED | OUTPATIENT
Start: 2022-02-23 | End: 2022-10-14 | Stop reason: SDUPTHER

## 2022-03-01 ENCOUNTER — TELEPHONE (OUTPATIENT)
Dept: INTERNAL MEDICINE CLINIC | Age: 77
End: 2022-03-01

## 2022-03-01 NOTE — TELEPHONE ENCOUNTER
----- Message from Eloisa Acuna MD sent at 3/1/2022 12:43 PM EST -----  Contact: Ayla Aquino [phone]    ----- Message -----  From: Sanford Dave  Sent: 3/1/2022  11:45 AM EST  To: Eloisa Acuna MD    Patient is having cataract removal surgery on 3/15/22 and needs an H&P. When can you fit her in?     Thank you

## 2022-03-04 ENCOUNTER — OFFICE VISIT (OUTPATIENT)
Dept: INTERNAL MEDICINE CLINIC | Age: 77
End: 2022-03-04

## 2022-03-04 VITALS
SYSTOLIC BLOOD PRESSURE: 130 MMHG | RESPIRATION RATE: 12 BRPM | HEIGHT: 61 IN | DIASTOLIC BLOOD PRESSURE: 80 MMHG | WEIGHT: 127 LBS | HEART RATE: 70 BPM | BODY MASS INDEX: 23.98 KG/M2

## 2022-03-04 DIAGNOSIS — H25.13 AGE-RELATED NUCLEAR CATARACT OF BOTH EYES: ICD-10-CM

## 2022-03-04 DIAGNOSIS — I50.22 CHRONIC SYSTOLIC CHF (CONGESTIVE HEART FAILURE) (HCC): ICD-10-CM

## 2022-03-04 DIAGNOSIS — I10 ESSENTIAL HYPERTENSION, BENIGN: ICD-10-CM

## 2022-03-04 DIAGNOSIS — I25.10 CAD, MULTIPLE VESSEL: ICD-10-CM

## 2022-03-04 DIAGNOSIS — M1A.09X0 IDIOPATHIC CHRONIC GOUT OF MULTIPLE SITES WITHOUT TOPHUS: ICD-10-CM

## 2022-03-04 DIAGNOSIS — R80.9 TYPE 2 DIABETES MELLITUS WITH MICROALBUMINURIA, WITHOUT LONG-TERM CURRENT USE OF INSULIN (HCC): ICD-10-CM

## 2022-03-04 DIAGNOSIS — E11.29 TYPE 2 DIABETES MELLITUS WITH MICROALBUMINURIA, WITHOUT LONG-TERM CURRENT USE OF INSULIN (HCC): ICD-10-CM

## 2022-03-04 DIAGNOSIS — N18.32 CHRONIC KIDNEY DISEASE (CKD) STAGE G3B/A1, MODERATELY DECREASED GLOMERULAR FILTRATION RATE (GFR) BETWEEN 30-44 ML/MIN/1.73 SQUARE METER AND ALBUMINURIA CREATININE RATIO LESS THAN 30 MG/G (HCC): ICD-10-CM

## 2022-03-04 DIAGNOSIS — E11.69 HYPERLIPIDEMIA ASSOCIATED WITH TYPE 2 DIABETES MELLITUS (HCC): ICD-10-CM

## 2022-03-04 DIAGNOSIS — Z01.818 PREOP EXAM FOR INTERNAL MEDICINE: Primary | ICD-10-CM

## 2022-03-04 DIAGNOSIS — E78.5 HYPERLIPIDEMIA ASSOCIATED WITH TYPE 2 DIABETES MELLITUS (HCC): ICD-10-CM

## 2022-03-04 PROCEDURE — 99214 OFFICE O/P EST MOD 30 MIN: CPT | Performed by: INTERNAL MEDICINE

## 2022-03-04 NOTE — PROGRESS NOTES
Subjective: Tobi Mclaughlin is a 68 y.o. female who presents to the office today for a preoperative consultation at the request of surgeon Dr Zina Levy who plans on performing cataract surgery. Planned anesthesia is Local and IV sedation.        Past Medical History:   Diagnosis Date    Anemia     Backache, unspecified 3/11/08    CAD, multiple vessel 1/6/2020    Chronic kidney disease (CKD) stage G3b/A1, moderately decreased glomerular filtration rate (GFR) between 30-44 mL/min/1.73 square meter and albuminuria creatinine ratio less than 30 mg/g ([facility]) 4/16/2021    Chronic systolic CHF (congestive heart failure) ([address] 12/11/2019    Depressive disorder, not elsewhere classified 11/8/07    Essential hypertension, benign 11/8/07    Gout 8/11/2011    Hyperlipidemia associated with type 2 diabetes mellitus ([address] 12/14/2015    Major depressive disorder, recurrent episode, moderate ([address] 12/14/2015    Osteoarthritis, hand 2/27/2012    Osteoarthrosis, unspecified whether generalized or localized, lower leg 11/8/07    Osteopenia 8/5/2015    Other and unspecified hyperlipidemia 11/8/07    Pain in joint, lower leg 1/29/07    Pneumonia     Rotator cuff tendinitis 10/6/2017    Tear of medial cartilage or meniscus of knee, current 1/29/07    Type 2 diabetes mellitus with microalbuminuria, without long-term current use of insulin ([address] 5/5/2017    Type 2 diabetes mellitus without complication ([address] 26/1/2006    Type II or unspecified type diabetes mellitus without mention of complication, not stated as uncontrolled 11/8/07    foot exam 7/16/08 normal     Past Surgical History:   Procedure Laterality Date    APPENDECTOMY      CHOLECYSTECTOMY      CORONARY ARTERY BYPASS GRAFT N/A 1/8/2020    CORONARY ARTERY BYPASS GRAFTING X3, INTERNAL MAMMARY ARTERY, SAPHENOUS VEIN GRAFTING, ON PUMP MITRAL VALVE RING REPAIR USING 26MM PELAEZ PHYSIO II RING, WITH LEFT ATRIAL APPENDAGE CLIP, PFO CLOSURETEE, 5 LEVEL BILATERAL INTERCOSTAL NERVE BLOCK performed by Portillo Pedro MD at [address]      total ab hyst    PARATHYROID GLAND SURGERY      explore parathyroid glands    TOTAL KNEE ARTHROPLASTY  03/29/10    left knee     Family History   Problem Relation Age of Onset    Cancer Mother 68        colon   Wilhelminia Blazing Diabetes Mother     Other Father         gun shot wound    Hypertension Sister     Diabetes Sister     High Blood Pressure Sister     High Cholesterol Sister     Cancer Brother 59        mets everywhere    Cancer Brother 77        colon    Diabetes Sister     Other Sister         bipolar     Social History     Socioeconomic History    Marital status:      Spouse name: None    Number of children: None    Years of education: None    Highest education level: None   Occupational History    None   Tobacco Use    Smoking status: Former Smoker     Packs/day: 0.50     Years: 20.00     Pack years: 10.00     Quit date: 2015     Years since quittin.2    Smokeless tobacco: Never Used   Vaping Use    Vaping Use: Never used   Substance and Sexual Activity    Alcohol use: No     Alcohol/week: 0.0 standard drinks    Drug use: No    Sexual activity: Yes     Partners: Male   Other Topics Concern    None   Social History Narrative    None     Social Determinants of Health     Financial Resource Strain:     Difficulty of Paying Living Expenses: Not on file   Food Insecurity:     Worried About Running Out of Food in the Last Year: Not on file    Anika of Food in the Last Year: Not on file   Transportation Needs:     Lack of Transportation (Medical): Not on file    Lack of Transportation (Non-Medical):  Not on file   Physical Activity:     Days of Exercise per Week: Not on file    Minutes of Exercise per Session: Not on file   Stress:     Feeling of Stress : Not on file   Social Connections:     Frequency of Communication with Friends and Family: Not on file    Frequency of Social Gatherings with Friends and Family: Not on file    Attends [employer] Services: Not on file    Active Member of Clubs or Organizations: Not on file    Attends Club or Organization Meetings: Not on file    Marital Status: Not on file   Intimate Partner Violence:     Fear of Current or Ex-Partner: Not on file    Emotionally Abused: Not on file    Physically Abused: Not on file    Sexually Abused: Not on file   Housing Stability:     Unable to Pay for Housing in the Last Year: Not on file    Number of Places Lived in the Last Year: Not on file    Unstable Housing in the Last Year: Not on file     Current Outpatient Medications   Medication Sig Dispense Refill    metFORMIN (GLUCOPHAGE-XR) 500 MG extended release tablet TAKE TWO TABLETS BY MOUTH TWICE A  tablet 0    digoxin (LANOXIN) 125 MCG tablet TAKE ONE TABLET BY MOUTH EVERY OTHER DAY 45 tablet 0    metoprolol succinate (TOPROL XL) 25 MG extended release tablet TAKE THREE TABLETS BY MOUTH DAILY 270 tablet 0    insulin glargine (LANTUS SOLOSTAR) 100 UNIT/ML injection pen Inject 10 Units into the skin nightly 3 pen 0    furosemide (LASIX) 40 MG tablet Take 1 tablet by mouth daily (Patient not taking: Reported on 8/4/2021) 90 tablet 1    atorvastatin (LIPITOR) 40 MG tablet TAKE ONE TABLET BY MOUTH DAILY 90 tablet 1    linagliptin (TRADJENTA) 5 MG tablet Take 1 tablet by mouth daily 90 tablet 1    glimepiride (AMARYL) 2 MG tablet Take 1 tablet by mouth every morning 90 tablet 1    allopurinol (ZYLOPRIM) 300 MG tablet TAKE ONE TABLET BY MOUTH DAILY 90 tablet 1    dapagliflozin (FARXIGA) 5 MG tablet Take 1 tablet by mouth every morning 90 tablet 1    enalapril (VASOTEC) 10 MG tablet TAKE ONE TABLET BY MOUTH TWICE DAILY 180 tablet 1    Ferrous Sulfate (IRON PO) Take by mouth      clopidogrel (PLAVIX) 75 MG tablet Take 1 tablet by mouth daily (Patient not taking: Reported on 6/22/2021) 30 tablet 0    vitamin B-12 (CYANOCOBALAMIN) 500 MCG tablet Take 500 mcg by mouth daily      calcium carbonate (OSCAL) 500 MG TABS tablet Take 500 mg by mouth daily      aspirin 81 MG EC tablet Take 81 mg by mouth daily. No current facility-administered medications for this visit. Allergies   Allergen Reactions    Penicillins Anaphylaxis     Penicillin and derivatives     Review of Systems  A comprehensive review of systems was negative. Planned anesthesia: Local and IV sedation  Known anesthesia problems: no  Bleeding risk:  no  Personal or FH of DVT/PE:no    Patient objection to receiving blood products: no     Objective:      Ht 5' 1\" (1.549 m)   Wt 127 lb (57.6 kg)   BMI 24.00 kg/m²     General Appearance:  Alert, cooperative, no distress, appears stated age   Head:  Normocephalic, without obvious abnormality, atraumatic   Eyes:  PERRL, conjunctiva/corneas clear, EOM's intact, bilateral cataract   Ears:  deferred   Nose: Nares normal, septum midline,mucosa normal, no drainage or sinus tenderness   Throat: Lips, mucosa, and tongue normal; teeth and gums normal   Neck: Supple, symmetrical, trachea midline, no adenopathy;  thyroid: not enlarged, symmetric, no tenderness/mass/nodules; no carotid bruit or JVD   Back:   deferred   Lungs:   Clear to auscultation bilaterally, respirations unlabored   Breasts:  deferred   Heart:  Regular rate and rhythm, S1 and S2 normal, no murmur, rub, or gallop   Abdomen:   Soft, non-tender, bowel sounds active all four quadrants,  no masses, no organomegaly   Pelvic: Deferred   Extremities: Extremities normal, atraumatic, no cyanosis or edema   Pulses: 2+ and symmetric   Skin: Skin color, texture, turgor normal, no rashes or lesions   Lymph nodes: Cervical, supraclavicular, and axillary nodes normal   Neurologic: Normal                Assessment:       Diagnosis Orders   1. Preop exam for internal medicine     2. Age-related nuclear cataract of both eyes     3. CAD, multiple vessel     4.  Chronic kidney disease (CKD) stage

## 2022-03-11 ENCOUNTER — HOSPITAL ENCOUNTER (OUTPATIENT)
Age: 77
Discharge: HOME | End: 2022-03-11
Payer: MEDICARE

## 2022-03-11 PROCEDURE — U0003 INFECTIOUS AGENT DETECTION BY NUCLEIC ACID (DNA OR RNA); SEVERE ACUTE RESPIRATORY SYNDROME CORONAVIRUS 2 (SARS-COV-2) (CORONAVIRUS DISEASE [COVID-19]), AMPLIFIED PROBE TECHNIQUE, MAKING USE OF HIGH THROUGHPUT TECHNOLOGIES AS DESCRIBED BY CMS-2020-01-R: HCPCS

## 2022-03-11 PROCEDURE — U0005 INFEC AGEN DETEC AMPLI PROBE: HCPCS

## 2022-03-12 LAB — SARS-COV-2: NOT DETECTED

## 2022-03-24 RX ORDER — ENALAPRIL MALEATE 10 MG/1
TABLET ORAL
Qty: 180 TABLET | Refills: 1 | Status: SHIPPED | OUTPATIENT
Start: 2022-03-24 | End: 2022-06-10

## 2022-04-13 RX ORDER — ATORVASTATIN CALCIUM 40 MG/1
TABLET, FILM COATED ORAL
Qty: 90 TABLET | Refills: 0 | Status: SHIPPED | OUTPATIENT
Start: 2022-04-13 | End: 2022-07-11

## 2022-04-17 NOTE — PROGRESS NOTES
Oxycodone.     Cathleen Thomas MD FACS Constipation, Adult      Constipation is when a person has trouble pooping (having a bowel movement). When you have this condition, you may poop fewer than 3 times a week. Your poop (stool) may also be dry, hard, or bigger than normal.      Follow these instructions at home:      Eating and drinking    •Eat foods that have a lot of fiber, such as:  •Fresh fruits and vegetables.      •Whole grains.      •Beans.      •Eat less of foods that are low in fiber and high in fat and sugar, such as:  •French fries.      •Hamburgers.      •Cookies.      •Candy.      •Soda.        •Drink enough fluid to keep your pee (urine) pale yellow.      General instructions     •Exercise regularly or as told by your doctor. Try to do 150 minutes of exercise each week.      •Go to the restroom when you feel like you need to poop. Do not hold it in.      •Take over-the-counter and prescription medicines only as told by your doctor. These include any fiber supplements.    •When you poop:  •Do deep breathing while relaxing your lower belly (abdomen).      •Relax your pelvic floor. The pelvic floor is a group of muscles that support the rectum, bladder, and intestines (as well as the uterus in women).        •Watch your condition for any changes. Tell your doctor if you notice any.      •Keep all follow-up visits as told by your doctor. This is important.        Contact a doctor if:    •You have pain that gets worse.      •You have a fever.      •You have not pooped for 4 days.      •You vomit.      •You are not hungry.      •You lose weight.      •You are bleeding from the opening of the butt (anus).      •You have thin, pencil-like poop.        Get help right away if:    •You have a fever, and your symptoms suddenly get worse.      •You leak poop or have blood in your poop.      •Your belly feels hard or bigger than normal (bloated).      •You have very bad belly pain.      •You feel dizzy or you faint.        Summary    •Constipation is when a person poops fewer than 3 times a week, has trouble pooping, or has poop that is dry, hard, or bigger than normal.      •Eat foods that have a lot of fiber.      •Drink enough fluid to keep your pee (urine) pale yellow.      •Take over-the-counter and prescription medicines only as told by your doctor. These include any fiber supplements.      This information is not intended to replace advice given to you by your health care provider. Make sure you discuss any questions you have with your health care provider.      Document Revised: 11/04/2020 Document Reviewed: 11/04/2020    [employer] Patient Education © 2022 [employer]

## 2022-05-02 ENCOUNTER — TELEPHONE (OUTPATIENT)
Dept: INTERNAL MEDICINE CLINIC | Age: 77
End: 2022-05-02

## 2022-05-02 NOTE — TELEPHONE ENCOUNTER
----- Message from Kg Trejo MD sent at 5/2/2022 10:13 AM EDT -----  Contact: Doug Keller  [phone]  Order a stat venous US of the LLE diagnosis: swollen left leg (Left leg edema)  ----- Message -----  From: Sirisha Henriquez  Sent: 5/2/2022  10:10 AM EDT  To: Kg Trejo MD    Patient called and is concerned her left leg is swollen really bad. Patient stated that the ast time that happened was when she had to have open heart surgery. So she is concerned and feels she needs to be seen.     [facility] [id number] - [address] [phone] (Ph: [phone])

## 2022-05-02 NOTE — TELEPHONE ENCOUNTER
----- Message from Arielle Ayon sent at 5/2/2022 10:24 AM EDT -----  Contact: Isauro Muse  [phone]  Attempted calling patient, unable to leave message. Number is not a working number. ----- Message -----  From: Jessica Lind MD  Sent: 5/2/2022  10:14 AM EDT  To: Arielle Ayon    Order a stat venous US of the LLE diagnosis: swollen left leg (Left leg edema)  ----- Message -----  From: Corwin Jeffers  Sent: 5/2/2022  10:10 AM EDT  To: Jessica Lind MD    Patient called and is concerned her left leg is swollen really bad. Patient stated that the ast time that happened was when she had to have open heart surgery. So she is concerned and feels she needs to be seen.     [facility] Engle [id number] - [address] [facility] [phone] (Ph: [phone])

## 2022-05-02 NOTE — TELEPHONE ENCOUNTER
----- Message from Darlene Vazquez sent at 5/2/2022 10:24 AM EDT -----  Contact: Brittney Lewis  [phone]  Attempted calling patient, unable to leave message. Number is not a working number. ----- Message -----  From: Padmini Gates MD  Sent: 5/2/2022  10:14 AM EDT  To: Darlene Vazquez    Order a stat venous US of the LLE diagnosis: swollen left leg (Left leg edema)  ----- Message -----  From: Chito Larios  Sent: 5/2/2022  10:10 AM EDT  To: Padmini Gates MD    Patient called and is concerned her left leg is swollen really bad. Patient stated that the ast time that happened was when she had to have open heart surgery. So she is concerned and feels she needs to be seen.     [facility] [id number] - [address] [facility] [phone] (Ph: [phone])

## 2022-05-03 ENCOUNTER — TELEPHONE (OUTPATIENT)
Dept: INTERNAL MEDICINE CLINIC | Age: 77
End: 2022-05-03

## 2022-05-03 NOTE — TELEPHONE ENCOUNTER
----- Message from Rome Lara sent at 5/2/2022 10:24 AM EDT -----  Contact: Anirudh Max  [phone]  Attempted calling patient, unable to leave message. Number is not a working number. ----- Message -----  From: Geovanna Zapien MD  Sent: 5/2/2022  10:14 AM EDT  To: Rome Lara    Order a stat venous US of the LLE diagnosis: swollen left leg (Left leg edema)  ----- Message -----  From: Aliza Ragland  Sent: 5/2/2022  10:10 AM EDT  To: Geovanna Zapien MD    Patient called and is concerned her left leg is swollen really bad. Patient stated that the ast time that happened was when she had to have open heart surgery. So she is concerned and feels she needs to be seen.     Ibrahima Bhandari [id number] - 8 [facility] [place] [place] [phone] (Ph: [phone])

## 2022-05-03 NOTE — TELEPHONE ENCOUNTER
----- Message from Elise Velasco sent at 5/2/2022 10:24 AM EDT -----  Contact: Doug Keller  [phone]  Attempted calling patient, unable to leave message. Number is not a working number. ----- Message -----  From: Kg Trejo MD  Sent: 5/2/2022  10:14 AM EDT  To: Elise Velasco    Order a stat venous US of the LLE diagnosis: swollen left leg (Left leg edema)  ----- Message -----  From: Sirisha Henriquez  Sent: 5/2/2022  10:10 AM EDT  To: Kg Trejo MD    Patient called and is concerned her left leg is swollen really bad. Patient stated that the ast time that happened was when she had to have open heart surgery. So she is concerned and feels she needs to be seen.     [facility] [id number] - [address] [facility] [phone] (Ph: [phone])

## 2022-05-03 NOTE — TELEPHONE ENCOUNTER
----- Message from Kobe Bai sent at 5/2/2022 10:24 AM EDT -----  Contact: Letha Clancy  [phone]  Attempted calling patient, unable to leave message. Number is not a working number. ----- Message -----  From: Kenny Parry MD  Sent: 5/2/2022  10:14 AM EDT  To: Kobe Bai    Order a stat venous US of the LLE diagnosis: swollen left leg (Left leg edema)  ----- Message -----  From: Reji Benoitin  Sent: 5/2/2022  10:10 AM EDT  To: Kenny Parry MD    Patient called and is concerned her left leg is swollen really bad. Patient stated that the ast time that happened was when she had to have open heart surgery. So she is concerned and feels she needs to be seen.     [facility] [id number] - [address] [phone] (Ph: [phone])

## 2022-05-03 NOTE — TELEPHONE ENCOUNTER
----- Message from Ap Allen sent at 5/2/2022 10:24 AM EDT -----  Contact: Leticia Resendiz  [phone]  Attempted calling patient, unable to leave message. Number is not a working number. ----- Message -----  From: Melissa Sanchez MD  Sent: 5/2/2022  10:14 AM EDT  To: Ap Allen    Order a stat venous US of the LLE diagnosis: swollen left leg (Left leg edema)  ----- Message -----  From: Aaliyah Daly  Sent: 5/2/2022  10:10 AM EDT  To: Melissa Sanchez MD    Patient called and is concerned her left leg is swollen really bad. Patient stated that the ast time that happened was when she had to have open heart surgery. So she is concerned and feels she needs to be seen.     [facility] [id number] - [address] [facility] [phone] (Ph: [phone])

## 2022-05-04 ENCOUNTER — TELEPHONE (OUTPATIENT)
Dept: INTERNAL MEDICINE CLINIC | Age: 77
End: 2022-05-04

## 2022-05-04 NOTE — TELEPHONE ENCOUNTER
----- Message from Lucho Larose sent at 5/2/2022 10:24 AM EDT -----  Contact: Chris Woods  [phone]  Attempted calling patient, unable to leave message. Number is not a working number. ----- Message -----  From: Justin Cortez MD  Sent: 5/2/2022  10:14 AM EDT  To: Lucho Larose    Order a stat venous US of the LLE diagnosis: swollen left leg (Left leg edema)  ----- Message -----  From: Lequita Dubin  Sent: 5/2/2022  10:10 AM EDT  To: Justin Cortez MD    Patient called and is concerned her left leg is swollen really bad. Patient stated that the ast time that happened was when she had to have open heart surgery. So she is concerned and feels she needs to be seen.     Chirag Adler [id number] - [address] [facility] [phone] (Ph: [phone])

## 2022-05-04 NOTE — TELEPHONE ENCOUNTER
Attempted to contact patient with provided number. Phone number is busy every time for the past few days. Attempted to contact spouse. Phone did ring, but was unable to leave message.

## 2022-05-05 ENCOUNTER — HOSPITAL ENCOUNTER (OUTPATIENT)
Dept: VASCULAR LAB | Age: 77
Discharge: HOME | End: 2022-05-05
Payer: MEDICARE

## 2022-05-05 ENCOUNTER — TELEPHONE (OUTPATIENT)
Dept: INTERNAL MEDICINE CLINIC | Age: 77
End: 2022-05-05

## 2022-05-05 DIAGNOSIS — R60.0 LEG EDEMA, LEFT: ICD-10-CM

## 2022-05-05 DIAGNOSIS — R60.0 LEG EDEMA, LEFT: Primary | ICD-10-CM

## 2022-05-05 PROCEDURE — 93971 EXTREMITY STUDY: CPT

## 2022-05-05 NOTE — TELEPHONE ENCOUNTER
----- Message from Ismael Carlisle sent at 5/2/2022 10:24 AM EDT -----  Contact: Lala Arroyo  [phone]  Attempted calling patient, unable to leave message. Number is not a working number. ----- Message -----  From: Timotyh De La Torre MD  Sent: 5/2/2022  10:14 AM EDT  To: Ismael Carlisle    Order a stat venous US of the LLE diagnosis: swollen left leg (Left leg edema)  ----- Message -----  From: Ashwin Carrillo  Sent: 5/2/2022  10:10 AM EDT  To: Timothy De La Torre MD    Patient called and is concerned her left leg is swollen really bad. Patient stated that the ast time that happened was when she had to have open heart surgery. So she is concerned and feels she needs to be seen.     [facility] [id number] - 8 [facility] [phone] (Ph: [phone])

## 2022-05-26 ENCOUNTER — TELEPHONE (OUTPATIENT)
Dept: INTERNAL MEDICINE CLINIC | Age: 77
End: 2022-05-26

## 2022-05-26 RX ORDER — FUROSEMIDE 20 MG/1
20 TABLET ORAL DAILY
Qty: 7 TABLET | Refills: 0 | Status: SHIPPED
Start: 2022-05-26 | End: 2022-06-01 | Stop reason: DRUGHIGH

## 2022-05-26 NOTE — TELEPHONE ENCOUNTER
----- Message from Hilda Aviles MD sent at 5/26/2022  1:38 PM EDT -----  Contact: [phone]  Start lasix 20 mg daily x 3 days   Send for 7 days  Make appt with Dr. oCny Perez   ----- Message -----  From: Juve Caldwell MA  Sent: 5/26/2022  10:23 AM EDT  To: MD Dr ZION Dowling pt called stating she has swelling in both legs and feet started 2 days ago no redness or hot to touch but very painful when she walks. She discontinued her Lasix about a year ago not sure if she needs something called in please advise.         [facility] [id number] - [address] [facility] [phone] (Ph: [phone])

## 2022-06-01 ENCOUNTER — TELEPHONE (OUTPATIENT)
Dept: INTERNAL MEDICINE CLINIC | Age: 77
End: 2022-06-01

## 2022-06-01 DIAGNOSIS — I50.22 CHRONIC SYSTOLIC CHF (CONGESTIVE HEART FAILURE) (HCC): ICD-10-CM

## 2022-06-01 DIAGNOSIS — M79.89 LEG SWELLING: Primary | ICD-10-CM

## 2022-06-01 RX ORDER — FUROSEMIDE 40 MG/1
40 TABLET ORAL DAILY
Qty: 60 TABLET | Refills: 2 | Status: ON HOLD
Start: 2022-06-01 | End: 2022-06-05 | Stop reason: HOSPADM

## 2022-06-01 RX ORDER — POTASSIUM CHLORIDE 750 MG/1
10 TABLET, FILM COATED, EXTENDED RELEASE ORAL DAILY
Qty: 30 TABLET | Refills: 2 | Status: ON HOLD
Start: 2022-06-01 | End: 2022-06-05 | Stop reason: HOSPADM

## 2022-06-01 NOTE — TELEPHONE ENCOUNTER
----- Message from Ga Sosa MD sent at 6/1/2022 11:55 AM EDT -----  Contact: Billy Gan   [phone] or [phone]  Resume lasix , increase to 40 mg daily , add KCL 10 meq along with lasix  Obtain BNP, BMP    Make appt with Dr. Heidi Park  ----- Message -----  From: Rina Contreras  Sent: 6/1/2022   9:59 AM EDT  To: Ga Sosa MD    Patient Dr. Tori Salgado. Patient called and stated that her legs are swollen really bad. That she cannot hardly walk on her feet they hurt. Patient states that she has clear fluid seeping out her legs. Patient wants to be advised how she should proceed.       [facility] [id number] - [address] [phone] (Ph: [phone])

## 2022-06-02 DIAGNOSIS — M79.89 LEG SWELLING: ICD-10-CM

## 2022-06-02 DIAGNOSIS — I50.22 CHRONIC SYSTOLIC CHF (CONGESTIVE HEART FAILURE) (HCC): ICD-10-CM

## 2022-06-03 ENCOUNTER — APPOINTMENT (OUTPATIENT)
Dept: GENERAL RADIOLOGY | Age: 77
DRG: 291 | End: 2022-06-03
Payer: MEDICARE

## 2022-06-03 ENCOUNTER — HOSPITAL ENCOUNTER (INPATIENT)
Age: 77
LOS: 2 days | Discharge: HOME | DRG: 291 | End: 2022-06-05
Attending: STUDENT IN AN ORGANIZED HEALTH CARE EDUCATION/TRAINING PROGRAM | Admitting: INTERNAL MEDICINE
Payer: MEDICARE

## 2022-06-03 ENCOUNTER — TELEPHONE (OUTPATIENT)
Dept: INTERNAL MEDICINE CLINIC | Age: 77
End: 2022-06-03

## 2022-06-03 DIAGNOSIS — R06.02 SHORTNESS OF BREATH: Primary | ICD-10-CM

## 2022-06-03 DIAGNOSIS — I50.9 ACUTE ON CHRONIC CONGESTIVE HEART FAILURE, UNSPECIFIED HEART FAILURE TYPE (HCC): ICD-10-CM

## 2022-06-03 PROBLEM — I50.43 ACUTE ON CHRONIC COMBINED SYSTOLIC (CONGESTIVE) AND DIASTOLIC (CONGESTIVE) HEART FAILURE (HCC): Status: ACTIVE | Noted: 2022-06-03

## 2022-06-03 PROBLEM — I50.23 ACUTE ON CHRONIC SYSTOLIC CHF (CONGESTIVE HEART FAILURE) (HCC): Status: ACTIVE | Noted: 2019-12-11

## 2022-06-03 LAB
A/G RATIO: 1.4 (ref 1.1–2.2)
ALBUMIN SERPL-MCNC: 3.4 G/DL (ref 3.4–5)
ALP BLD-CCNC: 157 U/L (ref 40–129)
ALT SERPL-CCNC: 76 U/L (ref 10–40)
ANION GAP SERPL CALCULATED.3IONS-SCNC: 10 MMOL/L (ref 3–16)
ANION GAP SERPL CALCULATED.3IONS-SCNC: 15 MMOL/L (ref 3–16)
AST SERPL-CCNC: 62 U/L (ref 15–37)
BASE EXCESS VENOUS: -4.4 MMOL/L (ref -3–3)
BASOPHILS ABSOLUTE: 0.1 K/UL (ref 0–0.2)
BASOPHILS RELATIVE PERCENT: 1 %
BILIRUB SERPL-MCNC: 0.8 MG/DL (ref 0–1)
BUN BLDV-MCNC: 25 MG/DL (ref 7–20)
BUN BLDV-MCNC: 28 MG/DL (ref 7–20)
CALCIUM SERPL-MCNC: 8.6 MG/DL (ref 8.3–10.6)
CALCIUM SERPL-MCNC: 9.3 MG/DL (ref 8.3–10.6)
CARBOXYHEMOGLOBIN: 9.1 % (ref 0–1.5)
CHLORIDE BLD-SCNC: 109 MMOL/L (ref 99–110)
CHLORIDE BLD-SCNC: 110 MMOL/L (ref 99–110)
CO2: 18 MMOL/L (ref 21–32)
CO2: 20 MMOL/L (ref 21–32)
CREAT SERPL-MCNC: 1 MG/DL (ref 0.6–1.2)
CREAT SERPL-MCNC: 1.2 MG/DL (ref 0.6–1.2)
DIGOXIN LEVEL: 0.4 NG/ML (ref 0.8–2)
EKG ATRIAL RATE: 90 BPM
EKG DIAGNOSIS: NORMAL
EKG P AXIS: -9 DEGREES
EKG P-R INTERVAL: 140 MS
EKG Q-T INTERVAL: 342 MS
EKG QRS DURATION: 106 MS
EKG QTC CALCULATION (BAZETT): 418 MS
EKG R AXIS: 5 DEGREES
EKG T AXIS: 220 DEGREES
EKG VENTRICULAR RATE: 90 BPM
EOSINOPHILS ABSOLUTE: 0.1 K/UL (ref 0–0.6)
EOSINOPHILS RELATIVE PERCENT: 1.7 %
GFR AFRICAN AMERICAN: 53
GFR AFRICAN AMERICAN: >60
GFR NON-AFRICAN AMERICAN: 44
GFR NON-AFRICAN AMERICAN: 54
GLUCOSE BLD-MCNC: 127 MG/DL (ref 70–99)
GLUCOSE BLD-MCNC: 136 MG/DL (ref 70–99)
GLUCOSE BLD-MCNC: 189 MG/DL (ref 70–99)
GLUCOSE BLD-MCNC: 206 MG/DL (ref 70–99)
HCO3 VENOUS: 19.2 MMOL/L (ref 23–29)
HCT VFR BLD CALC: 32.7 % (ref 36–48)
HEMOGLOBIN: 10.5 G/DL (ref 12–16)
INFLUENZA A: NOT DETECTED
INFLUENZA B: NOT DETECTED
LYMPHOCYTES ABSOLUTE: 0.7 K/UL (ref 1–5.1)
LYMPHOCYTES RELATIVE PERCENT: 11.7 %
MCH RBC QN AUTO: 29.8 PG (ref 26–34)
MCHC RBC AUTO-ENTMCNC: 32.2 G/DL (ref 31–36)
MCV RBC AUTO: 92.4 FL (ref 80–100)
METHEMOGLOBIN VENOUS: 0.3 %
MONOCYTES ABSOLUTE: 0.3 K/UL (ref 0–1.3)
MONOCYTES RELATIVE PERCENT: 5 %
NEUTROPHILS ABSOLUTE: 5 K/UL (ref 1.7–7.7)
NEUTROPHILS RELATIVE PERCENT: 80.6 %
O2 CONTENT, VEN: 13 VOL %
O2 SAT, VEN: 81 %
O2 THERAPY: ABNORMAL
PCO2, VEN: 30.8 MMHG (ref 40–50)
PDW BLD-RTO: 16.3 % (ref 12.4–15.4)
PERFORMED ON: ABNORMAL
PERFORMED ON: ABNORMAL
PH VENOUS: 7.41 (ref 7.35–7.45)
PLATELET # BLD: 209 K/UL (ref 135–450)
PMV BLD AUTO: 7.4 FL (ref 5–10.5)
PO2, VEN: 43.9 MMHG (ref 25–40)
POTASSIUM REFLEX MAGNESIUM: 4.7 MMOL/L (ref 3.5–5.1)
POTASSIUM SERPL-SCNC: 4.9 MMOL/L (ref 3.5–5.1)
PRO-BNP: ABNORMAL PG/ML (ref 0–449)
PRO-BNP: ABNORMAL PG/ML (ref 0–449)
RBC # BLD: 3.54 M/UL (ref 4–5.2)
SARS-COV-2 RNA, RT PCR: NOT DETECTED
SODIUM BLD-SCNC: 139 MMOL/L (ref 136–145)
SODIUM BLD-SCNC: 143 MMOL/L (ref 136–145)
TCO2 CALC VENOUS: 20 MMOL/L
TOTAL PROTEIN: 5.9 G/DL (ref 6.4–8.2)
TROPONIN: 0.01 NG/ML
TROPONIN: 0.02 NG/ML
TROPONIN: 0.02 NG/ML
WBC # BLD: 6.2 K/UL (ref 4–11)

## 2022-06-03 PROCEDURE — 80162 ASSAY OF DIGOXIN TOTAL: CPT

## 2022-06-03 PROCEDURE — 6370000000 HC RX 637 (ALT 250 FOR IP): Performed by: STUDENT IN AN ORGANIZED HEALTH CARE EDUCATION/TRAINING PROGRAM

## 2022-06-03 PROCEDURE — 99223 1ST HOSP IP/OBS HIGH 75: CPT | Performed by: INTERNAL MEDICINE

## 2022-06-03 PROCEDURE — 2060000000 HC ICU INTERMEDIATE R&B

## 2022-06-03 PROCEDURE — 83550 IRON BINDING TEST: CPT

## 2022-06-03 PROCEDURE — 85025 COMPLETE CBC W/AUTO DIFF WBC: CPT

## 2022-06-03 PROCEDURE — 6360000002 HC RX W HCPCS: Performed by: STUDENT IN AN ORGANIZED HEALTH CARE EDUCATION/TRAINING PROGRAM

## 2022-06-03 PROCEDURE — 71045 X-RAY EXAM CHEST 1 VIEW: CPT

## 2022-06-03 PROCEDURE — 2580000003 HC RX 258

## 2022-06-03 PROCEDURE — 99285 EMERGENCY DEPT VISIT HI MDM: CPT

## 2022-06-03 PROCEDURE — 6360000002 HC RX W HCPCS

## 2022-06-03 PROCEDURE — 80053 COMPREHEN METABOLIC PANEL: CPT

## 2022-06-03 PROCEDURE — 82803 BLOOD GASES ANY COMBINATION: CPT

## 2022-06-03 PROCEDURE — 84484 ASSAY OF TROPONIN QUANT: CPT

## 2022-06-03 PROCEDURE — 93005 ELECTROCARDIOGRAM TRACING: CPT | Performed by: STUDENT IN AN ORGANIZED HEALTH CARE EDUCATION/TRAINING PROGRAM

## 2022-06-03 PROCEDURE — 83540 ASSAY OF IRON: CPT

## 2022-06-03 PROCEDURE — 6370000000 HC RX 637 (ALT 250 FOR IP)

## 2022-06-03 PROCEDURE — 83880 ASSAY OF NATRIURETIC PEPTIDE: CPT

## 2022-06-03 PROCEDURE — 36415 COLL VENOUS BLD VENIPUNCTURE: CPT

## 2022-06-03 PROCEDURE — 87636 SARSCOV2 & INF A&B AMP PRB: CPT

## 2022-06-03 PROCEDURE — 93010 ELECTROCARDIOGRAM REPORT: CPT | Performed by: INTERNAL MEDICINE

## 2022-06-03 RX ORDER — ACETAMINOPHEN 325 MG/1
650 TABLET ORAL EVERY 6 HOURS PRN
Status: DISCONTINUED | OUTPATIENT
Start: 2022-06-03 | End: 2022-06-05 | Stop reason: HOSPADM

## 2022-06-03 RX ORDER — ALLOPURINOL 300 MG/1
300 TABLET ORAL DAILY
Status: DISCONTINUED | OUTPATIENT
Start: 2022-06-03 | End: 2022-06-05 | Stop reason: HOSPADM

## 2022-06-03 RX ORDER — DEXTROSE MONOHYDRATE 50 MG/ML
100 INJECTION, SOLUTION INTRAVENOUS PRN
Status: DISCONTINUED | OUTPATIENT
Start: 2022-06-03 | End: 2022-06-05 | Stop reason: HOSPADM

## 2022-06-03 RX ORDER — ENOXAPARIN SODIUM 100 MG/ML
40 INJECTION SUBCUTANEOUS DAILY
Status: DISCONTINUED | OUTPATIENT
Start: 2022-06-03 | End: 2022-06-05 | Stop reason: HOSPADM

## 2022-06-03 RX ORDER — ALOGLIPTIN 12.5 MG/1
12.5 TABLET, FILM COATED ORAL DAILY
Status: DISCONTINUED | OUTPATIENT
Start: 2022-06-03 | End: 2022-06-05 | Stop reason: HOSPADM

## 2022-06-03 RX ORDER — ENALAPRIL MALEATE 10 MG/1
10 TABLET ORAL 2 TIMES DAILY
Status: DISCONTINUED | OUTPATIENT
Start: 2022-06-03 | End: 2022-06-05 | Stop reason: HOSPADM

## 2022-06-03 RX ORDER — ACETAMINOPHEN 650 MG/1
650 SUPPOSITORY RECTAL EVERY 6 HOURS PRN
Status: DISCONTINUED | OUTPATIENT
Start: 2022-06-03 | End: 2022-06-05 | Stop reason: HOSPADM

## 2022-06-03 RX ORDER — ATORVASTATIN CALCIUM 40 MG/1
40 TABLET, FILM COATED ORAL DAILY
Status: DISCONTINUED | OUTPATIENT
Start: 2022-06-03 | End: 2022-06-05 | Stop reason: HOSPADM

## 2022-06-03 RX ORDER — POTASSIUM CHLORIDE 20 MEQ/1
40 TABLET, EXTENDED RELEASE ORAL PRN
Status: DISCONTINUED | OUTPATIENT
Start: 2022-06-03 | End: 2022-06-04

## 2022-06-03 RX ORDER — POTASSIUM CHLORIDE 7.45 MG/ML
10 INJECTION INTRAVENOUS PRN
Status: DISCONTINUED | OUTPATIENT
Start: 2022-06-03 | End: 2022-06-04

## 2022-06-03 RX ORDER — FUROSEMIDE 10 MG/ML
20 INJECTION INTRAMUSCULAR; INTRAVENOUS ONCE
Status: DISCONTINUED | OUTPATIENT
Start: 2022-06-03 | End: 2022-06-03

## 2022-06-03 RX ORDER — CALCIUM CARBONATE 500(1250)
500 TABLET ORAL DAILY
Status: DISCONTINUED | OUTPATIENT
Start: 2022-06-03 | End: 2022-06-05 | Stop reason: HOSPADM

## 2022-06-03 RX ORDER — ASPIRIN 81 MG/1
243 TABLET, CHEWABLE ORAL ONCE
Status: COMPLETED | OUTPATIENT
Start: 2022-06-03 | End: 2022-06-03

## 2022-06-03 RX ORDER — FUROSEMIDE 10 MG/ML
40 INJECTION INTRAMUSCULAR; INTRAVENOUS 2 TIMES DAILY
Status: DISCONTINUED | OUTPATIENT
Start: 2022-06-03 | End: 2022-06-05

## 2022-06-03 RX ORDER — SODIUM CHLORIDE 0.9 % (FLUSH) 0.9 %
10 SYRINGE (ML) INJECTION PRN
Status: DISCONTINUED | OUTPATIENT
Start: 2022-06-03 | End: 2022-06-05 | Stop reason: HOSPADM

## 2022-06-03 RX ORDER — INSULIN GLARGINE 100 [IU]/ML
10 INJECTION, SOLUTION SUBCUTANEOUS NIGHTLY
Status: DISCONTINUED | OUTPATIENT
Start: 2022-06-03 | End: 2022-06-05 | Stop reason: HOSPADM

## 2022-06-03 RX ORDER — SODIUM CHLORIDE 0.9 % (FLUSH) 0.9 %
5-40 SYRINGE (ML) INJECTION EVERY 12 HOURS SCHEDULED
Status: DISCONTINUED | OUTPATIENT
Start: 2022-06-03 | End: 2022-06-05 | Stop reason: HOSPADM

## 2022-06-03 RX ORDER — ONDANSETRON 2 MG/ML
4 INJECTION INTRAMUSCULAR; INTRAVENOUS EVERY 6 HOURS PRN
Status: DISCONTINUED | OUTPATIENT
Start: 2022-06-03 | End: 2022-06-05 | Stop reason: HOSPADM

## 2022-06-03 RX ORDER — ASPIRIN 81 MG/1
81 TABLET ORAL DAILY
Status: DISCONTINUED | OUTPATIENT
Start: 2022-06-03 | End: 2022-06-05 | Stop reason: HOSPADM

## 2022-06-03 RX ORDER — POLYETHYLENE GLYCOL 3350 17 G/17G
17 POWDER, FOR SOLUTION ORAL DAILY PRN
Status: DISCONTINUED | OUTPATIENT
Start: 2022-06-03 | End: 2022-06-05 | Stop reason: HOSPADM

## 2022-06-03 RX ORDER — ONDANSETRON 4 MG/1
4 TABLET, ORALLY DISINTEGRATING ORAL EVERY 8 HOURS PRN
Status: DISCONTINUED | OUTPATIENT
Start: 2022-06-03 | End: 2022-06-05 | Stop reason: HOSPADM

## 2022-06-03 RX ORDER — CHOLECALCIFEROL (VITAMIN D3) 125 MCG
500 CAPSULE ORAL DAILY
Status: DISCONTINUED | OUTPATIENT
Start: 2022-06-03 | End: 2022-06-05 | Stop reason: HOSPADM

## 2022-06-03 RX ORDER — FUROSEMIDE 10 MG/ML
40 INJECTION INTRAMUSCULAR; INTRAVENOUS ONCE
Status: COMPLETED | OUTPATIENT
Start: 2022-06-03 | End: 2022-06-03

## 2022-06-03 RX ORDER — DIGOXIN 125 MCG
125 TABLET ORAL
Status: DISCONTINUED | OUTPATIENT
Start: 2022-06-05 | End: 2022-06-05 | Stop reason: HOSPADM

## 2022-06-03 RX ORDER — INSULIN LISPRO 100 [IU]/ML
0-6 INJECTION, SOLUTION INTRAVENOUS; SUBCUTANEOUS NIGHTLY
Status: DISCONTINUED | OUTPATIENT
Start: 2022-06-03 | End: 2022-06-05 | Stop reason: HOSPADM

## 2022-06-03 RX ORDER — POTASSIUM CHLORIDE 750 MG/1
10 TABLET, EXTENDED RELEASE ORAL DAILY
Status: DISCONTINUED | OUTPATIENT
Start: 2022-06-03 | End: 2022-06-04

## 2022-06-03 RX ORDER — INSULIN LISPRO 100 [IU]/ML
0-12 INJECTION, SOLUTION INTRAVENOUS; SUBCUTANEOUS
Status: DISCONTINUED | OUTPATIENT
Start: 2022-06-03 | End: 2022-06-05 | Stop reason: HOSPADM

## 2022-06-03 RX ORDER — SODIUM CHLORIDE 9 MG/ML
INJECTION, SOLUTION INTRAVENOUS PRN
Status: DISCONTINUED | OUTPATIENT
Start: 2022-06-03 | End: 2022-06-05 | Stop reason: HOSPADM

## 2022-06-03 RX ORDER — MAGNESIUM SULFATE 1 G/100ML
1000 INJECTION INTRAVENOUS PRN
Status: DISCONTINUED | OUTPATIENT
Start: 2022-06-03 | End: 2022-06-05 | Stop reason: HOSPADM

## 2022-06-03 RX ADMIN — ATORVASTATIN CALCIUM 40 MG: 40 TABLET, FILM COATED ORAL at 16:18

## 2022-06-03 RX ADMIN — SODIUM CHLORIDE, PRESERVATIVE FREE 10 ML: 5 INJECTION INTRAVENOUS at 20:44

## 2022-06-03 RX ADMIN — INSULIN LISPRO 2 UNITS: 100 INJECTION, SOLUTION INTRAVENOUS; SUBCUTANEOUS at 20:45

## 2022-06-03 RX ADMIN — INSULIN GLARGINE 10 UNITS: 100 INJECTION, SOLUTION SUBCUTANEOUS at 20:45

## 2022-06-03 RX ADMIN — FUROSEMIDE 40 MG: 10 INJECTION, SOLUTION INTRAMUSCULAR; INTRAVENOUS at 15:15

## 2022-06-03 RX ADMIN — FUROSEMIDE 40 MG: 10 INJECTION, SOLUTION INTRAMUSCULAR; INTRAVENOUS at 18:26

## 2022-06-03 RX ADMIN — ASPIRIN 243 MG: 81 TABLET, CHEWABLE ORAL at 15:14

## 2022-06-03 RX ADMIN — ENOXAPARIN SODIUM 40 MG: 40 INJECTION SUBCUTANEOUS at 18:26

## 2022-06-03 RX ADMIN — ENALAPRIL MALEATE 10 MG: 10 TABLET ORAL at 20:44

## 2022-06-03 ASSESSMENT — PAIN DESCRIPTION - LOCATION: LOCATION: CHEST

## 2022-06-03 ASSESSMENT — PAIN SCALES - GENERAL
PAINLEVEL_OUTOF10: 0
PAINLEVEL_OUTOF10: 4

## 2022-06-03 ASSESSMENT — PAIN - FUNCTIONAL ASSESSMENT: PAIN_FUNCTIONAL_ASSESSMENT: 0-10

## 2022-06-03 ASSESSMENT — PAIN DESCRIPTION - DESCRIPTORS: DESCRIPTORS: PRESSURE

## 2022-06-03 NOTE — ED PROVIDER NOTES
[address] ED      CHIEF COMPLAINT   Shortness of Breath       HISTORY OF PRESENT ILLNESS  Reginald Jean is a 68 y.o. female with a past medical history of hypertension, coronary artery disease status post CABG, hyperlipidemia, diabetes, cardiomyopathy, CHF, and chronic kidney disease, who presents to the ED complaining of chest pain and shortness of breath. Onset: 2w, progressively  Palliative Factors: denies  Provocative Factors: denies  Chest pain: denies but denies pain when required previous CABG, does report chest pressure, none currently  Cough: denies  Fever: denies  Reports bilateral lower extremity swelling  Nausea- chronic intermittent    Denies history of blood clots or active malignancy. Patient denies unilateral leg swelling, hemoptysis, recent travel or surgery/immobilization, or OCP or other hormone use. Patient does smoke. Old records reviewed:     Echo 11/30/21:  Summary   Left ventricular systolic function is moderate to severely reduced with a   visually estimated ejection fraction of 25-30 %. The left ventricle is mildly dilated in size with mild hypertrophy. There is global hypokinesis with more prominent in the basal and mid   inferoseptal, and inferior walls. Indeterminate diastolic function due to mitral valve prosthesis. The left atrium is moderately dilated. An annuloplasty ring is seen in the mitral position. Mild mitral, pulmonic and tricuspid regurgitation. Systolic pulmonary artery pressure (sPAP) is normal and estimated at 39 mmHg   (right atrial pressure 3 mmHg)    Cardiac Cath 7/2020:  CONCLUSIONS:      Successful rotational atherectomy and PCI of LAD with single drug-eluting stent  Successful PCI of circumflex with 3 drug-eluting stents      No other complaints, modifying factors or associated symptoms. I have reviewed the following from the nursing documentation.     Past Medical History:   Diagnosis Date    Anemia     Backache, unspecified 3/11/08    CAD, multiple vessel 1/6/2020    Chronic kidney disease (CKD) stage G3b/A1, moderately decreased glomerular filtration rate (GFR) between 30-44 mL/min/1.73 square meter and albuminuria creatinine ratio less than 30 mg/g ([facility]) 4/16/2021    Chronic systolic CHF (congestive heart failure) ([facility] 12/11/2019    Depressive disorder, not elsewhere classified 11/8/07    Essential hypertension, benign 11/8/07    Gout 8/11/2011    Hyperlipidemia associated with type 2 diabetes mellitus ([address] 12/14/2015    Major depressive disorder, recurrent episode, moderate ([address] 12/14/2015    Osteoarthritis, hand 2/27/2012    Osteoarthrosis, unspecified whether generalized or localized, lower leg 11/8/07    Osteopenia 8/5/2015    Other and unspecified hyperlipidemia 11/8/07    Pain in joint, lower leg 1/29/07    Pneumonia     Rotator cuff tendinitis 10/6/2017    Tear of medial cartilage or meniscus of knee, current 1/29/07    Type 2 diabetes mellitus with microalbuminuria, without long-term current use of insulin ([address] 5/5/2017    Type 2 diabetes mellitus without complication ([address] 30/1/8503    Type II or unspecified type diabetes mellitus without mention of complication, not stated as uncontrolled 11/8/07    foot exam 7/16/08 normal     Past Surgical History:   Procedure Laterality Date    APPENDECTOMY      CHOLECYSTECTOMY      CORONARY ARTERY BYPASS GRAFT N/A 1/8/2020    CORONARY ARTERY BYPASS GRAFTING X3, INTERNAL MAMMARY ARTERY, SAPHENOUS VEIN GRAFTING, ON PUMP MITRAL VALVE RING REPAIR USING 26MM PELAEZ PHYSIO II RING, WITH LEFT ATRIAL APPENDAGE CLIP, PFO CLOSURETEE, 5 LEVEL BILATERAL INTERCOSTAL NERVE BLOCK performed by Mariella Mejia MD at [address]      total ab hyst    PARATHYROID GLAND SURGERY      explore parathyroid glands    TOTAL KNEE ARTHROPLASTY  03/29/10    left knee     Family History   Problem Relation Age of Onset    Cancer Mother 68        colon    Diabetes Mother  Other Father         gun shot wound    Hypertension Sister     Diabetes Sister     High Blood Pressure Sister     High Cholesterol Sister     Cancer Brother 59        mets everywhere    Cancer Brother 77        colon    Diabetes Sister     Other Sister         bipolar     Social History     Socioeconomic History    Marital status:      Spouse name: Not on file    Number of children: Not on file    Years of education: Not on file    Highest education level: Not on file   Occupational History    Not on file   Tobacco Use    Smoking status: Former Smoker     Packs/day: 0.50     Years: 20.00     Pack years: 10.00     Quit date: 2015     Years since quittin.5    Smokeless tobacco: Never Used   Vaping Use    Vaping Use: Never used   Substance and Sexual Activity    Alcohol use: No     Alcohol/week: 0.0 standard drinks    Drug use: No    Sexual activity: Yes     Partners: Male   Other Topics Concern    Not on file   Social History Narrative    Not on file     Social Determinants of Health     Financial Resource Strain:     Difficulty of Paying Living Expenses: Not on file   Food Insecurity:     Worried About Running Out of Food in the Last Year: Not on file    Anika of Food in the Last Year: Not on file   Transportation Needs:     Lack of Transportation (Medical): Not on file    Lack of Transportation (Non-Medical):  Not on file   Physical Activity:     Days of Exercise per Week: Not on file    Minutes of Exercise per Session: Not on file   Stress:     Feeling of Stress : Not on file   Social Connections:     Frequency of Communication with Friends and Family: Not on file    Frequency of Social Gatherings with Friends and Family: Not on file    Attends Zoroastrianism Services: Not on file    Active Member of Clubs or Organizations: Not on file    Attends Club or Organization Meetings: Not on file    Marital Status: Not on file   Intimate Partner Violence:     Fear of Current or Ex-Partner: Not on file    Emotionally Abused: Not on file    Physically Abused: Not on file    Sexually Abused: Not on file   Housing Stability:     Unable to Pay for Housing in the Last Year: Not on file    Number of Places Lived in the Last Year: Not on file    Unstable Housing in the Last Year: Not on file     No current facility-administered medications for this encounter.      Current Outpatient Medications   Medication Sig Dispense Refill    furosemide (LASIX) 40 MG tablet Take 1 tablet by mouth daily 60 tablet 2    potassium chloride (K-TAB) 10 MEQ extended release tablet Take 1 tablet by mouth daily 30 tablet 2    atorvastatin (LIPITOR) 40 MG tablet TAKE ONE TABLET BY MOUTH DAILY 90 tablet 0    enalapril (VASOTEC) 10 MG tablet TAKE ONE TABLET BY MOUTH TWICE DAILY 180 tablet 1    metFORMIN (GLUCOPHAGE-XR) 500 MG extended release tablet TAKE TWO TABLETS BY MOUTH TWICE A  tablet 0    digoxin (LANOXIN) 125 MCG tablet TAKE ONE TABLET BY MOUTH EVERY OTHER DAY 45 tablet 0    metoprolol succinate (TOPROL XL) 25 MG extended release tablet TAKE THREE TABLETS BY MOUTH DAILY 270 tablet 0    insulin glargine (LANTUS SOLOSTAR) 100 UNIT/ML injection pen Inject 10 Units into the skin nightly 3 pen 0    linagliptin (TRADJENTA) 5 MG tablet Take 1 tablet by mouth daily 90 tablet 1    glimepiride (AMARYL) 2 MG tablet Take 1 tablet by mouth every morning 90 tablet 1    allopurinol (ZYLOPRIM) 300 MG tablet TAKE ONE TABLET BY MOUTH DAILY 90 tablet 1    dapagliflozin (FARXIGA) 5 MG tablet Take 1 tablet by mouth every morning 90 tablet 1    Ferrous Sulfate (IRON PO) Take by mouth      clopidogrel (PLAVIX) 75 MG tablet Take 1 tablet by mouth daily (Patient not taking: Reported on 6/22/2021) 30 tablet 0    vitamin B-12 (CYANOCOBALAMIN) 500 MCG tablet Take 500 mcg by mouth daily      calcium carbonate (OSCAL) 500 MG TABS tablet Take 500 mg by mouth daily      aspirin 81 MG EC tablet Take 81 mg by mouth daily. Allergies   Allergen Reactions    Penicillins Anaphylaxis     Penicillin and derivatives       REVIEW OF SYSTEMS  All systems reviewed, pertinent positives per HPI otherwise noted to be negative. PHYSICAL EXAM  BP (!) 175/85   Pulse 93   Resp 16   Ht 5' 1\" (1.549 m)   Wt 125 lb (56.7 kg)   SpO2 97%   BMI 23.62 kg/m²    GENERAL APPEARANCE: Awake and alert. Cooperative. HENT: Normocephalic. Atraumatic. Mucous membranes are moist  NECK: Supple. Full range of motion of the neck without stiffness or pain. EYES: PERRL. EOM's grossly intact. HEART/CHEST: RRR. No murmurs. Chest wall is not tender to palpation. LUNGS: Respirations unlabored. Bilateral rails. Good air exchange. Speaking comfortably in full sentences. ABDOMEN: No tenderness. Soft. Non-distended. No masses. No organomegaly. No guarding or rebound. MUSCULOSKELETAL: Bilateral lower extremity edema. Compartments soft. No deformity. No tenderness in the extremities. All extremities neurovascularly intact. SKIN: Warm and dry. No acute rashes. NEUROLOGICAL: Alert and oriented. No gross facial drooping. Strength 5/5, sensation intact. PSYCHIATRIC: Normal mood and affect. LABS  I have reviewed all labs for this visit.    Results for orders placed or performed during the hospital encounter of 06/03/22   COVID-19 & Influenza Combo    Specimen: Nasopharyngeal Swab   Result Value Ref Range    SARS-CoV-2 RNA, RT PCR NOT DETECTED NOT DETECTED    INFLUENZA A NOT DETECTED NOT DETECTED    INFLUENZA B NOT DETECTED NOT DETECTED   CBC with Auto Differential   Result Value Ref Range    WBC 6.2 4.0 - 11.0 K/uL    RBC 3.54 (L) 4.00 - 5.20 M/uL    Hemoglobin 10.5 (L) 12.0 - 16.0 g/dL    Hematocrit 32.7 (L) 36.0 - 48.0 %    MCV 92.4 80.0 - 100.0 fL    MCH 29.8 26.0 - 34.0 pg    MCHC 32.2 31.0 - 36.0 g/dL    RDW 16.3 (H) 12.4 - 15.4 %    Platelets 901 593 - 590 K/uL    MPV 7.4 5.0 - 10.5 fL    Neutrophils % 80.6 %    Lymphocytes % 11.7 %    Monocytes % 5.0 %    Eosinophils % 1.7 %    Basophils % 1.0 %    Neutrophils Absolute 5.0 1.7 - 7.7 K/uL    Lymphocytes Absolute 0.7 (L) 1.0 - 5.1 K/uL    Monocytes Absolute 0.3 0.0 - 1.3 K/uL    Eosinophils Absolute 0.1 0.0 - 0.6 K/uL    Basophils Absolute 0.1 0.0 - 0.2 K/uL   Comprehensive Metabolic Panel w/ Reflex to MG   Result Value Ref Range    Sodium 139 136 - 145 mmol/L    Potassium reflex Magnesium 4.7 3.5 - 5.1 mmol/L    Chloride 109 99 - 110 mmol/L    CO2 20 (L) 21 - 32 mmol/L    Anion Gap 10 3 - 16    Glucose 136 (H) 70 - 99 mg/dL    BUN 28 (H) 7 - 20 mg/dL    CREATININE 1.2 0.6 - 1.2 mg/dL    GFR Non- 44 (A) >60    GFR  53 (A) >60    Calcium 8.6 8.3 - 10.6 mg/dL    Total Protein 5.9 (L) 6.4 - 8.2 g/dL    Albumin 3.4 3.4 - 5.0 g/dL    Albumin/Globulin Ratio 1.4 1.1 - 2.2    Total Bilirubin 0.8 0.0 - 1.0 mg/dL    Alkaline Phosphatase 157 (H) 40 - 129 U/L    ALT 76 (H) 10 - 40 U/L    AST 62 (H) 15 - 37 U/L   Troponin   Result Value Ref Range    Troponin 0.01 <0.01 ng/mL   Brain Natriuretic Peptide   Result Value Ref Range    Pro-BNP 36,839 (H) 0 - 449 pg/mL   Blood Gas, Venous   Result Value Ref Range    pH, Nicholas 7.413 7.350 - 7.450    pCO2, Nicholas 30.8 (L) 40.0 - 50.0 mmHg    pO2, Nicholas 43.9 (H) 25.0 - 40.0 mmHg    HCO3, Venous 19.2 (L) 23.0 - 29.0 mmol/L    Base Excess, Nicholas -4.4 (L) -3.0 - 3.0 mmol/L    O2 Sat, Nicholas 81 Not Established %    Carboxyhemoglobin 9.1 (H) 0.0 - 1.5 %    MetHgb, Nicholas 0.3 <1.5 %    TC02 (Calc), Nicholas 20 Not Established mmol/L    O2 Content, Nicholas 13 Not Established VOL %    O2 Therapy Unknown        ECG  The Ekg interpreted by me shows  normal sinus rhythm with a rate of 90  Axis is   Normal  QTc is  within an acceptable range  Intervals and Durations are unremarkable. ST Segments: normal  No significant change from prior EKG dated 7/24/20      RADIOLOGY  XR CHEST PORTABLE   Final Result   No acute cardiopulmonary disease.       Stable enlargement of cardiac silhouette. ED COURSE / MDM  Patient seen and evaluated. Old records reviewed and pertinent information included in HPI. Labs and imaging reviewed and results discussed with patient. Overall patient presenting for shortness of breath and intermittent chest pressure. Physical exam remarkable for lower extremity edema and bilateral rales. Differential diagnosis includes but is not limited to: Pneumonia, pneumothorax, pleural effusion, ACS, CHF exacerbation, COPD exacerbation, asthma, pulmonary embolism, arrhythmia, anemia, COVID    EKG, laboratory studies, and imaging obtained. Workup showed:    ED Course as of 06/05/22 1851 Fri Jun 03, 2022   1109 The Ekg interpreted by me shows  normal sinus rhythm with a rate of 90  Axis is   Normal  QTc is  within an acceptable range  Intervals and Durations are unremarkable. ST Segments: normal  No significant change from prior EKG dated 7/24/20 [ER]   1129 Took Aspirin 81mg today    Will give rest of aspirin load. [ER]   3525 B[place] elevated at [address]. Patient appears fluid overload on exam.  IV Lasix given. [ER]   1243 Troponin within normal limits. EKG without evidence of acute ischemia. However, given patient's significant history of cardiac disease, do feel that admission for stress test is indicated. Did discuss with cardiology given patient's previous stents and CABG, Dr Thiago Molina felt the patient could be [facility] to [facility] at this time. [ER]   1243 No significant electrolyte abnormalities or evidence of significant kidney dysfunction [ER]   1244 Mild transaminitis, patient denies any right upper quadrant pain or tenderness to palpation. [ER]   1244 Blood gas shows no acidemia or hypercarbia. [ER]   1244 Mild anemia to 7.5. No leukocytosis or thrombocytopenia [ER]   1244 CXR: IMPRESSION:  No acute cardiopulmonary disease.     Stable enlargement of cardiac silhouette.  [ER]      ED Course User Index  [ER] Jose Levi MD      At this time I have low concern for pulmonary embolism. Patient has not had a previous blood clot. Patient denies other risk factors for pulmonary embolism. Patient does not have any evidence of DVT on exam.  Patient is low risk on PERC and Wells criteria. At this time, considering that risks associated with further work-up for pulmonary embolism outweigh the likelihood of this diagnosis. Low suspicion for aortic pathology. Patient has strong pulses in the bilateral radial and femoral arteries. Pain was not maximal at onset. There is no evidence of mediastinal widening on chest x-ray. Patient does not have any neurologic deficits. The evidence indicates that the patient is very low risk for Aortic Dissection and this is consistent with my clinical intuition. The risk of further workup or hospitalization for aortic dissection is likely higher than the risk of the patient having an aortic dissection. During the patient's ED course, the patient was given:  Medications   aspirin chewable tablet 243 mg (243 mg Oral Given 6/3/22 1514)   furosemide (LASIX) injection 40 mg (40 mg IntraVENous Given 6/3/22 1515)      At least 3 minutes of smoking cessation education was provided to the patient. Based on results of work-up, I am concerned for shortness of breath and chest pressure and patient with significant cardiac history. Patient also appears to be having CHF exacerbation. At this time, do feel the patient requires admission for further work-up and management. Discussed the patient with hospital team, patient to be admitted to [facility]. CLINICAL IMPRESSION  1. Shortness of breath    2. Acute on chronic congestive heart failure, unspecified heart failure type (HCC)        Blood pressure (!) 155/74, pulse 76, temperature 98.3F,  resp. rate 20, height 5' 1\" (1.549 m), weight 130 lb 4.8 oz (59.1 kg), SpO2 98 %, not currently breastfeeding.     Génesis Gross Keo Barrios was admitted in stable condition. DISCLAIMER: This chart was created using Dragon dictation software. Efforts were made by me to ensure accuracy, however some errors may be present due to limitations of this technology and occasionally words are not transcribed correctly.               Keegan Melgar MD  06/05/22 [address], [place]  06/05/22 [id number]

## 2022-06-03 NOTE — CARE COORDINATION
Case Management Assessment  Initial Evaluation      Patient Name: Viraj Calvillo  Date of Birth: [birth year]  Diagnosis: Acute on chronic combined systolic (congestive) and diastolic (congestive) heart failure ([facility] [I50.43]  Date / Time: 6/3/2022 10:56 AM    Admission status/Date: Inpatient 6/03/2022  Chart Reviewed: Yes      Patient Interviewed: Yes   Family Interviewed:  No      Hospitalization in the last 30 days:  No      Health Care Decision Maker :   Primary Decision Maker: Kyle Zamudio Spouse - [phone]     Who do you trust or have selected to make healthcare decisions for you    Met with: Patient at bedside. Current PCP: Renetta Bolton MD    [address] required for SNF : Y         3 night stay required - N    ADLS  Support Systems/Care Needs:  family/friends  Transportation: self    Meal Preparation: self    Housing  Living Arrangements: Lives in home with spouse Steps: [address] for return to present living arrangements: Yes  Identified Issues: none at this time    Home Care Information  Active with [address] : No Agency:(Services)     Passport/Waiver : No  :                      Phone Number:    Passport/Waiver Services: n/a          Durable Medical Equiptment   DME Provider: n/a  Equipment:   Walker___Cane___RTS___ BSC___Shower Chair___Hospital Bed___W/C____Other________  02 at ____Liter(s)---wears(frequency)_______ HHN ___ CPAP___ BiPap___   N/A__x__      Home O2 Use :  No    If No for home O2---if presently on O2 during hospitalization:  No  if yes CM to follow for potential DC O2 need  Informed of need for care provider to bring portable home O2 tank on day of discharge for nursing to connect prior to leaving:   Not Indicated  Verbalized agreement/Understanding:   Not Indicated    Community Service Affiliation  Dialysis:  No    · Agency:  · Location:  · Dialysis Schedule:  · Phone:   · Fax:     Other Community Services: n/a    DISCHARGE PLAN: Explained Case Management role/services. CM reviewed chart and met with patient at bedside to discuss discharge needs and plan. Patient lives with spouse. IPTA and active . Plans to return home at discharge. No needs identified for discharge intervention. CM will follow peripherally.     Kellen Rivers RN

## 2022-06-03 NOTE — CONSULTS
[facility]  [phone]        Reason for Consultation/Chief Complaint: \"I have been having SOB, leg swelling, and chest pressure. \"  Consulted for discussion of admission location    History of Present Illness:  Halley Aguilera is a 68 y.o. patient who presented to [facility] 6/3/22 with c/o SOB and CP x 5 days. She follows with Dr. Al Valverde and last [address] was 6/28/21. She has PMH CAD s/p 3V CABG and MV ring repair 1/20, CAD s/p 1 LULY LAD/3 LULY CCx 5/07, chronic systolic CHF, ischemic CM EF=25-30%, and HLD. Note ECHO 10/29/19 EF of 25% moderate MR. Most recent Carotid Doppler 1/6/20 normal. Most recent Lexiscan 6/17/2020  Large sized inferior,  posterobasal, and basal lateral fixeds defect c/w infarct; EF of 18%. Most recent [facility] 4/29/20 s/p PCI of LAD with single LULY and 3 LULY CCx. Most recent ECHO 11/30/21 LVEF=25-30%. LV dilated; +WMA; LA is moderately dilated. An annuloplasty ring is seen in the mitral position. Mild MR, WI, TR   Ms. Jennifer Koch now presents with c/o SOB and chest pressure x 5 days and leg swelling. Feels SOB at rest and exertion and c/o pressure CP with SOB. Started lasix 1 week ago due to LE swelling but prior was NOT taking regularly scheduled diuretic. Admitting EKG NSR 90 bpm Nonspecific T wave abnormality (no change 7/20) Admitting CXR Stable enlargement of cardiac silhouette; Billy 0.01; Pro-BNP 33,135 [id number]. ALT 76 AST 62; H/H 10.5/32.7. Patient with no c/opalpitations, dizziness, or orthopnea/PND. I have been asked to provide consultation regarding further management and testing.       Past Medical History:   has a past medical history of Anemia, Backache, unspecified, CAD, multiple vessel, Chronic kidney disease (CKD) stage G3b/A1, moderately decreased glomerular filtration rate (GFR) between 30-44 mL/min/1.73 square meter and albuminuria creatinine ratio less than 30 mg/g (HCC), Chronic systolic CHF (congestive heart failure) ([facility] Depressive disorder, not elsewhere classified, Essential hypertension, benign, Gout, Hyperlipidemia associated with type 2 diabetes mellitus ([facility] [address] [address] Major depressive disorder, recurrent episode, moderate ([facility] [address] Osteoarthritis, hand, Osteoarthrosis, unspecified whether generalized or localized, lower leg, Osteopenia, Other and unspecified hyperlipidemia, Pain in joint, lower leg, Pneumonia, Rotator cuff tendinitis, Tear of medial cartilage or meniscus of knee, current, Type 2 diabetes mellitus with microalbuminuria, without long-term current use of insulin ([facility] [address] Type 2 diabetes mellitus without complication ([facility] [address] and Type II or unspecified type diabetes mellitus without mention of complication, not stated as uncontrolled. Surgical History:   has a past surgical history that includes Parathyroid gland surgery; Total knee arthroplasty (03/29/10); Hysterectomy; Appendectomy; Cholecystectomy; and Coronary artery bypass graft (N/A, 1/8/2020). Social History:   reports that she quit smoking about 6 years ago. She has a 10.00 pack-year smoking history. She has never used smokeless tobacco. She reports that she does not drink alcohol and does not use drugs. Family History:  family history includes Cancer (age of onset: 59) in her brother; Cancer (age of onset: 77) in her brother; Cancer (age of onset: 68) in her mother; Diabetes in her mother, sister, and sister; High Blood Pressure in her sister; High Cholesterol in her sister; Hypertension in her sister; Other in her father and sister. Home Medications:  Were reviewed and are listed in nursing record. and/or listed below  Prior to Admission medications    Medication Sig Start Date End Date Taking?  Authorizing Provider   furosemide (LASIX) 40 MG tablet Take 1 tablet by mouth daily 6/1/22   Laureano Mora MD   potassium chloride (K-TAB) 10 MEQ extended release tablet Take 1 tablet by mouth daily 6/1/22   Laureano Mora MD   atorvastatin (LIPITOR) 40 MG tablet TAKE ONE TABLET BY MOUTH DAILY 4/13/22   Nacho Steel MD   enalapril (VASOTEC) 10 MG tablet TAKE ONE TABLET BY MOUTH TWICE DAILY 3/24/22   Nacho Steel MD   metFORMIN (GLUCOPHAGE-XR) 500 MG extended release tablet TAKE TWO TABLETS BY MOUTH TWICE A DAY 2/23/22   Danie Ulloa MD   digoxin (LANOXIN) 125 MCG tablet TAKE ONE TABLET BY MOUTH EVERY OTHER DAY 2/23/22   Danie Ulloa MD   metoprolol succinate (TOPROL XL) 25 MG extended release tablet TAKE THREE TABLETS BY MOUTH DAILY 2/23/22   Danie Ulloa MD   insulin glargine (LANTUS SOLOSTAR) 100 UNIT/ML injection pen Inject 10 Units into the skin nightly 8/9/21   Nacho Steel MD   linagliptin (TRADJENTA) 5 MG tablet Take 1 tablet by mouth daily 8/2/21   Nacho Steel MD   glimepiride (AMARYL) 2 MG tablet Take 1 tablet by mouth every morning 8/2/21   Nacho Steel MD   allopurinol (ZYLOPRIM) 300 MG tablet TAKE ONE TABLET BY MOUTH DAILY 8/2/21   Nacho Steel MD   dapagliflozin (FARXIGA) 5 MG tablet Take 1 tablet by mouth every morning 8/2/21   Nacho Steel MD   Ferrous Sulfate (IRON PO) Take by mouth    Historical Provider, MD   clopidogrel (PLAVIX) 75 MG tablet Take 1 tablet by mouth daily  Patient not taking: Reported on 6/22/2021 2/10/20   [facility], APRN - CNP   vitamin B-12 (CYANOCOBALAMIN) 500 MCG tablet Take 500 mcg by mouth daily    Historical Provider, MD   calcium carbonate (OSCAL) 500 MG TABS tablet Take 500 mg by mouth daily    Historical Provider, MD   aspirin 81 MG EC tablet Take 81 mg by mouth daily.     Historical Provider, MD        Allergies:  Penicillins     Review of Systems:   12 point ROS negative in all areas as listed below except as in [place]  Constitutional, EENT, Cardiovascular, pulmonary, GI, , Musculoskeletal, skin, neurological, hematological, endocrine, Psychiatric    Physical Examination:    Vitals:    06/03/22 1322   BP: (!) 145/70   Pulse: 80 Resp: 25   Temp:    SpO2: 98%    Weight: 125 lb (56.7 kg)         General Appearance:  Alert, cooperative, no distress, appears stated age   Head:  Normocephalic, without obvious abnormality, atraumatic   Eyes:  PERRL, conjunctiva/corneas clear       Nose: Nares normal, no drainage or sinus tenderness   Throat: Lips, mucosa, and tongue normal   Neck: Supple, symmetrical, trachea midline, no adenopathy, thyroid: not enlarged, symmetric, no tenderness/mass/nodules, no carotid bruit or JVD       Lungs:   +crackles bibasilar   Chest Wall:  No tenderness or deformity   Heart:  Regular rate and rhythm, S1, S2 normal, no murmur, rub or gallop   Abdomen:   Soft, non-tender, bowel sounds active all four quadrants,  no masses, no organomegaly           Extremities: Extremities 2-3+ BLE edema   Pulses: 2+ and symmetric   Skin: Skin color, texture, turgor normal, no rashes or lesions   Pysch: Normal mood and affect   Neurologic: Normal gross motor and sensory exam.         Labs  CBC:   Lab Results   Component Value Date    WBC 6.2 06/03/2022    RBC 3.54 06/03/2022    HGB 10.5 06/03/2022    HCT 32.7 06/03/2022    MCV 92.4 06/03/2022    RDW 16.3 06/03/2022     06/03/2022     CMP:    Lab Results   Component Value Date     06/03/2022    K 4.7 06/03/2022     06/03/2022    CO2 20 06/03/2022    BUN 28 06/03/2022    CREATININE 1.2 06/03/2022    GFRAA 53 06/03/2022    GFRAA >60 06/04/2011    AGRATIO 1.4 06/03/2022    LABGLOM 44 06/03/2022    LABGLOM 59 04/29/2015    GLUCOSE 136 06/03/2022    GLUCOSE 128 02/27/2012    PROT 5.9 06/03/2022    PROT 7.0 03/04/2013    CALCIUM 8.6 06/03/2022    BILITOT 0.8 06/03/2022    ALKPHOS 157 06/03/2022    AST 62 06/03/2022    ALT 76 06/03/2022     PT/INR:  No results found for: PTINR  Lab Results   Component Value Date    TROPONINI 0.01 06/03/2022       EKG:  I have reviewed EKG with the following interpretation:  Impression:  See HPI    Assessment:  Viraj Calvillo is a 68 y.o. patient who presented to [facility] 6/3/22 with c/o SOB and CP x 5 days. She follows with Dr. Lucas Adorno and last [facility] was 6/28/21. She has PMH CAD s/p 3V CABG and MV ring repair 1/20, CAD s/p 1 LULY LAD/3 LULY CCx 3/68, chronic systolic CHF, ischemic CM EF=25-30%, and HLD. Note ECHO 10/29/19 EF of 25% moderate MR. Most recent Carotid Doppler 1/6/20 normal. Most recent Lexiscan 6/17/2020  Large sized inferior,  posterobasal, and basal lateral fixeds defect c/w infarct; EF of 18%. Most recent [facility] 4/29/20 s/p PCI of LAD with single LULY and 3 LULY CCx. Most recent ECHO 11/30/21 LVEF=25-30%. LV dilated; +WMA; LA is moderately dilated. An annuloplasty ring is seen in the mitral position. Mild MR, RI, TR   Ms. Haris Kathleen now presents with c/o SOB and chest pressure x 5 days and leg swelling. Feels SOB at rest and exertion and c/o pressure CP with SOB. Started lasix 1 week ago due to LE swelling but prior was NOT taking regularly scheduled diuretic. Admitting EKG NSR 90 bpm Nonspecific T wave abnormality (no change 7/20) Admitting CXR Stable enlargement of cardiac silhouette; Billy 0.01; Pro-BNP 33,135 [id number]. ALT 76 AST 62; H/H 10.5/32.7. Diagnosis of acute on chronic systolic CHF and unspecified CP in older female with hx CAD s/p 3V CABG and 4 stents afterwards with ischemic CM. Recs:  1. Decompensation possibly due to not taking diuretic regularly. ? Reason why not taking one.  2. IV diurese with lasix 40mg BID and adjust accordingly. 3. Fluid/salt restrict, daily weights, strict I/O's.  4. Continue Toprol XL 25mg qd, enalapril 10mg qd, lipitor 40mg qd, digoxin 0.125mg qd, farxiga 5mg qd, aspirin 81 mg qd, and KCL 10mEq qd. 5. Recheck limited ECHO for EF and MR assessment. 6. I will order a lexiscan nuclear stress test to assess myocardial perfusion and LV function. 7. Cycle Billy series    Further recs pending test results and clinical response and serial Billy enzymes.     Patient Active Problem List   Diagnosis    Backache  Essential hypertension, benign    Osteoarthrosis involving lower leg    Gout    Osteoarthritis, hand    Osteopenia    Chest pain    Hyperlipidemia associated with type 2 diabetes mellitus ([facility]    Major depressive disorder, recurrent episode, moderate ([facility]    Primary insomnia    Type 2 diabetes mellitus with microalbuminuria, without long-term current use of insulin ([facility])    Rotator cuff tendinitis    Other restrictive cardiomyopathy ([facility]    Nonrheumatic mitral valve regurgitation    Chronic systolic CHF (congestive heart failure) ([facility]    CAD, multiple vessel    Ischemic cardiomyopathy    S/P angioplasty with stent    Chronic kidney disease (CKD) stage G3b/A1, moderately decreased glomerular filtration rate (GFR) between 30-44 mL/min/1.73 square meter and albuminuria creatinine ratio less than 30 mg/g ([facility])       Thank you for allowing to us to participate in the [facility] or [facility]. Further evaluation will be based upon the patient's clinical course and testing results.

## 2022-06-03 NOTE — ACP (ADVANCE CARE PLANNING)
Advance Care Planning     General Advance Care Planning (ACP) Conversation    Date of Conversation: 6/3/2022  Conducted with: Patient with Decision Making Capacity    Healthcare Decision Maker:    Primary Decision Maker: Ramesh Gutierrez - Spouse - [phone]  Click here to complete Healthcare Decision Makers including selection of the Healthcare Decision Maker Relationship (ie \"Primary\"). Today we documented Decision Maker(s) consistent with Legal Next of Kin hierarchy.     Content/Action Overview:  Reviewed DNR/DNI and patient elects Full Code (Attempt Resuscitation)    Length of Voluntary ACP Conversation in minutes:  <16 minutes (Non-Billable)    Brittney Giordano RN

## 2022-06-03 NOTE — ED NOTES
200- Rudolph served the hospitalist for a consult about CHF, chest pressure, SOB     Fernandez Davies  06/03/22 1336      1341- Hung With Hospitalist called back and spoke with Dr. Clau Hall.       Fernandez Davies  06/03/22 5348

## 2022-06-03 NOTE — H&P
Hospital Medicine History & Physical      PCP: Eleanor Lucero MD    Date of Admission: 6/3/2022    Date of Service: Pt seen/examined on 6/3/2022     Chief Complaint:    Chief Complaint   Patient presents with    Shortness of Breath      past 5 days; with chest pressure; had CABG 2020; leg swelling         History Of Present Illness: The patient is a 68 y.o. female with pmhx of CHF, CKD, CAD s/p CABG, HTN, HLD, DM type 2,  depression, gout who presented to [facility] ED with complaint of shortness of breath and significant leg swelling for the last 5 days. Pt called PCP and he increased her home dose of lasix. She still had significant leg swelling with difficulty and pain with ambulation. She was also having shortness of breath with exertion. No symptoms at rest. She denies any chest pain or cough. She is able to lay flat in bed without difficulty. She does smoke a pack a day at home but does not use any inhalers. No home oxygen requirements. No fever, chills, chest pain, nausea, vomiting, diarrhea, dizziness, syncope.      Past Medical History:        Diagnosis Date    Anemia     Backache, unspecified 3/11/08    CAD, multiple vessel 1/6/2020    Chronic kidney disease (CKD) stage G3b/A1, moderately decreased glomerular filtration rate (GFR) between 30-44 mL/min/1.73 square meter and albuminuria creatinine ratio less than 30 mg/g ([facility]) 4/16/2021    Chronic systolic CHF (congestive heart failure) ([facility] 12/11/2019    Depressive disorder, not elsewhere classified 11/8/07    Essential hypertension, benign 11/8/07    Gout 8/11/2011    Hyperlipidemia associated with type 2 diabetes mellitus ([address] 12/14/2015    Major depressive disorder, recurrent episode, moderate ([address] 12/14/2015    Osteoarthritis, hand 2/27/2012    Osteoarthrosis, unspecified whether generalized or localized, lower leg 11/8/07    Osteopenia 8/5/2015    Other and unspecified hyperlipidemia 11/8/07    Pain in joint, lower leg 1/29/07    Pneumonia     Rotator cuff tendinitis 10/6/2017    Tear of medial cartilage or meniscus of knee, current 1/29/07    Type 2 diabetes mellitus with microalbuminuria, without long-term current use of insulin ([facility] [address] 5/5/2017    Type 2 diabetes mellitus without complication ([facility] [address] 21/8/1716    Type II or unspecified type diabetes mellitus without mention of complication, not stated as uncontrolled 11/8/07    foot exam 7/16/08 normal       Past Surgical History:        Procedure Laterality Date    APPENDECTOMY      CHOLECYSTECTOMY      CORONARY ARTERY BYPASS GRAFT N/A 1/8/2020    CORONARY ARTERY BYPASS GRAFTING X3, INTERNAL MAMMARY ARTERY, SAPHENOUS VEIN GRAFTING, ON PUMP MITRAL VALVE RING REPAIR USING 26MM PELAEZ PHYSIO II RING, WITH LEFT ATRIAL APPENDAGE CLIP, PFO CLOSURETEE, 5 LEVEL BILATERAL INTERCOSTAL NERVE BLOCK performed by Inez Del Angel MD at [address] [place] [address]      total ab hyst    PARATHYROID GLAND SURGERY      explore parathyroid glands    TOTAL KNEE ARTHROPLASTY  03/29/10    left knee       Medications Prior to Admission:    Prior to Admission medications    Medication Sig Start Date End Date Taking?  Authorizing Provider   furosemide (LASIX) 40 MG tablet Take 1 tablet by mouth daily 6/1/22   Brennan Amaya MD   potassium chloride (K-TAB) 10 MEQ extended release tablet Take 1 tablet by mouth daily 6/1/22   Brennan Amaya MD   atorvastatin (LIPITOR) 40 MG tablet TAKE ONE TABLET BY MOUTH DAILY 4/13/22   José Antonio Ventura MD   enalapril (VASOTEC) 10 MG tablet TAKE ONE TABLET BY MOUTH TWICE DAILY 3/24/22   José Antonio Ventura MD   metFORMIN (GLUCOPHAGE-XR) 500 MG extended release tablet TAKE TWO TABLETS BY MOUTH TWICE A DAY 2/23/22   Cassidy Castillo MD   digoxin (LANOXIN) 125 MCG tablet TAKE ONE TABLET BY MOUTH EVERY OTHER DAY 2/23/22   Cassidy Castillo MD   metoprolol succinate (TOPROL XL) 25 MG extended release tablet TAKE THREE TABLETS BY MOUTH DAILY 2/23/22   Nadine Oconnor MD   insulin glargine (LANTUS SOLOSTAR) 100 UNIT/ML injection pen Inject 10 Units into the skin nightly 8/9/21   Feliz Matta MD   linagliptin (TRADJENTA) 5 MG tablet Take 1 tablet by mouth daily 8/2/21   Feliz Matta MD   glimepiride (AMARYL) 2 MG tablet Take 1 tablet by mouth every morning 8/2/21   Feliz Matta MD   allopurinol (ZYLOPRIM) 300 MG tablet TAKE ONE TABLET BY MOUTH DAILY 8/2/21   Feliz Matta MD   dapagliflozin (FARXIGA) 5 MG tablet Take 1 tablet by mouth every morning 8/2/21   Feliz Matta MD   Ferrous Sulfate (IRON PO) Take by mouth    Historical Provider, MD   clopidogrel (PLAVIX) 75 MG tablet Take 1 tablet by mouth daily  Patient not taking: Reported on 6/22/2021 2/10/20   LADAN Cordero - CNP   vitamin B-12 (CYANOCOBALAMIN) 500 MCG tablet Take 500 mcg by mouth daily    Historical Provider, MD   calcium carbonate (OSCAL) 500 MG TABS tablet Take 500 mg by mouth daily    Historical Provider, MD   aspirin 81 MG EC tablet Take 81 mg by mouth daily. Historical Provider, MD       Allergies:  Penicillins    Social History:  The patient currently lives at home     TOBACCO:   reports that she quit smoking about 6 years ago. She has a 10.00 pack-year smoking history. She has never used smokeless tobacco.  ETOH:   reports no history of alcohol use.       Family History:   Positive as follows:        Problem Relation Age of Onset   Floydene Ada Cancer Mother 68        colon   Floydene Ada Diabetes Mother     Other Father         gun shot wound    Hypertension Sister     Diabetes Sister     High Blood Pressure Sister     High Cholesterol Sister     Cancer Brother 59        mets everywhere    Cancer Brother 77        colon    Diabetes Sister     Other Sister         bipolar       REVIEW OF SYSTEMS:       Constitutional: Negative for fever   HENT: Negative for sore throat   Eyes: Negative for redness   Respiratory: Negative cough, + dyspnea   Cardiovascular: Negative for chest pain   Gastrointestinal: Negative for vomiting, diarrhea   Genitourinary: Negative for hematuria   Musculoskeletal: Negative for arthralgias   Skin: Negative for rash   Neurological: Negative for syncope   Hematological: Negative for adenopathy   Psychiatric/Behavorial: Negative for anxiety    PHYSICAL EXAM:    BP (!) 145/70   Pulse 80   Temp 98.3 °F (36.8 °C) (Oral)   Resp 25   Ht 5' 1\" (1.549 m)   Wt 125 lb (56.7 kg)   SpO2 98%   BMI 23.62 kg/m²     Gen: No distress. Alert. Pleasant elderly female   Eyes: PERRL. No sclera icterus. No conjunctival injection. Neck: No JVD. No Carotid Bruit. Trachea midline. Resp: No accessory muscle use. No wheezes. No rhonchi. + crackles bilaterally in lung bases   CV: Regular rate. Regular rhythm. No rub. + murmur. 2+ pitting edema of leg bilaterally   Peripheral Pulses: +2 palpable, equal bilaterally   GI: Non-tender. Non-distended. No masses. No organomegaly. Normal bowel sounds. No hernia. Skin: Warm and dry. No nodule on exposed extremities. No rash on exposed extremities. M/S: No cyanosis. No joint deformity. No clubbing. Neuro: Awake. Grossly nonfocal    Psych: Oriented x 3. No anxiety or agitation.      Lab Results   Component Value Date    WBC 6.2 06/03/2022    HGB 10.5 (L) 06/03/2022    HCT 32.7 (L) 06/03/2022    MCV 92.4 06/03/2022     06/03/2022     Lab Results   Component Value Date     06/03/2022    K 4.7 06/03/2022     06/03/2022    CO2 20 (L) 06/03/2022    BUN 28 (H) 06/03/2022    CREATININE 1.2 06/03/2022    GLUCOSE 136 (H) 06/03/2022    CALCIUM 8.6 06/03/2022    PROT 5.9 (L) 06/03/2022    LABALBU 3.4 06/03/2022    BILITOT 0.8 06/03/2022    ALKPHOS 157 (H) 06/03/2022    AST 62 (H) 06/03/2022    ALT 76 (H) 06/03/2022    LABGLOM 44 (A) 06/03/2022    GFRAA 53 (A) 06/03/2022    AGRATIO 1.4 06/03/2022    GLOB 3.1 08/02/2021       CARDIAC ENZYMES  Recent Labs 06/03/22  1135   TROPONINI 0.01       U/A:    Lab Results   Component Value Date    NITRITE neg 01/11/2021    COLORU Yellow 01/07/2020    WBCUA 10-20 01/07/2020    RBCUA 5-10 01/07/2020    BACTERIA 1+ 01/07/2020    CLARITYU Clear 01/07/2020    SPECGRAV 1.015 01/07/2020    LEUKOCYTESUR neg 01/11/2021    LEUKOCYTESUR TRACE 01/07/2020    BLOODU neg 01/11/2021    BLOODU Negative 01/07/2020    GLUCOSEU neg 01/11/2021    GLUCOSEU Negative 01/07/2020       ABG    Lab Results   Component Value Date    XAQ7KXR 22.2 01/08/2020    BEART -4 01/08/2020    B0PPJOBG 100 01/08/2020    PHART 7.305 01/08/2020    JKC1ANF 44.7 01/08/2020    PO2ART 182.9 01/08/2020    GFH4LAX 24 01/08/2020       CULTURES  COVID and Flu not detected     EKG: Normal sinus rhythm  Nonspecific T wave abnormality  Abnormal ECG  When compared with ECG of 24-JUL-2020 09:00,  No significant change was found  Confirmed by NONA VALLES MD ([id number]) on 6/3/2022 12:31:05 PM      RADIOLOGY  XR CHEST PORTABLE   Final Result   No acute cardiopulmonary disease. Stable enlargement of cardiac silhouette. Susie Bhatt MD have reviewed the chart on Darlene Romo and personally interviewed and examined patient, reviewed the data (labs and imaging) and after discussion with my PA formulated the plan. Agree with note with the following edits. HPI:     Patient is a 71-year-old white female well-known to me. I am her PCP. She has a history of chronic systolic heart failure, CAD status post CABG-3 vessels, mitral valve ring repair, status post drug-eluting stent to the LAD and 3 drug-eluting stents in the circumflex artery, hyperlipidemia, diabetes mellitus type 2 for many years who came to the ER with complaints of leg edema. Leg edema started about 2 weeks ago. She was asked to increase the dose of her Lasix. She called today saying her leg swelling was not any better and I asked her to go to the emergency room.   Work-up in the ER was consistent with acute on chronic systolic heart failure. When I saw the patient he was resting comfortably. She is not on oxygen. She complained of increased leg edema and seeping from her legs. She denied any chest pressure. I reviewed the patient's Past Medical History, Past Surgical History, Medications, and Allergies. Physical exam:    BP (!) 164/90   Pulse 84   Temp 97.1 °F (36.2 °C) (Oral)   Resp 18   Ht 5' 1\" (1.549 m)   Wt 125 lb (56.7 kg)   SpO2 97%   BMI 23.62 kg/m²     Gen: No distress. Alert. Eyes: PERRL. No sclera icterus. No conjunctival injection. ENT: No discharge. Pharynx clear. Neck: Trachea midline. Normal thyroid. Resp: No accessory muscle use. No crackles. No wheezes. No rhonchi. No dullness on percussion. CV: Regular rate. Regular rhythm. No murmur or rub. ++ edema. ASSESSMENT/PLAN:    #Acute decompensation of chronic systolic HF  -last echo with EF 25-30% 2021  -visibly fluid overloaded   -BNP elevated   -continue vasotec, digoxin and toprol XL   -IV lasix 40 BID   -daily weights and I and Os  -echo pending   -cardiology consulted   -She may require an AICD.     #CAD s/p CABG   #Elevated troponin   -on ASA, statin, BB  -0.01 --> 0.02   -trending   -likely 2/2 to above   -EKG with no acute changes  -stress test pending AM   -cardiology on board     #CKD Stage III  -Cr baseline  -stable    #DM type 2  -continue lantus   -SSI   -monitor BS     #HTN  -continue ACEI and BB    #HLD   -continue statin     #Depression   -continue home regimen     #Gout   -continue allopurinol     DVT Prophylaxis: lovenox  Diet: No diet orders on file  Code Status: Prior    LUKE Nuñez  06/03/22  5:18 PM      Parish Syed MD 6/3/2022 6:08 PM

## 2022-06-03 NOTE — TELEPHONE ENCOUNTER
----- Message from Denese Denver, MD sent at 6/3/2022  9:43 AM EDT -----  Contact: Ayla Novak   [phone] or [phone]  She should go to the ER  ----- Message -----  From: Fide Arceo  Sent: 6/2/2022   2:56 PM EDT  To: Denese Denver, MD    Pt states her legs are still very swollen and leaking. Just had BNP & BMP drawn.  ----- Message -----  From: Denese Denver, MD  Sent: 6/2/2022   2:45 PM EDT  To: Fide Arceo    Find out how she is doing  ----- Message -----  From: Edgardo Brooks  Sent: 6/1/2022   1:24 PM EDT  To: Denese Denver, MD    Patient informed. When would you like to see her?   ----- Message -----  From: Pierre Longo MD  Sent: 6/1/2022  11:56 AM EDT  To: David Armstrong    Resume lasix , increase to 40 mg daily , add KCL 10 meq along with lasix  Obtain BNP, BMP    Make appt with Dr. Molina Layer  ----- Message -----  From: Tiffanie Rodriguez  Sent: 6/1/2022   9:59 AM EDT  To: Pierre Longo MD    Patient Dr. Rubén Dave. Patient called and stated that her legs are swollen really bad. That she cannot hardly walk on her feet they hurt. Patient states that she has clear fluid seeping out her legs. Patient wants to be advised how she should proceed.       [facility] [id number] - [address] [facility] [phone] (Ph: [phone])

## 2022-06-03 NOTE — FLOWSHEET NOTE
06/03/22 1530   Vital Signs   Temp 97.1 °F (36.2 °C)   Temp Source Oral   Heart Rate 84   Heart Rate Source Monitor   Resp 18   BP (!) 164/90   BP Location Left upper arm   BP Method Automatic   MAP (Calculated) 114.67   Level of Consciousness Alert (0)   MEWS Score 1   Pain Assessment   Pain Assessment 0-10   Pain Level 0   Patient's Stated Pain Goal 0 - No pain   Oxygen Therapy   SpO2 97 %   O2 Device None (Room air)   O2 Flow Rate (L/min) 0 L/min     Pt admitted to PCU from ER for chest pain and SOBOE. Denies chest pain or pressure. Denies SOB. O/Ax4. Oriented to room and call light system. Orders released. Patient is able to demonstrate the ability to move from a reclining position to an upright position within the recliner. Addendum [id number]: Admission completed.   Melissa John RN

## 2022-06-03 NOTE — ED NOTES
[facility] for a consult. [facility]  06/03/22 7841    26- Cardiologest Dr. Georgia Acuna called back to speak with Dr. Anel Mcnamara.       [facility]  06/03/22 8884

## 2022-06-03 NOTE — PLAN OF CARE
PCU    CHFae. SOB. Chest pressure. Did not give digoxin, takes every other day, unknown last dose. Please reconcile.      Marycarmen Cruz PA-C  6/3/2022 1:56 PM

## 2022-06-03 NOTE — PROGRESS NOTES
Consult has been called to Dr. Matilda Mcclure on 6/3/22. Spoke with Bill Mccann.  4:10 PM    Virgil Jaquez  6/3/2022

## 2022-06-03 NOTE — PROGRESS NOTES
Bedside report and transfer of care given to Kiera Fraga, [address] [place] [address]. Pt currently resting in bed with the call light within reach. Pt denies any other care needs at this time. Pt stable at this time.     Razia Lezama RN

## 2022-06-03 NOTE — PROGRESS NOTES
4 Eyes Skin Assessment     The patient is being assess for   Admission    I agree that 2 RN's have performed a thorough Head to Toe Skin Assessment on the patient. ALL assessment sites listed below have been assessed. Areas assessed for pressure by both nurses:   [x]   Head, Face, and Ears   [x]   Shoulders, Back, and Chest, Abdomen  [x]   Arms, Elbows, and Hands   [x]   Coccyx, Sacrum, and Ischium  [x]   Legs, Feet, and Heels        Skin Assessed Under all Medical Devices by both nurses:                All Mepilex Borders were peeled back and area peeked at by both nurses:  No: N/A  Please list where Mepilex Borders are located:  N/A             **SHARE this note so that the co-signing nurse is able to place an eSignature**    Co-signer eSignature: {Esignature:[id number]}    Does the Patient have Skin Breakdown related to pressure?   No              Alek Prevention initiated:  NA   Wound Care Orders initiated:  NA      WO nurse consulted for Pressure Injury (Stage 3,4, Unstageable, DTI, NWPT, Complex wounds)and New or Established Ostomies:  NA      Primary Nurse eSignature: Electronically signed by Danuta Durham RN on 6/3/22 at 5:19 PM EDT

## 2022-06-04 ENCOUNTER — APPOINTMENT (OUTPATIENT)
Dept: NUCLEAR MEDICINE | Age: 77
DRG: 291 | End: 2022-06-04
Payer: MEDICARE

## 2022-06-04 LAB
ANION GAP SERPL CALCULATED.3IONS-SCNC: 11 MMOL/L (ref 3–16)
BASOPHILS ABSOLUTE: 0.1 K/UL (ref 0–0.2)
BASOPHILS RELATIVE PERCENT: 1.9 %
BUN BLDV-MCNC: 34 MG/DL (ref 7–20)
CALCIUM SERPL-MCNC: 8.9 MG/DL (ref 8.3–10.6)
CHLORIDE BLD-SCNC: 106 MMOL/L (ref 99–110)
CO2: 24 MMOL/L (ref 21–32)
CREAT SERPL-MCNC: 1.4 MG/DL (ref 0.6–1.2)
EOSINOPHILS ABSOLUTE: 0.2 K/UL (ref 0–0.6)
EOSINOPHILS RELATIVE PERCENT: 4 %
GFR AFRICAN AMERICAN: 44
GFR NON-AFRICAN AMERICAN: 37
GLUCOSE BLD-MCNC: 109 MG/DL (ref 70–99)
GLUCOSE BLD-MCNC: 110 MG/DL (ref 70–99)
GLUCOSE BLD-MCNC: 179 MG/DL (ref 70–99)
GLUCOSE BLD-MCNC: 209 MG/DL (ref 70–99)
GLUCOSE BLD-MCNC: 99 MG/DL (ref 70–99)
HCT VFR BLD CALC: 31.2 % (ref 36–48)
HEMOGLOBIN: 10.2 G/DL (ref 12–16)
IRON SATURATION: 10 % (ref 15–50)
IRON: 32 UG/DL (ref 37–145)
LV EF: 29 %
LVEF MODALITY: NORMAL
LYMPHOCYTES ABSOLUTE: 1.2 K/UL (ref 1–5.1)
LYMPHOCYTES RELATIVE PERCENT: 20.8 %
MAGNESIUM: 1.6 MG/DL (ref 1.8–2.4)
MCH RBC QN AUTO: 30.1 PG (ref 26–34)
MCHC RBC AUTO-ENTMCNC: 32.7 G/DL (ref 31–36)
MCV RBC AUTO: 91.9 FL (ref 80–100)
MONOCYTES ABSOLUTE: 0.4 K/UL (ref 0–1.3)
MONOCYTES RELATIVE PERCENT: 6.1 %
NEUTROPHILS ABSOLUTE: 4 K/UL (ref 1.7–7.7)
NEUTROPHILS RELATIVE PERCENT: 67.2 %
PDW BLD-RTO: 15.9 % (ref 12.4–15.4)
PERFORMED ON: ABNORMAL
PLATELET # BLD: 215 K/UL (ref 135–450)
PMV BLD AUTO: 8.3 FL (ref 5–10.5)
POTASSIUM SERPL-SCNC: 4.6 MMOL/L (ref 3.5–5.1)
RBC # BLD: 3.4 M/UL (ref 4–5.2)
SODIUM BLD-SCNC: 141 MMOL/L (ref 136–145)
TOTAL IRON BINDING CAPACITY: 319 UG/DL (ref 260–445)
TSH REFLEX FT4: 2.53 UIU/ML (ref 0.27–4.2)
WBC # BLD: 6 K/UL (ref 4–11)

## 2022-06-04 PROCEDURE — 85025 COMPLETE CBC W/AUTO DIFF WBC: CPT

## 2022-06-04 PROCEDURE — 80048 BASIC METABOLIC PNL TOTAL CA: CPT

## 2022-06-04 PROCEDURE — 84443 ASSAY THYROID STIM HORMONE: CPT

## 2022-06-04 PROCEDURE — 2060000000 HC ICU INTERMEDIATE R&B

## 2022-06-04 PROCEDURE — A9502 TC99M TETROFOSMIN: HCPCS | Performed by: INTERNAL MEDICINE

## 2022-06-04 PROCEDURE — 2580000003 HC RX 258

## 2022-06-04 PROCEDURE — 6370000000 HC RX 637 (ALT 250 FOR IP)

## 2022-06-04 PROCEDURE — 99233 SBSQ HOSP IP/OBS HIGH 50: CPT | Performed by: INTERNAL MEDICINE

## 2022-06-04 PROCEDURE — 6360000002 HC RX W HCPCS: Performed by: INTERNAL MEDICINE

## 2022-06-04 PROCEDURE — 3430000000 HC RX DIAGNOSTIC RADIOPHARMACEUTICAL: Performed by: INTERNAL MEDICINE

## 2022-06-04 PROCEDURE — 6360000002 HC RX W HCPCS

## 2022-06-04 PROCEDURE — 93017 CV STRESS TEST TRACING ONLY: CPT

## 2022-06-04 PROCEDURE — 36415 COLL VENOUS BLD VENIPUNCTURE: CPT

## 2022-06-04 PROCEDURE — 93308 TTE F-UP OR LMTD: CPT

## 2022-06-04 PROCEDURE — 80061 LIPID PANEL: CPT

## 2022-06-04 PROCEDURE — 78452 HT MUSCLE IMAGE SPECT MULT: CPT

## 2022-06-04 PROCEDURE — 6370000000 HC RX 637 (ALT 250 FOR IP): Performed by: INTERNAL MEDICINE

## 2022-06-04 PROCEDURE — 83735 ASSAY OF MAGNESIUM: CPT

## 2022-06-04 RX ORDER — MAGNESIUM SULFATE IN WATER 40 MG/ML
2000 INJECTION, SOLUTION INTRAVENOUS ONCE
Status: COMPLETED | OUTPATIENT
Start: 2022-06-04 | End: 2022-06-04

## 2022-06-04 RX ORDER — CLOPIDOGREL BISULFATE 75 MG/1
75 TABLET ORAL DAILY
Status: DISCONTINUED | OUTPATIENT
Start: 2022-06-04 | End: 2022-06-05 | Stop reason: HOSPADM

## 2022-06-04 RX ORDER — SPIRONOLACTONE 25 MG/1
25 TABLET ORAL DAILY
Status: DISCONTINUED | OUTPATIENT
Start: 2022-06-04 | End: 2022-06-05 | Stop reason: HOSPADM

## 2022-06-04 RX ADMIN — TETROFOSMIN 32.3 MILLICURIE: 1.38 INJECTION, POWDER, LYOPHILIZED, FOR SOLUTION INTRAVENOUS at 09:29

## 2022-06-04 RX ADMIN — INSULIN LISPRO 2 UNITS: 100 INJECTION, SOLUTION INTRAVENOUS; SUBCUTANEOUS at 20:49

## 2022-06-04 RX ADMIN — SODIUM CHLORIDE, PRESERVATIVE FREE 10 ML: 5 INJECTION INTRAVENOUS at 20:25

## 2022-06-04 RX ADMIN — CYANOCOBALAMIN TAB 500 MCG 500 MCG: 500 TAB at 10:46

## 2022-06-04 RX ADMIN — CALCIUM 500 MG: 500 TABLET ORAL at 10:46

## 2022-06-04 RX ADMIN — REGADENOSON 0.4 MG: 0.08 INJECTION, SOLUTION INTRAVENOUS at 09:29

## 2022-06-04 RX ADMIN — FUROSEMIDE 40 MG: 10 INJECTION, SOLUTION INTRAMUSCULAR; INTRAVENOUS at 10:47

## 2022-06-04 RX ADMIN — CLOPIDOGREL BISULFATE 75 MG: 75 TABLET ORAL at 10:45

## 2022-06-04 RX ADMIN — SODIUM CHLORIDE, PRESERVATIVE FREE 10 ML: 5 INJECTION INTRAVENOUS at 10:50

## 2022-06-04 RX ADMIN — MAGNESIUM SULFATE HEPTAHYDRATE 2000 MG: 40 INJECTION, SOLUTION INTRAVENOUS at 13:33

## 2022-06-04 RX ADMIN — ATORVASTATIN CALCIUM 40 MG: 40 TABLET, FILM COATED ORAL at 10:44

## 2022-06-04 RX ADMIN — SPIRONOLACTONE 25 MG: 25 TABLET ORAL at 10:46

## 2022-06-04 RX ADMIN — INSULIN GLARGINE 10 UNITS: 100 INJECTION, SOLUTION SUBCUTANEOUS at 20:48

## 2022-06-04 RX ADMIN — ENALAPRIL MALEATE 10 MG: 10 TABLET ORAL at 10:45

## 2022-06-04 RX ADMIN — ALOGLIPTIN 12.5 MG: 12.5 TABLET, FILM COATED ORAL at 10:46

## 2022-06-04 RX ADMIN — ALLOPURINOL 300 MG: 300 TABLET ORAL at 10:44

## 2022-06-04 RX ADMIN — METOPROLOL SUCCINATE 75 MG: 50 TABLET, EXTENDED RELEASE ORAL at 10:44

## 2022-06-04 RX ADMIN — ENOXAPARIN SODIUM 40 MG: 40 INJECTION SUBCUTANEOUS at 10:46

## 2022-06-04 RX ADMIN — ENALAPRIL MALEATE 10 MG: 10 TABLET ORAL at 20:25

## 2022-06-04 RX ADMIN — TETROFOSMIN 11.2 MILLICURIE: 1.38 INJECTION, POWDER, LYOPHILIZED, FOR SOLUTION INTRAVENOUS at 08:01

## 2022-06-04 RX ADMIN — ASPIRIN 81 MG: 81 TABLET, COATED ORAL at 10:46

## 2022-06-04 RX ADMIN — FUROSEMIDE 40 MG: 10 INJECTION, SOLUTION INTRAMUSCULAR; INTRAVENOUS at 17:10

## 2022-06-04 RX ADMIN — INSULIN LISPRO 2 UNITS: 100 INJECTION, SOLUTION INTRAVENOUS; SUBCUTANEOUS at 17:10

## 2022-06-04 NOTE — PROGRESS NOTES
Pt is lying in bed with their eyes open. Alert and oriented times 4. Speech is clear. Denies pain and any needs at this time. Call light within reach. Bed in lowest position with the wheels locked. Will continue to monitor.  Sky Erazo RN

## 2022-06-04 NOTE — PROGRESS NOTES
[address] Daily Progress Note      Admit Date:  6/3/2022    Subjective:  Ms. Keo Barrios is admitted with new onset swelling legs Chest pressure and dyspnea  Denies orthopnea PND. chest pressure is resolved and shortness of breath is improved. Also has leg swellilng which is getting better. History of Present Illness:  Shalini Patrick is a 68 y.o. patient who presented to [facility] 6/3/22 with c/o SOB and CP x 5 days. She follows with Dr. Stephani Ortega and last Eldon Grim was 6/28/21. She has PMH CAD s/p 3V CABG and MV ring repair 1/20. CAD s/p 1 LULY LAD/3 LULY CCx 7/78, chronic systolic CHF, ischemic CM EF=25-30%, and HLD. .    Most recent [facility] 6/17/2020  Large sized inferior,  posterobasal, and basal lateral fixeds defect c/w infarct; EF of 18%. Most recent [facility] 4/29/20 s/p PCI of LAD with single LULY and 3 LULY CCx. Most recent ECHO 11/30/21 LVEF=25-30%. LV dilated; +WMA; LA is moderately dilated. An annuloplasty ring is seen in the mitral position. Mild MR, [place], TR              Ms. Colleen Hogan now presents with c/o SOB and chest pressure x 5 days and leg swelling. Feels SOB at rest and exertion and c/o pressure CP with SOB. Started lasix 1 week ago due to LE swelling but prior was NOT taking regularly scheduled diuretic. Admitting EKG NSR 90 bpm Nonspecific T wave abnormality (no change 7/20) Admitting CXR Stable enlargement of cardiac silhouette; Billy 0.01; Pro-BNP 33,135 [id number]. ALT 76 AST 62; H/H 10.5/32.7. Patient with no c/opalpitations, dizziness, or orthopnea/PND.  I have been asked to provide consultation regarding further management and testing.     ROS:  12 point ROS negative in all areas as listed below except as in [place]  Constitutional, HEENT, GI, , Musculoskeletal, skin, neurological, hematological, endocrine, Psychiatric    Past Medical History:   Diagnosis Date    Anemia     Backache, unspecified 3/11/08    CAD, multiple vessel 1/6/2020    Chronic kidney disease (CKD) stage G3b/A1, moderately decreased glomerular filtration rate (GFR) between 30-44 mL/min/1.73 square meter and albuminuria creatinine ratio less than 30 mg/g ([facility]) 4/16/2021    Chronic systolic CHF (congestive heart failure) ([facility] 12/11/2019    Depressive disorder, not elsewhere classified 11/8/07    Essential hypertension, benign 11/8/07    Gout 8/11/2011    Hyperlipidemia associated with type 2 diabetes mellitus ([address] 12/14/2015    Major depressive disorder, recurrent episode, moderate ([facility] 12/14/2015    Osteoarthritis, hand 2/27/2012    Osteoarthrosis, unspecified whether generalized or localized, lower leg 11/8/07    Osteopenia 8/5/2015    Other and unspecified hyperlipidemia 11/8/07    Pain in joint, lower leg 1/29/07    Pneumonia     Rotator cuff tendinitis 10/6/2017    Tear of medial cartilage or meniscus of knee, current 1/29/07    Type 2 diabetes mellitus with microalbuminuria, without long-term current use of insulin ([address] 5/5/2017    Type 2 diabetes mellitus without complication ([facility] 08/3/0517    Type II or unspecified type diabetes mellitus without mention of complication, not stated as uncontrolled 11/8/07    foot exam 7/16/08 normal     Past Surgical History:   Procedure Laterality Date    APPENDECTOMY      CHOLECYSTECTOMY      CORONARY ARTERY BYPASS GRAFT N/A 1/8/2020    CORONARY ARTERY BYPASS GRAFTING X3, INTERNAL MAMMARY ARTERY, SAPHENOUS VEIN GRAFTING, ON PUMP MITRAL VALVE RING REPAIR USING 26MM PELAEZ PHYSIO II RING, WITH LEFT ATRIAL APPENDAGE CLIP, PFO CLOSURETEE, 5 LEVEL BILATERAL INTERCOSTAL NERVE BLOCK performed by Nikolas Medina MD at [address]      total ab hyst    PARATHYROID GLAND SURGERY      explore parathyroid glands    TOTAL KNEE ARTHROPLASTY  03/29/10    left knee   active smoker half pack per day     Objective:   BP (!) 169/98   Pulse 92   Temp 97.5 °F (36.4 °C) (Oral)   Resp 18   Ht 5' 1\" (1.549 m)   Wt 138 lb 6.4 oz (62.8 kg)   SpO2 97%   BMI 26.15 kg/m² Intake/Output Summary (Last 24 hours) at 6/4/2022 0704  Last data filed at 6/4/2022 0002  Gross per 24 hour   Intake 342 ml   Output 1800 ml   Net -1458 ml       TELEMETRY:NSR    Physical Exam:  General: No Respiratory distress, appears well developed and well nourished. Eyes:  Sclera nonicteric  Nose/Sinuses:  negative findings: nose shows no deformity, asymmetry, or inflammation, nasal mucosa normal, septum midline with no perforation or bleeding  Back:  no pain to palpation  Joint:  no active joint inflammation  Musculoskeletal:  negative  Skin:  Warm and dry  Neck:  +ve for JVD and Carotid Bruits. Chest:  Crackles at left base to auscultation, respiration easy  Cardiovascular:  RRR, S1S2 normal, Tricuspid regurgitation murmur, no rub or thrill. Abdomen:  Soft normal liver and spleen  Extremities:   2 + bilat leg edema,  No clubbing, cyanosis,  Pulses:  Normal radial neg pedal pulses.   Neuro: intact    Medications:    allopurinol  300 mg Oral Daily    aspirin  81 mg Oral Daily    atorvastatin  40 mg Oral Daily    calcium elemental  500 mg Oral Daily    enalapril  10 mg Oral BID    vitamin B-12  500 mcg Oral Daily    potassium chloride  10 mEq Oral Daily    metoprolol succinate  75 mg Oral Daily    insulin glargine  10 Units SubCUTAneous Nightly    alogliptin  12.5 mg Oral Daily    sodium chloride flush  5-40 mL IntraVENous 2 times per day    enoxaparin  40 mg SubCUTAneous Daily    insulin lispro  0-12 Units SubCUTAneous TID WC    insulin lispro  0-6 Units SubCUTAneous Nightly    furosemide  40 mg IntraVENous BID    [START ON 6/5/2022] digoxin  125 mcg Oral Q48H      dextrose      sodium chloride       glucose, dextrose bolus **OR** dextrose bolus, glucagon (rDNA), dextrose, sodium chloride flush, sodium chloride, ondansetron **OR** ondansetron, polyethylene glycol, acetaminophen **OR** acetaminophen, potassium chloride **OR** potassium alternative oral replacement **OR** potassium chloride, magnesium sulfate, perflutren lipid microspheres, perflutren lipid microspheres, regadenoson    Lab Data:  CBC:   Recent Labs     06/03/22  1135 06/04/22  0456   WBC 6.2 6.0   HGB 10.5* 10.2*   HCT 32.7* 31.2*   MCV 92.4 91.9    215     BMP:   Recent Labs     06/02/22  1308 06/03/22  1135 06/04/22  0456    139 141   K 4.9 4.7 4.6    109 106   CO2 18* 20* 24   BUN 25* 28* 34*   CREATININE 1.0 1.2 1.4*     LIVER PROFILE:   Recent Labs     06/03/22  1135   AST 62*   ALT 76*   BILITOT 0.8   ALKPHOS 157*     PT/INR: No results for input(s): PROTIME, INR in the last 72 hours. APTT: No results for input(s): APTT in the last 72 hours. BNP:  No results for input(s): BNP in the last 72 hours. IMAGING:     Assessment:  1. Acute on chronic systolic CHF  2. CAD with unstable angina  3. CAD with ischemic cardiomyopathy  4. Smoker  5. HBP  6 HLD  7. S/P mitral annuloplasty  8. S/p angioplasty  9.  S/p CABG  Patient Active Problem List    Diagnosis Date Noted    Chest pain 11/30/2015    Essential hypertension, benign 11/08/2007    Acute on chronic combined systolic (congestive) and diastolic (congestive) heart failure ([address] 06/03/2022    S/P CABG x 3     S/P mitral valve repair     Backache 03/11/2008    Osteoarthrosis involving lower leg 11/08/2007    Chronic kidney disease (CKD) stage G3b/A1, moderately decreased glomerular filtration rate (GFR) between 30-44 mL/min/1.73 square meter and albuminuria creatinine ratio less than 30 mg/g ([address] 04/16/2021    S/P angioplasty with stent 07/23/2020    CAD, multiple vessel 01/06/2020    Ischemic cardiomyopathy 01/06/2020    Acute on chronic systolic CHF (congestive heart failure) ([address] 12/11/2019    Nonrheumatic mitral valve regurgitation 12/06/2019    Other restrictive cardiomyopathy ([address] 11/13/2019    Rotator cuff tendinitis 10/06/2017    Type 2 diabetes mellitus with microalbuminuria, without long-term current use of insulin [facility] 05/05/2017    Primary insomnia 11/23/2016    Hyperlipidemia associated with type 2 diabetes mellitus ([email or web address] 12/14/2015    Major depressive disorder, recurrent episode, moderate ([email or web address] 12/14/2015    Osteopenia 08/05/2015    Osteoarthritis, hand 02/27/2012    Gout 08/11/2011       Plan:  1. Ischemic eval, slight troponin elevation likely due to demand ischemia in setting of acute CHF  2. Repeat limited echo for EF  3. Guideline directed medical therapy for systolic CHF  4. Statin  5. DAPT  6.  Diet low salt    Core Measures:  · Discharge instructions:   · LVEF documented:   · ACEI for LV dysfunction:   · Smoking Cessation: yes    Jorie Castleman, MD, MD 6/4/2022 7:04 AM

## 2022-06-04 NOTE — PLAN OF CARE
Problem: Discharge Planning  Goal: Discharge to home or other facility with appropriate resources  Outcome: Progressing  Flowsheets (Taken 6/3/2022 1629 by Benji Burgos RN)  Discharge to home or other facility with appropriate resources:   Identify barriers to discharge with patient and caregiver   Identify discharge learning needs (meds, wound care, etc)   Arrange for needed discharge resources and transportation as appropriate     Problem: Pain  Goal: Verbalizes/displays adequate comfort level or baseline comfort level  Outcome: Progressing     Problem: Safety - Adult  Goal: Free from fall injury  Outcome: Progressing     Problem: ABCDS Injury Assessment  Goal: Absence of physical injury  Outcome: Progressing

## 2022-06-04 NOTE — PROGRESS NOTES
Hospitalist Progress Note      PCP: Leatha Bernal MD    Date of Admission: 6/3/2022    Chief Complaint: 68 y.o. female with pmhx of CHF, CKD, CAD s/p CABG and mitral annuloplasty in 2020 , HTN, HLD, DM type 2,  depression, gout who presented to [facility] ED with complaint of shortness of breath and significant leg swelling for the last 5 days        Subjective: reports SOB and leg edema improving. Medications:  Reviewed    Infusion Medications    dextrose      sodium chloride       Scheduled Medications    clopidogrel  75 mg Oral Daily    spironolactone  25 mg Oral Daily    allopurinol  300 mg Oral Daily    aspirin  81 mg Oral Daily    atorvastatin  40 mg Oral Daily    calcium elemental  500 mg Oral Daily    enalapril  10 mg Oral BID    vitamin B-12  500 mcg Oral Daily    metoprolol succinate  75 mg Oral Daily    insulin glargine  10 Units SubCUTAneous Nightly    alogliptin  12.5 mg Oral Daily    sodium chloride flush  5-40 mL IntraVENous 2 times per day    enoxaparin  40 mg SubCUTAneous Daily    insulin lispro  0-12 Units SubCUTAneous TID WC    insulin lispro  0-6 Units SubCUTAneous Nightly    furosemide  40 mg IntraVENous BID    [START ON 6/5/2022] digoxin  125 mcg Oral Q48H     PRN Meds: glucose, dextrose bolus **OR** dextrose bolus, glucagon (rDNA), dextrose, sodium chloride flush, sodium chloride, ondansetron **OR** ondansetron, polyethylene glycol, acetaminophen **OR** acetaminophen, magnesium sulfate, perflutren lipid microspheres, perflutren lipid microspheres      Intake/Output Summary (Last 24 hours) at 6/4/2022 1649  Last data filed at 6/4/2022 0002  Gross per 24 hour   Intake 342 ml   Output 1500 ml   Net -1158 ml       Physical Exam Performed:    BP (!) 156/86   Pulse 72   Temp 97.2 °F (36.2 °C) (Axillary)   Resp 16   Ht 5' 1\" (1.549 m)   Wt 138 lb 6.4 oz (62.8 kg)   SpO2 95%   BMI 26.15 kg/m²   Gen: No distress. Alert.  Pleasant elderly female   Eyes: PERRL. No sclera icterus. No conjunctival injection. Neck: No JVD. No Carotid Bruit. Trachea midline. Resp: No accessory muscle use. No wheezes. No rhonchi. + crackles bilaterally in lung bases   CV: Regular rate. Regular rhythm. No rub. + murmur. 2+ pitting edema of leg bilaterally   Peripheral Pulses: +2 palpable, equal bilaterally   GI: Non-tender. Non-distended. No masses. No organomegaly. Normal bowel sounds. No hernia. Skin: Warm and dry. No nodule on exposed extremities. No rash on exposed extremities. M/S: No cyanosis. No joint deformity. No clubbing. Neuro: Awake. Grossly nonfocal    Psych: Oriented x 3. No anxiety or agitation.          Labs:   Recent Labs     06/03/22  1135 06/04/22  0456   WBC 6.2 6.0   HGB 10.5* 10.2*   HCT 32.7* 31.2*    215     Recent Labs     06/02/22  1308 06/03/22  1135 06/04/22  0456    139 141   K 4.9 4.7 4.6    109 106   CO2 18* 20* 24   BUN 25* 28* 34*   CREATININE 1.0 1.2 1.4*   CALCIUM 9.3 8.6 8.9     Recent Labs     06/03/22  1135   AST 62*   ALT 76*   BILITOT 0.8   ALKPHOS 157*     No results for input(s): INR in the last 72 hours. Recent Labs     06/03/22  1135 06/03/22  1611 06/03/22  1841   TROPONINI 0.01 0.02* 0.02*       Urinalysis:      Lab Results   Component Value Date    NITRU Negative 01/07/2020    WBCUA 10-20 01/07/2020    BACTERIA 1+ 01/07/2020    RBCUA 5-10 01/07/2020    BLOODU neg 01/11/2021    BLOODU Negative 01/07/2020    SPECGRAV 1.015 01/07/2020    GLUCOSEU neg 01/11/2021    GLUCOSEU Negative 01/07/2020       Radiology:  NM Cardiac Stress Test Nuclear Imaging   Final Result      XR CHEST PORTABLE   Final Result   No acute cardiopulmonary disease. Stable enlargement of cardiac silhouette.                  Assessment/Plan:    Active Hospital Problems    Diagnosis     Essential hypertension, benign [I10]      Priority: High    Acute on chronic combined systolic (congestive) and diastolic (congestive) heart failure (HCC) [I50.43]      Priority: Medium    S/P mitral valve repair [Z98.890]      Priority: Medium    S/P CABG x 3 [Z95.1]      Priority: Medium    Chronic kidney disease (CKD) stage G3b/A1, moderately decreased glomerular filtration rate (GFR) between 30-44 mL/min/1.73 square meter and albuminuria creatinine ratio less than 30 mg/g ([facility]) [N18.32]     CAD, multiple vessel [I25.10]     Ischemic cardiomyopathy [I25.5]     Acute on chronic systolic CHF (congestive heart failure) ([facility]) [I50.23]     Type 2 diabetes mellitus with microalbuminuria, without long-term current use of insulin ([facility]) [E11.29, R80.9]     Hyperlipidemia associated with type 2 diabetes mellitus ([facility] [E11.69, E78.5]     Major depressive disorder, recurrent episode, moderate ([facility]) [F33.1]      #Acute decompensation of chronic systolic HF  -last echo with EF 25-30% 2021  -continue vasotec, digoxin and toprol XL   -IV lasix 40 BID   - aldactone  - echo pending   -cardiology consulted   -She may require an AICD.    - stress test   Summary    Global hypokinesis with EF 29%    Medium-large sized inferolateral minimally reversible defect of moderate    intensity consistent with infarction in the territory of the proximal LCx    and/or RCA .          #CAD s/p CABG   #Elevated troponin   -on ASA, statin, BB  -0.01 --> 0.02   -likely 2/2 to above   -EKG with no acute changes  -cardiology involved       #CKD Stage III  -Cr baseline  -stable       #DM type 2  -continue lantus   -SSI   -monitor BS        #HTN  -continue ACEI and BB       #HLD   -continue statin        #Depression   -continue home regimen        #Gout   -continue allopurinol        DVT Prophylaxis: lovenox  Diet: low Na  Code Status: Prior     Alexys Mae MD

## 2022-06-04 NOTE — PROGRESS NOTES
Resting quietly in bed with respirations easy/even on RA.  NSR on telemetry. Call in easy reach. Bed alarm on for safety. Continue to monitor closely.   Cathryne Curling, RN

## 2022-06-05 VITALS
DIASTOLIC BLOOD PRESSURE: 94 MMHG | RESPIRATION RATE: 18 BRPM | SYSTOLIC BLOOD PRESSURE: 175 MMHG | BODY MASS INDEX: 24.6 KG/M2 | OXYGEN SATURATION: 95 % | HEIGHT: 61 IN | WEIGHT: 130.3 LBS | TEMPERATURE: 95.8 F | HEART RATE: 95 BPM

## 2022-06-05 LAB
ANION GAP SERPL CALCULATED.3IONS-SCNC: 8 MMOL/L (ref 3–16)
BASOPHILS ABSOLUTE: 0.1 K/UL (ref 0–0.2)
BASOPHILS RELATIVE PERCENT: 0.9 %
BUN BLDV-MCNC: 40 MG/DL (ref 7–20)
CALCIUM SERPL-MCNC: 8.9 MG/DL (ref 8.3–10.6)
CHLORIDE BLD-SCNC: 96 MMOL/L (ref 99–110)
CHOLESTEROL, TOTAL: 91 MG/DL (ref 0–199)
CO2: 29 MMOL/L (ref 21–32)
CREAT SERPL-MCNC: 1.6 MG/DL (ref 0.6–1.2)
EOSINOPHILS ABSOLUTE: 0.2 K/UL (ref 0–0.6)
EOSINOPHILS RELATIVE PERCENT: 3 %
GFR AFRICAN AMERICAN: 38
GFR NON-AFRICAN AMERICAN: 31
GLUCOSE BLD-MCNC: 112 MG/DL (ref 70–99)
GLUCOSE BLD-MCNC: 138 MG/DL (ref 70–99)
GLUCOSE BLD-MCNC: 177 MG/DL (ref 70–99)
GLUCOSE BLD-MCNC: 236 MG/DL (ref 70–99)
HCT VFR BLD CALC: 33.9 % (ref 36–48)
HDLC SERPL-MCNC: 30 MG/DL (ref 40–60)
HEMOGLOBIN: 11.1 G/DL (ref 12–16)
LDL CHOLESTEROL CALCULATED: 35 MG/DL
LYMPHOCYTES ABSOLUTE: 1.2 K/UL (ref 1–5.1)
LYMPHOCYTES RELATIVE PERCENT: 17.2 %
MAGNESIUM: 1.8 MG/DL (ref 1.8–2.4)
MCH RBC QN AUTO: 29.6 PG (ref 26–34)
MCHC RBC AUTO-ENTMCNC: 32.7 G/DL (ref 31–36)
MCV RBC AUTO: 90.6 FL (ref 80–100)
MONOCYTES ABSOLUTE: 0.4 K/UL (ref 0–1.3)
MONOCYTES RELATIVE PERCENT: 6.2 %
NEUTROPHILS ABSOLUTE: 5.1 K/UL (ref 1.7–7.7)
NEUTROPHILS RELATIVE PERCENT: 72.7 %
PDW BLD-RTO: 15.9 % (ref 12.4–15.4)
PERFORMED ON: ABNORMAL
PLATELET # BLD: 245 K/UL (ref 135–450)
PMV BLD AUTO: 7.8 FL (ref 5–10.5)
POTASSIUM SERPL-SCNC: 4 MMOL/L (ref 3.5–5.1)
PRO-BNP: ABNORMAL PG/ML (ref 0–449)
RBC # BLD: 3.74 M/UL (ref 4–5.2)
SODIUM BLD-SCNC: 133 MMOL/L (ref 136–145)
TRIGL SERPL-MCNC: 130 MG/DL (ref 0–150)
VLDLC SERPL CALC-MCNC: 26 MG/DL
WBC # BLD: 7 K/UL (ref 4–11)

## 2022-06-05 PROCEDURE — 80048 BASIC METABOLIC PNL TOTAL CA: CPT

## 2022-06-05 PROCEDURE — 6360000002 HC RX W HCPCS

## 2022-06-05 PROCEDURE — 99239 HOSP IP/OBS DSCHRG MGMT >30: CPT | Performed by: INTERNAL MEDICINE

## 2022-06-05 PROCEDURE — 6370000000 HC RX 637 (ALT 250 FOR IP): Performed by: INTERNAL MEDICINE

## 2022-06-05 PROCEDURE — 99233 SBSQ HOSP IP/OBS HIGH 50: CPT | Performed by: INTERNAL MEDICINE

## 2022-06-05 PROCEDURE — 6370000000 HC RX 637 (ALT 250 FOR IP)

## 2022-06-05 PROCEDURE — 36415 COLL VENOUS BLD VENIPUNCTURE: CPT

## 2022-06-05 PROCEDURE — 83735 ASSAY OF MAGNESIUM: CPT

## 2022-06-05 PROCEDURE — 85025 COMPLETE CBC W/AUTO DIFF WBC: CPT

## 2022-06-05 PROCEDURE — 83880 ASSAY OF NATRIURETIC PEPTIDE: CPT

## 2022-06-05 RX ORDER — TORSEMIDE 20 MG/1
20 TABLET ORAL 2 TIMES DAILY
Status: DISCONTINUED | OUTPATIENT
Start: 2022-06-05 | End: 2022-06-05 | Stop reason: HOSPADM

## 2022-06-05 RX ORDER — TORSEMIDE 20 MG/1
20 TABLET ORAL 2 TIMES DAILY
Qty: 60 TABLET | Refills: 3 | Status: SHIPPED | OUTPATIENT
Start: 2022-06-05 | End: 2022-06-07 | Stop reason: SDUPTHER

## 2022-06-05 RX ORDER — SPIRONOLACTONE 25 MG/1
25 TABLET ORAL DAILY
Qty: 30 TABLET | Refills: 3 | Status: SHIPPED | OUTPATIENT
Start: 2022-06-06 | End: 2022-06-07 | Stop reason: SDUPTHER

## 2022-06-05 RX ADMIN — ASPIRIN 81 MG: 81 TABLET, COATED ORAL at 08:25

## 2022-06-05 RX ADMIN — CALCIUM 500 MG: 500 TABLET ORAL at 08:25

## 2022-06-05 RX ADMIN — CLOPIDOGREL BISULFATE 75 MG: 75 TABLET ORAL at 08:25

## 2022-06-05 RX ADMIN — ALOGLIPTIN 12.5 MG: 12.5 TABLET, FILM COATED ORAL at 08:25

## 2022-06-05 RX ADMIN — ENOXAPARIN SODIUM 40 MG: 40 INJECTION SUBCUTANEOUS at 08:24

## 2022-06-05 RX ADMIN — ENALAPRIL MALEATE 10 MG: 10 TABLET ORAL at 08:25

## 2022-06-05 RX ADMIN — DIGOXIN 125 MCG: 125 TABLET ORAL at 08:25

## 2022-06-05 RX ADMIN — SPIRONOLACTONE 25 MG: 25 TABLET ORAL at 08:25

## 2022-06-05 RX ADMIN — CYANOCOBALAMIN TAB 500 MCG 500 MCG: 500 TAB at 08:25

## 2022-06-05 RX ADMIN — ATORVASTATIN CALCIUM 40 MG: 40 TABLET, FILM COATED ORAL at 08:25

## 2022-06-05 RX ADMIN — INSULIN LISPRO 4 UNITS: 100 INJECTION, SOLUTION INTRAVENOUS; SUBCUTANEOUS at 12:55

## 2022-06-05 RX ADMIN — FUROSEMIDE 40 MG: 10 INJECTION, SOLUTION INTRAMUSCULAR; INTRAVENOUS at 08:24

## 2022-06-05 RX ADMIN — ALLOPURINOL 300 MG: 300 TABLET ORAL at 08:24

## 2022-06-05 RX ADMIN — METOPROLOL SUCCINATE 75 MG: 50 TABLET, EXTENDED RELEASE ORAL at 08:24

## 2022-06-05 NOTE — PROGRESS NOTES
[address] Daily Progress Note      Admit Date:  6/3/2022    Subjective:  Ms. Mita Garcia is admitted with new onset swelling legs Chest pressure and dyspnea  Denies orthopnea PND. chest pressure is resolved and shortness of breath is improved. Also has leg swellilng which is getting better. She is on room air ambulatory and no complaints. History of Present Illness:  Halley Aguilera is a 68 y.o. patient who presented to [facility] 6/3/22 with c/o SOB and CP x 5 days. She follows with Dr. Al Valverde and last Yary Elsy was 6/28/21. She has PMH CAD s/p 3V CABG and MV ring repair 1/20. CAD s/p 1 LULY LAD/3 LULY CCx 1/76, chronic systolic CHF, ischemic CM EF=25-30%, and HLD. .    Most recent [facility] 6/17/2020  Large sized inferior,  posterobasal, and basal lateral fixeds defect c/w infarct; EF of 18%. Most recent [facility] 4/29/20 s/p PCI of LAD with single LULY and 3 LULY CCx. Most recent ECHO 11/30/21 LVEF=25-30%. LV dilated; +WMA; LA is moderately dilated. An annuloplasty ring is seen in the mitral position. Mild MR, MD, TR              Ms. Jennifer Koch now presents with c/o SOB and chest pressure x 5 days and leg swelling. Feels SOB at rest and exertion and c/o pressure CP with SOB. Started lasix 1 week ago due to LE swelling but prior was NOT taking regularly scheduled diuretic. Admitting EKG NSR 90 bpm Nonspecific T wave abnormality (no change 7/20) Admitting CXR Stable enlargement of cardiac silhouette; Billy 0.01; Pro-BNP 33,135 [id number]. ALT 76 AST 62; H/H 10.5/32.7. Patient with no c/opalpitations, dizziness, or orthopnea/PND.  I have been asked to provide consultation regarding further management and testing.     ROS:  12 point ROS negative in all areas as listed below except as in [place]  Constitutional, HEENT, GI, , Musculoskeletal, skin, neurological, hematological, endocrine, Psychiatric    Past Medical History:   Diagnosis Date    Anemia     Backache, unspecified 3/11/08    CAD, multiple vessel 1/6/2020    Chronic kidney disease (CKD) stage G3b/A1, moderately decreased glomerular filtration rate (GFR) between 30-44 mL/min/1.73 square meter and albuminuria creatinine ratio less than 30 mg/g ([facility]) 4/16/2021    Chronic systolic CHF (congestive heart failure) ([facility] 12/11/2019    Depressive disorder, not elsewhere classified 11/8/07    Essential hypertension, benign 11/8/07    Gout 8/11/2011    Hyperlipidemia associated with type 2 diabetes mellitus ([address] 12/14/2015    Major depressive disorder, recurrent episode, moderate ([facility] 12/14/2015    Osteoarthritis, hand 2/27/2012    Osteoarthrosis, unspecified whether generalized or localized, lower leg 11/8/07    Osteopenia 8/5/2015    Other and unspecified hyperlipidemia 11/8/07    Pain in joint, lower leg 1/29/07    Pneumonia     Rotator cuff tendinitis 10/6/2017    Tear of medial cartilage or meniscus of knee, current 1/29/07    Type 2 diabetes mellitus with microalbuminuria, without long-term current use of insulin ([facility] 5/5/2017    Type 2 diabetes mellitus without complication ([facility] 54/5/3952    Type II or unspecified type diabetes mellitus without mention of complication, not stated as uncontrolled 11/8/07    foot exam 7/16/08 normal     Past Surgical History:   Procedure Laterality Date    APPENDECTOMY      CHOLECYSTECTOMY      CORONARY ARTERY BYPASS GRAFT N/A 1/8/2020    CORONARY ARTERY BYPASS GRAFTING X3, INTERNAL MAMMARY ARTERY, SAPHENOUS VEIN GRAFTING, ON PUMP MITRAL VALVE RING REPAIR USING 26MM PELAEZ PHYSIO II RING, WITH LEFT ATRIAL APPENDAGE CLIP, PFO CLOSURETEE, 5 LEVEL BILATERAL INTERCOSTAL NERVE BLOCK performed by Luan Dowell MD at [address]      total ab hyst    PARATHYROID GLAND SURGERY      explore parathyroid glands    TOTAL KNEE ARTHROPLASTY  03/29/10    left knee   active smoker half pack per day     Objective:   BP (!) 175/94   Pulse 95   Temp (!) 95.8 °F (35.4 °C) (Axillary)   Resp 18   Ht 5' 1\" (1.549 m)   Wt 130 lb 4.8 oz (59.1 kg)   SpO2 95%   BMI 24.62 kg/m²       Intake/Output Summary (Last 24 hours) at 6/5/2022 1128  Last data filed at 6/5/2022 1025  Gross per 24 hour   Intake 700 ml   Output 2825 ml   Net -2125 ml       TELEMETRY:NSR    Physical Exam:  General: No Respiratory distress, appears well developed and well nourished. Eyes:  Sclera nonicteric  Nose/Sinuses:  negative findings: nose shows no deformity, asymmetry, or inflammation, nasal mucosa normal, septum midline with no perforation or bleeding  Back:  no pain to palpation  Joint:  no active joint inflammation  Musculoskeletal:  negative  Skin:  Warm and dry  Neck:  +ve for JVD and Carotid Bruits. Chest:  Minimal Crackles at left base to auscultation, respiration easy  Cardiovascular:  RRR, S1S2 normal, Tricuspid regurgitation murmur, no rub or thrill. Abdomen:  Soft normal liver and spleen  Extremities:   2 + bilat leg edema,  No clubbing, cyanosis,  Pulses:  Normal radial neg pedal pulses.   Neuro: intact    Medications:    torsemide  20 mg Oral BID    clopidogrel  75 mg Oral Daily    spironolactone  25 mg Oral Daily    allopurinol  300 mg Oral Daily    aspirin  81 mg Oral Daily    atorvastatin  40 mg Oral Daily    calcium elemental  500 mg Oral Daily    enalapril  10 mg Oral BID    vitamin B-12  500 mcg Oral Daily    metoprolol succinate  75 mg Oral Daily    insulin glargine  10 Units SubCUTAneous Nightly    alogliptin  12.5 mg Oral Daily    sodium chloride flush  5-40 mL IntraVENous 2 times per day    enoxaparin  40 mg SubCUTAneous Daily    insulin lispro  0-12 Units SubCUTAneous TID     insulin lispro  0-6 Units SubCUTAneous Nightly    digoxin  125 mcg Oral Q48H      dextrose      sodium chloride       glucose, dextrose bolus **OR** dextrose bolus, glucagon (rDNA), dextrose, sodium chloride flush, sodium chloride, ondansetron **OR** ondansetron, polyethylene glycol, acetaminophen **OR** acetaminophen, magnesium sulfate    Lab Data:  CBC:   Recent Labs     06/03/22  1135 06/04/22  0456 06/05/22  0433   WBC 6.2 6.0 7.0   HGB 10.5* 10.2* 11.1*   HCT 32.7* 31.2* 33.9*   MCV 92.4 91.9 90.6    215 245     BMP:   Recent Labs     06/03/22  1135 06/04/22  0456 06/05/22  0433    141 133*   K 4.7 4.6 4.0    106 96*   CO2 20* 24 29   BUN 28* 34* 40*   CREATININE 1.2 1.4* 1.6*     LIVER PROFILE:   Recent Labs     06/03/22  1135   AST 62*   ALT 76*   BILITOT 0.8   ALKPHOS 157*     PT/INR: No results for input(s): PROTIME, INR in the last 72 hours. APTT: No results for input(s): APTT in the last 72 hours. BNP:  No results for input(s): BNP in the last 72 hours. IMAGING:   Nuclear stress test 6.4.22  Conclusions        Summary    Global hypokinesis with EF 29%    Medium-large sized inferolateral minimally reversible defect of moderate    intensity consistent with infarction in the territory of the proximal LCx    and/or RCA . Assessment:  1. Acute on chronic systolic CHF  2. CAD with unstable angina  No further chest pressure since diuresed  3. CAD with ischemic cardiomyopathy, nuclear stress test stable since 6.17.20 and 6.4.22 are similar no new ischemic areas  4. Smoker  5. HBP  6 HLD  7. S/P mitral annuloplasty  8. S/p angioplasty  9.  S/p CABG  Patient Active Problem List    Diagnosis Date Noted    Chest pain 11/30/2015    Essential hypertension, benign 11/08/2007    Acute on chronic combined systolic (congestive) and diastolic (congestive) heart failure ([facility] 06/03/2022    S/P CABG x 3     S/P mitral valve repair     Backache 03/11/2008    Osteoarthrosis involving lower leg 11/08/2007    Chronic kidney disease (CKD) stage G3b/A1, moderately decreased glomerular filtration rate (GFR) between 30-44 mL/min/1.73 square meter and albuminuria creatinine ratio less than 30 mg/g ([facility] 04/16/2021    S/P angioplasty with stent 07/23/2020    CAD, multiple vessel 01/06/2020    Ischemic cardiomyopathy 01/06/2020    Acute on chronic systolic CHF (congestive heart failure) ([facility] 12/11/2019    Nonrheumatic mitral valve regurgitation 12/06/2019    Other restrictive cardiomyopathy ([address] 11/13/2019    Rotator cuff tendinitis 10/06/2017    Type 2 diabetes mellitus with microalbuminuria, without long-term current use of insulin ([address] 05/05/2017    Primary insomnia 11/23/2016    Hyperlipidemia associated with type 2 diabetes mellitus ([address] 12/14/2015    Major depressive disorder, recurrent episode, moderate ([address] 12/14/2015    Osteopenia 08/05/2015    Osteoarthritis, hand 02/27/2012    Gout 08/11/2011       Plan:  1. Ok to discharge I changed her lasix to torsemide for better bioavailability and duration of action. 2. EF on nuclear study 29%  3. Guideline directed medical therapy for systolic CHF  4. Statin  5. DAPT  6. Diet low salt  7. Patient is requested to follow up inoffice  8. She wants to be followed in our [address] offices does not want to drive to [facility].     Core Measures:  · Discharge instructions:   · LVEF documented:   · ACEI for LV dysfunction:   · Smoking Cessation: yes    Nam Zavala MD, MD 6/5/2022 11:28 AM

## 2022-06-05 NOTE — PROGRESS NOTES
Discharge instructions given, patient verbalizes understanding of diet restrictions and weighing every day. IV discontinued and within normal limits.

## 2022-06-05 NOTE — DISCHARGE SUMMARY
Hospital Medicine Discharge Summary    Patient ID: Rigoberto Damon      Patient's PCP: Zhang Kidd MD    Admit Date: 6/3/2022     Discharge Date:   6/5/2022    Admitting Provider: Mary Blount MD     Discharge Provider: Narcisa Buitrago MD     Discharge Diagnoses: Active Hospital Problems    Diagnosis     Essential hypertension, benign [I10]      Priority: High    Acute on chronic combined systolic (congestive) and diastolic (congestive) heart failure (HCC) [I50.43]      Priority: Medium    S/P mitral valve repair [Z98.890]      Priority: Medium    S/P CABG x 3 [Z95.1]      Priority: Medium    Chronic kidney disease (CKD) stage G3b/A1, moderately decreased glomerular filtration rate (GFR) between 30-44 mL/min/1.73 square meter and albuminuria creatinine ratio less than 30 mg/g ([facility]) [N18.32]     CAD, multiple vessel [I25.10]     Ischemic cardiomyopathy [I25.5]     Acute on chronic systolic CHF (congestive heart failure) ([facility]) [I50.23]     Type 2 diabetes mellitus with microalbuminuria, without long-term current use of insulin ([facility]) [E11.29, R80.9]     Hyperlipidemia associated with type 2 diabetes mellitus ([facility] [E11.69, E78.5]     Major depressive disorder, recurrent episode, moderate ([facility] [F33.1]        The patient was seen and examined on day of discharge and this discharge summary is in conjunction with any daily progress note from day of discharge. Hospital Course: 68 y. o. female with pmhx of CHF, CKD, CAD s/p CABG and mitral annuloplasty in 2020 , HTN, HLD, DM type 2,  depression, gout who presented to [facility] ED with complaint of shortness of breath and significant leg swelling for the last 5 days           #Acute decompensation of chronic systolic HF  -last echo with EF 25-30% 2021  -continue vasotec, digoxin and toprol XL   -IV lasix 40 BID - to BID torsemide on discharge   - aldactone - to cont on discharge  -cardiology consulted   -She may require an AICD. - stress test   Summary    Global hypokinesis with EF 29%    Medium-large sized inferolateral minimally reversible defect of moderate    intensity consistent with infarction in the territory of the proximal LCx    and/or RCA .    stress test results discussed with cardiology - these are consistent with her known ischemic cardiomyopathy findings. Med management recommended on discharge.         #CAD s/p CABG   #Elevated troponin   -on ASA, statin, BB  -0.01 --> 0.02   -likely 2/2 to above   -EKG with no acute changes  -cardiology involved        #CKD Stage III  -Cr baseline  -stable        #DM type 2  Resume PTA regimen. Metformin stopped this admission due to frail cardiorenal process.         #HTN  -continue ACEI and BB        #HLD   -continue statin         #Depression   -continue home regimen         #Gout   -continue allopurinol                Physical Exam Performed:     BP (!) 175/94   Pulse 95   Temp (!) 95.8 °F (35.4 °C) (Axillary)   Resp 18   Ht 5' 1\" (1.549 m)   Wt 130 lb 4.8 oz (59.1 kg)   SpO2 95%   BMI 24.62 kg/m²       Gen: No distress. Alert. Pleasant elderly female   Eyes: PERRL. No sclera icterus. No conjunctival injection. Neck: No JVD.  No Carotid Bruit. Trachea midline. Resp: No accessory muscle use.  No wheezes. No rhonchi. + crackles bilaterally in lung bases   CV: Regular rate. Regular rhythm. No rub. + murmur. 2+ pitting edema of leg bilaterally   Peripheral Pulses: +2 palpable, equal bilaterally   GI: Non-tender. Non-distended. No masses. No organomegaly. Normal bowel sounds. No hernia. Skin: Warm and dry. No nodule on exposed extremities. No rash on exposed extremities. M/S: No cyanosis. No joint deformity. No clubbing. Neuro: Awake. Grossly nonfocal    Psych: Oriented x 3. No anxiety or agitation.           Labs:  For convenience and continuity at follow-up the following most recent labs are provided:      CBC:    Lab Results   Component Value Date WBC 7.0 06/05/2022    HGB 11.1 06/05/2022    HCT 33.9 06/05/2022     06/05/2022       Renal:    Lab Results   Component Value Date     06/05/2022    K 4.0 06/05/2022    K 4.7 06/03/2022    CL 96 06/05/2022    CO2 29 06/05/2022    BUN 40 06/05/2022    CREATININE 1.6 06/05/2022    CALCIUM 8.9 06/05/2022    PHOS 3.3 02/11/2020         Significant Diagnostic Studies    Radiology:   NM Cardiac Stress Test Nuclear Imaging   Final Result      XR CHEST PORTABLE   Final Result   No acute cardiopulmonary disease. Stable enlargement of cardiac silhouette. Consults:     IP CONSULT TO CARDIOLOGY  IP CONSULT TO HOSPITALIST  IP CONSULT TO CARDIOLOGY  IP CONSULT TO DIETITIAN    Disposition:  home     Condition at Discharge: Stable    Discharge Instructions/Follow-up:  PCP 1 week. Code Status:  Full Code     Activity: activity as tolerated    Diet: cardiac diet      Discharge Medications:     Current Discharge Medication List           Details   spironolactone (ALDACTONE) 25 MG tablet Take 1 tablet by mouth daily  Qty: 30 tablet, Refills: 3      torsemide (DEMADEX) 20 MG tablet Take 1 tablet by mouth in the morning and at bedtime  Qty: 60 tablet, Refills: 3              Details   atorvastatin (LIPITOR) 40 MG tablet TAKE ONE TABLET BY MOUTH DAILY  Qty: 90 tablet, Refills: 0      enalapril (VASOTEC) 10 MG tablet TAKE ONE TABLET BY MOUTH TWICE DAILY  Qty: 180 tablet, Refills: 1    Comments: Dose Adjustment.       digoxin (LANOXIN) 125 MCG tablet TAKE ONE TABLET BY MOUTH EVERY OTHER DAY  Qty: 45 tablet, Refills: 0      metoprolol succinate (TOPROL XL) 25 MG extended release tablet TAKE THREE TABLETS BY MOUTH DAILY  Qty: 270 tablet, Refills: 0      insulin glargine (LANTUS SOLOSTAR) 100 UNIT/ML injection pen Inject 10 Units into the skin nightly  Qty: 3 pen, Refills: 0      linagliptin (TRADJENTA) 5 MG tablet Take 1 tablet by mouth daily  Qty: 90 tablet, Refills: 1      glimepiride (AMARYL) 2 MG tablet Take 1 tablet by mouth every morning  Qty: 90 tablet, Refills: 1      allopurinol (ZYLOPRIM) 300 MG tablet TAKE ONE TABLET BY MOUTH DAILY  Qty: 90 tablet, Refills: 1      Ferrous Sulfate (IRON PO) Take by mouth      vitamin B-12 (CYANOCOBALAMIN) 500 MCG tablet Take 500 mcg by mouth daily      calcium carbonate (OSCAL) 500 MG TABS tablet Take 500 mg by mouth daily      aspirin 81 MG EC tablet Take 81 mg by mouth daily. Time Spent on discharge is more than 30 minutes in the examination, evaluation, counseling and review of medications and discharge plan. Signed:    Joan Lara MD   6/5/2022      Thank you Natalee Trent MD for the opportunity to be involved in this patient's care. If you have any questions or concerns, please feel free to contact me at [phone].

## 2022-06-05 NOTE — PROGRESS NOTES
Resting quietly with eyes closed & respirations easy/even on RA. Call in easy reach. Bed alarm on for safety. Continue to monitor closely.   Carrol Slaughter RN

## 2022-06-05 NOTE — PROGRESS NOTES
Nutrition Education    Provided CHF nutrition education today. · Educated on 6/5/22  · Learners: Patient and Significant Other  · Readiness: Acceptance  · Method: Explanation and Handout  · Response: Needs Reinforcement  · Contact name and number provided.     Silvia Nicholson RD, LD  Contact Number: [phone]

## 2022-06-05 NOTE — CARE COORDINATION
DC order noted. Chart reviewed. Discharge timeout done with Jaquan Almaguer RN, all discharge needs and concerns addressed.

## 2022-06-06 ENCOUNTER — TELEPHONE (OUTPATIENT)
Dept: CARDIOLOGY CLINIC | Age: 77
End: 2022-06-06

## 2022-06-06 ENCOUNTER — TELEPHONE (OUTPATIENT)
Dept: INTERNAL MEDICINE CLINIC | Age: 77
End: 2022-06-06

## 2022-06-06 NOTE — TELEPHONE ENCOUNTER
[address] [facility] Trans[facility] Initial Follow Up Call    Outreach made within 2 business days of discharge: Yes    Patient: Maggie Arteaga Patient :    MRN: [id number]  Reason for Admission: There are no discharge diagnoses documented for the most recent discharge. Discharge Date: 22       Spoke with: 2022 @ 9:19 am:  Attempted to contact patient, unable to leave message. 2022: Attempted home and mobile, unable to leave message.      Discharge department/facility: [facility]

## 2022-06-06 NOTE — TELEPHONE ENCOUNTER
Please schedule 1 week hospital follow up with dr. Shira Moralez per [facility] please.  CHF

## 2022-06-06 NOTE — TELEPHONE ENCOUNTER
She can see EMILY or another NP other than Griselda for the one week follow up. She was a no show for her last appt with me. You can schedule with me next available in a month or more.

## 2022-06-07 NOTE — TELEPHONE ENCOUNTER
----- Message from Everlean Fleischer, MD sent at 6/7/2022  2:27 PM EDT -----    ----- Message -----  From: Ray Warren  Sent: 6/7/2022   1:39 PM EDT  To: Everlean Fleischer, MD    Was released from hospital yesterday and was told they would call in her medication to [facility] and its not there What should she do please advise her number is [phone] Thanks [facility]

## 2022-06-09 ENCOUNTER — HOSPITAL ENCOUNTER (OUTPATIENT)
Age: 77
Discharge: HOME | End: 2022-06-09
Payer: MEDICARE

## 2022-06-09 ENCOUNTER — OFFICE VISIT (OUTPATIENT)
Dept: CARDIOLOGY CLINIC | Age: 77
End: 2022-06-09
Payer: MEDICARE

## 2022-06-09 VITALS
SYSTOLIC BLOOD PRESSURE: 130 MMHG | BODY MASS INDEX: 22.6 KG/M2 | HEART RATE: 84 BPM | WEIGHT: 122.8 LBS | DIASTOLIC BLOOD PRESSURE: 60 MMHG | OXYGEN SATURATION: 100 % | TEMPERATURE: 98.4 F | HEIGHT: 62 IN

## 2022-06-09 DIAGNOSIS — Z09 HOSPITAL DISCHARGE FOLLOW-UP: ICD-10-CM

## 2022-06-09 DIAGNOSIS — I50.22 CHRONIC SYSTOLIC CHF (CONGESTIVE HEART FAILURE) (HCC): Primary | ICD-10-CM

## 2022-06-09 DIAGNOSIS — I50.22 CHRONIC SYSTOLIC CHF (CONGESTIVE HEART FAILURE) (HCC): ICD-10-CM

## 2022-06-09 LAB
ANION GAP SERPL CALCULATED.3IONS-SCNC: 13 MMOL/L (ref 3–16)
BUN BLDV-MCNC: 35 MG/DL (ref 7–20)
CALCIUM SERPL-MCNC: 9.4 MG/DL (ref 8.3–10.6)
CHLORIDE BLD-SCNC: 99 MMOL/L (ref 99–110)
CO2: 28 MMOL/L (ref 21–32)
CREAT SERPL-MCNC: 1.6 MG/DL (ref 0.6–1.2)
GFR AFRICAN AMERICAN: 38
GFR NON-AFRICAN AMERICAN: 31
GLUCOSE BLD-MCNC: 130 MG/DL (ref 70–99)
POTASSIUM SERPL-SCNC: 4.6 MMOL/L (ref 3.5–5.1)
PRO-BNP: ABNORMAL PG/ML (ref 0–449)
SODIUM BLD-SCNC: 140 MMOL/L (ref 136–145)

## 2022-06-09 PROCEDURE — 80048 BASIC METABOLIC PNL TOTAL CA: CPT

## 2022-06-09 PROCEDURE — 36415 COLL VENOUS BLD VENIPUNCTURE: CPT

## 2022-06-09 PROCEDURE — 99496 TRANSJ CARE MGMT HIGH F2F 7D: CPT | Performed by: NURSE PRACTITIONER

## 2022-06-09 PROCEDURE — 1111F DSCHRG MED/CURRENT MED MERGE: CPT | Performed by: NURSE PRACTITIONER

## 2022-06-09 PROCEDURE — 83880 ASSAY OF NATRIURETIC PEPTIDE: CPT

## 2022-06-09 RX ORDER — SPIRONOLACTONE 25 MG/1
25 TABLET ORAL DAILY
Qty: 90 TABLET | Refills: 0 | Status: SHIPPED | OUTPATIENT
Start: 2022-06-09 | End: 2022-07-13

## 2022-06-09 RX ORDER — TORSEMIDE 20 MG/1
20 TABLET ORAL 2 TIMES DAILY
Qty: 180 TABLET | Refills: 0 | Status: SHIPPED | OUTPATIENT
Start: 2022-06-09 | End: 2022-07-13

## 2022-06-09 ASSESSMENT — ENCOUNTER SYMPTOMS
GASTROINTESTINAL NEGATIVE: 1
RESPIRATORY NEGATIVE: 1

## 2022-06-09 NOTE — PROGRESS NOTES
Post-Discharge Transitional Care  Follow Up  Casandra Pollack   YOB: 1945  Date of Office Visit:  6/9/2022  Date of Hospital Admission: 6/3/22  Date of Hospital Discharge: 6/5/22  Risk of hospital readmission (high >=14%. Medium >=10%) :Readmission Risk Score: 13.6 ( )  Care management risk score Rising risk (score 2-5) and Complex Care (Scores >=6): 4   Non face to face  following discharge, date last encounter closed (first attempt may have been earlier): 6/7/2022  8:26 AM  Call initiated 2 business days of discharge: Yes  ASSESSMENT/PLAN:   Chronic systolic CHF (congestive heart failure) ([facility] [address]  -     Basic Metabolic Panel; Future  -     Brain Natriuretic Peptide; Future  Medical Decision Making: high complexity  No follow-ups on file. On this date 6/9/2022 I have spent 45 minutes reviewing previous notes, test results and face to face with the patient discussing the diagnosis and importance of compliance with the treatment plan as well as documenting on the day of the visit. Subjective:   HPI:  Follow up of Hospital problems/diagnosis(es): CAD, CHF, ischemic cardiomyopathy  Inpatient course: Discharge summary reviewed- see chart.   Interval history/Current status: stable  Patient Active Problem List   Diagnosis    Backache    Essential hypertension, benign    Osteoarthrosis involving lower leg    Gout    Osteoarthritis, hand    Osteopenia    Chest pain    Hyperlipidemia associated with type 2 diabetes mellitus ([facility] [address]    Major depressive disorder, recurrent episode, moderate (HCC)    Primary insomnia    Type 2 diabetes mellitus with microalbuminuria, without long-term current use of insulin (HCC)    Rotator cuff tendinitis    Other restrictive cardiomyopathy (HCC)    Nonrheumatic mitral valve regurgitation    Acute on chronic systolic CHF (congestive heart failure) (HCC)    CAD, multiple vessel    Ischemic cardiomyopathy    S/P angioplasty with stent    Chronic kidney disease stage G3b/A1, moderately decreased glomerular filtration rate (GFR) between 30-44 mL/min/1.73 square meter and albuminuria creatinine ratio less than 30 mg/g (HCC), Chronic systolic CHF (congestive heart failure) ([facility] [address] Depressive disorder, not elsewhere classified, Essential hypertension, benign, Gout, Hyperlipidemia associated with type 2 diabetes mellitus ([facility] [address] Major depressive disorder, recurrent episode, moderate ([facility] [address] Osteoarthritis, hand, Osteoarthrosis, unspecified whether generalized or localized, lower leg, Osteopenia, Other and unspecified hyperlipidemia, Pain in joint, lower leg, Pneumonia, Rotator cuff tendinitis, Tear of medial cartilage or meniscus of knee, current, Type 2 diabetes mellitus with microalbuminuria, without long-term current use of insulin ([facility] [address] Type 2 diabetes mellitus without complication ([facility] [address] and Type II or unspecified type diabetes mellitus without mention of complication, not stated as uncontrolled. Past Surgical History:   has a past surgical history that includes Parathyroid gland surgery; Total knee arthroplasty (03/29/10); Hysterectomy; Appendectomy; Cholecystectomy; and Coronary artery bypass graft (N/A, 1/8/2020). Home Medications:    Prior to Admission medications    Medication Sig Start Date End Date Taking?  Authorizing Provider   spironolactone (ALDACTONE) 25 MG tablet Take 1 tablet by mouth daily 6/6/22   Aleshia Mckeon MD   torsemide (DEMADEX) 20 MG tablet Take 1 tablet by mouth in the morning and at bedtime 6/5/22   Aleshia Mckeon MD   atorvastatin (LIPITOR) 40 MG tablet TAKE ONE TABLET BY MOUTH DAILY 4/13/22   Connie Cornejo MD   enalapril (VASOTEC) 10 MG tablet TAKE ONE TABLET BY MOUTH TWICE DAILY 3/24/22   Connie Conrejo MD   digoxin (LANOXIN) 125 MCG tablet TAKE ONE TABLET BY MOUTH EVERY OTHER DAY 2/23/22   Trevin Arambula MD   metoprolol succinate (TOPROL XL) 25 MG extended release tablet TAKE THREE TABLETS BY MOUTH DAILY 2/23/22   Caren Singh MD   insulin glargine (LANTUS SOLOSTAR) 100 UNIT/ML injection pen Inject 10 Units into the skin nightly 8/9/21   Juan Francisco Rivera MD   linagliptin (TRADJENTA) 5 MG tablet Take 1 tablet by mouth daily 8/2/21   Juan Francisco Rivera MD   glimepiride (AMARYL) 2 MG tablet Take 1 tablet by mouth every morning 8/2/21   Juan Francisco Rivera MD   allopurinol (ZYLOPRIM) 300 MG tablet TAKE ONE TABLET BY MOUTH DAILY 8/2/21   Juan Francisco Rivera MD   Ferrous Sulfate (IRON PO) Take by mouth    Historical Provider, MD   vitamin B-12 (CYANOCOBALAMIN) 500 MCG tablet Take 500 mcg by mouth daily    Historical Provider, MD   calcium carbonate (OSCAL) 500 MG TABS tablet Take 500 mg by mouth daily    Historical Provider, MD   aspirin 81 MG EC tablet Take 81 mg by mouth daily. Historical Provider, MD     Allergies:  Penicillins    Social History:   reports that she quit smoking about 6 years ago. She has a 10.00 pack-year smoking history. She has never used smokeless tobacco. She reports that she does not drink alcohol and does not use drugs. Family History: family history includes Cancer (age of onset: 59) in her brother; Cancer (age of onset: 77) in her brother; Cancer (age of onset: 68) in her mother; Diabetes in her mother, sister, and sister; High Blood Pressure in her sister; High Cholesterol in her sister; Hypertension in her sister; Other in her father and sister. Review of Systems   Review of Systems   Constitutional: Negative. Respiratory: Negative. Cardiovascular: Positive for leg swelling. Negative for chest pain and palpitations. Gastrointestinal: Negative. Neurological: Negative.       OBJECTIVE:    Vital signs:    /60   Pulse 84   Temp 98.4 °F (36.9 °C)   Ht 5' 1.5\" (1.562 m)   Wt 122 lb 12.8 oz (55.7 kg)   SpO2 100%   BMI 22.83 kg/m²      Physical Exam:  Constitutional:  Comfortable and alert, NAD, appears stated age  Eyes: PERRL, sclera nonicteric  Neck:  Supple, no masses, no thyroidmegaly, no JVD  Skin:  Warm and dry; no rash or lesions  Heart: Regular, normal apex, S1 and S2 normal, no M/G/R  Lungs:  Normal respiratory effort; clear; no wheezing/rhonchi/rales  Abdomen: soft, non tender, + bowel sounds  Extremities:  1+ BLE edema, L>R; redness and thickened skin  Neuro: alert and oriented, moves legs and arms equally, normal mood and affect    Data Reviewed:      Stress test 6/2022:  Global hypokinesis with EF 29%    Medium-large sized inferolateral minimally reversible defect of moderate    intensity consistent with infarction in the territory of the proximal LCx    and/or RCA . Echo 6/2022:   Limited echo for LVEF, mitral valve repair. Left ventricular systolic function is severely reduced with a visually   estimated ejection fraction of <20 %. EF estimated by Hernandez's method at 16 %. The left ventricle is moderately dilated in size with mild septal   hypertrophy. Severe global hypokinesis with inferior akinesis. Indeterminate diastolic function due to mitral valve prosthesis. Right ventricular systolic function is reduced. A 26 mm Sherman Physio II annuloplasty ring appears well seated in the   mitral position with abnormal Doppler values suggesting moderate mitral   stenosis   There is mild mitral regurgitation   Moderate pulmonic regurgitation. Moderate to severe tricuspid regurgitation. Systolic pulmonary artery pressure (SPAP) is elevated and estimated at 66   mmHg (right atrial pressure 15 mmHg) consistent with severe pulmonary   hypertension.     Coronary angiogram 7/29/2022:  Ischemic  Cardiomyopathy  High risk (CHIP) PCI  PROCEDURES PERFORMED    Left heart catheterization  Coronary angiogam  Coronary cath  Temporary transvenous pacer insertion removal  Insertion of short term external heart assist system into heart, intraoperative, percutaneous approach  Assistance with cardiac output using impeller pump, continuous  Rotational atherectomy of LAD  IVUS of LAD  PCI of LAD with single drug-eluting stent  PCI of circumflex with 3 drug-eluting stents  PROCEDURE DESCRIPTION   Risks/benefits/alternatives/outcomes were discussed with patient and/or family and informed consent was obtained. Using ultrasound and fluoroscopic guidance, a 7 St Helenian sheath was inserted into both common femoral arteries and an 8 St Helenian sheath was inserted into the left common femoral vein. In the left femoral artery the short 7 St Helenian sheath was exchanged for a 45 cm destination 7 [employer] [place] sheath. Temporary transvenous pacer was inserted into the 8 St Helenian venous sheath and capture was demonstrated this was placed in backup mode for the procedure and then this was then removed at the end of the case. Attention turned towards the right groin where a pre-close technique was used with 2 Perclose devices into the right common femoral artery and then the right femoral artery was exchanged for the Impella CP sheath. A pigtail was advanced across the left ventricle and left heart catheterization was performed and then a 0.018 inch wire was advanced into the left ventricle and using this wire, the Impella device was inserted into the left ventricle and good cardiac outputs were noted. Attention turned towards PCI as noted below. At the end of the case the Impella was removed and then the Impella sheath was also removed using a dry close technique with an 8 and then 9 mm balloon. .  There were no immediate complications. Sedation was provided by the anesthesiology service. 410 cc contrast was utilized. <20cc EBL. FINDINGS    Please see prior diagnostic cath report for findings, would add that there is severe LAD tortuosity and that the proximal circumflex was also to be severely diseased with 80 to 90% stenosis that extends in the mid vessel.   Additionally would add that VIOLETTA was performed of the distal left main of the proximal LAD and this area was noted be diseased with a 50% stenosis. PERCUTANEOUS INTERVENTION DESCRIPTION    Heparin was used for anticoagulation, initially a 7 [employer] [place] XB 3.5 guide cath was used to intubate the left main. The LAD lesion was crossed with a [employer]  balloon catheter which was an over-the-wire system along with a choice floppy wire. The  balloon catheter was then advanced distally and the choice wire was removed in favor of a Rotafloppy wire. Rotational atherectomy was then performed with a 1.5 mm eugene. There was difficulty advancing the eugene and this was ultimately accomplished using Sammie glide mode. Lesion was then assessed with IVUS. It was felt to be a 2.5 mm vessel and as such a [employer] synergy 2.5 x 16 mm drug-eluting stent was implanted across the mid LAD lesion. Stent was then postdilated with a 2.5 mm noncompliant balloon. There was 0% residual stenosis. There was HAYDEN 3 flow before and after PCI. Attention then turned towards the circumflex and a choice floppy wire was taken but the circumflex could not be entered a lesion cannot be crossed due to severe angulation at the distal left main into the circumflex which was felt to be a 90 degree bend. Ultimately using a thread or microcatheter along with a PT2 mod support wire, the circumflex was entered and lesion was crossed. Circumflex lesions were then dilated with a 2.5 mm balloon and then lesions in the proximal and mid circumflex were then stented with a [employer] synergy stents, these were from distal to proximal with 2.75 x 16 mm, 3 x 12 mm and 2.5 x 12 mm drug-eluting stents. There was significant difficulty with advancing stents into the circumflex due to the severe angulation beyond the left main and 2 stents did not cross and were discarded.   Initially a 7 [employer] [place] guide extension catheter was utilized to assist with stent delivery but ultimately a 6 [employer] [place] guide extension catheter was utilized and this allowed for advancement of stents. Stents were postdilated with 3 mm noncompliant balloon. There was 0% residual stenosis. There was HAYDEN 3 flow before and after PCI.      Attention then turned towards the RCA and a 7 [employer] AL 0.75 guiding catheter was used to engage the RCA. RCA lesion could not be crossed using multiple wires including Fielder XT, PT 2 months support and  200 wires. It was felt that this lesion would require  intervention as a staged PCI. CONCLUSIONS:    Successful rotational atherectomy and PCI of LAD with single drug-eluting stent  Successful PCI of circumflex with 3 drug-eluting stents    Coronary angiogram 7/2022:  LVEDP  9   GRADIENT ACROSS AORTIC VALVE  none   LV FUNCTION EF 15 %   WALL MOTION  severe global hypokinesis with regional variation   MITRAL REGURGITATION  mild to moderate     CALIBER  normal caliber   AORTIC INSUFFICIENCY  artificial AI was created with catheter projecting slightly across aortic valve   BYPASS GRAFTS  no bypass grafts are visualized      LM  Proximal less than 10% stenosis, mid-distal 20% stenosis         LAD Medium sized vessel, calcified, proximal 40% stenosis followed by mid vessel 99% stenosis, distal 20% stenosis.  There are left-to-right collaterals noted.  These extend up to the crux of the RCA.         LCX Sharply angulated as it originates from left main, vessel is calcified with ostial/proximal 50 to 60% stenosis followed by mid 80% stenosis that extends into a medium to large sized first obtuse marginal branch.           RI  Large vessel, calcified, proximal-mid 60% stenosis, distal less than 10% stenosis, there is a small branch proximally from the ramus that has a ostial/proximal 99% stenosis.       RCA Large vessel, dominant, proximal 60 to 70% stenosis followed by mid-distal 99% subtotal occlusion with faint antegrade flow.  Vessel is heavily calcified.     There are left to right collaterals noted.      GUO-LAD  Atretic, 100% cfcgcfdm-tsd-lutzyv stenosis, chronic total occlusion         SVG-OM  100% proximal          SVG-PDA  100% proximal            CONCLUSIONS:    Normal filling pressures  Occluded bypass grafts  Severe LV dysfunction, continue medical therapy with beta-blocker and ACE inhibitor and diuretics, will need EP referral for consideration of ICD  Given occluded bypass grafts, will plan staged PCI, high risk, with Impella support as well as anesthesia support for multivessel PCI. Joaquim Lu will need atherectomy as well. Continue dual antiplatelet therapy    CABG 1/2020:  1.  Coronary artery bypass grafting x3, LIMA to LAD, reverse saphenous  vein to OM, reverse saphenous vein to diagonal.  2.  Mitral valve repair using Physio ring size 26.  3.  Left atrial appendage clip. 4.  Closure of the PFO. 5.  Intercostal nerve block. 6.  Transesophageal echo. 7.  Placement of temporary atrial and ventricular pacing wire. Cardiology Labs Reviewed:   CBC: No results for input(s): WBC, HGB, HCT, PLT in the last 72 hours. BMP:No results for input(s): NA, K, CO2, BUN, CREATININE, LABGLOM, GLUCOSE in the last 72 hours. PT/INR: No results for input(s): PROTIME, INR in the last 72 hours. APTT:No results for input(s): APTT in the last 72 hours. FASTING LIPID PANEL:  Lab Results   Component Value Date    HDL 30 06/04/2022    LDLDIRECT 45 08/02/2021    LDLCALC 35 06/04/2022    TRIG 130 06/04/2022     LIVER PROFILE:No results for input(s): AST, ALT, ALB in the last 72 hours.   BNP:   Lab Results   Component Value Date    PROBNP 32,048 06/05/2022    PROBNP 71,719 06/03/2022    PROBNP 33,135 06/02/2022    PROBNP 8,548 08/25/2020    PROBNP 24,037 06/05/2020     Reviewed all labs and imaging today    Assessment:   Chronic systolic CHF: appears compensated  CAD: stress test showed prior infarction, no significant ischemia 6/2022; s/p LULY LAD and LULY x3 LCx 4/2020; s/p CABG x3 and ZAHRAA clip 1/2020   - appears PCI  RCA also discussed but did not follow through 2020  Ischemic cardiomyopathy: known history; EF <20% on echo 6/2022; EF 25-30% 11/2021; 35-40% 9/2020; 25% in 2019  Mitral regurgitation s/p MV repair 2020  PFO: s/p closure 2020  HTN: stable  HLD: controlled, LDL 35, statin  DM    Plan:   1. Recheck BMP since changing lasix to torsemide  2. If renal function stable, will increase enalapril for cardiomyopathy. We also discussed changing to entresto, she declines currently. 3. Continue aspirin, statin, toprol, spironolactone, digoxin; consider SGLT2  4. EF persistently decreased. Recent stress test shows scar/prior infarction. Will refer to EP to consider ICD. I discussed this in detail with her and her . 5. Check BP and weight at home and call the office if consistently out of goal range  6. Follow up in 4 weeks. She would like most of her care at [facility]. She would like to see Dr. Kannan Guerrero in follow up.      Louise Hitchcock, LADAN-CNP  [facility]  [phone]

## 2022-06-10 DIAGNOSIS — I50.22 CHRONIC SYSTOLIC CHF (CONGESTIVE HEART FAILURE) (HCC): Primary | ICD-10-CM

## 2022-06-10 RX ORDER — ENALAPRIL MALEATE 20 MG/1
20 TABLET ORAL 2 TIMES DAILY
Qty: 180 TABLET | Refills: 1 | Status: SHIPPED | OUTPATIENT
Start: 2022-06-10 | End: 2022-09-27 | Stop reason: SDUPTHER

## 2022-06-10 NOTE — TELEPHONE ENCOUNTER
----- Message from LADAN Ellis CNP sent at 6/10/2022  7:36 AM EDT -----  Please let her know that fluid number looks much better. Kidney function is elevated but similar to prior. Given she felt well in office yesterday, will continue current torsemide and monitor closely. Please have her increase enalapril to 20 mg daily for heart function. BMP in 1 week. Thanks!

## 2022-06-10 NOTE — TELEPHONE ENCOUNTER
Patient informed. She notes that she is taking Enalapril 10 mg BID. Due you want to increase to 20 mg BID?

## 2022-06-28 RX ORDER — METOPROLOL SUCCINATE 25 MG/1
TABLET, EXTENDED RELEASE ORAL
Qty: 270 TABLET | Refills: 0 | Status: SHIPPED | OUTPATIENT
Start: 2022-06-28 | End: 2022-09-27 | Stop reason: SDUPTHER

## 2022-06-30 ENCOUNTER — TELEPHONE (OUTPATIENT)
Dept: INTERNAL MEDICINE CLINIC | Age: 77
End: 2022-06-30

## 2022-06-30 NOTE — TELEPHONE ENCOUNTER
----- Message from Kg Trejo MD sent at 6/30/2022  3:04 PM EDT -----  Contact: Jo-Ann Mcbride    [phone]  Stop it  ----- Message -----  From: Elise Velasco  Sent: 6/30/2022  11:10 AM EDT  To: Kg Trejo MD    Patient requesting refill of metformin. Hospital admission this month states \"Metformin stopped this admission due to frail cardiorenal process. \"  please advise.  ----- Message -----  From: Sirisha Henriquez  Sent: 6/30/2022  10:55 AM EDT  To: Elise Velasco    Patient  stated needs refill  Metformin        [facility] [id number] - [place] [address] [phone] - f [phone] (Ph: [phone])

## 2022-06-30 NOTE — TELEPHONE ENCOUNTER
----- Message from Patrick Hahn sent at 6/30/2022 10:52 AM EDT -----  Contact: Antonia Stapleton    [phone]  Patient  stated needs refill  Metformin        [facility] [id number] [facility] [address] [place] [address] [phone] - F [phone] (Ph: [phone])

## 2022-07-01 ENCOUNTER — TELEPHONE (OUTPATIENT)
Dept: INTERNAL MEDICINE CLINIC | Age: 77
End: 2022-07-01

## 2022-07-01 NOTE — TELEPHONE ENCOUNTER
----- Message from [facility] sent at 6/30/2022  3:59 PM EDT -----  Contact: Mariaa Menoner    [phone]  Attempted to contact patient, unable to leave message. ----- Message -----  From: JOHNIE Cambridge MINNA  Sent: 6/30/2022   3:18 PM EDT  To: Lisa An      ----- Message -----  From: Toni Meléndez MD  Sent: 6/30/2022   3:04 PM EDT  To: Lisa An    Stop it  ----- Message -----  From: Lisa An  Sent: 6/30/2022  11:10 AM EDT  To: Toni Meléndez MD    Patient requesting refill of metformin. Hospital admission this month states \"Metformin stopped this admission due to frail cardiorenal process. \"  please advise.  ----- Message -----  From: Tom Garner  Sent: 6/30/2022  10:55 AM EDT  To: Lisa An    Patient  stated needs refill  Metformin        [facility] [id number] - [address], [address] [place] [address] [phone] - F [phone] (Ph: [phone])

## 2022-07-01 NOTE — PROGRESS NOTES
Physician Progress Note      Maricel Rivera  CSN #:                  [id number]  :                       1945  ADMIT DATE:       6/3/2022 10:56 AM  DISCH DATE:        2022 3:40 PM  RESPONDING  PROVIDER #:        You Kamara MD          QUERY TEXT:    Pt admitted with acute on chronic systolic CHF. Pt noted to also have cad,   ckd, htn, ischemic cardiomyopathy, and MV repair . If possible, please   document in progress notes and discharge summary the etiology of CHF, if able   to be determined. The medical record reflects the following:  Risk Factors: advanced age, cad, ckd, htn, ischemic cardiomyopathy, and MV   repair  Clinical Indicators: Acute on chronic systolic CHF, CAD with ischemic   cardiomyopathy, nuclear stress test stable since 20 and 22 are   similar no new ischemic areas, EF 29%  Treatment: cardiology consult, statin, DAPT, low na diet, IV lasix, echo    Thank you for your assistance,  Rosi Rowland RN,BSN,CCDS,CRCR  Options provided:  -- CHF due to Hypertensive Heart Disease  -- CHF due to Hypertensive Heart Disease and CAD  -- CHF not due to Hypertension but due to CAD  -- CHF due to Hypertensive Heart Disease and ICMP  -- CHF not due to Hypertension but due to ICMP  -- CHF due to Hypertensive Heart Disease and Valvular Heart Disease  -- CHF not due to Hypertension but due to valvular heart disease  -- CHF due to HTN, CAD, ICMP and valvular disease  -- Other - I will add my own diagnosis  -- Disagree - Not applicable / Not valid  -- Disagree - Clinically unable to determine / Unknown  -- Refer to Clinical Documentation Reviewer    PROVIDER RESPONSE TEXT:    This patient has CHF due to hypertensive heart disease and ICMP.     Query created by: Rai Perez on 2022 11:46 AM      Electronically signed by:  You Kamara MD 2022 3:24 PM

## 2022-07-01 NOTE — TELEPHONE ENCOUNTER
----- Message from Elinor Dickinson sent at 6/30/2022  3:59 PM EDT -----  Contact: Donnie Hamlin    [phone]  Attempted to contact patient, unable to leave message. ----- Message -----  From: Elinor Dickinson  Sent: 6/30/2022   3:18 PM EDT  To: Colton Gaviria      ----- Message -----  From: Rosana Dunn MD  Sent: 6/30/2022   3:04 PM EDT  To: Colton Gaviria    Stop it  ----- Message -----  From: Colton Gaviria  Sent: 6/30/2022  11:10 AM EDT  To: Rosana Dunn MD    Patient requesting refill of metformin. Hospital admission this month states \"Metformin stopped this admission due to frail cardiorenal process. \"  please advise.  ----- Message -----  From: Beau Dunn  Sent: 6/30/2022  10:55 AM EDT  To: Colton Gaviria    Patient  stated needs refill  Metformin        [facility] [id number] - [place] [address], [address] [place] [address] [phone] - F [phone] (Ph: [phone])

## 2022-07-05 ENCOUNTER — TELEPHONE (OUTPATIENT)
Dept: INTERNAL MEDICINE CLINIC | Age: 77
End: 2022-07-05

## 2022-07-05 NOTE — TELEPHONE ENCOUNTER
----- Message from River Singh sent at 6/30/2022  3:59 PM EDT -----  Contact: Jailyn Avila    [phone]  Attempted to contact patient, unable to leave message. ----- Message -----  From: River Singh  Sent: 6/30/2022   3:18 PM EDT  To: Viraj Arguelles      ----- Message -----  From: Jessica Ayon MD  Sent: 6/30/2022   3:04 PM EDT  To: Viraj Arguelles    Stop it  ----- Message -----  From: Viraj Arguelles  Sent: 6/30/2022  11:10 AM EDT  To: Jessica Ayon MD    Patient requesting refill of metformin. Hospital admission this month states \"Metformin stopped this admission due to frail cardiorenal process. \"  please advise.  ----- Message -----  From: Yeni Culp  Sent: 6/30/2022  10:55 AM EDT  To: Viraj Arguelles    Patient  stated needs refill  Metformin        [facility] [id number] - [place] [address], [address] [place] [address] [phone] - F [phone] (Ph: [phone])

## 2022-07-06 ENCOUNTER — TELEPHONE (OUTPATIENT)
Dept: INTERNAL MEDICINE CLINIC | Age: 77
End: 2022-07-06

## 2022-07-06 NOTE — TELEPHONE ENCOUNTER
----- Message from Sarahi Buitrago MD sent at 7/6/2022  1:23 PM EDT -----  Contact: Jose Handler    [phone]  Ok to stop and monitor sugars  ----- Message -----  From: Clarisa Mayberry  Sent: 7/5/2022   4:55 PM EDT  To: Sarahi Buitrago MD    Patient states she has still been taking metformin. If she stops wanting to know if you will put her on something to replace it?  ----- Message -----  From: Ramos Su  Sent: 6/30/2022   3:59 PM EDT  To: Oleg Delgado    Attempted to contact patient, unable to leave message. ----- Message -----  From: Ramos Su  Sent: 6/30/2022   3:18 PM EDT  To: Clarisa Mayberry      ----- Message -----  From: Sarahi Buitrago MD  Sent: 6/30/2022   3:04 PM EDT  To: Clarisa Mayberry    Stop it  ----- Message -----  From: Clarisa Mayberry  Sent: 6/30/2022  11:10 AM EDT  To: Sarahi Buitrago MD    Patient requesting refill of metformin. Hospital admission this month states \"Metformin stopped this admission due to frail cardiorenal process. \"  please advise.  ----- Message -----  From: Adina Lindsey  Sent: 6/30/2022  10:55 AM EDT  To: Clarisa Mayberry    Patient  stated needs refill  Metformin        [facility] [id number] - [place] [address] [phone] - F [phone] (Ph: [phone])

## 2022-07-07 ENCOUNTER — TELEPHONE (OUTPATIENT)
Dept: INTERNAL MEDICINE CLINIC | Age: 77
End: 2022-07-07

## 2022-07-07 NOTE — TELEPHONE ENCOUNTER
----- Message from Debora Morris MD sent at 7/6/2022  1:23 PM EDT -----  Contact: Sidra Guerrero    [phone]  Ok to stop and monitor sugars  ----- Message -----  From: Betina Curran  Sent: 7/5/2022   4:55 PM EDT  To: Debora Morris MD    Patient states she has still been taking metformin. If she stops wanting to know if you will put her on something to replace it?  ----- Message -----  From: Rey Cool  Sent: 6/30/2022   3:59 PM EDT  To: Yolande Delgado    Attempted to contact patient, unable to leave message. ----- Message -----  From: Rey Cool  Sent: 6/30/2022   3:18 PM EDT  To: Betina Curran      ----- Message -----  From: Debora Morris MD  Sent: 6/30/2022   3:04 PM EDT  To: Betina Curran    Stop it  ----- Message -----  From: Betina Curran  Sent: 6/30/2022  11:10 AM EDT  To: Debora Morris MD    Patient requesting refill of metformin. Hospital admission this month states \"Metformin stopped this admission due to frail cardiorenal process. \"  please advise.  ----- Message -----  From: Lester Cardoza  Sent: 6/30/2022  10:55 AM EDT  To: Betina Curran    Patient  stated needs refill  Metformin        [facility] [id number] - [place] [address] [phone] - F [phone] (Ph: [phone])

## 2022-07-11 ENCOUNTER — TELEPHONE (OUTPATIENT)
Dept: CARDIOLOGY CLINIC | Age: 77
End: 2022-07-11

## 2022-07-11 ENCOUNTER — TELEPHONE (OUTPATIENT)
Dept: INTERNAL MEDICINE CLINIC | Age: 77
End: 2022-07-11

## 2022-07-11 RX ORDER — ATORVASTATIN CALCIUM 40 MG/1
TABLET, FILM COATED ORAL
Qty: 90 TABLET | Refills: 0 | Status: SHIPPED | OUTPATIENT
Start: 2022-07-11 | End: 2022-10-19 | Stop reason: SDUPTHER

## 2022-07-11 NOTE — TELEPHONE ENCOUNTER
----- Message from Eloisa Craig MD sent at 7/6/2022  1:23 PM EDT -----  Contact: Rocco Galvez    [phone]  Ok to stop and monitor sugars  ----- Message -----  From: Jazzy Gil  Sent: 7/5/2022   4:55 PM EDT  To: Eloisa Craig MD    Patient states she has still been taking metformin. If she stops wanting to know if you will put her on something to replace it?  ----- Message -----  From: Shoaib Martinez  Sent: 6/30/2022   3:59 PM EDT  To: Adelaida Delgado    Attempted to contact patient, unable to leave message. ----- Message -----  From: Shoaib Martinez  Sent: 6/30/2022   3:18 PM EDT  To: Jazzy Gil      ----- Message -----  From: Eloisa Craig MD  Sent: 6/30/2022   3:04 PM EDT  To: Jazzy Gil    Stop it  ----- Message -----  From: Jazzy Gil  Sent: 6/30/2022  11:10 AM EDT  To: Eloisa Craig MD    Patient requesting refill of metformin. Hospital admission this month states \"Metformin stopped this admission due to frail cardiorenal process. \"  please advise.  ----- Message -----  From: Diamond Morgan  Sent: 6/30/2022  10:55 AM EDT  To: Jazzy Gil    Patient  stated needs refill  Metformin        [facility] [id number] [facility], [address] [place] [address] [phone] - F [phone] (Ph: [phone])

## 2022-07-12 NOTE — TELEPHONE ENCOUNTER
07/12-Called ([phone]) unable to make contact, LM for pt to contact office in order to schedule.

## 2022-07-13 ENCOUNTER — OFFICE VISIT (OUTPATIENT)
Dept: FAMILY MEDICINE CLINIC | Age: 77
End: 2022-07-13
Payer: MEDICARE

## 2022-07-13 ENCOUNTER — TELEPHONE (OUTPATIENT)
Dept: INTERNAL MEDICINE CLINIC | Age: 77
End: 2022-07-13

## 2022-07-13 VITALS
BODY MASS INDEX: 22.49 KG/M2 | WEIGHT: 121 LBS | OXYGEN SATURATION: 99 % | SYSTOLIC BLOOD PRESSURE: 137 MMHG | DIASTOLIC BLOOD PRESSURE: 70 MMHG | HEART RATE: 105 BPM

## 2022-07-13 DIAGNOSIS — E78.5 HYPERLIPIDEMIA ASSOCIATED WITH TYPE 2 DIABETES MELLITUS (HCC): ICD-10-CM

## 2022-07-13 DIAGNOSIS — Z12.31 ENCOUNTER FOR SCREENING MAMMOGRAM FOR MALIGNANT NEOPLASM OF BREAST: ICD-10-CM

## 2022-07-13 DIAGNOSIS — Z95.1 S/P CABG X 3: ICD-10-CM

## 2022-07-13 DIAGNOSIS — R91.8 PULMONARY NODULES/LESIONS, MULTIPLE: ICD-10-CM

## 2022-07-13 DIAGNOSIS — Z98.890 S/P MITRAL VALVE REPAIR: ICD-10-CM

## 2022-07-13 DIAGNOSIS — N18.32 CHRONIC KIDNEY DISEASE (CKD) STAGE G3B/A1, MODERATELY DECREASED GLOMERULAR FILTRATION RATE (GFR) BETWEEN 30-44 ML/MIN/1.73 SQUARE METER AND ALBUMINURIA CREATININE RATIO LESS THAN 30 MG/G (HCC): ICD-10-CM

## 2022-07-13 DIAGNOSIS — E11.69 HYPERLIPIDEMIA ASSOCIATED WITH TYPE 2 DIABETES MELLITUS (HCC): ICD-10-CM

## 2022-07-13 DIAGNOSIS — I25.10 CAD, MULTIPLE VESSEL: ICD-10-CM

## 2022-07-13 DIAGNOSIS — R80.9 TYPE 2 DIABETES MELLITUS WITH MICROALBUMINURIA, WITHOUT LONG-TERM CURRENT USE OF INSULIN (HCC): ICD-10-CM

## 2022-07-13 DIAGNOSIS — M1A.09X0 IDIOPATHIC CHRONIC GOUT OF MULTIPLE SITES WITHOUT TOPHUS: ICD-10-CM

## 2022-07-13 DIAGNOSIS — E11.29 TYPE 2 DIABETES MELLITUS WITH MICROALBUMINURIA, WITHOUT LONG-TERM CURRENT USE OF INSULIN (HCC): ICD-10-CM

## 2022-07-13 DIAGNOSIS — I50.22 CHRONIC SYSTOLIC CHF (CONGESTIVE HEART FAILURE) (HCC): ICD-10-CM

## 2022-07-13 DIAGNOSIS — I25.5 ISCHEMIC CARDIOMYOPATHY: ICD-10-CM

## 2022-07-13 DIAGNOSIS — I10 ESSENTIAL HYPERTENSION, BENIGN: Primary | ICD-10-CM

## 2022-07-13 PROCEDURE — 99215 OFFICE O/P EST HI 40 MIN: CPT

## 2022-07-13 PROCEDURE — 1123F ACP DISCUSS/DSCN MKR DOCD: CPT

## 2022-07-13 PROCEDURE — 36415 COLL VENOUS BLD VENIPUNCTURE: CPT

## 2022-07-13 RX ORDER — ALLOPURINOL 300 MG/1
TABLET ORAL
Qty: 90 TABLET | Refills: 1 | Status: SHIPPED | OUTPATIENT
Start: 2022-07-13

## 2022-07-13 RX ORDER — FUROSEMIDE 40 MG/1
40 TABLET ORAL 2 TIMES DAILY
COMMUNITY

## 2022-07-13 RX ORDER — POTASSIUM CHLORIDE 750 MG/1
10 TABLET, EXTENDED RELEASE ORAL 2 TIMES DAILY
COMMUNITY
End: 2022-08-26 | Stop reason: SDUPTHER

## 2022-07-13 ASSESSMENT — PATIENT HEALTH QUESTIONNAIRE - PHQ9
2. FEELING DOWN, DEPRESSED OR HOPELESS: 0
SUM OF ALL RESPONSES TO PHQ QUESTIONS 1-9: 0
10. IF YOU CHECKED OFF ANY PROBLEMS, HOW DIFFICULT HAVE THESE PROBLEMS MADE IT FOR YOU TO DO YOUR WORK, TAKE CARE OF THINGS AT HOME, OR GET ALONG WITH OTHER PEOPLE: 0
4. FEELING TIRED OR HAVING LITTLE ENERGY: 0
5. POOR APPETITE OR OVEREATING: 0
3. TROUBLE FALLING OR STAYING ASLEEP: 0
9. THOUGHTS THAT YOU WOULD BE BETTER OFF DEAD, OR OF HURTING YOURSELF: 0
8. MOVING OR SPEAKING SO SLOWLY THAT OTHER PEOPLE COULD HAVE NOTICED. OR THE OPPOSITE, BEING SO FIGETY OR RESTLESS THAT YOU HAVE BEEN MOVING AROUND A LOT MORE THAN USUAL: 0
7. TROUBLE CONCENTRATING ON THINGS, SUCH AS READING THE NEWSPAPER OR WATCHING TELEVISION: 0
6. FEELING BAD ABOUT YOURSELF - OR THAT YOU ARE A FAILURE OR HAVE LET YOURSELF OR YOUR FAMILY DOWN: 0
SUM OF ALL RESPONSES TO PHQ QUESTIONS 1-9: 0

## 2022-07-13 ASSESSMENT — ENCOUNTER SYMPTOMS
SORE THROAT: 0
COLOR CHANGE: 0
SHORTNESS OF BREATH: 0
COUGH: 0
CHEST TIGHTNESS: 0
CONSTIPATION: 0
DIARRHEA: 0
EYE DISCHARGE: 0
ABDOMINAL DISTENTION: 0
EYE PAIN: 0
ABDOMINAL PAIN: 0

## 2022-07-13 NOTE — TELEPHONE ENCOUNTER
----- Message from Mayte Méndez MD sent at 2022  1:23 PM EDT -----  Contact: Rita Apgar    [phone]  Ok to stop and monitor sugars  ----- Message -----  From: Deborah Daugherty  Sent: 2022   4:55 PM EDT  To: Mayte Méndez MD    Patient states she has still been taking metformin. If she stops wanting to know if you will put her on something to replace it?  ----- Message -----  From: Iman Huddleston  Sent: 2022   3:59 PM EDT  To: Lioryonatan Delgado    Attempted to contact patient, unable to leave message. ----- Message -----  From: Iman Huddleston  Sent: 2022   3:18 PM EDT  To: Deborah Daugherty      ----- Message -----  From: Mayte Méndez MD  Sent: 2022   3:04 PM EDT  To: Deborah Daugherty    Stop it  ----- Message -----  From: Deborah Daugherty  Sent: 2022  11:10 AM EDT  To: Mayte Méndez MD    Patient requesting refill of metformin. Hospital admission this month states \"Metformin stopped this admission due to frail cardiorenal process. \"  please advise.  ----- Message -----  From: Kim Olmstead  Sent: 2022  10:55 AM EDT  To: Deborah Daugherty    Patient  stated needs refill  Metformin        [id number] [address], [address] [place] [address] [phone] - F [phone] (Ph: [phone])

## 2022-07-13 NOTE — TELEPHONE ENCOUNTER
----- Message from Zurdo Willis MD sent at 7/6/2022  1:23 PM EDT -----  Contact: Jyotsna Stearns    [phone]  Ok to stop and monitor sugars  ----- Message -----  From: Lissett Coelho  Sent: 7/5/2022   4:55 PM EDT  To: Zurdo Willis MD    Patient states she has still been taking metformin. If she stops wanting to know if you will put her on something to replace it?  ----- Message -----  From: Lucia Lau  Sent: 6/30/2022   3:59 PM EDT  To: Becky Delgado    Attempted to contact patient, unable to leave message. ----- Message -----  From: Lucia Lau  Sent: 6/30/2022   3:18 PM EDT  To: Lissett Coelho      ----- Message -----  From: Zurdo Willis MD  Sent: 6/30/2022   3:04 PM EDT  To: Lissett Coelho    Stop it  ----- Message -----  From: Lissett Coelho  Sent: 6/30/2022  11:10 AM EDT  To: Zurdo Willis MD    Patient requesting refill of metformin. Hospital admission this month states \"Metformin stopped this admission due to frail cardiorenal process. \"  please advise.  ----- Message -----  From: Sharonda Owens  Sent: 6/30/2022  10:55 AM EDT  To: Lissett Coelho    Patient  stated needs refill  Metformin        [facility] [id number] - [address] [phone] - F [phone] (Ph: [phone])

## 2022-07-13 NOTE — PROGRESS NOTES
[facility] Medicine  Establish care visit   [id number]    Nita Forrester (:  ) is a 68 y.o. female, here to establish care. Chief Complaint   Patient [facility] with   [facility].     former pt of Dr Katie Thomas        ASSESSMENT/ PLAN  1. Essential hypertension, benign  -On Toprol-XL, enalapril and digoxin  -Continue    2. Chronic systolic CHF (congestive heart failure) (HCC)  -Chronic systolic CHF. Recent echocardiogram in  did show EF of around 25%. -Denies any shortness of breath or chest pain at this time.  -Status post three-vessel bypass  -Status post mitral valve replacement  - MICROALBUMIN / CREATININE URINE RATIO; Future    3. Chronic kidney disease (CKD) stage G3b/A1, moderately decreased glomerular filtration rate (GFR) between 30-44 mL/min/1.73 square meter and albuminuria creatinine ratio less than 30 mg/g ([facility])  -Refer to nephrology  -Repeat kidney numbers  -She was recently started on Lasix 40 mg daily. Recommend following up with kidney numbers every 3 months to assess for worsening kidney failure related to loop diuretic    - Comprehensive Metabolic Panel  - MICROALBUMIN / CREATININE URINE RATIO; Future  - [facility] - Chelsea Cruz MD, Nephrology, [facility]    4. Type 2 diabetes mellitus with microalbuminuria, without long-term current use of insulin ([facility])  -Stable on metformin twice daily  -Last A1c was 5.7 in 2021  -Repeat today  - Hemoglobin A1C    5. CAD, multiple vessel  -Status post x3 CABG  -Drug-eluting stents in LAD and circumflex artery    6. Hyperlipidemia associated with type 2 diabetes mellitus ([facility])  -Continue Lipitor 40 mg    7. Ischemic cardiomyopathy  -Follows with Dr. Kacie Lopez of cardiology  -Continue Toprol-XL, digoxin and enalapril for blood pressure control  -Likely resulted in systolic CHF  -Status post x3 CABG    8. S/P mitral valve repair  -No residual effects at this time    9. S/P CABG x 3  -See above    10.  Idiopathic chronic gout of multiple sites without tophus  -Refill allopurinol 300 mg daily    11. Encounter for screening mammogram for malignant neoplasm of breast  -Mammogram reordered  - ANIKET DIGITAL SCREEN W OR WO CAD BILATERAL; Future    12. Pulmonary nodules/lesions, multiple  -7 mm lung nodule noted in 2018. Recommendation follow-up was 12 months at that time. I suspect that she did not get this completed due to COVID pandemic.  -Repeat LDCT  - Low Dose Chest CT-Abnormal Lung Screen Follow up; Future         Preventative Care:  Mammo  Labs      Imagin/3/22 stress     Risk Factors        The patient risk factors include:prior PCI on 2020;prior CABG on    2020;Current/Recent(w/in 1 year) tobacco use, treated    hypercholesterolemia, treated hypertension, insulin treated diabetes    mellitus, renal failure, prior heart failure , prior MI and (pack years:    45).      Conclusions        Summary    Global hypokinesis with EF 29%    Medium-large sized inferolateral minimally reversible defect of moderate    intensity consistent with infarction in the territory of the proximal LCx    and/or RCA .           ECHO 6/3/22    Summary   Limited echo for LVEF, mitral valve repair. Left ventricular systolic function is severely reduced with a visually   estimated ejection fraction of <20 %. EF estimated by Hernandze's method at 16 %. The left ventricle is moderately dilated in size with mild septal   hypertrophy. Severe global hypokinesis with inferior akinesis. Indeterminate diastolic function due to mitral valve prosthesis. Right ventricular systolic function is reduced. A 26 mm Sherman Physio II annuloplasty ring appears well seated in the   mitral position with abnormal Doppler values suggesting moderate mitral   stenosis   There is mild mitral regurgitation   Moderate pulmonic regurgitation. Moderate to severe tricuspid regurgitation.    Systolic pulmonary artery pressure (SPAP) is elevated and estimated at 66   mmHg (right atrial pressure 15 mmHg) consistent with severe pulmonary   hypertension. 11/30/21 Echo     Summary   Left ventricular systolic function is moderate to severely reduced with a   visually estimated ejection fraction of 25-30 %. The left ventricle is mildly dilated in size with mild hypertrophy. There is global hypokinesis with more prominent in the basal and mid   inferoseptal, and inferior walls. Indeterminate diastolic function due to mitral valve prosthesis. The left atrium is moderately dilated. An annuloplasty ring is seen in the mitral position. Mild mitral, pulmonic and tricuspid regurgitation. Systolic pulmonary artery pressure (sPAP) is normal and estimated at 39 mmHg   (right atrial pressure 3 mmHg)    11/30/2015 ECHO. Summary   Normal left ventricle size and systolic function with an estimated ejection   fraction of 55%. Mild concentric left ventricular hypertrophy. No regional   wall motion abnormalities are seen. There is reversal of E/A inflow velocities across the mitral valve   suggesting impaired left ventricular relaxation. E/A 0.7, IVRT 107msec. Mitral valve is structurally normal.   Mitral valve leaflets appear to open adequately. Trivial mitral regurgitation is present. The aortic valve appears tricuspid . The aortic leaflets appear to open adequately. No evidence of aortic valve regurgitation. No evidence of aortic valve stenosis. Normal right ventricular size and function. Return in about 3 months (around 10/13/2022) for regular follow up. Raven WOO  Patient is here as a new patient today. Previous patient of Dr. Nikki Bowen. Known history of systolic CHF, hypertension, type 2 diabetes, status post CABG x3, x4 drug-eluting stents, chronic kidney disease stage IIIb, history of parathyroid nodule, mitral valve replacement and hyperlipidemia.     She was recently admitted to [facility] in early June for acute on chronic congestive heart failure. Repeat echocardiogram and stress test on 6/3 did reveal ejection fraction of 20 to 25%. Ejection fraction in November 2015 was normal.  Upon discharge she was started on Lasix 40 mg daily. She has never followed with a nephrologist before. She does follow with Dr. Claudette Aviles of cardiology. Diabetes is controlled on metformin. Blood pressure has been controlled on Toprol-XL, enalapril and digoxin    She is on Lipitor 40 mg for hyperlipidemia and history of coronary artery disease    On allopurinol for gout         ROS  Review of Systems   Constitutional: Negative for chills, fever and unexpected weight change. HENT: Negative for congestion, dental problem and sore throat. Eyes: Negative for pain and discharge. Respiratory: Negative for cough, chest tightness and shortness of breath. Cardiovascular: Positive for leg swelling. Negative for chest pain and palpitations. Gastrointestinal: Negative for abdominal distention, abdominal pain, constipation and diarrhea. Endocrine: Negative for polydipsia, polyphagia and polyuria. Genitourinary: Negative for dysuria, flank pain, hematuria and urgency. Musculoskeletal: Negative for arthralgias, joint swelling and myalgias. Skin: Negative for color change and rash. Neurological: Negative for dizziness, light-headedness, numbness and headaches. Psychiatric/Behavioral: Negative for agitation and behavioral problems. The patient is not nervous/anxious.          HISTORIES  Current Outpatient Medications on File Prior to Visit   Medication Sig Dispense Refill    furosemide (LASIX) 40 MG tablet Take 40 mg by mouth 2 times daily      potassium chloride (KLOR-CON M) 10 MEQ extended release tablet Take 10 mEq by mouth 2 times daily      metFORMIN (GLUCOPHAGE) 500 MG tablet Take 500 mg by mouth 2 times daily (with meals)      atorvastatin (LIPITOR) 40 MG tablet TAKE ONE TABLET BY MOUTH DAILY 90 tablet 0    metoprolol succinate (TOPROL XL) 25 MG extended release tablet TAKE THREE TABLETS BY MOUTH DAILY 270 tablet 0    enalapril (VASOTEC) 20 MG tablet Take 1 tablet by mouth 2 times daily TAKE ONE TABLET BY MOUTH TWICE DAILY 180 tablet 1    digoxin (LANOXIN) 125 MCG tablet TAKE ONE TABLET BY MOUTH EVERY OTHER DAY 45 tablet 0    aspirin 81 MG EC tablet Take 81 mg by mouth daily. No current facility-administered medications on file prior to visit.         Allergies   Allergen Reactions    Penicillins Anaphylaxis     Penicillin and derivatives       Past Medical History:   Diagnosis Date    Anemia     Backache, unspecified 3/11/08    CAD, multiple vessel 1/6/2020    Chronic kidney disease (CKD) stage G3b/A1, moderately decreased glomerular filtration rate (GFR) between 30-44 mL/min/1.73 square meter and albuminuria creatinine ratio less than 30 mg/g ([facility]) 4/16/2021    Chronic systolic CHF (congestive heart failure) ([address] 12/11/2019    Depressive disorder, not elsewhere classified 11/8/07    Essential hypertension, benign 11/8/07    Gout 8/11/2011    Hyperlipidemia associated with type 2 diabetes mellitus ([address] 12/14/2015    Major depressive disorder, recurrent episode, moderate ([address] 12/14/2015    Osteoarthritis, hand 2/27/2012    Osteoarthrosis, unspecified whether generalized or localized, lower leg 11/8/07    Osteopenia 8/5/2015    Other and unspecified hyperlipidemia 11/8/07    Pain in joint, lower leg 1/29/07    Pneumonia     Rotator cuff tendinitis 10/6/2017    Tear of medial cartilage or meniscus of knee, current 1/29/07    Type 2 diabetes mellitus with microalbuminuria, without long-term current use of insulin ([address] 5/5/2017    Type 2 diabetes mellitus without complication ([address] 69/9/2733    Type II or unspecified type diabetes mellitus without mention of complication, not stated as uncontrolled 11/8/07    foot exam 7/16/08 normal       Patient Active Problem List   Diagnosis    Backache    Essential hypertension, benign    Osteoarthrosis involving lower leg    Gout    Osteoarthritis, hand    Osteopenia    Chest pain    Hyperlipidemia associated with type 2 diabetes mellitus ([facility])    Major depressive disorder, recurrent episode, moderate ([facility])    Primary insomnia    Type 2 diabetes mellitus with microalbuminuria, without long-term current use of insulin ([facility])    Rotator cuff tendinitis    Other restrictive cardiomyopathy ([facility])    Nonrheumatic mitral valve regurgitation    Acute on chronic systolic CHF (congestive heart failure) ([facility])    CAD, multiple vessel    Ischemic cardiomyopathy    S/P angioplasty with stent    Chronic kidney disease (CKD) stage G3b/A1, moderately decreased glomerular filtration rate (GFR) between 30-44 mL/min/1.73 square meter and albuminuria creatinine ratio less than 30 mg/g ([facility])    Acute on chronic combined systolic (congestive) and diastolic (congestive) heart failure ([facility])    S/P CABG x 3    S/P mitral valve repair    Chronic systolic CHF (congestive heart failure) ([facility])    Pulmonary nodules/lesions, multiple       Past Surgical History:   Procedure Laterality Date    APPENDECTOMY      CHOLECYSTECTOMY      CORONARY ARTERY BYPASS GRAFT N/A 1/8/2020    CORONARY ARTERY BYPASS GRAFTING X3, INTERNAL MAMMARY ARTERY, SAPHENOUS VEIN GRAFTING, ON PUMP MITRAL VALVE RING REPAIR USING 26MM PELAEZ PHYSIO II RING, WITH LEFT ATRIAL APPENDAGE CLIP, PFO CLOSURETEE, 5 LEVEL BILATERAL INTERCOSTAL NERVE BLOCK performed by Portillo Ervin MD at [address] (CERVIX STATUS UNKNOWN)      total ab hyst    PARATHYROID GLAND SURGERY      explore parathyroid glands    TOTAL KNEE ARTHROPLASTY  03/29/10    left knee       Social History     Socioeconomic History    Marital status:      Spouse name: Not on file    Number of children: Not on file    Years of education: Not on file    Highest education level: Not on file   Occupational History    Not on file Tobacco Use    Smoking status: Former Smoker     Packs/day: 0.50     Years: 20.00     Pack years: 10.00     Quit date: 2015     Years since quittin.6    Smokeless tobacco: Never Used   Vaping Use    Vaping Use: Never used   Substance and Sexual Activity    Alcohol use: No     Alcohol/week: 0.0 standard drinks    Drug use: No    Sexual activity: Yes     Partners: Male   Other Topics Concern    Not on file   Social History Narrative    Not on file     Social Determinants of Health     Financial Resource Strain:     Difficulty of Paying Living Expenses: Not on file   Food Insecurity:     Worried About Running Out of Food in the Last Year: Not on file    Anika of Food in the Last Year: Not on file   Transportation Needs:     Lack of Transportation (Medical): Not on file    Lack of Transportation (Non-Medical):  Not on file   Physical Activity:     Days of Exercise per Week: Not on file    Minutes of Exercise per Session: Not on file   Stress:     Feeling of Stress : Not on file   Social Connections:     Frequency of Communication with Friends and Family: Not on file    Frequency of Social Gatherings with Friends and Family: Not on file    Attends Evangelical Services: Not on file    Active Member of [address] [place] [address] [employer] or Organizations: Not on file    Attends Club or Organization Meetings: Not on file    Marital Status: Not on file   Intimate Partner Violence:     Fear of Current or Ex-Partner: Not on file    Emotionally Abused: Not on file    Physically Abused: Not on file    Sexually Abused: Not on file   Housing Stability:     Unable to Pay for Housing in the Last Year: Not on file    Number of [place] in the Last Year: Not on file    Unstable Housing in the Last Year: Not on file        Family History   Problem Relation Age of Onset    Cancer Mother 68        colon    Diabetes Mother     Other Father         gun shot wound    Hypertension Sister     Diabetes Sister     High Blood Pressure Sister     High Cholesterol Sister     Cancer Brother 59        mets everywhere    Cancer Brother 77        colon    Diabetes Sister     Other Sister         bipolar       PE  Vitals:    22 1333   BP: 137/70   Site: Left Upper Arm   Position: Sitting   Cuff Size: Medium Adult   Pulse: (!) 105   SpO2: 99%   Weight: 121 lb (54.9 kg)     Estimated body mass index is 22.49 kg/m² as calculated from the following:    Height as of 22: 5' 1.5\" (1.562 m). Weight as of this encounter: 121 lb (54.9 kg). Physical Exam  Constitutional:       General: She is not in acute distress. Appearance: Normal appearance. She is not ill-appearing. HENT:      Head: Normocephalic. Right Ear: Tympanic membrane and external ear normal.      Left Ear: Tympanic membrane and external ear normal.      Nose: No congestion or rhinorrhea. Mouth/Throat:      Mouth: Mucous membranes are moist.      Pharynx: Oropharynx is clear. No posterior oropharyngeal erythema. Eyes:      Extraocular Movements: Extraocular movements intact. Conjunctiva/sclera: Conjunctivae normal.      Pupils: Pupils are equal, round, and reactive to light. Cardiovascular:      Rate and Rhythm: Normal rate and regular rhythm. Pulses: Normal pulses. Heart sounds: Heart sounds are distant. Murmur heard. Systolic murmur is present with a grade of 3/6. Pulmonary:      Effort: Pulmonary effort is normal. No respiratory distress. Breath sounds: Normal breath sounds. No wheezing. Abdominal:      General: Abdomen is flat. Bowel sounds are normal.      Palpations: Abdomen is soft. Tenderness: There is no abdominal tenderness. Hernia: No hernia is present. Musculoskeletal:         General: No swelling. Normal range of motion. Cervical back: Normal range of motion and neck supple. No tenderness. Right lower le+ Pitting Edema present. Left lower le+ Pitting Edema present. Lymphadenopathy:      Cervical: No cervical adenopathy. Skin:     General: Skin is warm and dry. Capillary Refill: Capillary refill takes less than 2 seconds. Neurological:      General: No focal deficit present. Mental Status: She is alert and oriented to person, place, and time. Mental status is at baseline. Cranial Nerves: No cranial nerve deficit. Psychiatric:         Mood and Affect: Mood normal.         Behavior: Behavior normal.         Judgment: Judgment normal.         Immunization History   Administered Date(s) Administered    COVID-19, MODERNA BLUE border, Primary or Immunocompromised, (age 12y+), IM, 100 mcg/0.5mL 02/12/2021, 03/12/2021    COVID-19, MODERNA Booster BLUE border, (age 18y+), IM, 50mcg/0.25mL 11/26/2021    Influenza 11/11/2011, 08/27/2012    Influenza Virus Vaccine 09/15/2014, 10/02/2015    Influenza Whole 09/23/2010    Influenza, High Dose (Fluzone 65 yrs and older) 10/10/2016, 10/06/2017, 10/24/2018    Pneumococcal Conjugate 13-valent ([id number]) 08/10/2018    Pneumococcal Conjugate 7-valent (Prevnar7) 10/05/2005    Pneumococcal Polysaccharide ([id number]) 12/01/2015       Health Maintenance   Topic Date Due    DTaP/Tdap/Td vaccine (1 - Tdap) Never done    Shingles vaccine (1 of 2) Never done    Depression Monitoring  04/16/2022    Annual Wellness Visit (AWV)  04/17/2022    Flu vaccine (1) 09/01/2022    Lipids  06/04/2023    DEXA (modify frequency per FRAX score)  Completed    Pneumococcal 65+ years Vaccine  Completed    COVID-19 Vaccine  Completed    Hepatitis C screen  Completed    Hepatitis A vaccine  Aged Out    Hib vaccine  Aged Out    Meningococcal (ACWY) vaccine  Aged Out       PSH, PMH, SH and FH reviewed and noted. Recent and past labs, tests and consults also reviewed. Recent or new meds also reviewed. LADAN Beal - CNP    This dictation was generated by voice recognition computer software.   Although all attempts are made to edit the dictation for accuracy, there may be errors in the transcription that are not intended.

## 2022-07-14 LAB
A/G RATIO: 1.7 (ref 1.1–2.2)
ALBUMIN SERPL-MCNC: 4.1 G/DL (ref 3.4–5)
ALP BLD-CCNC: 142 U/L (ref 40–129)
ALT SERPL-CCNC: 12 U/L (ref 10–40)
ANION GAP SERPL CALCULATED.3IONS-SCNC: 16 MMOL/L (ref 3–16)
AST SERPL-CCNC: 16 U/L (ref 15–37)
BILIRUB SERPL-MCNC: 0.5 MG/DL (ref 0–1)
BUN BLDV-MCNC: 40 MG/DL (ref 7–20)
CALCIUM SERPL-MCNC: 9.6 MG/DL (ref 8.3–10.6)
CHLORIDE BLD-SCNC: 100 MMOL/L (ref 99–110)
CO2: 21 MMOL/L (ref 21–32)
CREAT SERPL-MCNC: 1.2 MG/DL (ref 0.6–1.2)
ESTIMATED AVERAGE GLUCOSE: 148.5 MG/DL
GFR AFRICAN AMERICAN: 53
GFR NON-AFRICAN AMERICAN: 44
GLUCOSE BLD-MCNC: 106 MG/DL (ref 70–99)
HBA1C MFR BLD: 6.8 %
POTASSIUM SERPL-SCNC: 5.5 MMOL/L (ref 3.5–5.1)
SODIUM BLD-SCNC: 137 MMOL/L (ref 136–145)
TOTAL PROTEIN: 6.5 G/DL (ref 6.4–8.2)

## 2022-07-14 NOTE — TELEPHONE ENCOUNTER
07/14-Called ([phone]) unable to make contact, LM for pt to contact office in order to schedule.

## 2022-07-15 NOTE — RESULT ENCOUNTER NOTE
A1c is 6.8.  7 months ago was 5.7. This is still good control however we have elevated sugar numbers compared to 7 months ago.     Patient is stable follow-up with nephrology

## 2022-07-27 ENCOUNTER — TELEPHONE (OUTPATIENT)
Dept: FAMILY MEDICINE CLINIC | Age: 77
End: 2022-07-27

## 2022-07-27 NOTE — TELEPHONE ENCOUNTER
Patient received a letter to call the office for test results. Informed her of results. She would be willing to see a nephrologist if Harish Hassan CNP would refer her to someone in the area of [place] to [facility] [place]. Will not go in the [facility] area.

## 2022-08-26 RX ORDER — POTASSIUM CHLORIDE 750 MG/1
TABLET, FILM COATED, EXTENDED RELEASE ORAL
Qty: 30 TABLET | Refills: 2 | Status: SHIPPED | OUTPATIENT
Start: 2022-08-26

## 2022-08-26 RX ORDER — POTASSIUM CHLORIDE 750 MG/1
10 TABLET, EXTENDED RELEASE ORAL 2 TIMES DAILY
Qty: 60 TABLET | Refills: 1 | Status: SHIPPED | OUTPATIENT
Start: 2022-08-26 | End: 2022-08-26

## 2022-08-26 NOTE — TELEPHONE ENCOUNTER
Potassium Chloride 10 MEQ, [address]  [address] [facility] [address]  is requesting 90 day supply      Last OV- 7/13/22 fu 3m    Next OV-10/14/22

## 2022-09-26 RX ORDER — METOPROLOL SUCCINATE 25 MG/1
TABLET, EXTENDED RELEASE ORAL
Qty: 270 TABLET | Refills: 0 | OUTPATIENT
Start: 2022-09-26

## 2022-09-26 RX ORDER — ENALAPRIL MALEATE 10 MG/1
TABLET ORAL
Qty: 180 TABLET | Refills: 1 | OUTPATIENT
Start: 2022-09-26

## 2022-09-27 RX ORDER — ENALAPRIL MALEATE 20 MG/1
20 TABLET ORAL 2 TIMES DAILY
Qty: 180 TABLET | Refills: 0 | Status: SHIPPED | OUTPATIENT
Start: 2022-09-27

## 2022-09-27 RX ORDER — METOPROLOL SUCCINATE 25 MG/1
TABLET, EXTENDED RELEASE ORAL
Qty: 270 TABLET | Refills: 0 | Status: SHIPPED | OUTPATIENT
Start: 2022-09-27

## 2022-10-14 ENCOUNTER — OFFICE VISIT (OUTPATIENT)
Dept: FAMILY MEDICINE CLINIC | Age: 77
End: 2022-10-14
Payer: MEDICARE

## 2022-10-14 VITALS — DIASTOLIC BLOOD PRESSURE: 70 MMHG | WEIGHT: 125 LBS | BODY MASS INDEX: 23.24 KG/M2 | SYSTOLIC BLOOD PRESSURE: 134 MMHG

## 2022-10-14 DIAGNOSIS — E11.69 HYPERLIPIDEMIA ASSOCIATED WITH TYPE 2 DIABETES MELLITUS (HCC): ICD-10-CM

## 2022-10-14 DIAGNOSIS — R80.9 TYPE 2 DIABETES MELLITUS WITH MICROALBUMINURIA, WITHOUT LONG-TERM CURRENT USE OF INSULIN (HCC): ICD-10-CM

## 2022-10-14 DIAGNOSIS — E11.29 TYPE 2 DIABETES MELLITUS WITH MICROALBUMINURIA, WITHOUT LONG-TERM CURRENT USE OF INSULIN (HCC): ICD-10-CM

## 2022-10-14 DIAGNOSIS — R91.8 PULMONARY NODULES/LESIONS, MULTIPLE: ICD-10-CM

## 2022-10-14 DIAGNOSIS — E78.5 HYPERLIPIDEMIA ASSOCIATED WITH TYPE 2 DIABETES MELLITUS (HCC): ICD-10-CM

## 2022-10-14 DIAGNOSIS — I50.22 CHRONIC SYSTOLIC CHF (CONGESTIVE HEART FAILURE) (HCC): ICD-10-CM

## 2022-10-14 DIAGNOSIS — I10 ESSENTIAL HYPERTENSION, BENIGN: Primary | ICD-10-CM

## 2022-10-14 DIAGNOSIS — N18.32 CHRONIC KIDNEY DISEASE (CKD) STAGE G3B/A1, MODERATELY DECREASED GLOMERULAR FILTRATION RATE (GFR) BETWEEN 30-44 ML/MIN/1.73 SQUARE METER AND ALBUMINURIA CREATININE RATIO LESS THAN 30 MG/G (HCC): ICD-10-CM

## 2022-10-14 DIAGNOSIS — Z23 FLU VACCINE NEED: ICD-10-CM

## 2022-10-14 LAB — HBA1C MFR BLD: 6.8 %

## 2022-10-14 PROCEDURE — 1123F ACP DISCUSS/DSCN MKR DOCD: CPT

## 2022-10-14 PROCEDURE — 3044F HG A1C LEVEL LT 7.0%: CPT

## 2022-10-14 PROCEDURE — G0008 ADMIN INFLUENZA VIRUS VAC: HCPCS

## 2022-10-14 PROCEDURE — 90694 VACC AIIV4 NO PRSRV 0.5ML IM: CPT

## 2022-10-14 PROCEDURE — 99214 OFFICE O/P EST MOD 30 MIN: CPT

## 2022-10-14 PROCEDURE — 83036 HEMOGLOBIN GLYCOSYLATED A1C: CPT

## 2022-10-14 RX ORDER — DIGOXIN 125 MCG
TABLET ORAL
Qty: 45 TABLET | Refills: 3 | Status: SHIPPED | OUTPATIENT
Start: 2022-10-14

## 2022-10-14 ASSESSMENT — ENCOUNTER SYMPTOMS
CHEST TIGHTNESS: 0
SORE THROAT: 0
DIARRHEA: 0
COLOR CHANGE: 0
CONSTIPATION: 0
SHORTNESS OF BREATH: 0
ABDOMINAL PAIN: 0
EYE DISCHARGE: 0
EYE PAIN: 0
ABDOMINAL DISTENTION: 0
COUGH: 0

## 2022-10-14 NOTE — PROGRESS NOTES
[facility]        Alina Palacios (:  ) is a 68 y.o. female, here to follow up on chronic conditions . Chief Complaint   Patient presents with    Hypertension    Congestive Heart Failure        ASSESSMENT/ PLAN  1. Essential hypertension, benign  -Controlled today  -Continue digoxin 125 mcg every other day  -Continue Toprol 25 mg daily  -Continue enalapril 20 mg daily    - digoxin (LANOXIN) 125 MCG tablet; TAKE ONE TABLET BY MOUTH EVERY OTHER DAY  Dispense: 45 tablet; Refill: 3    2. Type 2 diabetes mellitus with microalbuminuria, without long-term current use of insulin ([facility])  -Start Farxiga 10 mg for diabetes control as well as chronic kidney disease  -A1c today 6.8  -Controlled today  - dapagliflozin (FARXIGA) 10 MG tablet; Take 1 tablet by mouth every morning  Dispense: 30 tablet; Refill: 2  - POCT glycosylated hemoglobin (Hb A1C)    3. Chronic kidney disease (CKD) stage G3b/A1, moderately decreased glomerular filtration rate (GFR) between 30-44 mL/min/1.73 square meter and albuminuria creatinine ratio less than 30 mg/g ([facility])  -Strongly encouraged her to follow back up with nephrology  -Start Farxiga 10 mg  -GFR last was 44  -Reassess today    - dapagliflozin (FARXIGA) 10 MG tablet; Take 1 tablet by mouth every morning  Dispense: 30 tablet; Refill: 2  - Basic Metabolic Panel    4. Hyperlipidemia associated with type 2 diabetes mellitus ([facility])  -Continue Lipitor 40 mg daily    5. Chronic systolic CHF (congestive heart failure) ([facility])  -Echocardiogram does show EF of 20-25%  -Continue digoxin  -No new symptoms of shortness of breath or chest pain at this time  - digoxin (LANOXIN) 125 MCG tablet; TAKE ONE TABLET BY MOUTH EVERY OTHER DAY  Dispense: 45 tablet; Refill: 3  - dapagliflozin (FARXIGA) 10 MG tablet; Take 1 tablet by mouth every morning  Dispense: 30 tablet; Refill: 2    6.  Pulmonary nodules/lesions, multiple  -Multiple pulmonary nodules noted in the past  -She has LDCT monitoring ordered  -She has not completed this    7. Flu vaccine need  Flu vaccine today  - Influenza, FLUAD, (age 72 y+), IM, Preservative Free, 0.5 mL        Preventative Care:  Mammo  Labs  LDCT ordered      Imagin/3/22 stress     Risk Factors        The patient risk factors include:prior PCI on 2020;prior CABG on    2020;Current/Recent(w/in 1 year) tobacco use, treated    hypercholesterolemia, treated hypertension, insulin treated diabetes    mellitus, renal failure, prior heart failure , prior MI and (pack years:    39). Conclusions        Summary    Global hypokinesis with EF 29%    Medium-large sized inferolateral minimally reversible defect of moderate    intensity consistent with infarction in the territory of the proximal LCx    and/or RCA . ECHO 6/3/22    Summary   Limited echo for LVEF, mitral valve repair. Left ventricular systolic function is severely reduced with a visually   estimated ejection fraction of <20 %. EF estimated by Hernandez's method at 16 %. The left ventricle is moderately dilated in size with mild septal   hypertrophy. Severe global hypokinesis with inferior akinesis. Indeterminate diastolic function due to mitral valve prosthesis. Right ventricular systolic function is reduced. A 26 mm Sherman Physio II annuloplasty ring appears well seated in the   mitral position with abnormal Doppler values suggesting moderate mitral   stenosis   There is mild mitral regurgitation   Moderate pulmonic regurgitation. Moderate to severe tricuspid regurgitation. Systolic pulmonary artery pressure (SPAP) is elevated and estimated at 66   mmHg (right atrial pressure 15 mmHg) consistent with severe pulmonary   hypertension. 21 Echo     Summary   Left ventricular systolic function is moderate to severely reduced with a   visually estimated ejection fraction of 25-30 %. The left ventricle is mildly dilated in size with mild hypertrophy. There is global hypokinesis with more prominent in the basal and mid   inferoseptal, and inferior walls. Indeterminate diastolic function due to mitral valve prosthesis. The left atrium is moderately dilated. An annuloplasty ring is seen in the mitral position. Mild mitral, pulmonic and tricuspid regurgitation. Systolic pulmonary artery pressure (sPAP) is normal and estimated at 39 mmHg   (right atrial pressure 3 mmHg)    11/30/2015 ECHO. Summary   Normal left ventricle size and systolic function with an estimated ejection   fraction of 55%. Mild concentric left ventricular hypertrophy. No regional   wall motion abnormalities are seen. There is reversal of E/A inflow velocities across the mitral valve   suggesting impaired left ventricular relaxation. E/A 0.7, IVRT 107msec. Mitral valve is structurally normal.   Mitral valve leaflets appear to open adequately. Trivial mitral regurgitation is present. The aortic valve appears tricuspid . The aortic leaflets appear to open adequately. No evidence of aortic valve regurgitation. No evidence of aortic valve stenosis. Normal right ventricular size and function. Return in about 3 months (around 1/14/2023) for Regular follow up. [facility]  Patient is here as a new patient today. Previous patient of Dr. Elliot Oliva. Known history of systolic CHF, hypertension, type 2 diabetes, status post CABG x3, x4 drug-eluting stents, chronic kidney disease stage IIIb, history of parathyroid nodule, mitral valve replacement and hyperlipidemia. She was recently admitted to [facility] in early June for acute on chronic congestive heart failure. Repeat echocardiogram and stress test on 6/3 did reveal ejection fraction of 20 to 25%. Ejection fraction in November 2015 was normal.  Upon discharge she was started on Lasix 40 mg daily. She has never followed with a nephrologist before.   She does follow with Dr. Jose L Clark of cardiology. Diabetes is controlled on metformin. Blood pressure has been controlled on Toprol-XL, enalapril and digoxin    She is on Lipitor 40 mg for hyperlipidemia and history of coronary artery disease    On allopurinol for gout    Interval history 10/14/22:  Patient is here for follow-up on diabetes, hypertension,, CHF and chronic kidney disease. Since last time she has not follow-up with nephrology yet     Blood sugars have been running well at home. Last A1c was 6.8. No new symptoms of shortness of breath. She has not completed LDCT or mammogram.    ROS  Review of Systems   Constitutional:  Negative for chills, fever and unexpected weight change. HENT:  Negative for congestion, dental problem and sore throat. Eyes:  Negative for pain and discharge. Respiratory:  Negative for cough, chest tightness and shortness of breath. Cardiovascular:  Positive for leg swelling. Negative for chest pain and palpitations. Gastrointestinal:  Negative for abdominal distention, abdominal pain, constipation and diarrhea. Endocrine: Negative for polydipsia, polyphagia and polyuria. Genitourinary:  Negative for dysuria, flank pain, hematuria and urgency. Musculoskeletal:  Negative for arthralgias, joint swelling and myalgias. Skin:  Negative for color change and rash. Neurological:  Negative for dizziness, light-headedness, numbness and headaches. Psychiatric/Behavioral:  Negative for agitation and behavioral problems. The patient is not nervous/anxious.        HISTORIES  Current Outpatient Medications on File Prior to Visit   Medication Sig Dispense Refill    metoprolol succinate (TOPROL XL) 25 MG extended release tablet TAKE THREE TABLETS BY MOUTH DAILY 270 tablet 0    enalapril (VASOTEC) 20 MG tablet Take 1 tablet by mouth 2 times daily TAKE ONE TABLET BY MOUTH TWICE DAILY 180 tablet 0    potassium chloride (KLOR-CON) 10 MEQ extended release tablet TAKE ONE TABLET BY MOUTH DAILY 30 tablet 2    metFORMIN (GLUCOPHAGE) 500 MG tablet Take 1 tablet by mouth in the morning and 1 tablet in the evening. Take with meals. 60 tablet 3    furosemide (LASIX) 40 MG tablet Take 40 mg by mouth 2 times daily      allopurinol (ZYLOPRIM) 300 MG tablet TAKE ONE TABLET BY MOUTH DAILY 90 tablet 1    atorvastatin (LIPITOR) 40 MG tablet TAKE ONE TABLET BY MOUTH DAILY 90 tablet 0    aspirin 81 MG EC tablet Take 81 mg by mouth daily. No current facility-administered medications on file prior to visit.         Allergies   Allergen Reactions    Penicillins Anaphylaxis     Penicillin and derivatives       Past Medical History:   Diagnosis Date    Anemia     Backache, unspecified 3/11/08    CAD, multiple vessel 1/6/2020    Chronic kidney disease (CKD) stage G3b/A1, moderately decreased glomerular filtration rate (GFR) between 30-44 mL/min/1.73 square meter and albuminuria creatinine ratio less than 30 mg/g ([facility]) 4/16/2021    Chronic systolic CHF (congestive heart failure) ([address] 12/11/2019    Depressive disorder, not elsewhere classified 11/8/07    Essential hypertension, benign 11/8/07    Gout 8/11/2011    Hyperlipidemia associated with type 2 diabetes mellitus ([address] 12/14/2015    Major depressive disorder, recurrent episode, moderate ([address] 12/14/2015    Osteoarthritis, hand 2/27/2012    Osteoarthrosis, unspecified whether generalized or localized, lower leg 11/8/07    Osteopenia 8/5/2015    Other and unspecified hyperlipidemia 11/8/07    Pain in joint, lower leg 1/29/07    Pneumonia     Rotator cuff tendinitis 10/6/2017    Tear of medial cartilage or meniscus of knee, current 1/29/07    Type 2 diabetes mellitus with microalbuminuria, without long-term current use of insulin ([address] 5/5/2017    Type 2 diabetes mellitus without complication ([address] 81/0/0991    Type II or unspecified type diabetes mellitus without mention of complication, not stated as uncontrolled 11/8/07    foot exam 7/16/08 normal       Patient Active Problem List   Diagnosis    Backache    Essential hypertension, benign    Osteoarthrosis involving lower leg    Gout    Osteoarthritis, hand    Osteopenia    Chest pain    Hyperlipidemia associated with type 2 diabetes mellitus ([facility]    Major depressive disorder, recurrent episode, moderate ([facility])    Primary insomnia    Type 2 diabetes mellitus with microalbuminuria, without long-term current use of insulin ([facility])    Rotator cuff tendinitis    Other restrictive cardiomyopathy ([facility])    Nonrheumatic mitral valve regurgitation    Acute on chronic systolic CHF (congestive heart failure) ([facility])    CAD, multiple vessel    Ischemic cardiomyopathy    S/P angioplasty with stent    Chronic kidney disease (CKD) stage G3b/A1, moderately decreased glomerular filtration rate (GFR) between 30-44 mL/min/1.73 square meter and albuminuria creatinine ratio less than 30 mg/g ([facility])    Acute on chronic combined systolic (congestive) and diastolic (congestive) heart failure ([facility])    S/P CABG x 3    S/P mitral valve repair    Chronic systolic CHF (congestive heart failure) ([facility]    Pulmonary nodules/lesions, multiple       Past Surgical History:   Procedure Laterality Date    APPENDECTOMY      CHOLECYSTECTOMY      CORONARY ARTERY BYPASS GRAFT N/A 1/8/2020    CORONARY ARTERY BYPASS GRAFTING X3, INTERNAL MAMMARY ARTERY, SAPHENOUS VEIN GRAFTING, ON PUMP MITRAL VALVE RING REPAIR USING 26MM PELAEZ PHYSIO II RING, WITH LEFT ATRIAL APPENDAGE CLIP, PFO CLOSURETEE, 5 LEVEL BILATERAL INTERCOSTAL NERVE BLOCK [facility] ([address] [place] [address])      total ab hyst    PARATHYROID GLAND SURGERY      explore parathyroid glands    TOTAL KNEE ARTHROPLASTY  03/29/10    left knee       Social History     Socioeconomic History    Marital status:      Spouse name: Not on file    Number of children: Not on file    Years of education: Not on file    Highest education level: Not on file   Occupational History    Not on file   Tobacco Use    Smoking status: Former     Packs/day: 0.50     Years: 20.00     Pack years: 10.00     Types: Cigarettes     Quit date: 2015     Years since quittin.8    Smokeless tobacco: Never   Vaping Use    Vaping Use: Never used   Substance and Sexual Activity    Alcohol use: No     Alcohol/week: 0.0 standard drinks    Drug use: No    Sexual activity: Yes     Partners: Male   Other Topics Concern    Not on file   Social History Narrative    Not on file     Social Determinants of Health     Financial Resource Strain: Not on file   Food Insecurity: Not on file   Transportation Needs: Not on file   Physical Activity: Not on file   Stress: Not on file   Social Connections: Not on file   Intimate Partner Violence: Not on file   Housing Stability: Not on file        Family History   Problem Relation Age of Onset    Cancer Mother 68        colon    Diabetes Mother     Other Father         gun shot wound    Hypertension Sister     Diabetes Sister     High Blood Pressure Sister     High Cholesterol Sister     Cancer Brother 59        mets everywhere    Cancer Brother 77        colon    Diabetes Sister     Other Sister         bipolar       PE  Vitals:    10/14/22 1303 10/14/22 1306 10/14/22 1318   BP: (!) 142/70 (!) 140/70 134/70   Site: Left Upper Arm Left Upper Arm    Position: Sitting Sitting    Cuff Size: Medium Adult Medium Adult    Weight: 125 lb (56.7 kg)       Estimated body mass index is 23.24 kg/m² as calculated from the following:    Height as of 22: 5' 1.5\" (1.562 m). Weight as of this encounter: 125 lb (56.7 kg). Physical Exam  Constitutional:       General: She is not in acute distress. Appearance: Normal appearance. She is not ill-appearing. HENT:      Head: Normocephalic. Right Ear: Tympanic membrane and external ear normal.      Left Ear: Tympanic membrane and external ear normal.      Nose: No congestion or rhinorrhea.       Mouth/Throat:      Mouth: Mucous membranes are moist.      Pharynx: Oropharynx is clear. No posterior oropharyngeal erythema. Eyes:      Extraocular Movements: Extraocular movements intact. Conjunctiva/sclera: Conjunctivae normal.      Pupils: Pupils are equal, round, and reactive to light. Cardiovascular:      Rate and Rhythm: Normal rate and regular rhythm. Pulses: Normal pulses. Heart sounds: Heart sounds are distant. Murmur heard. Systolic murmur is present with a grade of 3/6. Pulmonary:      Effort: Pulmonary effort is normal. No respiratory distress. Breath sounds: Normal breath sounds. No wheezing. Abdominal:      General: Abdomen is flat. Bowel sounds are normal.      Palpations: Abdomen is soft. Tenderness: no abdominal tenderness      Hernia: No hernia is present. Musculoskeletal:         General: No swelling. Normal range of motion. Cervical back: Normal range of motion and neck supple. No tenderness. Right lower le+ Pitting Edema present. Left lower le+ Pitting Edema present. Lymphadenopathy:      Cervical: No cervical adenopathy. Skin:     General: Skin is warm and dry. Capillary Refill: Capillary refill takes less than 2 seconds. Neurological:      General: No focal deficit present. Mental Status: She is alert and oriented to person, place, and time. Mental status is at baseline. Cranial Nerves: No cranial nerve deficit.    Psychiatric:         Mood and Affect: Mood normal.         Behavior: Behavior normal.         Judgment: Judgment normal.       Immunization History   Administered Date(s) Administered    COVID-19, MODERNA BLUE border, Primary or Immunocompromised, (age 12y+), IM, 100 mcg/0.5mL 2021, 2021    COVID-19, MODERNA Booster BLUE border, (age 18y+), IM, 50mcg/0.25mL 2021    Influenza 2011, 2012    Influenza Virus Vaccine 09/15/2014, 10/02/2015    Influenza Whole 2010    Influenza, High Dose (Fluzone 65 yrs and older) 10/10/2016, 10/06/2017, 10/24/2018    Pneumococcal Conjugate 13-valent (Oldsghc75) 08/10/2018    Pneumococcal Conjugate 7-valent (Prevnar7) 10/05/2005    Pneumococcal Polysaccharide ([id number]) 12/01/2015       Health Maintenance   Topic Date Due    DTaP/Tdap/Td vaccine (1 - Tdap) Never done    Shingles vaccine (1 of 2) Never done    COVID-19 Vaccine (4 - Booster for Moderna series) 03/26/2022    Annual Wellness Visit (AWV)  04/17/2022    Flu vaccine (1) 08/01/2022    Lipids  06/04/2023    Depression Monitoring  07/13/2023    DEXA (modify frequency per FRAX score)  Completed    Pneumococcal 65+ years Vaccine  Completed    Hepatitis C screen  Completed    Hepatitis A vaccine  Aged Out    Hib vaccine  Aged Out    Meningococcal (ACWY) vaccine  Aged Out       PSH, PMH, SH and FH reviewed and noted. Recent and past labs, tests and consults also reviewed. Recent or new meds also reviewed. Alli Pritchett, APRN - CNP    This dictation was generated by voice recognition computer software. Although all attempts are made to edit the dictation for accuracy, there may be errors in the transcription that are not intended.

## 2022-10-15 LAB
ANION GAP SERPL CALCULATED.3IONS-SCNC: 15 MMOL/L (ref 3–16)
BUN BLDV-MCNC: 45 MG/DL (ref 7–20)
CALCIUM SERPL-MCNC: 8.8 MG/DL (ref 8.3–10.6)
CHLORIDE BLD-SCNC: 102 MMOL/L (ref 99–110)
CO2: 19 MMOL/L (ref 21–32)
CREAT SERPL-MCNC: 1.8 MG/DL (ref 0.6–1.2)
GFR AFRICAN AMERICAN: 33
GFR NON-AFRICAN AMERICAN: 27
GLUCOSE BLD-MCNC: 253 MG/DL (ref 70–99)
POTASSIUM SERPL-SCNC: 4.5 MMOL/L (ref 3.5–5.1)
SODIUM BLD-SCNC: 136 MMOL/L (ref 136–145)

## 2022-10-15 NOTE — RESULT ENCOUNTER NOTE
Kidney function appears to have declined some. She needs to make sure she is getting at least 40-64 ounces of water per day. Definitely needs to establish with nephrology ASAP.

## 2022-10-19 RX ORDER — ATORVASTATIN CALCIUM 40 MG/1
TABLET, FILM COATED ORAL
Qty: 90 TABLET | Refills: 0 | Status: SHIPPED | OUTPATIENT
Start: 2022-10-19

## 2022-10-19 NOTE — TELEPHONE ENCOUNTER
Patient needing a refill of Atorvastatin 40 mg. Send to [facility].

## 2022-10-26 ENCOUNTER — HOSPITAL ENCOUNTER (OUTPATIENT)
Age: 77
Discharge: HOME | End: 2022-10-26
Payer: MEDICARE

## 2022-10-26 LAB
ALBUMIN SERPL-MCNC: 3.9 G/DL (ref 3.4–5)
ANION GAP SERPL CALCULATED.3IONS-SCNC: 13 MMOL/L (ref 3–16)
BACTERIA: ABNORMAL /HPF
BILIRUBIN URINE: NEGATIVE
BLOOD, URINE: NEGATIVE
BUN BLDV-MCNC: 54 MG/DL (ref 7–20)
CALCIUM SERPL-MCNC: 9.4 MG/DL (ref 8.3–10.6)
CHLORIDE BLD-SCNC: 101 MMOL/L (ref 99–110)
CLARITY: ABNORMAL
CO2: 23 MMOL/L (ref 21–32)
COLOR: YELLOW
CREAT SERPL-MCNC: 2.2 MG/DL (ref 0.6–1.2)
EPITHELIAL CELLS, UA: ABNORMAL /HPF (ref 0–5)
GFR SERPL CREATININE-BSD FRML MDRD: 23 ML/MIN/{1.73_M2}
GLUCOSE BLD-MCNC: 194 MG/DL (ref 70–99)
GLUCOSE URINE: >=1000 MG/DL
HCT VFR BLD CALC: 31.3 % (ref 36–48)
HEMOGLOBIN: 10.6 G/DL (ref 12–16)
KETONES, URINE: NEGATIVE MG/DL
LEUKOCYTE ESTERASE, URINE: NEGATIVE
MCH RBC QN AUTO: 32 PG (ref 26–34)
MCHC RBC AUTO-ENTMCNC: 33.9 G/DL (ref 31–36)
MCV RBC AUTO: 94.3 FL (ref 80–100)
MICROSCOPIC EXAMINATION: YES
NITRITE, URINE: NEGATIVE
PDW BLD-RTO: 15.4 % (ref 12.4–15.4)
PH UA: 6 (ref 5–8)
PHOSPHORUS: 4.5 MG/DL (ref 2.5–4.9)
PLATELET # BLD: 208 K/UL (ref 135–450)
PMV BLD AUTO: 8.6 FL (ref 5–10.5)
POTASSIUM SERPL-SCNC: 4.5 MMOL/L (ref 3.5–5.1)
PROTEIN UA: 30 MG/DL
RBC # BLD: 3.32 M/UL (ref 4–5.2)
RBC UA: ABNORMAL /HPF (ref 0–4)
SODIUM BLD-SCNC: 137 MMOL/L (ref 136–145)
SPECIFIC GRAVITY UA: 1.01 (ref 1–1.03)
URINE TYPE: ABNORMAL
UROBILINOGEN, URINE: 0.2 E.U./DL
WBC # BLD: 6 K/UL (ref 4–11)
WBC UA: ABNORMAL /HPF (ref 0–5)

## 2022-10-26 PROCEDURE — 80069 RENAL FUNCTION PANEL: CPT

## 2022-10-26 PROCEDURE — 85027 COMPLETE CBC AUTOMATED: CPT

## 2022-10-26 PROCEDURE — 36415 COLL VENOUS BLD VENIPUNCTURE: CPT

## 2022-10-26 PROCEDURE — 81001 URINALYSIS AUTO W/SCOPE: CPT

## 2022-10-26 PROCEDURE — 82570 ASSAY OF URINE CREATININE: CPT

## 2022-10-26 PROCEDURE — 83970 ASSAY OF PARATHORMONE: CPT

## 2022-10-26 PROCEDURE — 82306 VITAMIN D 25 HYDROXY: CPT

## 2022-10-26 PROCEDURE — 84156 ASSAY OF PROTEIN URINE: CPT

## 2022-10-27 LAB
CREATININE URINE: 84 MG/DL (ref 28–259)
PARATHYROID HORMONE INTACT: 133.3 PG/ML (ref 14–72)
PROTEIN PROTEIN: 44 MG/DL
PROTEIN/CREAT RATIO: 0.5 MG/DL
VITAMIN D 25-HYDROXY: 13.4 NG/ML

## 2022-10-28 ENCOUNTER — TELEPHONE (OUTPATIENT)
Dept: FAMILY MEDICINE CLINIC | Age: 77
End: 2022-10-28

## 2022-10-28 NOTE — TELEPHONE ENCOUNTER
Her last couple A1c's have been really well controlled off of medication I think she should be okay.

## 2022-10-28 NOTE — TELEPHONE ENCOUNTER
If Dr. Nomi Razo said to stop taking Farxiga then that is okay.   I would not replace it with anything

## 2022-10-28 NOTE — TELEPHONE ENCOUNTER
Patient says that Dr Phi Ann instructed her to stop taking farxiga. I asked her the reason for stopping the farxiga and she says that something is high. Told her to call our office and see what Nabil Allen CNP wants to prescribe in it's place.  Send to [facility]

## 2022-11-01 RX ORDER — ENALAPRIL MALEATE 10 MG/1
TABLET ORAL
Qty: 180 TABLET | Refills: 0 | OUTPATIENT
Start: 2022-11-01

## 2022-11-01 NOTE — TELEPHONE ENCOUNTER
Attempted to contact pt, unable to leave vm. Looks like pt is seeing JACOB Guerrero as her new PCP. Medication was filled by JACOB Guerrero on 9/26/22.

## 2022-11-18 ENCOUNTER — HOSPITAL ENCOUNTER (OUTPATIENT)
Age: 77
Discharge: HOME | End: 2022-11-18
Payer: MEDICARE

## 2022-11-18 LAB
ALBUMIN SERPL-MCNC: 4 G/DL (ref 3.4–5)
ANION GAP SERPL CALCULATED.3IONS-SCNC: 12 MMOL/L (ref 3–16)
BUN BLDV-MCNC: 35 MG/DL (ref 7–20)
CALCIUM SERPL-MCNC: 9.7 MG/DL (ref 8.3–10.6)
CHLORIDE BLD-SCNC: 94 MMOL/L (ref 99–110)
CO2: 22 MMOL/L (ref 21–32)
CREAT SERPL-MCNC: 2 MG/DL (ref 0.6–1.2)
GFR SERPL CREATININE-BSD FRML MDRD: 25 ML/MIN/{1.73_M2}
GLUCOSE BLD-MCNC: 524 MG/DL (ref 70–99)
PHOSPHORUS: 4.8 MG/DL (ref 2.5–4.9)
POTASSIUM SERPL-SCNC: 4.8 MMOL/L (ref 3.5–5.1)
SODIUM BLD-SCNC: 128 MMOL/L (ref 136–145)

## 2022-11-18 PROCEDURE — 80069 RENAL FUNCTION PANEL: CPT

## 2022-11-18 PROCEDURE — 36415 COLL VENOUS BLD VENIPUNCTURE: CPT

## 2022-12-19 RX ORDER — ENALAPRIL MALEATE 20 MG/1
TABLET ORAL
Qty: 180 TABLET | Refills: 0 | Status: SHIPPED | OUTPATIENT
Start: 2022-12-19

## 2022-12-19 RX ORDER — METOPROLOL SUCCINATE 25 MG/1
TABLET, EXTENDED RELEASE ORAL
Qty: 270 TABLET | Refills: 0 | Status: SHIPPED | OUTPATIENT
Start: 2022-12-19

## 2023-01-03 RX ORDER — POTASSIUM CHLORIDE 750 MG/1
TABLET, FILM COATED, EXTENDED RELEASE ORAL
Qty: 30 TABLET | Refills: 2 | Status: SHIPPED | OUTPATIENT
Start: 2023-01-03

## 2023-01-03 NOTE — TELEPHONE ENCOUNTER
----- Message from Rin Villafana sent at 1/3/2023 12:32 PM EST -----  Subject: Refill Request    QUESTIONS  Name of Medication? potassium chloride (KLOR-CON) 10 MEQ extended release   tablet  Patient-reported dosage and instructions? 1 every other day  How many days do you have left? 0  Preferred Pharmacy? [facility] [id number]  Pharmacy phone number (if available)? [phone]  ---------------------------------------------------------------------------  --------------  CALL BACK INFO  What is the best way for the office to contact you? OK to leave message on   voicemail  Preferred Call Back Phone Number? [phone]  ---------------------------------------------------------------------------  --------------  SCRIPT ANSWERS  Relationship to Patient?  Self

## 2023-01-11 RX ORDER — ALLOPURINOL 300 MG/1
TABLET ORAL
Qty: 90 TABLET | Refills: 1 | Status: SHIPPED | OUTPATIENT
Start: 2023-01-11

## 2023-01-11 NOTE — TELEPHONE ENCOUNTER
Carleen Hinojosa is requesting refill(s)   Last OV 10/14/2022 (pertaining to medication)  LR 7/13/22 (per medication requested)  Next office visit scheduled or attempted Yes   If no, reason:  1/16/2023

## 2023-01-14 DIAGNOSIS — N18.32 CHRONIC KIDNEY DISEASE (CKD) STAGE G3B/A1, MODERATELY DECREASED GLOMERULAR FILTRATION RATE (GFR) BETWEEN 30-44 ML/MIN/1.73 SQUARE METER AND ALBUMINURIA CREATININE RATIO LESS THAN 30 MG/G (HCC): ICD-10-CM

## 2023-01-14 DIAGNOSIS — I50.22 CHRONIC SYSTOLIC CHF (CONGESTIVE HEART FAILURE) (HCC): ICD-10-CM

## 2023-01-14 DIAGNOSIS — R80.9 TYPE 2 DIABETES MELLITUS WITH MICROALBUMINURIA, WITHOUT LONG-TERM CURRENT USE OF INSULIN (HCC): ICD-10-CM

## 2023-01-14 DIAGNOSIS — E11.29 TYPE 2 DIABETES MELLITUS WITH MICROALBUMINURIA, WITHOUT LONG-TERM CURRENT USE OF INSULIN (HCC): ICD-10-CM

## 2023-01-16 RX ORDER — DAPAGLIFLOZIN 10 MG/1
TABLET, FILM COATED ORAL
Qty: 30 TABLET | Refills: 2 | Status: SHIPPED | OUTPATIENT
Start: 2023-01-16

## 2023-01-16 RX ORDER — ATORVASTATIN CALCIUM 40 MG/1
TABLET, FILM COATED ORAL
Qty: 90 TABLET | Refills: 0 | Status: SHIPPED | OUTPATIENT
Start: 2023-01-16

## 2023-01-31 ENCOUNTER — OFFICE VISIT (OUTPATIENT)
Dept: FAMILY MEDICINE CLINIC | Age: 78
End: 2023-01-31

## 2023-01-31 VITALS — BODY MASS INDEX: 24.72 KG/M2 | WEIGHT: 133 LBS | DIASTOLIC BLOOD PRESSURE: 80 MMHG | SYSTOLIC BLOOD PRESSURE: 132 MMHG

## 2023-01-31 DIAGNOSIS — Z00.00 MEDICARE ANNUAL WELLNESS VISIT, SUBSEQUENT: Primary | ICD-10-CM

## 2023-01-31 DIAGNOSIS — Z71.89 ACP (ADVANCE CARE PLANNING): ICD-10-CM

## 2023-01-31 DIAGNOSIS — R80.9 TYPE 2 DIABETES MELLITUS WITH MICROALBUMINURIA, WITHOUT LONG-TERM CURRENT USE OF INSULIN (HCC): ICD-10-CM

## 2023-01-31 DIAGNOSIS — E11.69 HYPERLIPIDEMIA ASSOCIATED WITH TYPE 2 DIABETES MELLITUS (HCC): ICD-10-CM

## 2023-01-31 DIAGNOSIS — N18.32 CHRONIC KIDNEY DISEASE (CKD) STAGE G3B/A1, MODERATELY DECREASED GLOMERULAR FILTRATION RATE (GFR) BETWEEN 30-44 ML/MIN/1.73 SQUARE METER AND ALBUMINURIA CREATININE RATIO LESS THAN 30 MG/G (HCC): ICD-10-CM

## 2023-01-31 DIAGNOSIS — I50.22 CHRONIC SYSTOLIC CHF (CONGESTIVE HEART FAILURE) (HCC): ICD-10-CM

## 2023-01-31 DIAGNOSIS — N18.32 STAGE 3B CHRONIC KIDNEY DISEASE (HCC): ICD-10-CM

## 2023-01-31 DIAGNOSIS — R91.8 PULMONARY NODULES/LESIONS, MULTIPLE: ICD-10-CM

## 2023-01-31 DIAGNOSIS — E11.29 TYPE 2 DIABETES MELLITUS WITH MICROALBUMINURIA, WITHOUT LONG-TERM CURRENT USE OF INSULIN (HCC): ICD-10-CM

## 2023-01-31 DIAGNOSIS — E78.5 HYPERLIPIDEMIA ASSOCIATED WITH TYPE 2 DIABETES MELLITUS (HCC): ICD-10-CM

## 2023-01-31 LAB — HBA1C MFR BLD: 11.7 %

## 2023-01-31 RX ORDER — FUROSEMIDE 20 MG/1
20 TABLET ORAL EVERY OTHER DAY
Qty: 30 TABLET | Refills: 1 | Status: SHIPPED | OUTPATIENT
Start: 2023-01-31

## 2023-01-31 RX ORDER — DULAGLUTIDE 0.75 MG/.5ML
0.75 INJECTION, SOLUTION SUBCUTANEOUS WEEKLY
Qty: 2 ML | Refills: 0 | Status: SHIPPED | OUTPATIENT
Start: 2023-01-31

## 2023-01-31 ASSESSMENT — ENCOUNTER SYMPTOMS
ABDOMINAL PAIN: 0
CONSTIPATION: 0
SORE THROAT: 0
DIARRHEA: 0
EYE PAIN: 0
CHEST TIGHTNESS: 0
EYE DISCHARGE: 0
COLOR CHANGE: 0
SHORTNESS OF BREATH: 0
COUGH: 0
ABDOMINAL DISTENTION: 0

## 2023-01-31 ASSESSMENT — PATIENT HEALTH QUESTIONNAIRE - PHQ9
10. IF YOU CHECKED OFF ANY PROBLEMS, HOW DIFFICULT HAVE THESE PROBLEMS MADE IT FOR YOU TO DO YOUR WORK, TAKE CARE OF THINGS AT HOME, OR GET ALONG WITH OTHER PEOPLE: 0
2. FEELING DOWN, DEPRESSED OR HOPELESS: 0
SUM OF ALL RESPONSES TO PHQ QUESTIONS 1-9: 4
7. TROUBLE CONCENTRATING ON THINGS, SUCH AS READING THE NEWSPAPER OR WATCHING TELEVISION: 0
9. THOUGHTS THAT YOU WOULD BE BETTER OFF DEAD, OR OF HURTING YOURSELF: 0
8. MOVING OR SPEAKING SO SLOWLY THAT OTHER PEOPLE COULD HAVE NOTICED. OR THE OPPOSITE, BEING SO FIGETY OR RESTLESS THAT YOU HAVE BEEN MOVING AROUND A LOT MORE THAN USUAL: 0
3. TROUBLE FALLING OR STAYING ASLEEP: 3
SUM OF ALL RESPONSES TO PHQ QUESTIONS 1-9: 4
6. FEELING BAD ABOUT YOURSELF - OR THAT YOU ARE A FAILURE OR HAVE LET YOURSELF OR YOUR FAMILY DOWN: 0
SUM OF ALL RESPONSES TO PHQ9 QUESTIONS 1 & 2: 0
1. LITTLE INTEREST OR PLEASURE IN DOING THINGS: 0
SUM OF ALL RESPONSES TO PHQ QUESTIONS 1-9: 4
SUM OF ALL RESPONSES TO PHQ QUESTIONS 1-9: 4
5. POOR APPETITE OR OVEREATING: 1
4. FEELING TIRED OR HAVING LITTLE ENERGY: 0

## 2023-01-31 ASSESSMENT — LIFESTYLE VARIABLES
HOW OFTEN DO YOU HAVE A DRINK CONTAINING ALCOHOL: NEVER
HOW MANY STANDARD DRINKS CONTAINING ALCOHOL DO YOU HAVE ON A TYPICAL DAY: PATIENT DOES NOT DRINK

## 2023-01-31 NOTE — PATIENT INSTRUCTIONS
Learning About Vision Tests  What are vision tests?     The four most common vision tests are visual acuity tests, refraction, visual field tests, and color vision tests.  Visual acuity (sharpness) tests  These tests are used:  To see if you need glasses or contact lenses.  To monitor an eye problem.  To check an eye injury.  Visual acuity tests are done as part of routine exams. You may also have this test when you get your 's license or apply for some types of jobs.  Visual field tests  These tests are used:  To check for vision loss in any area of your range of vision.  To screen for certain eye diseases.  To look for nerve damage after a stroke, head injury, or other problem that could reduce blood flow to the brain.  Refraction and color tests  A refraction test is done to find the right prescription for glasses and contact lenses.  A color vision test is done to check for color blindness.  Color vision is often tested as part of a routine exam. You may also have this test when you apply for a job where recognizing different colors is important, such as , electronics, or the .  How are vision tests done?  Visual acuity test   You cover one eye at a time.  You read aloud from a wall chart across the room.  You read aloud from a small card that you hold in your hand.  Refraction   You look into a special device.  The device puts lenses of different strengths in front of each eye to see how strong your glasses or contact lenses need to be.  Visual field tests   Your doctor may have you look through special machines.  Or your doctor may simply have you stare straight ahead while they move a finger into and out of your field of vision.  Color vision test   You look at pieces of printed test patterns in various colors. You say what number or symbol you see.  Your doctor may have you trace the number or symbol using a pointer.  How do these tests feel?  There is very little chance of  having a problem from this test. If dilating drops are used for a vision test, they may make the eyes sting and cause a medicine taste in the mouth. Follow-up care is a key part of your treatment and safety. Be sure to make and go to all appointments, and call your doctor if you are having problems. It's also a good idea to know your test results and keep a list of the medicines you take. Where can you learn more? Go to http://www.xavier.com/ and enter G551 to learn more about \"Learning About Vision Tests. \"  Current as of: October 12, 2022               Content Version: 13.5  © 6280-8468 [employer]. Care instructions adapted under license by [facility] ([facility]). If you have questions about a medical condition or this instruction, always ask your healthcare professional. [address] any warranty or liability for your use of this information. Advance Directives: Care Instructions  Overview  An advance directive is a legal way to state your wishes at the end of your life. It tells your family and your doctor what to do if you can't say what you want. There are two main types of advance directives. You can change them any time your wishes change. Living will. This form tells your family and your doctor your wishes about life support and other treatment. The form is also called a declaration. Medical power of . This form lets you name a person to make treatment decisions for you when you can't speak for yourself. This person is called a health care agent (health care proxy, health care surrogate). The form is also called a durable power of  for health care. If you do not have an advance directive, decisions about your medical care may be made by a family member, or by a doctor or a  who doesn't know you. It may help to think of an advance directive as a gift to the people who care for you.  If you have one, they won't have to make tough decisions by themselves. For more information, including forms for your state, see the [address] website (www.caringinfo.org/planning/advance-directives/). Follow-up care is a key part of your treatment and safety. Be sure to make and go to all appointments, and call your doctor if you are having problems. It's also a good idea to know your test results and keep a list of the medicines you take. What should you include in an advance directive? Many states have a unique advance directive form. (It may ask you to address specific issues.) Or you might use a universal form that's approved by many states. If your form doesn't tell you what to address, it may be hard to know what to include in your advance directive. Use the questions below to help you get started. Who do you want to make decisions about your medical care if you are not able to? What life-support measures do you want if you have a serious illness that gets worse over time or can't be cured? What are you most afraid of that might happen? (Maybe you're afraid of having pain, losing your independence, or being kept alive by machines.)  Where would you prefer to die? (Your home? A hospital? A nursing home?)  Do you want to donate your organs when you die? Do you want certain [employer] practices performed before you die? When should you call for help? Be sure to contact your doctor if you have any questions. Where can you learn more? Go to http://www.xavier.com/ and enter R264 to learn more about \"Advance Directives: Care Instructions. \"  Current as of: June 16, 2022               Content Version: 13.5  © 2390-1625 [employer]. Care instructions adapted under license by [facility] ([facility]). If you have questions about a medical condition or this instruction, always ask your healthcare professional. [address] any warranty or liability for your use of this information.       Personalized Preventive Plan for Keon Keys - 9/[birth year]45  Medicare offers a range of preventive health benefits. Some of the tests and screenings are paid in full while other may be subject to a deductible, co-insurance, and/or copay. Some of these benefits include a comprehensive review of your medical history including lifestyle, illnesses that may run in your family, and various assessments and screenings as appropriate. After reviewing your medical record and screening and assessments performed today your provider may have ordered immunizations, labs, imaging, and/or referrals for you. A list of these orders (if applicable) as well as your Preventive Care list are included within your After Visit Summary for your review. Other Preventive Recommendations:    A preventive eye exam performed by an eye specialist is recommended every 1-2 years to screen for glaucoma; cataracts, macular degeneration, and other eye disorders. A preventive dental visit is recommended every 6 months. Try to get at least 150 minutes of exercise per week or 10,000 steps per day on a pedometer . Order or download the FREE \"Exercise & Physical Activity: Your Everyday Guide\" from [employer]. Call 2-447.345.1386 or search [employer] online. You need 4374-3734 mg of calcium and 0752-4516 IU of vitamin D per day. It is possible to meet your calcium requirement with diet alone, but a vitamin D supplement is usually necessary to meet this goal.  When exposed to the sun, use a sunscreen that protects against both UVA and UVB radiation with an SPF of 30 or greater. Reapply every 2 to 3 hours or after sweating, drying off with a towel, or swimming. Always wear a seat belt when traveling in a car. Always wear a helmet when riding a bicycle or motorcycle.

## 2023-01-31 NOTE — PROGRESS NOTES
Medicare Annual Wellness Visit    Zulema Bang is here for Hypertension, Diabetes, and Medicare AWV    Assessment & Plan   Medicare annual wellness visit, subsequent  Type 2 diabetes mellitus with microalbuminuria, without long-term current use of insulin ([facility])  -     POCT glycosylated hemoglobin (Hb A1C)  -     Dulaglutide (TRULICITY) 0.32 DY/7.0HF SOPN; Inject 0.75 mg into the skin once a week, Disp-2 mL, R-0Normal  Stage 3b chronic kidney disease ([facility])  -     Renal Function Panel  -     Dulaglutide (TRULICITY) 3.32 ZM/1.8JU SOPN; Inject 0.75 mg into the skin once a week, Disp-2 mL, R-0Normal  Chronic kidney disease (CKD) stage G3b/A1, moderately decreased glomerular filtration rate (GFR) between 30-44 mL/min/1.73 square meter and albuminuria creatinine ratio less than 30 mg/g ([facility])  -     Renal Function Panel  -     Dulaglutide (TRULICITY) 4.51 QN/0.9JI SOPN; Inject 0.75 mg into the skin once a week, Disp-2 mL, [id number]  Chronic systolic CHF (congestive heart failure) ([facility])  -     Renal Function Panel  -     Dulaglutide (TRULICITY) 0.80 CF/2.8XR SOPN; Inject 0.75 mg into the skin once a week, Disp-2 mL, R-0Normal  Hyperlipidemia associated with type 2 diabetes mellitus ([facility] [address]  Pulmonary nodules/lesions, multiple  -     CT Lung Screen (Initial or Annual); Future  ACP (advance care planning)      Recommendations for Preventive Services Due: see orders and patient instructions/AVS.  Recommended screening schedule for the next 5-10 years is provided to the patient in written form: see Patient Instructions/AVS.     Return for Medicare Annual Wellness Visit in 1 year. Subjective   The following acute and/or chronic problems were also addressed today:  Chronic CHF, Uncontrolled diabetes, HTN, CKD stage 3 b. Patient's complete Health Risk Assessment and screening values have been reviewed and are found in Flowsheets.  The following problems were reviewed today and where indicated follow up appointments were made and/or referrals ordered. Positive Risk Factor Screenings with Interventions:                    Vision Screen:  Do you have difficulty driving, watching TV, or doing any of your daily activities because of your eyesight?: No  Have you had an eye exam within the past year?: (!) No  No results found. Interventions:   Patient encouraged to make appointment with their eye specialist        LDCT Screening: Discussed with patient the benefits and harms of screening, follow-up diagnostic testing, over-diagnosis, false positive rate, and total radiation exposure. Counseled on the importance of adherence to annual lung cancer LDCT screening, impact of comorbidities, ability and willingness to undergo diagnosis and treatment. Counseled on the importance of maintaining cigarette smoking abstinence and cessation. The patient has a history of >20 pack years and is either still smoking or quit within the last 15 years. There are no signs or symptoms of lung cancer. Objective   Vitals:    01/31/23 1601   BP: 132/80   Site: Left Upper Arm   Position: Sitting   Cuff Size: Medium Adult   Weight: 133 lb (60.3 kg)      Body mass index is 24.72 kg/m². Allergies   Allergen Reactions    Penicillins Anaphylaxis     Penicillin and derivatives     Prior to Visit Medications    Medication Sig Taking?  Authorizing Provider   furosemide (LASIX) 20 MG tablet Take 1 tablet by mouth every other day Yes LADAN Corea CNP   Dulaglutide (TRULICITY) 1.36 GZ/3.4ST SOPN Inject 0.75 mg into the skin once a week Yes LADAN Corea CNP   FARXIGA 10 MG tablet TAKE ONE TABLET BY MOUTH EVERY MORNING Yes LADAN Corea CNP   atorvastatin (LIPITOR) 40 MG tablet TAKE ONE TABLET BY MOUTH DAILY Yes LADAN Corea CNP   allopurinol (ZYLOPRIM) 300 MG tablet TAKE ONE TABLET BY MOUTH DAILY Yes LADAN Corea CNP   potassium chloride (KLOR-CON) 10 MEQ extended release tablet TAKE ONE TABLET BY MOUTH DAILY Yes LADAN Bullard CNP   metoprolol succinate (TOPROL XL) 25 MG extended release tablet TAKE THREE TABLETS BY MOUTH DAILY Yes LADAN Bullard CNP   enalapril (VASOTEC) 20 MG tablet TAKE ONE TABLET BY MOUTH TWICE A DAY Yes LADAN Bullard CNP   metFORMIN (GLUCOPHAGE) 500 MG tablet TAKE ONE TABLET BY MOUTH EVERY MORNING AND TAKE ONE TABLET BY MOUTH EVERY EVENING WITH A MEAL Yes LADAN Bullard CNP   digoxin (LANOXIN) 125 MCG tablet TAKE ONE TABLET BY MOUTH EVERY OTHER DAY Yes LADAN Bullard CNP   aspirin 81 MG EC tablet Take 81 mg by mouth daily. Yes Historical Provider, MD Newman (Including outside providers/suppliers regularly involved in providing care):   Patient Care Team:  LADAN Bullard CNP as PCP - General (Nurse Practitioner)  LADAN Bullard CNP as PCP - [facility] Empaneled Provider     Reviewed and updated this visit:  [facility]        Zulema Bang (:  ) is a 68 y.o. female, here to follow up on chronic conditions . Chief Complaint   Patient presents with    Hypertension    Diabetes    Medicare AWV        ASSESSMENT/ PLAN    1. Medicare annual wellness visit, subsequent  -Patient is here for annual wellness visit and chronic condition follow-up    2. Type 2 diabetes mellitus with microalbuminuria, without long-term current use of insulin ([facility])  -Uncontrolled today.   A1c last was 6.8 and now is up to 11.7  -Likely secondary to stopping metformin and Farxiga by nephrology  -Restart Farxiga 10 mg today  -Add Trulicity for diabetes control, CHF and chronic kidney disease  -Unfortunately cannot do Jardiance, Amaryl, metformin due to worsening chronic kidney disease  -Follow-up in 4 weeks for Trulicity  -Repeat renal function panel today  - POCT glycosylated hemoglobin (Hb A1C)  - Dulaglutide (TRULICITY) 4.96 IO/7.2VY SOPN; Inject 0.75 mg into the skin once a week  Dispense: 2 mL; Refill: 0    3. Stage 3b chronic kidney disease ([facility] [address]  -Stop metformin at nephrology direction  -Restart Farxiga 10 mg  -Lasix 20 mg every other day  -Start Trulicity in the absence of metformin  -Cannot use Amaryl, Jardiance or metformin due to chronic kidney disease  - Renal Function Panel  - Dulaglutide (TRULICITY) 5.35 YC/1.1TX SOPN; Inject 0.75 mg into the skin once a week  Dispense: 2 mL; Refill: 0    4. Chronic kidney disease (CKD) stage G3b/A1, moderately decreased glomerular filtration rate (GFR) between 30-44 mL/min/1.73 square meter and albuminuria creatinine ratio less than 30 mg/g ([facility])  -See #3  - Renal Function Panel  - Dulaglutide (TRULICITY) 3.13 RD/7.1DH SOPN; Inject 0.75 mg into the skin once a week  Dispense: 2 mL; Refill: 0    5. Chronic systolic CHF (congestive heart failure) ([facility])  -Last ejection fraction was 29%  -High risk for worsening congestive heart failure secondary to being off metformin and uncontrolled diabetes  -Add Trulicity  -Follow-up in 4 weeks  - Renal Function Panel  - Dulaglutide (TRULICITY) 6.99 QU/6.7HA SOPN; Inject 0.75 mg into the skin once a week  Dispense: 2 mL; Refill: 0    6. Hyperlipidemia associated with type 2 diabetes mellitus ([facility])  -Continue Lipitor 40 mg daily    7. Pulmonary nodules/lesions, multiple  -CT lung screening ordered  - CT Lung Screen (Initial or Annual); Future    8. ACP (advance care planning)  -Patient has healthcare power  living will in place  -Requested documents from her      Imagin/3/22 stress     Risk Factors        The patient risk factors include:prior PCI on 2020;prior CABG on    2020;Current/Recent(w/in 1 year) tobacco use, treated    hypercholesterolemia, treated hypertension, insulin treated diabetes    mellitus, renal failure, prior heart failure , prior MI and (pack years:    39).         Conclusions        Summary    Global hypokinesis with EF 29% Medium-large sized inferolateral minimally reversible defect of moderate    intensity consistent with infarction in the territory of the proximal LCx    and/or RCA . ECHO 6/3/22    Summary   Limited echo for LVEF, mitral valve repair. Left ventricular systolic function is severely reduced with a visually   estimated ejection fraction of <20 %. EF estimated by Hernandez's method at 16 %. The left ventricle is moderately dilated in size with mild septal   hypertrophy. Severe global hypokinesis with inferior akinesis. Indeterminate diastolic function due to mitral valve prosthesis. Right ventricular systolic function is reduced. A 26 mm Sherman Physio II annuloplasty ring appears well seated in the   mitral position with abnormal Doppler values suggesting moderate mitral   stenosis   There is mild mitral regurgitation   Moderate pulmonic regurgitation. Moderate to severe tricuspid regurgitation. Systolic pulmonary artery pressure (SPAP) is elevated and estimated at 66   mmHg (right atrial pressure 15 mmHg) consistent with severe pulmonary   hypertension. 11/30/21 Echo     Summary   Left ventricular systolic function is moderate to severely reduced with a   visually estimated ejection fraction of 25-30 %. The left ventricle is mildly dilated in size with mild hypertrophy. There is global hypokinesis with more prominent in the basal and mid   inferoseptal, and inferior walls. Indeterminate diastolic function due to mitral valve prosthesis. The left atrium is moderately dilated. An annuloplasty ring is seen in the mitral position. Mild mitral, pulmonic and tricuspid regurgitation. Systolic pulmonary artery pressure (sPAP) is normal and estimated at 39 mmHg   (right atrial pressure 3 mmHg)    11/30/2015 ECHO. Summary   Normal left ventricle size and systolic function with an estimated ejection   fraction of 55%. Mild concentric left ventricular hypertrophy.  No regional wall motion abnormalities are seen. There is reversal of E/A inflow velocities across the mitral valve   suggesting impaired left ventricular relaxation. E/A 0.7, IVRT 107msec. Mitral valve is structurally normal.   Mitral valve leaflets appear to open adequately. Trivial mitral regurgitation is present. The aortic valve appears tricuspid . The aortic leaflets appear to open adequately. No evidence of aortic valve regurgitation. No evidence of aortic valve stenosis. Normal right ventricular size and function. Return for Medicare Annual Wellness Visit in 1 year. HPI  Patient is here as a new patient today. Previous patient of Dr. Master Bloom. Known history of systolic CHF, hypertension, type 2 diabetes, status post CABG x3, x4 drug-eluting stents, chronic kidney disease stage IIIb, history of parathyroid nodule, mitral valve replacement and hyperlipidemia. She was recently admitted to [facility] in early June for acute on chronic congestive heart failure. Repeat echocardiogram and stress test on 6/3 did reveal ejection fraction of 20 to 25%. Ejection fraction in November 2015 was normal.  Upon discharge she was started on Lasix 40 mg daily. She has never followed with a nephrologist before. She does follow with Dr. Mark Lemus of cardiology. Diabetes is controlled on metformin. Blood pressure has been controlled on Toprol-XL, enalapril and digoxin    She is on Lipitor 40 mg for hyperlipidemia and history of coronary artery disease    On allopurinol for gout    Interval history 10/14/22:  Patient is here for follow-up on diabetes, hypertension,, CHF and chronic kidney disease. Since last time she has not follow-up with nephrology yet     Blood sugars have been running well at home. Last A1c was 6.8. No new symptoms of shortness of breath. Interval history 1/31/2023    Patient is here for annual wellness visit as well as chronic condition visit.   She recently saw nephrology in October and metformin and Diannah [place] were stopped due to worsening kidney failure. Her last A1c was 6.8. She now states that her blood sugars are running elevated and out of control. She is now taking Lasix 20 mg every other day as well as digoxin every other day. No new symptoms of shortness of breath or chest pain. She does have mild swelling in bilateral lower extremities. She also does have healthcare power of  and living will. ROS  Review of Systems   Constitutional:  Negative for chills, fever and unexpected weight change. HENT:  Negative for congestion, dental problem and sore throat. Eyes:  Negative for pain and discharge. Respiratory:  Negative for cough, chest tightness and shortness of breath. Cardiovascular:  Positive for leg swelling. Negative for chest pain and palpitations. Gastrointestinal:  Negative for abdominal distention, abdominal pain, constipation and diarrhea. Endocrine: Negative for polydipsia, polyphagia and polyuria. Genitourinary:  Negative for dysuria, flank pain, hematuria and urgency. Musculoskeletal:  Negative for arthralgias, joint swelling and myalgias. Skin:  Negative for color change and rash. Neurological:  Negative for dizziness, light-headedness, numbness and headaches. Psychiatric/Behavioral:  Negative for agitation and behavioral problems. The patient is not nervous/anxious.        HISTORIES  Current Outpatient Medications on File Prior to Visit   Medication Sig Dispense Refill    FARXIGA 10 MG tablet TAKE ONE TABLET BY MOUTH EVERY MORNING 30 tablet 2    atorvastatin (LIPITOR) 40 MG tablet TAKE ONE TABLET BY MOUTH DAILY 90 tablet 0    allopurinol (ZYLOPRIM) 300 MG tablet TAKE ONE TABLET BY MOUTH DAILY 90 tablet 1    potassium chloride (KLOR-CON) 10 MEQ extended release tablet TAKE ONE TABLET BY MOUTH DAILY 30 tablet 2    metoprolol succinate (TOPROL XL) 25 MG extended release tablet TAKE THREE TABLETS BY MOUTH DAILY 270 tablet 0    enalapril (VASOTEC) 20 MG tablet TAKE ONE TABLET BY MOUTH TWICE A  tablet 0    metFORMIN (GLUCOPHAGE) 500 MG tablet TAKE ONE TABLET BY MOUTH EVERY MORNING AND TAKE ONE TABLET BY MOUTH EVERY EVENING WITH A MEAL 180 tablet 1    digoxin (LANOXIN) 125 MCG tablet TAKE ONE TABLET BY MOUTH EVERY OTHER DAY 45 tablet 3    aspirin 81 MG EC tablet Take 81 mg by mouth daily. No current facility-administered medications on file prior to visit.         Allergies   Allergen Reactions    Penicillins Anaphylaxis     Penicillin and derivatives       Past Medical History:   Diagnosis Date    Anemia     Backache, unspecified 3/11/08    CAD, multiple vessel 1/6/2020    Chronic kidney disease (CKD) stage G3b/A1, moderately decreased glomerular filtration rate (GFR) between 30-44 mL/min/1.73 square meter and albuminuria creatinine ratio less than 30 mg/g ([facility]) 4/16/2021    Chronic systolic CHF (congestive heart failure) ([address] 12/11/2019    Depressive disorder, not elsewhere classified 11/8/07    Essential hypertension, benign 11/8/07    Gout 8/11/2011    Hyperlipidemia associated with type 2 diabetes mellitus ([address] 12/14/2015    Major depressive disorder, recurrent episode, moderate ([address] 12/14/2015    Osteoarthritis, hand 2/27/2012    Osteoarthrosis, unspecified whether generalized or localized, lower leg 11/8/07    Osteopenia 8/5/2015    Other and unspecified hyperlipidemia 11/8/07    Pain in joint, lower leg 1/29/07    Pneumonia     Rotator cuff tendinitis 10/6/2017    Tear of medial cartilage or meniscus of knee, current 1/29/07    Type 2 diabetes mellitus with microalbuminuria, without long-term current use of insulin ([address] 5/5/2017    Type 2 diabetes mellitus without complication ([address] 54/5/9861    Type II or unspecified type diabetes mellitus without mention of complication, not stated as uncontrolled 11/8/07    foot exam 7/16/08 normal       Patient Active Problem List   Diagnosis    Backache Essential hypertension, benign    Osteoarthrosis involving lower leg    Gout    Osteoarthritis, hand    Osteopenia    Chest pain    Hyperlipidemia associated with type 2 diabetes mellitus ([facility])    Major depressive disorder, recurrent episode, moderate ([facility])    Primary insomnia    Type 2 diabetes mellitus with microalbuminuria, without long-term current use of insulin ([facility])    Rotator cuff tendinitis    Other restrictive cardiomyopathy ([facility])    Nonrheumatic mitral valve regurgitation    Acute on chronic systolic CHF (congestive heart failure) ([facility])    CAD, multiple vessel    Ischemic cardiomyopathy    S/P angioplasty with stent    Chronic kidney disease (CKD) stage G3b/A1, moderately decreased glomerular filtration rate (GFR) between 30-44 mL/min/1.73 square meter and albuminuria creatinine ratio less than 30 mg/g ([facility])    Acute on chronic combined systolic (congestive) and diastolic (congestive) heart failure ([facility])    S/P CABG x 3    S/P mitral valve repair    Chronic systolic CHF (congestive heart failure) ([facility] [address]    Pulmonary nodules/lesions, multiple       Past Surgical History:   Procedure Laterality Date    APPENDECTOMY      CHOLECYSTECTOMY      CORONARY ARTERY BYPASS GRAFT N/A 1/8/2020    CORONARY ARTERY BYPASS GRAFTING X3, INTERNAL MAMMARY ARTERY, SAPHENOUS VEIN GRAFTING, ON PUMP MITRAL VALVE RING REPAIR USING 26MM PELAEZ PHYSIO II RING, WITH LEFT ATRIAL APPENDAGE CLIP, PFO CLOSURETEE, 5 LEVEL BILATERAL INTERCOSTAL NERVE BLOCK performed by Hyacinth Chaudhary MD at [address] ([address] [place] [address])      total ab hyst    PARATHYROID GLAND SURGERY      explore parathyroid glands    TOTAL KNEE ARTHROPLASTY  03/29/10    left knee       Social History     Socioeconomic History    Marital status:      Spouse name: Not on file    Number of children: Not on file    Years of education: Not on file    Highest education level: Not on file   Occupational History    Not on file   Tobacco Use    Smoking status: Former     Packs/day: 0.50     Years: 20.00     Pack years: 10.00     Types: Cigarettes     Quit date: 2015     Years since quittin.1    Smokeless tobacco: Never   Vaping Use    Vaping Use: Never used   Substance and Sexual Activity    Alcohol use: No     Alcohol/week: 0.0 standard drinks    Drug use: No    Sexual activity: Yes     Partners: Male   Other Topics Concern    Not on file   Social History Narrative    Not on file     Social Determinants of Health     Financial Resource Strain: Not on file   Food Insecurity: Not on file   Transportation Needs: Not on file   Physical Activity: Sufficiently Active    Days of Exercise per Week: 4 days    Minutes of Exercise per Session: 40 min   Stress: Not on file   Social Connections: Not on file   Intimate Partner Violence: Not on file   Housing Stability: Not on file        Family History   Problem Relation Age of Onset    Cancer Mother 68        colon    Diabetes Mother     Other Father         gun shot wound    Hypertension Sister     Diabetes Sister     High Blood Pressure Sister     High Cholesterol Sister     Cancer Brother 59        mets everywhere    Cancer Brother 77        colon    Diabetes Sister     Other Sister         bipolar       PE  Vitals:    23 1601   BP: 132/80   Site: Left Upper Arm   Position: Sitting   Cuff Size: Medium Adult   Weight: 133 lb (60.3 kg)     Estimated body mass index is 24.72 kg/m² as calculated from the following:    Height as of 22: 5' 1.5\" (1.562 m). Weight as of this encounter: 133 lb (60.3 kg). Physical Exam  Constitutional:       General: She is not in acute distress. Appearance: Normal appearance. She is not ill-appearing. HENT:      Head: Normocephalic. Right Ear: Tympanic membrane and external ear normal.      Left Ear: Tympanic membrane and external ear normal.      Nose: No congestion or rhinorrhea.       Mouth/Throat:      Mouth: Mucous membranes are moist.      Pharynx: Oropharynx is clear. No posterior oropharyngeal erythema. Eyes:      Extraocular Movements: Extraocular movements intact. Conjunctiva/sclera: Conjunctivae normal.      Pupils: Pupils are equal, round, and reactive to light. Cardiovascular:      Rate and Rhythm: Normal rate and regular rhythm. Pulses: Normal pulses. Heart sounds: Heart sounds are distant. Murmur heard. Systolic murmur is present with a grade of 3/6. Pulmonary:      Effort: Pulmonary effort is normal. No respiratory distress. Breath sounds: Normal breath sounds. No wheezing. Abdominal:      General: Abdomen is flat. Bowel sounds are normal.      Palpations: Abdomen is soft. Tenderness: There is no abdominal tenderness. Hernia: No hernia is present. Musculoskeletal:         General: No swelling. Normal range of motion. Cervical back: Normal range of motion and neck supple. No tenderness. Right lower le+ Pitting Edema present. Left lower le+ Pitting Edema present. Lymphadenopathy:      Cervical: No cervical adenopathy. Skin:     General: Skin is warm and dry. Capillary Refill: Capillary refill takes less than 2 seconds. Neurological:      General: No focal deficit present. Mental Status: She is alert and oriented to person, place, and time. Mental status is at baseline. Cranial Nerves: No cranial nerve deficit.    Psychiatric:         Mood and Affect: Mood normal.         Behavior: Behavior normal.         Judgment: Judgment normal.       Immunization History   Administered Date(s) Administered    COVID-19, MODERNA BLUE border, Primary or Immunocompromised, (age 12y+), IM, 100 mcg/0.5mL 2021, 2021    COVID-19, MODERNA Booster BLUE border, (age 18y+), IM, 50mcg/0.25mL 2021    Influenza 2011, 2012    Influenza Virus Vaccine 09/15/2014, 10/02/2015    Influenza Whole 2010    Influenza, FLUAD, (age 72 y+), Adjuvanted, 0.5mL 10/14/2022 Influenza, High Dose (Fluzone 65 yrs and older) 10/10/2016, 10/06/2017, 10/24/2018    Pneumococcal Conjugate 13-valent ([id number]) 08/10/2018    Pneumococcal Conjugate 7-valent (Prevnar7) 10/05/2005    Pneumococcal Polysaccharide ([id number]) 12/01/2015       Health Maintenance   Topic Date Due    DTaP/Tdap/Td vaccine (1 - Tdap) Never done    Shingles vaccine (1 of 2) Never done    COVID-19 Vaccine (4 - Booster for Orlie Pete series) 01/21/2022    Lipids  06/04/2023    Depression Monitoring  07/13/2023    Annual Wellness Visit (AWV)  02/01/2024    DEXA (modify frequency per FRAX score)  Completed    Flu vaccine  Completed    Pneumococcal 65+ years Vaccine  Completed    Hepatitis C screen  Completed    Hepatitis A vaccine  Aged Out    Hib vaccine  Aged Out    Meningococcal (ACWY) vaccine  Aged Out       PSH, PMH, SH and FH reviewed and noted. Recent and past labs, tests and consults also reviewed. Recent or new meds also reviewed. Josh Padilla, APRN - CNP    This dictation was generated by voice recognition computer software. Although all attempts are made to edit the dictation for accuracy, there may be errors in the transcription that are not intended.

## 2023-01-31 NOTE — Clinical Note
Resumed Farxiga 10 mg today and starting on Trulicity 9.58 mg weekly. Rechecking renal function panel today. Most recent A1c was 11.7. High risk for congestive heart failure. Starting Trulicity today for cardiac protection and diabetes control.   Hope all is well  Kostas Perdue

## 2023-02-01 DIAGNOSIS — E87.5 HYPERKALEMIA: Primary | ICD-10-CM

## 2023-02-01 LAB
ALBUMIN SERPL-MCNC: 3.8 G/DL (ref 3.4–5)
ANION GAP SERPL CALCULATED.3IONS-SCNC: 11 MMOL/L (ref 3–16)
BUN BLDV-MCNC: 28 MG/DL (ref 7–20)
CALCIUM SERPL-MCNC: 9.2 MG/DL (ref 8.3–10.6)
CHLORIDE BLD-SCNC: 105 MMOL/L (ref 99–110)
CO2: 22 MMOL/L (ref 21–32)
CREAT SERPL-MCNC: 1.9 MG/DL (ref 0.6–1.2)
GFR SERPL CREATININE-BSD FRML MDRD: 27 ML/MIN/{1.73_M2}
GLUCOSE BLD-MCNC: 180 MG/DL (ref 70–99)
PHOSPHORUS: 3.8 MG/DL (ref 2.5–4.9)
POTASSIUM SERPL-SCNC: 5.8 MMOL/L (ref 3.5–5.1)
SODIUM BLD-SCNC: 138 MMOL/L (ref 136–145)

## 2023-02-01 NOTE — RESULT ENCOUNTER NOTE
Renal function panel does show stable kidney function. Potassium is elevated at 5.8. This could be secondary to the way the blood was drawn and hemolysis. I would prefer her to have a repeat BMP on Friday to make sure potassium is normal.     BMP order placed in the chart.   Please call her and schedule

## 2023-02-03 ENCOUNTER — NURSE ONLY (OUTPATIENT)
Dept: FAMILY MEDICINE CLINIC | Age: 78
End: 2023-02-03
Payer: MEDICARE

## 2023-02-03 DIAGNOSIS — E87.5 HYPERKALEMIA: ICD-10-CM

## 2023-02-03 PROCEDURE — 36415 COLL VENOUS BLD VENIPUNCTURE: CPT

## 2023-02-04 LAB
ANION GAP SERPL CALCULATED.3IONS-SCNC: 14 MMOL/L (ref 3–16)
BUN BLDV-MCNC: 24 MG/DL (ref 7–20)
CALCIUM SERPL-MCNC: 10 MG/DL (ref 8.3–10.6)
CHLORIDE BLD-SCNC: 100 MMOL/L (ref 99–110)
CO2: 22 MMOL/L (ref 21–32)
CREAT SERPL-MCNC: 1.7 MG/DL (ref 0.6–1.2)
GFR SERPL CREATININE-BSD FRML MDRD: 31 ML/MIN/{1.73_M2}
GLUCOSE BLD-MCNC: 234 MG/DL (ref 70–99)
POTASSIUM SERPL-SCNC: 5.3 MMOL/L (ref 3.5–5.1)
SODIUM BLD-SCNC: 136 MMOL/L (ref 136–145)

## 2023-02-05 NOTE — RESULT ENCOUNTER NOTE
Kidney function appears to have improved. Potassium level is 5.3. We will recheck this number on 2/28 when she sees me in the office. Should she have any muscle cramps or twitching that is new please have her notify the office because this could be a sign of elevated potassium.

## 2023-02-28 ENCOUNTER — OFFICE VISIT (OUTPATIENT)
Dept: FAMILY MEDICINE CLINIC | Age: 78
End: 2023-02-28

## 2023-02-28 VITALS
DIASTOLIC BLOOD PRESSURE: 70 MMHG | WEIGHT: 128 LBS | BODY MASS INDEX: 23.79 KG/M2 | HEART RATE: 90 BPM | SYSTOLIC BLOOD PRESSURE: 130 MMHG | OXYGEN SATURATION: 99 %

## 2023-02-28 DIAGNOSIS — E11.29 TYPE 2 DIABETES MELLITUS WITH MICROALBUMINURIA, WITHOUT LONG-TERM CURRENT USE OF INSULIN (HCC): Primary | ICD-10-CM

## 2023-02-28 DIAGNOSIS — I50.22 CHRONIC SYSTOLIC CHF (CONGESTIVE HEART FAILURE) (HCC): ICD-10-CM

## 2023-02-28 DIAGNOSIS — E11.29 TYPE 2 DIABETES MELLITUS WITH MICROALBUMINURIA, WITHOUT LONG-TERM CURRENT USE OF INSULIN (HCC): ICD-10-CM

## 2023-02-28 DIAGNOSIS — E87.5 HYPERKALEMIA: ICD-10-CM

## 2023-02-28 DIAGNOSIS — R80.9 TYPE 2 DIABETES MELLITUS WITH MICROALBUMINURIA, WITHOUT LONG-TERM CURRENT USE OF INSULIN (HCC): Primary | ICD-10-CM

## 2023-02-28 DIAGNOSIS — N18.32 CHRONIC KIDNEY DISEASE (CKD) STAGE G3B/A1, MODERATELY DECREASED GLOMERULAR FILTRATION RATE (GFR) BETWEEN 30-44 ML/MIN/1.73 SQUARE METER AND ALBUMINURIA CREATININE RATIO LESS THAN 30 MG/G (HCC): ICD-10-CM

## 2023-02-28 DIAGNOSIS — N18.32 STAGE 3B CHRONIC KIDNEY DISEASE (HCC): ICD-10-CM

## 2023-02-28 DIAGNOSIS — R80.9 TYPE 2 DIABETES MELLITUS WITH MICROALBUMINURIA, WITHOUT LONG-TERM CURRENT USE OF INSULIN (HCC): ICD-10-CM

## 2023-02-28 RX ORDER — DULAGLUTIDE 0.75 MG/.5ML
INJECTION, SOLUTION SUBCUTANEOUS
Qty: 2 ML | Refills: 0 | OUTPATIENT
Start: 2023-02-28

## 2023-02-28 RX ORDER — DULAGLUTIDE 1.5 MG/.5ML
1.5 INJECTION, SOLUTION SUBCUTANEOUS WEEKLY
Qty: 2 ML | Refills: 0 | Status: SHIPPED | OUTPATIENT
Start: 2023-02-28

## 2023-02-28 RX ORDER — DULAGLUTIDE 0.75 MG/.5ML
0.75 INJECTION, SOLUTION SUBCUTANEOUS WEEKLY
Qty: 2 ML | Refills: 0 | Status: SHIPPED
Start: 2023-02-28 | End: 2023-02-28 | Stop reason: CLARIF

## 2023-02-28 SDOH — ECONOMIC STABILITY: INCOME INSECURITY: HOW HARD IS IT FOR YOU TO PAY FOR THE VERY BASICS LIKE FOOD, HOUSING, MEDICAL CARE, AND HEATING?: NOT HARD AT ALL

## 2023-02-28 SDOH — ECONOMIC STABILITY: HOUSING INSECURITY
IN THE LAST 12 MONTHS, WAS THERE A TIME WHEN YOU DID NOT HAVE A STEADY PLACE TO SLEEP OR SLEPT IN A SHELTER (INCLUDING NOW)?: NO

## 2023-02-28 SDOH — ECONOMIC STABILITY: FOOD INSECURITY: WITHIN THE PAST 12 MONTHS, THE FOOD YOU BOUGHT JUST DIDN'T LAST AND YOU DIDN'T HAVE MONEY TO GET MORE.: NEVER TRUE

## 2023-02-28 SDOH — ECONOMIC STABILITY: FOOD INSECURITY: WITHIN THE PAST 12 MONTHS, YOU WORRIED THAT YOUR FOOD WOULD RUN OUT BEFORE YOU GOT MONEY TO BUY MORE.: NEVER TRUE

## 2023-02-28 ASSESSMENT — ENCOUNTER SYMPTOMS
EYE DISCHARGE: 0
ABDOMINAL DISTENTION: 0
COUGH: 0
COLOR CHANGE: 0
SORE THROAT: 0
ABDOMINAL PAIN: 0
DIARRHEA: 0
CHEST TIGHTNESS: 0
SHORTNESS OF BREATH: 0
CONSTIPATION: 0
EYE PAIN: 0

## 2023-02-28 NOTE — PROGRESS NOTES
[facility]      Caren Hicks (:  ) is a 68 y.o. female, here for evaluation of the following medical concerns:    Chief Complaint   Patient presents with    Diabetes     Doing well on trulicity    Chronic Kidney Disease        ASSESSMENT/ PLAN  1. Type 2 diabetes mellitus with microalbuminuria, without long-term current use of insulin ([facility])  -Increase Trulicity to 1.5 mg  -Continue Farxiga  -Monitor blood sugars at least 3 times a day    - Basic Metabolic Panel  - dapagliflozin (FARXIGA) 10 MG tablet; TAKE ONE TABLET BY MOUTH EVERY MORNING  Dispense: 30 tablet; Refill: 2  - dulaglutide (TRULICITY) 1.5 SL/4.3YN SC injection; Inject 0.5 mLs into the skin once a week  Dispense: 2 mL; Refill: 0    2. Stage 3b chronic kidney disease ([facility]  -Kidney numbers improved last time  -Recheck BMP today  - Basic Metabolic Panel    3. Hyperkalemia  -Recheck [facility]  - Basic Metabolic Panel    4. Chronic systolic CHF (congestive heart failure) (HCC)  -Continue Farxiga and Trulicity  - dapagliflozin (FARXIGA) 10 MG tablet; TAKE ONE TABLET BY MOUTH EVERY MORNING  Dispense: 30 tablet; Refill: 2  - dulaglutide (TRULICITY) 1.5 BG/8.5DZ SC injection; Inject 0.5 mLs into the skin once a week  Dispense: 2 mL; Refill: 0    5. Chronic kidney disease (CKD) stage G3b/A1, moderately decreased glomerular filtration rate (GFR) between 30-44 mL/min/1.73 square meter and albuminuria creatinine ratio less than 30 mg/g ([facility])  -See #2  - dapagliflozin (FARXIGA) 10 MG tablet; TAKE ONE TABLET BY MOUTH EVERY MORNING  Dispense: 30 tablet; Refill: 2       Today I spent 30 minutes providing clinical care with patient completing physical exam, ROS and history as well as reviewing chart, consulting clinical references and providing patient education and discussion. Return in about 4 weeks (around ) for trulicity. HPI  Since last time patient has been on Trulicity 7.36 milligrams weekly.   She is tolerating well with no abdominal pain, nausea or vomiting. Her kidney numbers appear to have improved on 2/3. We will recheck [facility] as well. Overall she states that her blood sugars are typically running about 115 in morning. ROS  Review of Systems   Constitutional:  Negative for chills, fever and unexpected weight change. HENT:  Negative for congestion, dental problem and sore throat. Eyes:  Negative for pain and discharge. Respiratory:  Negative for cough, chest tightness and shortness of breath. Cardiovascular:  Positive for leg swelling. Negative for chest pain and palpitations. Gastrointestinal:  Negative for abdominal distention, abdominal pain, constipation and diarrhea. Endocrine: Negative for polydipsia, polyphagia and polyuria. Genitourinary:  Negative for dysuria, flank pain, hematuria and urgency. Musculoskeletal:  Negative for arthralgias, joint swelling and myalgias. Skin:  Negative for color change and rash. Neurological:  Negative for dizziness, light-headedness, numbness and headaches. Psychiatric/Behavioral:  Negative for agitation and behavioral problems. The patient is not nervous/anxious.       HISTORIES  Current Outpatient Medications on File Prior to Visit   Medication Sig Dispense Refill    furosemide (LASIX) 20 MG tablet Take 1 tablet by mouth every other day 30 tablet 1    atorvastatin (LIPITOR) 40 MG tablet TAKE ONE TABLET BY MOUTH DAILY 90 tablet 0    allopurinol (ZYLOPRIM) 300 MG tablet TAKE ONE TABLET BY MOUTH DAILY 90 tablet 1    potassium chloride (KLOR-CON) 10 MEQ extended release tablet TAKE ONE TABLET BY MOUTH DAILY 30 tablet 2    metoprolol succinate (TOPROL XL) 25 MG extended release tablet TAKE THREE TABLETS BY MOUTH DAILY 270 tablet 0    enalapril (VASOTEC) 20 MG tablet TAKE ONE TABLET BY MOUTH TWICE A  tablet 0    digoxin (LANOXIN) 125 MCG tablet TAKE ONE TABLET BY MOUTH EVERY OTHER DAY 45 tablet 3    aspirin 81 MG EC tablet Take 81 mg by mouth daily. No current facility-administered medications on file prior to visit.       Allergies   Allergen Reactions    Penicillins Anaphylaxis     Penicillin and derivatives     Past Medical History:   Diagnosis Date    Anemia     Backache, unspecified 3/11/08    CAD, multiple vessel 1/6/2020    Chronic kidney disease (CKD) stage G3b/A1, moderately decreased glomerular filtration rate (GFR) between 30-44 mL/min/1.73 square meter and albuminuria creatinine ratio less than 30 mg/g ([facility]) 4/16/2021    Chronic systolic CHF (congestive heart failure) ([address] 12/11/2019    Depressive disorder, not elsewhere classified 11/8/07    Essential hypertension, benign 11/8/07    Gout 8/11/2011    Hyperlipidemia associated with type 2 diabetes mellitus ([address] 12/14/2015    Major depressive disorder, recurrent episode, moderate ([address] 12/14/2015    Osteoarthritis, hand 2/27/2012    Osteoarthrosis, unspecified whether generalized or localized, lower leg 11/8/07    Osteopenia 8/5/2015    Other and unspecified hyperlipidemia 11/8/07    Pain in joint, lower leg 1/29/07    Pneumonia     Rotator cuff tendinitis 10/6/2017    Tear of medial cartilage or meniscus of knee, current 1/29/07    Type 2 diabetes mellitus with microalbuminuria, without long-term current use of insulin ([address] 5/5/2017    Type 2 diabetes mellitus without complication ([address] 20/2/1084    Type II or unspecified type diabetes mellitus without mention of complication, not stated as uncontrolled 11/8/07    foot exam 7/16/08 normal     Patient Active Problem List   Diagnosis    Backache    Essential hypertension, benign    Osteoarthrosis involving lower leg    Gout    Osteoarthritis, hand    Osteopenia    Chest pain    Hyperlipidemia associated with type 2 diabetes mellitus ([address]    Major depressive disorder, recurrent episode, moderate (HCC)    Primary insomnia    Type 2 diabetes mellitus with microalbuminuria, without long-term current use of insulin ([address]    Rotator cuff tendinitis    Other restrictive cardiomyopathy (HCC)    Nonrheumatic mitral valve regurgitation    Acute on chronic systolic CHF (congestive heart failure) (HCC)    CAD, multiple vessel    Ischemic cardiomyopathy    S/P angioplasty with stent    Chronic kidney disease (CKD) stage G3b/A1, moderately decreased glomerular filtration rate (GFR) between 30-44 mL/min/1.73 square meter and albuminuria creatinine ratio less than 30 mg/g (HCC)    Acute on chronic combined systolic (congestive) and diastolic (congestive) heart failure (HCC)    S/P CABG x 3    S/P mitral valve repair    Chronic systolic CHF (congestive heart failure) ([facility] [address]    Pulmonary nodules/lesions, multiple     Past Surgical History:   Procedure Laterality Date    APPENDECTOMY      CHOLECYSTECTOMY      CORONARY ARTERY BYPASS GRAFT N/A 2020    CORONARY ARTERY BYPASS GRAFTING X3, INTERNAL MAMMARY ARTERY, SAPHENOUS VEIN GRAFTING, ON PUMP MITRAL VALVE RING REPAIR USING 26MM PELAEZ PHYSIO II RING, WITH LEFT ATRIAL APPENDAGE CLIP, PFO CLOSURETEE, 5 LEVEL BILATERAL INTERCOSTAL NERVE BLOCK performed by Malina Ross MD at [address] ([address] [place] [address])      total ab hyst    PARATHYROID GLAND SURGERY      explore parathyroid glands    TOTAL KNEE ARTHROPLASTY  03/29/10    left knee     Social History     Socioeconomic History    Marital status:      Spouse name: Not on file    Number of children: Not on file    Years of education: Not on file    Highest education level: Not on file   Occupational History    Not on file   Tobacco Use    Smoking status: Former     Packs/day: 0.50     Years: 20.00     Pack years: 10.00     Types: Cigarettes     Quit date: 2015     Years since quittin.2    Smokeless tobacco: Never   Vaping Use    Vaping Use: Never used   Substance and Sexual Activity    Alcohol use: No     Alcohol/week: 0.0 standard drinks    Drug use: No    Sexual activity: Yes     Partners: Male   Other Topics Concern Not on file   Social History Narrative    Not on file     Social Determinants of Health     Financial Resource Strain: Low Risk     Difficulty of Paying Living Expenses: Not hard at all   Food Insecurity: No Food Insecurity    Worried About [address] in the Last Year: Never true    [facility] in the Last Year: Never true   Transportation Needs: Unknown    Lack of Transportation (Medical): Not on file    Lack of Transportation (Non-Medical): No   Physical Activity: Sufficiently Active    Days of Exercise per Week: 4 days    Minutes of Exercise per Session: 40 min   Stress: Not on file   Social Connections: Not on file   Intimate Partner Violence: Not on file   Housing Stability: Unknown    Unable to Pay for Housing in the Last Year: Not on file    Number of Places Lived in the Last Year: Not on file    Unstable Housing in the Last Year: No      Family History   Problem Relation Age of Onset    Cancer Mother 68        colon    Diabetes Mother     Other Father         gun shot wound    Hypertension Sister     Diabetes Sister     High Blood Pressure Sister     High Cholesterol Sister     Cancer Brother 59        mets everywhere    Cancer Brother 77        colon    Diabetes Sister     Other Sister         bipolar       PE  Vitals:    02/28/23 1447   BP: 130/70   Site: Left Upper Arm   Position: Sitting   Cuff Size: Medium Adult   Pulse: 90   SpO2: 99%   Weight: 128 lb (58.1 kg)     Estimated body mass index is 23.79 kg/m² as calculated from the following:    Height as of 6/9/22: 5' 1.5\" (1.562 m). Weight as of this encounter: 128 lb (58.1 kg). Physical Exam  Constitutional:       General: She is not in acute distress. Appearance: Normal appearance. She is not ill-appearing. HENT:      Head: Normocephalic. Right Ear: Tympanic membrane and external ear normal.      Left Ear: Tympanic membrane and external ear normal.      Nose: No congestion or rhinorrhea.       Mouth/Throat:      Mouth: Mucous membranes are moist.      Pharynx: Oropharynx is clear. No posterior oropharyngeal erythema. Eyes:      Extraocular Movements: Extraocular movements intact. Conjunctiva/sclera: Conjunctivae normal.      Pupils: Pupils are equal, round, and reactive to light. Cardiovascular:      Rate and Rhythm: Normal rate and regular rhythm. Pulses: Normal pulses. Heart sounds: Heart sounds are distant. Murmur heard. Systolic murmur is present with a grade of 3/6. Pulmonary:      Effort: Pulmonary effort is normal. No respiratory distress. Breath sounds: Normal breath sounds. No wheezing. Abdominal:      General: Abdomen is flat. Bowel sounds are normal.      Palpations: Abdomen is soft. Tenderness: There is no abdominal tenderness. Hernia: No hernia is present. Musculoskeletal:         General: No swelling. Normal range of motion. Cervical back: Normal range of motion and neck supple. No tenderness. Right lower le+ Pitting Edema present. Left lower le+ Pitting Edema present. Lymphadenopathy:      Cervical: No cervical adenopathy. Skin:     General: Skin is warm and dry. Capillary Refill: Capillary refill takes less than 2 seconds. Neurological:      General: No focal deficit present. Mental Status: She is alert and oriented to person, place, and time. Mental status is at baseline. Cranial Nerves: No cranial nerve deficit. Psychiatric:         Mood and Affect: Mood normal.         Behavior: Behavior normal.         Judgment: Judgment normal.       LADAN Chen CNP    This dictation was generated by voice recognition computer software. Although all attempts are made to edit the dictation for accuracy, there may be errors in the transcription that are not intended.

## 2023-03-01 LAB
ANION GAP SERPL CALCULATED.3IONS-SCNC: 15 MMOL/L (ref 3–16)
BUN BLDV-MCNC: 33 MG/DL (ref 7–20)
CALCIUM SERPL-MCNC: 8.6 MG/DL (ref 8.3–10.6)
CHLORIDE BLD-SCNC: 97 MMOL/L (ref 99–110)
CO2: 20 MMOL/L (ref 21–32)
CREAT SERPL-MCNC: 2.1 MG/DL (ref 0.6–1.2)
GFR SERPL CREATININE-BSD FRML MDRD: 24 ML/MIN/{1.73_M2}
GLUCOSE BLD-MCNC: 579 MG/DL (ref 70–99)
POTASSIUM SERPL-SCNC: 5.2 MMOL/L (ref 3.5–5.1)
SODIUM BLD-SCNC: 132 MMOL/L (ref 136–145)

## 2023-03-01 RX ORDER — INSULIN GLARGINE 100 [IU]/ML
10 INJECTION, SOLUTION SUBCUTANEOUS 2 TIMES DAILY
Qty: 5 ADJUSTABLE DOSE PRE-FILLED PEN SYRINGE | Refills: 0 | Status: SHIPPED | OUTPATIENT
Start: 2023-03-01

## 2023-03-01 NOTE — RESULT ENCOUNTER NOTE
Kidney function appears grossly stable. However, blood sugar and metabolic panel was 315. Based upon these findings and the elevation in A1c in the past.  I think that we should start Lantus 10 units twice a day. I sent this over to the pharmacy. Continue to follow-up with me 3/28.   For any other questions please call me

## 2023-03-23 RX ORDER — METOPROLOL SUCCINATE 25 MG/1
TABLET, EXTENDED RELEASE ORAL
Qty: 270 TABLET | Refills: 3 | Status: SHIPPED | OUTPATIENT
Start: 2023-03-23

## 2023-03-23 RX ORDER — ENALAPRIL MALEATE 20 MG/1
TABLET ORAL
Qty: 180 TABLET | Refills: 3 | OUTPATIENT
Start: 2023-03-23

## 2023-03-26 DIAGNOSIS — I50.22 CHRONIC SYSTOLIC CHF (CONGESTIVE HEART FAILURE) (HCC): ICD-10-CM

## 2023-03-26 DIAGNOSIS — R80.9 TYPE 2 DIABETES MELLITUS WITH MICROALBUMINURIA, WITHOUT LONG-TERM CURRENT USE OF INSULIN (HCC): ICD-10-CM

## 2023-03-26 DIAGNOSIS — E11.29 TYPE 2 DIABETES MELLITUS WITH MICROALBUMINURIA, WITHOUT LONG-TERM CURRENT USE OF INSULIN (HCC): ICD-10-CM

## 2023-03-27 RX ORDER — DULAGLUTIDE 1.5 MG/.5ML
INJECTION, SOLUTION SUBCUTANEOUS
Qty: 2 ML | Refills: 0 | Status: SHIPPED | OUTPATIENT
Start: 2023-03-27

## 2023-03-28 ENCOUNTER — TELEPHONE (OUTPATIENT)
Dept: FAMILY MEDICINE CLINIC | Age: 78
End: 2023-03-28

## 2023-03-28 ENCOUNTER — OFFICE VISIT (OUTPATIENT)
Dept: FAMILY MEDICINE CLINIC | Age: 78
End: 2023-03-28
Payer: MEDICARE

## 2023-03-28 VITALS — BODY MASS INDEX: 23.79 KG/M2 | DIASTOLIC BLOOD PRESSURE: 80 MMHG | WEIGHT: 128 LBS | SYSTOLIC BLOOD PRESSURE: 132 MMHG

## 2023-03-28 DIAGNOSIS — R80.9 TYPE 2 DIABETES MELLITUS WITH MICROALBUMINURIA, WITHOUT LONG-TERM CURRENT USE OF INSULIN (HCC): Primary | ICD-10-CM

## 2023-03-28 DIAGNOSIS — E11.29 TYPE 2 DIABETES MELLITUS WITH MICROALBUMINURIA, WITHOUT LONG-TERM CURRENT USE OF INSULIN (HCC): Primary | ICD-10-CM

## 2023-03-28 DIAGNOSIS — N18.32 CHRONIC KIDNEY DISEASE (CKD) STAGE G3B/A1, MODERATELY DECREASED GLOMERULAR FILTRATION RATE (GFR) BETWEEN 30-44 ML/MIN/1.73 SQUARE METER AND ALBUMINURIA CREATININE RATIO LESS THAN 30 MG/G (HCC): ICD-10-CM

## 2023-03-28 PROCEDURE — 36415 COLL VENOUS BLD VENIPUNCTURE: CPT

## 2023-03-28 PROCEDURE — 3075F SYST BP GE 130 - 139MM HG: CPT

## 2023-03-28 PROCEDURE — 3046F HEMOGLOBIN A1C LEVEL >9.0%: CPT

## 2023-03-28 PROCEDURE — 3079F DIAST BP 80-89 MM HG: CPT

## 2023-03-28 PROCEDURE — 99214 OFFICE O/P EST MOD 30 MIN: CPT

## 2023-03-28 PROCEDURE — 1123F ACP DISCUSS/DSCN MKR DOCD: CPT

## 2023-03-28 RX ORDER — INSULIN GLARGINE 100 [IU]/ML
10 INJECTION, SOLUTION SUBCUTANEOUS 2 TIMES DAILY
Qty: 5 ADJUSTABLE DOSE PRE-FILLED PEN SYRINGE | Refills: 0 | Status: SHIPPED | OUTPATIENT
Start: 2023-03-28

## 2023-03-28 RX ORDER — SYRINGE-NEEDLE,INSULIN,0.5 ML 27GX1/2"
1 SYRINGE, EMPTY DISPOSABLE MISCELLANEOUS DAILY
Qty: 100 EACH | Refills: 3 | Status: SHIPPED | OUTPATIENT
Start: 2023-03-28

## 2023-03-28 ASSESSMENT — ENCOUNTER SYMPTOMS
COUGH: 0
DIARRHEA: 0
ABDOMINAL DISTENTION: 0
COLOR CHANGE: 0
SORE THROAT: 0
EYE DISCHARGE: 0
CHEST TIGHTNESS: 0
ABDOMINAL PAIN: 0
EYE PAIN: 0
CONSTIPATION: 0
SHORTNESS OF BREATH: 0

## 2023-03-28 NOTE — Clinical Note
I encouraged patient to make an appointment with you regarding chronic kidney disease. Appears to have declined since July 2022. However, it does appear to be stable. I recently discontinued enalapril after you had discontinued metformin. Currently her blood sugars are becoming more under control.   Next A1c will be until May

## 2023-03-29 LAB
ANION GAP SERPL CALCULATED.3IONS-SCNC: 14 MMOL/L (ref 3–16)
BUN SERPL-MCNC: 16 MG/DL (ref 7–20)
CALCIUM SERPL-MCNC: 9.8 MG/DL (ref 8.3–10.6)
CHLORIDE SERPL-SCNC: 105 MMOL/L (ref 99–110)
CO2 SERPL-SCNC: 24 MMOL/L (ref 21–32)
CREAT SERPL-MCNC: 1.6 MG/DL (ref 0.6–1.2)
GFR SERPLBLD CREATININE-BSD FMLA CKD-EPI: 33 ML/MIN/{1.73_M2}
GLUCOSE SERPL-MCNC: 141 MG/DL (ref 70–99)
POTASSIUM SERPL-SCNC: 4.7 MMOL/L (ref 3.5–5.1)
SODIUM SERPL-SCNC: 143 MMOL/L (ref 136–145)

## 2023-03-29 NOTE — RESULT ENCOUNTER NOTE
Creatinine and GFR have slightly improved since stopping enalapril and with better control of blood sugars. This is what we were hoping. Still continue to follow up with Dr. Evy Sigala, of nephrology.

## 2023-03-31 ENCOUNTER — TELEPHONE (OUTPATIENT)
Dept: FAMILY MEDICINE CLINIC | Age: 78
End: 2023-03-31

## 2023-03-31 NOTE — TELEPHONE ENCOUNTER
Patient calling to report that she scheduled appointment with Dr. Faisal Victor for 5/22/23, was the soonest availability.

## 2023-04-04 RX ORDER — POTASSIUM CHLORIDE 750 MG/1
TABLET, FILM COATED, EXTENDED RELEASE ORAL
Qty: 30 TABLET | Refills: 2 | Status: SHIPPED | OUTPATIENT
Start: 2023-04-04

## 2023-04-20 RX ORDER — ATORVASTATIN CALCIUM 40 MG/1
TABLET, FILM COATED ORAL
Qty: 90 TABLET | Refills: 0 | Status: SHIPPED | OUTPATIENT
Start: 2023-04-20

## 2023-05-01 ENCOUNTER — OFFICE VISIT (OUTPATIENT)
Dept: FAMILY MEDICINE CLINIC | Age: 78
End: 2023-05-01
Payer: MEDICARE

## 2023-05-01 VITALS
HEIGHT: 60 IN | WEIGHT: 131 LBS | BODY MASS INDEX: 25.72 KG/M2 | SYSTOLIC BLOOD PRESSURE: 120 MMHG | HEART RATE: 76 BPM | DIASTOLIC BLOOD PRESSURE: 80 MMHG | OXYGEN SATURATION: 98 %

## 2023-05-01 DIAGNOSIS — N18.32 CHRONIC KIDNEY DISEASE (CKD) STAGE G3B/A1, MODERATELY DECREASED GLOMERULAR FILTRATION RATE (GFR) BETWEEN 30-44 ML/MIN/1.73 SQUARE METER AND ALBUMINURIA CREATININE RATIO LESS THAN 30 MG/G (HCC): ICD-10-CM

## 2023-05-01 DIAGNOSIS — I50.22 CHRONIC SYSTOLIC CHF (CONGESTIVE HEART FAILURE) (HCC): ICD-10-CM

## 2023-05-01 DIAGNOSIS — E11.29 TYPE 2 DIABETES MELLITUS WITH MICROALBUMINURIA, WITHOUT LONG-TERM CURRENT USE OF INSULIN (HCC): Primary | ICD-10-CM

## 2023-05-01 DIAGNOSIS — R80.9 TYPE 2 DIABETES MELLITUS WITH MICROALBUMINURIA, WITHOUT LONG-TERM CURRENT USE OF INSULIN (HCC): Primary | ICD-10-CM

## 2023-05-01 LAB — HBA1C MFR BLD: 10.8 %

## 2023-05-01 PROCEDURE — 3079F DIAST BP 80-89 MM HG: CPT

## 2023-05-01 PROCEDURE — 99214 OFFICE O/P EST MOD 30 MIN: CPT

## 2023-05-01 PROCEDURE — 3046F HEMOGLOBIN A1C LEVEL >9.0%: CPT

## 2023-05-01 PROCEDURE — 1123F ACP DISCUSS/DSCN MKR DOCD: CPT

## 2023-05-01 PROCEDURE — 83036 HEMOGLOBIN GLYCOSYLATED A1C: CPT

## 2023-05-01 PROCEDURE — 3074F SYST BP LT 130 MM HG: CPT

## 2023-05-01 RX ORDER — INSULIN GLARGINE 100 [IU]/ML
INJECTION, SOLUTION SUBCUTANEOUS
Qty: 5 ADJUSTABLE DOSE PRE-FILLED PEN SYRINGE | Refills: 0 | Status: SHIPPED | OUTPATIENT
Start: 2023-05-01

## 2023-05-01 ASSESSMENT — ENCOUNTER SYMPTOMS
DIARRHEA: 0
EYE PAIN: 0
ABDOMINAL PAIN: 0
NAUSEA: 1
SHORTNESS OF BREATH: 0
CHEST TIGHTNESS: 0
COUGH: 0
EYE DISCHARGE: 0
ABDOMINAL DISTENTION: 0
SORE THROAT: 0
CONSTIPATION: 0
COLOR CHANGE: 0

## 2023-05-01 ASSESSMENT — PATIENT HEALTH QUESTIONNAIRE - PHQ9
7. TROUBLE CONCENTRATING ON THINGS, SUCH AS READING THE NEWSPAPER OR WATCHING TELEVISION: 0
9. THOUGHTS THAT YOU WOULD BE BETTER OFF DEAD, OR OF HURTING YOURSELF: 0
SUM OF ALL RESPONSES TO PHQ9 QUESTIONS 1 & 2: 0
SUM OF ALL RESPONSES TO PHQ QUESTIONS 1-9: 3
3. TROUBLE FALLING OR STAYING ASLEEP: 2
1. LITTLE INTEREST OR PLEASURE IN DOING THINGS: 0
8. MOVING OR SPEAKING SO SLOWLY THAT OTHER PEOPLE COULD HAVE NOTICED. OR THE OPPOSITE, BEING SO FIGETY OR RESTLESS THAT YOU HAVE BEEN MOVING AROUND A LOT MORE THAN USUAL: 0
SUM OF ALL RESPONSES TO PHQ QUESTIONS 1-9: 3
SUM OF ALL RESPONSES TO PHQ QUESTIONS 1-9: 3
5. POOR APPETITE OR OVEREATING: 1
SUM OF ALL RESPONSES TO PHQ QUESTIONS 1-9: 3
4. FEELING TIRED OR HAVING LITTLE ENERGY: 0
6. FEELING BAD ABOUT YOURSELF - OR THAT YOU ARE A FAILURE OR HAVE LET YOURSELF OR YOUR FAMILY DOWN: 0
2. FEELING DOWN, DEPRESSED OR HOPELESS: 0
10. IF YOU CHECKED OFF ANY PROBLEMS, HOW DIFFICULT HAVE THESE PROBLEMS MADE IT FOR YOU TO DO YOUR WORK, TAKE CARE OF THINGS AT HOME, OR GET ALONG WITH OTHER PEOPLE: 0

## 2023-05-01 NOTE — PROGRESS NOTES
rhythm. Pulses: Normal pulses. Heart sounds: Heart sounds are distant. Murmur heard. Systolic murmur is present with a grade of 3/6. Pulmonary:      Effort: Pulmonary effort is normal. No respiratory distress. Breath sounds: Normal breath sounds. No wheezing. Abdominal:      General: Abdomen is flat. Bowel sounds are normal.      Palpations: Abdomen is soft. Tenderness: There is no abdominal tenderness. Hernia: No hernia is present. Musculoskeletal:         General: No swelling. Normal range of motion. Cervical back: Normal range of motion and neck supple. No tenderness. Right lower le+ Pitting Edema present. Left lower le+ Pitting Edema present. Lymphadenopathy:      Cervical: No cervical adenopathy. Skin:     General: Skin is warm and dry. Capillary Refill: Capillary refill takes less than 2 seconds. Neurological:      General: No focal deficit present. Mental Status: She is alert and oriented to person, place, and time. Mental status is at baseline. Cranial Nerves: No cranial nerve deficit. Psychiatric:         Mood and Affect: Mood normal.         Behavior: Behavior normal.         Judgment: Judgment normal.       LADAN Tristan CNP    This dictation was generated by voice recognition computer software. Although all attempts are made to edit the dictation for accuracy, there may be errors in the transcription that are not intended.

## 2023-05-18 ENCOUNTER — TELEPHONE (OUTPATIENT)
Dept: FAMILY MEDICINE CLINIC | Age: 78
End: 2023-05-18

## 2023-05-18 ENCOUNTER — HOSPITAL ENCOUNTER (OUTPATIENT)
Age: 78
Discharge: HOME | End: 2023-05-18
Payer: MEDICARE

## 2023-05-18 LAB
ALBUMIN SERPL-MCNC: 3.4 G/DL (ref 3.4–5)
ANION GAP SERPL CALCULATED.3IONS-SCNC: 11 MMOL/L (ref 3–16)
BACTERIA URNS QL MICRO: ABNORMAL /HPF
BILIRUB UR QL STRIP.AUTO: NEGATIVE
BUN SERPL-MCNC: 28 MG/DL (ref 7–20)
CALCIUM SERPL-MCNC: 9 MG/DL (ref 8.3–10.6)
CHLORIDE SERPL-SCNC: 104 MMOL/L (ref 99–110)
CLARITY UR: CLEAR
CO2 SERPL-SCNC: 19 MMOL/L (ref 21–32)
COLOR UR: YELLOW
CREAT SERPL-MCNC: 1.7 MG/DL (ref 0.6–1.2)
DEPRECATED RDW RBC AUTO: 15.2 % (ref 12.4–15.4)
EPI CELLS #/AREA URNS HPF: ABNORMAL /HPF (ref 0–5)
GFR SERPLBLD CREATININE-BSD FMLA CKD-EPI: 31 ML/MIN/{1.73_M2}
GLUCOSE SERPL-MCNC: 416 MG/DL (ref 70–99)
GLUCOSE UR STRIP.AUTO-MCNC: >=1000 MG/DL
HCT VFR BLD AUTO: 35.4 % (ref 36–48)
HGB BLD-MCNC: 11.6 G/DL (ref 12–16)
HGB UR QL STRIP.AUTO: NEGATIVE
KETONES UR STRIP.AUTO-MCNC: NEGATIVE MG/DL
LEUKOCYTE ESTERASE UR QL STRIP.AUTO: NEGATIVE
MCH RBC QN AUTO: 31.8 PG (ref 26–34)
MCHC RBC AUTO-ENTMCNC: 32.8 G/DL (ref 31–36)
MCV RBC AUTO: 97 FL (ref 80–100)
NITRITE UR QL STRIP.AUTO: NEGATIVE
PH UR STRIP.AUTO: 5.5 [PH] (ref 5–8)
PHOSPHATE SERPL-MCNC: 4.3 MG/DL (ref 2.5–4.9)
PLATELET # BLD AUTO: 203 K/UL (ref 135–450)
PMV BLD AUTO: 7.8 FL (ref 5–10.5)
POTASSIUM SERPL-SCNC: 4.8 MMOL/L (ref 3.5–5.1)
PROT UR STRIP.AUTO-MCNC: 30 MG/DL
RBC # BLD AUTO: 3.65 M/UL (ref 4–5.2)
RBC #/AREA URNS HPF: ABNORMAL /HPF (ref 0–4)
SODIUM SERPL-SCNC: 134 MMOL/L (ref 136–145)
SP GR UR STRIP.AUTO: 1.01 (ref 1–1.03)
UA DIPSTICK W REFLEX MICRO PNL UR: YES
URN SPEC COLLECT METH UR: ABNORMAL
UROBILINOGEN UR STRIP-ACNC: 0.2 E.U./DL
WBC # BLD AUTO: 8.4 K/UL (ref 4–11)
WBC #/AREA URNS HPF: ABNORMAL /HPF (ref 0–5)

## 2023-05-18 PROCEDURE — 84156 ASSAY OF PROTEIN URINE: CPT

## 2023-05-18 PROCEDURE — 36415 COLL VENOUS BLD VENIPUNCTURE: CPT

## 2023-05-18 PROCEDURE — 81001 URINALYSIS AUTO W/SCOPE: CPT

## 2023-05-18 PROCEDURE — 85027 COMPLETE CBC AUTOMATED: CPT

## 2023-05-18 PROCEDURE — 83970 ASSAY OF PARATHORMONE: CPT

## 2023-05-18 PROCEDURE — 82306 VITAMIN D 25 HYDROXY: CPT

## 2023-05-18 PROCEDURE — 80069 RENAL FUNCTION PANEL: CPT

## 2023-05-18 PROCEDURE — 82570 ASSAY OF URINE CREATININE: CPT

## 2023-05-19 LAB
25(OH)D3 SERPL-MCNC: 38.6 NG/ML
CREAT UR-MCNC: 43.9 MG/DL (ref 28–259)
PROT UR-MCNC: 44 MG/DL
PROT/CREAT UR-RTO: 1 MG/DL
PTH-INTACT SERPL-MCNC: 91.2 PG/ML (ref 14–72)

## 2023-07-03 RX ORDER — POTASSIUM CHLORIDE 750 MG/1
TABLET, FILM COATED, EXTENDED RELEASE ORAL
Qty: 90 TABLET | Refills: 0 | Status: SHIPPED | OUTPATIENT
Start: 2023-07-03

## 2023-07-11 RX ORDER — ALLOPURINOL 300 MG/1
TABLET ORAL
Qty: 90 TABLET | Refills: 1 | Status: SHIPPED | OUTPATIENT
Start: 2023-07-11

## 2023-08-02 ENCOUNTER — OFFICE VISIT (OUTPATIENT)
Dept: FAMILY MEDICINE CLINIC | Age: 78
End: 2023-08-02
Payer: MEDICARE

## 2023-08-02 VITALS
OXYGEN SATURATION: 98 % | BODY MASS INDEX: 25.97 KG/M2 | DIASTOLIC BLOOD PRESSURE: 80 MMHG | WEIGHT: 133 LBS | HEART RATE: 90 BPM | SYSTOLIC BLOOD PRESSURE: 138 MMHG

## 2023-08-02 DIAGNOSIS — I50.22 CHRONIC SYSTOLIC CHF (CONGESTIVE HEART FAILURE) (HCC): ICD-10-CM

## 2023-08-02 DIAGNOSIS — E11.29 TYPE 2 DIABETES MELLITUS WITH MICROALBUMINURIA, WITHOUT LONG-TERM CURRENT USE OF INSULIN (HCC): Primary | ICD-10-CM

## 2023-08-02 DIAGNOSIS — R80.9 TYPE 2 DIABETES MELLITUS WITH MICROALBUMINURIA, WITHOUT LONG-TERM CURRENT USE OF INSULIN (HCC): Primary | ICD-10-CM

## 2023-08-02 DIAGNOSIS — N18.32 CHRONIC KIDNEY DISEASE (CKD) STAGE G3B/A1, MODERATELY DECREASED GLOMERULAR FILTRATION RATE (GFR) BETWEEN 30-44 ML/MIN/1.73 SQUARE METER AND ALBUMINURIA CREATININE RATIO LESS THAN 30 MG/G (HCC): ICD-10-CM

## 2023-08-02 LAB — HBA1C MFR BLD: 9.1 %

## 2023-08-02 PROCEDURE — 3046F HEMOGLOBIN A1C LEVEL >9.0%: CPT

## 2023-08-02 PROCEDURE — 83037 HB GLYCOSYLATED A1C HOME DEV: CPT

## 2023-08-02 PROCEDURE — 99214 OFFICE O/P EST MOD 30 MIN: CPT

## 2023-08-02 PROCEDURE — 1123F ACP DISCUSS/DSCN MKR DOCD: CPT

## 2023-08-02 PROCEDURE — 3079F DIAST BP 80-89 MM HG: CPT

## 2023-08-02 PROCEDURE — 3075F SYST BP GE 130 - 139MM HG: CPT

## 2023-08-02 RX ORDER — INSULIN GLARGINE 100 [IU]/ML
INJECTION, SOLUTION SUBCUTANEOUS
Qty: 5 ADJUSTABLE DOSE PRE-FILLED PEN SYRINGE | Refills: 0 | Status: SHIPPED | OUTPATIENT
Start: 2023-08-02

## 2023-08-02 RX ORDER — INSULIN GLARGINE 100 [IU]/ML
INJECTION, SOLUTION SUBCUTANEOUS
Qty: 5 ADJUSTABLE DOSE PRE-FILLED PEN SYRINGE | Refills: 0 | Status: SHIPPED | OUTPATIENT
Start: 2023-08-02 | End: 2023-08-02

## 2023-08-02 ASSESSMENT — ENCOUNTER SYMPTOMS
NAUSEA: 1
SORE THROAT: 0
ABDOMINAL PAIN: 0
COLOR CHANGE: 0
EYE PAIN: 0
EYE DISCHARGE: 0
COUGH: 0
CHEST TIGHTNESS: 0
ABDOMINAL DISTENTION: 0
CONSTIPATION: 0
DIARRHEA: 0
SHORTNESS OF BREATH: 0

## 2023-08-02 NOTE — PROGRESS NOTES
[facility]  9/3/4268    Kern Goodpasture (:  ) is a 68 y.o. female, here for evaluation of the following medical concerns:    Chief Complaint   Patient presents with    Diabetes     Pt checks every morning avg is about 134        ASSESSMENT/ PLAN  1. Type 2 diabetes mellitus with microalbuminuria, without long-term current use of insulin ([facility])  -A1c is 9.1  -increase lantus to 12 units at night and 16 in the morning.   -follow up in 3 month. - Insulin Pen Needle 31G X 5 MM MISC; 1 each by Does not apply route daily  Dispense: 100 each; Refill: 3  - POCT glycosylated hemoglobin (Hb A1C)  - insulin glargine (LANTUS SOLOSTAR) 100 UNIT/ML injection pen; 16 units in the morning and 12 units in the evening  Dispense: 5 Adjustable Dose Pre-filled Pen Syringe; Refill: 0    2. Chronic kidney disease (CKD) stage G3b/A1, moderately decreased glomerular filtration rate (GFR) between 30-44 mL/min/1.73 square meter and albuminuria creatinine ratio less than 30 mg/g ([facility])  -urinating okay. -follow up with nephro    3. Chronic systolic CHF (congestive heart failure) ([facility])  -no chest pain or SOB. -stable at this time. Return in about 3 months (around 2023) for A1C.    HPI  Since last time patient has been on Trulicity 3.20 milligrams weekly. She is tolerating well with no abdominal pain, nausea or vomiting. Her kidney numbers appear to have improved on 2/3. We will recheck JIMMY Nichols as well. Overall she states that her blood sugars are typically running about 115 in morning. Interval history 3/28/2023  Since last time patient has been taking 1.5 mg of Trulicity weekly with no adverse side effects of nausea, vomiting or belly pain. She was also started on Lantus 10 units twice daily. She has been checking her blood sugars twice a day and readings have been around 160s consistently. Today in the office she was 165.   On another note she does have chronic kidney

## 2023-09-25 ENCOUNTER — TELEPHONE (OUTPATIENT)
Dept: FAMILY MEDICINE CLINIC | Age: 78
End: 2023-09-25

## 2023-09-25 NOTE — TELEPHONE ENCOUNTER
----- Message from Magdi Melendez sent at 9/25/2023 11:45 AM EDT -----  Subject: Appointment Request    Reason for Call: New Patient/New to Provider Appointment needed: New   Patient Request Appointment    QUESTIONS    Reason for appointment request? No appointments available during search     Additional Information for Provider? per pt insurance pt is only able to   see jared GARCIA MD due to her insulin, she is currently seeing Fadia Yolis but is   wanting to switch to [facility] he is currently not accepting new pt wanting   to see what her options are. please advise   ---------------------------------------------------------------------------  --------------  Wesly HART  [phone];  Do not leave any message, patient will call back for answer  ---------------------------------------------------------------------------  --------------  SCRIPT ANSWERS

## 2023-09-25 NOTE — TELEPHONE ENCOUNTER
Patient notified to call her insurance and see if they are able to set her up with an MD/DO that is accepting new patients.

## 2023-10-10 DIAGNOSIS — I50.22 CHRONIC SYSTOLIC CHF (CONGESTIVE HEART FAILURE) (HCC): ICD-10-CM

## 2023-10-10 DIAGNOSIS — I10 ESSENTIAL HYPERTENSION, BENIGN: ICD-10-CM

## 2023-10-10 RX ORDER — DIGOXIN 125 MCG
TABLET ORAL
Qty: 45 TABLET | Refills: 3 | Status: SHIPPED | OUTPATIENT
Start: 2023-10-10

## 2023-10-10 NOTE — TELEPHONE ENCOUNTER
Refill Request     CONFIRM preferrred pharmacy with the patient. If Mail Order Rx - Pend for 90 day refill. Last Seen: Last Seen Department: 8/2/2023  Last Seen by PCP: 8/2/2023    Last Written: 10-    If no future appointment scheduled, route STAFF MESSAGE with patient name to the [facility] for scheduling. Next Appointment:   Future Appointments   Date Time Provider [address] [place] [address]   11/3/2023  1:00 PM LADAN Porter - SHADI SWANSON       Message sent to [address] [place] [address] to schedule appt with patient?   N/A      Requested Prescriptions     Pending Prescriptions Disp Refills    digoxin (LANOXIN) 125 MCG tablet [Pharmacy Med Name: DIGOXIN 125 MCG TABLET] 45 tablet 3     Sig: TAKE ONE TABLET BY MOUTH EVERY OTHER DAY

## 2023-10-20 ENCOUNTER — OFFICE VISIT (OUTPATIENT)
Dept: INTERNAL MEDICINE CLINIC | Age: 78
End: 2023-10-20

## 2023-10-20 VITALS
SYSTOLIC BLOOD PRESSURE: 146 MMHG | BODY MASS INDEX: 28.27 KG/M2 | DIASTOLIC BLOOD PRESSURE: 80 MMHG | WEIGHT: 144 LBS | HEART RATE: 64 BPM | HEIGHT: 60 IN | RESPIRATION RATE: 16 BRPM

## 2023-10-20 DIAGNOSIS — R80.9 CONTROLLED TYPE 2 DIABETES MELLITUS WITH MICROALBUMINURIA, WITH LONG-TERM CURRENT USE OF INSULIN (HCC): ICD-10-CM

## 2023-10-20 DIAGNOSIS — M79.89 LEG SWELLING: ICD-10-CM

## 2023-10-20 DIAGNOSIS — Z79.4 CONTROLLED TYPE 2 DIABETES MELLITUS WITH MICROALBUMINURIA, WITH LONG-TERM CURRENT USE OF INSULIN (HCC): ICD-10-CM

## 2023-10-20 DIAGNOSIS — N18.30 STAGE 3 CHRONIC KIDNEY DISEASE, UNSPECIFIED WHETHER STAGE 3A OR 3B CKD (HCC): ICD-10-CM

## 2023-10-20 DIAGNOSIS — E11.69 HYPERLIPIDEMIA ASSOCIATED WITH TYPE 2 DIABETES MELLITUS (HCC): ICD-10-CM

## 2023-10-20 DIAGNOSIS — M1A.09X0 IDIOPATHIC CHRONIC GOUT OF MULTIPLE SITES WITHOUT TOPHUS: ICD-10-CM

## 2023-10-20 DIAGNOSIS — Z23 NEED FOR INFLUENZA VACCINATION: ICD-10-CM

## 2023-10-20 DIAGNOSIS — E78.5 HYPERLIPIDEMIA ASSOCIATED WITH TYPE 2 DIABETES MELLITUS (HCC): ICD-10-CM

## 2023-10-20 DIAGNOSIS — I50.22 CHRONIC SYSTOLIC CHF (CONGESTIVE HEART FAILURE) (HCC): Primary | ICD-10-CM

## 2023-10-20 DIAGNOSIS — I10 ESSENTIAL HYPERTENSION, BENIGN: ICD-10-CM

## 2023-10-20 DIAGNOSIS — E11.29 CONTROLLED TYPE 2 DIABETES MELLITUS WITH MICROALBUMINURIA, WITH LONG-TERM CURRENT USE OF INSULIN (HCC): ICD-10-CM

## 2023-10-20 DIAGNOSIS — I25.10 CAD, MULTIPLE VESSEL: ICD-10-CM

## 2023-10-20 PROCEDURE — 1123F ACP DISCUSS/DSCN MKR DOCD: CPT | Performed by: NURSE PRACTITIONER

## 2023-10-20 PROCEDURE — 99215 OFFICE O/P EST HI 40 MIN: CPT | Performed by: NURSE PRACTITIONER

## 2023-10-20 PROCEDURE — 3052F HG A1C>EQUAL 8.0%<EQUAL 9.0%: CPT | Performed by: NURSE PRACTITIONER

## 2023-10-20 PROCEDURE — 90662 IIV NO PRSV INCREASED AG IM: CPT | Performed by: NURSE PRACTITIONER

## 2023-10-20 PROCEDURE — 3078F DIAST BP <80 MM HG: CPT | Performed by: NURSE PRACTITIONER

## 2023-10-20 PROCEDURE — 3074F SYST BP LT 130 MM HG: CPT | Performed by: NURSE PRACTITIONER

## 2023-10-20 PROCEDURE — G0008 ADMIN INFLUENZA VIRUS VAC: HCPCS | Performed by: NURSE PRACTITIONER

## 2023-10-20 RX ORDER — TRIAMCINOLONE ACETONIDE 1 MG/G
OINTMENT TOPICAL
Qty: 30 G | Refills: 1 | Status: SHIPPED | OUTPATIENT
Start: 2023-10-20 | End: 2023-10-27

## 2023-10-20 ASSESSMENT — ENCOUNTER SYMPTOMS
VOMITING: 0
BACK PAIN: 0
SHORTNESS OF BREATH: 0
WHEEZING: 0
ABDOMINAL PAIN: 0
RHINORRHEA: 0

## 2023-10-20 NOTE — PROGRESS NOTES
Subjective:      Patient ID: Matthias Alaniz is a 68 y.o. female. HPI    Patient is here for follow up on diabetes, hypertension, hyperlipidemia, depression and arthritis. She has chronic systolic CHF. She is doing well. Her last EF was improved at 35-40 %. She has history of mitral valve repair, left atrial appendage clip, closure of PFO and a 3 way bypass. She is doing well. She has known CAD and had CABG. Was in 2020. Patient's BGs 133 this morning. She does not have hypoglycemia, increase appetite, paresthesia of the feet, polydipsia and polyuria. It is under fair control. She has had her eye exam. No issues with her feet. Her depression has been stable. Patient's BP is controlled on current medications. No chest pain or dyspnea. Her arthritis is stable. She gets occasional attacks of gout. No recent issues. She has osteopenia. She is taking calcium and vitamin d but could not tolerate Fosamax. Gout. No recent flares. On allopurinol. Not taking Lasix  Has CKD she c/o lower extremity swelling. Used to work at [employer] for many years.        Current Outpatient Medications:     digoxin (LANOXIN) 125 MCG tablet, TAKE ONE TABLET BY MOUTH EVERY OTHER DAY, Disp: 45 tablet, Rfl: 3    Insulin Pen Needle 31G X 5 MM MISC, 1 each by Does not apply route daily, Disp: 100 each, Rfl: 3    insulin glargine (LANTUS SOLOSTAR) 100 UNIT/ML injection pen, 16 units in the morning and 12 units in the evening, Disp: 5 Adjustable Dose Pre-filled Pen Syringe, Rfl: 0    allopurinol (ZYLOPRIM) 300 MG tablet, TAKE ONE TABLET BY MOUTH DAILY, Disp: 90 tablet, Rfl: 1    potassium chloride (KLOR-CON) 10 MEQ extended release tablet, TAKE ONE TABLET BY MOUTH DAILY, Disp: 90 tablet, Rfl: 0    atorvastatin (LIPITOR) 40 MG tablet, TAKE ONE TABLET BY MOUTH DAILY, Disp: 90 tablet, Rfl: 0    Insulin Syringe-Needle U-100 31G X 5/16\" 1 ML MISC, 1 each by Does not apply route daily, Disp: 100 each, Rfl: 3    TRULICITY 1.5 ZQ/7.4NM

## 2024-01-02 ENCOUNTER — OFFICE VISIT (OUTPATIENT)
Dept: INTERNAL MEDICINE CLINIC | Age: 79
End: 2024-01-02

## 2024-01-02 VITALS
WEIGHT: 157 LBS | SYSTOLIC BLOOD PRESSURE: 152 MMHG | HEIGHT: 60 IN | DIASTOLIC BLOOD PRESSURE: 80 MMHG | BODY MASS INDEX: 30.82 KG/M2 | RESPIRATION RATE: 16 BRPM

## 2024-01-02 DIAGNOSIS — Z79.4 CONTROLLED TYPE 2 DIABETES MELLITUS WITH MICROALBUMINURIA, WITH LONG-TERM CURRENT USE OF INSULIN (HCC): ICD-10-CM

## 2024-01-02 DIAGNOSIS — N18.32 CHRONIC KIDNEY DISEASE (CKD) STAGE G3B/A1, MODERATELY DECREASED GLOMERULAR FILTRATION RATE (GFR) BETWEEN 30-44 ML/MIN/1.73 SQUARE METER AND ALBUMINURIA CREATININE RATIO LESS THAN 30 MG/G (HCC): ICD-10-CM

## 2024-01-02 DIAGNOSIS — E11.29 CONTROLLED TYPE 2 DIABETES MELLITUS WITH MICROALBUMINURIA, WITH LONG-TERM CURRENT USE OF INSULIN (HCC): ICD-10-CM

## 2024-01-02 DIAGNOSIS — N18.30 STAGE 3 CHRONIC KIDNEY DISEASE, UNSPECIFIED WHETHER STAGE 3A OR 3B CKD (HCC): ICD-10-CM

## 2024-01-02 DIAGNOSIS — M1A.09X0 IDIOPATHIC CHRONIC GOUT OF MULTIPLE SITES WITHOUT TOPHUS: ICD-10-CM

## 2024-01-02 DIAGNOSIS — I25.10 CAD, MULTIPLE VESSEL: ICD-10-CM

## 2024-01-02 DIAGNOSIS — R80.9 CONTROLLED TYPE 2 DIABETES MELLITUS WITH MICROALBUMINURIA, WITH LONG-TERM CURRENT USE OF INSULIN (HCC): ICD-10-CM

## 2024-01-02 DIAGNOSIS — E78.5 HYPERLIPIDEMIA ASSOCIATED WITH TYPE 2 DIABETES MELLITUS (HCC): ICD-10-CM

## 2024-01-02 DIAGNOSIS — I50.22 CHRONIC SYSTOLIC CHF (CONGESTIVE HEART FAILURE) (HCC): ICD-10-CM

## 2024-01-02 DIAGNOSIS — E11.69 HYPERLIPIDEMIA ASSOCIATED WITH TYPE 2 DIABETES MELLITUS (HCC): ICD-10-CM

## 2024-01-02 DIAGNOSIS — I10 ESSENTIAL HYPERTENSION, BENIGN: ICD-10-CM

## 2024-01-02 DIAGNOSIS — I50.22 CHRONIC SYSTOLIC CHF (CONGESTIVE HEART FAILURE) (HCC): Primary | ICD-10-CM

## 2024-01-02 LAB
ANION GAP SERPL CALCULATED.3IONS-SCNC: 8 MMOL/L (ref 3–16)
BUN SERPL-MCNC: 34 MG/DL (ref 7–20)
CALCIUM SERPL-MCNC: 9.1 MG/DL (ref 8.3–10.6)
CHLORIDE SERPL-SCNC: 110 MMOL/L (ref 99–110)
CO2 SERPL-SCNC: 26 MMOL/L (ref 21–32)
CREAT SERPL-MCNC: 1.4 MG/DL (ref 0.6–1.2)
GFR SERPLBLD CREATININE-BSD FMLA CKD-EPI: 38 ML/MIN/{1.73_M2}
GLUCOSE SERPL-MCNC: 112 MG/DL (ref 70–99)
POTASSIUM SERPL-SCNC: 4.6 MMOL/L (ref 3.5–5.1)
SODIUM SERPL-SCNC: 144 MMOL/L (ref 136–145)

## 2024-01-02 PROCEDURE — 3077F SYST BP >= 140 MM HG: CPT | Performed by: NURSE PRACTITIONER

## 2024-01-02 PROCEDURE — 1123F ACP DISCUSS/DSCN MKR DOCD: CPT | Performed by: NURSE PRACTITIONER

## 2024-01-02 PROCEDURE — 99214 OFFICE O/P EST MOD 30 MIN: CPT | Performed by: NURSE PRACTITIONER

## 2024-01-02 PROCEDURE — 3079F DIAST BP 80-89 MM HG: CPT | Performed by: NURSE PRACTITIONER

## 2024-01-02 RX ORDER — FUROSEMIDE 40 MG/1
40 TABLET ORAL DAILY
Qty: 90 TABLET | Refills: 0 | Status: SHIPPED | OUTPATIENT
Start: 2024-01-02

## 2024-01-02 ASSESSMENT — ENCOUNTER SYMPTOMS: SHORTNESS OF BREATH: 1

## 2024-01-02 NOTE — PROGRESS NOTES
CKD stage 3b stable. Advised to see nephrology.      Discussed use, benefit, and side effects of prescribed medications.  Barriers to medication compliance addressed.  All patient questions answered.  Pt voiced understanding.       continue present plan and medications.  See orders.  Labs ordered       Return in about 3 months (around 4/2/2024), or if symptoms worsen or fail to improve.    Saundra Bond FNP-C  1/2/2024

## 2024-01-08 RX ORDER — ALLOPURINOL 300 MG/1
TABLET ORAL
Qty: 90 TABLET | Refills: 1 | Status: SHIPPED | OUTPATIENT
Start: 2024-01-08

## 2024-01-21 ENCOUNTER — HOSPITAL ENCOUNTER (INPATIENT)
Age: 79
LOS: 9 days | Discharge: HOME HEALTH | DRG: 291 | End: 2024-01-31
Attending: STUDENT IN AN ORGANIZED HEALTH CARE EDUCATION/TRAINING PROGRAM | Admitting: INTERNAL MEDICINE
Payer: MEDICARE

## 2024-01-21 DIAGNOSIS — R80.9 TYPE 2 DIABETES MELLITUS WITH MICROALBUMINURIA, WITHOUT LONG-TERM CURRENT USE OF INSULIN (HCC): ICD-10-CM

## 2024-01-21 DIAGNOSIS — I50.23 ACUTE ON CHRONIC SYSTOLIC CONGESTIVE HEART FAILURE (HCC): ICD-10-CM

## 2024-01-21 DIAGNOSIS — I50.22 CHRONIC SYSTOLIC CHF (CONGESTIVE HEART FAILURE) (HCC): ICD-10-CM

## 2024-01-21 DIAGNOSIS — N18.9 CHRONIC KIDNEY DISEASE, UNSPECIFIED CKD STAGE: ICD-10-CM

## 2024-01-21 DIAGNOSIS — R79.89 ELEVATED TROPONIN: ICD-10-CM

## 2024-01-21 DIAGNOSIS — E11.29 TYPE 2 DIABETES MELLITUS WITH MICROALBUMINURIA, WITHOUT LONG-TERM CURRENT USE OF INSULIN (HCC): ICD-10-CM

## 2024-01-21 DIAGNOSIS — J18.9 COMMUNITY ACQUIRED PNEUMONIA, UNSPECIFIED LATERALITY: Primary | ICD-10-CM

## 2024-01-21 PROCEDURE — 99285 EMERGENCY DEPT VISIT HI MDM: CPT

## 2024-01-22 ENCOUNTER — APPOINTMENT (OUTPATIENT)
Dept: GENERAL RADIOLOGY | Age: 79
DRG: 291 | End: 2024-01-22
Payer: MEDICARE

## 2024-01-22 PROBLEM — R79.89 ELEVATED TROPONIN: Status: ACTIVE | Noted: 2024-01-22

## 2024-01-22 PROBLEM — R79.89 ELEVATED TROPONIN LEVEL NOT DUE TO ACUTE CORONARY SYNDROME: Status: ACTIVE | Noted: 2024-01-22

## 2024-01-22 PROBLEM — N18.9 CHRONIC KIDNEY DISEASE: Status: ACTIVE | Noted: 2021-04-16

## 2024-01-22 PROBLEM — J18.9 PNEUMONIA DUE TO INFECTIOUS ORGANISM: Status: ACTIVE | Noted: 2024-01-22

## 2024-01-22 PROBLEM — J12.9 PNEUMONIA DUE TO VIRUS: Status: ACTIVE | Noted: 2024-01-22

## 2024-01-22 PROBLEM — R77.8 ELEVATED TROPONIN LEVEL NOT DUE TO ACUTE CORONARY SYNDROME: Status: ACTIVE | Noted: 2024-01-22

## 2024-01-22 PROBLEM — J18.9 PNEUMONIA DUE TO INFECTIOUS ORGANISM, UNSPECIFIED LATERALITY, UNSPECIFIED PART OF LUNG: Status: ACTIVE | Noted: 2024-01-22

## 2024-01-22 LAB
ALBUMIN SERPL-MCNC: 3.4 G/DL (ref 3.4–5)
ALBUMIN/GLOB SERPL: 1.1 {RATIO} (ref 1.1–2.2)
ALP SERPL-CCNC: 252 U/L (ref 40–129)
ALT SERPL-CCNC: 14 U/L (ref 10–40)
ANION GAP SERPL CALCULATED.3IONS-SCNC: 10 MMOL/L (ref 3–16)
AST SERPL-CCNC: 18 U/L (ref 15–37)
BASE EXCESS BLDV CALC-SCNC: -1.9 MMOL/L (ref -3–3)
BASOPHILS # BLD: 0.1 K/UL (ref 0–0.2)
BASOPHILS NFR BLD: 1.2 %
BILIRUB SERPL-MCNC: 0.9 MG/DL (ref 0–1)
BUN SERPL-MCNC: 31 MG/DL (ref 7–20)
CALCIUM SERPL-MCNC: 9.4 MG/DL (ref 8.3–10.6)
CHLORIDE SERPL-SCNC: 110 MMOL/L (ref 99–110)
CO2 BLDV-SCNC: 26 MMOL/L
CO2 SERPL-SCNC: 24 MMOL/L (ref 21–32)
COHGB MFR BLDV: 4.6 % (ref 0–1.5)
CREAT SERPL-MCNC: 1.5 MG/DL (ref 0.6–1.2)
DEPRECATED RDW RBC AUTO: 18.4 % (ref 12.4–15.4)
EKG ATRIAL RATE: 100 BPM
EKG DIAGNOSIS: NORMAL
EKG P AXIS: 61 DEGREES
EKG P-R INTERVAL: 106 MS
EKG Q-T INTERVAL: 324 MS
EKG QRS DURATION: 112 MS
EKG QTC CALCULATION (BAZETT): 417 MS
EKG R AXIS: 88 DEGREES
EKG T AXIS: -69 DEGREES
EKG VENTRICULAR RATE: 100 BPM
EOSINOPHIL # BLD: 0.1 K/UL (ref 0–0.6)
EOSINOPHIL NFR BLD: 2.4 %
FLUAV RNA UPPER RESP QL NAA+PROBE: NEGATIVE
FLUBV AG NPH QL: NEGATIVE
GFR SERPLBLD CREATININE-BSD FMLA CKD-EPI: 35 ML/MIN/{1.73_M2}
GLUCOSE BLD-MCNC: 119 MG/DL (ref 70–99)
GLUCOSE BLD-MCNC: 177 MG/DL (ref 70–99)
GLUCOSE BLD-MCNC: 85 MG/DL (ref 70–99)
GLUCOSE SERPL-MCNC: 142 MG/DL (ref 70–99)
HCO3 BLDV-SCNC: 24.3 MMOL/L (ref 23–29)
HCT VFR BLD AUTO: 37.4 % (ref 36–48)
HGB BLD-MCNC: 11.9 G/DL (ref 12–16)
LACTATE BLDV-SCNC: 1.4 MMOL/L (ref 0.4–1.9)
LYMPHOCYTES # BLD: 0.8 K/UL (ref 1–5.1)
LYMPHOCYTES NFR BLD: 14 %
MCH RBC QN AUTO: 27.4 PG (ref 26–34)
MCHC RBC AUTO-ENTMCNC: 31.8 G/DL (ref 31–36)
MCV RBC AUTO: 86.2 FL (ref 80–100)
METHGB MFR BLDV: 0.3 %
MONOCYTES # BLD: 0.3 K/UL (ref 0–1.3)
MONOCYTES NFR BLD: 6.4 %
NEUTROPHILS # BLD: 4.1 K/UL (ref 1.7–7.7)
NEUTROPHILS NFR BLD: 76 %
NT-PROBNP SERPL-MCNC: ABNORMAL PG/ML (ref 0–449)
O2 THERAPY: ABNORMAL
PCO2 BLDV: 47 MMHG (ref 40–50)
PERFORMED ON: ABNORMAL
PERFORMED ON: ABNORMAL
PERFORMED ON: NORMAL
PH BLDV: 7.33 [PH] (ref 7.35–7.45)
PLATELET # BLD AUTO: 159 K/UL (ref 135–450)
PMV BLD AUTO: 8 FL (ref 5–10.5)
PO2 BLDV: 30.7 MMHG (ref 25–40)
POTASSIUM SERPL-SCNC: 5 MMOL/L (ref 3.5–5.1)
POTASSIUM SERPL-SCNC: 5.2 MMOL/L (ref 3.5–5.1)
POTASSIUM SERPL-SCNC: 5.4 MMOL/L (ref 3.5–5.1)
PROT SERPL-MCNC: 6.6 G/DL (ref 6.4–8.2)
RBC # BLD AUTO: 4.34 M/UL (ref 4–5.2)
RSV AG NOSE QL: NEGATIVE
SAO2 % BLDV: 54 %
SARS-COV-2 RDRP RESP QL NAA+PROBE: NOT DETECTED
SODIUM SERPL-SCNC: 144 MMOL/L (ref 136–145)
TROPONIN, HIGH SENSITIVITY: 49 NG/L (ref 0–14)
TROPONIN, HIGH SENSITIVITY: 60 NG/L (ref 0–14)
WBC # BLD AUTO: 5.4 K/UL (ref 4–11)

## 2024-01-22 PROCEDURE — 99223 1ST HOSP IP/OBS HIGH 75: CPT | Performed by: NURSE PRACTITIONER

## 2024-01-22 PROCEDURE — 6370000000 HC RX 637 (ALT 250 FOR IP): Performed by: INTERNAL MEDICINE

## 2024-01-22 PROCEDURE — 80053 COMPREHEN METABOLIC PANEL: CPT

## 2024-01-22 PROCEDURE — 84132 ASSAY OF SERUM POTASSIUM: CPT

## 2024-01-22 PROCEDURE — 82803 BLOOD GASES ANY COMBINATION: CPT

## 2024-01-22 PROCEDURE — 2580000003 HC RX 258: Performed by: STUDENT IN AN ORGANIZED HEALTH CARE EDUCATION/TRAINING PROGRAM

## 2024-01-22 PROCEDURE — 1200000000 HC SEMI PRIVATE

## 2024-01-22 PROCEDURE — 83880 ASSAY OF NATRIURETIC PEPTIDE: CPT

## 2024-01-22 PROCEDURE — 6370000000 HC RX 637 (ALT 250 FOR IP): Performed by: STUDENT IN AN ORGANIZED HEALTH CARE EDUCATION/TRAINING PROGRAM

## 2024-01-22 PROCEDURE — 87635 SARS-COV-2 COVID-19 AMP PRB: CPT

## 2024-01-22 PROCEDURE — 6360000002 HC RX W HCPCS: Performed by: NURSE PRACTITIONER

## 2024-01-22 PROCEDURE — 6360000002 HC RX W HCPCS: Performed by: STUDENT IN AN ORGANIZED HEALTH CARE EDUCATION/TRAINING PROGRAM

## 2024-01-22 PROCEDURE — 96375 TX/PRO/DX INJ NEW DRUG ADDON: CPT

## 2024-01-22 PROCEDURE — 96368 THER/DIAG CONCURRENT INF: CPT

## 2024-01-22 PROCEDURE — 84484 ASSAY OF TROPONIN QUANT: CPT

## 2024-01-22 PROCEDURE — 36415 COLL VENOUS BLD VENIPUNCTURE: CPT

## 2024-01-22 PROCEDURE — 71045 X-RAY EXAM CHEST 1 VIEW: CPT

## 2024-01-22 PROCEDURE — 99223 1ST HOSP IP/OBS HIGH 75: CPT

## 2024-01-22 PROCEDURE — 85025 COMPLETE CBC W/AUTO DIFF WBC: CPT

## 2024-01-22 PROCEDURE — 93010 ELECTROCARDIOGRAM REPORT: CPT | Performed by: STUDENT IN AN ORGANIZED HEALTH CARE EDUCATION/TRAINING PROGRAM

## 2024-01-22 PROCEDURE — 96365 THER/PROPH/DIAG IV INF INIT: CPT

## 2024-01-22 PROCEDURE — 93005 ELECTROCARDIOGRAM TRACING: CPT | Performed by: STUDENT IN AN ORGANIZED HEALTH CARE EDUCATION/TRAINING PROGRAM

## 2024-01-22 PROCEDURE — 2580000003 HC RX 258: Performed by: INTERNAL MEDICINE

## 2024-01-22 PROCEDURE — 87804 INFLUENZA ASSAY W/OPTIC: CPT

## 2024-01-22 PROCEDURE — 6360000002 HC RX W HCPCS: Performed by: INTERNAL MEDICINE

## 2024-01-22 PROCEDURE — 87040 BLOOD CULTURE FOR BACTERIA: CPT

## 2024-01-22 PROCEDURE — 83605 ASSAY OF LACTIC ACID: CPT

## 2024-01-22 PROCEDURE — 87807 RSV ASSAY W/OPTIC: CPT

## 2024-01-22 RX ORDER — SODIUM CHLORIDE 0.9 % (FLUSH) 0.9 %
5-40 SYRINGE (ML) INJECTION EVERY 12 HOURS SCHEDULED
Status: DISCONTINUED | OUTPATIENT
Start: 2024-01-22 | End: 2024-01-31 | Stop reason: HOSPADM

## 2024-01-22 RX ORDER — DIGOXIN 125 MCG
125 TABLET ORAL EVERY OTHER DAY
Status: DISCONTINUED | OUTPATIENT
Start: 2024-01-22 | End: 2024-01-29

## 2024-01-22 RX ORDER — ENOXAPARIN SODIUM 100 MG/ML
30 INJECTION SUBCUTANEOUS DAILY
Status: DISCONTINUED | OUTPATIENT
Start: 2024-01-22 | End: 2024-01-30

## 2024-01-22 RX ORDER — IPRATROPIUM BROMIDE AND ALBUTEROL SULFATE 2.5; .5 MG/3ML; MG/3ML
1 SOLUTION RESPIRATORY (INHALATION) ONCE
Status: COMPLETED | OUTPATIENT
Start: 2024-01-22 | End: 2024-01-22

## 2024-01-22 RX ORDER — DEXTROSE MONOHYDRATE 100 MG/ML
INJECTION, SOLUTION INTRAVENOUS CONTINUOUS PRN
Status: DISCONTINUED | OUTPATIENT
Start: 2024-01-22 | End: 2024-01-31 | Stop reason: HOSPADM

## 2024-01-22 RX ORDER — ATORVASTATIN CALCIUM 40 MG/1
40 TABLET, FILM COATED ORAL DAILY
Status: DISCONTINUED | OUTPATIENT
Start: 2024-01-22 | End: 2024-01-31 | Stop reason: HOSPADM

## 2024-01-22 RX ORDER — ACETAMINOPHEN 650 MG/1
650 SUPPOSITORY RECTAL EVERY 6 HOURS PRN
Status: DISCONTINUED | OUTPATIENT
Start: 2024-01-22 | End: 2024-01-31 | Stop reason: HOSPADM

## 2024-01-22 RX ORDER — POTASSIUM CHLORIDE 750 MG/1
10 TABLET, FILM COATED, EXTENDED RELEASE ORAL DAILY
Status: DISCONTINUED | OUTPATIENT
Start: 2024-01-22 | End: 2024-01-23

## 2024-01-22 RX ORDER — ONDANSETRON 2 MG/ML
4 INJECTION INTRAMUSCULAR; INTRAVENOUS EVERY 6 HOURS PRN
Status: DISCONTINUED | OUTPATIENT
Start: 2024-01-22 | End: 2024-01-31 | Stop reason: HOSPADM

## 2024-01-22 RX ORDER — ACETAMINOPHEN 325 MG/1
650 TABLET ORAL EVERY 6 HOURS PRN
Status: DISCONTINUED | OUTPATIENT
Start: 2024-01-22 | End: 2024-01-31 | Stop reason: HOSPADM

## 2024-01-22 RX ORDER — SODIUM CHLORIDE 9 MG/ML
INJECTION, SOLUTION INTRAVENOUS PRN
Status: DISCONTINUED | OUTPATIENT
Start: 2024-01-22 | End: 2024-01-31 | Stop reason: HOSPADM

## 2024-01-22 RX ORDER — POLYETHYLENE GLYCOL 3350 17 G/17G
17 POWDER, FOR SOLUTION ORAL DAILY PRN
Status: DISCONTINUED | OUTPATIENT
Start: 2024-01-22 | End: 2024-01-24

## 2024-01-22 RX ORDER — FUROSEMIDE 40 MG/1
40 TABLET ORAL DAILY
Status: DISCONTINUED | OUTPATIENT
Start: 2024-01-22 | End: 2024-01-22

## 2024-01-22 RX ORDER — INSULIN LISPRO 100 [IU]/ML
0-4 INJECTION, SOLUTION INTRAVENOUS; SUBCUTANEOUS
Status: DISCONTINUED | OUTPATIENT
Start: 2024-01-22 | End: 2024-01-31 | Stop reason: HOSPADM

## 2024-01-22 RX ORDER — ASPIRIN 81 MG/1
81 TABLET ORAL DAILY
Status: DISCONTINUED | OUTPATIENT
Start: 2024-01-22 | End: 2024-01-31 | Stop reason: HOSPADM

## 2024-01-22 RX ORDER — GLUCAGON 1 MG/ML
1 KIT INJECTION PRN
Status: DISCONTINUED | OUTPATIENT
Start: 2024-01-22 | End: 2024-01-31 | Stop reason: HOSPADM

## 2024-01-22 RX ORDER — SODIUM CHLORIDE 0.9 % (FLUSH) 0.9 %
5-40 SYRINGE (ML) INJECTION PRN
Status: DISCONTINUED | OUTPATIENT
Start: 2024-01-22 | End: 2024-01-31 | Stop reason: HOSPADM

## 2024-01-22 RX ORDER — FUROSEMIDE 10 MG/ML
40 INJECTION INTRAMUSCULAR; INTRAVENOUS 2 TIMES DAILY
Status: DISCONTINUED | OUTPATIENT
Start: 2024-01-22 | End: 2024-01-23

## 2024-01-22 RX ORDER — ENALAPRIL MALEATE 10 MG/1
20 TABLET ORAL DAILY
Status: DISCONTINUED | OUTPATIENT
Start: 2024-01-22 | End: 2024-01-22

## 2024-01-22 RX ORDER — INSULIN LISPRO 100 [IU]/ML
0-4 INJECTION, SOLUTION INTRAVENOUS; SUBCUTANEOUS NIGHTLY
Status: DISCONTINUED | OUTPATIENT
Start: 2024-01-22 | End: 2024-01-31 | Stop reason: HOSPADM

## 2024-01-22 RX ORDER — FUROSEMIDE 10 MG/ML
20 INJECTION INTRAMUSCULAR; INTRAVENOUS ONCE
Status: COMPLETED | OUTPATIENT
Start: 2024-01-22 | End: 2024-01-22

## 2024-01-22 RX ORDER — LISINOPRIL 2.5 MG/1
TABLET ORAL DAILY
Status: ON HOLD | COMMUNITY
End: 2024-01-22

## 2024-01-22 RX ORDER — ONDANSETRON 4 MG/1
4 TABLET, ORALLY DISINTEGRATING ORAL EVERY 8 HOURS PRN
Status: DISCONTINUED | OUTPATIENT
Start: 2024-01-22 | End: 2024-01-31 | Stop reason: HOSPADM

## 2024-01-22 RX ORDER — ENALAPRIL MALEATE 20 MG/1
TABLET ORAL
Status: ON HOLD | COMMUNITY
Start: 2023-12-20 | End: 2024-01-28 | Stop reason: ALTCHOICE

## 2024-01-22 RX ORDER — INSULIN GLARGINE 100 [IU]/ML
10 INJECTION, SOLUTION SUBCUTANEOUS 2 TIMES DAILY
Status: DISCONTINUED | OUTPATIENT
Start: 2024-01-22 | End: 2024-01-28

## 2024-01-22 RX ORDER — EMPAGLIFLOZIN 10 MG/1
TABLET, FILM COATED ORAL
Status: ON HOLD | COMMUNITY
Start: 2023-10-18 | End: 2024-01-22

## 2024-01-22 RX ORDER — ALLOPURINOL 300 MG/1
300 TABLET ORAL DAILY
Status: DISCONTINUED | OUTPATIENT
Start: 2024-01-22 | End: 2024-01-25

## 2024-01-22 RX ADMIN — ONDANSETRON 4 MG: 2 INJECTION INTRAMUSCULAR; INTRAVENOUS at 20:08

## 2024-01-22 RX ADMIN — AZITHROMYCIN MONOHYDRATE 500 MG: 500 INJECTION, POWDER, LYOPHILIZED, FOR SOLUTION INTRAVENOUS at 18:42

## 2024-01-22 RX ADMIN — Medication 10 ML: at 10:56

## 2024-01-22 RX ADMIN — FUROSEMIDE 20 MG: 10 INJECTION, SOLUTION INTRAMUSCULAR; INTRAVENOUS at 01:58

## 2024-01-22 RX ADMIN — CEFTRIAXONE SODIUM 1000 MG: 1 INJECTION, POWDER, FOR SOLUTION INTRAMUSCULAR; INTRAVENOUS at 18:06

## 2024-01-22 RX ADMIN — ENALAPRIL MALEATE 20 MG: 10 TABLET ORAL at 10:54

## 2024-01-22 RX ADMIN — INSULIN GLARGINE 10 UNITS: 100 INJECTION, SOLUTION SUBCUTANEOUS at 20:16

## 2024-01-22 RX ADMIN — ATORVASTATIN CALCIUM 40 MG: 40 TABLET, FILM COATED ORAL at 10:54

## 2024-01-22 RX ADMIN — AZITHROMYCIN MONOHYDRATE 500 MG: 500 INJECTION, POWDER, LYOPHILIZED, FOR SOLUTION INTRAVENOUS at 02:16

## 2024-01-22 RX ADMIN — CEFTRIAXONE SODIUM 1000 MG: 1 INJECTION, POWDER, FOR SOLUTION INTRAMUSCULAR; INTRAVENOUS at 02:00

## 2024-01-22 RX ADMIN — EMPAGLIFLOZIN 10 MG: 10 TABLET, FILM COATED ORAL at 10:54

## 2024-01-22 RX ADMIN — METOPROLOL SUCCINATE 75 MG: 50 TABLET, EXTENDED RELEASE ORAL at 10:54

## 2024-01-22 RX ADMIN — ASPIRIN 81 MG: 81 TABLET, COATED ORAL at 10:54

## 2024-01-22 RX ADMIN — FUROSEMIDE 40 MG: 40 TABLET ORAL at 10:54

## 2024-01-22 RX ADMIN — IPRATROPIUM BROMIDE AND ALBUTEROL SULFATE 1 DOSE: 2.5; .5 SOLUTION RESPIRATORY (INHALATION) at 00:11

## 2024-01-22 RX ADMIN — FUROSEMIDE 40 MG: 10 INJECTION, SOLUTION INTRAMUSCULAR; INTRAVENOUS at 18:08

## 2024-01-22 RX ADMIN — Medication 10 ML: at 20:09

## 2024-01-22 RX ADMIN — ALLOPURINOL 300 MG: 300 TABLET ORAL at 10:54

## 2024-01-22 ASSESSMENT — LIFESTYLE VARIABLES: HOW OFTEN DO YOU HAVE A DRINK CONTAINING ALCOHOL: NEVER

## 2024-01-22 ASSESSMENT — PAIN - FUNCTIONAL ASSESSMENT: PAIN_FUNCTIONAL_ASSESSMENT: NONE - DENIES PAIN

## 2024-01-22 NOTE — ED NOTES
Pt ambulated in hallway, severe dyspnea with the exertion. O2 saturation remained WNL but has become increasingly tachypneic.

## 2024-01-22 NOTE — ED PROVIDER NOTES
Emergency Department Encounter    Patient: Amber Fuentes  MRN: [id number]  : 1945  Date of Evaluation: 2024  ED Provider:  Omar Zapien MD    Triage Chief Complaint:   Congestive Heart Failure    [place]:  Amber Fuentes is a 78 y.o. female with history seen below including CHF, CKD, diabetes, hypertension, hyperlipidemia, coronary artery disease presenting with shortness of breath.  Patient states over the past 2 days she has had increased shortness of breath.  States she whenever she exerts herself at all she becomes tachypneic.  States she is having trouble getting around her house secondary to shortness of breath.  States she does have a mild cough denies significant sputum production denies fevers, nasal congestion, sore throat.  States she does have some extremity edema but is unsure if this is worse than baseline.  Patient's last echo revealed an ejection fraction of less than 20%.  Patient denies abdominal pain, nausea vomiting, diarrhea constipation or urinary symptoms.    ROS - see HPI, below listed is current ROS at time of my eval:  At least 14 systems reviewed  Past Medical History:   Diagnosis Date    Anemia     Backache, unspecified 3/11/08    CAD, multiple vessel 2020    Chronic kidney disease (CKD) stage G3b/A1, moderately decreased glomerular filtration rate (GFR) between 30-44 mL/min/1.73 square meter and albuminuria creatinine ratio less than 30 mg/g ([facility]) 2021    Chronic systolic CHF (congestive heart failure) (HCC) 2019    Depressive disorder, not elsewhere classified 07    Essential hypertension, benign 07    Gout 2011    Hyperlipidemia associated with type 2 diabetes mellitus (HCC) 2015    Major depressive disorder, recurrent episode, moderate (HCC) 2015    Osteoarthritis, hand 2012    Osteoarthrosis, unspecified whether generalized or localized, lower leg 07    Osteopenia 2015    Other and unspecified hyperlipidemia

## 2024-01-22 NOTE — H&P
Result   1. Right basilar patchy airspace opacity, concerning for pneumonia.   2. Cardiomegaly with pulmonary vascular congestion.   3. Questionable small right pleural effusion.             ASSESSMENT/PLAN:    #CAP, 2/2 gram positive organism  -CXR shows right basilar patchy airspace opacity   -covid, flu, RSV negative  -respiratory and blood cultures pending  -IV rocephin and zithromax D#1    #Acute on chronic systolic CHF  #Ischemic cardiomyopathy   -pro-BNP >40k  -CXR shows cardiomegaly with pulmonary vascular congestion  -limited echo 6/2022 with EF <20%  -referred to EP in past for ICD consideration but has not yet followed up   -given 20 IV lasix in ED  -continue home dose lasix for now, will defer need for further IV diuresis to cardiology   -not on MRA or ARNi, patient has declined entresto previously  -monitor daily weights, I&Os    #Elevated troponin  #CAD s/p CABG   -troponin 60-->49  -EKG with non-specific T wave changes  -patient denies chest pain   -continue ASA, statin, BB  -cardiology consulted as above     #HTN  -elevated on admission  -continue ACEI and BB     #Hyperkalemia  -specimen hemolyzed x2  -repeat and treat accordingly    #CKD3  -Cr near baseline at 1.5  -monitor BMP    #T2DM  -hold oral regimen  -lantus and SSI  -monitor BG     #Gout   -continue allopurinol       #Depression   -continue home regimen         Note CAP and CHF exacerbation makes patient higher risk for morbidity and mortality requiring testing and treatment.       DVT Prophylaxis: Lovenox  Diet: No diet orders on file  Code Status: Prior    Noa Arevalo PA-C  1/22/2024  9:32 AM

## 2024-01-22 NOTE — ED TRIAGE NOTES
Pt x/o SOB that started approximately 1 hour PTA, when EMS arrived she was very weak and unable to transfer.

## 2024-01-22 NOTE — PLAN OF CARE
Problem: Respiratory - Adult  Goal: Achieves optimal ventilation and oxygenation  Outcome: Progressing     Problem: Cardiovascular - Adult  Goal: Maintains optimal cardiac output and hemodynamic stability  Outcome: Progressing  Goal: Absence of cardiac dysrhythmias or at baseline  Outcome: Progressing     Problem: Chronic Conditions and Co-morbidities  Goal: Patient's chronic conditions and co-morbidity symptoms are monitored and maintained or improved  Outcome: Progressing     Problem: Discharge Planning  Goal: Discharge to home or other facility with appropriate resources  Outcome: Progressing  Flowsheets (Taken 1/22/2024 9275)  Discharge to home or other facility with appropriate resources:   Identify barriers to discharge with patient and caregiver   Identify discharge learning needs (meds, wound care, etc)   Refer to discharge planning if patient needs post-hospital services based on physician order or complex needs related to functional status, cognitive ability or social support system     Problem: Safety - Adult  Goal: Free from fall injury  Outcome: Progressing

## 2024-01-22 NOTE — DISCHARGE INSTRUCTIONS
Recheck labs in 1 week         Heart Failure Resources:  Heart Failure Interactive Workbook:  Go to [email or web address].[email or web address]/[email or web address] for a Free Heart Failure Interactive Workbook provided by The American [employer] Association. This interactive workbook will provide information on Healthier Living with Heart Failure. Please copy and paste link into search bar. Use your mouse to scroll through the pages.    HF [place] osiel:   Heart Failure Free smart phone osiel available for iPhone and Android download. Use your phone to track sodium intake, fluid intake, symptoms, and weight.     Low Sodium Diet / Recipes:  Go to www.[employer].mySociety website for “renal” diet which is Low Sodium! [employer] is a dialysis company, but this website offers free seasonal cookbooks. Each quarter, they will release 25-30 new recipes with a breakdown of calories, sodium, and glucose. You can also go to www.[employer]/recipes website for free recipes.     Discharge Instruction Video:  Scan the QR code below with your camera and click the canva.com link to open the video and watch educational information on Heart Failure and Medications from one of our nurses.   [email or web address]/[email or web address]/edit    Home Exercise Program:   Identification of Green/Yellow/Red zones:  You should be able to identify when you feel good (green zone), if you have 1-2 symptoms of HF (yellow zone), or if you are in need of medical attention (red zone).  In your CHF education folder you were provided a “stop light tool” to outline this information.     We want to you to rate your exertion levels:    Our therapy team has discussed means of identification with you such as the \"Jluian scale.\"  The Julian rating scale ranges from 6 to 20, where 6 means \"no exertion at all\" and 20 means \"maximal exertion.\" The goal is to use this to gauge how much effort it is taking for you to do your normal daily tasks.   You should be able to

## 2024-01-22 NOTE — FLOWSHEET NOTE
01/22/24 1209   Vital Signs   Pulse 93   Heart Rate Source Monitor   Respirations 19   BP (!) 147/96   MAP (Calculated) 113   BP Location Left upper arm   BP Method Automatic   Patient Position High fowlers   Pain Assessment   Pain Assessment None - Denies Pain   Oxygen Therapy   SpO2 97 %   O2 Device None (Room air)     Vitals and reassessment completed, no significant changes. Unable to obtain temperature at this time due to patient eating lunch. Standing scale to be obtained when patient finishes. Lasix modified to IV BID, not given at this time due to already receiving oral earlier this AM. No further needs at this time.     Ella Marcelino, RN

## 2024-01-23 LAB
ANION GAP SERPL CALCULATED.3IONS-SCNC: 8 MMOL/L (ref 3–16)
BASOPHILS # BLD: 0.1 K/UL (ref 0–0.2)
BASOPHILS NFR BLD: 0.9 %
BUN SERPL-MCNC: 32 MG/DL (ref 7–20)
CALCIUM SERPL-MCNC: 8.6 MG/DL (ref 8.3–10.6)
CHLORIDE SERPL-SCNC: 110 MMOL/L (ref 99–110)
CO2 SERPL-SCNC: 25 MMOL/L (ref 21–32)
CREAT SERPL-MCNC: 2 MG/DL (ref 0.6–1.2)
DEPRECATED RDW RBC AUTO: 18.6 % (ref 12.4–15.4)
EOSINOPHIL # BLD: 0.2 K/UL (ref 0–0.6)
EOSINOPHIL NFR BLD: 3.3 %
GFR SERPLBLD CREATININE-BSD FMLA CKD-EPI: 25 ML/MIN/{1.73_M2}
GLUCOSE BLD-MCNC: 107 MG/DL (ref 70–99)
GLUCOSE BLD-MCNC: 119 MG/DL (ref 70–99)
GLUCOSE BLD-MCNC: 159 MG/DL (ref 70–99)
GLUCOSE BLD-MCNC: 196 MG/DL (ref 70–99)
GLUCOSE BLD-MCNC: 76 MG/DL (ref 70–99)
GLUCOSE SERPL-MCNC: 96 MG/DL (ref 70–99)
HCT VFR BLD AUTO: 33.6 % (ref 36–48)
HGB BLD-MCNC: 10.8 G/DL (ref 12–16)
LYMPHOCYTES # BLD: 1 K/UL (ref 1–5.1)
LYMPHOCYTES NFR BLD: 17 %
MCH RBC QN AUTO: 28 PG (ref 26–34)
MCHC RBC AUTO-ENTMCNC: 32 G/DL (ref 31–36)
MCV RBC AUTO: 87.5 FL (ref 80–100)
MONOCYTES # BLD: 0.5 K/UL (ref 0–1.3)
MONOCYTES NFR BLD: 8.5 %
NEUTROPHILS # BLD: 4 K/UL (ref 1.7–7.7)
NEUTROPHILS NFR BLD: 70.3 %
PERFORMED ON: ABNORMAL
PERFORMED ON: NORMAL
PLATELET # BLD AUTO: 157 K/UL (ref 135–450)
PMV BLD AUTO: 8.6 FL (ref 5–10.5)
POTASSIUM SERPL-SCNC: 5.2 MMOL/L (ref 3.5–5.1)
RBC # BLD AUTO: 3.85 M/UL (ref 4–5.2)
SODIUM SERPL-SCNC: 143 MMOL/L (ref 136–145)
WBC # BLD AUTO: 5.8 K/UL (ref 4–11)

## 2024-01-23 PROCEDURE — 6360000002 HC RX W HCPCS: Performed by: STUDENT IN AN ORGANIZED HEALTH CARE EDUCATION/TRAINING PROGRAM

## 2024-01-23 PROCEDURE — 1200000000 HC SEMI PRIVATE

## 2024-01-23 PROCEDURE — 93308 TTE F-UP OR LMTD: CPT

## 2024-01-23 PROCEDURE — 36415 COLL VENOUS BLD VENIPUNCTURE: CPT

## 2024-01-23 PROCEDURE — 93356 MYOCRD STRAIN IMG SPCKL TRCK: CPT

## 2024-01-23 PROCEDURE — 6370000000 HC RX 637 (ALT 250 FOR IP): Performed by: INTERNAL MEDICINE

## 2024-01-23 PROCEDURE — 99232 SBSQ HOSP IP/OBS MODERATE 35: CPT | Performed by: STUDENT IN AN ORGANIZED HEALTH CARE EDUCATION/TRAINING PROGRAM

## 2024-01-23 PROCEDURE — 6370000000 HC RX 637 (ALT 250 FOR IP): Performed by: STUDENT IN AN ORGANIZED HEALTH CARE EDUCATION/TRAINING PROGRAM

## 2024-01-23 PROCEDURE — 6360000002 HC RX W HCPCS: Performed by: INTERNAL MEDICINE

## 2024-01-23 PROCEDURE — 6360000002 HC RX W HCPCS: Performed by: NURSE PRACTITIONER

## 2024-01-23 PROCEDURE — 80048 BASIC METABOLIC PNL TOTAL CA: CPT

## 2024-01-23 PROCEDURE — 99232 SBSQ HOSP IP/OBS MODERATE 35: CPT | Performed by: INTERNAL MEDICINE

## 2024-01-23 PROCEDURE — 2580000003 HC RX 258: Performed by: INTERNAL MEDICINE

## 2024-01-23 PROCEDURE — 93306 TTE W/DOPPLER COMPLETE: CPT

## 2024-01-23 PROCEDURE — 2580000003 HC RX 258: Performed by: STUDENT IN AN ORGANIZED HEALTH CARE EDUCATION/TRAINING PROGRAM

## 2024-01-23 PROCEDURE — 85025 COMPLETE CBC W/AUTO DIFF WBC: CPT

## 2024-01-23 RX ORDER — ISOSORBIDE DINITRATE 10 MG/1
5 TABLET ORAL 3 TIMES DAILY
Status: DISCONTINUED | OUTPATIENT
Start: 2024-01-23 | End: 2024-01-24

## 2024-01-23 RX ORDER — HYDRALAZINE HYDROCHLORIDE 10 MG/1
10 TABLET, FILM COATED ORAL EVERY 8 HOURS SCHEDULED
Status: DISCONTINUED | OUTPATIENT
Start: 2024-01-23 | End: 2024-01-25

## 2024-01-23 RX ORDER — FUROSEMIDE 10 MG/ML
80 INJECTION INTRAMUSCULAR; INTRAVENOUS ONCE
Status: COMPLETED | OUTPATIENT
Start: 2024-01-23 | End: 2024-01-23

## 2024-01-23 RX ADMIN — ATORVASTATIN CALCIUM 40 MG: 40 TABLET, FILM COATED ORAL at 08:47

## 2024-01-23 RX ADMIN — ISOSORBIDE DINITRATE 5 MG: 10 TABLET ORAL at 17:33

## 2024-01-23 RX ADMIN — HYDRALAZINE HYDROCHLORIDE 10 MG: 10 TABLET, FILM COATED ORAL at 20:09

## 2024-01-23 RX ADMIN — INSULIN GLARGINE 10 UNITS: 100 INJECTION, SOLUTION SUBCUTANEOUS at 20:09

## 2024-01-23 RX ADMIN — ENOXAPARIN SODIUM 30 MG: 100 INJECTION SUBCUTANEOUS at 08:46

## 2024-01-23 RX ADMIN — ALLOPURINOL 300 MG: 300 TABLET ORAL at 08:47

## 2024-01-23 RX ADMIN — Medication 10 ML: at 20:11

## 2024-01-23 RX ADMIN — CEFTRIAXONE SODIUM 1000 MG: 1 INJECTION, POWDER, FOR SOLUTION INTRAMUSCULAR; INTRAVENOUS at 17:38

## 2024-01-23 RX ADMIN — ISOSORBIDE DINITRATE 5 MG: 10 TABLET ORAL at 14:36

## 2024-01-23 RX ADMIN — ASPIRIN 81 MG: 81 TABLET, COATED ORAL at 08:46

## 2024-01-23 RX ADMIN — HYDRALAZINE HYDROCHLORIDE 10 MG: 10 TABLET, FILM COATED ORAL at 14:36

## 2024-01-23 RX ADMIN — FUROSEMIDE 5 MG/HR: 10 INJECTION, SOLUTION INTRAMUSCULAR; INTRAVENOUS at 12:11

## 2024-01-23 RX ADMIN — METOPROLOL SUCCINATE 75 MG: 50 TABLET, EXTENDED RELEASE ORAL at 08:46

## 2024-01-23 RX ADMIN — FUROSEMIDE 80 MG: 10 INJECTION, SOLUTION INTRAMUSCULAR; INTRAVENOUS at 17:32

## 2024-01-23 RX ADMIN — FUROSEMIDE 40 MG: 10 INJECTION, SOLUTION INTRAMUSCULAR; INTRAVENOUS at 08:46

## 2024-01-23 RX ADMIN — EMPAGLIFLOZIN 10 MG: 10 TABLET, FILM COATED ORAL at 08:46

## 2024-01-23 RX ADMIN — AZITHROMYCIN MONOHYDRATE 500 MG: 500 INJECTION, POWDER, LYOPHILIZED, FOR SOLUTION INTRAVENOUS at 16:32

## 2024-01-23 RX ADMIN — INSULIN GLARGINE 10 UNITS: 100 INJECTION, SOLUTION SUBCUTANEOUS at 08:46

## 2024-01-23 RX ADMIN — Medication 10 ML: at 08:47

## 2024-01-23 NOTE — PLAN OF CARE
Problem: Respiratory - Adult  Goal: Achieves optimal ventilation and oxygenation  1/22/2024 2025 by Brianna Monson RN  Outcome: Progressing  1/22/2024 0956 by Ella Marcelino RN  Outcome: Progressing  Flowsheets (Taken 1/22/2024 0930)  Achieves optimal ventilation and oxygenation: Assess for changes in respiratory status     Problem: Cardiovascular - Adult  Goal: Maintains optimal cardiac output and hemodynamic stability  1/22/2024 2025 by Brianna Monson RN  Outcome: Progressing  1/22/2024 0956 by Ella Marcelino RN  Outcome: Progressing  Flowsheets (Taken 1/22/2024 0930)  Maintains optimal cardiac output and hemodynamic stability: Monitor blood pressure and heart rate  Goal: Absence of cardiac dysrhythmias or at baseline  1/22/2024 2025 by Brianna Monson RN  Outcome: Progressing  1/22/2024 0956 by Ella Marcelino RN  Outcome: Progressing  Flowsheets (Taken 1/22/2024 0930)  Absence of cardiac dysrhythmias or at baseline: Monitor cardiac rate and rhythm     Problem: Chronic Conditions and Co-morbidities  Goal: Patient's chronic conditions and co-morbidity symptoms are monitored and maintained or improved  1/22/2024 2025 by Brianna Monson RN  Outcome: Progressing  1/22/2024 0956 by Ella Marcelino RN  Outcome: Progressing  Flowsheets (Taken 1/22/2024 0930)  Care Plan - Patient's Chronic Conditions and Co-Morbidity Symptoms are Monitored and Maintained or Improved: Monitor and assess patient's chronic conditions and comorbid symptoms for stability, deterioration, or improvement     Problem: Discharge Planning  Goal: Discharge to home or other facility with appropriate resources  1/22/2024 2025 by Brianna Monson RN  Outcome: Progressing  1/22/2024 0956 by Ella Marcelino RN  Outcome: Progressing  Flowsheets  Taken 1/22/2024 0954  Discharge to home or other facility with appropriate resources:   Identify barriers to discharge with  At beside with provider. Pt is verbally abusive and cussing. Explained to pt that we are doing everything we can to take care of his needs. Security called for aggressive behavior. Pt had son record the whole incident on his phone.  Pt left ama with IV in arm. Pt sped out of parking lot very fast.           Kristin Dias RN  02/03/22 1614       Kristin Dias RN  02/03/22 1611

## 2024-01-23 NOTE — FLOWSHEET NOTE
01/23/24 0812   Vital Signs   Temp 97.9 °F (36.6 °C)   Temp Source Oral   Pulse 77   Respirations 16   /69   MAP (Calculated) 89   BP Location Right upper arm   BP Method Automatic   Patient Position Semi fowlers   Oxygen Therapy   SpO2 96 %   O2 Device Nasal cannula   O2 Flow Rate (L/min) 2 L/min     Patient resting quietly in bed. No s/s of distress noted. Shift assessment complete, see flow sheet. Denies needs at this time. Call light in reach. Will monitor.

## 2024-01-23 NOTE — PLAN OF CARE
HEART FAILURE CARE PLAN:    Comorbidities Reviewed: Yes   Patient has a past medical history of Anemia, Backache, unspecified, CAD, multiple vessel, Chronic kidney disease (CKD) stage G3b/A1, moderately decreased glomerular filtration rate (GFR) between 30-44 mL/min/1.73 square meter and albuminuria creatinine ratio less than 30 mg/g ([facility]), Chronic systolic CHF (congestive heart failure) ([facility]), Depressive disorder, not elsewhere classified, Essential hypertension, benign, Gout, Hyperlipidemia associated with type 2 diabetes mellitus ([facility]), Major depressive disorder, recurrent episode, moderate ([facility]), Osteoarthritis, hand, Osteoarthrosis, unspecified whether generalized or localized, lower leg, Osteopenia, Other and unspecified hyperlipidemia, Pain in joint, lower leg, Pneumonia, Rotator cuff tendinitis, Tear of medial cartilage or meniscus of knee, current, Type 2 diabetes mellitus with microalbuminuria, without long-term current use of insulin ([facility]), Type 2 diabetes mellitus without complication ([facility]), and Type II or unspecified type diabetes mellitus without mention of complication, not stated as uncontrolled.     ECHOCARDIOGRAM Reviewed: Yes   Patient's Ejection Fraction (EF) is less than 40%    Weights Reviewed: Yes   Admission weight: 71.2 kg (157 lb)   Wt Readings from Last 3 Encounters:   01/23/24 73.9 kg (162 lb 14.7 oz)   01/02/24 71.2 kg (157 lb)   10/20/23 65.3 kg (144 lb)     Intake & Output Reviewed: Yes     Intake/Output Summary (Last 24 hours) at 1/23/2024 0522  Last data filed at 1/22/2024 2346  Gross per 24 hour   Intake --   Output 450 ml   Net -450 ml     Medications Reviewed: Yes   SCHEDULED HOSPITAL MEDICATIONS:   allopurinol  300 mg Oral Daily    atorvastatin  40 mg Oral Daily    aspirin  81 mg Oral Daily    empagliflozin  10 mg Oral Daily    [Held by provider] digoxin  125 mcg Oral Every Other Day    insulin glargine  10 Units SubCUTAneous BID    metoprolol succinate  75 mg Oral Daily     How Severe Are Your Spot(S)?: moderate What Is The Reason For Today's Visit?: Full Body Skin Examination What Is The Reason For Today's Visit? (Being Monitored For X): the development of a new lesion

## 2024-01-23 NOTE — CARE COORDINATION
Case Management Assessment  Initial Evaluation    Date/Time of Evaluation: 1/23/2024 9:06 AM  Assessment Completed by: Fide Bhakta    If patient is discharged prior to next notation, then this note serves as note for discharge by case management.    Patient Name: Amber Fuentes                   YOB: 1945  Diagnosis: Elevated troponin [R79.89]  Acute on chronic systolic congestive heart failure (HCC) [I50.23]  Pneumonia due to infectious organism, unspecified laterality, unspecified part of lung [J18.9]  Chronic kidney disease, unspecified CKD stage [N18.9]  Community acquired pneumonia, unspecified laterality [J18.9]  Pneumonia due to virus [J12.9]                   Date / Time: 1/21/2024 11:53 PM    Patient Admission Status: Inpatient   Readmission Risk (Low < 19, Mod (19-27), High > 27): Readmission Risk Score: 12.8    Current PCP: Lucila Tejeda MD  PCP verified by CM? Yes (Nikhil)    Chart Reviewed: Yes      History Provided by: Patient  Patient Orientation: Alert and Oriented    Patient Cognition: Alert    Hospitalization in the last 30 days (Readmission):  No    If yes, Readmission Assessment in CM Navigator will be completed.    Advance Directives:      Code Status: Full Code   Patient's Primary Decision Maker is: Patient Declined (Legal Next of Kin Remains as Decision Maker)    Primary Decision Maker: Stanford Fuentes - Spouse - [phone]    Discharge Planning:    Patient lives with: Spouse/Significant Other Type of Home: Trailer/Mobile Home  Primary Care Giver: Self  Patient Support Systems include: Spouse/Significant Other   Current Financial resources: Medicare  Current community resources: None  Current services prior to admission: None            Current DME:              Type of Home Care services:  None    ADLS  Prior functional level: Independent in ADLs/IADLs  Current functional level: Independent in ADLs/IADLs    PT AM-PAC:   /24  OT AM-PAC:   /24    Family can

## 2024-01-23 NOTE — ACP (ADVANCE CARE PLANNING)
Advance Care Planning     General Advance Care Planning (ACP) Conversation    Date of Conversation: 1/21/2024  Conducted with: Patient with Decision Making Capacity    Healthcare Decision Maker:    Primary Decision Maker: Stanford Fuentes - Spouse - [phone]  Click here to complete Healthcare Decision Makers including selection of the Healthcare Decision Maker Relationship (ie \"Primary\").   Today we documented Decision Maker(s) consistent with Legal Next of Kin hierarchy.    Content/Action Overview:  DECLINED ACP Conversation - will revisit periodically  Reviewed DNR/DNI and patient elects Full Code (Attempt Resuscitation)        Length of Voluntary ACP Conversation in minutes:  <16 minutes (Non-Billable)    Fide Bhakta

## 2024-01-24 LAB
ALBUMIN SERPL-MCNC: 2.6 G/DL (ref 3.4–5)
ANION GAP SERPL CALCULATED.3IONS-SCNC: 8 MMOL/L (ref 3–16)
BASOPHILS # BLD: 0.1 K/UL (ref 0–0.2)
BASOPHILS NFR BLD: 0.9 %
BUN SERPL-MCNC: 37 MG/DL (ref 7–20)
CALCIUM SERPL-MCNC: 8 MG/DL (ref 8.3–10.6)
CHLORIDE SERPL-SCNC: 107 MMOL/L (ref 99–110)
CO2 SERPL-SCNC: 24 MMOL/L (ref 21–32)
CREAT SERPL-MCNC: 2.2 MG/DL (ref 0.6–1.2)
DEPRECATED RDW RBC AUTO: 18.6 % (ref 12.4–15.4)
EOSINOPHIL # BLD: 0.1 K/UL (ref 0–0.6)
EOSINOPHIL NFR BLD: 1.9 %
GFR SERPLBLD CREATININE-BSD FMLA CKD-EPI: 22 ML/MIN/{1.73_M2}
GLUCOSE BLD-MCNC: 105 MG/DL (ref 70–99)
GLUCOSE BLD-MCNC: 125 MG/DL (ref 70–99)
GLUCOSE BLD-MCNC: 151 MG/DL (ref 70–99)
GLUCOSE BLD-MCNC: 156 MG/DL (ref 70–99)
GLUCOSE BLD-MCNC: 81 MG/DL (ref 70–99)
GLUCOSE SERPL-MCNC: 83 MG/DL (ref 70–99)
HCT VFR BLD AUTO: 31.8 % (ref 36–48)
HGB BLD-MCNC: 10 G/DL (ref 12–16)
LYMPHOCYTES # BLD: 0.9 K/UL (ref 1–5.1)
LYMPHOCYTES NFR BLD: 14.6 %
MCH RBC QN AUTO: 27.5 PG (ref 26–34)
MCHC RBC AUTO-ENTMCNC: 31.4 G/DL (ref 31–36)
MCV RBC AUTO: 87.7 FL (ref 80–100)
MONOCYTES # BLD: 0.6 K/UL (ref 0–1.3)
MONOCYTES NFR BLD: 10.1 %
NEUTROPHILS # BLD: 4.3 K/UL (ref 1.7–7.7)
NEUTROPHILS NFR BLD: 72.5 %
PERFORMED ON: ABNORMAL
PERFORMED ON: NORMAL
PHOSPHATE SERPL-MCNC: 4.8 MG/DL (ref 2.5–4.9)
PLATELET # BLD AUTO: 148 K/UL (ref 135–450)
PMV BLD AUTO: 8.3 FL (ref 5–10.5)
POTASSIUM SERPL-SCNC: 4.7 MMOL/L (ref 3.5–5.1)
POTASSIUM SERPL-SCNC: 4.7 MMOL/L (ref 3.5–5.1)
RBC # BLD AUTO: 3.63 M/UL (ref 4–5.2)
SODIUM SERPL-SCNC: 139 MMOL/L (ref 136–145)
WBC # BLD AUTO: 6 K/UL (ref 4–11)

## 2024-01-24 PROCEDURE — 6370000000 HC RX 637 (ALT 250 FOR IP): Performed by: REGISTERED NURSE

## 2024-01-24 PROCEDURE — 36415 COLL VENOUS BLD VENIPUNCTURE: CPT

## 2024-01-24 PROCEDURE — 6370000000 HC RX 637 (ALT 250 FOR IP): Performed by: INTERNAL MEDICINE

## 2024-01-24 PROCEDURE — 1200000000 HC SEMI PRIVATE

## 2024-01-24 PROCEDURE — 2580000003 HC RX 258: Performed by: INTERNAL MEDICINE

## 2024-01-24 PROCEDURE — 51798 US URINE CAPACITY MEASURE: CPT

## 2024-01-24 PROCEDURE — 6360000002 HC RX W HCPCS: Performed by: INTERNAL MEDICINE

## 2024-01-24 PROCEDURE — 97166 OT EVAL MOD COMPLEX 45 MIN: CPT

## 2024-01-24 PROCEDURE — 80069 RENAL FUNCTION PANEL: CPT

## 2024-01-24 PROCEDURE — 6370000000 HC RX 637 (ALT 250 FOR IP): Performed by: STUDENT IN AN ORGANIZED HEALTH CARE EDUCATION/TRAINING PROGRAM

## 2024-01-24 PROCEDURE — 99232 SBSQ HOSP IP/OBS MODERATE 35: CPT | Performed by: INTERNAL MEDICINE

## 2024-01-24 PROCEDURE — 85025 COMPLETE CBC W/AUTO DIFF WBC: CPT

## 2024-01-24 PROCEDURE — 97530 THERAPEUTIC ACTIVITIES: CPT

## 2024-01-24 PROCEDURE — 99232 SBSQ HOSP IP/OBS MODERATE 35: CPT | Performed by: STUDENT IN AN ORGANIZED HEALTH CARE EDUCATION/TRAINING PROGRAM

## 2024-01-24 PROCEDURE — 97161 PT EVAL LOW COMPLEX 20 MIN: CPT

## 2024-01-24 PROCEDURE — 51702 INSERT TEMP BLADDER CATH: CPT

## 2024-01-24 RX ORDER — ISOSORBIDE DINITRATE 10 MG/1
10 TABLET ORAL 3 TIMES DAILY
Status: DISCONTINUED | OUTPATIENT
Start: 2024-01-24 | End: 2024-01-25

## 2024-01-24 RX ORDER — POLYETHYLENE GLYCOL 3350 17 G/17G
17 POWDER, FOR SOLUTION ORAL DAILY
Status: DISCONTINUED | OUTPATIENT
Start: 2024-01-24 | End: 2024-01-31 | Stop reason: HOSPADM

## 2024-01-24 RX ORDER — FUROSEMIDE 10 MG/ML
80 INJECTION INTRAMUSCULAR; INTRAVENOUS ONCE
Status: COMPLETED | OUTPATIENT
Start: 2024-01-24 | End: 2024-01-24

## 2024-01-24 RX ORDER — HYDROXYZINE HYDROCHLORIDE 10 MG/1
10 TABLET, FILM COATED ORAL 3 TIMES DAILY PRN
Status: DISCONTINUED | OUTPATIENT
Start: 2024-01-24 | End: 2024-01-29

## 2024-01-24 RX ORDER — MECOBALAMIN 5000 MCG
5 TABLET,DISINTEGRATING ORAL NIGHTLY
Status: DISCONTINUED | OUTPATIENT
Start: 2024-01-24 | End: 2024-01-29

## 2024-01-24 RX ADMIN — FUROSEMIDE 10 MG/HR: 10 INJECTION, SOLUTION INTRAMUSCULAR; INTRAVENOUS at 06:15

## 2024-01-24 RX ADMIN — CEFTRIAXONE SODIUM 1000 MG: 1 INJECTION, POWDER, FOR SOLUTION INTRAMUSCULAR; INTRAVENOUS at 19:07

## 2024-01-24 RX ADMIN — HYDRALAZINE HYDROCHLORIDE 10 MG: 10 TABLET, FILM COATED ORAL at 06:08

## 2024-01-24 RX ADMIN — ENOXAPARIN SODIUM 30 MG: 100 INJECTION SUBCUTANEOUS at 09:11

## 2024-01-24 RX ADMIN — ACETAMINOPHEN 650 MG: 325 TABLET ORAL at 00:43

## 2024-01-24 RX ADMIN — EMPAGLIFLOZIN 10 MG: 10 TABLET, FILM COATED ORAL at 09:12

## 2024-01-24 RX ADMIN — ISOSORBIDE DINITRATE 5 MG: 10 TABLET ORAL at 09:12

## 2024-01-24 RX ADMIN — METOPROLOL SUCCINATE 75 MG: 50 TABLET, EXTENDED RELEASE ORAL at 09:12

## 2024-01-24 RX ADMIN — Medication 10 ML: at 09:12

## 2024-01-24 RX ADMIN — HYDRALAZINE HYDROCHLORIDE 10 MG: 10 TABLET, FILM COATED ORAL at 21:41

## 2024-01-24 RX ADMIN — ISOSORBIDE DINITRATE 10 MG: 10 TABLET ORAL at 17:00

## 2024-01-24 RX ADMIN — ATORVASTATIN CALCIUM 40 MG: 40 TABLET, FILM COATED ORAL at 09:12

## 2024-01-24 RX ADMIN — FUROSEMIDE 10 MG/HR: 10 INJECTION, SOLUTION INTRAMUSCULAR; INTRAVENOUS at 00:16

## 2024-01-24 RX ADMIN — ASPIRIN 81 MG: 81 TABLET, COATED ORAL at 09:12

## 2024-01-24 RX ADMIN — ISOSORBIDE DINITRATE 10 MG: 10 TABLET ORAL at 14:04

## 2024-01-24 RX ADMIN — HYDRALAZINE HYDROCHLORIDE 10 MG: 10 TABLET, FILM COATED ORAL at 14:04

## 2024-01-24 RX ADMIN — POLYETHYLENE GLYCOL (3350) 17 G: 17 POWDER, FOR SOLUTION ORAL at 17:00

## 2024-01-24 RX ADMIN — AZITHROMYCIN MONOHYDRATE 500 MG: 500 INJECTION, POWDER, LYOPHILIZED, FOR SOLUTION INTRAVENOUS at 16:54

## 2024-01-24 RX ADMIN — INSULIN GLARGINE 10 UNITS: 100 INJECTION, SOLUTION SUBCUTANEOUS at 09:11

## 2024-01-24 RX ADMIN — ALLOPURINOL 300 MG: 300 TABLET ORAL at 09:12

## 2024-01-24 RX ADMIN — Medication 5 MG: at 21:41

## 2024-01-24 RX ADMIN — Medication 10 ML: at 20:06

## 2024-01-24 RX ADMIN — FUROSEMIDE 20 MG/HR: 10 INJECTION, SOLUTION INTRAMUSCULAR; INTRAVENOUS at 14:21

## 2024-01-24 RX ADMIN — FUROSEMIDE 20 MG/HR: 10 INJECTION, SOLUTION INTRAMUSCULAR; INTRAVENOUS at 19:37

## 2024-01-24 RX ADMIN — FUROSEMIDE 80 MG: 10 INJECTION, SOLUTION INTRAMUSCULAR; INTRAVENOUS at 11:40

## 2024-01-24 ASSESSMENT — PAIN - FUNCTIONAL ASSESSMENT
PAIN_FUNCTIONAL_ASSESSMENT: ACTIVITIES ARE NOT PREVENTED
PAIN_FUNCTIONAL_ASSESSMENT: ACTIVITIES ARE NOT PREVENTED

## 2024-01-24 ASSESSMENT — PAIN SCALES - GENERAL
PAINLEVEL_OUTOF10: 5
PAINLEVEL_OUTOF10: 1

## 2024-01-24 ASSESSMENT — PAIN DESCRIPTION - LOCATION: LOCATION: KNEE

## 2024-01-24 ASSESSMENT — PAIN DESCRIPTION - DESCRIPTORS: DESCRIPTORS: ACHING;DULL

## 2024-01-24 NOTE — PLAN OF CARE
HEART FAILURE CARE PLAN:    Comorbidities Reviewed: Yes   Patient has a past medical history of Anemia, Backache, unspecified, CAD, multiple vessel, Chronic kidney disease (CKD) stage G3b/A1, moderately decreased glomerular filtration rate (GFR) between 30-44 mL/min/1.73 square meter and albuminuria creatinine ratio less than 30 mg/g (HCC), Chronic systolic CHF (congestive heart failure) ([facility]), Depressive disorder, not elsewhere classified, Essential hypertension, benign, Gout, Hyperlipidemia associated with type 2 diabetes mellitus ([facility]), Major depressive disorder, recurrent episode, moderate ([facility]), Osteoarthritis, hand, Osteoarthrosis, unspecified whether generalized or localized, lower leg, Osteopenia, Other and unspecified hyperlipidemia, Pain in joint, lower leg, Pneumonia, Rotator cuff tendinitis, Tear of medial cartilage or meniscus of knee, current, Type 2 diabetes mellitus with microalbuminuria, without long-term current use of insulin ([facility]), Type 2 diabetes mellitus without complication ([facility]), and Type II or unspecified type diabetes mellitus without mention of complication, not stated as uncontrolled.     ECHOCARDIOGRAM Reviewed: Yes   Patient's Ejection Fraction (EF) is less than 40%    Weights Reviewed: Yes   Admission weight: 71.2 kg (157 lb)   Wt Readings from Last 3 Encounters:   01/23/24 73.9 kg (162 lb 14.7 oz)   01/02/24 71.2 kg (157 lb)   10/20/23 65.3 kg (144 lb)     Intake & Output Reviewed: Yes     Intake/Output Summary (Last 24 hours) at 1/23/2024 2200  Last data filed at 1/23/2024 1743  Gross per 24 hour   Intake 360 ml   Output 550 ml   Net -190 ml     Medications Reviewed: Yes   SCHEDULED HOSPITAL MEDICATIONS:   hydrALAZINE  10 mg Oral 3 times per day    isosorbide dinitrate  5 mg Oral TID    allopurinol  300 mg Oral Daily    atorvastatin  40 mg Oral Daily    aspirin  81 mg Oral Daily    empagliflozin  10 mg Oral Daily    [Held by provider] digoxin  125 mcg Oral Every Other Day    insulin

## 2024-01-24 NOTE — CARE COORDINATION
Reviewed chart, discussed pt in rounds, met with pt bedside. Discussed with pt PT recs for SNF. Pt wants to talk with  and will FU with CM tomorrow 1/25/2025. Pt will need precert. Will continue to monitor.

## 2024-01-24 NOTE — FLOWSHEET NOTE
01/24/24 0752   Vital Signs   Temp 97.2 °F (36.2 °C)   Temp Source Oral   Pulse 68   Heart Rate Source Monitor   Respirations 16   /67   MAP (Calculated) 85   BP Location Left upper arm   BP Method Automatic   Patient Position Semi fowlers   Oxygen Therapy   SpO2 98 %   O2 Device Nasal cannula   O2 Flow Rate (L/min) 2 L/min     Patient resting quietly in bed. No s/s of distress noted. Shift assessment complete, see flow sheet.   Lasix gtt infusing @ 10mL/hr. Gagnon catheter in place.  Denies needs at this time. Call light in reach. Will monitor.

## 2024-01-24 NOTE — PLAN OF CARE
Problem: Respiratory - Adult  Goal: Achieves optimal ventilation and oxygenation  Outcome: Progressing     Problem: Cardiovascular - Adult  Goal: Maintains optimal cardiac output and hemodynamic stability  Outcome: Progressing  Goal: Absence of cardiac dysrhythmias or at baseline  Outcome: Progressing

## 2024-01-25 LAB
ANION GAP SERPL CALCULATED.3IONS-SCNC: 12 MMOL/L (ref 3–16)
BASOPHILS # BLD: 0 K/UL (ref 0–0.2)
BASOPHILS NFR BLD: 0.6 %
BUN SERPL-MCNC: 40 MG/DL (ref 7–20)
CALCIUM SERPL-MCNC: 8.4 MG/DL (ref 8.3–10.6)
CHLORIDE SERPL-SCNC: 109 MMOL/L (ref 99–110)
CO2 SERPL-SCNC: 25 MMOL/L (ref 21–32)
CREAT SERPL-MCNC: 2.3 MG/DL (ref 0.6–1.2)
DEPRECATED RDW RBC AUTO: 19 % (ref 12.4–15.4)
EOSINOPHIL # BLD: 0.1 K/UL (ref 0–0.6)
EOSINOPHIL NFR BLD: 1.9 %
GFR SERPLBLD CREATININE-BSD FMLA CKD-EPI: 21 ML/MIN/{1.73_M2}
GLUCOSE BLD-MCNC: 111 MG/DL (ref 70–99)
GLUCOSE BLD-MCNC: 60 MG/DL (ref 70–99)
GLUCOSE BLD-MCNC: 70 MG/DL (ref 70–99)
GLUCOSE BLD-MCNC: 81 MG/DL (ref 70–99)
GLUCOSE BLD-MCNC: 92 MG/DL (ref 70–99)
GLUCOSE SERPL-MCNC: 56 MG/DL (ref 70–99)
HCT VFR BLD AUTO: 33.5 % (ref 36–48)
HGB BLD-MCNC: 10.5 G/DL (ref 12–16)
LYMPHOCYTES # BLD: 0.9 K/UL (ref 1–5.1)
LYMPHOCYTES NFR BLD: 12.2 %
MCH RBC QN AUTO: 27.7 PG (ref 26–34)
MCHC RBC AUTO-ENTMCNC: 31.4 G/DL (ref 31–36)
MCV RBC AUTO: 88.2 FL (ref 80–100)
MONOCYTES # BLD: 0.7 K/UL (ref 0–1.3)
MONOCYTES NFR BLD: 9.6 %
NEUTROPHILS # BLD: 5.4 K/UL (ref 1.7–7.7)
NEUTROPHILS NFR BLD: 75.7 %
PERFORMED ON: ABNORMAL
PERFORMED ON: ABNORMAL
PERFORMED ON: NORMAL
PLATELET # BLD AUTO: 158 K/UL (ref 135–450)
PMV BLD AUTO: 8.2 FL (ref 5–10.5)
POTASSIUM SERPL-SCNC: 4.7 MMOL/L (ref 3.5–5.1)
RBC # BLD AUTO: 3.8 M/UL (ref 4–5.2)
SODIUM SERPL-SCNC: 146 MMOL/L (ref 136–145)
TROPONIN, HIGH SENSITIVITY: 63 NG/L (ref 0–14)
WBC # BLD AUTO: 7.1 K/UL (ref 4–11)

## 2024-01-25 PROCEDURE — 99222 1ST HOSP IP/OBS MODERATE 55: CPT | Performed by: STUDENT IN AN ORGANIZED HEALTH CARE EDUCATION/TRAINING PROGRAM

## 2024-01-25 PROCEDURE — 6360000002 HC RX W HCPCS: Performed by: INTERNAL MEDICINE

## 2024-01-25 PROCEDURE — P9047 ALBUMIN (HUMAN), 25%, 50ML: HCPCS | Performed by: INTERNAL MEDICINE

## 2024-01-25 PROCEDURE — 99232 SBSQ HOSP IP/OBS MODERATE 35: CPT | Performed by: INTERNAL MEDICINE

## 2024-01-25 PROCEDURE — 36415 COLL VENOUS BLD VENIPUNCTURE: CPT

## 2024-01-25 PROCEDURE — 6370000000 HC RX 637 (ALT 250 FOR IP): Performed by: INTERNAL MEDICINE

## 2024-01-25 PROCEDURE — 2580000003 HC RX 258: Performed by: INTERNAL MEDICINE

## 2024-01-25 PROCEDURE — 6370000000 HC RX 637 (ALT 250 FOR IP): Performed by: STUDENT IN AN ORGANIZED HEALTH CARE EDUCATION/TRAINING PROGRAM

## 2024-01-25 PROCEDURE — 80048 BASIC METABOLIC PNL TOTAL CA: CPT

## 2024-01-25 PROCEDURE — 1200000000 HC SEMI PRIVATE

## 2024-01-25 PROCEDURE — 6370000000 HC RX 637 (ALT 250 FOR IP): Performed by: REGISTERED NURSE

## 2024-01-25 PROCEDURE — 84484 ASSAY OF TROPONIN QUANT: CPT

## 2024-01-25 PROCEDURE — 85025 COMPLETE CBC W/AUTO DIFF WBC: CPT

## 2024-01-25 RX ORDER — HYDRALAZINE HYDROCHLORIDE 25 MG/1
25 TABLET, FILM COATED ORAL EVERY 8 HOURS SCHEDULED
Status: DISCONTINUED | OUTPATIENT
Start: 2024-01-25 | End: 2024-01-27

## 2024-01-25 RX ORDER — METOLAZONE 2.5 MG/1
5 TABLET ORAL ONCE
Status: COMPLETED | OUTPATIENT
Start: 2024-01-25 | End: 2024-01-25

## 2024-01-25 RX ORDER — ALBUMIN (HUMAN) 12.5 G/50ML
25 SOLUTION INTRAVENOUS EVERY 6 HOURS
Status: COMPLETED | OUTPATIENT
Start: 2024-01-25 | End: 2024-01-26

## 2024-01-25 RX ORDER — HYDROCODONE BITARTRATE AND ACETAMINOPHEN 5; 325 MG/1; MG/1
1 TABLET ORAL EVERY 8 HOURS PRN
Status: DISCONTINUED | OUTPATIENT
Start: 2024-01-25 | End: 2024-01-29

## 2024-01-25 RX ORDER — ALLOPURINOL 100 MG/1
200 TABLET ORAL DAILY
Status: DISCONTINUED | OUTPATIENT
Start: 2024-01-26 | End: 2024-01-31 | Stop reason: HOSPADM

## 2024-01-25 RX ORDER — ISOSORBIDE DINITRATE 20 MG/1
20 TABLET ORAL 3 TIMES DAILY
Status: DISCONTINUED | OUTPATIENT
Start: 2024-01-25 | End: 2024-01-27

## 2024-01-25 RX ADMIN — HYDRALAZINE HYDROCHLORIDE 10 MG: 10 TABLET, FILM COATED ORAL at 05:49

## 2024-01-25 RX ADMIN — METOLAZONE 5 MG: 2.5 TABLET ORAL at 11:40

## 2024-01-25 RX ADMIN — HYDROXYZINE HYDROCHLORIDE 10 MG: 10 TABLET ORAL at 00:24

## 2024-01-25 RX ADMIN — HYDROCODONE BITARTRATE AND ACETAMINOPHEN 1 TABLET: 5; 325 TABLET ORAL at 13:08

## 2024-01-25 RX ADMIN — ISOSORBIDE DINITRATE 20 MG: 20 TABLET ORAL at 13:08

## 2024-01-25 RX ADMIN — FUROSEMIDE 20 MG/HR: 10 INJECTION, SOLUTION INTRAMUSCULAR; INTRAVENOUS at 00:26

## 2024-01-25 RX ADMIN — Medication 10 ML: at 21:30

## 2024-01-25 RX ADMIN — HYDRALAZINE HYDROCHLORIDE 25 MG: 25 TABLET ORAL at 13:08

## 2024-01-25 RX ADMIN — ASPIRIN 81 MG: 81 TABLET, COATED ORAL at 09:48

## 2024-01-25 RX ADMIN — HYDROCODONE BITARTRATE AND ACETAMINOPHEN 1 TABLET: 5; 325 TABLET ORAL at 21:34

## 2024-01-25 RX ADMIN — POLYETHYLENE GLYCOL (3350) 17 G: 17 POWDER, FOR SOLUTION ORAL at 09:48

## 2024-01-25 RX ADMIN — FUROSEMIDE 20 MG/HR: 10 INJECTION, SOLUTION INTRAMUSCULAR; INTRAVENOUS at 06:13

## 2024-01-25 RX ADMIN — Medication 5 MG: at 21:30

## 2024-01-25 RX ADMIN — FUROSEMIDE 20 MG/HR: 10 INJECTION, SOLUTION INTRAMUSCULAR; INTRAVENOUS at 11:56

## 2024-01-25 RX ADMIN — ALBUMIN (HUMAN) 25 G: 0.25 INJECTION, SOLUTION INTRAVENOUS at 11:43

## 2024-01-25 RX ADMIN — ALLOPURINOL 300 MG: 300 TABLET ORAL at 09:48

## 2024-01-25 RX ADMIN — Medication 10 ML: at 09:48

## 2024-01-25 RX ADMIN — ISOSORBIDE DINITRATE 10 MG: 10 TABLET ORAL at 09:48

## 2024-01-25 RX ADMIN — FUROSEMIDE 20 MG/HR: 10 INJECTION, SOLUTION INTRAMUSCULAR; INTRAVENOUS at 23:04

## 2024-01-25 RX ADMIN — METOPROLOL SUCCINATE 75 MG: 50 TABLET, EXTENDED RELEASE ORAL at 09:48

## 2024-01-25 RX ADMIN — ALBUMIN (HUMAN) 25 G: 0.25 INJECTION, SOLUTION INTRAVENOUS at 19:55

## 2024-01-25 RX ADMIN — ACETAMINOPHEN 650 MG: 325 TABLET ORAL at 11:40

## 2024-01-25 RX ADMIN — ATORVASTATIN CALCIUM 40 MG: 40 TABLET, FILM COATED ORAL at 09:48

## 2024-01-25 RX ADMIN — HYDRALAZINE HYDROCHLORIDE 25 MG: 25 TABLET ORAL at 21:30

## 2024-01-25 RX ADMIN — FUROSEMIDE 20 MG/HR: 10 INJECTION, SOLUTION INTRAMUSCULAR; INTRAVENOUS at 17:37

## 2024-01-25 RX ADMIN — ENOXAPARIN SODIUM 30 MG: 100 INJECTION SUBCUTANEOUS at 09:48

## 2024-01-25 RX ADMIN — ACETAMINOPHEN 650 MG: 325 TABLET ORAL at 04:24

## 2024-01-25 RX ADMIN — ISOSORBIDE DINITRATE 20 MG: 20 TABLET ORAL at 17:38

## 2024-01-25 ASSESSMENT — PAIN DESCRIPTION - ORIENTATION
ORIENTATION: RIGHT

## 2024-01-25 ASSESSMENT — PAIN DESCRIPTION - DESCRIPTORS
DESCRIPTORS: GNAWING
DESCRIPTORS: ACHING;DISCOMFORT
DESCRIPTORS: ACHING

## 2024-01-25 ASSESSMENT — PAIN DESCRIPTION - LOCATION
LOCATION: LEG
LOCATION: KNEE

## 2024-01-25 ASSESSMENT — PAIN - FUNCTIONAL ASSESSMENT: PAIN_FUNCTIONAL_ASSESSMENT: ACTIVITIES ARE NOT PREVENTED

## 2024-01-25 ASSESSMENT — PAIN SCALES - GENERAL
PAINLEVEL_OUTOF10: 10
PAINLEVEL_OUTOF10: 4
PAINLEVEL_OUTOF10: 3
PAINLEVEL_OUTOF10: 10
PAINLEVEL_OUTOF10: 9

## 2024-01-25 NOTE — DISCHARGE INSTR - COC
Continuity of Care Form      Heart Failure Instructions for Daily Management  Patient was treated for acute on chronic systolic heart failure.  Amber Fuentes will require the following:  Please weigh daily on the same scale and approximately the same time of day.  Report weight gain of 3 pounds/day or 5 pounds/week to : [facility] [phone].  Please use hospital discharge weight as baseline reference.   Please monitor for signs and symptoms of and report to MD:  Worsening Heart Failure: sudden weight gain, shortness of breath, lower extremity or general edema/swelling, abdominal bloating/swelling, inability to lie flat, intolerance to usual activity, or cough (especially at night). Report these finding even if no increase in weight.  Dehydration:  having difficulty or a decrease in urination, dizziness, worsening fatigue, or new onset/worsening of generalized weakness.     Please continue a LOW SODIUM diet and LIMIT fluid intake to 48 - 64 ounces ( 1.5 - 2 liters) per day.   Call [facility] [phone]} with any questions or concerns.   Please continue heart failure education to patient and family/support system.  See After Visit Summary for hospital follow up appointment details.  Consider Spiritual Care Referral for support and/or completion of advance directives:   [facility] Spiritual Care [facility]: [phone]  Consider: having the facility MD complete required 7 day follow up.  Patient's primary Cardiologist is   Tanvir Forman CNP  [address]  [address] [place] [address]  [phone]     Patient's personal belongings (please select all that are sent with patient):  Life vest and batteries    RN SIGNATURE:  Electronically signed by Maria E Jaramillo RN on 1/31/24 at 2:22 PM EST    CASE MANAGEMENT/SOCIAL WORK SECTION    Inpatient Status Date: 1/22/24 inpt    Readmission Risk Assessment

## 2024-01-25 NOTE — FLOWSHEET NOTE
01/25/24 0658   Vital Signs   Temp 97.1 °F (36.2 °C)   Temp Source Oral   Pulse 73   Heart Rate Source Monitor   Respirations 19   BP (!) 160/84   MAP (Calculated) 109   BP Location Left upper arm   BP Method Automatic   Patient Position High fowlers   Oxygen Therapy   SpO2 92 %   O2 Device None (Room air)     Shift assessment completed. See flow sheet. Medications given. Patient is A&O x4 with periods of confusion.  Vitals are stable. Patient remains on 2L of O2 and denies further needs. Call light within reach.

## 2024-01-25 NOTE — PLAN OF CARE
HEART FAILURE CARE PLAN:    Comorbidities Reviewed: Yes   Patient has a past medical history of Anemia, Backache, unspecified, CAD, multiple vessel, Chronic kidney disease (CKD) stage G3b/A1, moderately decreased glomerular filtration rate (GFR) between 30-44 mL/min/1.73 square meter and albuminuria creatinine ratio less than 30 mg/g ([facility]), Chronic systolic CHF (congestive heart failure) ([facility]), Depressive disorder, not elsewhere classified, Essential hypertension, benign, Gout, Hyperlipidemia associated with type 2 diabetes mellitus ([facility]), Major depressive disorder, recurrent episode, moderate ([facility]), Osteoarthritis, hand, Osteoarthrosis, unspecified whether generalized or localized, lower leg, Osteopenia, Other and unspecified hyperlipidemia, Pain in joint, lower leg, Pneumonia, Rotator cuff tendinitis, Tear of medial cartilage or meniscus of knee, current, Type 2 diabetes mellitus with microalbuminuria, without long-term current use of insulin ([facility]), Type 2 diabetes mellitus without complication ([facility]), and Type II or unspecified type diabetes mellitus without mention of complication, not stated as uncontrolled.     ECHOCARDIOGRAM Reviewed: Yes   Patient's Ejection Fraction (EF) is less than 40%    Weights Reviewed: Yes   Admission weight: 71.2 kg (157 lb)   Wt Readings from Last 3 Encounters:   01/24/24 73.4 kg (161 lb 14.4 oz)   01/02/24 71.2 kg (157 lb)   10/20/23 65.3 kg (144 lb)     Intake & Output Reviewed: Yes     Intake/Output Summary (Last 24 hours) at 1/24/2024 2024  Last data filed at 1/24/2024 2009  Gross per 24 hour   Intake 900 ml   Output 2775 ml   Net -1875 ml     Medications Reviewed: Yes   SCHEDULED HOSPITAL MEDICATIONS:   isosorbide dinitrate  10 mg Oral TID    polyethylene glycol  17 g Oral Daily    melatonin  5 mg Oral Nightly    hydrALAZINE  10 mg Oral 3 times per day    allopurinol  300 mg Oral Daily    atorvastatin  40 mg Oral Daily    aspirin  81 mg Oral Daily    [Held by provider]

## 2024-01-26 LAB
ALBUMIN SERPL-MCNC: 3.5 G/DL (ref 3.4–5)
ANION GAP SERPL CALCULATED.3IONS-SCNC: 13 MMOL/L (ref 3–16)
BACTERIA BLD CULT ORG #2: NORMAL
BACTERIA BLD CULT: NORMAL
BUN SERPL-MCNC: 42 MG/DL (ref 7–20)
CALCIUM SERPL-MCNC: 8.9 MG/DL (ref 8.3–10.6)
CHLORIDE SERPL-SCNC: 103 MMOL/L (ref 99–110)
CO2 SERPL-SCNC: 27 MMOL/L (ref 21–32)
CREAT SERPL-MCNC: 2.1 MG/DL (ref 0.6–1.2)
GFR SERPLBLD CREATININE-BSD FMLA CKD-EPI: 24 ML/MIN/{1.73_M2}
GLUCOSE BLD-MCNC: 122 MG/DL (ref 70–99)
GLUCOSE BLD-MCNC: 82 MG/DL (ref 70–99)
GLUCOSE BLD-MCNC: 94 MG/DL (ref 70–99)
GLUCOSE BLD-MCNC: 96 MG/DL (ref 70–99)
GLUCOSE SERPL-MCNC: 45 MG/DL (ref 70–99)
PERFORMED ON: ABNORMAL
PERFORMED ON: NORMAL
PHOSPHATE SERPL-MCNC: 4.4 MG/DL (ref 2.5–4.9)
POTASSIUM SERPL-SCNC: 4.3 MMOL/L (ref 3.5–5.1)
SODIUM SERPL-SCNC: 143 MMOL/L (ref 136–145)

## 2024-01-26 PROCEDURE — 6360000002 HC RX W HCPCS: Performed by: INTERNAL MEDICINE

## 2024-01-26 PROCEDURE — 6370000000 HC RX 637 (ALT 250 FOR IP): Performed by: INTERNAL MEDICINE

## 2024-01-26 PROCEDURE — 80069 RENAL FUNCTION PANEL: CPT

## 2024-01-26 PROCEDURE — 2580000003 HC RX 258: Performed by: INTERNAL MEDICINE

## 2024-01-26 PROCEDURE — 1200000000 HC SEMI PRIVATE

## 2024-01-26 PROCEDURE — P9047 ALBUMIN (HUMAN), 25%, 50ML: HCPCS | Performed by: INTERNAL MEDICINE

## 2024-01-26 PROCEDURE — 6370000000 HC RX 637 (ALT 250 FOR IP): Performed by: REGISTERED NURSE

## 2024-01-26 PROCEDURE — 36415 COLL VENOUS BLD VENIPUNCTURE: CPT

## 2024-01-26 PROCEDURE — 99233 SBSQ HOSP IP/OBS HIGH 50: CPT | Performed by: INTERNAL MEDICINE

## 2024-01-26 PROCEDURE — 6370000000 HC RX 637 (ALT 250 FOR IP): Performed by: STUDENT IN AN ORGANIZED HEALTH CARE EDUCATION/TRAINING PROGRAM

## 2024-01-26 PROCEDURE — 99232 SBSQ HOSP IP/OBS MODERATE 35: CPT | Performed by: STUDENT IN AN ORGANIZED HEALTH CARE EDUCATION/TRAINING PROGRAM

## 2024-01-26 RX ORDER — ALBUMIN (HUMAN) 12.5 G/50ML
25 SOLUTION INTRAVENOUS EVERY 6 HOURS
Status: DISCONTINUED | OUTPATIENT
Start: 2024-01-26 | End: 2024-01-27

## 2024-01-26 RX ADMIN — Medication 5 MG: at 22:38

## 2024-01-26 RX ADMIN — Medication 10 ML: at 09:16

## 2024-01-26 RX ADMIN — ATORVASTATIN CALCIUM 40 MG: 40 TABLET, FILM COATED ORAL at 09:15

## 2024-01-26 RX ADMIN — HYDRALAZINE HYDROCHLORIDE 25 MG: 25 TABLET ORAL at 06:16

## 2024-01-26 RX ADMIN — FUROSEMIDE 20 MG/HR: 10 INJECTION, SOLUTION INTRAMUSCULAR; INTRAVENOUS at 16:41

## 2024-01-26 RX ADMIN — ALBUMIN (HUMAN) 25 G: 0.25 INJECTION, SOLUTION INTRAVENOUS at 09:25

## 2024-01-26 RX ADMIN — ALBUMIN (HUMAN) 25 G: 0.25 INJECTION, SOLUTION INTRAVENOUS at 02:38

## 2024-01-26 RX ADMIN — FUROSEMIDE 20 MG/HR: 10 INJECTION, SOLUTION INTRAMUSCULAR; INTRAVENOUS at 04:20

## 2024-01-26 RX ADMIN — HYDRALAZINE HYDROCHLORIDE 25 MG: 25 TABLET ORAL at 16:32

## 2024-01-26 RX ADMIN — ASPIRIN 81 MG: 81 TABLET, COATED ORAL at 09:16

## 2024-01-26 RX ADMIN — HYDRALAZINE HYDROCHLORIDE 25 MG: 25 TABLET ORAL at 22:38

## 2024-01-26 RX ADMIN — ALLOPURINOL 200 MG: 100 TABLET ORAL at 09:16

## 2024-01-26 RX ADMIN — HYDROCODONE BITARTRATE AND ACETAMINOPHEN 1 TABLET: 5; 325 TABLET ORAL at 09:14

## 2024-01-26 RX ADMIN — FUROSEMIDE 20 MG/HR: 10 INJECTION, SOLUTION INTRAMUSCULAR; INTRAVENOUS at 09:45

## 2024-01-26 RX ADMIN — ALBUMIN (HUMAN) 25 G: 0.25 INJECTION, SOLUTION INTRAVENOUS at 16:34

## 2024-01-26 RX ADMIN — ENOXAPARIN SODIUM 30 MG: 100 INJECTION SUBCUTANEOUS at 09:14

## 2024-01-26 RX ADMIN — POLYETHYLENE GLYCOL (3350) 17 G: 17 POWDER, FOR SOLUTION ORAL at 09:16

## 2024-01-26 RX ADMIN — METOPROLOL SUCCINATE 75 MG: 50 TABLET, EXTENDED RELEASE ORAL at 09:15

## 2024-01-26 RX ADMIN — ALBUMIN (HUMAN) 25 G: 0.25 INJECTION, SOLUTION INTRAVENOUS at 22:30

## 2024-01-26 RX ADMIN — ISOSORBIDE DINITRATE 20 MG: 20 TABLET ORAL at 16:32

## 2024-01-26 RX ADMIN — ISOSORBIDE DINITRATE 20 MG: 20 TABLET ORAL at 09:16

## 2024-01-26 ASSESSMENT — PAIN SCALES - GENERAL: PAINLEVEL_OUTOF10: 7

## 2024-01-26 ASSESSMENT — PAIN DESCRIPTION - LOCATION: LOCATION: KNEE

## 2024-01-26 ASSESSMENT — PAIN DESCRIPTION - ORIENTATION: ORIENTATION: RIGHT

## 2024-01-26 ASSESSMENT — PAIN DESCRIPTION - DESCRIPTORS: DESCRIPTORS: GNAWING

## 2024-01-26 NOTE — FLOWSHEET NOTE
01/26/24 0707   Vital Signs   Temp 97.2 °F (36.2 °C)   Temp Source Oral   Pulse 69   Heart Rate Source Monitor   Respirations 18   /72   MAP (Calculated) 94   BP Location Left upper arm   BP Method Automatic   Patient Position Semi fowlers   Oxygen Therapy   SpO2 92 %   O2 Device None (Room air)     Shift assessment completed. See flow sheet. Medications given. Patient is A&O x4 with periods of confusion. Vitals are stable. Patient is currently on RA and denies further needs. Call light within reach.

## 2024-01-26 NOTE — FLOWSHEET NOTE
01/25/24 1621   Vital Signs   Temp 98 °F (36.7 °C)   Temp Source Oral   Pulse 67   Heart Rate Source Monitor   Respirations 17   /62   MAP (Calculated) 79   BP Location Right upper arm   BP Method Automatic   Patient Position Semi fowlers   Oxygen Therapy   SpO2 98 %   O2 Device Nasal cannula   O2 Flow Rate (L/min) 2 L/min     Patient remains stable. Call light within reach.

## 2024-01-26 NOTE — FLOWSHEET NOTE
01/25/24 1230   Vital Signs   Temp 97.5 °F (36.4 °C)   Temp Source Oral   Pulse 74   Heart Rate Source Monitor   Respirations 16   BP (!) 142/80   MAP (Calculated) 101   BP Location Left upper arm   BP Method Automatic   Patient Position Semi fowlers   Oxygen Therapy   SpO2 94 %   O2 Device Nasal cannula   O2 Flow Rate (L/min) 2 L/min     Vitals are stable. Patient remains alert with periods of confusion. Vitals are stable and Patient denies further needs. Call light within reach.

## 2024-01-26 NOTE — FLOWSHEET NOTE
01/25/24 2359   Vital Signs   Temp 98 °F (36.7 °C)   Temp Source Oral   Pulse 71   Heart Rate Source Monitor   Respirations 16   /72   MAP (Calculated) 94   BP Location Right upper arm   BP Method Automatic   Patient Position Semi fowlers   Oxygen Therapy   SpO2 94 %   O2 Device Nasal cannula   O2 Flow Rate (L/min) 2 L/min     Pt reassessment complete.  No changes noted at this time.  Call light within reach.  Bed exit alarm on.

## 2024-01-26 NOTE — CARE COORDINATION
Received VM from [facility], they are not contracted with [employer] and they can not accept. Called Abby at [facility], to review.       1245 - Met with  and pt at bedside. Updated on [facility] not accepting pt due to insurance and that ref made to [facility]. [facility] unable to take due to insurance. Discussed palliative care and pt is agreeable and would like [facility] program. Called and spoke with April and gave her the referral. Called Maria at [facility] to see if they are able to accept pt at [place].    1515 - Per Сергей at [facility], they are out of network as well. Maria at [facility] will start working on the one time contract for pt. Will continue to monitor.

## 2024-01-26 NOTE — FLOWSHEET NOTE
01/26/24 1608   Vital Signs   Temp 97 °F (36.1 °C)   Temp Source Oral   Pulse 81   Heart Rate Source Monitor   Respirations 18   BP (!) 146/76   MAP (Calculated) 99   BP Location Left upper arm   BP Method Automatic   Patient Position Semi fowlers   Oxygen Therapy   SpO2 90 %   O2 Device None (Room air)     Patient remains stable and denies further need. Call light within reach.

## 2024-01-27 LAB
ALBUMIN SERPL-MCNC: 4.8 G/DL (ref 3.4–5)
ANION GAP SERPL CALCULATED.3IONS-SCNC: 20 MMOL/L (ref 3–16)
BUN SERPL-MCNC: 47 MG/DL (ref 7–20)
CALCIUM SERPL-MCNC: 10.2 MG/DL (ref 8.3–10.6)
CHLORIDE SERPL-SCNC: 95 MMOL/L (ref 99–110)
CO2 SERPL-SCNC: 28 MMOL/L (ref 21–32)
CREAT SERPL-MCNC: 2.1 MG/DL (ref 0.6–1.2)
GFR SERPLBLD CREATININE-BSD FMLA CKD-EPI: 24 ML/MIN/{1.73_M2}
GLUCOSE BLD-MCNC: 100 MG/DL (ref 70–99)
GLUCOSE BLD-MCNC: 100 MG/DL (ref 70–99)
GLUCOSE BLD-MCNC: 102 MG/DL (ref 70–99)
GLUCOSE BLD-MCNC: 127 MG/DL (ref 70–99)
GLUCOSE BLD-MCNC: 224 MG/DL (ref 70–99)
GLUCOSE SERPL-MCNC: 92 MG/DL (ref 70–99)
PERFORMED ON: ABNORMAL
PHOSPHATE SERPL-MCNC: 4.3 MG/DL (ref 2.5–4.9)
POTASSIUM SERPL-SCNC: 4.2 MMOL/L (ref 3.5–5.1)
SODIUM SERPL-SCNC: 143 MMOL/L (ref 136–145)

## 2024-01-27 PROCEDURE — 99233 SBSQ HOSP IP/OBS HIGH 50: CPT | Performed by: INTERNAL MEDICINE

## 2024-01-27 PROCEDURE — 6370000000 HC RX 637 (ALT 250 FOR IP): Performed by: STUDENT IN AN ORGANIZED HEALTH CARE EDUCATION/TRAINING PROGRAM

## 2024-01-27 PROCEDURE — 80069 RENAL FUNCTION PANEL: CPT

## 2024-01-27 PROCEDURE — 1200000000 HC SEMI PRIVATE

## 2024-01-27 PROCEDURE — 2580000003 HC RX 258: Performed by: INTERNAL MEDICINE

## 2024-01-27 PROCEDURE — 6370000000 HC RX 637 (ALT 250 FOR IP): Performed by: INTERNAL MEDICINE

## 2024-01-27 PROCEDURE — 6370000000 HC RX 637 (ALT 250 FOR IP): Performed by: REGISTERED NURSE

## 2024-01-27 PROCEDURE — 6360000002 HC RX W HCPCS: Performed by: INTERNAL MEDICINE

## 2024-01-27 PROCEDURE — P9047 ALBUMIN (HUMAN), 25%, 50ML: HCPCS | Performed by: INTERNAL MEDICINE

## 2024-01-27 PROCEDURE — 36415 COLL VENOUS BLD VENIPUNCTURE: CPT

## 2024-01-27 RX ORDER — HYDRALAZINE HYDROCHLORIDE 50 MG/1
50 TABLET, FILM COATED ORAL EVERY 8 HOURS SCHEDULED
Status: DISCONTINUED | OUTPATIENT
Start: 2024-01-27 | End: 2024-01-30

## 2024-01-27 RX ORDER — TORSEMIDE 20 MG/1
40 TABLET ORAL DAILY
Status: DISCONTINUED | OUTPATIENT
Start: 2024-01-27 | End: 2024-01-29

## 2024-01-27 RX ORDER — ISOSORBIDE DINITRATE 20 MG/1
40 TABLET ORAL 3 TIMES DAILY
Status: DISCONTINUED | OUTPATIENT
Start: 2024-01-27 | End: 2024-01-29

## 2024-01-27 RX ADMIN — TORSEMIDE 40 MG: 20 TABLET ORAL at 15:43

## 2024-01-27 RX ADMIN — ISOSORBIDE DINITRATE 40 MG: 20 TABLET ORAL at 18:32

## 2024-01-27 RX ADMIN — HYDRALAZINE HYDROCHLORIDE 25 MG: 25 TABLET ORAL at 06:48

## 2024-01-27 RX ADMIN — Medication 10 ML: at 19:55

## 2024-01-27 RX ADMIN — Medication 5 MG: at 19:56

## 2024-01-27 RX ADMIN — ENOXAPARIN SODIUM 30 MG: 100 INJECTION SUBCUTANEOUS at 09:56

## 2024-01-27 RX ADMIN — ISOSORBIDE DINITRATE 20 MG: 20 TABLET ORAL at 09:55

## 2024-01-27 RX ADMIN — HYDRALAZINE HYDROCHLORIDE 50 MG: 50 TABLET ORAL at 23:24

## 2024-01-27 RX ADMIN — ASPIRIN 81 MG: 81 TABLET, COATED ORAL at 09:55

## 2024-01-27 RX ADMIN — ATORVASTATIN CALCIUM 40 MG: 40 TABLET, FILM COATED ORAL at 09:55

## 2024-01-27 RX ADMIN — HYDROCODONE BITARTRATE AND ACETAMINOPHEN 1 TABLET: 5; 325 TABLET ORAL at 15:43

## 2024-01-27 RX ADMIN — HYDRALAZINE HYDROCHLORIDE 50 MG: 50 TABLET ORAL at 15:42

## 2024-01-27 RX ADMIN — ISOSORBIDE DINITRATE 40 MG: 20 TABLET ORAL at 15:43

## 2024-01-27 RX ADMIN — Medication 10 ML: at 09:56

## 2024-01-27 RX ADMIN — ALBUMIN (HUMAN) 25 G: 0.25 INJECTION, SOLUTION INTRAVENOUS at 09:56

## 2024-01-27 RX ADMIN — ALLOPURINOL 200 MG: 100 TABLET ORAL at 09:55

## 2024-01-27 RX ADMIN — METOPROLOL SUCCINATE 75 MG: 50 TABLET, EXTENDED RELEASE ORAL at 09:55

## 2024-01-27 RX ADMIN — ALBUMIN (HUMAN) 25 G: 0.25 INJECTION, SOLUTION INTRAVENOUS at 04:11

## 2024-01-27 ASSESSMENT — PAIN DESCRIPTION - LOCATION: LOCATION: KNEE

## 2024-01-27 ASSESSMENT — PAIN DESCRIPTION - ORIENTATION: ORIENTATION: RIGHT

## 2024-01-27 ASSESSMENT — PAIN SCALES - GENERAL: PAINLEVEL_OUTOF10: 7

## 2024-01-27 NOTE — PLAN OF CARE
Problem: Respiratory - Adult  Goal: Achieves optimal ventilation and oxygenation  Outcome: Progressing     Problem: Cardiovascular - Adult  Goal: Absence of cardiac dysrhythmias or at baseline  Outcome: Progressing

## 2024-01-28 LAB
ALBUMIN SERPL-MCNC: 3.7 G/DL (ref 3.4–5)
ALBUMIN SERPL-MCNC: 4.1 G/DL (ref 3.4–5)
ALBUMIN/GLOB SERPL: 1.7 {RATIO} (ref 1.1–2.2)
ALP SERPL-CCNC: 152 U/L (ref 40–129)
ALT SERPL-CCNC: 8 U/L (ref 10–40)
ANION GAP SERPL CALCULATED.3IONS-SCNC: 18 MMOL/L (ref 3–16)
ANION GAP SERPL CALCULATED.3IONS-SCNC: 9 MMOL/L (ref 3–16)
AST SERPL-CCNC: 19 U/L (ref 15–37)
BASOPHILS # BLD: 0 K/UL (ref 0–0.2)
BASOPHILS NFR BLD: 0.6 %
BILIRUB SERPL-MCNC: 1.2 MG/DL (ref 0–1)
BUN SERPL-MCNC: 51 MG/DL (ref 7–20)
BUN SERPL-MCNC: 52 MG/DL (ref 7–20)
CA-I BLD-SCNC: 1.03 MMOL/L (ref 1.12–1.32)
CALCIUM SERPL-MCNC: 9.2 MG/DL (ref 8.3–10.6)
CALCIUM SERPL-MCNC: 9.7 MG/DL (ref 8.3–10.6)
CHLORIDE SERPL-SCNC: 93 MMOL/L (ref 99–110)
CHLORIDE SERPL-SCNC: 94 MMOL/L (ref 99–110)
CO2 SERPL-SCNC: 32 MMOL/L (ref 21–32)
CO2 SERPL-SCNC: 39 MMOL/L (ref 21–32)
CREAT SERPL-MCNC: 2 MG/DL (ref 0.6–1.2)
CREAT SERPL-MCNC: 2.2 MG/DL (ref 0.6–1.2)
DEPRECATED RDW RBC AUTO: 19 % (ref 12.4–15.4)
EOSINOPHIL # BLD: 0.1 K/UL (ref 0–0.6)
EOSINOPHIL NFR BLD: 2.5 %
GFR SERPLBLD CREATININE-BSD FMLA CKD-EPI: 22 ML/MIN/{1.73_M2}
GFR SERPLBLD CREATININE-BSD FMLA CKD-EPI: 25 ML/MIN/{1.73_M2}
GLUCOSE BLD-MCNC: 180 MG/DL (ref 70–99)
GLUCOSE BLD-MCNC: 192 MG/DL (ref 70–99)
GLUCOSE BLD-MCNC: 219 MG/DL (ref 70–99)
GLUCOSE SERPL-MCNC: 154 MG/DL (ref 70–99)
GLUCOSE SERPL-MCNC: 194 MG/DL (ref 70–99)
HCT VFR BLD AUTO: 32 % (ref 36–48)
HGB BLD-MCNC: 10.2 G/DL (ref 12–16)
LYMPHOCYTES # BLD: 0.6 K/UL (ref 1–5.1)
LYMPHOCYTES NFR BLD: 9.9 %
MAGNESIUM SERPL-MCNC: 1.6 MG/DL (ref 1.8–2.4)
MCH RBC QN AUTO: 27.8 PG (ref 26–34)
MCHC RBC AUTO-ENTMCNC: 32 G/DL (ref 31–36)
MCV RBC AUTO: 86.7 FL (ref 80–100)
MONOCYTES # BLD: 0.4 K/UL (ref 0–1.3)
MONOCYTES NFR BLD: 6.6 %
NEUTROPHILS # BLD: 4.8 K/UL (ref 1.7–7.7)
NEUTROPHILS NFR BLD: 80.4 %
PERFORMED ON: ABNORMAL
PH BLDV: 7.58 [PH] (ref 7.35–7.45)
PHOSPHATE SERPL-MCNC: 2.8 MG/DL (ref 2.5–4.9)
PHOSPHATE SERPL-MCNC: 3.2 MG/DL (ref 2.5–4.9)
PLATELET # BLD AUTO: 153 K/UL (ref 135–450)
PMV BLD AUTO: 7.8 FL (ref 5–10.5)
POTASSIUM SERPL-SCNC: 3 MMOL/L (ref 3.5–5.1)
POTASSIUM SERPL-SCNC: 3.4 MMOL/L (ref 3.5–5.1)
PROT SERPL-MCNC: 5.9 G/DL (ref 6.4–8.2)
RBC # BLD AUTO: 3.69 M/UL (ref 4–5.2)
SODIUM SERPL-SCNC: 141 MMOL/L (ref 136–145)
SODIUM SERPL-SCNC: 144 MMOL/L (ref 136–145)
WBC # BLD AUTO: 5.9 K/UL (ref 4–11)

## 2024-01-28 PROCEDURE — 6370000000 HC RX 637 (ALT 250 FOR IP): Performed by: INTERNAL MEDICINE

## 2024-01-28 PROCEDURE — 97110 THERAPEUTIC EXERCISES: CPT

## 2024-01-28 PROCEDURE — 83735 ASSAY OF MAGNESIUM: CPT

## 2024-01-28 PROCEDURE — 97530 THERAPEUTIC ACTIVITIES: CPT

## 2024-01-28 PROCEDURE — 99233 SBSQ HOSP IP/OBS HIGH 50: CPT | Performed by: INTERNAL MEDICINE

## 2024-01-28 PROCEDURE — 2580000003 HC RX 258: Performed by: INTERNAL MEDICINE

## 2024-01-28 PROCEDURE — 85025 COMPLETE CBC W/AUTO DIFF WBC: CPT

## 2024-01-28 PROCEDURE — 6360000002 HC RX W HCPCS: Performed by: INTERNAL MEDICINE

## 2024-01-28 PROCEDURE — 82330 ASSAY OF CALCIUM: CPT

## 2024-01-28 PROCEDURE — 36415 COLL VENOUS BLD VENIPUNCTURE: CPT

## 2024-01-28 PROCEDURE — 6370000000 HC RX 637 (ALT 250 FOR IP): Performed by: REGISTERED NURSE

## 2024-01-28 PROCEDURE — 80053 COMPREHEN METABOLIC PANEL: CPT

## 2024-01-28 PROCEDURE — 1200000000 HC SEMI PRIVATE

## 2024-01-28 PROCEDURE — 84100 ASSAY OF PHOSPHORUS: CPT

## 2024-01-28 RX ORDER — INSULIN GLARGINE 100 [IU]/ML
10 INJECTION, SOLUTION SUBCUTANEOUS NIGHTLY
Status: DISCONTINUED | OUTPATIENT
Start: 2024-01-28 | End: 2024-01-31 | Stop reason: HOSPADM

## 2024-01-28 RX ADMIN — ALLOPURINOL 200 MG: 100 TABLET ORAL at 09:09

## 2024-01-28 RX ADMIN — ENOXAPARIN SODIUM 30 MG: 100 INJECTION SUBCUTANEOUS at 09:09

## 2024-01-28 RX ADMIN — TORSEMIDE 40 MG: 20 TABLET ORAL at 09:10

## 2024-01-28 RX ADMIN — METOPROLOL SUCCINATE 75 MG: 50 TABLET, EXTENDED RELEASE ORAL at 09:10

## 2024-01-28 RX ADMIN — Medication 5 MG: at 21:57

## 2024-01-28 RX ADMIN — INSULIN GLARGINE 10 UNITS: 100 INJECTION, SOLUTION SUBCUTANEOUS at 21:59

## 2024-01-28 RX ADMIN — SACUBITRIL AND VALSARTAN 0.5 TABLET: 24; 26 TABLET, FILM COATED ORAL at 21:57

## 2024-01-28 RX ADMIN — INSULIN LISPRO 1 UNITS: 100 INJECTION, SOLUTION INTRAVENOUS; SUBCUTANEOUS at 11:40

## 2024-01-28 RX ADMIN — HYDRALAZINE HYDROCHLORIDE 50 MG: 50 TABLET ORAL at 14:53

## 2024-01-28 RX ADMIN — ASPIRIN 81 MG: 81 TABLET, COATED ORAL at 09:10

## 2024-01-28 RX ADMIN — POLYETHYLENE GLYCOL (3350) 17 G: 17 POWDER, FOR SOLUTION ORAL at 09:10

## 2024-01-28 RX ADMIN — HYDRALAZINE HYDROCHLORIDE 50 MG: 50 TABLET ORAL at 06:25

## 2024-01-28 RX ADMIN — ISOSORBIDE DINITRATE 40 MG: 20 TABLET ORAL at 18:13

## 2024-01-28 RX ADMIN — Medication 10 ML: at 09:10

## 2024-01-28 RX ADMIN — Medication 10 ML: at 22:00

## 2024-01-28 RX ADMIN — HYDRALAZINE HYDROCHLORIDE 50 MG: 50 TABLET ORAL at 21:57

## 2024-01-28 RX ADMIN — ISOSORBIDE DINITRATE 40 MG: 20 TABLET ORAL at 09:10

## 2024-01-28 RX ADMIN — ISOSORBIDE DINITRATE 40 MG: 20 TABLET ORAL at 14:53

## 2024-01-28 RX ADMIN — ATORVASTATIN CALCIUM 40 MG: 40 TABLET, FILM COATED ORAL at 09:10

## 2024-01-28 NOTE — PLAN OF CARE
Problem: Respiratory - Adult  Goal: Achieves optimal ventilation and oxygenation  Outcome: Progressing     Problem: Cardiovascular - Adult  Goal: Maintains optimal cardiac output and hemodynamic stability  Outcome: Progressing  Goal: Absence of cardiac dysrhythmias or at baseline  Outcome: Progressing     Problem: Chronic Conditions and Co-morbidities  Goal: Patient's chronic conditions and co-morbidity symptoms are monitored and maintained or improved  Outcome: Progressing     Problem: Discharge Planning  Goal: Discharge to home or other facility with appropriate resources  Outcome: Progressing     Problem: Safety - Adult  Goal: Free from fall injury  Outcome: Progressing

## 2024-01-29 ENCOUNTER — TELEPHONE (OUTPATIENT)
Dept: CARDIOLOGY CLINIC | Age: 79
End: 2024-01-29

## 2024-01-29 LAB
ALBUMIN SERPL-MCNC: 3.7 G/DL (ref 3.4–5)
ANION GAP SERPL CALCULATED.3IONS-SCNC: 10 MMOL/L (ref 3–16)
BUN SERPL-MCNC: 52 MG/DL (ref 7–20)
CALCIUM SERPL-MCNC: 9.4 MG/DL (ref 8.3–10.6)
CHLORIDE SERPL-SCNC: 91 MMOL/L (ref 99–110)
CO2 SERPL-SCNC: 38 MMOL/L (ref 21–32)
CREAT SERPL-MCNC: 2.1 MG/DL (ref 0.6–1.2)
GFR SERPLBLD CREATININE-BSD FMLA CKD-EPI: 24 ML/MIN/{1.73_M2}
GLUCOSE BLD-MCNC: 122 MG/DL (ref 70–99)
GLUCOSE BLD-MCNC: 141 MG/DL (ref 70–99)
GLUCOSE BLD-MCNC: 178 MG/DL (ref 70–99)
GLUCOSE BLD-MCNC: 206 MG/DL (ref 70–99)
GLUCOSE BLD-MCNC: 248 MG/DL (ref 70–99)
GLUCOSE SERPL-MCNC: 128 MG/DL (ref 70–99)
PERFORMED ON: ABNORMAL
PHOSPHATE SERPL-MCNC: 2.5 MG/DL (ref 2.5–4.9)
POTASSIUM SERPL-SCNC: 2.8 MMOL/L (ref 3.5–5.1)
SODIUM SERPL-SCNC: 139 MMOL/L (ref 136–145)

## 2024-01-29 PROCEDURE — 36415 COLL VENOUS BLD VENIPUNCTURE: CPT

## 2024-01-29 PROCEDURE — 94640 AIRWAY INHALATION TREATMENT: CPT

## 2024-01-29 PROCEDURE — 99233 SBSQ HOSP IP/OBS HIGH 50: CPT | Performed by: INTERNAL MEDICINE

## 2024-01-29 PROCEDURE — 1200000000 HC SEMI PRIVATE

## 2024-01-29 PROCEDURE — 6370000000 HC RX 637 (ALT 250 FOR IP): Performed by: INTERNAL MEDICINE

## 2024-01-29 PROCEDURE — 6360000002 HC RX W HCPCS: Performed by: INTERNAL MEDICINE

## 2024-01-29 PROCEDURE — 80069 RENAL FUNCTION PANEL: CPT

## 2024-01-29 PROCEDURE — 2580000003 HC RX 258: Performed by: INTERNAL MEDICINE

## 2024-01-29 RX ORDER — POTASSIUM CHLORIDE 20 MEQ/1
40 TABLET, EXTENDED RELEASE ORAL ONCE
Status: COMPLETED | OUTPATIENT
Start: 2024-01-29 | End: 2024-01-29

## 2024-01-29 RX ORDER — MAGNESIUM SULFATE 1 G/100ML
1000 INJECTION INTRAVENOUS ONCE
Status: COMPLETED | OUTPATIENT
Start: 2024-01-29 | End: 2024-01-29

## 2024-01-29 RX ORDER — ISOSORBIDE DINITRATE 20 MG/1
20 TABLET ORAL 3 TIMES DAILY
Status: DISCONTINUED | OUTPATIENT
Start: 2024-01-29 | End: 2024-01-31 | Stop reason: HOSPADM

## 2024-01-29 RX ORDER — ALBUTEROL SULFATE 90 UG/1
2 AEROSOL, METERED RESPIRATORY (INHALATION) EVERY 6 HOURS PRN
Status: DISCONTINUED | OUTPATIENT
Start: 2024-01-29 | End: 2024-01-31 | Stop reason: HOSPADM

## 2024-01-29 RX ORDER — LANOLIN ALCOHOL/MO/W.PET/CERES
3 CREAM (GRAM) TOPICAL NIGHTLY PRN
Status: DISCONTINUED | OUTPATIENT
Start: 2024-01-29 | End: 2024-01-29

## 2024-01-29 RX ORDER — DIGOXIN 125 MCG
125 TABLET ORAL EVERY OTHER DAY
Status: DISCONTINUED | OUTPATIENT
Start: 2024-01-29 | End: 2024-01-29

## 2024-01-29 RX ADMIN — ISOSORBIDE DINITRATE 20 MG: 20 TABLET ORAL at 08:08

## 2024-01-29 RX ADMIN — ONDANSETRON 4 MG: 2 INJECTION INTRAMUSCULAR; INTRAVENOUS at 15:42

## 2024-01-29 RX ADMIN — ISOSORBIDE DINITRATE 20 MG: 20 TABLET ORAL at 13:46

## 2024-01-29 RX ADMIN — POLYETHYLENE GLYCOL (3350) 17 G: 17 POWDER, FOR SOLUTION ORAL at 08:11

## 2024-01-29 RX ADMIN — TORSEMIDE 40 MG: 20 TABLET ORAL at 08:08

## 2024-01-29 RX ADMIN — SACUBITRIL AND VALSARTAN 0.5 TABLET: 24; 26 TABLET, FILM COATED ORAL at 20:42

## 2024-01-29 RX ADMIN — POTASSIUM CHLORIDE 40 MEQ: 1500 TABLET, EXTENDED RELEASE ORAL at 11:54

## 2024-01-29 RX ADMIN — METOPROLOL SUCCINATE 75 MG: 50 TABLET, EXTENDED RELEASE ORAL at 08:10

## 2024-01-29 RX ADMIN — ENOXAPARIN SODIUM 30 MG: 100 INJECTION SUBCUTANEOUS at 08:11

## 2024-01-29 RX ADMIN — MAGNESIUM SULFATE HEPTAHYDRATE 1000 MG: 1 INJECTION, SOLUTION INTRAVENOUS at 08:14

## 2024-01-29 RX ADMIN — Medication 10 ML: at 08:11

## 2024-01-29 RX ADMIN — ALLOPURINOL 200 MG: 100 TABLET ORAL at 08:10

## 2024-01-29 RX ADMIN — HYDRALAZINE HYDROCHLORIDE 50 MG: 50 TABLET ORAL at 06:26

## 2024-01-29 RX ADMIN — SACUBITRIL AND VALSARTAN 0.5 TABLET: 24; 26 TABLET, FILM COATED ORAL at 08:08

## 2024-01-29 RX ADMIN — ASPIRIN 81 MG: 81 TABLET, COATED ORAL at 08:08

## 2024-01-29 RX ADMIN — POTASSIUM CHLORIDE 40 MEQ: 1500 TABLET, EXTENDED RELEASE ORAL at 08:11

## 2024-01-29 RX ADMIN — Medication 2 PUFF: at 16:48

## 2024-01-29 RX ADMIN — Medication 10 ML: at 20:42

## 2024-01-29 RX ADMIN — INSULIN GLARGINE 10 UNITS: 100 INJECTION, SOLUTION SUBCUTANEOUS at 20:43

## 2024-01-29 RX ADMIN — INSULIN LISPRO 1 UNITS: 100 INJECTION, SOLUTION INTRAVENOUS; SUBCUTANEOUS at 12:06

## 2024-01-29 RX ADMIN — ATORVASTATIN CALCIUM 40 MG: 40 TABLET, FILM COATED ORAL at 08:11

## 2024-01-29 RX ADMIN — DIGOXIN 125 MCG: 125 TABLET ORAL at 08:10

## 2024-01-29 ASSESSMENT — PAIN SCALES - GENERAL: PAINLEVEL_OUTOF10: 0

## 2024-01-29 NOTE — TELEPHONE ENCOUNTER
Pt is currently inpatient at [facility]. I scheduled pt with CMV at [place] for 2/29/24 at 115pm.

## 2024-01-29 NOTE — FLOWSHEET NOTE
01/28/24 2154   Vital Signs   Temp 98.9 °F (37.2 °C)   Temp Source Oral   Pulse 86   Heart Rate Source Monitor   Respirations 18   BP (!) 141/77   MAP (Calculated) 98   BP Location Left upper arm   BP Method Automatic   Patient Position Semi fowlers   Opioid-Induced Sedation   POSS Score 1   RASS   [place] Agitation Sedation Scale (RASS) 0   Oxygen Therapy   SpO2 92 %   Pulse Oximetry Type Intermittent   Pulse Oximeter Device Mode Intermittent   Pulse Oximeter Device Location Left;Finger   O2 Device None (Room air)     Patient A+Ox4, vitals and assessments done at this time. Bed in lowest position, call light within reach.

## 2024-01-29 NOTE — TELEPHONE ENCOUNTER
It does not appear patient has seen EP in the last three years. Please schedule with EPMD first available.

## 2024-01-29 NOTE — CARE COORDINATION
INTERDISCIPLINARY PLAN OF CARE CONFERENCE    Date/Time: 1/29/2024 2:43 PM  Completed by: Michelle Lang RN, Case Management      Patient Name:  Amber Fuentes  YOB: 1945  Admitting Diagnosis: Elevated troponin [R79.89]  Acute on chronic systolic congestive heart failure (HCC) [I50.23]  Pneumonia due to infectious organism, unspecified laterality, unspecified part of lung [J18.9]  Chronic kidney disease, unspecified CKD stage [N18.9]  Community acquired pneumonia, unspecified laterality [J18.9]  Pneumonia due to virus [J12.9]     Admit Date/Time:  1/21/2024 11:53 PM    Chart reviewed. Interdisciplinary team contacted or reviewed plan related to patient progress and discharge plans.   Disciplines included Case Management, Nursing, and Dietitian.    Current Status:Stable  PT/OT recommendation for discharge plan of care: SNF    Expected D/C Disposition:  Rehab  Confirmed plan with patient and/or family Yes confirmed with: (name) spouse  Met with:patient and spouse  Discharge Plan Comments: Reviewed chart and met with pt and spouse who cont plan for [facility] [place]. Noted pt has life vest at bedside. Spoke with Lakshmi at [facility] who states one time contract completed and can accept at [place]. Lakshmi aware pt has life vest and will bring with her. Will cont to follow.    Home O2 in place on admit: No  Pt informed of need to bring portable home O2 tank on day of discharge for nursing to connect prior to leaving:  Not Indicated  Verbalized agreement/Understanding:  Not Indicated

## 2024-01-29 NOTE — TELEPHONE ENCOUNTER
----- Message from Chris Rico RN sent at 1/29/2024  8:40 AM EST -----    ----- Message -----  From: Cooper Griffith MD  Sent: 1/28/2024   9:02 AM EST  To: Fide Howell RN; Chris Rico RN    Pt needs EP follow up, routine. TY!    RJM

## 2024-01-30 LAB
ANION GAP SERPL CALCULATED.3IONS-SCNC: 10 MMOL/L (ref 3–16)
BUN SERPL-MCNC: 55 MG/DL (ref 7–20)
CALCIUM SERPL-MCNC: 9 MG/DL (ref 8.3–10.6)
CHLORIDE SERPL-SCNC: 93 MMOL/L (ref 99–110)
CO2 SERPL-SCNC: 39 MMOL/L (ref 21–32)
CREAT SERPL-MCNC: 2.8 MG/DL (ref 0.6–1.2)
GFR SERPLBLD CREATININE-BSD FMLA CKD-EPI: 17 ML/MIN/{1.73_M2}
GLUCOSE BLD-MCNC: 124 MG/DL (ref 70–99)
GLUCOSE BLD-MCNC: 142 MG/DL (ref 70–99)
GLUCOSE BLD-MCNC: 151 MG/DL (ref 70–99)
GLUCOSE BLD-MCNC: 193 MG/DL (ref 70–99)
GLUCOSE BLD-MCNC: 229 MG/DL (ref 70–99)
GLUCOSE SERPL-MCNC: 117 MG/DL (ref 70–99)
PERFORMED ON: ABNORMAL
POTASSIUM SERPL-SCNC: 3.9 MMOL/L (ref 3.5–5.1)
SODIUM SERPL-SCNC: 142 MMOL/L (ref 136–145)

## 2024-01-30 PROCEDURE — 6370000000 HC RX 637 (ALT 250 FOR IP): Performed by: INTERNAL MEDICINE

## 2024-01-30 PROCEDURE — 36415 COLL VENOUS BLD VENIPUNCTURE: CPT

## 2024-01-30 PROCEDURE — 80048 BASIC METABOLIC PNL TOTAL CA: CPT

## 2024-01-30 PROCEDURE — 99233 SBSQ HOSP IP/OBS HIGH 50: CPT | Performed by: INTERNAL MEDICINE

## 2024-01-30 PROCEDURE — 97530 THERAPEUTIC ACTIVITIES: CPT

## 2024-01-30 PROCEDURE — 1200000000 HC SEMI PRIVATE

## 2024-01-30 PROCEDURE — 6360000002 HC RX W HCPCS: Performed by: INTERNAL MEDICINE

## 2024-01-30 PROCEDURE — 2580000003 HC RX 258: Performed by: INTERNAL MEDICINE

## 2024-01-30 PROCEDURE — 97535 SELF CARE MNGMENT TRAINING: CPT

## 2024-01-30 RX ORDER — HEPARIN SODIUM 5000 [USP'U]/ML
5000 INJECTION, SOLUTION INTRAVENOUS; SUBCUTANEOUS EVERY 8 HOURS SCHEDULED
Status: DISCONTINUED | OUTPATIENT
Start: 2024-01-31 | End: 2024-01-31 | Stop reason: HOSPADM

## 2024-01-30 RX ADMIN — Medication 10 ML: at 20:25

## 2024-01-30 RX ADMIN — Medication 10 ML: at 09:13

## 2024-01-30 RX ADMIN — POLYETHYLENE GLYCOL (3350) 17 G: 17 POWDER, FOR SOLUTION ORAL at 09:11

## 2024-01-30 RX ADMIN — SACUBITRIL AND VALSARTAN 0.5 TABLET: 24; 26 TABLET, FILM COATED ORAL at 20:24

## 2024-01-30 RX ADMIN — ASPIRIN 81 MG: 81 TABLET, COATED ORAL at 09:12

## 2024-01-30 RX ADMIN — INSULIN LISPRO 1 UNITS: 100 INJECTION, SOLUTION INTRAVENOUS; SUBCUTANEOUS at 17:07

## 2024-01-30 RX ADMIN — ENOXAPARIN SODIUM 30 MG: 100 INJECTION SUBCUTANEOUS at 09:13

## 2024-01-30 RX ADMIN — INSULIN GLARGINE 10 UNITS: 100 INJECTION, SOLUTION SUBCUTANEOUS at 20:28

## 2024-01-30 RX ADMIN — ALLOPURINOL 200 MG: 100 TABLET ORAL at 09:15

## 2024-01-30 RX ADMIN — METOPROLOL SUCCINATE 75 MG: 50 TABLET, EXTENDED RELEASE ORAL at 09:11

## 2024-01-30 RX ADMIN — SACUBITRIL AND VALSARTAN 0.5 TABLET: 24; 26 TABLET, FILM COATED ORAL at 09:12

## 2024-01-30 RX ADMIN — ATORVASTATIN CALCIUM 40 MG: 40 TABLET, FILM COATED ORAL at 09:13

## 2024-01-30 ASSESSMENT — PAIN - FUNCTIONAL ASSESSMENT
PAIN_FUNCTIONAL_ASSESSMENT: ACTIVITIES ARE NOT PREVENTED

## 2024-01-30 ASSESSMENT — PAIN SCALES - GENERAL
PAINLEVEL_OUTOF10: 0

## 2024-01-30 NOTE — FLOWSHEET NOTE
01/29/24 2030   Vital Signs   Temp 98.2 °F (36.8 °C)   Temp Source Oral   Pulse 86   Heart Rate Source Monitor   Respirations 20   /64   MAP (Calculated) 81   BP Location Left upper arm   BP Method Automatic   Patient Position Semi fowlers   Opioid-Induced Sedation   POSS Score 1   RASS   [place] Agitation Sedation Scale (RASS) 0   Oxygen Therapy   SpO2 98 %   Pulse Oximetry Type Intermittent   Pulse Oximeter Device Mode Intermittent   Pulse Oximeter Device Location Left;Finger   O2 Device Nasal cannula   O2 Flow Rate (L/min) 2 L/min     Patient A+Ox4, vitals and assessments done at this time. Bed in lowest position, call light within reach.

## 2024-01-30 NOTE — FLOWSHEET NOTE
01/30/24 0745   Vital Signs   Temp 98.2 °F (36.8 °C)   Temp Source Oral   Pulse 79   Heart Rate Source Monitor   Respirations 16   /64   MAP (Calculated) 87   BP Location Left upper arm   BP Method Automatic   Patient Position Semi fowlers   Oxygen Therapy   SpO2 97 %   O2 Device Nasal cannula   O2 Flow Rate (L/min) 2 L/min     Pt am assessment completed, vss, see flow sheet. Pt alert and oriented x4 confused at times.  Pt resting in bed, call light in reach.  JULIANA MARES, RN

## 2024-01-30 NOTE — CARE COORDINATION
[facility]  Received referral regarding HC services from Vesta ALFONSO. [facility] is not in network with pt's insurance. CM aware.    [facility] believes request for HC needs to be placed with [employer] plan from [facility].        Electronically signed by Maria E Grewal RN on 1/30/2024 at 11:37 AM

## 2024-01-30 NOTE — CARE COORDINATION
Reviewed chart, met with pt and  bedside. PT recs home with 24 assistance and home PT, pt and  agreeable to HC. HC orders placed in Epic. LVM for Maria E [facility]. Plan for DC tomorrow, will need walk test. LVM for Maria at [facility] making her aware. Will continue to monitor.    1600 - Received VM from [facility], states they are not in network. States CM will need to call Amrita to get referral on who can take pt at [place] for HC.

## 2024-01-31 VITALS
HEART RATE: 79 BPM | OXYGEN SATURATION: 97 % | SYSTOLIC BLOOD PRESSURE: 118 MMHG | HEIGHT: 60 IN | RESPIRATION RATE: 18 BRPM | BODY MASS INDEX: 26.27 KG/M2 | DIASTOLIC BLOOD PRESSURE: 67 MMHG | WEIGHT: 133.82 LBS | TEMPERATURE: 98.2 F

## 2024-01-31 LAB
ANION GAP SERPL CALCULATED.3IONS-SCNC: 10 MMOL/L (ref 3–16)
BUN SERPL-MCNC: 64 MG/DL (ref 7–20)
CALCIUM SERPL-MCNC: 8.5 MG/DL (ref 8.3–10.6)
CHLORIDE SERPL-SCNC: 95 MMOL/L (ref 99–110)
CO2 SERPL-SCNC: 36 MMOL/L (ref 21–32)
CREAT SERPL-MCNC: 2.8 MG/DL (ref 0.6–1.2)
GFR SERPLBLD CREATININE-BSD FMLA CKD-EPI: 17 ML/MIN/{1.73_M2}
GLUCOSE BLD-MCNC: 129 MG/DL (ref 70–99)
GLUCOSE BLD-MCNC: 259 MG/DL (ref 70–99)
GLUCOSE BLD-MCNC: 88 MG/DL (ref 70–99)
GLUCOSE SERPL-MCNC: 88 MG/DL (ref 70–99)
PERFORMED ON: ABNORMAL
PERFORMED ON: ABNORMAL
PERFORMED ON: NORMAL
POTASSIUM SERPL-SCNC: 4 MMOL/L (ref 3.5–5.1)
SODIUM SERPL-SCNC: 141 MMOL/L (ref 136–145)

## 2024-01-31 PROCEDURE — 6370000000 HC RX 637 (ALT 250 FOR IP): Performed by: INTERNAL MEDICINE

## 2024-01-31 PROCEDURE — 6360000002 HC RX W HCPCS: Performed by: INTERNAL MEDICINE

## 2024-01-31 PROCEDURE — 36415 COLL VENOUS BLD VENIPUNCTURE: CPT

## 2024-01-31 PROCEDURE — 2580000003 HC RX 258: Performed by: INTERNAL MEDICINE

## 2024-01-31 PROCEDURE — 80048 BASIC METABOLIC PNL TOTAL CA: CPT

## 2024-01-31 RX ORDER — FUROSEMIDE 40 MG/1
20 TABLET ORAL DAILY
Qty: 90 TABLET | Refills: 0
Start: 2024-01-31

## 2024-01-31 RX ORDER — ISOSORBIDE DINITRATE 10 MG/1
10 TABLET ORAL 2 TIMES DAILY
Qty: 60 TABLET | Refills: 0 | Status: SHIPPED | OUTPATIENT
Start: 2024-01-31

## 2024-01-31 RX ORDER — INSULIN GLARGINE 100 [IU]/ML
10 INJECTION, SOLUTION SUBCUTANEOUS NIGHTLY
Qty: 5 ADJUSTABLE DOSE PRE-FILLED PEN SYRINGE | Refills: 0
Start: 2024-01-31

## 2024-01-31 RX ORDER — ALLOPURINOL 300 MG/1
150 TABLET ORAL DAILY
Qty: 90 TABLET | Refills: 1
Start: 2024-01-31

## 2024-01-31 RX ORDER — METOPROLOL SUCCINATE 25 MG/1
75 TABLET, EXTENDED RELEASE ORAL DAILY
Qty: 30 TABLET | Refills: 0 | Status: SHIPPED | OUTPATIENT
Start: 2024-02-01

## 2024-01-31 RX ADMIN — INSULIN LISPRO 2 UNITS: 100 INJECTION, SOLUTION INTRAVENOUS; SUBCUTANEOUS at 12:20

## 2024-01-31 RX ADMIN — ALLOPURINOL 200 MG: 100 TABLET ORAL at 09:55

## 2024-01-31 RX ADMIN — METOPROLOL SUCCINATE 75 MG: 50 TABLET, EXTENDED RELEASE ORAL at 09:55

## 2024-01-31 RX ADMIN — POLYETHYLENE GLYCOL (3350) 17 G: 17 POWDER, FOR SOLUTION ORAL at 09:55

## 2024-01-31 RX ADMIN — ASPIRIN 81 MG: 81 TABLET, COATED ORAL at 09:55

## 2024-01-31 RX ADMIN — SACUBITRIL AND VALSARTAN 0.5 TABLET: 24; 26 TABLET, FILM COATED ORAL at 09:55

## 2024-01-31 RX ADMIN — HEPARIN SODIUM 5000 UNITS: 5000 INJECTION INTRAVENOUS; SUBCUTANEOUS at 05:34

## 2024-01-31 RX ADMIN — ATORVASTATIN CALCIUM 40 MG: 40 TABLET, FILM COATED ORAL at 09:59

## 2024-01-31 RX ADMIN — Medication 10 ML: at 09:56

## 2024-01-31 NOTE — PLAN OF CARE
Problem: Respiratory - Adult  Goal: Achieves optimal ventilation and oxygenation  1/31/2024 1339 by Maria E Jaramillo, RN  Outcome: Not Progressing     02 sat at rest on RA 82%.  Discharge planner aware of need for home 02

## 2024-01-31 NOTE — PLAN OF CARE
Problem: Respiratory - Adult  Goal: Achieves optimal ventilation and oxygenation  Outcome: Progressing     Problem: Cardiovascular - Adult  Goal: Maintains optimal cardiac output and hemodynamic stability  Outcome: Progressing  Goal: Absence of cardiac dysrhythmias or at baseline  Outcome: Progressing     Problem: Chronic Conditions and Co-morbidities  Goal: Patient's chronic conditions and co-morbidity symptoms are monitored and maintained or improved  Outcome: Progressing     Problem: Discharge Planning  Goal: Discharge to home or other facility with appropriate resources  Outcome: Progressing     Problem: Safety - Adult  Goal: Free from fall injury  Outcome: Progressing     Problem: Skin/Tissue Integrity  Goal: Absence of new skin breakdown  Description: 1.  Monitor for areas of redness and/or skin breakdown  2.  Assess vascular access sites hourly  3.  Every 4-6 hours minimum:  Change oxygen saturation probe site  4.  Every 4-6 hours:  If on nasal continuous positive airway pressure, respiratory therapy assess nares and determine need for appliance change or resting period.  Outcome: Progressing     Problem: Pain  Goal: Verbalizes/displays adequate comfort level or baseline comfort level  Outcome: Progressing

## 2024-01-31 NOTE — PLAN OF CARE
Problem: Respiratory - Adult  Goal: Achieves optimal ventilation and oxygenation  1/31/2024 1703 by Maria E Jaramillo, RN  Outcome: Adequate for Discharge  1/31/2024 1339 by Maria E Jaramillo, RN  Outcome: Progressing  1

## 2024-01-31 NOTE — PROGRESS NOTES
Progress Note    HISTORY     CC:   Shortness of breath           We are following for Acute kidney injury, CKD stage 4         Subjective/   HPI:    Renal function lower with diuresis, non-oliguric.  Denies any shortness of breath, on 2  L NC.    ROS:  +weak, intake adequate.    EXAM       Objective/     Vitals:    01/30/24 0207 01/30/24 0552 01/30/24 0745 01/30/24 0802   BP: 120/65  132/64    Pulse: 84  79 79   Resp: 18  16    Temp: 98.7 °F (37.1 °C)  98.2 °F (36.8 °C)    TempSrc: Oral  Oral    SpO2: 97%      Weight:  61 kg (134 lb 7.7 oz)     Height:         24HR INTAKE/OUTPUT:    Intake/Output Summary (Last 24 hours) at 1/30/2024 0928  Last data filed at 1/30/2024 0814  Gross per 24 hour   Intake 660 ml   Output 800 ml   Net -140 ml       Constitutional: Ill appearing   Eyes:  Pupils reactive, sclera clear   Neck:  Normal thyroid, no masses   Cardiovascular:  Regular, no rub  Respiratory:  No distress, no wheezing  Psychiatry:  Somnolent mood/affect, alert  Abdomen: +bs, soft, nt, no masses   Musculoskeletal: no LE edema, no clubbing   Lymphatics:  No LAD in neck, no supraclavicular nodes       MEDICAL DECISION MAKING       Data/  Recent Labs     01/28/24 2005   WBC 5.9   HGB 10.2*   HCT 32.0*   MCV 86.7          Recent Labs     01/28/24  0625 01/28/24 2006 01/29/24  0530 01/30/24  0622    141 139 142   K 3.4* 3.0* 2.8* 3.9   CL 94* 93* 91* 93*   CO2 32 39* 38* 39*   GLUCOSE 154* 194* 128* 117*   PHOS 3.2 2.8 2.5  --    MG  --  1.60*  --   --    BUN 51* 52* 52* 55*   CREATININE 2.0* 2.2* 2.1* 2.8*   LABGLOM 25* 22* 24* 17*         Assessment/     Acute Kidney Injury:  KDIGO stage 1  - Etiology:  Cardiorenal syndrome with passive venous congestion due to fluid overload and decreased renal blood flow.  Overall net negative 11 liters and Scr slightly improved, within upper range of baseline and might need to be in the higher range for euvolemia.       Chronic Kidney Disease:  Stage IV  - 
           Progress Note    HISTORY     CC:   Shortness of breath           We are following for Acute kidney injury, CKD stage 4         Subjective/   HPI:    Renal function lower with diuresis, non-oliguric.  Denies any shortness of breath, on 2  L NC.    ROS:  +weakness better, intake adequate.    EXAM       Objective/     Vitals:    01/30/24 1420 01/30/24 2021 01/31/24 0130 01/31/24 0830   BP: (!) 113/53 116/61 131/70 118/67   Pulse: 77 74 82 79   Resp: 18 18 18 18   Temp: 97.9 °F (36.6 °C) 98.3 °F (36.8 °C) 97.6 °F (36.4 °C) 98.2 °F (36.8 °C)   TempSrc: Oral Oral Oral Oral   SpO2: 95% 95% 98% 97%   Weight:   60.7 kg (133 lb 13.1 oz)    Height:   1.524 m (5')      24HR INTAKE/OUTPUT:    Intake/Output Summary (Last 24 hours) at 1/31/2024 1129  Last data filed at 1/30/2024 1711  Gross per 24 hour   Intake 460 ml   Output 700 ml   Net -240 ml       Constitutional: Ill appearing   Eyes:  Pupils reactive, sclera clear   Neck:  Normal thyroid, no masses   Cardiovascular:  Regular, no rub  Respiratory:  No distress, no wheezing  Psychiatry:  Somnolent mood/affect, alert  Abdomen: +bs, soft, nt, no masses   Musculoskeletal: no LE edema, no clubbing   Lymphatics:  No LAD in neck, no supraclavicular nodes       MEDICAL DECISION MAKING       Data/  Recent Labs     01/28/24 2005   WBC 5.9   HGB 10.2*   HCT 32.0*   MCV 86.7          Recent Labs     01/28/24 2006 01/29/24  0530 01/30/24  0622 01/31/24  0613    139 142 141   K 3.0* 2.8* 3.9 4.0   CL 93* 91* 93* 95*   CO2 39* 38* 39* 36*   GLUCOSE 194* 128* 117* 88   PHOS 2.8 2.5  --   --    MG 1.60*  --   --   --    BUN 52* 52* 55* 64*   CREATININE 2.2* 2.1* 2.8* 2.8*   LABGLOM 22* 24* 17* 17*         Assessment/     Acute Kidney Injury:  KDIGO stage 1  - Etiology:  Cardiorenal syndrome with passive venous congestion due to fluid overload and decreased renal blood flow.  Overall net negative 11 liters and Scr slightly improved, within upper range of baseline and 
           Progress Note    HISTORY     CC:   Shortness of breath           We are following for Acute kidney injury, CKD stage 4         Subjective/   HPI:    Renal function stable within range with diuresis, non-oliguric.  Denies any shortness of breath, on RA.    ROS:  +weak, intake adequate.    EXAM       Objective/     Vitals:    01/28/24 2154 01/29/24 0323 01/29/24 0615 01/29/24 0806   BP: (!) 141/77 122/62 131/64 129/70   Pulse: 86 84 63 87   Resp: 18 18  18   Temp: 98.9 °F (37.2 °C) 98.1 °F (36.7 °C)  98.3 °F (36.8 °C)   TempSrc: Oral Oral  Oral   SpO2: 92% 91%  95%   Weight:  60.5 kg (133 lb 6.1 oz)     Height:  1.524 m (5')       24HR INTAKE/OUTPUT:    Intake/Output Summary (Last 24 hours) at 1/29/2024 1033  Last data filed at 1/29/2024 0915  Gross per 24 hour   Intake 360 ml   Output 2500 ml   Net -2140 ml       Constitutional: Ill appearing   Eyes:  Pupils reactive, sclera clear   Neck:  Normal thyroid, no masses   Cardiovascular:  Regular, no rub  Respiratory:  No distress, no wheezing  Psychiatry:  Somnolent mood/affect, alert  Abdomen: +bs, soft, nt, no masses   Musculoskeletal: no LE edema, no clubbing   Lymphatics:  No LAD in neck, no supraclavicular nodes       MEDICAL DECISION MAKING       Data/  Recent Labs     01/28/24 2005   WBC 5.9   HGB 10.2*   HCT 32.0*   MCV 86.7          Recent Labs     01/28/24  0625 01/28/24 2006 01/29/24  0530    141 139   K 3.4* 3.0* 2.8*   CL 94* 93* 91*   CO2 32 39* 38*   GLUCOSE 154* 194* 128*   PHOS 3.2 2.8 2.5   MG  --  1.60*  --    BUN 51* 52* 52*   CREATININE 2.0* 2.2* 2.1*   LABGLOM 25* 22* 24*         Assessment/     Acute Kidney Injury:  KDIGO stage 1  - Etiology:  Cardiorenal syndrome with passive venous congestion due to fluid overload and decreased renal blood flow.  Overall net negative 11 liters and Scr slightly improved, within upper range of baseline and might need to be in the higher range for euvolemia.       Chronic Kidney Disease:  
           Progress Note    HISTORY     CC:   Shortness of breath           We are following for acute kidney injury        Subjective/   HPI:  Patient is doing ok.  Had urinary retention overnight and good placed.  On lasix gtt and Scr up to 2.2      ROS:  Constitutional:  No fevers, No Chills, + weakness  Cardiovascular:  No palpations, + edema  Respiratory:  No wheezing, + SOB  Skin:  No rash, no itching  :  No hematuria, urinary retention, good placed     Social Hx:  No Family at the bedside     Past Medical and Surgical History:  - Reviewed, no changes     EXAM       Objective/     Vitals:    01/23/24 2009 01/23/24 2356 01/24/24 0400 01/24/24 0752   BP: 120/68 137/74 128/62 121/67   Pulse:  76 68 68   Resp:  16 16 16   Temp:  97.1 °F (36.2 °C) 97.8 °F (36.6 °C) 97.2 °F (36.2 °C)   TempSrc:  Oral Oral Oral   SpO2:  96%  98%   Weight:   73.4 kg (161 lb 14.4 oz)    Height:         24HR INTAKE/OUTPUT:    Intake/Output Summary (Last 24 hours) at 1/24/2024 1048  Last data filed at 1/24/2024 0729  Gross per 24 hour   Intake 720 ml   Output 1050 ml   Net -330 ml     Constitutional:  Alert, awake, no apparent distress  Eyes:  Pupils reactive, sclera clear   Neck:  Normal thyroid, no masses   Cardiovascular:  Regular, no rub  Respiratory:  No distress, no wheezing  Psychiatry:  Appropriate mood/affect, alert  Abdomen: +bs, soft, nt, no masses   Musculoskeletal: No LE edema, no clubbing   Lymphatics:  No LAD in neck, no supraclavicular nodes       MEDICAL DECISION MAKING       Data/  Recent Labs     01/22/24  0000 01/23/24  0454 01/24/24  0514   WBC 5.4 5.8 6.0   HGB 11.9* 10.8* 10.0*   HCT 37.4 33.6* 31.8*   MCV 86.2 87.5 87.7    157 148     Recent Labs     01/22/24  0000 01/22/24  0200 01/23/24  0454 01/24/24  0514     --  143 139   K 5.2*   < > 5.2* 4.7  4.7     --  110 107   CO2 24  --  25 24   GLUCOSE 142*  --  96 83   PHOS  --   --   --  4.8   BUN 31*  --  32* 37*   CREATININE 1.5*  --  2.0* 
           Progress Note    HISTORY     CC:   Shortness of breath           We are following for acute kidney injury        Subjective/   HPI:  Patient is doing ok.  On lasix gtt with 2 liters of urine output and Scr up to 2.3.  Feels that swelling is better       ROS:  Constitutional:  No fevers, No Chills, + weakness  Cardiovascular:  No palpations, + edema  Respiratory:  No wheezing, + SOB  Skin:  No rash, no itching  :  No hematuria, urinary retention, good placed     Social Hx:  No Family at the bedside     Past Medical and Surgical History:  - Reviewed, no changes     EXAM       Objective/     Vitals:    01/25/24 0015 01/25/24 0312 01/25/24 0530 01/25/24 0658   BP: 116/63 129/72 (!) 147/75 (!) 160/84   Pulse: 68 69 70 73   Resp:  18 16 19   Temp:  98.2 °F (36.8 °C)  97.1 °F (36.2 °C)   TempSrc:  Oral  Oral   SpO2:  98% 97% 92%   Weight:       Height:         24HR INTAKE/OUTPUT:    Intake/Output Summary (Last 24 hours) at 1/25/2024 1030  Last data filed at 1/25/2024 0915  Gross per 24 hour   Intake 450 ml   Output 2075 ml   Net -1625 ml       Constitutional:  Alert, awake, no apparent distress  Eyes:  Pupils reactive, sclera clear   Neck:  Normal thyroid, no masses   Cardiovascular:  Regular, no rub  Respiratory:  No distress, no wheezing  Psychiatry:  Appropriate mood/affect, alert  Abdomen: +bs, soft, nt, no masses   Musculoskeletal: No LE edema, no clubbing   Lymphatics:  No LAD in neck, no supraclavicular nodes       MEDICAL DECISION MAKING       Data/  Recent Labs     01/23/24  0454 01/24/24  0514 01/25/24  0600   WBC 5.8 6.0 7.1   HGB 10.8* 10.0* 10.5*   HCT 33.6* 31.8* 33.5*   MCV 87.5 87.7 88.2    148 158       Recent Labs     01/23/24  0454 01/24/24  0514 01/25/24  0600    139 146*   K 5.2* 4.7  4.7 4.7    107 109   CO2 25 24 25   GLUCOSE 96 83 56*   PHOS  --  4.8  --    BUN 32* 37* 40*   CREATININE 2.0* 2.2* 2.3*   LABGLOM 25* 22* 21*         Assessment/     Acute Kidney Injury: 
           Progress Note    HISTORY     CC:   Shortness of breath           We are following for acute kidney injury        Subjective/   HPI:  Patient is doing ok. Room air.  Feels that breathing is better.  BP coming down and urine volume is good on oral diuretic dosing.    ROS:  Constitutional:  No fevers, No Chills, + weakness  Cardiovascular:  No palpations, + edema ( improved )  Respiratory:  No wheezing, breathing improved   Skin:  No rash, no itching  :  No hematuria, urinary retention, good placed     Social Hx:  No Family at the bedside     Past Medical and Surgical History:  - Reviewed, no changes     EXAM       Objective/     Vitals:    01/27/24 1950 01/28/24 0416 01/28/24 0445 01/28/24 0900   BP: (!) 149/66  (!) 156/78 129/70   Pulse: 85  92 91   Resp: 18  18 18   Temp: 98.1 °F (36.7 °C)  98.1 °F (36.7 °C) 97.2 °F (36.2 °C)   TempSrc: Oral  Oral Oral   SpO2: 92%  92% 93%   Weight:  63.7 kg (140 lb 6.9 oz)     Height:         24HR INTAKE/OUTPUT:    Intake/Output Summary (Last 24 hours) at 1/28/2024 1054  Last data filed at 1/28/2024 0811  Gross per 24 hour   Intake 300 ml   Output 2900 ml   Net -2600 ml       Constitutional: Ill appearing   Eyes:  Pupils reactive, sclera clear   Neck:  Normal thyroid, no masses   Cardiovascular:  Regular, no rub  Respiratory:  No distress, no wheezing  Psychiatry:  Somnolent mood/affect, alert  Abdomen: +bs, soft, nt, no masses   Musculoskeletal: + LE edema, no clubbing   Lymphatics:  No LAD in neck, no supraclavicular nodes       MEDICAL DECISION MAKING       Data/  No results for input(s): \"WBC\", \"HGB\", \"HCT\", \"MCV\", \"PLT\" in the last 72 hours.    Recent Labs     01/26/24  0536 01/27/24  0638 01/28/24  0625    143 144   K 4.3 4.2 3.4*    95* 94*   CO2 27 28 32   GLUCOSE 45* 92 154*   PHOS 4.4 4.3 3.2   BUN 42* 47* 51*   CREATININE 2.1* 2.1* 2.0*   LABGLOM 24* 24* 25*         Assessment/     Acute Kidney Injury:  KDIGO stage 1  - Etiology:  Cardiorenal 
           Progress Note    HISTORY     CC:   Shortness of breath           We are following for acute kidney injury        Subjective/   HPI:  Patient is doing ok. Room air.  Feels that breathing is better.  Not yet worked with PT/OT     ROS:  Constitutional:  No fevers, No Chills, + weakness  Cardiovascular:  No palpations, + edema ( improved )  Respiratory:  No wheezing, breathing improved   Skin:  No rash, no itching  :  No hematuria, urinary retention, good placed     Social Hx:  No Family at the bedside     Past Medical and Surgical History:  - Reviewed, no changes     EXAM       Objective/     Vitals:    01/26/24 1608 01/26/24 2012 01/27/24 0225 01/27/24 0940   BP: (!) 146/76 138/75 (!) 168/87 (!) 144/65   Pulse: 81 79 82 85   Resp: 18 18 17 16   Temp: 97 °F (36.1 °C) 97.2 °F (36.2 °C) 97.4 °F (36.3 °C) 97.7 °F (36.5 °C)   TempSrc: Oral Oral Oral Oral   SpO2: 90% 90% 94% 92%   Weight:       Height:         24HR INTAKE/OUTPUT:    Intake/Output Summary (Last 24 hours) at 1/27/2024 1053  Last data filed at 1/27/2024 0647  Gross per 24 hour   Intake 180 ml   Output 2650 ml   Net -2470 ml       Constitutional: Ill appearing   Eyes:  Pupils reactive, sclera clear   Neck:  Normal thyroid, no masses   Cardiovascular:  Regular, no rub  Respiratory:  No distress, no wheezing  Psychiatry:  Somnolent mood/affect, alert  Abdomen: +bs, soft, nt, no masses   Musculoskeletal: + LE edema, no clubbing   Lymphatics:  No LAD in neck, no supraclavicular nodes       MEDICAL DECISION MAKING       Data/  Recent Labs     01/25/24  0600   WBC 7.1   HGB 10.5*   HCT 33.5*   MCV 88.2          Recent Labs     01/25/24  0600 01/26/24  0536 01/27/24  0638   * 143 143   K 4.7 4.3 4.2    103 95*   CO2 25 27 28   GLUCOSE 56* 45* 92   PHOS  --  4.4 4.3   BUN 40* 42* 47*   CREATININE 2.3* 2.1* 2.1*   LABGLOM 21* 24* 24*         Assessment/     Acute Kidney Injury:  KDIGO stage 1  - Etiology:  Cardiorenal syndrome with 
           Progress Note    HISTORY     CC:   Shortness of breath           We are following for acute kidney injury        Subjective/   HPI:  Patient is doing ok. Urine volume picking up.  BP is good.  She is feeling better and Scr slightly down     ROS:  Constitutional:  No fevers, No Chills, + weakness  Cardiovascular:  No palpations, + edema  Respiratory:  No wheezing, + SOB  Skin:  No rash, no itching  :  No hematuria, urinary retention, good placed     Social Hx:  + Family at the bedside     Past Medical and Surgical History:  - Reviewed, no changes     EXAM       Objective/     Vitals:    01/25/24 2359 01/26/24 0404 01/26/24 0707 01/26/24 0914   BP: 139/72 137/68 137/72    Pulse: 71 68 69    Resp: 16 16 18 16   Temp: 98 °F (36.7 °C) 98.3 °F (36.8 °C) 97.2 °F (36.2 °C)    TempSrc: Oral Oral Oral    SpO2: 94% 96% 92%    Weight:  72.8 kg (160 lb 6.4 oz)     Height:         24HR INTAKE/OUTPUT:    Intake/Output Summary (Last 24 hours) at 1/26/2024 1241  Last data filed at 1/26/2024 1123  Gross per 24 hour   Intake 610 ml   Output 5200 ml   Net -4590 ml       Constitutional: Ill appearing   Eyes:  Pupils reactive, sclera clear   Neck:  Normal thyroid, no masses   Cardiovascular:  Regular, no rub  Respiratory:  No distress, no wheezing  Psychiatry:  Somnolent mood/affect, alert  Abdomen: +bs, soft, nt, no masses   Musculoskeletal: + LE edema, no clubbing   Lymphatics:  No LAD in neck, no supraclavicular nodes       MEDICAL DECISION MAKING       Data/  Recent Labs     01/24/24  0514 01/25/24  0600   WBC 6.0 7.1   HGB 10.0* 10.5*   HCT 31.8* 33.5*   MCV 87.7 88.2    158       Recent Labs     01/24/24  0514 01/25/24  0600 01/26/24  0536    146* 143   K 4.7  4.7 4.7 4.3    109 103   CO2 24 25 27   GLUCOSE 83 56* 45*   PHOS 4.8  --  4.4   BUN 37* 40* 42*   CREATININE 2.2* 2.3* 2.1*   LABGLOM 22* 21* 24*         Assessment/     Acute Kidney Injury:  KDIGO stage 1  - Etiology:  Cardiorenal syndrome 
   01/22/24 1532   Encounter Summary   Encounter Overview/Reason  Spiritual/Emotional Needs;Rituals, Rites and Sacraments   Service Provided For: Patient and family together   Referral/Consult From: Patient   Support System Children   Last Encounter  01/22/24  (Patient desires Mormonism.  Episcopal background.)   Complexity of Encounter Moderate   Begin Time 1500   End Time  1533   Total Time Calculated 33 min   Spiritual/Emotional needs   Type Spiritual Support   Assessment/Intervention/Outcome   Assessment Calm;Desire for reconciliation   Intervention Active listening;Discussed belief system/Muslim practices/rigo;Explored/Affirmed feelings, thoughts, concerns   Outcome Engaged in conversation;Expressed feelings, needs, and concerns;Receptive   Plan and Referrals   Plan/Referrals Coordinate Rites and/or Rituals     Patient states she has wanted to be baptized for some time.  She is of the Episcopal tradition in the past.  When asked about her understanding of Mormonism, she states \"I want to see my mother again.\"  She has remorse about action in her past.  Patient referred to ordained clergy  for follow-up.    
   01/23/24 1309   Encounter Summary   Encounter Overview/Reason  Spiritual/Emotional Needs   Last Encounter    (1/23: disc. Caodaism, forgiveness,  arrived during visit, discussed connections with local churches, encouraged ongoing conversation with patient and to look into Caodaism with community/family (pt's  is Hinduism). prayer)   Begin Time 1250   End Time  1311   Total Time Calculated 21 min   Spiritual/Emotional needs   Type Spiritual Support     Thank you for consulting Spiritual Health    If you would like a 's presence for emotional, spiritual, grief or comfort care,   please dial \"[phone]\" and ask for the  on-call to be paged.    For help with Advanced Care Planning, Power of  for Healthcare or Living Will forms, you may also call us directly:    [phone]) [facility]a  [phone]) Allison  [phone]) Outpatient    - Bertram Edwards    [facility]      
   01/26/24 1121   Encounter Summary   Encounter Overview/Reason  Follow-up   Service Provided For: Patient;Family   Referral/Consult From: Rounding   Support System Spouse   Last Encounter  01/26/24  ( visited; provided pastoral support and encouragement)   Assessment/Intervention/Outcome   Assessment Coping   Intervention Sustaining Presence/Ministry of presence       
  HOSPITALIST  ADMIT ACCEPT NOTE    1/22/2024 3:19 AM    Patient Information: DES WREN   Date of Admit:  1/21/2024  Primary Care Physician:  Lucila Tejeda MD    Chief complaint:    Chief Complaint   Patient presents with    Congestive Heart Failure       History of Present Illness:  DES WREN is a 78 y.o. female on [employer] service who was seen in ER for 1/21/2024 for shortness of breath.  Patient was not hypoxic but very tachypneic with only minor exertion and appears very frail to the ER provider.  Patient did receive a dose of IV Lasix along with Rocephin and Zithromax.    Plan:  Admit for pneumonia with possible component of fluid overload  Continue Rocephin and Zithromax  Hold digoxin but otherwise continue home meds  Continue home dose of Lasix        Cooper Swanson MD      
7 beats of V Tach per [facility]. Dr Gomez made aware via urgent perfect serve. VSS. No new complaints from patient. No further orders per Dr Gomez.   
Bed side report given to POORNIMA Martins.   
Bed side report given to POORNIMA Quispe.   
Bedside Mobility Assessment Tool (BMAT):     Assessment Level 1- Sit and Shake    1. From a semi-reclined position, ask patient to sit up and rotate to a seated position at the side of the bed. Can use the bedrail.    2. Ask patient to reach out and grab your hand and shake making sure patient reaches across his/her midline.   Pass- Patient is able to come to a seated position, maintain core strength. Maintains seated balance while reaching across midline. Move on to Assessment Level 2.     Assessment Level 2- Stretch and Point   1. With patient in seated position at the side of the bed, have patient place both feet on the floor (or stool) with knees no higher than hips.    2. Ask patient to stretch one leg and straighten the knee, then bend the ankle/flex and point the toes. If appropriate, repeat with the other leg.   Pass- Patient is able to demonstrate appropriate quad strength on intended weight bearing limb(s). Move onto Assessment Level 3.     Assessment Level 3- Stand   1. Ask patient to elevate off the bed or chair (seated to standing) using an assistive device (cane, bedrail).    2. Patient should be able to raise buttocks off be and hold for a count of five. May repeat once.   Fail- Patient unable to demonstrate standing stability. Patient is MOBILITY LEVEL 3.     Assessment Level 4- Walk   1. Ask patient to march in place at bedside.    2. Then ask patient to advance step and return each foot. Some medical conditions may render a patient from stepping backwards, use your best clinical judgement.   Fail- Patient not able to complete tasks OR requires use of assistive device. Patient is MOBILITY LEVEL 3.       Mobility Level- 2    
Bedside Mobility Assessment Tool (BMAT):     Assessment Level 1- Sit and Shake    1. From a semi-reclined position, ask patient to sit up and rotate to a seated position at the side of the bed. Can use the bedrail.    2. Ask patient to reach out and grab your hand and shake making sure patient reaches across his/her midline.   Pass- Patient is able to come to a seated position, maintain core strength. Maintains seated balance while reaching across midline. Move on to Assessment Level 2.     Assessment Level 2- Stretch and Point   1. With patient in seated position at the side of the bed, have patient place both feet on the floor (or stool) with knees no higher than hips.    2. Ask patient to stretch one leg and straighten the knee, then bend the ankle/flex and point the toes. If appropriate, repeat with the other leg.   Pass- Patient is able to demonstrate appropriate quad strength on intended weight bearing limb(s). Move onto Assessment Level 3.     Assessment Level 3- Stand   1. Ask patient to elevate off the bed or chair (seated to standing) using an assistive device (cane, bedrail).    2. Patient should be able to raise buttocks off be and hold for a count of five. May repeat once.   Fail- Patient unable to demonstrate standing stability. Patient is MOBILITY LEVEL 3.     Assessment Level 4- Walk   1. Ask patient to march in place at bedside.    2. Then ask patient to advance step and return each foot. Some medical conditions may render a patient from stepping backwards, use your best clinical judgement.   Fail- Patient not able to complete tasks OR requires use of assistive device. Patient is MOBILITY LEVEL 3.       Mobility Level- 3   
Bedside Mobility Assessment Tool (BMAT):     Assessment Level 1- Sit and Shake    1. From a semi-reclined position, ask patient to sit up and rotate to a seated position at the side of the bed. Can use the bedrail.    2. Ask patient to reach out and grab your hand and shake making sure patient reaches across his/her midline.   Pass- Patient is able to come to a seated position, maintain core strength. Maintains seated balance while reaching across midline. Move on to Assessment Level 2.     Assessment Level 2- Stretch and Point   1. With patient in seated position at the side of the bed, have patient place both feet on the floor (or stool) with knees no higher than hips.    2. Ask patient to stretch one leg and straighten the knee, then bend the ankle/flex and point the toes. If appropriate, repeat with the other leg.   Pass- Patient is able to demonstrate appropriate quad strength on intended weight bearing limb(s). Move onto Assessment Level 3.     Assessment Level 3- Stand   1. Ask patient to elevate off the bed or chair (seated to standing) using an assistive device (cane, bedrail).    2. Patient should be able to raise buttocks off be and hold for a count of five. May repeat once.   Fail- Patient unable to demonstrate standing stability. Patient is MOBILITY LEVEL 3.     Assessment Level 4- Walk   1. Ask patient to march in place at bedside.    2. Then ask patient to advance step and return each foot. Some medical conditions may render a patient from stepping backwards, use your best clinical judgement.   Fail- Patient not able to complete tasks OR requires use of assistive device. Patient is MOBILITY LEVEL 3.       Mobility Level- 3   
Bedside report and transfer of care given to POORNIMA Brewster. Pt currently resting in bed with the call light within reach. Pt denies any other care needs at this time. Pt stable at this time.    Ella Marcelino RN      
Bedside report and transfer of care given to POORNIMA Johnson. Pt currently resting in bed with the call light within reach. Pt denies any other care needs at this time. Pt stable at this time.    
Bedside report given to POORNIMA Reed and POORNIMA Brewster. Care transferred.     
Bedside report given to POORNIMA Reed and POORNIMA Brewster. Care transferred.     
CMU notified writer 11 beats VT. Dr Gomez notified on unit. Primary nurse aware.   
CMU notified writer of 23 beats of SVT w/ HR in the 140s.  Urgent perfect serve sent to hospitalist on call. Pt remains resting in bed, vss, no signs of distress.   Bed side report given to POORNIMA Martins. Pt has call light within reach. Bed alarm is on.   
Care transferred to Lavern NOGUERA  
[facility]    Daily Progress Note for 2024 9:23 AM /0321-01  Amber Fuentes : 1945 Age: 78 y.o. Sex: female  Length of Stay:  2    Interval History:      CC: F/U Congestive Heart Failure    Subjective:       Ms Fuentes says she feels fine today. She does have some constipation. She denies any recent urinary symptoms or dysuria. Gagnon was placed for retention.     Objective:     Vitals:    24 2009 24 2356 24 0400 24 0752   BP: 120/68 137/74 128/62 121/67   Pulse:  76 68 68   Resp:  16 16 16   Temp:  97.1 °F (36.2 °C) 97.8 °F (36.6 °C) 97.2 °F (36.2 °C)   TempSrc:  Oral Oral Oral   SpO2:  96%  98%   Weight:   73.4 kg (161 lb 14.4 oz)    Height:              Intake/Output Summary (Last 24 hours) at 2024 0923  Last data filed at 2024 0729  Gross per 24 hour   Intake 720 ml   Output 1050 ml   Net -330 ml       Body mass index is 31.62 kg/m².    Physical Exam:  General: Cooperative, pleasant/Ill appearing, on  Nasal cannula  HEENT:  Head: normocephalic,atraumatic, anicteric sclera, clear conjunctiva  Neck: Normal size, Jugular venous pulsations: normal  Respiratory:unlabored breathing, clear to auscultation with no crackles, wheezes rhonchi  Heart: Regular rate and rhythm, S1, S2-normal, No murmurs  Abdomen: soft, nondistended, nontender, normoactive bowel sounds,  Neurological/Psych: Alert and oriented times three, no focal neurological deficits, Mood and affect appropriate.  Skin: No obvious rashes    Extremities:  no edema, Pedal pulses 2+ bilaterally    Scheduled Medications:  hydrALAZINE, 10 mg, 3 times per day  isosorbide dinitrate, 5 mg, TID  allopurinol, 300 mg, Daily  atorvastatin, 40 mg, Daily  aspirin, 81 mg, Daily  empagliflozin, 10 mg, Daily  [Held by provider] digoxin, 125 mcg, Every Other Day  insulin glargine, 10 Units, BID  metoprolol succinate, 75 mg, Daily  sodium chloride flush, 5-40 mL, 2 times per day  enoxaparin, 30 
[facility]    Daily Progress Note for 2024 10:34 AM /0322-02  Amber Fuentes : 1945 Age: 78 y.o. Sex: female  Length of Stay:  4    Interval History:      CC: F/U Congestive Heart Failure    Subjective:       Ms Fuentes says she feels pretty good. No dyspnea or cough. At home she usually eats small meals throughout the day. No constipation or diarrhea. She has some mild lower abdominal tenderness.     Objective:     Vitals:    24 2359 24 0404 24 0707 24 0914   BP: 139/72 137/68 137/72    Pulse: 71 68 69    Resp: 16 16 18 16   Temp: 98 °F (36.7 °C) 98.3 °F (36.8 °C) 97.2 °F (36.2 °C)    TempSrc: Oral Oral Oral    SpO2: 94% 96% 92%    Weight:  72.8 kg (160 lb 6.4 oz)     Height:              Intake/Output Summary (Last 24 hours) at 2024 1034  Last data filed at 2024 0933  Gross per 24 hour   Intake 430 ml   Output 5200 ml   Net -4770 ml       Body mass index is 31.33 kg/m².    Physical Exam:  General: Cooperative, pleasant/Ill appearing, on  Nasal cannula  HEENT:  Head: normocephalic,atraumatic, anicteric sclera, clear conjunctiva  Neck: Normal size, Jugular venous pulsations: normal  Respiratory:unlabored breathing, clear to auscultation with no crackles, wheezes rhonchi  Heart: Regular rate and rhythm, S1, S2-normal, No murmurs  Abdomen: soft, nondistended, nontender, normoactive bowel sounds,  Neurological/Psych: Alert and oriented times three, no focal neurological deficits, Mood and affect appropriate.  Skin: No obvious rashes    Extremities:  no edema, Pedal pulses 2+ bilaterally    Scheduled Medications:  isosorbide dinitrate, 20 mg, TID  hydrALAZINE, 25 mg, 3 times per day  allopurinol, 200 mg, Daily  polyethylene glycol, 17 g, Daily  melatonin, 5 mg, Nightly  atorvastatin, 40 mg, Daily  aspirin, 81 mg, Daily  [Held by provider] empagliflozin, 10 mg, Daily  [Held by provider] digoxin, 125 mcg, Every Other Day  [Held by provider] 
Consult has been called to Dr. Kelly on 1/22/24. Spoke with Mariama. 10:30 AM    Laura Knowles  1/22/2024  
Consult has been called to Dr. Wakefield on 1/23/24. Spoke with Elyssa. 11:35 AM    Luara Knowles  1/23/2024  
Dr Gomez made aware of K of 3.4 this AM. No new orders placed.  
F/C inserted 16fr 10cc , 400 ml clear yellow urine return immed. Pt tolerated procedure well. Pt in bed call light in reach no further issues. Report given to Zahra NOGUERA  
HEART FAILURE CARE PLAN:    Comorbidities Reviewed: Yes   Patient has a past medical history of Anemia, Backache, unspecified, CAD, multiple vessel, Chronic kidney disease (CKD) stage G3b/A1, moderately decreased glomerular filtration rate (GFR) between 30-44 mL/min/1.73 square meter and albuminuria creatinine ratio less than 30 mg/g ([facility]), Chronic systolic CHF (congestive heart failure) ([facility]), Depressive disorder, not elsewhere classified, Essential hypertension, benign, Gout, Hyperlipidemia associated with type 2 diabetes mellitus ([facility]), Major depressive disorder, recurrent episode, moderate ([facility]), Osteoarthritis, hand, Osteoarthrosis, unspecified whether generalized or localized, lower leg, Osteopenia, Other and unspecified hyperlipidemia, Pain in joint, lower leg, Pneumonia, Rotator cuff tendinitis, Tear of medial cartilage or meniscus of knee, current, Type 2 diabetes mellitus with microalbuminuria, without long-term current use of insulin ([facility]), Type 2 diabetes mellitus without complication ([facility]), and Type II or unspecified type diabetes mellitus without mention of complication, not stated as uncontrolled.     ECHOCARDIOGRAM Reviewed: Yes   Patient's Ejection Fraction (EF) is less than 40%    Weights Reviewed: Yes   Admission weight: 71.2 kg (157 lb)   Wt Readings from Last 3 Encounters:   01/28/24 63.7 kg (140 lb 6.9 oz)   01/02/24 71.2 kg (157 lb)   10/20/23 65.3 kg (144 lb)     Intake & Output Reviewed: Yes     Intake/Output Summary (Last 24 hours) at 1/28/2024 1109  Last data filed at 1/28/2024 0811  Gross per 24 hour   Intake 360 ml   Output 2350 ml   Net -1990 ml     Medications Reviewed: Yes   SCHEDULED HOSPITAL MEDICATIONS:   insulin glargine  10 Units SubCUTAneous Nightly    sacubitril-valsartan  0.5 tablet Oral BID    torsemide  40 mg Oral Daily    isosorbide dinitrate  40 mg Oral TID    hydrALAZINE  50 mg Oral 3 times per day    allopurinol  200 mg Oral Daily    polyethylene glycol  17 g Oral Daily 
HEART FAILURE CARE PLAN:    Comorbidities Reviewed: Yes   Patient has a past medical history of Anemia, Backache, unspecified, CAD, multiple vessel, Chronic kidney disease (CKD) stage G3b/A1, moderately decreased glomerular filtration rate (GFR) between 30-44 mL/min/1.73 square meter and albuminuria creatinine ratio less than 30 mg/g (HCC), Chronic systolic CHF (congestive heart failure) ([facility]), Depressive disorder, not elsewhere classified, Essential hypertension, benign, Gout, Hyperlipidemia associated with type 2 diabetes mellitus ([facility]), Major depressive disorder, recurrent episode, moderate ([facility]), Osteoarthritis, hand, Osteoarthrosis, unspecified whether generalized or localized, lower leg, Osteopenia, Other and unspecified hyperlipidemia, Pain in joint, lower leg, Pneumonia, Rotator cuff tendinitis, Tear of medial cartilage or meniscus of knee, current, Type 2 diabetes mellitus with microalbuminuria, without long-term current use of insulin ([facility]), Type 2 diabetes mellitus without complication ([facility]), and Type II or unspecified type diabetes mellitus without mention of complication, not stated as uncontrolled.     ECHOCARDIOGRAM Reviewed: Yes   Patient's Ejection Fraction (EF) is less than 40%    Weights Reviewed: Yes   Admission weight: 71.2 kg (157 lb)   Wt Readings from Last 3 Encounters:   01/29/24 60.5 kg (133 lb 6.1 oz)   01/02/24 71.2 kg (157 lb)   10/20/23 65.3 kg (144 lb)     Intake & Output Reviewed: Yes     Intake/Output Summary (Last 24 hours) at 1/29/2024 1103  Last data filed at 1/29/2024 0915  Gross per 24 hour   Intake 360 ml   Output 2500 ml   Net -2140 ml     Medications Reviewed: Yes   SCHEDULED HOSPITAL MEDICATIONS:   isosorbide dinitrate  20 mg Oral TID    insulin glargine  10 Units SubCUTAneous Nightly    sacubitril-valsartan  0.5 tablet Oral BID    [Held by provider] hydrALAZINE  50 mg Oral 3 times per day    allopurinol  200 mg Oral Daily    polyethylene glycol  17 g Oral Daily    
HEART FAILURE CARE PLAN:    Comorbidities Reviewed: Yes   Patient has a past medical history of Anemia, Backache, unspecified, CAD, multiple vessel, Chronic kidney disease (CKD) stage G3b/A1, moderately decreased glomerular filtration rate (GFR) between 30-44 mL/min/1.73 square meter and albuminuria creatinine ratio less than 30 mg/g ([facility]), Chronic systolic CHF (congestive heart failure) ([facility]), Depressive disorder, not elsewhere classified, Essential hypertension, benign, Gout, Hyperlipidemia associated with type 2 diabetes mellitus ([facility]), Major depressive disorder, recurrent episode, moderate ([facility]), Osteoarthritis, hand, Osteoarthrosis, unspecified whether generalized or localized, lower leg, Osteopenia, Other and unspecified hyperlipidemia, Pain in joint, lower leg, Pneumonia, Rotator cuff tendinitis, Tear of medial cartilage or meniscus of knee, current, Type 2 diabetes mellitus with microalbuminuria, without long-term current use of insulin ([facility]), Type 2 diabetes mellitus without complication ([facility]), and Type II or unspecified type diabetes mellitus without mention of complication, not stated as uncontrolled.     ECHOCARDIOGRAM Reviewed: Yes   Patient's Ejection Fraction (EF) is less than 40%    Weights Reviewed: Yes   Admission weight: 71.2 kg (157 lb)   Wt Readings from Last 3 Encounters:   01/30/24 61 kg (134 lb 7.7 oz)   01/02/24 71.2 kg (157 lb)   10/20/23 65.3 kg (144 lb)     Intake & Output Reviewed: Yes     Intake/Output Summary (Last 24 hours) at 1/30/2024 1021  Last data filed at 1/30/2024 0814  Gross per 24 hour   Intake 660 ml   Output 800 ml   Net -140 ml     Medications Reviewed: Yes   SCHEDULED HOSPITAL MEDICATIONS:   [Held by provider] isosorbide dinitrate  20 mg Oral TID    insulin glargine  10 Units SubCUTAneous Nightly    sacubitril-valsartan  0.5 tablet Oral BID    allopurinol  200 mg Oral Daily    polyethylene glycol  17 g Oral Daily    atorvastatin  40 mg Oral Daily    aspirin  81 mg Oral 
HEART FAILURE CARE PLAN:    Comorbidities Reviewed: Yes   Patient has a past medical history of Anemia, Backache, unspecified, CAD, multiple vessel, Chronic kidney disease (CKD) stage G3b/A1, moderately decreased glomerular filtration rate (GFR) between 30-44 mL/min/1.73 square meter and albuminuria creatinine ratio less than 30 mg/g ([facility]), Chronic systolic CHF (congestive heart failure) ([facility]), Depressive disorder, not elsewhere classified, Essential hypertension, benign, Gout, Hyperlipidemia associated with type 2 diabetes mellitus ([facility]), Major depressive disorder, recurrent episode, moderate ([facility]), Osteoarthritis, hand, Osteoarthrosis, unspecified whether generalized or localized, lower leg, Osteopenia, Other and unspecified hyperlipidemia, Pain in joint, lower leg, Pneumonia, Rotator cuff tendinitis, Tear of medial cartilage or meniscus of knee, current, Type 2 diabetes mellitus with microalbuminuria, without long-term current use of insulin ([facility]), Type 2 diabetes mellitus without complication ([facility]), and Type II or unspecified type diabetes mellitus without mention of complication, not stated as uncontrolled.     ECHOCARDIOGRAM Reviewed: Yes   Patient's Ejection Fraction (EF) is less than 40%    Weights Reviewed: Yes   Admission weight: 71.2 kg (157 lb)   Wt Readings from Last 3 Encounters:   01/26/24 72.8 kg (160 lb 6.4 oz)   01/02/24 71.2 kg (157 lb)   10/20/23 65.3 kg (144 lb)     Intake & Output Reviewed: Yes     Intake/Output Summary (Last 24 hours) at 1/27/2024 1950  Last data filed at 1/27/2024 1825  Gross per 24 hour   Intake 480 ml   Output 1800 ml   Net -1320 ml     Medications Reviewed: Yes   SCHEDULED HOSPITAL MEDICATIONS:   torsemide  40 mg Oral Daily    isosorbide dinitrate  40 mg Oral TID    hydrALAZINE  50 mg Oral 3 times per day    allopurinol  200 mg Oral Daily    polyethylene glycol  17 g Oral Daily    melatonin  5 mg Oral Nightly    atorvastatin  40 mg Oral Daily    aspirin  81 mg Oral Daily 
Inpatient Occupational Therapy Evaluation and Treatment    Unit: PCU  Date:  1/24/2024  Patient Name:    Amber Fuentes  Admitting diagnosis:  Elevated troponin [R79.89]  Acute on chronic systolic congestive heart failure (HCC) [I50.23]  Pneumonia due to infectious organism, unspecified laterality, unspecified part of lung [J18.9]  Chronic kidney disease, unspecified CKD stage [N18.9]  Community acquired pneumonia, unspecified laterality [J18.9]  Pneumonia due to virus [J12.9]  Admit Date:  1/21/2024  Precautions/Restrictions/WB Status/ Lines/ Wounds/ Oxygen: Fall risk, Bed/chair alarm, Lines (IV, Supplemental O2 (2L), and internal catheter), Telemetry, and Continuous pulse oximetry 2000ml fluid restrictions    Pt seen for cotreatment this date due to patient safety, patient endurance, complexity of condition, and acute illness/injury    Treatment Time:  000-628  Treatment Number:  1  Timed Code Treatment Minutes: 25 minutes  Total Treatment Minutes:  35  minutes    Patient Goals for Therapy: \"none stated \"          Discharge Recommendations: SNF; if pt refuses SNF, recommend 24 hour assist and home health OT  DME needs for discharge: Defer to facility       Therapy recommendations for staff:   Assist of 1 for transfers with use of rolling walker (RW) and gait belt to/from chair  to/from bathroom    History of Present Illness: Per KRZYSZTOF Arevalo H&P:  \"78 y.o. female with CAD s/p CABG and PCI, ischemic cardiomyopathy, PFO s/p closure, MR s/p repair, HTN, HLD, T1DM, CKD3 and gout who presented to [facility] ED with complaint of generalized weakness. Patient reports she has generally felt unwell over the last few weeks. States she has noticed she is becoming increasingly short of breath with exertion in the past couple of days. Yesterday, she tried to stand from her recliner, but was too weak to do so. She reports chronic lower extremity edema, but is unsure if this is changed from her baseline. States estimates a 10-15 lb 
Inpatient Occupational Therapy Treatment    Unit: PCU  Date:  1/30/2024  Patient Name:    Amber Fuentes  Admitting diagnosis:  Elevated troponin [R79.89]  Acute on chronic systolic congestive heart failure (HCC) [I50.23]  Pneumonia due to infectious organism, unspecified laterality, unspecified part of lung [J18.9]  Chronic kidney disease, unspecified CKD stage [N18.9]  Community acquired pneumonia, unspecified laterality [J18.9]  Pneumonia due to virus [J12.9]  Admit Date:  1/21/2024  Precautions/Restrictions/WB Status/ Lines/ Wounds/ Oxygen: Fall risk, Bed/chair alarm, Lines (IV and Supplemental O2 (2.5L)), Telemetry, and Continuous pulse oximetry, life vest, 2000ml fluid restriction    Pt seen for cotreatment this date due to acute illness/injury    Treatment Time:  1030-1055  Treatment Number:  2  Timed Code Treatment Minutes: 25 minutes  Total Treatment Minutes:  25  minutes    Patient Goals for Therapy: \"go home\"          Discharge Recommendations: SNF if no 24 hour assist available, if going home will need 24 hour assist and home health OT  DME needs for discharge: Defer to facility, RW if going home       Therapy recommendations for staff:   Assist of 1 for transfers with use of rolling walker (RW) and gait belt to/from chair  to/from bathroom    History of Present Illness: Per KRZYSZTOF Arevalo H&P:  \"78 y.o. female with CAD s/p CABG and PCI, ischemic cardiomyopathy, PFO s/p closure, MR s/p repair, HTN, HLD, T1DM, CKD3 and gout who presented to [facility] ED with complaint of generalized weakness. Patient reports she has generally felt unwell over the last few weeks. States she has noticed she is becoming increasingly short of breath with exertion in the past couple of days. Yesterday, she tried to stand from her recliner, but was too weak to do so. She reports chronic lower extremity edema, but is unsure if this is changed from her baseline. States estimates a 10-15 lb weight gain over the past few months.\"  Diagnosed 
Inpatient Physical Therapy Evaluation & Treatment    Unit: PCU  Date:  1/24/2024  Patient Name:    Amber Fuentes  Admitting diagnosis:  Elevated troponin [R79.89]  Acute on chronic systolic congestive heart failure (HCC) [I50.23]  Pneumonia due to infectious organism, unspecified laterality, unspecified part of lung [J18.9]  Chronic kidney disease, unspecified CKD stage [N18.9]  Community acquired pneumonia, unspecified laterality [J18.9]  Pneumonia due to virus [J12.9]  Admit Date:  1/21/2024  Precautions/Restrictions/WB Status/ Lines/ Wounds/ Oxygen: Fall risk, Bed/chair alarm, Lines (IV, Supplemental O2 (2L), and internal catheter), Telemetry, Continuous pulse oximetry, and 2000ml fluid restrictions      Pt seen for cotreatment this date due to patient safety, patient endurance, and acute illness/injury    Treatment Time:  9:25 - 9:45  Treatment Number:  1   Timed Code Treatment Minutes: 10 minutes  Total Treatment Minutes:  20  minutes    Patient Stated Goals for Therapy: \" go home \"          Discharge Recommendations: SNF - if pt refuses, will need 24hr assist and HHPT  DME needs for discharge: RW - if returning home       Therapy recommendation for EMS Transport: can transport by wheelchair    Therapy recommendations for staff:   Assist of 1 for transfers with use of rolling walker (RW) and gait belt to/from BSC  to/from chair  within room    History of Present Illness: Per KRZYSZTOF Arevalo H&P:  \"78 y.o. female with CAD s/p CABG and PCI, ischemic cardiomyopathy, PFO s/p closure, MR s/p repair, HTN, HLD, T1DM, CKD3 and gout who presented to [facility] ED with complaint of generalized weakness. Patient reports she has generally felt unwell over the last few weeks. States she has noticed she is becoming increasingly short of breath with exertion in the past couple of days. Yesterday, she tried to stand from her recliner, but was too weak to do so. She reports chronic lower extremity edema, but is unsure if this is changed 
Inpatient Physical Therapy Treatment    Unit: [address] [place]  Date:  1/28/2024  Patient Name:    Amber Fuentes  Admitting diagnosis:  Elevated troponin [R79.89]  Acute on chronic systolic congestive heart failure (HCC) [I50.23]  Pneumonia due to infectious organism, unspecified laterality, unspecified part of lung [J18.9]  Chronic kidney disease, unspecified CKD stage [N18.9]  Community acquired pneumonia, unspecified laterality [J18.9]  Pneumonia due to virus [J12.9]  Admit Date:  1/21/2024  Precautions/Restrictions/WB Status/ Lines/ Wounds/ Oxygen: Fall risk, Bed/chair alarm, Lines (IV, Supplemental O2 (2L), and internal catheter), Telemetry, Continuous pulse oximetry, and 2000ml fluid restrictions      Pt seen for cotreatment this date due to patient safety, patient endurance, and acute illness/injury    Treatment Time:  0809 - 0849  Treatment Number:  2   Timed Code Treatment Minutes: 40 minutes  Total Treatment Minutes:  40  minutes    Patient Stated Goals for Therapy: \" go home \"          Discharge Recommendations: Home with 24hr assistance and Home PT  DME needs for discharge: RW       Therapy recommendation for EMS Transport: can transport by wheelchair    Therapy recommendations for staff:   Assist of 1 for ambulation with use of rolling walker (RW) and gait belt to/from chair  to/from bathroom  within room  within halls    History of Present Illness: Per KRZYSZTOF Arevalo H&P:  \"78 y.o. female with CAD s/p CABG and PCI, ischemic cardiomyopathy, PFO s/p closure, MR s/p repair, HTN, HLD, T1DM, CKD3 and gout who presented to [facility] ED with complaint of generalized weakness. Patient reports she has generally felt unwell over the last few weeks. States she has noticed she is becoming increasingly short of breath with exertion in the past couple of days. Yesterday, she tried to stand from her recliner, but was too weak to do so. She reports chronic lower extremity edema, but is unsure if this is changed from her baseline. States 
Inpatient Physical Therapy Treatment    Unit: [address] [place]  Date:  1/30/2024  Patient Name:    Amber Fuentes  Admitting diagnosis:  Elevated troponin [R79.89]  Acute on chronic systolic congestive heart failure (HCC) [I50.23]  Pneumonia due to infectious organism, unspecified laterality, unspecified part of lung [J18.9]  Chronic kidney disease, unspecified CKD stage [N18.9]  Community acquired pneumonia, unspecified laterality [J18.9]  Pneumonia due to virus [J12.9]  Admit Date:  1/21/2024  Precautions/Restrictions/WB Status/ Lines/ Wounds/ Oxygen: Fall risk, Bed/chair alarm, Lines (Supplemental O2 (2.5L)), Telemetry, Continuous pulse oximetry, and 2000ml fluid restrictions    Pt seen for cotreatment this date due to patient safety, patient endurance, and acute illness/injury    Treatment Time:  10:30-10:55  Treatment Number:  2   Timed Code Treatment Minutes: 25 minutes  Total Treatment Minutes:  25  minutes    Patient Stated Goals for Therapy: \" go home \"          Discharge Recommendations: Home with 24hr assistance and Home PT; if reliable 24/7 assist is not available, may be more appropriate for SNF.   DME needs for discharge: RW if going home.       Therapy recommendation for EMS Transport: can transport by wheelchair    Therapy recommendations for staff:   Stand by assist for ambulation with use of rolling walker (RW) and gait belt to/from chair  to/from bathroom  within room  within halls    History of Present Illness: Per KRZYSZTOF Arevalo H&P:  \"78 y.o. female with CAD s/p CABG and PCI, ischemic cardiomyopathy, PFO s/p closure, MR s/p repair, HTN, HLD, T1DM, CKD3 and gout who presented to [facility] ED with complaint of generalized weakness. Patient reports she has generally felt unwell over the last few weeks. States she has noticed she is becoming increasingly short of breath with exertion in the past couple of days. Yesterday, she tried to stand from her recliner, but was too weak to do so. She reports chronic lower 
Lab called stated Pt's BS was 46.  Upon entering room, Pt easily aroused.  Gave OJ and peanut butter and crackers.  Will recheck BS.    
[place] [facility] [place] [facility]    Daily Progress Note for 2024 9:26 AM /0321-01  Amber Fuentes : 1945 Age: 78 y.o. Sex: female  Length of Stay:  1    Interval History:      CC: F/U Congestive Heart Failure    Subjective:       Ms Fuentes seen at bedside this morning, still sleepy. Denies any specific complaints.     Objective:     Vitals:    24 0000 24 0454 24 0457 24 0812   BP: 133/74 133/75  128/69   Pulse: 74 76  77   Resp: 18 16  16   Temp: 97.8 °F (36.6 °C) 98 °F (36.7 °C)  97.9 °F (36.6 °C)   TempSrc: Oral Oral  Oral   SpO2: 98% 98%  96%   Weight:   73.9 kg (162 lb 14.7 oz)    Height:              Intake/Output Summary (Last 24 hours) at 2024 0926  Last data filed at 2024 0848  Gross per 24 hour   Intake 240 ml   Output 450 ml   Net -210 ml     Body mass index is 31.82 kg/m².    Physical Exam:  General: Cooperative, pleasant/Ill appearing, on  Nasal cannula  HEENT:  Head: normocephalic,atraumatic, anicteric sclera, clear conjunctiva  Neck: Normal size, Jugular venous pulsations: normal  Respiratory:unlabored breathing, clear to auscultation with no crackles, wheezes rhonchi  Heart: Regular rate and rhythm, S1, S2-normal, No murmurs  Abdomen: soft, nondistended, nontender, normoactive bowel sounds,  Neurological/Psych: Alert and oriented times three, no focal neurological deficits, Mood and affect appropriate.  Skin: No obvious rashes    Extremities:  no edema, Pedal pulses 2+ bilaterally    Scheduled Medications:  allopurinol, 300 mg, Daily  atorvastatin, 40 mg, Daily  aspirin, 81 mg, Daily  empagliflozin, 10 mg, Daily  [Held by provider] digoxin, 125 mcg, Every Other Day  insulin glargine, 10 Units, BID  metoprolol succinate, 75 mg, Daily  potassium chloride, 10 mEq, Daily  sodium chloride flush, 5-40 mL, 2 times per day  enoxaparin, 30 mg, Daily  cefTRIAXone (ROCEPHIN) IV, 1,000 mg, Q24H  azithromycin, 500 mg, Q24H  insulin lispro, 0-4 Units, 
[facility]    Daily Progress Note for 2024 9:30 AM 0214/0214-01  Amber Fuentes : 1945 Age: 78 y.o. Sex: female  Length of Stay:  5    Interval History:      CC: F/U Congestive Heart Failure    Low EF of 20 % started on lasix gtt with neg of 9 L since admission      Subjective:       Ms Fuentes says she feels pretty good. No dyspnea or cough.  Remains on RA  Intermittently confused     Objective:     Vitals:    24 0914 24 1608 24 0225   BP:  (!) 146/76 138/75 (!) 168/87   Pulse:  81 79 82   Resp: 16 18 18 17   Temp:  97 °F (36.1 °C) 97.2 °F (36.2 °C) 97.4 °F (36.3 °C)   TempSrc:  Oral Oral Oral   SpO2:  90% 90% 94%   Weight:       Height:              Intake/Output Summary (Last 24 hours) at 2024 0930  Last data filed at 2024 0647  Gross per 24 hour   Intake 360 ml   Output 3950 ml   Net -3590 ml       Body mass index is 31.33 kg/m².    Physical Exam:        General:  elderly female Awake, alert and oriented. Appears to be not in any distress  Mucous Membranes:  Pink , anicteric  Neck: ++JVD, no carotid bruit, no thyromegaly  Chest:  Clear to auscultation bilaterally minimal basilar crackles  Cardiovascular:  RRR S1S2 heard, no murmurs or gallops  Abdomen:  Soft, undistended, non tender, no organomegaly, BS present  Extremities- resolving 3 + peripheral edema . Distal pulses well felt  Neurological : grossly normal with intermittent confusion     Scheduled Medications:  albumin human 25%, 25 g, Q6H  isosorbide dinitrate, 20 mg, TID  hydrALAZINE, 25 mg, 3 times per day  allopurinol, 200 mg, Daily  polyethylene glycol, 17 g, Daily  melatonin, 5 mg, Nightly  atorvastatin, 40 mg, Daily  aspirin, 81 mg, Daily  [Held by provider] empagliflozin, 10 mg, Daily  [Held by provider] digoxin, 125 mcg, Every Other Day  [Held by provider] insulin glargine, 10 Units, BID  metoprolol succinate, 75 mg, Daily  sodium chloride flush, 5-40 mL, 2 times 
Occupational Therapy  Attempted OT treatment this afternoon. Patient feeling nauseous upon therapist attempt and declined participation in treatment. OT will follow up later this week as appropriate.       Sirisha Mccarthy, OTR/L #[id number]    
Patient c/o severe nausea and says she \"feels like she's going to pass out\". /55, HR 81. Pt very drowsy, nodding off during conversation, during these episodes, O2 sats dropping in the 70s, 80s on room air. Pt awakens easily to voice. PRN IV Zofran given, see MAR. Dr Gomez at bed side to assess patient. New orders placed.     VSS. RT called to give patient breathing tx.     Patient denies further needs at this time. Call light within reach. Bed alarm on.   
Patient is able to demonstrate the ability to move from a reclining position to an upright position within the recliner.   
Patient is able to demonstrate the ability to move from a reclining position to an upright position within the recliner.   
Prescriptions meds to bed, patient spouse has taken to vehicle , and discharge instructions given. Pt verbalized understanding/ denies questions/ needs at this time. Life vest in place. Transport called to transport pt to vehicle for discharge home.   
Pt L AC PIV leaking, removed angiocath intact min bleeding noted to site. Pt denies pain. Furosemide started. VSS. Pt requested Tylenol for knee pain . Resting in bed wanted TV on. NO further needs or concerns at present.   
Pt a/o. Am assessment completed see flow sheet. Pt denies any pain/ needs at this time. Call light within reach.   Vitals:    01/31/24 0830   BP: 118/67   Pulse: 79   Resp: 18   Temp: 98.2 °F (36.8 °C)   SpO2: 97%    Pulse oximetry assessment   82% at rest on room air (if 88% or less, skip next steps    
Pt had a run of ST for 12 beats, MD notified gave NO for add on Troponin level with this am labs.  
Pt in bed remains awake watching the news. PIV inserted to right FA good blood return noted. VSS Wt obtained Lasix infusing per order. No distress or c/o pain.   
Pt in bed resting denies pain c/o insomnia text out to MD  
Pt in bed stated earlier was having nausea prn Zofran given with effective results, ,denies any pain. Oxygen @ 2 LPM placed on pt for decreased SpO2. Pt eyes closed chest rise and fall with easy respirations.  
Pt pulled out PIV  to L FA reinserted PIV to R hand ,bladder scanned pt d/t 700 ml output allnight retining  999ml MD notified gave NO for F/C.  
Pt resting with eyes closed switched out Sp02 lead earlier has been in place ever since . Did get up to void with PCA, VSS .  
Pt was administered medications had pericare and sheets changed voided in BSC, denies any pain or discomfort. In bed watching tv at present.  
RT Inhaler-Nebulizer Bronchodilator Protocol Note    There is a bronchodilator order in the chart from a provider indicating to follow the RT Bronchodilator Protocol and there is an “Initiate RT Inhaler-Nebulizer Bronchodilator Protocol” order as well (see protocol at bottom of note).    CXR Findings:  No results found.    The findings from the last RT Protocol Assessment were as follows:   History Pulmonary Disease: Smoker 15 pack years or more  Respiratory Pattern: Regular pattern and RR 12-20 bpm  Breath Sounds: Slightly diminished and/or crackles  Cough: Strong, spontaneous, non-productive  Indication for Bronchodilator Therapy: None  Bronchodilator Assessment Score: 3    Aerosolized bronchodilator medication orders have been revised according to the RT Inhaler-Nebulizer Bronchodilator Protocol below.    Respiratory Therapist to perform RT Therapy Protocol Assessment initially then follow the protocol.  Repeat RT Therapy Protocol Assessment PRN for score 0-3 or on second treatment, BID, and PRN for scores above 3.    No Indications - adjust the frequency to every 6 hours PRN wheezing or bronchospasm, if no treatments needed after 48 hours then discontinue using Per Protocol order mode.     If indication present, adjust the RT bronchodilator orders based on the Bronchodilator Assessment Score as indicated below.  Use Inhaler orders unless patient has one or more of the following: on home nebulizer, not able to hold breath for 10 seconds, is not alert and oriented, cannot activate and use MDI correctly, or respiratory rate 25 breaths per minute or more, then use the equivalent nebulizer order(s) with same Frequency and PRN reasons based on the score.  If a patient is on this medication at home then do not decrease Frequency below that used at home.    0-3 - enter or revise RT bronchodilator order(s) to equivalent RT Bronchodilator order with Frequency of every 4 hours PRN for wheezing or increased work of 
Received report from night shift POORNIMA Martins. Pt had 2 episodes last night between 5681-6888 of SVT, first episode with 23 beats, HR in the 140s - this happened during report at shift change, around 1945. Writer contacted Roberta Woodward NP, nocturnist on call - was notified urgently via perfect serve and hand off report was given to POORNIMA Martins. Per Lakshmi, pt had another episode of 27 beats of SVT, HR in the 170s around 1641-3157. Labs were ordered overnight but no electrolyte replacements were ordered. Writer sent urgent perfect serve to Dr Gomez at 0721. New orders placed. See MAR.   
Recheck BS now 82.  EOS given to POORNIMA Hanley.  Pt care transferred at this time.    
Report given to POORNIMA Rodriguez, on [address] as Patient is being transferred.  Patient is stable at this time. Charge to monitor Patient until Patients room clean. Call light within reach.     
See PM shift assess and vitals.  Talkative; a/o x 4; pleasant.  States she is looking forward to going home to be with her spouse.  States she plans to exercise and watch her diet.  States that she does not eat salt.  Discussed a couple of food items that are good to manage CHF.  Watching TV; denies pain/sob; call light in reach.    
Shift assessment complete. VSS. Patient drowsy, but awakens to voice. Pt states she usually is up late at night and sleeps in late in the morning. Oriented to person and time, disoriented to place and situation, easily reoriented. Pt denies pain or N/V/D. Scheduled meds given. See MAR. Pt denies further needs. Breakfast tray set up. Call light within reach. Bed alarm on.   
Shift assessment complete. VSS. Pt A/OX3, stated \"rehab facility\" when asked where patient was this morning. Reoriented patient to surroundings. Pt denies pain or N/V/D. Scheduled meds given. See MAR. Pt up to commode this AM and now sitting in recliner chair. Denies further needs at this time. Call light within reach. Chair alarm on.   
Shift assessment complete. VSS. Pt A/OX4, intermit. Confusion / forgetfulness per previous RNs report. Pt denies pain at this time. Scheduled meds given. See MAR. Pt denies further needs. Call light within reach. Bed alarm on.   
Shift report given to POORNIMA Kiser  
Shift report given to POORNIMA Kiser  
Shift report received from POORNIMA Kiser.    
Spoke with Casandra with [employer] and patient is approved.  They will be out to put lifevest on prior to discharge. Zahra NOGUERA aware.   
Tranferred from PCU to [facility] room #214 bed 1.  Report received from POORNIMA Rajan.  Alert and oriented x 4; pleasant.  PM meds given; IV to R hand leaking and removed.  Lasix drip continues at 4 ml/hr; purwick initiated; patient on room air.  Tele showing SR; HR 76 at this time.  Denies needs/concerns, call light in reach.    
[facility]    Daily Progress Note for 2024 8:51 AM 0214/0214-01  Amber Fuentes : 1945 Age: 78 y.o. Sex: female  Length of Stay:  7    Interval History:      CC: F/U Congestive Heart Failure    Low EF of 20 % started on lasix gtt with neg of 13 L since admission    Several episodes of v tach overnight , asymptomatic  Remains on RA    Subjective: -    Ms Fuentes seen waking up from sleep slightly confused  No new issues    Objective:     Vitals:    24 2154 24 0323 24 0615 24 0806   BP: (!) 141/77 122/62 131/64 129/70   Pulse: 86 84 63 87   Resp: 18 18  18   Temp: 98.9 °F (37.2 °C) 98.1 °F (36.7 °C)  98.3 °F (36.8 °C)   TempSrc: Oral Oral  Oral   SpO2: 92% 91%  95%   Weight:  60.5 kg (133 lb 6.1 oz)     Height:  1.524 m (5')            Intake/Output Summary (Last 24 hours) at 2024 0851  Last data filed at 2024 0506  Gross per 24 hour   Intake 360 ml   Output 2300 ml   Net -1940 ml       Body mass index is 26.05 kg/m².    Physical Exam:        General:  elderly female Awake, alert and fairlyoriented. Appears to be not in any distress  Mucous Membranes:  Pink , anicteric  Neck: no JVD, no carotid bruit, no thyromegaly  Chest:  Clear to auscultation bilaterally minimal basilar crackles  Cardiovascular:  RRR S1S2 heard, no murmurs or gallops  Abdomen:  Soft, undistended, non tender, no organomegaly, BS present  Extremities- resolved 3 + peripheral edema now trace . Distal pulses well felt  Neurological : grossly normal with intermittent confusion     Scheduled Medications:  magnesium sulfate, 1,000 mg, Once  digoxin, 125 mcg, Every Other Day  isosorbide dinitrate, 20 mg, TID  insulin glargine, 10 Units, Nightly  sacubitril-valsartan, 0.5 tablet, BID  torsemide, 40 mg, Daily  [Held by provider] hydrALAZINE, 50 mg, 3 times per day  allopurinol, 200 mg, Daily  polyethylene glycol, 17 g, Daily  melatonin, 5 mg, Nightly  atorvastatin, 40 mg, 
chloride flush, 5-40 mL, 2 times per day  enoxaparin, 30 mg, Daily  insulin lispro, 0-4 Units, TID WC  insulin lispro, 0-4 Units, Nightly        PRN Medications:  HYDROcodone 5 mg - acetaminophen, 1 tablet, Q8H PRN  hydrOXYzine HCl, 10 mg, TID PRN  sodium chloride flush, 5-40 mL, PRN  sodium chloride, , PRN  ondansetron, 4 mg, Q8H PRN   Or  ondansetron, 4 mg, Q6H PRN  acetaminophen, 650 mg, Q6H PRN   Or  acetaminophen, 650 mg, Q6H PRN  perflutren lipid microspheres, 1.5 mL, ONCE PRN  glucose, 4 tablet, PRN  dextrose bolus, 125 mL, PRN   Or  dextrose bolus, 250 mL, PRN  glucagon (rDNA), 1 mg, PRN  dextrose, , Continuous PRN        Data Review:      Laboratory Data Reviewed:    CBC:  Lab Results   Component Value Date    WBC 7.1 01/25/2024    HGB 10.5 (L) 01/25/2024    HCT 33.5 (L) 01/25/2024    MCV 88.2 01/25/2024     01/25/2024         Basic Metabolic Panel  Lab Results   Component Value Date/Time     01/28/2024 06:25 AM    CL 94 01/28/2024 06:25 AM    CO2 32 01/28/2024 06:25 AM    GLUCOSE 154 01/28/2024 06:25 AM    GLUCOSE 128 02/27/2012 11:08 AM    BUN 51 01/28/2024 06:25 AM    CREATININE 2.0 01/28/2024 06:25 AM       HEPATIC PANEL   Lab Results   Component Value Date/Time    AST 18 01/22/2024 12:00 AM    ALT 14 01/22/2024 12:00 AM       Lab Results   Component Value Date    TROPONINI 0.02 (H) 06/03/2022       Lab Results   Component Value Date/Time    INR 1.04 07/23/2020 07:32 AM    INR 0.99 07/06/2020 07:05 AM    INR 1.46 01/08/2020 02:00 PM       Test Review:        Radiology reviewed:     XR CHEST PORTABLE   Final Result   1. Right basilar patchy airspace opacity, concerning for pneumonia.   2. Cardiomegaly with pulmonary vascular congestion.   3. Questionable small right pleural effusion.             ECHO      This is a limited study for LVEF.   Left ventricular systolic function is reduced with ejection fraction   estimated at 25% +/- 5%. Global longitudinal strain, -7.2%.   Inferior wall 
      ASSESSMENT/PLAN:     #CAP, 2/2 gram positive organism  -CXR shows right basilar patchy airspace opacity   -covid, flu, RSV negative  -respiratory and blood cultures pending  -IV rocephin and zithromax D#4     #Acute on chronic systolic CHF  #Ischemic cardiomyopathy   -pro-BNP >40k  -CXR shows cardiomegaly with pulmonary vascular congestion  -limited echo 6/2022 with EF <20%  -referred to EP in past for ICD consideration but has not yet followed up   -not on MRA or ARNi currently, patient has declined entresto previously but this is under reassessment  -monitor daily weights, I&Os  - cardiology consulted    LANCE on CKD3  - possible cardiorenal  - nephrology consulted  - good placed for urinary retention     #Elevated troponin  #CAD s/p CABG   -troponin 60-->49  -EKG with non-specific T wave changes  -patient denies chest pain   -continue ASA, statin, BB  -cardiology consulted as above     #HTN  -elevated on admission  -continue ACEI and BB      #Hyperkalemia  -specimen hemolyzed x2  -repeat and treat accordingly     #T2DM  -hold oral regimen  -lantus and SSI  -monitor BG     #Gout   -continue allopurinol       #Depression   -continue home regimen     Management discussed with RN,     Electronically signed by: Debora Da Silva DO, 1/25/2024 8:51 AM          
chloride flush 0.9 % injection 5-40 mL  5-40 mL IntraVENous PRN Cooper Swanson MD        0.9 % sodium chloride infusion   IntraVENous PRN Cooper Swanson MD        enoxaparin Sodium (LOVENOX) injection 30 mg  30 mg SubCUTAneous Daily Cooper Swanson MD   30 mg at 01/26/24 0914    ondansetron (ZOFRAN-ODT) disintegrating tablet 4 mg  4 mg Oral Q8H PRN Cooper Swanson MD        Or    ondansetron (ZOFRAN) injection 4 mg  4 mg IntraVENous Q6H PRN Cooper Swanson MD   4 mg at 01/22/24 2008    acetaminophen (TYLENOL) tablet 650 mg  650 mg Oral Q6H PRN Cooper Swanson MD   650 mg at 01/25/24 1140    Or    acetaminophen (TYLENOL) suppository 650 mg  650 mg Rectal Q6H PRN Cooper Swanson MD        insulin lispro (HUMALOG) injection vial 0-4 Units  0-4 Units SubCUTAneous TID WC Cooper Swanson MD        insulin lispro (HUMALOG) injection vial 0-4 Units  0-4 Units SubCUTAneous Nightly Cooper Swanson MD        perflutren lipid microspheres (DEFINITY) injection 1.5 mL  1.5 mL IntraVENous ONCE PRN Tanvir Forman APRN - CNP        glucose chewable tablet 16 g  4 tablet Oral PRN Cooper Swanson MD        dextrose bolus 10% 125 mL  125 mL IntraVENous PRN Cooper Swanson MD        Or    dextrose bolus 10% 250 mL  250 mL IntraVENous PRN Cooper Swanson MD        glucagon injection 1 mg  1 mg SubCUTAneous PRN Cooper Swanson MD        dextrose 10 % infusion   IntraVENous Continuous PRN Cooper Swanson MD         Review of Systems   Constitutional:  Positive for fatigue.   Respiratory:  Positive for shortness of breath.    Cardiovascular:  Positive for leg swelling. Negative for chest pain and palpitations.   Gastrointestinal: Negative.    Neurological: Negative.      Objective:     Telemetry monitor: SR     Physical Exam:  Constitutional:  Comfortable and alert, NAD, appears stated age  Eyes: PERRL, sclera nonicteric  Neck:  Supple, no masses, no thyroidmegaly, 
wall appears akinetic; otherwise, severe global hypokinesis.   Left ventricular size is mildly increased.   There is mild concentric left ventricular hypertrophy.   Right ventricle is mildly dilated with systolic function is mildly reduced .   5-1-2023 less than 20% EF severe hypokinesis with inferior akinesis. Septal   Hypertrophy, MV annuloplasty ring with mod MS and mild MR, mod to sev TR w SPAP 66 mmHg.        ASSESSMENT/PLAN:        #Acute on chronic systolic CHF  #Ischemic cardiomyopathy   -pro-BNP >40k  -CXR shows cardiomegaly with pulmonary vascular congestion  -limited echo 1/24 with similar EF of 25 % as before  - cardiology consulted and started on lasix gtt  - net neg of 13 L and nephrology managing   - started on TOPROL, oral demadex  , hydralazine, nitro and eventually initiated on  low dose  Entresto - half dose  24/26 mg ,  - however her BP dropped , now holding demadex and hydralazine   - likely BP cannot tolerate jardiance /aldactone   -  should accept higher creatinine    - asymptomatic V tach spells noted. Dcd home digoxin, remains on BB  - life vest arranged at [place] and plans for EP eval again noted        LANCE on CKD3  - possible cardiorenal  - nephrology consulted   - good diuresis as above  -creatinine stabilized at 2.2 now   -  switched to oral demadex as above  and tolerating entresto now with low Bp readings      #Elevated troponin  #CAD s/p previous CABG   -troponin 60-->49  -EKG with non-specific T wave changes   - last stress test with infarct only   -patient denies chest pain   -continue ASA, statin, BB     #HTN  -elevated on admission  -normalized see above meds- hydralazine, imdur, BB, entresto  - adjust meds to avoid hypotension       #CAP, 2/2 gram positive organism  -CXR shows right basilar patchy airspace opacity   -covid, flu, RSV negative  -respiratory and blood cultures remain neg  -IV rocephin and zithromax D#5 and now off   - suspect symptoms from CHF rather than pna,       
suggesting moderate mitral   stenosis   There is mild mitral regurgitation   Moderate pulmonic regurgitation.   Moderate to severe tricuspid regurgitation.   Systolic pulmonary artery pressure (SPAP) is elevated and estimated at 66   mmHg (right atrial pressure 15 mmHg) consistent with severe pulmonary   hypertension.     Coronary angiogram 7/29/2022:  Ischemic  Cardiomyopathy  High risk (CHIP) PCI  PROCEDURES PERFORMED    Left heart catheterization  Coronary angiogam  Coronary cath  Temporary transvenous pacer insertion removal  Insertion of short term external heart assist system into heart, intraoperative, percutaneous approach  Assistance with cardiac output using impeller pump, continuous  Rotational atherectomy of LAD  IVUS of LAD  PCI of LAD with single drug-eluting stent  PCI of circumflex with 3 drug-eluting stents  PROCEDURE DESCRIPTION   Risks/benefits/alternatives/outcomes were discussed with patient and/or family and informed consent was obtained.  Using ultrasound and fluoroscopic guidance, a 7 Kittitian sheath was inserted into both common femoral arteries and an 8 [place] sheath was inserted into the left common femoral vein.  In the left femoral artery the short 7 Kitt[place] sheath was exchanged for a 45 cm destination 7 Kitt[place] sheath.  Temporary transvenous pacer was inserted into the 8 [place] venous sheath and capture was demonstrated this was placed in backup mode for the procedure and then this was then removed at the end of the case.  Attention turned towards the right groin where a pre-close technique was used with 2 Perclose devices into the right common femoral artery and then the right femoral artery was exchanged for the Impella CP sheath.  A pigtail was advanced across the left ventricle and left heart catheterization was performed and then a 0.018 inch wire was advanced into the left ventricle and using this wire, the Impella device was inserted into the left ventricle and good cardiac outputs 
cardiomyopathy  -EF <20% on echo 6/2022; EF 25% by echo this admit  -Diuresing with high dose lasix gtt; good UOP last 48hrs now; await re-evaluation by nephrology service today  -GDMT with Toprol, Hydralazine/ISDN. ACEi held in anticipating of ARNi upon renal recovery; Jardiance has been held   -strict I/O, daily standing weights    -congestive heart failure education   -lifevest ordered in anticipation of DC; will be re-est with EP team for ICD discussion    Elevated troponin   - acute on chronic myocardial injury 2/2 congestive heart failure, chronic kidney disease, chronic underlying coronary artery disease     Complex coronary artery disease   -stress test showed prior infarction, no significant ischemia 6/2022  -s/p LULY LAD and LULY x3 LCx 4/2020; s/p CABG x3 and ZAHRAA clip 1/2020              - appears PCI  RCA also discussed but did not follow through 2020  -continue aspirin, statin     Acute kidney injury on chronic kidney disease suspected 2/2 cardiorenal syndrome    -b/l Scr lower limit 1.7.     Mitral regurgitation s/p MV repair 2020  PFO: s/p closure 2020  Hypertension  Hyperlipidemia  Diabetes mellitus       Follow up nephrology recs for diuretic in setting of acute kidney injury. I do not have the benefit of serial exams but with great UOP last 48 hrs, stable Scr, and reported sig edema previously that has seemingly improved, she appear to be doing much better. Continue GDMT.            Acute/severe symptoms. Monitor for toxicity with lasix gtt/acute kidney injury,  High risk for decompensation/life-threatening disease.  High MDM.      Patient Active Problem List   Diagnosis    Backache    Essential hypertension, benign    Osteoarthrosis involving lower leg    Gout    Osteoarthritis, hand    Osteopenia    Chest pain    Hyperlipidemia associated with type 2 diabetes mellitus (HCC)    Major depressive disorder, recurrent episode, moderate (HCC)    Primary insomnia    Type 2 diabetes mellitus with 
  Diagnosis    Backache    Essential hypertension, benign    Osteoarthrosis involving lower leg    Gout    Osteoarthritis, hand    Osteopenia    Chest pain    Hyperlipidemia associated with type 2 diabetes mellitus (HCC)    Major depressive disorder, recurrent episode, moderate (HCC)    Primary insomnia    Type 2 diabetes mellitus with microalbuminuria, without long-term current use of insulin (HCC)    Rotator cuff tendinitis    Other restrictive cardiomyopathy (HCC)    Nonrheumatic mitral valve regurgitation    Acute on chronic systolic congestive heart failure (HCC)    CAD, multiple vessel    Ischemic cardiomyopathy    S/P angioplasty with stent    Chronic kidney disease    Acute on chronic combined systolic (congestive) and diastolic (congestive) heart failure (HCC)    S/P CABG x 3    S/P mitral valve repair    Chronic systolic CHF (congestive heart failure) (HCC)    Pulmonary nodules/lesions, multiple    Elevated troponin    Pneumonia due to infectious organism    Community acquired pneumonia    Pneumonia due to virus           I will address the patient's cardiac risk factors and adjusted pharmacologic treatment as needed. In addition, I have reinforced the need for patient directed risk factor modification.  All questions and concerns were addressed to the patient/family. Alternatives to my treatment were discussed.     Thank you for allowing us to participate in the care of Amber Fuentes. Please call me with any questions [phone].    Cooper Griffith MD, [facility]  Cardiovascular Disease  [facility] [place]  [phone] [place] Office  [phone] [place] Office  1/28/2024 8:02 AM  
    Cardiac Enzymes:  CK/MbTroponin  Lab Results   Component Value Date/Time    TROPONINI 0.02 06/03/2022 06:41 PM     PT/ INR   Lab Results   Component Value Date/Time    INR 1.04 07/23/2020 07:32 AM    INR 0.99 07/06/2020 07:05 AM    INR 1.46 01/08/2020 02:00 PM    PROTIME 12.1 07/23/2020 07:32 AM    PROTIME 11.5 07/06/2020 07:05 AM    PROTIME 17.0 01/08/2020 02:00 PM     PTT No results found for: \"PTT\"   Lab Results   Component Value Date/Time    MG 1.80 06/05/2022 04:33 AM      Lab Results   Component Value Date/Time    TSH 3.72 01/27/2014 04:38 PM     All labs and imaging reviewed today    Assessment:  Elevated troponin: flat, not c/w ACS; likely due to severe cardiomyopathy/HFrEF with background of known CAD and renal insufficiency  Acute on chronic HFrEF: Decompensated  CAD: stress test showed prior infarction, no significant ischemia 6/2022; s/p LULY LAD and LULY x3 LCx 4/2020; s/p CABG x3 and ZAHRAA clip 1/2020              - appears PCI  RCA also discussed but did not follow through 2020  Ischemic cardiomyopathy: known history; EF <20% on echo 6/2022; EF 25-30% 11/2021; 35-40% 9/2020; 25% in 2019  Cardiorenal syndrome  Mitral regurgitation s/p MV repair 2020  PFO: s/p closure 2020  HTN: Uncontrolled  HLD  DM    Recommendation:   1.  Change IV Lasix 40 mg twice daily to lasix gtt at 5mg/h. Suboptimal UO response to IV lasix.   2.  Cr increased to 2 today; will ask nephrology assist with management with suspected cardiorenal syndrome 2/2 renal venous congestion   3.  Hold ACE in anticipation of starting Entresto if potassium remains stable  4.  Jardiance, Toprol. Hold digoxin given worsening renal function. Long term we need re-consider.   5.  Unable to resume or add ACEi/ARB/ARNi/MRA given renal function. Start 10mg PO HYD and 5mg PO isordil.   6.  Limited echo for EF: LVEF 25% +/5%.   7.  Again discussed EP evaluation for ICD in the future. Hasn't declined this to me.   8.  Emphasized compliance with 
Long term we need re-consider.   5.  Unable to resume or add ACEi/ARB/ARNi/MRA given renal function. Start 10mg PO HYD and increase PO isordil to 10mg TID.   6.  Limited echo for EF: LVEF 25% +/5%.   7.  Will need lifevest at discharge; pt is now wanting to consider ICD, she will need EP follow up at discharge.   8.  Emphasized compliance with cardiology follow-up  9.  Daily standing weights, strict I's and O's      Edenilson Kelly DO  [facility] [place]  [phone]

## 2024-01-31 NOTE — FLOWSHEET NOTE
01/30/24 2021   Vital Signs   Temp 98.3 °F (36.8 °C)   Temp Source Oral   Pulse 74   Heart Rate Source Monitor   Respirations 18   /61   MAP (Calculated) 79   BP Location Left upper arm   BP Method Automatic   Patient Position Semi fowlers   Opioid-Induced Sedation   POSS Score 1   RASS   [place] Agitation Sedation Scale (RASS) 0   Oxygen Therapy   SpO2 95 %   Pulse Oximetry Type Intermittent   Pulse Oximeter Device Mode Intermittent   Pulse Oximeter Device Location Left;Finger   O2 Device Nasal cannula   O2 Flow Rate (L/min) 2 L/min     Patient A+Ox4, vitals and assessments done at this time. Bed in lowest position, call light within reach.

## 2024-01-31 NOTE — DISCHARGE SUMMARY
COLLECTED:  01/22/24 02:00  ANTIBIOTICS AT [facility].:                      RECEIVED :  01/22/24 09:55  If child <=2 yrs old please draw pediatric bottle.~Blood Culture 1    COVID-19, Rapid [[id number]] Collected: 01/22/24 0008    Order Status: Completed Specimen: Nasopharyngeal Swab Updated: 01/22/24 0027     SARS-CoV-2, NAAT Not Detected     Comment: Rapid NAAT:   Negative results should be treated as presumptive and,  if inconsistent with clinical signs and symptoms or necessary for  patient management, should be tested with an alternative molecular  assay. Negative results do not preclude SARS-CoV-2 infection and  should not be used as the sole basis for patient management decisions.  This test has been authorized by the FDA under an Emergency Use  Authorization (EUA) for use by authorized laboratories.    Fact sheet for Healthcare Providers:  https://www.fda.gov/media/870886/download  Fact sheet for Patients: https://www.fda.gov/media/145403/download    METHODOLOGY: Isothermal Nucleic Acid Amplification         Rapid influenza A/B antigens [[id number]] Collected: 01/22/24 0008    Order Status: Completed Specimen: Nasopharyngeal Updated: 01/22/24 0027     Rapid Influenza A Ag Negative     Rapid Influenza B Ag Negative    Rapid RSV Antigen [[id number]] Collected: 01/22/24 0008    Order Status: Completed Specimen: Nasopharyngeal Swab Updated: 01/22/24 0825     RSV Rapid Ag Negative              RADIOLOGY  XR CHEST PORTABLE   Final Result   1. Right basilar patchy airspace opacity, concerning for pneumonia.   2. Cardiomegaly with pulmonary vascular congestion.   3. Questionable small right pleural effusion.           ECHO      This is a limited study for LVEF.   Left ventricular systolic function is reduced with ejection fraction   estimated at 25% +/- 5%. Global longitudinal strain, -7.2%.   Inferior wall appears akinetic; otherwise, severe global hypokinesis.   Left ventricular size is mildly increased.

## 2024-01-31 NOTE — CARE COORDINATION
DISCHARGE ORDER  Date/Time 2024 5:32 PM  Completed by: Michelle Lang RN, Case Management    Patient Name: Amber Fuentes      : 1945  Admitting Diagnosis: Elevated troponin [R79.89]  Acute on chronic systolic congestive heart failure (HCC) [I50.23]  Pneumonia due to infectious organism, unspecified laterality, unspecified part of lung [J18.9]  Chronic kidney disease, unspecified CKD stage [N18.9]  Community acquired pneumonia, unspecified laterality [J18.9]  Pneumonia due to virus [J12.9]      Admit order Date and Status:24 inpt  (verify MD's last order for status of admission)      Noted discharge order.   If applicable PT/OT recommendation at Discharge:  Home with 24hr assistance and Home PT; if reliable 24/ assist is not available, may be more appropriate for SNF.   DME recommendation by PT/OT:RW-Pt states has at home  Confirmed discharge plan  : Yes  with whom patient  If pt confirmed DC plan does family need to be contacted by CM Yes if yes who spouse  Discharge Plan: Order for dc noted. Spoke with pt who now plans for home with spouse. Duiscussed HHC and pt is agreeable. Referral called to [facility] who can accept and will make initial visit on . States they will obtain pre cert thru [employer] and they are in network. Pt aware that HHC will come for 1st visit but then will come once pre cert completed.Also discussed need for home O2 and pt is agreeable and chooses [facility]. Spoke with [facility] who will arrange for home O2. Noted pt does have lift vest at bedside.    Received call from [facility] pt can dc.     Pt given E-tank and POC.    Date of Last IMM Given: 24    Reviewed chart.  Role of discharge planner explained and patient verbalized understanding. Discharge order is noted.    Has Home O2 in place on admit:  No  Informed of need to bring portable home O2 tank on day of discharge for nursing to connect prior to leaving:   Not Indicated  Verbalized agreement/Understanding:   Not

## 2024-02-01 ENCOUNTER — TELEPHONE (OUTPATIENT)
Dept: INTERNAL MEDICINE CLINIC | Age: 79
End: 2024-02-01

## 2024-02-01 ENCOUNTER — FOLLOWUP TELEPHONE ENCOUNTER (OUTPATIENT)
Dept: ADMINISTRATIVE | Age: 79
End: 2024-02-01

## 2024-02-01 NOTE — TELEPHONE ENCOUNTER
----- Message from Brian Gomez MD sent at 2/1/2024 12:51 PM EST -----  Contact: Jose PALMA [phone]  I will talk to hospital    ----- Message -----  From: Geri Huffman  Sent: 2/1/2024  11:18 AM EST  To: Brian Gomez MD    [facility] on behalf of [facility] states the rendering oxygen site is out of network, but [employer] and [employer] which are in network.

## 2024-02-01 NOTE — TELEPHONE ENCOUNTER
Heart Failure Follow-Up Call:    Call within 72 Hours of Discharge: Yes     Patient: Amber Fuentes   Patient : 1945   MRN: [id number]    Date of discharge: 24    [facility]    Discharge Disposition: Home with [facility]    RARS: Readmission Risk Score: 18.9    Spoke with: Amber and spouse    Amber is doing well since yesterday. Stated no problems from life vest on. Answered do's and don'ts with the vest. Patient did not weigh self because scale is not working. Told them I will provide them a scale at there follow up appointment next week. They were not aware it did not work.  Reinforced Heart Failure education on: signs/symptoms to monitor, medications, daily weights, low sodium diet, 2000 ml fluid restriction, and activity. Confirmed appointments on  with PCP and  with Cardiology. Provided Heart Failure Nurse number at [phone] for any further questions or assistance with resources.     Follow Up  Future Appointments   Date Time Provider Department Center   2024  3:00 PM Isidro Tinajero MD [facility]   2024  9:45 AM Tanvir Forman, APRN - CNP [facility]   2024  1:15 PM Omar Shaikh MD [facility]   2024  3:00 PM Lucila Tejeda MD [facility] None       Electronically signed by KEMAL Smith, RN  on 2024 at 1:38 PM

## 2024-02-06 ENCOUNTER — TELEPHONE (OUTPATIENT)
Dept: INTERNAL MEDICINE CLINIC | Age: 79
End: 2024-02-06

## 2024-02-06 NOTE — TELEPHONE ENCOUNTER
----- Message from Lucila Tejeda MD sent at 2/6/2024 11:24 AM EST -----  Contact: [phone] Tara Arguello can sign it   ----- Message -----  From: Sarah Armstrong  Sent: 2/6/2024   8:15 AM EST  To: Lucila Tejeda MD    Patient started seeing us again in October 2023 and saw Saundra.  She has a Hospital follow up with Dr RENAE on Wednesday and a future appointment with you in April.   ----- Message -----  From: Lucila Tejeda MD  Sent: 2/6/2024   5:57 AM EST  To: Leighann Munoz    I am confused with this patient. She has seen Jonah Trotter for the last two years and I have not seen her in two years  ----- Message -----  From: Estella Escalera MA  Sent: 2/1/2024  11:00 AM EST  To: MD Tara Nettles with [facility] called and wants to know if you are willing to sign home care orders for the patient? Please advise.       [facility]  - [place] - [address] - P [phone] - F [phone]  [address], [place] [address]  Phone: [phone]  Fax: [phone]

## 2024-02-07 ENCOUNTER — HOSPITAL ENCOUNTER (INPATIENT)
Age: 79
LOS: 4 days | Discharge: HOME | DRG: 291 | End: 2024-02-11
Attending: EMERGENCY MEDICINE | Admitting: INTERNAL MEDICINE
Payer: MEDICARE

## 2024-02-07 ENCOUNTER — APPOINTMENT (OUTPATIENT)
Dept: GENERAL RADIOLOGY | Age: 79
DRG: 291 | End: 2024-02-07
Payer: MEDICARE

## 2024-02-07 DIAGNOSIS — U07.1 COVID-19: ICD-10-CM

## 2024-02-07 DIAGNOSIS — I50.9 CONGESTIVE HEART FAILURE, UNSPECIFIED HF CHRONICITY, UNSPECIFIED HEART FAILURE TYPE (HCC): ICD-10-CM

## 2024-02-07 DIAGNOSIS — R79.89 ELEVATED TROPONIN: Primary | ICD-10-CM

## 2024-02-07 LAB
ALBUMIN SERPL-MCNC: 3.6 G/DL (ref 3.4–5)
ALBUMIN/GLOB SERPL: 1.1 {RATIO} (ref 1.1–2.2)
ALP SERPL-CCNC: 223 U/L (ref 40–129)
ALT SERPL-CCNC: 20 U/L (ref 10–40)
ANION GAP SERPL CALCULATED.3IONS-SCNC: 10 MMOL/L (ref 3–16)
AST SERPL-CCNC: 29 U/L (ref 15–37)
BACTERIA URNS QL MICRO: ABNORMAL /HPF
BASOPHILS # BLD: 0 K/UL (ref 0–0.2)
BASOPHILS NFR BLD: 0.6 %
BILIRUB SERPL-MCNC: 1.1 MG/DL (ref 0–1)
BILIRUB UR QL STRIP.AUTO: NEGATIVE
BUN SERPL-MCNC: 61 MG/DL (ref 7–20)
CALCIUM SERPL-MCNC: 8.8 MG/DL (ref 8.3–10.6)
CHLORIDE SERPL-SCNC: 99 MMOL/L (ref 99–110)
CLARITY UR: CLEAR
CO2 SERPL-SCNC: 31 MMOL/L (ref 21–32)
COLOR UR: YELLOW
CREAT SERPL-MCNC: 1.8 MG/DL (ref 0.6–1.2)
DEPRECATED RDW RBC AUTO: 19 % (ref 12.4–15.4)
EKG ATRIAL RATE: 96 BPM
EKG DIAGNOSIS: NORMAL
EKG Q-T INTERVAL: 344 MS
EKG QRS DURATION: 118 MS
EKG QTC CALCULATION (BAZETT): 432 MS
EKG R AXIS: 105 DEGREES
EKG T AXIS: -67 DEGREES
EKG VENTRICULAR RATE: 95 BPM
EOSINOPHIL # BLD: 0.2 K/UL (ref 0–0.6)
EOSINOPHIL NFR BLD: 2.6 %
EPI CELLS #/AREA URNS HPF: ABNORMAL /HPF (ref 0–5)
FLUAV RNA RESP QL NAA+PROBE: NOT DETECTED
FLUBV RNA RESP QL NAA+PROBE: NOT DETECTED
GFR SERPLBLD CREATININE-BSD FMLA CKD-EPI: 28 ML/MIN/{1.73_M2}
GLUCOSE BLD-MCNC: 144 MG/DL (ref 70–99)
GLUCOSE SERPL-MCNC: 139 MG/DL (ref 70–99)
GLUCOSE UR STRIP.AUTO-MCNC: 100 MG/DL
HCT VFR BLD AUTO: 27.4 % (ref 36–48)
HGB BLD-MCNC: 8.8 G/DL (ref 12–16)
HGB UR QL STRIP.AUTO: NEGATIVE
KETONES UR STRIP.AUTO-MCNC: NEGATIVE MG/DL
LACTATE BLDV-SCNC: 1.5 MMOL/L (ref 0.4–2)
LEUKOCYTE ESTERASE UR QL STRIP.AUTO: ABNORMAL
LYMPHOCYTES # BLD: 0.5 K/UL (ref 1–5.1)
LYMPHOCYTES NFR BLD: 7.9 %
MCH RBC QN AUTO: 28.1 PG (ref 26–34)
MCHC RBC AUTO-ENTMCNC: 32.1 G/DL (ref 31–36)
MCV RBC AUTO: 87.6 FL (ref 80–100)
MONOCYTES # BLD: 0.4 K/UL (ref 0–1.3)
MONOCYTES NFR BLD: 5.4 %
NEUTROPHILS # BLD: 5.6 K/UL (ref 1.7–7.7)
NEUTROPHILS NFR BLD: 83.5 %
NITRITE UR QL STRIP.AUTO: NEGATIVE
NT-PROBNP SERPL-MCNC: ABNORMAL PG/ML (ref 0–449)
PERFORMED ON: ABNORMAL
PH UR STRIP.AUTO: 7 [PH] (ref 5–8)
PLATELET # BLD AUTO: 192 K/UL (ref 135–450)
PMV BLD AUTO: 9.4 FL (ref 5–10.5)
POTASSIUM SERPL-SCNC: 4.8 MMOL/L (ref 3.5–5.1)
PROT SERPL-MCNC: 6.9 G/DL (ref 6.4–8.2)
PROT UR STRIP.AUTO-MCNC: 100 MG/DL
RBC # BLD AUTO: 3.12 M/UL (ref 4–5.2)
RBC #/AREA URNS HPF: ABNORMAL /HPF (ref 0–4)
RSV AG NOSE QL: NEGATIVE
SARS-COV-2 RNA RESP QL NAA+PROBE: DETECTED
SODIUM SERPL-SCNC: 140 MMOL/L (ref 136–145)
SP GR UR STRIP.AUTO: 1.02 (ref 1–1.03)
TROPONIN, HIGH SENSITIVITY: 131 NG/L (ref 0–14)
TROPONIN, HIGH SENSITIVITY: 138 NG/L (ref 0–14)
TROPONIN, HIGH SENSITIVITY: 181 NG/L (ref 0–14)
UA COMPLETE W REFLEX CULTURE PNL UR: ABNORMAL
UA DIPSTICK W REFLEX MICRO PNL UR: YES
URN SPEC COLLECT METH UR: ABNORMAL
UROBILINOGEN UR STRIP-ACNC: 4 E.U./DL
WBC # BLD AUTO: 6.7 K/UL (ref 4–11)
WBC #/AREA URNS HPF: ABNORMAL /HPF (ref 0–5)

## 2024-02-07 PROCEDURE — 93010 ELECTROCARDIOGRAM REPORT: CPT | Performed by: INTERNAL MEDICINE

## 2024-02-07 PROCEDURE — 85025 COMPLETE CBC W/AUTO DIFF WBC: CPT

## 2024-02-07 PROCEDURE — 99285 EMERGENCY DEPT VISIT HI MDM: CPT

## 2024-02-07 PROCEDURE — 36415 COLL VENOUS BLD VENIPUNCTURE: CPT

## 2024-02-07 PROCEDURE — 80053 COMPREHEN METABOLIC PANEL: CPT

## 2024-02-07 PROCEDURE — 2060000000 HC ICU INTERMEDIATE R&B

## 2024-02-07 PROCEDURE — 6370000000 HC RX 637 (ALT 250 FOR IP): Performed by: EMERGENCY MEDICINE

## 2024-02-07 PROCEDURE — 87807 RSV ASSAY W/OPTIC: CPT

## 2024-02-07 PROCEDURE — 87040 BLOOD CULTURE FOR BACTERIA: CPT

## 2024-02-07 PROCEDURE — 2580000003 HC RX 258

## 2024-02-07 PROCEDURE — 96374 THER/PROPH/DIAG INJ IV PUSH: CPT

## 2024-02-07 PROCEDURE — 87636 SARSCOV2 & INF A&B AMP PRB: CPT

## 2024-02-07 PROCEDURE — 81001 URINALYSIS AUTO W/SCOPE: CPT

## 2024-02-07 PROCEDURE — 87086 URINE CULTURE/COLONY COUNT: CPT

## 2024-02-07 PROCEDURE — 93005 ELECTROCARDIOGRAM TRACING: CPT | Performed by: EMERGENCY MEDICINE

## 2024-02-07 PROCEDURE — 6370000000 HC RX 637 (ALT 250 FOR IP)

## 2024-02-07 PROCEDURE — 84484 ASSAY OF TROPONIN QUANT: CPT

## 2024-02-07 PROCEDURE — 6360000002 HC RX W HCPCS

## 2024-02-07 PROCEDURE — 85014 HEMATOCRIT: CPT

## 2024-02-07 PROCEDURE — 85018 HEMOGLOBIN: CPT

## 2024-02-07 PROCEDURE — 71045 X-RAY EXAM CHEST 1 VIEW: CPT

## 2024-02-07 PROCEDURE — 6360000002 HC RX W HCPCS: Performed by: EMERGENCY MEDICINE

## 2024-02-07 PROCEDURE — 99223 1ST HOSP IP/OBS HIGH 75: CPT | Performed by: INTERNAL MEDICINE

## 2024-02-07 PROCEDURE — 83605 ASSAY OF LACTIC ACID: CPT

## 2024-02-07 PROCEDURE — 83880 ASSAY OF NATRIURETIC PEPTIDE: CPT

## 2024-02-07 PROCEDURE — 83036 HEMOGLOBIN GLYCOSYLATED A1C: CPT

## 2024-02-07 RX ORDER — ACETAMINOPHEN 650 MG/1
650 SUPPOSITORY RECTAL EVERY 6 HOURS PRN
Status: DISCONTINUED | OUTPATIENT
Start: 2024-02-07 | End: 2024-02-11 | Stop reason: HOSPADM

## 2024-02-07 RX ORDER — DEXTROSE MONOHYDRATE 100 MG/ML
INJECTION, SOLUTION INTRAVENOUS CONTINUOUS PRN
Status: DISCONTINUED | OUTPATIENT
Start: 2024-02-07 | End: 2024-02-11 | Stop reason: HOSPADM

## 2024-02-07 RX ORDER — ASPIRIN 81 MG/1
324 TABLET, CHEWABLE ORAL ONCE
Status: COMPLETED | OUTPATIENT
Start: 2024-02-07 | End: 2024-02-07

## 2024-02-07 RX ORDER — INSULIN GLARGINE 100 [IU]/ML
10 INJECTION, SOLUTION SUBCUTANEOUS NIGHTLY
Status: DISCONTINUED | OUTPATIENT
Start: 2024-02-07 | End: 2024-02-11 | Stop reason: HOSPADM

## 2024-02-07 RX ORDER — SODIUM CHLORIDE 0.9 % (FLUSH) 0.9 %
5-40 SYRINGE (ML) INJECTION EVERY 12 HOURS SCHEDULED
Status: DISCONTINUED | OUTPATIENT
Start: 2024-02-07 | End: 2024-02-11 | Stop reason: HOSPADM

## 2024-02-07 RX ORDER — INSULIN LISPRO 100 [IU]/ML
0-4 INJECTION, SOLUTION INTRAVENOUS; SUBCUTANEOUS NIGHTLY
Status: DISCONTINUED | OUTPATIENT
Start: 2024-02-07 | End: 2024-02-11 | Stop reason: HOSPADM

## 2024-02-07 RX ORDER — ONDANSETRON 4 MG/1
4 TABLET, ORALLY DISINTEGRATING ORAL EVERY 8 HOURS PRN
Status: DISCONTINUED | OUTPATIENT
Start: 2024-02-07 | End: 2024-02-11 | Stop reason: HOSPADM

## 2024-02-07 RX ORDER — ACETAMINOPHEN 325 MG/1
650 TABLET ORAL EVERY 6 HOURS PRN
Status: DISCONTINUED | OUTPATIENT
Start: 2024-02-07 | End: 2024-02-11 | Stop reason: HOSPADM

## 2024-02-07 RX ORDER — FUROSEMIDE 10 MG/ML
40 INJECTION INTRAMUSCULAR; INTRAVENOUS ONCE
Status: COMPLETED | OUTPATIENT
Start: 2024-02-07 | End: 2024-02-07

## 2024-02-07 RX ORDER — GLUCAGON 1 MG/ML
1 KIT INJECTION PRN
Status: DISCONTINUED | OUTPATIENT
Start: 2024-02-07 | End: 2024-02-11 | Stop reason: HOSPADM

## 2024-02-07 RX ORDER — ATORVASTATIN CALCIUM 40 MG/1
40 TABLET, FILM COATED ORAL DAILY
Status: DISCONTINUED | OUTPATIENT
Start: 2024-02-07 | End: 2024-02-08 | Stop reason: SDUPTHER

## 2024-02-07 RX ORDER — ASPIRIN 81 MG/1
81 TABLET ORAL DAILY
Status: DISCONTINUED | OUTPATIENT
Start: 2024-02-07 | End: 2024-02-11 | Stop reason: HOSPADM

## 2024-02-07 RX ORDER — ONDANSETRON 2 MG/ML
4 INJECTION INTRAMUSCULAR; INTRAVENOUS EVERY 6 HOURS PRN
Status: DISCONTINUED | OUTPATIENT
Start: 2024-02-07 | End: 2024-02-11 | Stop reason: HOSPADM

## 2024-02-07 RX ORDER — SODIUM CHLORIDE 9 MG/ML
INJECTION, SOLUTION INTRAVENOUS PRN
Status: DISCONTINUED | OUTPATIENT
Start: 2024-02-07 | End: 2024-02-11 | Stop reason: HOSPADM

## 2024-02-07 RX ORDER — INSULIN LISPRO 100 [IU]/ML
0-4 INJECTION, SOLUTION INTRAVENOUS; SUBCUTANEOUS
Status: DISCONTINUED | OUTPATIENT
Start: 2024-02-07 | End: 2024-02-11 | Stop reason: HOSPADM

## 2024-02-07 RX ORDER — HEPARIN SODIUM 5000 [USP'U]/ML
5000 INJECTION, SOLUTION INTRAVENOUS; SUBCUTANEOUS EVERY 8 HOURS SCHEDULED
Status: DISCONTINUED | OUTPATIENT
Start: 2024-02-07 | End: 2024-02-11 | Stop reason: HOSPADM

## 2024-02-07 RX ORDER — SODIUM CHLORIDE 0.9 % (FLUSH) 0.9 %
10 SYRINGE (ML) INJECTION PRN
Status: DISCONTINUED | OUTPATIENT
Start: 2024-02-07 | End: 2024-02-11 | Stop reason: HOSPADM

## 2024-02-07 RX ORDER — ISOSORBIDE DINITRATE 10 MG/1
10 TABLET ORAL 2 TIMES DAILY
Status: DISCONTINUED | OUTPATIENT
Start: 2024-02-07 | End: 2024-02-11 | Stop reason: HOSPADM

## 2024-02-07 RX ORDER — POLYETHYLENE GLYCOL 3350 17 G/17G
17 POWDER, FOR SOLUTION ORAL DAILY PRN
Status: DISCONTINUED | OUTPATIENT
Start: 2024-02-07 | End: 2024-02-11 | Stop reason: HOSPADM

## 2024-02-07 RX ADMIN — SODIUM CHLORIDE, PRESERVATIVE FREE 10 ML: 5 INJECTION INTRAVENOUS at 21:37

## 2024-02-07 RX ADMIN — HEPARIN SODIUM 5000 UNITS: 5000 INJECTION INTRAVENOUS; SUBCUTANEOUS at 21:36

## 2024-02-07 RX ADMIN — HEPARIN SODIUM 5000 UNITS: 5000 INJECTION INTRAVENOUS; SUBCUTANEOUS at 17:44

## 2024-02-07 RX ADMIN — FUROSEMIDE 40 MG: 10 INJECTION, SOLUTION INTRAMUSCULAR; INTRAVENOUS at 15:23

## 2024-02-07 RX ADMIN — ISOSORBIDE DINITRATE 10 MG: 10 TABLET ORAL at 21:36

## 2024-02-07 RX ADMIN — ASPIRIN 324 MG: 81 TABLET, CHEWABLE ORAL at 14:15

## 2024-02-07 NOTE — PLAN OF CARE
COVID +    Acute on chronic HF   Cardiology consulted     CKD    Nephrology consulted     Bed [facility]     PCU     LUKE Alvarez  02/07/24  4:46 PM

## 2024-02-07 NOTE — CONSULTS
[facility]   CONSULTATION  [phone]        Reason for Consultation/Chief Complaint: \"I have been having SOB.\"  Cardiology consulted for sob and CHF in setting of COVID per Stanford Suresh  Last seen by Tanvir Forman NP 6/9/22    History of Present Illness:    Amber Fuentes is a 78 y.o. patient who presented to [facility] 2/7/2024 with c/o increased SOB. She has PMH CAD s/p 3V CABG and MV ring repair, PFO closure 1/20, known  RCA, CAD s/p 1 LULY LAD/3 LULY CCx 4/20, chronic systolic CHF, ICM EF=25-30%, and HLD. Prior testing: Carotid Doppler 1/6/20 normal. Lexiscan 6/17/2020 large fixed defects c/w infarct; EF of 18%. [facility] 4/29/20 s/p PCI LAD with single LULY and 3 LULY CCx. [facility] 7/20 occluded grafts s/p Rotablator/LULY LAD and LULY x 3 LCx.  Echo 6/4/22 EF <20% (25% in 10/19); mitral valve ring well-seated with moderate MS, severe pulm HTN.  Ling nuc 6/4/22 showed prior infarction and no ischemia.   Recently admitted 1/21-1/31/2024 with acute CHF. Diuresed with IV lasix gtt.  Limited Echo 1/23/24 EF=25%+/- 5%; Inferior AK with severe HK remaining segments;  LV mildly increased; mid LVH; RV is mildly dilated with systolic function mildly reduced.     Ms. Fuentes returns to ER today with c/o increased SOB and dry cough x 2 days. Noted to have severe SOB with ambulation in ER.  Admission Testing:  CXR cardiac enlargement and vascular prominence. Admit EKG noted probable sinus tachycardia; Rightward axis; IVCD; NST & T wave abnormality, consider inferolateral ischemia; 96 bpm (no change 1/24).. Admitting LABS: , K 4.8, BUN/Cr 61/1.8 (64/2.8 on 1/31/24), Pro-BNP 18,505 (41,002 in 1/24), ALT 20, AST 29, H/H 8.8/27.4 (11.9/37.4 on 1/22/24) and Billy 131 and 138 (49-63 in 1/24). She is COVID positive.  Given IV antibiotics and Lasix 40mg IV x 1. Patient with no c/o CP, palpitations, dizziness, edema, or orthopnea/PND. I have been asked to provide consultation regarding further management and testing.      Past Medical

## 2024-02-07 NOTE — ED PROVIDER NOTES
[facility] ED  eMERGENCY dEPARTMENT eNCOUnter      Pt Name: Amber Fuentes  MRN: [id number]  Birthdate 1945  Date of evaluation: 2/7/2024  Provider: Stanford Suresh MD    CHIEF COMPLAINT       Chief Complaint   Patient presents with   • Shortness of Breath     Feeling SOB X 90 minutes.          HISTORY OF PRESENT ILLNESS   (Location/Symptom, Timing/Onset, Context/Setting, Quality, Duration, Modifying Factors, Severity)  Note limiting factors.     History obtained from: The patient and her     Amber Fuentes is a 78 y.o. female with hx of CHF with recent admission for CHF with prolonged diuresis who presents due to worsening shortness of breath and cough for approximately 90 minutes.  Patient has any chest pain.  Patient denies any leg swelling or hemoptysis.  Patient denies any fever.      HPI    Nursing Notes were reviewed.    REVIEW OFSYSTEMS    (2-9 systems for level 4, 10 or more for level 5)     Review of Systems   Constitutional:  Negative for appetite change and fever.   HENT:  Negative for trouble swallowing and voice change.    Eyes:  Negative for visual disturbance.   Respiratory:  Positive for cough and shortness of breath.    Cardiovascular:  Negative for chest pain and palpitations.   Gastrointestinal:  Negative for diarrhea, nausea and vomiting.   Genitourinary:  Negative for dysuria.   Musculoskeletal:  Negative for gait problem.   Neurological:  Negative for seizures and syncope.   Psychiatric/Behavioral:  Negative for self-injury and suicidal ideas.        Except as noted above the remainder of the review of systems was reviewed and negative.       PAST MEDICAL HISTORY     Past Medical History:   Diagnosis Date   • Anemia    • Backache, unspecified 3/11/08   • CAD, multiple vessel 1/6/2020   • Chronic kidney disease (CKD) stage G3b/A1, moderately decreased glomerular filtration rate (GFR) between 30-44 mL/min/1.73 square meter and albuminuria creatinine ratio less than 30

## 2024-02-07 NOTE — DISCHARGE INSTRUCTIONS
Hold Entresto for now with your covid infection. Please make an appointment this week with your primary care provider or cardiologist for repeat labs, BP check, and resumption of Entresto.       Heart Failure Resources:  Heart Failure Interactive Workbook:  Go to https://dakick.[employer] Books/publication/?d=580056 for a Free Heart Failure Interactive Workbook provided by The American Heart Association. This interactive workbook will provide information on Healthier Living with Heart Failure. Please copy and paste link into search bar. Use your mouse to scroll through the pages.    HF Ava osiel:   Heart Failure Free smart phone osiel available for iPhone and Android download. Use your phone to track sodium intake, fluid intake, symptoms, and weight.     Low Sodium Diet / Recipes:  Go to www.[employer].Brainient website for “renal” diet which is Low Sodium! [employer] is a dialysis company, but this website offers free seasonal cookbooks. Each quarter, they will release 25-30 new recipes with a breakdown of calories, sodium, and glucose. You can also go to wwwWavo.me/recipes website for free recipes.     Discharge Instruction Video:  Scan the QR code below with your camera and click the canva.com link to open the video and watch educational information on Heart Failure and Medications from one of our nurses.   https://www.[employer]/design/DAFZnsH_JRk/3PjjlmrSASNqrQCcvX9fgg/edit    Home Exercise Program:   Identification of Green/Yellow/Red zones:  You should be able to identify when you feel good (green zone), if you have 1-2 symptoms of HF (yellow zone), or if you are in need of medical attention (red zone).  In your CHF education folder you were provided a “stop light tool” to outline this information.     We want to you to rate your exertion levels:    Our therapy team has discussed means of identification with you such as the \"Julian scale.\"  The Julian rating scale ranges from 6 to 20, where 6 means \"no exertion at

## 2024-02-08 PROBLEM — Z95.810 USES LIFEVEST DEFIBRILLATOR: Status: ACTIVE | Noted: 2024-02-08

## 2024-02-08 PROBLEM — J18.9 COMMUNITY ACQUIRED PNEUMONIA: Status: RESOLVED | Noted: 2024-01-22 | Resolved: 2024-02-08

## 2024-02-08 PROBLEM — M75.80 ROTATOR CUFF TENDINITIS: Status: RESOLVED | Noted: 2017-10-06 | Resolved: 2024-02-08

## 2024-02-08 PROBLEM — R79.89 ELEVATED TROPONIN: Status: RESOLVED | Noted: 2024-01-22 | Resolved: 2024-02-08

## 2024-02-08 PROBLEM — J18.9 PNEUMONIA DUE TO INFECTIOUS ORGANISM: Status: RESOLVED | Noted: 2024-01-22 | Resolved: 2024-02-08

## 2024-02-08 PROBLEM — I47.29 NSVT (NONSUSTAINED VENTRICULAR TACHYCARDIA) (HCC): Status: ACTIVE | Noted: 2024-02-08

## 2024-02-08 PROBLEM — I50.9 ACUTE ON CHRONIC HEART FAILURE, UNSPECIFIED HEART FAILURE TYPE (HCC): Status: RESOLVED | Noted: 2024-02-07 | Resolved: 2024-02-08

## 2024-02-08 PROBLEM — J12.9 PNEUMONIA DUE TO VIRUS: Status: RESOLVED | Noted: 2024-01-22 | Resolved: 2024-02-08

## 2024-02-08 LAB
ANION GAP SERPL CALCULATED.3IONS-SCNC: 19 MMOL/L (ref 3–16)
BACTERIA UR CULT: NORMAL
BASOPHILS # BLD: 0 K/UL (ref 0–0.2)
BASOPHILS NFR BLD: 0.6 %
BUN SERPL-MCNC: 65 MG/DL (ref 7–20)
CALCIUM SERPL-MCNC: 9.6 MG/DL (ref 8.3–10.6)
CHLORIDE SERPL-SCNC: 100 MMOL/L (ref 99–110)
CO2 SERPL-SCNC: 23 MMOL/L (ref 21–32)
CREAT SERPL-MCNC: 2.2 MG/DL (ref 0.6–1.2)
DEPRECATED RDW RBC AUTO: 19.2 % (ref 12.4–15.4)
EOSINOPHIL # BLD: 0 K/UL (ref 0–0.6)
EOSINOPHIL NFR BLD: 0 %
EST. AVERAGE GLUCOSE BLD GHB EST-MCNC: 154.2 MG/DL
GFR SERPLBLD CREATININE-BSD FMLA CKD-EPI: 22 ML/MIN/{1.73_M2}
GLUCOSE BLD-MCNC: 156 MG/DL (ref 70–99)
GLUCOSE BLD-MCNC: 196 MG/DL (ref 70–99)
GLUCOSE BLD-MCNC: 198 MG/DL (ref 70–99)
GLUCOSE BLD-MCNC: 211 MG/DL (ref 70–99)
GLUCOSE BLD-MCNC: 219 MG/DL (ref 70–99)
GLUCOSE SERPL-MCNC: 173 MG/DL (ref 70–99)
HBA1C MFR BLD: 7 %
HCT VFR BLD AUTO: 27.1 % (ref 36–48)
HCT VFR BLD AUTO: 28.3 % (ref 36–48)
HCT VFR BLD AUTO: 29.8 % (ref 36–48)
HCT VFR BLD AUTO: 29.9 % (ref 36–48)
HGB BLD-MCNC: 8.8 G/DL (ref 12–16)
HGB BLD-MCNC: 9.1 G/DL (ref 12–16)
HGB BLD-MCNC: 9.5 G/DL (ref 12–16)
HGB BLD-MCNC: 9.6 G/DL (ref 12–16)
LYMPHOCYTES # BLD: 0.3 K/UL (ref 1–5.1)
LYMPHOCYTES NFR BLD: 4.5 %
MAGNESIUM SERPL-MCNC: 2.2 MG/DL (ref 1.8–2.4)
MCH RBC QN AUTO: 28.6 PG (ref 26–34)
MCHC RBC AUTO-ENTMCNC: 31.7 G/DL (ref 31–36)
MCV RBC AUTO: 90.3 FL (ref 80–100)
MONOCYTES # BLD: 0.4 K/UL (ref 0–1.3)
MONOCYTES NFR BLD: 4.6 %
NEUTROPHILS # BLD: 7 K/UL (ref 1.7–7.7)
NEUTROPHILS NFR BLD: 90.3 %
PERFORMED ON: ABNORMAL
PLATELET # BLD AUTO: 170 K/UL (ref 135–450)
PMV BLD AUTO: 9.1 FL (ref 5–10.5)
POTASSIUM SERPL-SCNC: 5.1 MMOL/L (ref 3.5–5.1)
RBC # BLD AUTO: 3.32 M/UL (ref 4–5.2)
SODIUM SERPL-SCNC: 142 MMOL/L (ref 136–145)
WBC # BLD AUTO: 7.7 K/UL (ref 4–11)

## 2024-02-08 PROCEDURE — 99233 SBSQ HOSP IP/OBS HIGH 50: CPT | Performed by: INTERNAL MEDICINE

## 2024-02-08 PROCEDURE — 85018 HEMOGLOBIN: CPT

## 2024-02-08 PROCEDURE — 80048 BASIC METABOLIC PNL TOTAL CA: CPT

## 2024-02-08 PROCEDURE — 85014 HEMATOCRIT: CPT

## 2024-02-08 PROCEDURE — 6370000000 HC RX 637 (ALT 250 FOR IP)

## 2024-02-08 PROCEDURE — 85025 COMPLETE CBC W/AUTO DIFF WBC: CPT

## 2024-02-08 PROCEDURE — 36415 COLL VENOUS BLD VENIPUNCTURE: CPT

## 2024-02-08 PROCEDURE — 83735 ASSAY OF MAGNESIUM: CPT

## 2024-02-08 PROCEDURE — 6370000000 HC RX 637 (ALT 250 FOR IP): Performed by: INTERNAL MEDICINE

## 2024-02-08 PROCEDURE — 2580000003 HC RX 258

## 2024-02-08 PROCEDURE — 6360000002 HC RX W HCPCS

## 2024-02-08 PROCEDURE — 2060000000 HC ICU INTERMEDIATE R&B

## 2024-02-08 RX ORDER — ALLOPURINOL 300 MG/1
150 TABLET ORAL DAILY
Status: DISCONTINUED | OUTPATIENT
Start: 2024-02-08 | End: 2024-02-11 | Stop reason: HOSPADM

## 2024-02-08 RX ORDER — ATORVASTATIN CALCIUM 40 MG/1
40 TABLET, FILM COATED ORAL DAILY
Status: DISCONTINUED | OUTPATIENT
Start: 2024-02-08 | End: 2024-02-11 | Stop reason: HOSPADM

## 2024-02-08 RX ORDER — FUROSEMIDE 20 MG/1
20 TABLET ORAL DAILY
Status: DISCONTINUED | OUTPATIENT
Start: 2024-02-08 | End: 2024-02-08

## 2024-02-08 RX ADMIN — HEPARIN SODIUM 5000 UNITS: 5000 INJECTION INTRAVENOUS; SUBCUTANEOUS at 05:49

## 2024-02-08 RX ADMIN — SACUBITRIL AND VALSARTAN 0.5 TABLET: 24; 26 TABLET, FILM COATED ORAL at 08:39

## 2024-02-08 RX ADMIN — INSULIN LISPRO 1 UNITS: 100 INJECTION, SOLUTION INTRAVENOUS; SUBCUTANEOUS at 12:20

## 2024-02-08 RX ADMIN — ATORVASTATIN CALCIUM 40 MG: 40 TABLET, FILM COATED ORAL at 08:40

## 2024-02-08 RX ADMIN — SODIUM CHLORIDE, PRESERVATIVE FREE 10 ML: 5 INJECTION INTRAVENOUS at 20:46

## 2024-02-08 RX ADMIN — SACUBITRIL AND VALSARTAN 0.5 TABLET: 24; 26 TABLET, FILM COATED ORAL at 20:45

## 2024-02-08 RX ADMIN — METOPROLOL SUCCINATE 75 MG: 50 TABLET, EXTENDED RELEASE ORAL at 08:40

## 2024-02-08 RX ADMIN — ISOSORBIDE DINITRATE 10 MG: 10 TABLET ORAL at 08:40

## 2024-02-08 RX ADMIN — ASPIRIN 81 MG: 81 TABLET, COATED ORAL at 08:39

## 2024-02-08 RX ADMIN — INSULIN GLARGINE 10 UNITS: 100 INJECTION, SOLUTION SUBCUTANEOUS at 20:46

## 2024-02-08 RX ADMIN — HEPARIN SODIUM 5000 UNITS: 5000 INJECTION INTRAVENOUS; SUBCUTANEOUS at 15:04

## 2024-02-08 RX ADMIN — ISOSORBIDE DINITRATE 10 MG: 10 TABLET ORAL at 20:45

## 2024-02-08 RX ADMIN — FUROSEMIDE 20 MG: 20 TABLET ORAL at 08:40

## 2024-02-08 RX ADMIN — HEPARIN SODIUM 5000 UNITS: 5000 INJECTION INTRAVENOUS; SUBCUTANEOUS at 21:00

## 2024-02-08 RX ADMIN — ALLOPURINOL 150 MG: 300 TABLET ORAL at 08:39

## 2024-02-08 RX ADMIN — SODIUM CHLORIDE, PRESERVATIVE FREE 10 ML: 5 INJECTION INTRAVENOUS at 08:40

## 2024-02-08 NOTE — CARE COORDINATION
Case Management Assessment  Initial Evaluation    Date/Time of Evaluation: 2/8/2024 9:18 AM  Assessment Completed by: Stefani Anand RN    If patient is discharged prior to next notation, then this note serves as note for discharge by case management.    Patient Name: Amber Fuentes                   YOB: 1945  Diagnosis: Elevated troponin [R79.89]  Acute on chronic heart failure, unspecified heart failure type (HCC) [I50.9]  Congestive heart failure, unspecified HF chronicity, unspecified heart failure type (HCC) [I50.9]  COVID-19 [U07.1]                   Date / Time: 2/7/2024  9:06 AM    Patient Admission Status: Inpatient   Readmission Risk (Low < 19, Mod (19-27), High > 27): Readmission Risk Score: 19.4    Current PCP: Lucila Tejeda MD  PCP verified by CM? Yes    Chart Reviewed: Yes      History Provided by: Spouse  Patient Orientation: Alert and Oriented    Patient Cognition: Alert    Hospitalization in the last 30 days (Readmission):  Yes    If yes, Readmission Assessment in  Navigator will be completed.    Advance Directives:      Code Status: Full Code   Patient's Primary Decision Maker is: Patient Declined (Legal Next of Kin Remains as Decision Maker)    Primary Decision Maker: Stanford Fuentes - Spouse - [phone]    Discharge Planning:    Patient lives with: Spouse/Significant Other Type of Home: Trailer/Mobile Home  Primary Care Giver: Self  Patient Support Systems include: Spouse/Significant Other   Current Financial resources: Medicare  Current community resources: ECF/Home Care  Current services prior to admission: None            Current DME:              Type of Home Care services:  None    ADLS  Prior functional level: Independent in ADLs/IADLs  Current functional level: Independent in ADLs/IADLs    PT AM-PAC:   /24  OT AM-PAC:   /24    Family can provide assistance at DC: Yes  Would you like Case Management to discuss the discharge plan with any other family

## 2024-02-08 NOTE — FLOWSHEET NOTE
02/07/24 2115   Vital Signs   Temp 100.1 °F (37.8 °C)   Temp Source Oral   Pulse 97   Heart Rate Source Monitor   Respirations 18   /70   MAP (Calculated) 93   BP Location Left upper arm   BP Method Automatic   Patient Position Semi fowlers   Oxygen Therapy   SpO2 96 %   O2 Device None (Room air)       Vss. Temp elevated will continue to monitor. Attempted chg bath patient became verbally aggressive and was mad and refused the rest. Evening meds given per mar.

## 2024-02-08 NOTE — CONSULTS
[address] [address]                                  CONSULTATION    PATIENT NAME: DES WREN                       :        1945  MED REC NO:   [id number]                          ROOM:  ACCOUNT NO:   [id number]                           ADMIT DATE: 2024  PROVIDER:     Rodrick Martinez MD    CONSULT DATE:  2024    REASON FOR CONSULTATION:  Chronic kidney disease management.    HISTORY OF PRESENT ILLNESS:  The patient is a 78-year-old   female patient with a past medical history significant for chronic  kidney disease and a baseline creatinine ranging between 1.8 and 2.2  mg/dL.  The patient presented to [facility] complaining of  worsening shortness of breath and cough.  She was diagnosed with  COVID-19 infection and was admitted for further evaluation.  The patient  did receive 40 mg of IV Lasix in the emergency department and her  outpatient home dose of Lasix was resumed.    PAST MEDICAL HISTORY:  1.  Chronic kidney disease.  2.  Systolic heart failure.  3.  COPD.  4.  Hypertension.  5.  Gout.  6.  Hyperlipidemia.    PAST SURGICAL HISTORY:  1.  Parathyroidectomy.  2.  Total knee arthroplasty.  3.  Hysterectomy.  4.  Appendectomy.  5.  Cholecystectomy.  6.   section.  7.  Coronary artery bypass graft.    ALLERGIES:  The patient is allergic to PENICILLIN.    SOCIAL HISTORY:  The patient quit smoking less than a year ago and  drinks alcohol occasionally.    FAMILY HISTORY:  Significant for diabetes mellitus, hypertension, and  hyperlipidemia.    REVIEW OF SYSTEMS:  The patient denied any nausea, vomiting, or  abdominal pain.  Otherwise, a 10-point review of systems was relatively  unremarkable.    PHYSICAL EXAMINATION:  VITAL SIGNS:  Blood pressure 140/74, heart rate 94, respirations 20,  temperature 99 Fahrenheit.  The patient is satting 95% on room air.  GENERAL

## 2024-02-08 NOTE — CONSULTS
Patient seen and examined, consult note dictated.    Assessment and Plan:    1- CKD: The patient has chronic kidney disease with a baseline creatinine of 1.8 to 2.2 mg/dl. Her urinary output is well maintained and his serum creatinine is within baseline range.  - Hold further diuresis.  - Avoid all nephrotoxic agents at this time.  - Maintain systolic blood pressure > 120 mmHg.    2- No significant electrolytes disorders noted.    3- HTN: Blood pressure within acceptable range, monitor serum creatinine while on Entresto.    4- COVID-19 infection: Management per primary team.

## 2024-02-08 NOTE — CONSULTS
[facility]  Palliative Medicine Consultation Note      Date Of Admission:2/7/2024  Date of consult: 02/08/24  Seen by PC in the past:  Yes    Recommendations:        Writer met with the pt on last admission to introduce palliative care and hospice options. Pt re-admitted with Elevated troponin, acute on chronic heart failure,C-19+ and shortness of breath. Referral made to [facility] on previous admission and per pt's spouse pt has not met with them yet, and family requesting another referral to [facility] to see if the pt would qualify for hospice at home.    Writer spoke with April from [facility] and she will have a nurse follow up with pt and family, awaiting meeting time. Spiritual care has been consulted to follow the pt for emotional and spiritual support.     KADEN Powers aware of above POC.     1. Goals of Care/Advanced Care planning/Code status: pt elects Full Code  2. Pain: na  3. SOB: yes,currently on RA  4. Disposition: meeting with [facility] today    Reason for Consult:         [x]  Goals of Care  []  Code Status Discussion/Advanced Care Planning   []  Psychosocial/Family Support  []  Symptom Management  []  Other (Specify)    Requesting Physician: Dr. Da Silva    CHIEF COMPLAINT:  Shortness of breath, C-19+    History Obtained From:  spouse    History of Present Illness:         Amber Fuentes is a 78 y.o. female with PMH of (see below list) who presented with Acute on chronic heart failure, shortness of breath, C-19+.    Subjective:         Past Medical History:        Diagnosis Date    Anemia     Backache, unspecified 03/11/2008    CAD, multiple vessel 01/06/2020    Chronic kidney disease (CKD) stage G3b/A1, moderately decreased glomerular filtration rate (GFR) between 30-44 mL/min/1.73 square meter and albuminuria creatinine ratio less than 30 mg/g (formerly [facility]) 04/16/2021    Chronic systolic CHF (congestive heart failure) (formerly [facility]) 12/11/2019    COPD (chronic obstructive pulmonary disease) (formerly [facility])     Depressive

## 2024-02-08 NOTE — CONSULTS
[facility]   HEART FAILURE PROGRAM      NAME:  Amber Fuentes  AGE: 78 y.o.   GENDER: female  : 1945  TODAY'S DATE:  2024    Subjective:     VISIT TYPE: Evaluation / Consult    ADMIT DATE: 2024    PAST MEDICAL HISTORY:      Diagnosis Date    Anemia     Backache, unspecified 2008    CAD, multiple vessel 2020    Chronic kidney disease (CKD) stage G3b/A1, moderately decreased glomerular filtration rate (GFR) between 30-44 mL/min/1.73 square meter and albuminuria creatinine ratio less than 30 mg/g ([facility]) 2021    Chronic systolic CHF (congestive heart failure) ([facility]) 2019    COPD (chronic obstructive pulmonary disease) ([facility])     Depressive disorder, not elsewhere classified 2007    Essential hypertension, benign 2007    Gout 2011    History of blood transfusion     Hyperlipidemia associated with type 2 diabetes mellitus ([facility]) 2015    Major depressive disorder, recurrent episode, moderate ([facility]) 2015    Osteoarthritis, hand 2012    Osteoarthrosis, unspecified whether generalized or localized, lower leg 2007    Osteopenia 2015    Other and unspecified hyperlipidemia 2007    Pain in joint, lower leg 2007    Pneumonia     Rotator cuff tendinitis 10/06/2017    Tear of medial cartilage or meniscus of knee, current 2007    Type 2 diabetes mellitus with microalbuminuria, without long-term current use of insulin ([facility]) 2017    Type 2 diabetes mellitus without complication ([facility]) 2007    Type II or unspecified type diabetes mellitus without mention of complication, not stated as uncontrolled 2007    foot exam 08 normal    Uses LifeVest defibrillator 2024     HOME MEDICATIONS:  Prior to Admission medications    Medication Sig Start Date End Date Taking? Authorizing Provider   allopurinol (ZYLOPRIM) 300 MG tablet Take 0.5 tablets by mouth daily TAKE 1 TABLET BY MOUTH DAILY 24

## 2024-02-08 NOTE — PLAN OF CARE
HEART FAILURE CARE PLAN:    Comorbidities Reviewed: Yes   Patient has a past medical history of Anemia, Backache, unspecified, CAD, multiple vessel, Chronic kidney disease (CKD) stage G3b/A1, moderately decreased glomerular filtration rate (GFR) between 30-44 mL/min/1.73 square meter and albuminuria creatinine ratio less than 30 mg/g ([facility]), Chronic systolic CHF (congestive heart failure) ([facility]), COPD (chronic obstructive pulmonary disease) ([facility]), Depressive disorder, not elsewhere classified, Essential hypertension, benign, Gout, History of blood transfusion, Hyperlipidemia associated with type 2 diabetes mellitus ([facility]), Major depressive disorder, recurrent episode, moderate ([facility]), Osteoarthritis, hand, Osteoarthrosis, unspecified whether generalized or localized, lower leg, Osteopenia, Other and unspecified hyperlipidemia, Pain in joint, lower leg, Pneumonia, Rotator cuff tendinitis, Tear of medial cartilage or meniscus of knee, current, Type 2 diabetes mellitus with microalbuminuria, without long-term current use of insulin ([facility]), Type 2 diabetes mellitus without complication ([facility]), and Type II or unspecified type diabetes mellitus without mention of complication, not stated as uncontrolled.     Weights Reviewed: Yes   Admission weight: 60.3 kg (133 lb)   Wt Readings from Last 3 Encounters:   02/07/24 60.3 kg (133 lb)   01/31/24 60.7 kg (133 lb 13.1 oz)   01/02/24 71.2 kg (157 lb)     Intake & Output Reviewed: Yes   No intake or output data in the 24 hours ending 02/07/24 8620    ECHOCARDIOGRAM Reviewed: Yes   Patient's Ejection Fraction (EF) is less than or equal to 40%    Medications Reviewed: Yes   SCHEDULED HOSPITAL MEDICATIONS:   metoprolol succinate  75 mg Oral Daily    aspirin  81 mg Oral Daily    atorvastatin  40 mg Oral Daily    insulin glargine  10 Units SubCUTAneous Nightly    isosorbide dinitrate  10 mg Oral BID    sodium chloride flush  5-40 mL IntraVENous 2 times per day    heparin (porcine)  5,000

## 2024-02-09 PROBLEM — N17.9 ACUTE KIDNEY INJURY SUPERIMPOSED ON CKD (HCC): Status: ACTIVE | Noted: 2024-02-09

## 2024-02-09 PROBLEM — U07.1 COVID-19: Status: ACTIVE | Noted: 2024-02-09

## 2024-02-09 PROBLEM — N18.9 ACUTE KIDNEY INJURY SUPERIMPOSED ON CKD (HCC): Status: ACTIVE | Noted: 2024-02-09

## 2024-02-09 LAB
ANION GAP SERPL CALCULATED.3IONS-SCNC: 13 MMOL/L (ref 3–16)
BASOPHILS # BLD: 0.1 K/UL (ref 0–0.2)
BASOPHILS NFR BLD: 1.8 %
BUN SERPL-MCNC: 71 MG/DL (ref 7–20)
CALCIUM SERPL-MCNC: 9.6 MG/DL (ref 8.3–10.6)
CHLORIDE SERPL-SCNC: 98 MMOL/L (ref 99–110)
CO2 SERPL-SCNC: 29 MMOL/L (ref 21–32)
CREAT SERPL-MCNC: 2.4 MG/DL (ref 0.6–1.2)
DEPRECATED RDW RBC AUTO: 19.2 % (ref 12.4–15.4)
EOSINOPHIL # BLD: 0 K/UL (ref 0–0.6)
EOSINOPHIL NFR BLD: 0.3 %
GFR SERPLBLD CREATININE-BSD FMLA CKD-EPI: 20 ML/MIN/{1.73_M2}
GLUCOSE BLD-MCNC: 123 MG/DL (ref 70–99)
GLUCOSE BLD-MCNC: 144 MG/DL (ref 70–99)
GLUCOSE BLD-MCNC: 161 MG/DL (ref 70–99)
GLUCOSE BLD-MCNC: 172 MG/DL (ref 70–99)
GLUCOSE BLD-MCNC: 216 MG/DL (ref 70–99)
GLUCOSE SERPL-MCNC: 127 MG/DL (ref 70–99)
HCT VFR BLD AUTO: 27 % (ref 36–48)
HCT VFR BLD AUTO: 31.2 % (ref 36–48)
HGB BLD-MCNC: 8.5 G/DL (ref 12–16)
HGB BLD-MCNC: 9.8 G/DL (ref 12–16)
LYMPHOCYTES # BLD: 0.3 K/UL (ref 1–5.1)
LYMPHOCYTES NFR BLD: 5.5 %
MCH RBC QN AUTO: 28.1 PG (ref 26–34)
MCHC RBC AUTO-ENTMCNC: 31.3 G/DL (ref 31–36)
MCV RBC AUTO: 89.8 FL (ref 80–100)
MONOCYTES # BLD: 0.4 K/UL (ref 0–1.3)
MONOCYTES NFR BLD: 6.2 %
NEUTROPHILS # BLD: 5.2 K/UL (ref 1.7–7.7)
NEUTROPHILS NFR BLD: 86.2 %
PERFORMED ON: ABNORMAL
PLATELET # BLD AUTO: 180 K/UL (ref 135–450)
PMV BLD AUTO: 9.3 FL (ref 5–10.5)
POTASSIUM SERPL-SCNC: 5 MMOL/L (ref 3.5–5.1)
RBC # BLD AUTO: 3.47 M/UL (ref 4–5.2)
SODIUM SERPL-SCNC: 140 MMOL/L (ref 136–145)
WBC # BLD AUTO: 6.1 K/UL (ref 4–11)

## 2024-02-09 PROCEDURE — 99232 SBSQ HOSP IP/OBS MODERATE 35: CPT | Performed by: INTERNAL MEDICINE

## 2024-02-09 PROCEDURE — 6370000000 HC RX 637 (ALT 250 FOR IP)

## 2024-02-09 PROCEDURE — 2580000003 HC RX 258

## 2024-02-09 PROCEDURE — 36415 COLL VENOUS BLD VENIPUNCTURE: CPT

## 2024-02-09 PROCEDURE — 6370000000 HC RX 637 (ALT 250 FOR IP): Performed by: INTERNAL MEDICINE

## 2024-02-09 PROCEDURE — 2580000003 HC RX 258: Performed by: INTERNAL MEDICINE

## 2024-02-09 PROCEDURE — 85018 HEMOGLOBIN: CPT

## 2024-02-09 PROCEDURE — 6360000002 HC RX W HCPCS

## 2024-02-09 PROCEDURE — 2060000000 HC ICU INTERMEDIATE R&B

## 2024-02-09 PROCEDURE — 85014 HEMATOCRIT: CPT

## 2024-02-09 PROCEDURE — 85025 COMPLETE CBC W/AUTO DIFF WBC: CPT

## 2024-02-09 PROCEDURE — 80048 BASIC METABOLIC PNL TOTAL CA: CPT

## 2024-02-09 RX ORDER — SODIUM CHLORIDE 9 MG/ML
INJECTION, SOLUTION INTRAVENOUS CONTINUOUS
Status: DISCONTINUED | OUTPATIENT
Start: 2024-02-09 | End: 2024-02-10

## 2024-02-09 RX ADMIN — HEPARIN SODIUM 5000 UNITS: 5000 INJECTION INTRAVENOUS; SUBCUTANEOUS at 05:45

## 2024-02-09 RX ADMIN — HEPARIN SODIUM 5000 UNITS: 5000 INJECTION INTRAVENOUS; SUBCUTANEOUS at 14:25

## 2024-02-09 RX ADMIN — ASPIRIN 81 MG: 81 TABLET, COATED ORAL at 09:28

## 2024-02-09 RX ADMIN — SACUBITRIL AND VALSARTAN 0.5 TABLET: 24; 26 TABLET, FILM COATED ORAL at 09:27

## 2024-02-09 RX ADMIN — SODIUM CHLORIDE, PRESERVATIVE FREE 10 ML: 5 INJECTION INTRAVENOUS at 09:36

## 2024-02-09 RX ADMIN — ACETAMINOPHEN 650 MG: 325 TABLET ORAL at 09:33

## 2024-02-09 RX ADMIN — ISOSORBIDE DINITRATE 10 MG: 10 TABLET ORAL at 20:05

## 2024-02-09 RX ADMIN — METOPROLOL SUCCINATE 75 MG: 50 TABLET, EXTENDED RELEASE ORAL at 09:27

## 2024-02-09 RX ADMIN — SODIUM CHLORIDE, PRESERVATIVE FREE 10 ML: 5 INJECTION INTRAVENOUS at 20:05

## 2024-02-09 RX ADMIN — ATORVASTATIN CALCIUM 40 MG: 40 TABLET, FILM COATED ORAL at 09:35

## 2024-02-09 RX ADMIN — SODIUM CHLORIDE: 9 INJECTION, SOLUTION INTRAVENOUS at 13:53

## 2024-02-09 RX ADMIN — ISOSORBIDE DINITRATE 10 MG: 10 TABLET ORAL at 09:28

## 2024-02-09 RX ADMIN — HEPARIN SODIUM 5000 UNITS: 5000 INJECTION INTRAVENOUS; SUBCUTANEOUS at 20:05

## 2024-02-09 RX ADMIN — INSULIN GLARGINE 10 UNITS: 100 INJECTION, SOLUTION SUBCUTANEOUS at 20:47

## 2024-02-09 RX ADMIN — ALLOPURINOL 150 MG: 300 TABLET ORAL at 09:28

## 2024-02-09 NOTE — FLOWSHEET NOTE
02/09/24 0915   Vital Signs   Temp (!) 101.8 °F (38.8 °C)   Temp Source Oral   Pulse (!) 106   Heart Rate Source Monitor   Respirations 18   BP (!) 155/91   MAP (Calculated) 112   BP Location Left lower arm     Am assessment complete,plan of care discussed with pt.Temp elevated-tylenol given at this time.Pt pulled up in bed and sat up for breakfast.Plan of care discussed.call light in reach

## 2024-02-09 NOTE — PLAN OF CARE
Problem: Cardiovascular - Adult  Goal: Maintains optimal cardiac output and hemodynamic stability  Outcome: Progressing  Goal: Absence of cardiac dysrhythmias or at baseline  Outcome: Progressing     Problem: Chronic Conditions and Co-morbidities  Goal: Patient's chronic conditions and co-morbidity symptoms are monitored and maintained or improved  Outcome: Progressing     Problem: Discharge Planning  Goal: Discharge to home or other facility with appropriate resources  Outcome: Progressing     Problem: Pain  Goal: Verbalizes/displays adequate comfort level or baseline comfort level  Outcome: Progressing     Problem: Safety - Adult  Goal: Free from fall injury  Outcome: Progressing     Problem: ABCDS Injury Assessment  Goal: Absence of physical injury  Outcome: Progressing     Problem: Skin/Tissue Integrity  Goal: Absence of new skin breakdown  Description: 1.  Monitor for areas of redness and/or skin breakdown  2.  Assess vascular access sites hourly  3.  Every 4-6 hours minimum:  Change oxygen saturation probe site  4.  Every 4-6 hours:  If on nasal continuous positive airway pressure, respiratory therapy assess nares and determine need for appliance change or resting period.  Outcome: Progressing

## 2024-02-09 NOTE — FLOWSHEET NOTE
02/08/24 1943   Vital Signs   Temp 98.3 °F (36.8 °C)   Temp Source Oral   Pulse 75   Heart Rate Source Monitor   Respirations 18   /67   MAP (Calculated) 89   BP Location Left upper arm   BP Method Automatic   Patient Position High fowlers   Oxygen Therapy   SpO2 93 %   O2 Device None (Room air)     Vital signs taken and stable at this time. Shift assessment is complete, see flow sheet. Pt denies c/o pain or further needs at this time. Pt A&OX4 with call light within reach and uses appropriately.

## 2024-02-10 LAB
ALBUMIN SERPL-MCNC: 3 G/DL (ref 3.4–5)
ANION GAP SERPL CALCULATED.3IONS-SCNC: 10 MMOL/L (ref 3–16)
ANION GAP SERPL CALCULATED.3IONS-SCNC: 12 MMOL/L (ref 3–16)
BASOPHILS # BLD: 0 K/UL (ref 0–0.2)
BASOPHILS NFR BLD: 0.8 %
BUN SERPL-MCNC: 71 MG/DL (ref 7–20)
BUN SERPL-MCNC: 71 MG/DL (ref 7–20)
CALCIUM SERPL-MCNC: 8.4 MG/DL (ref 8.3–10.6)
CALCIUM SERPL-MCNC: 9 MG/DL (ref 8.3–10.6)
CHLORIDE SERPL-SCNC: 101 MMOL/L (ref 99–110)
CHLORIDE SERPL-SCNC: 103 MMOL/L (ref 99–110)
CO2 SERPL-SCNC: 25 MMOL/L (ref 21–32)
CO2 SERPL-SCNC: 29 MMOL/L (ref 21–32)
CREAT SERPL-MCNC: 2.2 MG/DL (ref 0.6–1.2)
CREAT SERPL-MCNC: 2.3 MG/DL (ref 0.6–1.2)
DEPRECATED RDW RBC AUTO: 19.1 % (ref 12.4–15.4)
EOSINOPHIL # BLD: 0 K/UL (ref 0–0.6)
EOSINOPHIL NFR BLD: 1.3 %
GFR SERPLBLD CREATININE-BSD FMLA CKD-EPI: 21 ML/MIN/{1.73_M2}
GFR SERPLBLD CREATININE-BSD FMLA CKD-EPI: 22 ML/MIN/{1.73_M2}
GLUCOSE BLD-MCNC: 151 MG/DL (ref 70–99)
GLUCOSE BLD-MCNC: 166 MG/DL (ref 70–99)
GLUCOSE BLD-MCNC: 168 MG/DL (ref 70–99)
GLUCOSE BLD-MCNC: 171 MG/DL (ref 70–99)
GLUCOSE BLD-MCNC: 88 MG/DL (ref 70–99)
GLUCOSE BLD-MCNC: 92 MG/DL (ref 70–99)
GLUCOSE SERPL-MCNC: 145 MG/DL (ref 70–99)
GLUCOSE SERPL-MCNC: 81 MG/DL (ref 70–99)
HCT VFR BLD AUTO: 28.2 % (ref 36–48)
HGB BLD-MCNC: 8.9 G/DL (ref 12–16)
LYMPHOCYTES # BLD: 0.5 K/UL (ref 1–5.1)
LYMPHOCYTES NFR BLD: 14.4 %
MAGNESIUM SERPL-MCNC: 2.1 MG/DL (ref 1.8–2.4)
MCH RBC QN AUTO: 27.9 PG (ref 26–34)
MCHC RBC AUTO-ENTMCNC: 31.4 G/DL (ref 31–36)
MCV RBC AUTO: 88.9 FL (ref 80–100)
MONOCYTES # BLD: 0.4 K/UL (ref 0–1.3)
MONOCYTES NFR BLD: 11.8 %
NEUTROPHILS # BLD: 2.7 K/UL (ref 1.7–7.7)
NEUTROPHILS NFR BLD: 71.7 %
PERFORMED ON: ABNORMAL
PERFORMED ON: NORMAL
PERFORMED ON: NORMAL
PHOSPHATE SERPL-MCNC: 3.9 MG/DL (ref 2.5–4.9)
PLATELET # BLD AUTO: 175 K/UL (ref 135–450)
PMV BLD AUTO: 9.4 FL (ref 5–10.5)
POTASSIUM SERPL-SCNC: 4.1 MMOL/L (ref 3.5–5.1)
POTASSIUM SERPL-SCNC: 4.3 MMOL/L (ref 3.5–5.1)
RBC # BLD AUTO: 3.17 M/UL (ref 4–5.2)
SODIUM SERPL-SCNC: 138 MMOL/L (ref 136–145)
SODIUM SERPL-SCNC: 142 MMOL/L (ref 136–145)
WBC # BLD AUTO: 3.7 K/UL (ref 4–11)

## 2024-02-10 PROCEDURE — 6370000000 HC RX 637 (ALT 250 FOR IP): Performed by: INTERNAL MEDICINE

## 2024-02-10 PROCEDURE — 36415 COLL VENOUS BLD VENIPUNCTURE: CPT

## 2024-02-10 PROCEDURE — 2060000000 HC ICU INTERMEDIATE R&B

## 2024-02-10 PROCEDURE — 2580000003 HC RX 258: Performed by: INTERNAL MEDICINE

## 2024-02-10 PROCEDURE — 6370000000 HC RX 637 (ALT 250 FOR IP): Performed by: STUDENT IN AN ORGANIZED HEALTH CARE EDUCATION/TRAINING PROGRAM

## 2024-02-10 PROCEDURE — 6360000002 HC RX W HCPCS

## 2024-02-10 PROCEDURE — 83735 ASSAY OF MAGNESIUM: CPT

## 2024-02-10 PROCEDURE — 85025 COMPLETE CBC W/AUTO DIFF WBC: CPT

## 2024-02-10 PROCEDURE — 2580000003 HC RX 258

## 2024-02-10 PROCEDURE — 6370000000 HC RX 637 (ALT 250 FOR IP)

## 2024-02-10 PROCEDURE — 80069 RENAL FUNCTION PANEL: CPT

## 2024-02-10 PROCEDURE — 99232 SBSQ HOSP IP/OBS MODERATE 35: CPT | Performed by: INTERNAL MEDICINE

## 2024-02-10 RX ORDER — GUAIFENESIN/DEXTROMETHORPHAN 100-10MG/5
10 SYRUP ORAL EVERY 8 HOURS PRN
Status: DISCONTINUED | OUTPATIENT
Start: 2024-02-10 | End: 2024-02-11 | Stop reason: HOSPADM

## 2024-02-10 RX ADMIN — METOPROLOL SUCCINATE 75 MG: 50 TABLET, EXTENDED RELEASE ORAL at 09:04

## 2024-02-10 RX ADMIN — GUAIFENESIN AND DEXTROMETHORPHAN 10 ML: 100; 10 SYRUP ORAL at 23:39

## 2024-02-10 RX ADMIN — ISOSORBIDE DINITRATE 10 MG: 10 TABLET ORAL at 09:04

## 2024-02-10 RX ADMIN — INSULIN GLARGINE 10 UNITS: 100 INJECTION, SOLUTION SUBCUTANEOUS at 22:18

## 2024-02-10 RX ADMIN — SODIUM CHLORIDE, PRESERVATIVE FREE 10 ML: 5 INJECTION INTRAVENOUS at 09:07

## 2024-02-10 RX ADMIN — SODIUM CHLORIDE, PRESERVATIVE FREE 10 ML: 5 INJECTION INTRAVENOUS at 22:20

## 2024-02-10 RX ADMIN — HEPARIN SODIUM 5000 UNITS: 5000 INJECTION INTRAVENOUS; SUBCUTANEOUS at 22:18

## 2024-02-10 RX ADMIN — SODIUM CHLORIDE: 9 INJECTION, SOLUTION INTRAVENOUS at 05:36

## 2024-02-10 RX ADMIN — ATORVASTATIN CALCIUM 40 MG: 40 TABLET, FILM COATED ORAL at 09:04

## 2024-02-10 RX ADMIN — ISOSORBIDE DINITRATE 10 MG: 10 TABLET ORAL at 22:18

## 2024-02-10 RX ADMIN — ASPIRIN 81 MG: 81 TABLET, COATED ORAL at 09:04

## 2024-02-10 RX ADMIN — HEPARIN SODIUM 5000 UNITS: 5000 INJECTION INTRAVENOUS; SUBCUTANEOUS at 05:34

## 2024-02-10 RX ADMIN — ALLOPURINOL 150 MG: 300 TABLET ORAL at 09:04

## 2024-02-10 RX ADMIN — HEPARIN SODIUM 5000 UNITS: 5000 INJECTION INTRAVENOUS; SUBCUTANEOUS at 15:54

## 2024-02-10 NOTE — FLOWSHEET NOTE
02/09/24 1955   Vital Signs   Temp 97 °F (36.1 °C)   Temp Source Oral   Pulse 78   Heart Rate Source Monitor   Respirations 18   BP (!) 141/82   MAP (Calculated) 102   BP Location Right upper arm   BP Method Automatic   Patient Position Sitting   Pain Assessment   Pain Assessment None - Denies Pain   Opioid-Induced Sedation   POSS Score 1   Oxygen Therapy   SpO2 97 %   O2 Device None (Room air)     Vital signs taken and stable at this time. Shift assessment is complete, see flow sheet. Pt up to bedside commode and tolerated fairly well. Pt denies c/o pain or further needs a this  time. Pt A&OX4 with call light within reach and bed alarm on.

## 2024-02-11 VITALS
TEMPERATURE: 97.5 F | DIASTOLIC BLOOD PRESSURE: 75 MMHG | WEIGHT: 131.5 LBS | HEIGHT: 60 IN | BODY MASS INDEX: 25.82 KG/M2 | HEART RATE: 85 BPM | RESPIRATION RATE: 18 BRPM | OXYGEN SATURATION: 98 % | SYSTOLIC BLOOD PRESSURE: 132 MMHG

## 2024-02-11 LAB
ANION GAP SERPL CALCULATED.3IONS-SCNC: 10 MMOL/L (ref 3–16)
BACTERIA BLD CULT ORG #2: NORMAL
BACTERIA BLD CULT: NORMAL
BUN SERPL-MCNC: 64 MG/DL (ref 7–20)
CALCIUM SERPL-MCNC: 8.9 MG/DL (ref 8.3–10.6)
CHLORIDE SERPL-SCNC: 101 MMOL/L (ref 99–110)
CO2 SERPL-SCNC: 28 MMOL/L (ref 21–32)
CREAT SERPL-MCNC: 2 MG/DL (ref 0.6–1.2)
GFR SERPLBLD CREATININE-BSD FMLA CKD-EPI: 25 ML/MIN/{1.73_M2}
GLUCOSE BLD-MCNC: 131 MG/DL (ref 70–99)
GLUCOSE BLD-MCNC: 164 MG/DL (ref 70–99)
GLUCOSE BLD-MCNC: 183 MG/DL (ref 70–99)
GLUCOSE SERPL-MCNC: 142 MG/DL (ref 70–99)
PERFORMED ON: ABNORMAL
POTASSIUM SERPL-SCNC: 4.1 MMOL/L (ref 3.5–5.1)
SODIUM SERPL-SCNC: 139 MMOL/L (ref 136–145)

## 2024-02-11 PROCEDURE — 6360000002 HC RX W HCPCS

## 2024-02-11 PROCEDURE — 99238 HOSP IP/OBS DSCHRG MGMT 30/<: CPT | Performed by: INTERNAL MEDICINE

## 2024-02-11 PROCEDURE — 80048 BASIC METABOLIC PNL TOTAL CA: CPT

## 2024-02-11 PROCEDURE — 6370000000 HC RX 637 (ALT 250 FOR IP)

## 2024-02-11 PROCEDURE — 2580000003 HC RX 258

## 2024-02-11 PROCEDURE — 6370000000 HC RX 637 (ALT 250 FOR IP): Performed by: INTERNAL MEDICINE

## 2024-02-11 PROCEDURE — 36415 COLL VENOUS BLD VENIPUNCTURE: CPT

## 2024-02-11 RX ADMIN — ASPIRIN 81 MG: 81 TABLET, COATED ORAL at 08:29

## 2024-02-11 RX ADMIN — HEPARIN SODIUM 5000 UNITS: 5000 INJECTION INTRAVENOUS; SUBCUTANEOUS at 05:17

## 2024-02-11 RX ADMIN — ISOSORBIDE DINITRATE 10 MG: 10 TABLET ORAL at 08:29

## 2024-02-11 RX ADMIN — SODIUM CHLORIDE, PRESERVATIVE FREE 10 ML: 5 INJECTION INTRAVENOUS at 08:30

## 2024-02-11 RX ADMIN — ALLOPURINOL 150 MG: 300 TABLET ORAL at 08:29

## 2024-02-11 RX ADMIN — ATORVASTATIN CALCIUM 40 MG: 40 TABLET, FILM COATED ORAL at 08:29

## 2024-02-11 RX ADMIN — METOPROLOL SUCCINATE 75 MG: 50 TABLET, EXTENDED RELEASE ORAL at 08:29

## 2024-02-11 NOTE — FLOWSHEET NOTE
02/10/24 0495   Pain Assessment   Pain Assessment None - Denies Pain   Opioid-Induced Sedation   POSS Score S     Vital signs taken and stable. Shift assessment is complete, see flow sheet. Pt denies c/o pain or further needs at this time. Pt A&OX4 with call light within reach and uses appropriately.

## 2024-02-11 NOTE — PROGRESS NOTES
7:20- Pt had 9 beats of Vtach at 7:20am. Pt asymptomatic. Noa BUTLER made aware.     11:15- Dr. Santamaria made aware at this time.   
Bedside report and transfer of care given to POORNIMA Garces. Pt currently resting in bed with the call light within reach. Pt denies any other care needs at this time. Pt stable at this time.    
Bedside report and transfer of care given to POORNIMA Nesbitt. Pt currently resting in bed with the call light within reach. Pt denies any other care needs at this time. Pt stable at this time.    
Bedside report and transfer of care given to POORNIMA Nesbitt. Pt currently resting in bed with the call light within reach. Pt denies any other care needs at this time. Pt stable at this time.    
Brief Progress Note    Date: 02/09/24    Subjective: Ms Fuentes says she is feeling much better than she did on her last admission. Overall she is really feeling pretty good and hoping to go home soon.     Physical Exam:  Gen: No distress. Alert. Ill and unkempt appearing.  Eyes: PERRL. No sclera icterus. No conjunctival injection.   ENT: No discharge. Pharynx clear.   Neck: No JVD.  Trachea midline.  Resp: No accessory muscle use. No crackles. No wheezes. No rhonchi.   CV: Regular rate. Regular rhythm. +Soft murmur.  No rub. Trace edema bilaterally.    Peripheral Pulses: +2 palpable, equal bilaterally   GI: Non-tender. Non-distended.  Normal bowel sounds.  Skin: Warm and dry. No nodule on exposed extremities. No rash on exposed extremities.   M/S: No cyanosis. No joint deformity. No clubbing.   Neuro: Awake. Grossly nonfocal    Psych: Oriented x 3. No anxiety or agitation.       Objective:  Vitals:    02/09/24 0705 02/09/24 0915 02/09/24 1145 02/09/24 1515   BP: 134/74 (!) 155/91 122/60 122/78   Pulse: (!) 106 (!) 106 84 78   Resp: 17 18 18 18   Temp:  (!) 101.8 °F (38.8 °C) 99 °F (37.2 °C) 97.8 °F (36.6 °C)   TempSrc:  Oral Oral Oral   SpO2: 95%      Weight:       Height:           Labs:  CBC:   Recent Labs     02/07/24  1100 02/07/24  2355 02/08/24  0745 02/08/24  1541 02/08/24  2333 02/09/24  0757 02/09/24  1542   WBC 6.7  --  7.7  --   --  6.1  --    HGB 8.8*   < > 9.5*   < > 8.8* 9.8* 8.5*   HCT 27.4*   < > 29.9*   < > 27.1* 31.2* 27.0*   MCV 87.6  --  90.3  --   --  89.8  --      --  170  --   --  180  --     < > = values in this interval not displayed.       BMP:   Recent Labs     02/07/24  0950 02/08/24  0745 02/09/24  0757    142 140   K 4.8 5.1 5.0   CL 99 100 98*   CO2 31 23 29   BUN 61* 65* 71*   CREATININE 1.8* 2.2* 2.4*       LIVER PROFILE:   Recent Labs     02/07/24  0950   AST 29   ALT 20   BILITOT 1.1*   ALKPHOS 223*       PT/INR: No results for input(s): \"PROTIME\", \"INR\" in the last 
[facility]   Progress Note  Cardiology      HPI: Seeing Ms. Fuentes today for f/u systolic CHF and elevated Billy. She feels poorly in general but denies specific complaints. Tele NSR 95 currently. Note 2 runs NSVT 6-9 beats overnight and this AM.      Physical Examination:    Vitals:    02/08/24 0717   BP: 134/74   Pulse: 96   Resp: 20   Temp: 99.5 °F (37.5 °C)   SpO2: 94%          Constitutional and General Appearance: NAD   Respiratory:  Normal excursion and expansion without use of accessory muscles  Resp Auscultation: Soft breath sounds  Cardiovascular:  The apical impulses not displaced  Heart tones are crisp and normal  Cervical veins are not engorged  The carotid upstroke is normal in amplitude and contour without delay or bruit  Normal S1S2, No S3, No Murmur  Peripheral pulses are symmetrical and full  There is no clubbing, cyanosis of the extremities.  No edema  Pedal Pulses: 2+ and equal   Abdomen:  No masses or tenderness  Liver/Spleen: No Abnormalities Noted  Neurological/Psychiatric:  Alert and oriented in all spheres  Moves all extremities well  No abnormalities of mood, affect, memory, mentation, or behavior are noted  Skin: warm and dry      Lab Results   Component Value Date    WBC 6.7 02/07/2024    HGB 9.6 (L) 02/07/2024    HCT 29.8 (L) 02/07/2024    MCV 87.6 02/07/2024     02/07/2024     Lab Results   Component Value Date    CREATININE 1.8 (H) 02/07/2024    BUN 61 (H) 02/07/2024     02/07/2024    K 4.8 02/07/2024    CL 99 02/07/2024    CO2 31 02/07/2024     Lab Results   Component Value Date    INR 1.04 07/23/2020    PROTIME 12.1 07/23/2020        Assessment:  Amber Fuentes is a 78 y.o. patient who [place] [facility] with c/o increased SOB. She has PMH CAD s/p 3V CABG and MV ring repair, PFO closure 1/20, known  RCA, CAD s/p 1 LULY LAD/3 LULY CCx 4/20, chronic systolic CHF, ICM EF=25-30%, and HLD. Prior testing: Carotid Doppler 1/6/20 normal. Lexiscan 6/17/2020 large 
Consult has been called to Dr. Gavin on 2/8/24. Spoke with Sho. 7:50 AM    Laura Knowles  2/8/2024  
Department of Internal Medicine  Nephrology Progress Note        SUBJECTIVE:    We are following this patient for A/CKD.  The patient was seen and examined; she feels well today with no CP, SOB, nausea or vomiting.    ROS: No fever or chills.  Social: No family at bedside.    Physical Exam:    VITALS:  /60   Pulse 84   Temp 99 °F (37.2 °C) (Oral)   Resp 18   Ht 1.524 m (5')   Wt 60.6 kg (133 lb 9.6 oz)   SpO2 95%   BMI 26.09 kg/m²     General appearance: Seems comfortable, no acute distress.  Neck: Trachea midline, thyroid normal.   Lungs:  Non labored breathing, CTA to anterior auscultation.  Heart:  S1S2 normal, rub or gallop. No peripheral edema.  Abdomen: Soft, non-tender, no organomegaly.   Skin: No lesions or rashes, warm to touch.     DATA:    CBC with Differential:    Lab Results   Component Value Date/Time    WBC 6.1 02/09/2024 07:57 AM    RBC 3.47 02/09/2024 07:57 AM    HGB 9.8 02/09/2024 07:57 AM    HCT 31.2 02/09/2024 07:57 AM     02/09/2024 07:57 AM    MCV 89.8 02/09/2024 07:57 AM    MCH 28.1 02/09/2024 07:57 AM    MCHC 31.3 02/09/2024 07:57 AM    RDW 19.2 02/09/2024 07:57 AM    SEGSPCT 53.4 04/29/2015 04:14 PM    SEGSPCT 53.4 04/29/2015 04:14 PM    LYMPHOPCT 5.5 02/09/2024 07:57 AM    MONOPCT 6.2 02/09/2024 07:57 AM    EOSPCT 7.6 02/27/2012 11:08 AM    BASOPCT 1.8 02/09/2024 07:57 AM    MONOSABS 0.4 02/09/2024 07:57 AM    LYMPHSABS 0.3 02/09/2024 07:57 AM    EOSABS 0.0 02/09/2024 07:57 AM    BASOSABS 0.1 02/09/2024 07:57 AM    DIFFTYPE Auto 06/04/2011 02:30 AM     BMP:    Lab Results   Component Value Date/Time     02/09/2024 07:57 AM    K 5.0 02/09/2024 07:57 AM    CL 98 02/09/2024 07:57 AM    CO2 29 02/09/2024 07:57 AM    BUN 71 02/09/2024 07:57 AM    LABALBU 3.6 02/07/2024 09:50 AM    CREATININE 2.4 02/09/2024 07:57 AM    CALCIUM 9.6 02/09/2024 07:57 AM    GFRAA 33 10/14/2022 01:29 PM    GFRAA >60 06/04/2011 02:30 AM    LABGLOM 20 02/09/2024 07:57 AM    GLUCOSE 127 
Department of Internal Medicine  Nephrology Progress Note        SUBJECTIVE:    We are following this patient for A/CKD.  The patient was seen and examined; she feels well today with no CP, SOB, nausea or vomiting.    ROS: No fever or chills.  Social: No family at bedside.    Physical Exam:    VITALS:  /72   Pulse 86   Temp 97.6 °F (36.4 °C) (Oral)   Resp 18   Ht 1.524 m (5')   Wt 60.1 kg (132 lb 8 oz)   SpO2 94%   BMI 25.88 kg/m²     General appearance: Seems comfortable, no acute distress.  Neck: Trachea midline, thyroid normal.   Lungs:  Non labored breathing, CTA to anterior auscultation.  Heart:  S1S2 normal, rub or gallop. No peripheral edema.  Abdomen: Soft, non-tender, no organomegaly.   Skin: No lesions or rashes, warm to touch.     DATA:    CBC with Differential:    Lab Results   Component Value Date/Time    WBC 3.7 02/10/2024 05:13 AM    RBC 3.17 02/10/2024 05:13 AM    HGB 8.9 02/10/2024 05:13 AM    HCT 28.2 02/10/2024 05:13 AM     02/10/2024 05:13 AM    MCV 88.9 02/10/2024 05:13 AM    MCH 27.9 02/10/2024 05:13 AM    MCHC 31.4 02/10/2024 05:13 AM    RDW 19.1 02/10/2024 05:13 AM    SEGSPCT 53.4 04/29/2015 04:14 PM    SEGSPCT 53.4 04/29/2015 04:14 PM    LYMPHOPCT 14.4 02/10/2024 05:13 AM    MONOPCT 11.8 02/10/2024 05:13 AM    EOSPCT 7.6 02/27/2012 11:08 AM    BASOPCT 0.8 02/10/2024 05:13 AM    MONOSABS 0.4 02/10/2024 05:13 AM    LYMPHSABS 0.5 02/10/2024 05:13 AM    EOSABS 0.0 02/10/2024 05:13 AM    BASOSABS 0.0 02/10/2024 05:13 AM    DIFFTYPE Auto 06/04/2011 02:30 AM     BMP:    Lab Results   Component Value Date/Time     02/10/2024 05:13 AM    K 4.3 02/10/2024 05:13 AM     02/10/2024 05:13 AM    CO2 29 02/10/2024 05:13 AM    BUN 71 02/10/2024 05:13 AM    LABALBU 3.6 02/07/2024 09:50 AM    CREATININE 2.3 02/10/2024 05:13 AM    CALCIUM 9.0 02/10/2024 05:13 AM    GFRAA 33 10/14/2022 01:29 PM    GFRAA >60 06/04/2011 02:30 AM    LABGLOM 21 02/10/2024 05:13 AM    GLUCOSE 81 
HEART FAILURE CARE PLAN:    Comorbidities Reviewed: Yes   Patient has a past medical history of Anemia, Backache, unspecified, CAD, multiple vessel, Chronic kidney disease (CKD) stage G3b/A1, moderately decreased glomerular filtration rate (GFR) between 30-44 mL/min/1.73 square meter and albuminuria creatinine ratio less than 30 mg/g ([facility]), Chronic systolic CHF (congestive heart failure) ([facility]), COPD (chronic obstructive pulmonary disease) ([facility]), Depressive disorder, not elsewhere classified, Essential hypertension, benign, Gout, History of blood transfusion, Hyperlipidemia associated with type 2 diabetes mellitus ([facility]), Major depressive disorder, recurrent episode, moderate ([facility]), Osteoarthritis, hand, Osteoarthrosis, unspecified whether generalized or localized, lower leg, Osteopenia, Other and unspecified hyperlipidemia, Pain in joint, lower leg, Pneumonia, Rotator cuff tendinitis, Tear of medial cartilage or meniscus of knee, current, Type 2 diabetes mellitus with microalbuminuria, without long-term current use of insulin ([facility]), Type 2 diabetes mellitus without complication ([facility]), Type II or unspecified type diabetes mellitus without mention of complication, not stated as uncontrolled, and Uses LifeVest defibrillator.     Weights Reviewed: Yes   Admission weight: 60.3 kg (133 lb)   Wt Readings from Last 3 Encounters:   02/09/24 60.6 kg (133 lb 9.6 oz)   01/31/24 60.7 kg (133 lb 13.1 oz)   01/02/24 71.2 kg (157 lb)     Intake & Output Reviewed: Yes     Intake/Output Summary (Last 24 hours) at 2/9/2024 1001  Last data filed at 2/9/2024 0915  Gross per 24 hour   Intake 522 ml   Output 700 ml   Net -178 ml       ECHOCARDIOGRAM Reviewed: Yes   Patient's Ejection Fraction (EF) is less than or equal to 40%. Discuss HFrEF Guideline Directed Medical Therapy (GDMT) with Cardiologist or Hospitalist:          Medications Reviewed: Yes   SCHEDULED HOSPITAL MEDICATIONS:   allopurinol  150 mg Oral Daily    atorvastatin  40 mg 
Hand off report given to  POORNIMA De Los Santos.   Patient is stable showing no signs of distress and has no current needs at this time.   Call light is in reach and bed is in lowest position.    Care is transferred at this time.     
Handoff report given to Eloise RN.  Patient is in stable condition and has no needs at this time. Call light is in reach and bed is in the lowest position.  Care is transferred at this time.    
Internal Medicine Progress Note    Date: 02/10/24    Subjective: Ms Fuentes says she feels great. She wants to go home however sitting up earlier today she became quite light-headed. She has no other new symptoms. No signs of bleeding.     Physical Exam:  Gen: No distress. Alert. Ill and unkempt appearing.  Eyes: PERRL. No sclera icterus. No conjunctival injection.   ENT: No discharge. Pharynx clear.   Neck: No JVD.  Trachea midline.  Resp: No accessory muscle use. No crackles. No wheezes. No rhonchi.   CV: Regular rate. Regular rhythm. +Soft murmur.  No rub. Trace edema bilaterally.    Peripheral Pulses: +2 palpable, equal bilaterally   GI: Non-tender. Non-distended.  Normal bowel sounds.  Skin: Warm and dry. No nodule on exposed extremities. No rash on exposed extremities.   M/S: No cyanosis. No joint deformity. No clubbing.   Neuro: Awake. Grossly nonfocal    Psych: Oriented x 3. No anxiety or agitation.       Objective:  Vitals:    02/10/24 0658 02/10/24 0904 02/10/24 0915 02/10/24 1043   BP: 120/73 131/72 134/72 132/75   Pulse: 82 80 86 82   Resp: 18      Temp: 98 °F (36.7 °C)  97.6 °F (36.4 °C) 97.5 °F (36.4 °C)   TempSrc: Oral  Oral Oral   SpO2: 95%  94% 95%   Weight:       Height:           Labs:  CBC:   Recent Labs     02/08/24  0745 02/08/24  1541 02/09/24  0757 02/09/24  1542 02/10/24  0513   WBC 7.7  --  6.1  --  3.7*   HGB 9.5*   < > 9.8* 8.5* 8.9*   HCT 29.9*   < > 31.2* 27.0* 28.2*   MCV 90.3  --  89.8  --  88.9     --  180  --  175    < > = values in this interval not displayed.       BMP:   Recent Labs     02/08/24  0745 02/09/24  0757 02/10/24  0513    140 142   K 5.1 5.0 4.3    98* 103   CO2 23 29 29   BUN 65* 71* 71*   CREATININE 2.2* 2.4* 2.3*       LIVER PROFILE:   No results for input(s): \"AST\", \"ALT\", \"LIPASE\", \"AMYLASE\", \"ALB\", \"BILIDIR\", \"BILITOT\", \"ALKPHOS\" in the last 72 hours.    PT/INR: No results for input(s): \"PROTIME\", \"INR\" in the last 72 hours.  APTT: No results 
PCA called Staff Assist due to Pt feeling lightheaded and looking like she was going to faint when the PCA sat her up for a bath. Blood sugar and BP were normal. Pt was laid back down and recovered. Provider notified.      /72   Pulse 86   Temp 97.6 °F (36.4 °C) (Oral)   Resp 18   Ht 1.524 m (5')   Wt 60.1 kg (132 lb 8 oz)   SpO2 94%   BMI 25.88 kg/m²     
Patient admitted to room 319 from ED. Patient oriented to room, call light, bed rails, phone, lights and bathroom. Patient instructed about the schedule of the day including: vital sign frequency, lab draws, possible tests, frequency of MD and staff rounds, daily weights, I &O's and prescribed diet.  Telemetry box in place, patient aware of placement and reason. Bed locked, in lowest position, side rails up 2/4, call light within reach.        Recliner Assessment  Patient is able to demonstrate the ability to move from a reclining position to an upright position within the recliner.       4 Eyes Skin Assessment     NAME:  Amber Fuentes  YOB: 1945  MEDICAL RECORD NUMBER:  [id number]    The patient is being assessed for  Admission    I agree that at least one RN has performed a thorough Head to Toe Skin Assessment on the patient. ALL assessment sites listed below have been assessed.      Areas assessed by both nurses:    Head, Face, Ears, Shoulders, Back, Chest, Arms, Elbows, Hands, Sacrum. Buttock, Coccyx, Ischium, Legs. Feet and Heels, and Under Medical Devices      Abrasions, bruising scattered and across abdomen. Scab/bruise on sacrum.bites on bilat legs      Does the Patient have a Wound? No noted wound(s)       Alek Prevention initiated by RN: No  Wound Care Orders initiated by RN: No    Pressure Injury (Stage 3,4, Unstageable, DTI, NWPT, and Complex wounds) if present, place Wound referral order by RN under : No    New Ostomies, if present place, Ostomy referral order under : No     Nurse 1 eSignature: Electronically signed by Yeimi Lopez RN on 2/7/24 at 9:43 PM EST    **SHARE this note so that the co-signing nurse can place an eSignature**    Nurse 2 eSignature: {Esignature:[id number]}    
Patient admitted to room 319 from ER. Patient oriented to room, call light, bed rails, phone, lights and bathroom. Patient instructed about the schedule of the day including: vital sign frequency, lab draws, possible tests, frequency of MD and staff rounds, daily weights, I &O's and prescribed diet.  Telemetry box in place, patient aware of placement and reason. Bed locked, in lowest position, side rails up 2/4, call light within reach.        Recliner Assessment  Patient is able to demonstrate the ability to move from a reclining position to an upright position within the recliner.       4 Eyes Skin Assessment     NAME:  Amber Fuentes  YOB: 1945  MEDICAL RECORD NUMBER:  [id number]    The patient is being assessed for  Admission    I agree that at least one RN has performed a thorough Head to Toe Skin Assessment on the patient. ALL assessment sites listed below have been assessed.      Areas assessed by both nurses: Jimy and Sandra    Scattered bruising to BLE, large area of stomach and scrape that is scabbed to mid buttock.  Small bedbug bites to lower BLE.        Head, Face, Ears, Shoulders, Back, Chest, Arms, Elbows, Hands, Sacrum. Buttock, Coccyx, Ischium, Legs. Feet and Heels, and Under Medical Devices         Does the Patient have a Wound? No noted wound(s)       Alek Prevention initiated by RN: No  Wound Care Orders initiated by RN: No    Pressure Injury (Stage 3,4, Unstageable, DTI, NWPT, and Complex wounds) if present, place Wound referral order by RN under : No    New Ostomies, if present place, Ostomy referral order under : No     Nurse 1 eSignature: Electronically signed by Sandra Beltran RN on 2/7/24 at 6:39 PM EST    **SHARE this note so that the co-signing nurse can place an eSignature**    Nurse 2 eSignature: Electronically signed by Jimy Soares RN on 2/7/24 at 7:05 PM EST    
Pt awake in bed. Assessment completed and mediations given per MAR. VS as charted in flowsheet. A&Ox4. Pt denies any further needs at this time. Call light in reach and bed in lowest position.   
Pt discharged in stable condition to lobby with .  
Pt had a run of 32 beats of PVCs. Pt /62 HR 79. Pt sleeping. Writer woke patient up she states she feels fine. Dr. Gomez made aware. No new orders at this time.   
Pt requesting cough medication. JOSETTE Rust gave new order for Robitussin dm 10ml q 8hrs PRN.   
Shift assessment completed. Vital signs stable. Call light in reach and standard safety measures in place. Pt resting in bed; no needs or complaints at this time.    
Shift assessment completed. Vital signs stable. Call light in reach and standard safety measures in place. Pt resting in bed; no needs or complaints at this time.    
Shift report given to nurse Jalloh at bedside. Patient care handed off in stable condition at this time. Sandra Beltran RN      
Telephone update given to , he is planning to bring life pack heart monitor battery and  tomorrow.   
  UROBILINOGEN 4.0*   BILIRUBINUR Negative   BLOODU Negative   GLUCOSEU 100*        XR CHEST PORTABLE   Final Result   Cardiac enlargement and vascular prominence.  This may reflect failure in the   correct clinical setting.               Assessment & Plan:  #COVID-19  -stable on room air  -droplet plus precautions     #Acute on chronic systolic CHF  -pro-BNP >18k  -CXR shows cardiac enlargement and vascular prominence  -last echo 1/23/24 EF 25%  -resumed GDMT including entresto and metoprolol  -last admission, BP did not tolerate jardiance/aldactone   -IV lasix 40 mg daily   -low Na diet, strict I&Os, daily weights  -no further testing planned   -cardiology following    #CKD3  -nephrology consulted for assistance with diuresis   -monitor BMP closely     #Elevated troponin  #CAD s/p CABG  -suspect demand in setting of COVID and CHFae  -EKG non-specific ST, T wave changes  -cardiology consulted as above, no further testing planned   -continue ASA, statin, BB  -monitor telemetry     #Asymptomatic V tach  -noted last admission, again this admission  -life vest arranged last hospital discharge on 1/21 with plans for EP follow-up    #S/p MVR    #HTN  -continue home medications     #T2DM  -lantus + SSI  -monitor BG    #Gout  -continue allopurinol    #Bed bug infestation  -patient decontaminated in ED       Hospice meeting today    DVT Prophylaxis: Heparin  Diet: ADULT DIET; Regular; 4 carb choices (60 gm/meal); 1800 ml   Code Status: Full Code     Noa Arevalo PA-C   2/8/2024  10:01 AM

## 2024-02-11 NOTE — DISCHARGE SUMMARY
Hospital Medicine Discharge Summary    Patient: Amber Fuentes     Gender: female  : 1945   Age: 78 y.o.  MRN: [id number]    Admitting Physician: Cooper Swanson MD  Discharge Physician: Debora Da Silva DO    Code Status: Full Code     Admit Date: 2024   Discharge Date: 2024     Discharge Diagnoses:    Active Hospital Problems    Diagnosis Date Noted    Essential hypertension, benign [I10] 2007     Priority: High    COVID-19 [U07.1] 2024    Acute kidney injury superimposed on CKD (HCC) [N17.9, N18.9] 2024    Uses LifeVest defibrillator [Z95.810] 2024    NSVT (nonsustained ventricular tachycardia) ([facility]) [I47.29] 2024    Elevated troponin [R79.89] 2024    Acute on chronic systolic CHF (congestive heart failure) ([facility]) [I50.23] 2019    Type 2 diabetes mellitus with microalbuminuria, without long-term current use of insulin ([facility]) [E11.29, R80.9] 2017     Condition at Discharge: Stable    Hospital Course:     #COVID-19  -stable on room air  -droplet plus precautions   -light headed this am but not hypoxic, likely due to infection. She reports symptoms are resolved. Will keep inpatient to monitor and see if any further symptoms develop.      #Acute on chronic systolic CHF - resolved  -pro-BNP >18k  -CXR shows cardiac enlargement and vascular prominence  -last echo 24 EF 25%  -resumed GDMT including entresto and metoprolol  -last admission, BP did not tolerate jardiance/aldactone   -IV lasix 40 mg daily   -low Na diet, strict I&Os, daily weights  -no further testing planned currently  -cardiology consulted  - resum ehome medications     #LANCE on CKD3 - resolving  -nephrology consulted for assistance with diuresis   -monitor BMP closely   -gentle IVF overnight - now off, Cr improving      #Elevated troponin  #CAD s/p CABG  -suspect demand in setting of COVID and CHFae  -EKG non-specific ST, T wave changes  -cardiology consulted as above, no further testing

## 2024-02-11 NOTE — CARE COORDINATION
CASE MANAGEMENT DISCHARGE SUMMARY      Discharge to: Home with [facility]    Precertification completed: N/A  Hospital Exemption Notification (HENS) completed: N/A    IMM given: (date) 2/8/24    New Durable Medical Equipment ordered/agency: N/A    Transportation:    Family/car: spouse/ Private car     Confirmed discharge plan with: Patient is active with [facility] and O2 provided through  [facility]. I have called [facility] to see if ne HHC orders are need to resume, but no answer all contact information provided in      Patient: yes     Family:  yes    Name:Stanford Contact number:[phone]           RN, name:  Delicia    Note: Discharging nurse to complete EULALIO, reconcile AVS, and place final copy with patient's discharge packet. RN to ensure that written prescriptions for  Level II medications are sent with patient to the facility as per protocol.  .Karli Nazario RN

## 2024-02-12 ENCOUNTER — CARE COORDINATION (OUTPATIENT)
Dept: CASE MANAGEMENT | Age: 79
End: 2024-02-12

## 2024-02-12 NOTE — DISCHARGE INSTR - COC
Continuity of Care Form    Patient Name: Amber Fuentes   :  1945  MRN:  [id number]    Admit date:  2024  Discharge date:  ***    Code Status Order: Prior   Advance Directives:     Admitting Physician:  Debora Da Silva DO  PCP: Lucila Tejeda MD    Discharging Nurse: ***  Discharging Hospital Unit/Room#: /0319-01  Discharging Unit Phone Number: ***    Emergency Contact:   Extended Emergency Contact Information  Primary Emergency Contact: Stanford Fuentes  Address: [address] [address] [facility]  Home Phone: [phone]  Mobile Phone: [phone]  Relation: Spouse   needed? No  Secondary Emergency Contact: Sandra De Leon  Home Phone: [phone]  Relation: Child    Past Surgical History:  Past Surgical History:   Procedure Laterality Date    APPENDECTOMY      CARDIAC SURGERY       SECTION      CHOLECYSTECTOMY      CORONARY ARTERY BYPASS GRAFT N/A 2020    CORONARY ARTERY BYPASS GRAFTING X3, INTERNAL MAMMARY ARTERY, SAPHENOUS VEIN GRAFTING, ON PUMP MITRAL VALVE RING REPAIR USING 26MM PELAEZ PHYSIO II RING, WITH LEFT ATRIAL APPENDAGE CLIP, PFO CLOSURETEE, 5 LEVEL BILATERAL INTERCOSTAL NERVE BLOCK performed by Olivia Hoffmann MD at [facility]    HYSTERECTOMY (CERVIX STATUS UNKNOWN)      total ab hyst    PARATHYROID GLAND SURGERY      explore parathyroid glands    TOTAL KNEE ARTHROPLASTY  2010    left knee       Immunization History:   Immunization History   Administered Date(s) Administered    COVID-19, MODERNA BLUE border, Primary or Immunocompromised, (age 12y+), IM, 100 mcg/0.5mL 2021, 2021    COVID-19, MODERNA Booster BLUE border, (age 18y+), IM, 50mcg/0.25mL 2021    Influenza 2011, 2012    Influenza Virus Vaccine 09/15/2014, 10/02/2015    Influenza Whole 2010    Influenza, FLUAD, (age 65 y+), Adjuvanted, 0.5mL 10/14/2022    Influenza, FLUZONE (age 65 y+), High

## 2024-02-12 NOTE — CARE COORDINATION
Care Transitions Initial Follow Up Call    Call within 2 business days of discharge: Yes    Patient: Amber Fuentes Patient : 1945   MRN: [id number]  Reason for Admission: 3 day readmission -> COVID-19 +2/7, acute on chronic systolic CHF, LANCE on CKD3, elevated troponin, CAD s/p CABG, asymptomatic V Tach, s/p MVR, HTN, DM2, gout, bed bug infestation -> home w/ [facility] DEBO (no order?), O2 [employer] 2L baseline, possible [place]?, hold Entresto until f/up appointment  Discharge Date: 24 RARS: Readmission Risk Score: 21.5      Last Discharge Facility       Date Complaint Diagnosis Description Type Department Provider    24 Shortness of Breath Elevated troponin ... ED to Hosp-Admission (Discharged) [place]ITT[place] [facility] [place]a Da Silva DO; Stanford Suresh ...     [id number] contacted [facility] (Evelin) to see if they got orders and notification of patient DC home 2/10/2024. Intake is not in yet. Evelin states they will need orders since she was admitted in inpatient status but also took message and will have  return call. CTN left message on PCU CM voice mail that HC orders likely needed.     09-outreach to patient. No answer. Unable to leave message. Has PCP appt on  as noted below.     Follow Up  Future Appointments   Date Time Provider Department Center   2024  3:20 PM Saundra Bond APRN - CNP [facility]   2024  9:45 AM Tanvir Forman APRN - CNP [facility]   2024  1:15 PM Omar Shaikh MD [facility]   2024  3:00 PM Lucila Tejeda MD [facility] None     Soledad Fam RN  Care Transition Nurse  [phone] mobile

## 2024-02-13 ENCOUNTER — CARE COORDINATION (OUTPATIENT)
Dept: CASE MANAGEMENT | Age: 79
End: 2024-02-13

## 2024-02-13 ENCOUNTER — FOLLOWUP TELEPHONE ENCOUNTER (OUTPATIENT)
Dept: ADMINISTRATIVE | Age: 79
End: 2024-02-13

## 2024-02-13 NOTE — CARE COORDINATION
Care Transitions Initial Follow Up Call    Call within 2 business days of discharge: Yes    Patient: Amber Fuentes Patient : 1945   MRN: [id number]  Reason for Admission: 3 day readmission -> COVID-19 +2/7, acute on chronic systolic CHF, LANCE on CKD3, elevated troponin, CAD s/p CABG, asymptomatic V Tach, s/p MVR, HTN, DM2, gout, bed bug infestation -> home w/ [facility] DEBO (no order?), O2 [employer] 2L baseline, possible [place]?, hold Entresto until f/up appointment  Discharge Date: 24 RARS: Readmission Risk Score: 21.5    Last Discharge Facility       Date Complaint Diagnosis Description Type Department Provider    24 Shortness of Breath Elevated troponin ... ED to Hosp-Admission (Discharged) (ADMITTED) [facility] [place] [place] Debora Da Silva DO; Stanford Suresh ...     CTN attempted follow-up outreach to patient. Message left including CTN contact information. No further CTN outreach scheduled. Outreach to [facility] (Kimberly)-message left with CTN and PCU CM contact numbers if orders needed.    Follow Up  Future Appointments   Date Time Provider Department Center   2024  3:20 PM Saundra Bond APRN - CNP [facility] None   2024  1:15 PM Omar Shaikh MD [facility]   2024  2:15 PM Tanvir Forman APRN - CNP [facility]   2024  3:00 PM Lucila Tejeda MD [facility] None     Soledad Fam RN  Care Transition Nurse  [phone] mobile

## 2024-02-13 NOTE — TELEPHONE ENCOUNTER
Heart Failure Follow-up Call:     1st Attempt by Soledad Fam on 2/12    2nd Attempt by Soledad Fam on 2/13    3rd Attempt by writer 2/13; No Answer- unable to leave message on phone phone. Called spouse Raheel's cell phone at [phone]. Phone rang with no VM to leave message. Patient has contact information from the hospital.     Electronically signed by KEMAL Smith, RN  on 2/13/2024 at 12:20 PM

## 2024-02-23 ENCOUNTER — OFFICE VISIT (OUTPATIENT)
Dept: INTERNAL MEDICINE CLINIC | Age: 79
End: 2024-02-23

## 2024-02-23 VITALS — SYSTOLIC BLOOD PRESSURE: 189 MMHG | HEART RATE: 98 BPM | DIASTOLIC BLOOD PRESSURE: 85 MMHG

## 2024-02-23 DIAGNOSIS — E11.29 CONTROLLED TYPE 2 DIABETES MELLITUS WITH MICROALBUMINURIA, WITH LONG-TERM CURRENT USE OF INSULIN (HCC): ICD-10-CM

## 2024-02-23 DIAGNOSIS — N18.30 STAGE 3 CHRONIC KIDNEY DISEASE, UNSPECIFIED WHETHER STAGE 3A OR 3B CKD (HCC): ICD-10-CM

## 2024-02-23 DIAGNOSIS — Z79.4 CONTROLLED TYPE 2 DIABETES MELLITUS WITH MICROALBUMINURIA, WITH LONG-TERM CURRENT USE OF INSULIN (HCC): ICD-10-CM

## 2024-02-23 DIAGNOSIS — E11.69 HYPERLIPIDEMIA ASSOCIATED WITH TYPE 2 DIABETES MELLITUS (HCC): ICD-10-CM

## 2024-02-23 DIAGNOSIS — R80.9 CONTROLLED TYPE 2 DIABETES MELLITUS WITH MICROALBUMINURIA, WITH LONG-TERM CURRENT USE OF INSULIN (HCC): ICD-10-CM

## 2024-02-23 DIAGNOSIS — I25.10 CAD, MULTIPLE VESSEL: ICD-10-CM

## 2024-02-23 DIAGNOSIS — Z09 HOSPITAL DISCHARGE FOLLOW-UP: ICD-10-CM

## 2024-02-23 DIAGNOSIS — E78.5 HYPERLIPIDEMIA ASSOCIATED WITH TYPE 2 DIABETES MELLITUS (HCC): ICD-10-CM

## 2024-02-23 DIAGNOSIS — I10 ESSENTIAL HYPERTENSION, BENIGN: ICD-10-CM

## 2024-02-23 DIAGNOSIS — I50.22 CHRONIC SYSTOLIC CHF (CONGESTIVE HEART FAILURE) (HCC): Primary | ICD-10-CM

## 2024-02-23 DIAGNOSIS — M1A.09X0 IDIOPATHIC CHRONIC GOUT OF MULTIPLE SITES WITHOUT TOPHUS: ICD-10-CM

## 2024-02-23 PROCEDURE — 3079F DIAST BP 80-89 MM HG: CPT | Performed by: NURSE PRACTITIONER

## 2024-02-23 PROCEDURE — 1111F DSCHRG MED/CURRENT MED MERGE: CPT | Performed by: NURSE PRACTITIONER

## 2024-02-23 PROCEDURE — 99213 OFFICE O/P EST LOW 20 MIN: CPT | Performed by: NURSE PRACTITIONER

## 2024-02-23 PROCEDURE — 3077F SYST BP >= 140 MM HG: CPT | Performed by: NURSE PRACTITIONER

## 2024-02-23 PROCEDURE — 1124F ACP DISCUSS-NO DSCNMKR DOCD: CPT | Performed by: NURSE PRACTITIONER

## 2024-02-23 PROCEDURE — 3051F HG A1C>EQUAL 7.0%<8.0%: CPT | Performed by: NURSE PRACTITIONER

## 2024-02-23 RX ORDER — METOPROLOL SUCCINATE 25 MG/1
TABLET, EXTENDED RELEASE ORAL
Qty: 270 TABLET | Refills: 0 | Status: SHIPPED | OUTPATIENT
Start: 2024-02-23

## 2024-02-23 NOTE — PROGRESS NOTES
appointments to cardiology and Electrophysiology.     Patient Active Problem List   Diagnosis    Essential hypertension, benign    Osteoarthrosis involving lower leg    Gout    Osteoarthritis, hand    Osteopenia    Hyperlipidemia associated with type 2 diabetes mellitus ([facility])    Major depressive disorder, recurrent episode, moderate ([facility])    Primary insomnia    Type 2 diabetes mellitus with microalbuminuria, without long-term current use of insulin ([facility])    Other restrictive cardiomyopathy ([facility])    Nonrheumatic mitral valve regurgitation    Acute on chronic systolic CHF (congestive heart failure) ([facility])    CAD, multiple vessel    Ischemic cardiomyopathy    S/P angioplasty with stent    Chronic kidney disease    Acute on chronic combined systolic (congestive) and diastolic (congestive) heart failure ([facility])    S/P mitral valve repair    Chronic systolic CHF (congestive heart failure) ([facility])    Pulmonary nodules/lesions, multiple    Elevated troponin    Uses LifeVest defibrillator    NSVT (nonsustained ventricular tachycardia) ([facility])    COVID-19    Acute kidney injury superimposed on CKD ([facility])       Medications listed as started at the time of discharge from hospital  Cannot display discharge medications since this is not an admission.          Medications marked \"taking\" at this time  No outpatient medications have been marked as taking for the 2/23/24 encounter (Appointment) with Saundra Bond APRN - CNP.        Medications patient taking as of now reconciled against medications ordered at time of hospital discharge: Yes    Review of Systems    Objective:    Patient-Reported Vitals  Vitals:    02/23/24 0933   BP: (!) 189/85   Pulse: 98       Physical Exam  No physical exam done as it was a phone conversation.  Unable to do Facetime.       Amber Fuentes, was evaluated through a synchronous (real-time) audio-video encounter. The patient (or guardian if applicable) is aware that this is a billable service, which includes 
likely due to infection. She reports symptoms are resolved. Will keep inpatient to monitor and see if any further symptoms develop.      #Acute on chronic systolic CHF - resolved  -pro-BNP >18k  -CXR shows cardiac enlargement and vascular prominence  -last echo 1/23/24 EF 25%  -resumed GDMT including entresto and metoprolol  -last admission, BP did not tolerate jardiance/aldactone   -IV lasix 40 mg daily   -low Na diet, strict I&Os, daily weights  -no further testing planned currently  -cardiology consulted  - resum ehome medications     #LANCE on CKD3 - resolving  -nephrology consulted for assistance with diuresis   -monitor BMP closely   -gentle IVF overnight - now off, Cr improving      #Elevated troponin  #CAD s/p CABG  -suspect demand in setting of COVID and CHFae  -EKG non-specific ST, T wave changes  -cardiology consulted as above, no further testing planned   -continue ASA, statin, BB  -monitor telemetry      #Asymptomatic V tach  -noted last admission, again this admission  -life vest arranged last hospital discharge on 1/21 with plans for EP follow-up     #S/p MVR     #HTN  -continue home medications      #T2DM  -lantus + SSI  -monitor BG     #Gout  -continue allopurinol     #Bed bug infestation  -patient decontaminated in ED     No follow-ups on file.    Saundra ESTEBAN-C  2/23/2024

## 2024-02-27 ENCOUNTER — TELEPHONE (OUTPATIENT)
Dept: CARDIOLOGY CLINIC | Age: 79
End: 2024-02-27

## 2024-02-27 NOTE — TELEPHONE ENCOUNTER
This pt was scheduled on the same day she is to see  at [facility]. There will not be enough time for pt to be at [facility] for the CMV appt and then to travel to [facility] for the next appt same day. Her HSFU was scheduled after the CMV appt. Please contact pt to rs her [facility] appt for another date. Thank you so much.

## 2024-03-04 NOTE — TELEPHONE ENCOUNTER
LM to call back for Appointment.  She also no showed with [facility].    Confirm that she is wearing life vest and make appointments next available please.

## 2024-03-05 NOTE — TELEPHONE ENCOUNTER
Attempted to call pt, no answer. Unable to LMOVM for pt to return call back to office. Will try again at a later time.

## 2024-03-06 NOTE — TELEPHONE ENCOUNTER
Numerous attempts have been made to try to get into contact with pt, mailing letter and closing encounter.

## 2024-03-09 PROBLEM — R79.89 ELEVATED TROPONIN: Status: RESOLVED | Noted: 2024-01-22 | Resolved: 2024-03-09

## 2024-04-01 ENCOUNTER — APPOINTMENT (OUTPATIENT)
Dept: GENERAL RADIOLOGY | Age: 79
End: 2024-04-01
Payer: MEDICARE

## 2024-04-01 ENCOUNTER — HOSPITAL ENCOUNTER (INPATIENT)
Age: 79
LOS: 19 days | Discharge: HOSPICE | End: 2024-04-20
Attending: STUDENT IN AN ORGANIZED HEALTH CARE EDUCATION/TRAINING PROGRAM | Admitting: INTERNAL MEDICINE
Payer: MEDICARE

## 2024-04-01 ENCOUNTER — APPOINTMENT (OUTPATIENT)
Dept: CT IMAGING | Age: 79
End: 2024-04-01
Payer: MEDICARE

## 2024-04-01 DIAGNOSIS — E87.5 HYPERKALEMIA: Primary | ICD-10-CM

## 2024-04-01 DIAGNOSIS — J18.9 PNEUMONIA OF RIGHT LOWER LOBE DUE TO INFECTIOUS ORGANISM: ICD-10-CM

## 2024-04-01 DIAGNOSIS — R18.8 OTHER ASCITES: ICD-10-CM

## 2024-04-01 DIAGNOSIS — R00.1 BRADYCARDIA: ICD-10-CM

## 2024-04-01 DIAGNOSIS — R60.1 ANASARCA: ICD-10-CM

## 2024-04-01 DIAGNOSIS — J90 PLEURAL EFFUSION: ICD-10-CM

## 2024-04-01 DIAGNOSIS — N17.9 ACUTE RENAL FAILURE, UNSPECIFIED ACUTE RENAL FAILURE TYPE (HCC): ICD-10-CM

## 2024-04-01 PROBLEM — E87.20 METABOLIC ACIDOSIS, NORMAL ANION GAP (NAG): Status: ACTIVE | Noted: 2024-04-01

## 2024-04-01 LAB
ALBUMIN SERPL-MCNC: 3.2 G/DL (ref 3.4–5)
ALBUMIN SERPL-MCNC: 3.3 G/DL (ref 3.4–5)
ALBUMIN/GLOB SERPL: 0.9 {RATIO} (ref 1.1–2.2)
ALBUMIN/GLOB SERPL: 1 {RATIO} (ref 1.1–2.2)
ALP SERPL-CCNC: 261 U/L (ref 40–129)
ALP SERPL-CCNC: 269 U/L (ref 40–129)
ALT SERPL-CCNC: 24 U/L (ref 10–40)
ALT SERPL-CCNC: 25 U/L (ref 10–40)
AMORPH SED URNS QL MICRO: ABNORMAL /HPF
ANION GAP SERPL CALCULATED.3IONS-SCNC: 14 MMOL/L (ref 3–16)
ANION GAP SERPL CALCULATED.3IONS-SCNC: 14 MMOL/L (ref 3–16)
AST SERPL-CCNC: 38 U/L (ref 15–37)
AST SERPL-CCNC: 39 U/L (ref 15–37)
BACTERIA URNS QL MICRO: ABNORMAL /HPF
BASE EXCESS BLDV CALC-SCNC: -11.4 MMOL/L (ref -3–3)
BASOPHILS # BLD: 0.1 K/UL (ref 0–0.2)
BASOPHILS NFR BLD: 1.3 %
BILIRUB SERPL-MCNC: 1.1 MG/DL (ref 0–1)
BILIRUB SERPL-MCNC: 1.1 MG/DL (ref 0–1)
BILIRUB UR QL STRIP.AUTO: ABNORMAL
BUN SERPL-MCNC: 53 MG/DL (ref 7–20)
BUN SERPL-MCNC: 53 MG/DL (ref 7–20)
CALCIUM SERPL-MCNC: 8.3 MG/DL (ref 8.3–10.6)
CALCIUM SERPL-MCNC: 8.4 MG/DL (ref 8.3–10.6)
CHLORIDE SERPL-SCNC: 108 MMOL/L (ref 99–110)
CHLORIDE SERPL-SCNC: 110 MMOL/L (ref 99–110)
CLARITY UR: CLEAR
CO2 BLDV-SCNC: 17 MMOL/L
CO2 SERPL-SCNC: 15 MMOL/L (ref 21–32)
CO2 SERPL-SCNC: 17 MMOL/L (ref 21–32)
COARSE GRAN CASTS #/AREA URNS LPF: ABNORMAL /LPF (ref 0–2)
COHGB MFR BLDV: 4.4 % (ref 0–1.5)
COLOR UR: YELLOW
CREAT SERPL-MCNC: 2.4 MG/DL (ref 0.6–1.2)
CREAT SERPL-MCNC: 2.4 MG/DL (ref 0.6–1.2)
DEPRECATED RDW RBC AUTO: 20.1 % (ref 12.4–15.4)
EOSINOPHIL # BLD: 0.2 K/UL (ref 0–0.6)
EOSINOPHIL NFR BLD: 2.4 %
EPI CELLS #/AREA URNS HPF: ABNORMAL /HPF (ref 0–5)
FLUAV RNA RESP QL NAA+PROBE: NOT DETECTED
FLUBV RNA RESP QL NAA+PROBE: NOT DETECTED
GFR SERPLBLD CREATININE-BSD FMLA CKD-EPI: 20 ML/MIN/{1.73_M2}
GFR SERPLBLD CREATININE-BSD FMLA CKD-EPI: 20 ML/MIN/{1.73_M2}
GLUCOSE BLD-MCNC: 82 MG/DL (ref 70–99)
GLUCOSE SERPL-MCNC: 90 MG/DL (ref 70–99)
GLUCOSE SERPL-MCNC: 98 MG/DL (ref 70–99)
GLUCOSE UR STRIP.AUTO-MCNC: NEGATIVE MG/DL
HCO3 BLDV-SCNC: 15.6 MMOL/L (ref 23–29)
HCT VFR BLD AUTO: 39.5 % (ref 36–48)
HGB BLD-MCNC: 12 G/DL (ref 12–16)
HGB UR QL STRIP.AUTO: NEGATIVE
KETONES UR STRIP.AUTO-MCNC: NEGATIVE MG/DL
LACTATE BLDV-SCNC: 2.2 MMOL/L (ref 0.4–2)
LEUKOCYTE ESTERASE UR QL STRIP.AUTO: NEGATIVE
LIPASE SERPL-CCNC: 30 U/L (ref 13–60)
LYMPHOCYTES # BLD: 1.2 K/UL (ref 1–5.1)
LYMPHOCYTES NFR BLD: 17.5 %
MCH RBC QN AUTO: 27.9 PG (ref 26–34)
MCHC RBC AUTO-ENTMCNC: 30.4 G/DL (ref 31–36)
MCV RBC AUTO: 91.9 FL (ref 80–100)
METHGB MFR BLDV: 0 %
MONOCYTES # BLD: 0.6 K/UL (ref 0–1.3)
MONOCYTES NFR BLD: 8.2 %
NEUTROPHILS # BLD: 4.9 K/UL (ref 1.7–7.7)
NEUTROPHILS NFR BLD: 70.6 %
NITRITE UR QL STRIP.AUTO: NEGATIVE
NT-PROBNP SERPL-MCNC: ABNORMAL PG/ML (ref 0–449)
O2 THERAPY: ABNORMAL
PCO2 BLDV: 39 MMHG (ref 40–50)
PERFORMED ON: NORMAL
PH BLDV: 7.22 [PH] (ref 7.35–7.45)
PH UR STRIP.AUTO: 5.5 [PH] (ref 5–8)
PLATELET # BLD AUTO: 228 K/UL (ref 135–450)
PMV BLD AUTO: 8.8 FL (ref 5–10.5)
PO2 BLDV: 49.8 MMHG (ref 25–40)
POTASSIUM SERPL-SCNC: 6.7 MMOL/L (ref 3.5–5.1)
POTASSIUM SERPL-SCNC: 7.5 MMOL/L (ref 3.5–5.1)
POTASSIUM SERPL-SCNC: 7.5 MMOL/L (ref 3.5–5.1)
PROCALCITONIN SERPL IA-MCNC: 0.13 NG/ML (ref 0–0.15)
PROT SERPL-MCNC: 6.4 G/DL (ref 6.4–8.2)
PROT SERPL-MCNC: 6.9 G/DL (ref 6.4–8.2)
PROT UR STRIP.AUTO-MCNC: >=300 MG/DL
RBC # BLD AUTO: 4.3 M/UL (ref 4–5.2)
RBC #/AREA URNS HPF: ABNORMAL /HPF (ref 0–4)
SAO2 % BLDV: 78 %
SARS-COV-2 RNA RESP QL NAA+PROBE: NOT DETECTED
SODIUM SERPL-SCNC: 139 MMOL/L (ref 136–145)
SODIUM SERPL-SCNC: 139 MMOL/L (ref 136–145)
SP GR UR STRIP.AUTO: >=1.03 (ref 1–1.03)
TROPONIN, HIGH SENSITIVITY: 59 NG/L (ref 0–14)
TROPONIN, HIGH SENSITIVITY: 61 NG/L (ref 0–14)
TSH SERPL DL<=0.005 MIU/L-ACNC: 8.25 UIU/ML (ref 0.27–4.2)
UA COMPLETE W REFLEX CULTURE PNL UR: YES
UA DIPSTICK W REFLEX MICRO PNL UR: YES
URN SPEC COLLECT METH UR: ABNORMAL
UROBILINOGEN UR STRIP-ACNC: 1 E.U./DL
WBC # BLD AUTO: 7 K/UL (ref 4–11)
WBC #/AREA URNS HPF: ABNORMAL /HPF (ref 0–5)

## 2024-04-01 PROCEDURE — 93005 ELECTROCARDIOGRAM TRACING: CPT | Performed by: PHYSICIAN ASSISTANT

## 2024-04-01 PROCEDURE — 87086 URINE CULTURE/COLONY COUNT: CPT

## 2024-04-01 PROCEDURE — 71045 X-RAY EXAM CHEST 1 VIEW: CPT

## 2024-04-01 PROCEDURE — 84145 PROCALCITONIN (PCT): CPT

## 2024-04-01 PROCEDURE — 84132 ASSAY OF SERUM POTASSIUM: CPT

## 2024-04-01 PROCEDURE — 2580000003 HC RX 258: Performed by: PHYSICIAN ASSISTANT

## 2024-04-01 PROCEDURE — 96375 TX/PRO/DX INJ NEW DRUG ADDON: CPT

## 2024-04-01 PROCEDURE — 2000000000 HC ICU R&B

## 2024-04-01 PROCEDURE — 83605 ASSAY OF LACTIC ACID: CPT

## 2024-04-01 PROCEDURE — 85025 COMPLETE CBC W/AUTO DIFF WBC: CPT

## 2024-04-01 PROCEDURE — 96374 THER/PROPH/DIAG INJ IV PUSH: CPT

## 2024-04-01 PROCEDURE — 84484 ASSAY OF TROPONIN QUANT: CPT

## 2024-04-01 PROCEDURE — 2500000003 HC RX 250 WO HCPCS: Performed by: STUDENT IN AN ORGANIZED HEALTH CARE EDUCATION/TRAINING PROGRAM

## 2024-04-01 PROCEDURE — 83690 ASSAY OF LIPASE: CPT

## 2024-04-01 PROCEDURE — 6370000000 HC RX 637 (ALT 250 FOR IP): Performed by: PHYSICIAN ASSISTANT

## 2024-04-01 PROCEDURE — 82803 BLOOD GASES ANY COMBINATION: CPT

## 2024-04-01 PROCEDURE — 84460 ALANINE AMINO (ALT) (SGPT): CPT

## 2024-04-01 PROCEDURE — 85610 PROTHROMBIN TIME: CPT

## 2024-04-01 PROCEDURE — 6360000002 HC RX W HCPCS: Performed by: STUDENT IN AN ORGANIZED HEALTH CARE EDUCATION/TRAINING PROGRAM

## 2024-04-01 PROCEDURE — 6360000002 HC RX W HCPCS: Performed by: PHYSICIAN ASSISTANT

## 2024-04-01 PROCEDURE — 2580000003 HC RX 258

## 2024-04-01 PROCEDURE — 80053 COMPREHEN METABOLIC PANEL: CPT

## 2024-04-01 PROCEDURE — 87040 BLOOD CULTURE FOR BACTERIA: CPT

## 2024-04-01 PROCEDURE — 84443 ASSAY THYROID STIM HORMONE: CPT

## 2024-04-01 PROCEDURE — 36415 COLL VENOUS BLD VENIPUNCTURE: CPT

## 2024-04-01 PROCEDURE — 99285 EMERGENCY DEPT VISIT HI MDM: CPT

## 2024-04-01 PROCEDURE — 74176 CT ABD & PELVIS W/O CONTRAST: CPT

## 2024-04-01 PROCEDURE — 81001 URINALYSIS AUTO W/SCOPE: CPT

## 2024-04-01 PROCEDURE — 6370000000 HC RX 637 (ALT 250 FOR IP): Performed by: STUDENT IN AN ORGANIZED HEALTH CARE EDUCATION/TRAINING PROGRAM

## 2024-04-01 PROCEDURE — 87636 SARSCOV2 & INF A&B AMP PRB: CPT

## 2024-04-01 PROCEDURE — 83880 ASSAY OF NATRIURETIC PEPTIDE: CPT

## 2024-04-01 PROCEDURE — 84439 ASSAY OF FREE THYROXINE: CPT

## 2024-04-01 PROCEDURE — 84450 TRANSFERASE (AST) (SGOT): CPT

## 2024-04-01 RX ORDER — BUMETANIDE 0.25 MG/ML
2 INJECTION INTRAMUSCULAR; INTRAVENOUS ONCE
Status: DISCONTINUED | OUTPATIENT
Start: 2024-04-01 | End: 2024-04-01 | Stop reason: ALTCHOICE

## 2024-04-01 RX ORDER — FUROSEMIDE 10 MG/ML
20 INJECTION INTRAMUSCULAR; INTRAVENOUS ONCE
Status: DISCONTINUED | OUTPATIENT
Start: 2024-04-01 | End: 2024-04-01

## 2024-04-01 RX ORDER — ALBUTEROL SULFATE 2.5 MG/3ML
10 SOLUTION RESPIRATORY (INHALATION) ONCE
Status: DISCONTINUED | OUTPATIENT
Start: 2024-04-01 | End: 2024-04-01

## 2024-04-01 RX ORDER — CALCIUM GLUCONATE 20 MG/ML
1000 INJECTION, SOLUTION INTRAVENOUS ONCE
Status: DISCONTINUED | OUTPATIENT
Start: 2024-04-01 | End: 2024-04-01 | Stop reason: ALTCHOICE

## 2024-04-01 RX ORDER — DEXTROSE MONOHYDRATE 100 MG/ML
INJECTION, SOLUTION INTRAVENOUS CONTINUOUS PRN
Status: DISCONTINUED | OUTPATIENT
Start: 2024-04-01 | End: 2024-04-01

## 2024-04-01 RX ORDER — GLUCAGON 1 MG/ML
1 KIT INJECTION PRN
Status: DISCONTINUED | OUTPATIENT
Start: 2024-04-01 | End: 2024-04-20 | Stop reason: HOSPADM

## 2024-04-01 RX ORDER — CALCIUM GLUCONATE 94 MG/ML
1000 INJECTION, SOLUTION INTRAVENOUS ONCE
Status: COMPLETED | OUTPATIENT
Start: 2024-04-01 | End: 2024-04-01

## 2024-04-01 RX ORDER — GLUCAGON 1 MG/ML
1 KIT INJECTION PRN
Status: DISCONTINUED | OUTPATIENT
Start: 2024-04-01 | End: 2024-04-02 | Stop reason: SDUPTHER

## 2024-04-01 RX ORDER — LEVOFLOXACIN 5 MG/ML
750 INJECTION, SOLUTION INTRAVENOUS ONCE
Status: COMPLETED | OUTPATIENT
Start: 2024-04-01 | End: 2024-04-01

## 2024-04-01 RX ORDER — ALBUTEROL SULFATE 2.5 MG/3ML
10 SOLUTION RESPIRATORY (INHALATION) ONCE
Status: COMPLETED | OUTPATIENT
Start: 2024-04-01 | End: 2024-04-01

## 2024-04-01 RX ORDER — DEXTROSE MONOHYDRATE 100 MG/ML
INJECTION, SOLUTION INTRAVENOUS
Status: COMPLETED
Start: 2024-04-01 | End: 2024-04-02

## 2024-04-01 RX ORDER — ALBUTEROL SULFATE 2.5 MG/3ML
10 SOLUTION RESPIRATORY (INHALATION) ONCE
Status: DISCONTINUED | OUTPATIENT
Start: 2024-04-01 | End: 2024-04-02 | Stop reason: HOSPADM

## 2024-04-01 RX ORDER — DEXTROSE MONOHYDRATE 100 MG/ML
INJECTION, SOLUTION INTRAVENOUS CONTINUOUS PRN
Status: DISCONTINUED | OUTPATIENT
Start: 2024-04-01 | End: 2024-04-20 | Stop reason: HOSPADM

## 2024-04-01 RX ORDER — DEXTROSE MONOHYDRATE 25 G/50ML
25 INJECTION, SOLUTION INTRAVENOUS ONCE
Status: DISCONTINUED | OUTPATIENT
Start: 2024-04-01 | End: 2024-04-01 | Stop reason: ALTCHOICE

## 2024-04-01 RX ORDER — BUMETANIDE 0.25 MG/ML
3 INJECTION INTRAMUSCULAR; INTRAVENOUS ONCE
Status: COMPLETED | OUTPATIENT
Start: 2024-04-01 | End: 2024-04-01

## 2024-04-01 RX ADMIN — SODIUM ZIRCONIUM CYCLOSILICATE 10 G: 10 POWDER, FOR SUSPENSION ORAL at 23:51

## 2024-04-01 RX ADMIN — BUMETANIDE 3 MG: 0.25 INJECTION INTRAMUSCULAR; INTRAVENOUS at 23:27

## 2024-04-01 RX ADMIN — DEXTROSE MONOHYDRATE 250 ML: 10 INJECTION, SOLUTION INTRAVENOUS at 23:48

## 2024-04-01 RX ADMIN — SODIUM BICARBONATE 50 MEQ: 84 INJECTION INTRAVENOUS at 21:40

## 2024-04-01 RX ADMIN — INSULIN HUMAN 10 UNITS: 100 INJECTION, SOLUTION PARENTERAL at 22:02

## 2024-04-01 RX ADMIN — LEVOFLOXACIN 750 MG: 5 INJECTION, SOLUTION INTRAVENOUS at 22:06

## 2024-04-01 RX ADMIN — DEXTROSE MONOHYDRATE 250 ML: 10 INJECTION, SOLUTION INTRAVENOUS at 21:43

## 2024-04-01 RX ADMIN — INSULIN HUMAN 10 UNITS: 100 INJECTION, SOLUTION PARENTERAL at 23:43

## 2024-04-01 RX ADMIN — BUMETANIDE 2 MG/HR: 0.25 INJECTION INTRAMUSCULAR; INTRAVENOUS at 23:32

## 2024-04-01 RX ADMIN — ALBUTEROL SULFATE 10 MG: 2.5 SOLUTION RESPIRATORY (INHALATION) at 21:48

## 2024-04-01 RX ADMIN — CALCIUM GLUCONATE 1000 MG: 98 INJECTION, SOLUTION INTRAVENOUS at 21:57

## 2024-04-01 ASSESSMENT — LIFESTYLE VARIABLES
HOW MANY STANDARD DRINKS CONTAINING ALCOHOL DO YOU HAVE ON A TYPICAL DAY: PATIENT DOES NOT DRINK
HOW OFTEN DO YOU HAVE A DRINK CONTAINING ALCOHOL: NEVER

## 2024-04-01 NOTE — ED PROVIDER NOTES
[facility] ICU  EMERGENCY DEPARTMENT ENCOUNTER        Pt Name: Amber Fuentes  MRN: [id number]  Birthdate 1945  Date of evaluation: 2024  Provider: Elyssa Gutierrez PA-C  PCP: Lucila Tejeda MD  Note Started: 5:54 PM EDT 24      SHSAHI. I have evaluated this patient.  With my attending physician Dr. Hastings      CHIEF COMPLAINT       Chief Complaint   Patient presents with    Nausea     Nausea  x 2 days       HISTORY OF PRESENT ILLNESS: 1 or more Elements     History From: Patient and             Chief Complaint: Nausea    Amber Fuentes is a 78 y.o. female who presents because for the past couple of days patient has been fatigued short of breath nauseated not eating and having periumbilical abdominal pain with a rash on her abdomen.  She is also noticed some swelling in her right upper extremity.  She denies vomiting.  She is having diarrhea.  She denies melena medic easy hematemesis.  She denies chest pain cough fever.  She states she was just here in the hospital at the end of February for COVID.    Nursing Notes were all reviewed and agreed with or any disagreements were addressed in the HPI.    REVIEW OF SYSTEMS :      Review of Systems    Positives and Pertinent negatives as per HPI.     SURGICAL HISTORY     Past Surgical History:   Procedure Laterality Date    APPENDECTOMY      CARDIAC SURGERY       SECTION      CHOLECYSTECTOMY      CORONARY ARTERY BYPASS GRAFT N/A 2020    CORONARY ARTERY BYPASS GRAFTING X3, INTERNAL MAMMARY ARTERY, SAPHENOUS VEIN GRAFTING, ON PUMP MITRAL VALVE RING REPAIR USING 26MM PELAEZ PHYSIO II RING, WITH LEFT ATRIAL APPENDAGE CLIP, PFO CLOSURETEE, 5 LEVEL BILATERAL INTERCOSTAL NERVE BLOCK performed by Olivia Hoffmann MD at [facility]    HYSTERECTOMY (CERVIX STATUS UNKNOWN)      total ab hyst    PARATHYROID GLAND SURGERY      explore parathyroid glands    TOTAL KNEE ARTHROPLASTY  2010    left knee       CURRENTMEDICATIONS       Current Discharge  and nauseated with diarrhea for the past couple of days.  She arrives looking pale very tired and some swelling on examination.  She also has some periumbilical abdominal tenderness with a unknown lower abdominal rash.  Differential diagnosis includes transaminitis hyperbilirubinemia ascites kidney failure electrolyte imbalance pneumonia influenza.    Patient's workup reveals hyperkalemia of 7.5.  She has baseline kidney function with BUN 53 creatinine 2.4 GFR 20.  She does have some mild elevated liver enzymes as well.  No significant anemia or leukocytosis.  She has had mild not uptrending troponins of 61 and 59.  EKG with bradycardia and low voltage QRS.  Her TSH is elevated at 8.2.  She appears to have a lactic acidosis with lactic acid 2.2 pH 7.2.  CT of her abdomen pelvis revealed bilateral pleural effusions with a consolidation in the right perihilar region and diffuse anasarca with ascites.    Patient was emergently treated for her hyperkalemia with calcium injection, albuterol, insulin with D10.  With her kidney disease and anasarca that would be diuresed in the hospital I did consult with nephrology about possible emergent dialysis.  They recommended a Bumex injection and drip.  And a Gagnon catheter.  Patient will be admitted to the ICU.  Vital signs stable without hypoxia at this time.  Blood cultures taken and patient was given IV Levaquin.      Did not give patient 30 mg/kg fluid bolus for sepsis due to patient's clinical evidence of fluid overload.    I have been in contact with the nephrologist Dr. Tyler several times about the patient.  He requested repeat potassium which is 6.7.  Patient has only had 50 cc of urine output.  She just received Bumex in the drip is started.  Heart rate has improved.  She still has stable soft pressures.  We are repeating insulin and D10 at this time.  Plan to repeat potassium level and update on urine output status at 1230.    The patient tolerated their visit well.

## 2024-04-02 ENCOUNTER — APPOINTMENT (OUTPATIENT)
Dept: INTERVENTIONAL RADIOLOGY/VASCULAR | Age: 79
End: 2024-04-02
Payer: MEDICARE

## 2024-04-02 ENCOUNTER — APPOINTMENT (OUTPATIENT)
Dept: ULTRASOUND IMAGING | Age: 79
End: 2024-04-02
Payer: MEDICARE

## 2024-04-02 LAB
ALBUMIN SERPL-MCNC: 2.3 G/DL (ref 3.4–5)
ALBUMIN SERPL-MCNC: 2.4 G/DL (ref 3.4–5)
ALBUMIN/GLOB SERPL: 1 {RATIO} (ref 1.1–2.2)
ALBUMIN/GLOB SERPL: 1 {RATIO} (ref 1.1–2.2)
ALP SERPL-CCNC: 169 U/L (ref 40–129)
ALP SERPL-CCNC: 175 U/L (ref 40–129)
ALT SERPL-CCNC: 18 U/L (ref 10–40)
ALT SERPL-CCNC: 20 U/L (ref 10–40)
ANION GAP SERPL CALCULATED.3IONS-SCNC: 11 MMOL/L (ref 3–16)
ANION GAP SERPL CALCULATED.3IONS-SCNC: 13 MMOL/L (ref 3–16)
ANION GAP SERPL CALCULATED.3IONS-SCNC: 5 MMOL/L (ref 3–16)
ANION GAP SERPL CALCULATED.3IONS-SCNC: 9 MMOL/L (ref 3–16)
AST SERPL-CCNC: 25 U/L (ref 15–37)
AST SERPL-CCNC: 28 U/L (ref 15–37)
BACTERIA UR CULT: NORMAL
BASE EXCESS BLDA CALC-SCNC: -7.4 MMOL/L (ref -3–3)
BASOPHILS # BLD: 0 K/UL (ref 0–0.2)
BASOPHILS NFR BLD: 0.3 %
BILIRUB SERPL-MCNC: 0.7 MG/DL (ref 0–1)
BILIRUB SERPL-MCNC: 0.7 MG/DL (ref 0–1)
BUN SERPL-MCNC: 33 MG/DL (ref 7–20)
BUN SERPL-MCNC: 41 MG/DL (ref 7–20)
BUN SERPL-MCNC: 57 MG/DL (ref 7–20)
BUN SERPL-MCNC: 57 MG/DL (ref 7–20)
CA-I BLD-SCNC: 1 MMOL/L (ref 1.12–1.32)
CA-I BLD-SCNC: 1.03 MMOL/L (ref 1.12–1.32)
CALCIUM SERPL-MCNC: 6.9 MG/DL (ref 8.3–10.6)
CALCIUM SERPL-MCNC: 7 MG/DL (ref 8.3–10.6)
CALCIUM SERPL-MCNC: 7.8 MG/DL (ref 8.3–10.6)
CALCIUM SERPL-MCNC: 8 MG/DL (ref 8.3–10.6)
CHLORIDE SERPL-SCNC: 105 MMOL/L (ref 99–110)
CHLORIDE SERPL-SCNC: 106 MMOL/L (ref 99–110)
CHLORIDE SERPL-SCNC: 109 MMOL/L (ref 99–110)
CHLORIDE SERPL-SCNC: 110 MMOL/L (ref 99–110)
CO2 BLDA-SCNC: 19.9 MMOL/L
CO2 SERPL-SCNC: 17 MMOL/L (ref 21–32)
CO2 SERPL-SCNC: 17 MMOL/L (ref 21–32)
CO2 SERPL-SCNC: 23 MMOL/L (ref 21–32)
CO2 SERPL-SCNC: 28 MMOL/L (ref 21–32)
COHGB MFR BLDA: 0.3 % (ref 0–1.5)
CREAT SERPL-MCNC: 1.6 MG/DL (ref 0.6–1.2)
CREAT SERPL-MCNC: 2 MG/DL (ref 0.6–1.2)
CREAT SERPL-MCNC: 2.4 MG/DL (ref 0.6–1.2)
CREAT SERPL-MCNC: 2.5 MG/DL (ref 0.6–1.2)
DEPRECATED RDW RBC AUTO: 19.6 % (ref 12.4–15.4)
EKG ATRIAL RATE: 49 BPM
EKG DIAGNOSIS: NORMAL
EKG P AXIS: 18 DEGREES
EKG P-R INTERVAL: 184 MS
EKG Q-T INTERVAL: 474 MS
EKG QRS DURATION: 120 MS
EKG QTC CALCULATION (BAZETT): 428 MS
EKG R AXIS: 121 DEGREES
EKG T AXIS: 156 DEGREES
EKG VENTRICULAR RATE: 49 BPM
EOSINOPHIL # BLD: 0.1 K/UL (ref 0–0.6)
EOSINOPHIL NFR BLD: 1.4 %
GFR SERPLBLD CREATININE-BSD FMLA CKD-EPI: 19 ML/MIN/{1.73_M2}
GFR SERPLBLD CREATININE-BSD FMLA CKD-EPI: 20 ML/MIN/{1.73_M2}
GFR SERPLBLD CREATININE-BSD FMLA CKD-EPI: 25 ML/MIN/{1.73_M2}
GFR SERPLBLD CREATININE-BSD FMLA CKD-EPI: 33 ML/MIN/{1.73_M2}
GLUCOSE BLD-MCNC: 106 MG/DL (ref 70–99)
GLUCOSE BLD-MCNC: 107 MG/DL (ref 70–99)
GLUCOSE BLD-MCNC: 108 MG/DL (ref 70–99)
GLUCOSE BLD-MCNC: 134 MG/DL (ref 70–99)
GLUCOSE BLD-MCNC: 135 MG/DL (ref 70–99)
GLUCOSE BLD-MCNC: 67 MG/DL (ref 70–99)
GLUCOSE BLD-MCNC: 79 MG/DL (ref 70–99)
GLUCOSE BLD-MCNC: 81 MG/DL (ref 70–99)
GLUCOSE BLD-MCNC: 83 MG/DL (ref 70–99)
GLUCOSE BLD-MCNC: 89 MG/DL (ref 70–99)
GLUCOSE BLD-MCNC: 90 MG/DL (ref 70–99)
GLUCOSE SERPL-MCNC: 102 MG/DL (ref 70–99)
GLUCOSE SERPL-MCNC: 105 MG/DL (ref 70–99)
GLUCOSE SERPL-MCNC: 147 MG/DL (ref 70–99)
GLUCOSE SERPL-MCNC: 78 MG/DL (ref 70–99)
HCO3 BLDA-SCNC: 18.7 MMOL/L (ref 21–29)
HCT VFR BLD AUTO: 30.5 % (ref 36–48)
HGB BLD-MCNC: 9.2 G/DL (ref 12–16)
HGB BLDA-MCNC: 10.1 G/DL (ref 12–16)
INR PPP: 1.24 (ref 0.84–1.16)
LYMPHOCYTES # BLD: 0.9 K/UL (ref 1–5.1)
LYMPHOCYTES NFR BLD: 15.2 %
MAGNESIUM SERPL-MCNC: 1.9 MG/DL (ref 1.8–2.4)
MAGNESIUM SERPL-MCNC: 2.3 MG/DL (ref 1.8–2.4)
MCH RBC QN AUTO: 28.1 PG (ref 26–34)
MCHC RBC AUTO-ENTMCNC: 30.3 G/DL (ref 31–36)
MCV RBC AUTO: 92.7 FL (ref 80–100)
METHGB MFR BLDA: 0.1 %
MONOCYTES # BLD: 0.7 K/UL (ref 0–1.3)
MONOCYTES NFR BLD: 11.2 %
NEUTROPHILS # BLD: 4.3 K/UL (ref 1.7–7.7)
NEUTROPHILS NFR BLD: 71.9 %
O2 THERAPY: ABNORMAL
PCO2 BLDA: 40.2 MMHG (ref 35–45)
PERFORMED ON: ABNORMAL
PERFORMED ON: NORMAL
PH BLDA: 7.29 [PH] (ref 7.35–7.45)
PH BLDV: 7.3 [PH] (ref 7.35–7.45)
PH BLDV: 7.35 [PH] (ref 7.35–7.45)
PHOSPHATE SERPL-MCNC: 3.3 MG/DL (ref 2.5–4.9)
PHOSPHATE SERPL-MCNC: 5.5 MG/DL (ref 2.5–4.9)
PLATELET # BLD AUTO: 175 K/UL (ref 135–450)
PMV BLD AUTO: 8.4 FL (ref 5–10.5)
PO2 BLDA: 101.4 MMHG (ref 75–108)
POTASSIUM SERPL-SCNC: 4.6 MMOL/L (ref 3.5–5.1)
POTASSIUM SERPL-SCNC: 4.9 MMOL/L (ref 3.5–5.1)
POTASSIUM SERPL-SCNC: 6.2 MMOL/L (ref 3.5–5.1)
POTASSIUM SERPL-SCNC: 6.6 MMOL/L (ref 3.5–5.1)
PROT SERPL-MCNC: 4.5 G/DL (ref 6.4–8.2)
PROT SERPL-MCNC: 4.7 G/DL (ref 6.4–8.2)
PROTHROMBIN TIME: 15.6 SEC (ref 11.5–14.8)
RBC # BLD AUTO: 3.29 M/UL (ref 4–5.2)
SAO2 % BLDA: 97 %
SODIUM SERPL-SCNC: 137 MMOL/L (ref 136–145)
SODIUM SERPL-SCNC: 137 MMOL/L (ref 136–145)
SODIUM SERPL-SCNC: 139 MMOL/L (ref 136–145)
SODIUM SERPL-SCNC: 140 MMOL/L (ref 136–145)
T4 FREE SERPL-MCNC: 1.1 NG/DL (ref 0.9–1.8)
WBC # BLD AUTO: 6 K/UL (ref 4–11)

## 2024-04-02 PROCEDURE — 2580000003 HC RX 258: Performed by: INTERNAL MEDICINE

## 2024-04-02 PROCEDURE — 76770 US EXAM ABDO BACK WALL COMP: CPT

## 2024-04-02 PROCEDURE — C8929 TTE W OR WO FOL WCON,DOPPLER: HCPCS

## 2024-04-02 PROCEDURE — C1751 CATH, INF, PER/CENT/MIDLINE: HCPCS

## 2024-04-02 PROCEDURE — 2500000003 HC RX 250 WO HCPCS: Performed by: INTERNAL MEDICINE

## 2024-04-02 PROCEDURE — 36410 VNPNXR 3YR/> PHY/QHP DX/THER: CPT

## 2024-04-02 PROCEDURE — 93010 ELECTROCARDIOGRAM REPORT: CPT | Performed by: INTERNAL MEDICINE

## 2024-04-02 PROCEDURE — 6370000000 HC RX 637 (ALT 250 FOR IP): Performed by: INTERNAL MEDICINE

## 2024-04-02 PROCEDURE — 3E033XZ INTRODUCTION OF VASOPRESSOR INTO PERIPHERAL VEIN, PERCUTANEOUS APPROACH: ICD-10-PCS | Performed by: INTERNAL MEDICINE

## 2024-04-02 PROCEDURE — 82330 ASSAY OF CALCIUM: CPT

## 2024-04-02 PROCEDURE — 6360000002 HC RX W HCPCS: Performed by: INTERNAL MEDICINE

## 2024-04-02 PROCEDURE — 84100 ASSAY OF PHOSPHORUS: CPT

## 2024-04-02 PROCEDURE — 2700000000 HC OXYGEN THERAPY PER DAY

## 2024-04-02 PROCEDURE — 85025 COMPLETE CBC W/AUTO DIFF WBC: CPT

## 2024-04-02 PROCEDURE — 80053 COMPREHEN METABOLIC PANEL: CPT

## 2024-04-02 PROCEDURE — 05HB33Z INSERTION OF INFUSION DEVICE INTO RIGHT BASILIC VEIN, PERCUTANEOUS APPROACH: ICD-10-PCS | Performed by: INTERNAL MEDICINE

## 2024-04-02 PROCEDURE — 6360000002 HC RX W HCPCS: Performed by: PHYSICIAN ASSISTANT

## 2024-04-02 PROCEDURE — 51702 INSERT TEMP BLADDER CATH: CPT

## 2024-04-02 PROCEDURE — 76937 US GUIDE VASCULAR ACCESS: CPT

## 2024-04-02 PROCEDURE — 36415 COLL VENOUS BLD VENIPUNCTURE: CPT

## 2024-04-02 PROCEDURE — 77001 FLUOROGUIDE FOR VEIN DEVICE: CPT

## 2024-04-02 PROCEDURE — 90945 DIALYSIS ONE EVALUATION: CPT

## 2024-04-02 PROCEDURE — 99291 CRITICAL CARE FIRST HOUR: CPT | Performed by: INTERNAL MEDICINE

## 2024-04-02 PROCEDURE — 83735 ASSAY OF MAGNESIUM: CPT

## 2024-04-02 PROCEDURE — 93356 MYOCRD STRAIN IMG SPCKL TRCK: CPT

## 2024-04-02 PROCEDURE — 82803 BLOOD GASES ANY COMBINATION: CPT

## 2024-04-02 PROCEDURE — C1894 INTRO/SHEATH, NON-LASER: HCPCS

## 2024-04-02 PROCEDURE — 94761 N-INVAS EAR/PLS OXIMETRY MLT: CPT

## 2024-04-02 PROCEDURE — 02H633Z INSERTION OF INFUSION DEVICE INTO RIGHT ATRIUM, PERCUTANEOUS APPROACH: ICD-10-PCS | Performed by: STUDENT IN AN ORGANIZED HEALTH CARE EDUCATION/TRAINING PROGRAM

## 2024-04-02 PROCEDURE — 2000000000 HC ICU R&B

## 2024-04-02 PROCEDURE — 2580000003 HC RX 258: Performed by: PHYSICIAN ASSISTANT

## 2024-04-02 PROCEDURE — 36556 INSERT NON-TUNNEL CV CATH: CPT

## 2024-04-02 PROCEDURE — 94640 AIRWAY INHALATION TREATMENT: CPT

## 2024-04-02 PROCEDURE — P9047 ALBUMIN (HUMAN), 25%, 50ML: HCPCS | Performed by: INTERNAL MEDICINE

## 2024-04-02 RX ORDER — POLYETHYLENE GLYCOL 3350 17 G/17G
17 POWDER, FOR SOLUTION ORAL DAILY PRN
Status: DISCONTINUED | OUTPATIENT
Start: 2024-04-02 | End: 2024-04-17

## 2024-04-02 RX ORDER — SODIUM CHLORIDE 9 MG/ML
INJECTION, SOLUTION INTRAVENOUS PRN
Status: DISCONTINUED | OUTPATIENT
Start: 2024-04-02 | End: 2024-04-20

## 2024-04-02 RX ORDER — ONDANSETRON 4 MG/1
4 TABLET, ORALLY DISINTEGRATING ORAL EVERY 8 HOURS PRN
Status: DISCONTINUED | OUTPATIENT
Start: 2024-04-02 | End: 2024-04-20

## 2024-04-02 RX ORDER — ASPIRIN 81 MG/1
81 TABLET ORAL DAILY
Status: DISCONTINUED | OUTPATIENT
Start: 2024-04-02 | End: 2024-04-20 | Stop reason: HOSPADM

## 2024-04-02 RX ORDER — ACETAMINOPHEN 650 MG/1
650 SUPPOSITORY RECTAL EVERY 6 HOURS PRN
Status: DISCONTINUED | OUTPATIENT
Start: 2024-04-02 | End: 2024-04-20

## 2024-04-02 RX ORDER — ALBUTEROL SULFATE 2.5 MG/3ML
10 SOLUTION RESPIRATORY (INHALATION) ONCE
Status: COMPLETED | OUTPATIENT
Start: 2024-04-02 | End: 2024-04-02

## 2024-04-02 RX ORDER — CALCIUM GLUCONATE 20 MG/ML
2000 INJECTION, SOLUTION INTRAVENOUS PRN
Status: DISCONTINUED | OUTPATIENT
Start: 2024-04-02 | End: 2024-04-05

## 2024-04-02 RX ORDER — ONDANSETRON 2 MG/ML
4 INJECTION INTRAMUSCULAR; INTRAVENOUS EVERY 6 HOURS PRN
Status: DISCONTINUED | OUTPATIENT
Start: 2024-04-02 | End: 2024-04-20

## 2024-04-02 RX ORDER — CALCIUM GLUCONATE 20 MG/ML
1000 INJECTION, SOLUTION INTRAVENOUS PRN
Status: DISCONTINUED | OUTPATIENT
Start: 2024-04-02 | End: 2024-04-05

## 2024-04-02 RX ORDER — INSULIN LISPRO 100 [IU]/ML
0-4 INJECTION, SOLUTION INTRAVENOUS; SUBCUTANEOUS NIGHTLY
Status: DISCONTINUED | OUTPATIENT
Start: 2024-04-02 | End: 2024-04-20 | Stop reason: HOSPADM

## 2024-04-02 RX ORDER — CHLOROTHIAZIDE SODIUM 500 MG/1
500 INJECTION INTRAVENOUS ONCE
Status: COMPLETED | OUTPATIENT
Start: 2024-04-02 | End: 2024-04-02

## 2024-04-02 RX ORDER — POTASSIUM CHLORIDE 29.8 MG/ML
20 INJECTION INTRAVENOUS PRN
Status: DISCONTINUED | OUTPATIENT
Start: 2024-04-02 | End: 2024-04-05

## 2024-04-02 RX ORDER — MAGNESIUM SULFATE 1 G/100ML
1000 INJECTION INTRAVENOUS PRN
Status: DISCONTINUED | OUTPATIENT
Start: 2024-04-02 | End: 2024-04-05

## 2024-04-02 RX ORDER — SODIUM CHLORIDE 0.9 % (FLUSH) 0.9 %
5-40 SYRINGE (ML) INJECTION EVERY 12 HOURS SCHEDULED
Status: DISCONTINUED | OUTPATIENT
Start: 2024-04-02 | End: 2024-04-20

## 2024-04-02 RX ORDER — ALLOPURINOL 100 MG/1
100 TABLET ORAL DAILY
Status: DISCONTINUED | OUTPATIENT
Start: 2024-04-02 | End: 2024-04-20

## 2024-04-02 RX ORDER — HEPARIN SODIUM 5000 [USP'U]/ML
5000 INJECTION, SOLUTION INTRAVENOUS; SUBCUTANEOUS EVERY 8 HOURS SCHEDULED
Status: DISCONTINUED | OUTPATIENT
Start: 2024-04-02 | End: 2024-04-20 | Stop reason: HOSPADM

## 2024-04-02 RX ORDER — INSULIN LISPRO 100 [IU]/ML
0-8 INJECTION, SOLUTION INTRAVENOUS; SUBCUTANEOUS
Status: DISCONTINUED | OUTPATIENT
Start: 2024-04-02 | End: 2024-04-20 | Stop reason: HOSPADM

## 2024-04-02 RX ORDER — SODIUM CHLORIDE 0.9 % (FLUSH) 0.9 %
5-40 SYRINGE (ML) INJECTION PRN
Status: DISCONTINUED | OUTPATIENT
Start: 2024-04-02 | End: 2024-04-20

## 2024-04-02 RX ORDER — ACETAMINOPHEN 325 MG/1
650 TABLET ORAL EVERY 6 HOURS PRN
Status: DISCONTINUED | OUTPATIENT
Start: 2024-04-02 | End: 2024-04-20

## 2024-04-02 RX ORDER — ALBUMIN (HUMAN) 12.5 G/50ML
25 SOLUTION INTRAVENOUS ONCE
Status: COMPLETED | OUTPATIENT
Start: 2024-04-02 | End: 2024-04-02

## 2024-04-02 RX ORDER — DEXTROSE MONOHYDRATE 50 MG/ML
INJECTION, SOLUTION INTRAVENOUS CONTINUOUS
Status: DISCONTINUED | OUTPATIENT
Start: 2024-04-02 | End: 2024-04-02 | Stop reason: ALTCHOICE

## 2024-04-02 RX ORDER — 0.9 % SODIUM CHLORIDE 0.9 %
500 INTRAVENOUS SOLUTION INTRAVENOUS ONCE
Status: COMPLETED | OUTPATIENT
Start: 2024-04-02 | End: 2024-04-02

## 2024-04-02 RX ORDER — ENOXAPARIN SODIUM 100 MG/ML
30 INJECTION SUBCUTANEOUS DAILY
Status: DISCONTINUED | OUTPATIENT
Start: 2024-04-02 | End: 2024-04-02

## 2024-04-02 RX ORDER — ATORVASTATIN CALCIUM 40 MG/1
40 TABLET, FILM COATED ORAL NIGHTLY
Status: DISCONTINUED | OUTPATIENT
Start: 2024-04-02 | End: 2024-04-20 | Stop reason: HOSPADM

## 2024-04-02 RX ADMIN — SODIUM BICARBONATE: 84 INJECTION, SOLUTION INTRAVENOUS at 03:02

## 2024-04-02 RX ADMIN — Medication: at 14:56

## 2024-04-02 RX ADMIN — ASPIRIN 81 MG: 81 TABLET, COATED ORAL at 11:34

## 2024-04-02 RX ADMIN — ALLOPURINOL 100 MG: 100 TABLET ORAL at 11:34

## 2024-04-02 RX ADMIN — Medication: at 21:43

## 2024-04-02 RX ADMIN — Medication: at 10:54

## 2024-04-02 RX ADMIN — SODIUM BICARBONATE: 84 INJECTION, SOLUTION INTRAVENOUS at 17:30

## 2024-04-02 RX ADMIN — CALCIUM GLUCONATE 1000 MG: 20 INJECTION, SOLUTION INTRAVENOUS at 23:10

## 2024-04-02 RX ADMIN — MUPIROCIN: 20 OINTMENT TOPICAL at 19:34

## 2024-04-02 RX ADMIN — MUPIROCIN: 20 OINTMENT TOPICAL at 11:34

## 2024-04-02 RX ADMIN — Medication: at 23:50

## 2024-04-02 RX ADMIN — BUMETANIDE 2 MG/HR: 0.25 INJECTION INTRAMUSCULAR; INTRAVENOUS at 06:52

## 2024-04-02 RX ADMIN — Medication: at 10:50

## 2024-04-02 RX ADMIN — HEPARIN SODIUM: 1000 INJECTION INTRAVENOUS; SUBCUTANEOUS at 21:09

## 2024-04-02 RX ADMIN — SODIUM CHLORIDE 500 ML: 9 INJECTION, SOLUTION INTRAVENOUS at 02:05

## 2024-04-02 RX ADMIN — SODIUM ZIRCONIUM CYCLOSILICATE 10 G: 10 POWDER, FOR SUSPENSION ORAL at 03:26

## 2024-04-02 RX ADMIN — Medication: at 14:06

## 2024-04-02 RX ADMIN — CHLOROTHIAZIDE SODIUM 500 MG: 500 INJECTION, POWDER, LYOPHILIZED, FOR SOLUTION INTRAVENOUS at 04:02

## 2024-04-02 RX ADMIN — SODIUM CHLORIDE: 9 INJECTION, SOLUTION INTRAVENOUS at 16:32

## 2024-04-02 RX ADMIN — ALBUMIN (HUMAN) 12.5 G: 0.25 INJECTION, SOLUTION INTRAVENOUS at 02:46

## 2024-04-02 RX ADMIN — DEXTROSE MONOHYDRATE 125 ML: 10 INJECTION, SOLUTION INTRAVENOUS at 02:27

## 2024-04-02 RX ADMIN — ALBUTEROL SULFATE 10 MG: 2.5 SOLUTION RESPIRATORY (INHALATION) at 01:06

## 2024-04-02 RX ADMIN — HEPARIN SODIUM 5000 UNITS: 5000 INJECTION INTRAVENOUS; SUBCUTANEOUS at 14:57

## 2024-04-02 RX ADMIN — SODIUM CHLORIDE, PRESERVATIVE FREE 10 ML: 5 INJECTION INTRAVENOUS at 19:37

## 2024-04-02 RX ADMIN — HEPARIN SODIUM 5000 UNITS: 5000 INJECTION INTRAVENOUS; SUBCUTANEOUS at 11:34

## 2024-04-02 RX ADMIN — Medication: at 18:06

## 2024-04-02 RX ADMIN — Medication: at 10:52

## 2024-04-02 RX ADMIN — NOREPINEPHRINE BITARTRATE 2 MCG/MIN: 1 SOLUTION INTRAVENOUS at 17:14

## 2024-04-02 RX ADMIN — CALCIUM GLUCONATE 1000 MG: 20 INJECTION, SOLUTION INTRAVENOUS at 16:33

## 2024-04-02 RX ADMIN — DESMOPRESSIN ACETATE 40 MG: 0.2 TABLET ORAL at 19:34

## 2024-04-02 RX ADMIN — HEPARIN SODIUM 5000 UNITS: 5000 INJECTION INTRAVENOUS; SUBCUTANEOUS at 21:43

## 2024-04-02 RX ADMIN — Medication: at 16:39

## 2024-04-02 ASSESSMENT — PAIN DESCRIPTION - ONSET: ONSET: ON-GOING

## 2024-04-02 ASSESSMENT — PAIN SCALES - GENERAL
PAINLEVEL_OUTOF10: 0
PAINLEVEL_OUTOF10: 3

## 2024-04-02 ASSESSMENT — PAIN - FUNCTIONAL ASSESSMENT: PAIN_FUNCTIONAL_ASSESSMENT: ACTIVITIES ARE NOT PREVENTED

## 2024-04-02 ASSESSMENT — PAIN DESCRIPTION - LOCATION: LOCATION: FOOT;ANKLE

## 2024-04-02 ASSESSMENT — PAIN DESCRIPTION - PAIN TYPE: TYPE: CHRONIC PAIN

## 2024-04-02 ASSESSMENT — PAIN DESCRIPTION - DESCRIPTORS: DESCRIPTORS: ACHING;DISCOMFORT

## 2024-04-02 ASSESSMENT — PAIN DESCRIPTION - ORIENTATION: ORIENTATION: LEFT

## 2024-04-02 ASSESSMENT — LIFESTYLE VARIABLES: HOW OFTEN DO YOU HAVE A DRINK CONTAINING ALCOHOL: NEVER

## 2024-04-02 NOTE — PROGRESS NOTES
Panic Potassium reported at 6.2. Dr Tyler stated \" No need to call if Potassium level is improving.\"

## 2024-04-02 NOTE — PROGRESS NOTES
Reassessment completed (see Flowsheet). All ICU lines remain intact, ICU monitoring continued-   Infusing:  NaHCO3 (see MAR)    Pt is on CRRT- keeping even.  pt remains A/O- drowsy.     Lung sounds diminished  pt's blood pressures Hypotensive- Dr Martinez aware, Dr Tucker informed. .

## 2024-04-02 NOTE — PROGRESS NOTES
Pt awoke C/O pain in Left ankle. Gripper socks removed Doppler pulses checked and present bilateral pedal and posterior tibial.  ABGs drawn from R radial 1st stick. Pt tolerated fairly well.

## 2024-04-02 NOTE — PROGRESS NOTES
Dr Tyler in room. NS bolus stopped before 500cc completed. Albumin stopped after 12.5 GM infused per Dr Tyler's verba; order.

## 2024-04-02 NOTE — PROGRESS NOTES
4 Eyes Skin Assessment     NAME:  Amber Fuentes  YOB: 1945  MEDICAL RECORD NUMBER:  [id number]    The patient is being assessed for  {Reason for Assessment:50455}    I agree that at least one RN has performed a thorough Head to Toe Skin Assessment on the patient. ALL assessment sites listed below have been assessed.      Areas assessed by both nurses:    Head, Face, Ears, Shoulders, Back, Chest, Arms, Elbows, Hands, Sacrum. Buttock, Coccyx, Ischium, Legs. Feet and Heels, and Under Medical Devices         Does the Patient have a Wound? No noted wound(s)       Alek Prevention initiated by RN: Yes  Wound Care Orders initiated by RN: No    Pressure Injury (Stage 3,4, Unstageable, DTI, NWPT, and Complex wounds) if present, place Wound referral order by RN under : No    New Ostomies, if present place, Ostomy referral order under : No     Nurse 1 eSignature: Electronically signed by Genevieve Bassett RN on 4/2/24 at 7:04 AM EDT    **SHARE this note so that the co-signing nurse can place an eSignature**    Nurse 2 eSignature: {Esignature:[id number]}

## 2024-04-02 NOTE — PLAN OF CARE
Pt seen by selena this am  Hx of severe systolic CHF on diuretics and entresto with hyperkalemia   Given medical tx and monitor k levels   Will need to resume diuresis at some point

## 2024-04-02 NOTE — PROGRESS NOTES
Dr Tyler here to see pt at 2:45. D10 W infused for low glucose Good catheter D/Maurice after 450cc seen per bladder scan. Only a few drops of urine recovered from new good. . Lokelma dose given. BMP to be rechecked at 0430 am.

## 2024-04-02 NOTE — PROGRESS NOTES
HEART FAILURE CARE PLAN:    Comorbidities Reviewed: Yes   Patient has a past medical history of Anemia, Backache, unspecified, CAD, multiple vessel, Chronic kidney disease (CKD) stage G3b/A1, moderately decreased glomerular filtration rate (GFR) between 30-44 mL/min/1.73 square meter and albuminuria creatinine ratio less than 30 mg/g ([facility]), Chronic systolic CHF (congestive heart failure) ([facility]), COPD (chronic obstructive pulmonary disease) ([facility]), Depressive disorder, not elsewhere classified, Essential hypertension, benign, Gout, History of blood transfusion, Hyperlipidemia associated with type 2 diabetes mellitus ([facility]), Major depressive disorder, recurrent episode, moderate ([facility]), Osteoarthritis, hand, Osteoarthrosis, unspecified whether generalized or localized, lower leg, Osteopenia, Other and unspecified hyperlipidemia, Pain in joint, lower leg, Pneumonia, Rotator cuff tendinitis, Tear of medial cartilage or meniscus of knee, current, Type 2 diabetes mellitus with microalbuminuria, without long-term current use of insulin ([facility]), Type 2 diabetes mellitus without complication ([facility]), Type II or unspecified type diabetes mellitus without mention of complication, not stated as uncontrolled, and Uses LifeVest defibrillator.     Weights Reviewed: Yes   Admission weight: 59.6 kg (131 lb 6.3 oz) (chart review)   Wt Readings from Last 3 Encounters:   04/02/24 73.3 kg (161 lb 9.6 oz)   02/11/24 59.6 kg (131 lb 8 oz)   01/31/24 60.7 kg (133 lb 13.1 oz)     Intake & Output Reviewed: Yes     Intake/Output Summary (Last 24 hours) at 4/2/2024 0806  Last data filed at 4/2/2024 0700  Gross per 24 hour   Intake 1300.14 ml   Output 174 ml   Net 1126.14 ml       ECHOCARDIOGRAM Reviewed: Yes   Patient's Ejection Fraction (EF) is less than or equal to 40%. Discuss HFrEF Guideline Directed Medical Therapy (GDMT) with Cardiologist or Hospitalist:          Medications Reviewed: Yes   SCHEDULED HOSPITAL MEDICATIONS:   sodium chloride flush

## 2024-04-02 NOTE — CONSULTS
Nephrology Consult Note  [phone]  [phone]   [email or web address].        Reason for Consult:  Hyperkalemia    HISTORY OF PRESENT ILLNESS:                The patient is a 78 y.o. female with significant past medicl history of CKD, CAD, COPD, and DMII who presents with generalized weakness, nausea and diarrhea for last 2 days.In ER she was found to have hyperkalemia. I spoke to ER. Treated medically in ER. Potassium is high but getting better.     Past Medical History:        Diagnosis Date    Anemia     Backache, unspecified 03/11/2008    CAD, multiple vessel 01/06/2020    Chronic kidney disease (CKD) stage G3b/A1, moderately decreased glomerular filtration rate (GFR) between 30-44 mL/min/1.73 square meter and albuminuria creatinine ratio less than 30 mg/g ([facility]) 04/16/2021    Chronic systolic CHF (congestive heart failure) ([facility]) 12/11/2019    COPD (chronic obstructive pulmonary disease) ([facility])     Depressive disorder, not elsewhere classified 11/08/2007    Essential hypertension, benign 11/08/2007    Gout 08/11/2011    History of blood transfusion     Hyperlipidemia associated with type 2 diabetes mellitus ([facility]) 12/14/2015    Major depressive disorder, recurrent episode, moderate ([facility]) 12/14/2015    Osteoarthritis, hand 02/27/2012    Osteoarthrosis, unspecified whether generalized or localized, lower leg 11/08/2007    Osteopenia 08/05/2015    Other and unspecified hyperlipidemia 11/08/2007    Pain in joint, lower leg 01/29/2007    Pneumonia     Rotator cuff tendinitis 10/06/2017    Tear of medial cartilage or meniscus of knee, current 01/29/2007    Type 2 diabetes mellitus with microalbuminuria, without long-term current use of insulin ([facility]) 05/05/2017    Type 2 diabetes mellitus without complication ([facility]) 11/08/2007    Type II or unspecified type diabetes mellitus without mention of complication, not stated as uncontrolled 11/08/2007    foot exam 7/16/08 normal    Uses LifeVest defibrillator 02/08/2024       Past  01:13 AM    K 6.6 04/02/2024 01:13 AM    K 7.5 04/01/2024 08:50 PM     04/02/2024 01:13 AM    CO2 17 04/02/2024 01:13 AM    BUN 57 04/02/2024 01:13 AM    CREATININE 2.4 04/02/2024 01:13 AM    GFRAA 33 10/14/2022 01:29 PM    GFRAA >60 06/04/2011 02:30 AM    AGRATIO 1.0 04/01/2024 08:50 PM    LABGLOM 20 04/02/2024 01:13 AM    GLUCOSE 102 04/02/2024 01:13 AM    GLUCOSE 128 02/27/2012 11:08 AM    PROT 6.4 04/01/2024 08:50 PM    PROT 7.0 03/04/2013 05:05 PM    LABALBU 3.2 04/01/2024 08:50 PM    CALCIUM 8.0 04/02/2024 01:13 AM    BILITOT 1.1 04/01/2024 08:50 PM    ALKPHOS 261 04/01/2024 08:50 PM    AST 39 04/01/2024 08:50 PM    ALT 24 04/01/2024 08:50 PM     BMP:    Lab Results   Component Value Date/Time     04/02/2024 01:13 AM    K 6.6 04/02/2024 01:13 AM    K 7.5 04/01/2024 08:50 PM     04/02/2024 01:13 AM    CO2 17 04/02/2024 01:13 AM    BUN 57 04/02/2024 01:13 AM    LABALBU 3.2 04/01/2024 08:50 PM    CREATININE 2.4 04/02/2024 01:13 AM    CALCIUM 8.0 04/02/2024 01:13 AM    GFRAA 33 10/14/2022 01:29 PM    GFRAA >60 06/04/2011 02:30 AM    LABGLOM 20 04/02/2024 01:13 AM    GLUCOSE 102 04/02/2024 01:13 AM    GLUCOSE 128 02/27/2012 11:08 AM       IMPRESSION/RECOMMENDATIONS:       - Hyperkalemia: metabolic acidosis + poor perfusion. Medically treated. Potassium is getting better. Will start gentle bicarb drip. Started bumex drip. Will give diuril.May need RRT.     - CKD IV: Most likely diabetic nephropathy and cardiorenal syndrome. baseline sr cr 1.8-2.4. Renal function stable.    - Metabolic acidosis: due to diarrhea + poor perfusion. Started gentle bicarb drip.     - DMII: Hypoglycemic. Started  on D5 (with bicarb)    - CAD    - Chronic Systolic CHF: Edema present. Started on bumex drip. Good is not draining well. Change good cath. Urine color light yellow.     Discussed with ER and primary team. Critical time sent 34 minutes.    Thank you for allowing me to participate in the care of this patient.  I

## 2024-04-02 NOTE — PROGRESS NOTES
Dr Hardin updated Nephrology K- 6.58. New orders received. 500cc NS bolus hung. Will repeat potassium at 3:15 am.

## 2024-04-02 NOTE — ED NOTES
Blood culture obtained from left hand per sterile/hospital protocol.  PIV placed at same time.  Attempted to obtain second set blood cultures from right wrist without success.  Pt then sent to CT scan.  Will attempt again when patient returns to room.   Lab called at 2010 and states to redraw green top due to specimen being hemolyzed. Redrawn from new piv placed.   Renaldo meeks

## 2024-04-02 NOTE — PROGRESS NOTES
Filter Pressure increased to 350. CRRT alarmed High filter pressure.     CRRT filter changed. 73mL of Blood was able to be returned.   2:05 PM  CRRT resumed

## 2024-04-02 NOTE — H&P
auscultation  Gastrointestinal: Soft, non tender  Genitourinary: no suprapubic tenderness  Musculoskeletal: No edema  Skin: warm, dry  Neuro: Alert.  Psych: Mood appropriate.       Past Medical History:   PMHx   Past Medical History:   Diagnosis Date    Anemia     Backache, unspecified 2008    CAD, multiple vessel 2020    Chronic kidney disease (CKD) stage G3b/A1, moderately decreased glomerular filtration rate (GFR) between 30-44 mL/min/1.73 square meter and albuminuria creatinine ratio less than 30 mg/g ([facility]) 2021    Chronic systolic CHF (congestive heart failure) ([facility]) 2019    COPD (chronic obstructive pulmonary disease) ([facility])     Depressive disorder, not elsewhere classified 2007    Essential hypertension, benign 2007    Gout 2011    History of blood transfusion     Hyperlipidemia associated with type 2 diabetes mellitus ([facility]) 2015    Major depressive disorder, recurrent episode, moderate ([facility]) 2015    Osteoarthritis, hand 2012    Osteoarthrosis, unspecified whether generalized or localized, lower leg 2007    Osteopenia 2015    Other and unspecified hyperlipidemia 2007    Pain in joint, lower leg 2007    Pneumonia     Rotator cuff tendinitis 10/06/2017    Tear of medial cartilage or meniscus of knee, current 2007    Type 2 diabetes mellitus with microalbuminuria, without long-term current use of insulin ([facility]) 2017    Type 2 diabetes mellitus without complication ([facility]) 2007    Type II or unspecified type diabetes mellitus without mention of complication, not stated as uncontrolled 2007    foot exam 08 normal    Uses LifeVest defibrillator 2024     PSHX:  has a past surgical history that includes Parathyroid gland surgery; Total knee arthroplasty (2010); Hysterectomy; Appendectomy; Cholecystectomy; Coronary artery bypass graft (N/A, 2020); Cardiac surgery; and   Please note this report has been produced using speech recognition software and may contain errors related to that system including errors in grammar, punctuation, and spelling, as well as words and phrases that may be inappropriate. If there are any questions or concerns, please feel free to contact the dictating provider for clarification.     Electronically signed by LYNSDEY CALDERON MD on 4/2/2024 at 1:14 AM

## 2024-04-02 NOTE — PROGRESS NOTES
Shift handoff report given to Senait NOGUERA at bedside.   Pt is Awake and alert  IV handoff completed.   Call light within reach, bed in lowest position, bed alarm on. End of shift checks completed.    Pt has been free of falls for duration of shift. .

## 2024-04-02 NOTE — PROGRESS NOTES
Reassessment completed (see Flowsheet). All ICU lines remain intact, ICU monitoring continued-   Infusing:  Na HCO3 (See MAR).    Pt is still hypotensive-   Vitals:    04/02/24 1600   BP: (!) 96/38   Pulse: 70   Resp: 18   Temp: 98.5 °F (36.9 °C)   SpO2: 92%     Dr Tucker informed.     pt remains onCRRT- keeping even.     Lung sounds Diminished.    .  4:16 PM  Order placed for Levophed per Dr Tucker.

## 2024-04-02 NOTE — PROGRESS NOTES
Message sent to Dr Martinez asking if dialysis solution orders need to be changed.    6:00 PM  Per Dr Martinez change Dialysis bags to Prismisate 2.5/4. Orders changed.

## 2024-04-02 NOTE — PROGRESS NOTES
CRRT alarmed high filter pressure. Filter pressure increased from 230 to 300+, TMP increased from 288 to 350+.    Page to Nephro to see if Heparin is needed. Or filter order to change     7:01 PM  Per Dr Pamela Ovalles Order for heparin syringe via CRRT machine.   Labs discussed. No order to stop SQ heparin at this time.    219mL returned to patient.

## 2024-04-02 NOTE — ACP (ADVANCE CARE PLANNING)
Advance Care Planning     General Advance Care Planning (ACP) Conversation    Date of Conversation: 4/2/2024  Conducted with: Patient with Decision Making Capacity   Healthcare Decision Maker: Next of Kin by law (only applies in absence of above) (name) Spoke with spouse who is the patient's legal next of kin and can speak for the patient if she cannot speak for herself.     Healthcare Decision Maker:    Primary Decision Maker: Stanford Fuentes - Spouse - [phone]  Click here to complete Healthcare Decision Makers including selection of the Healthcare Decision Maker Relationship (ie \"Primary\").   Today we documented Decision Maker(s) consistent with Legal Next of Kin hierarchy.    Content/Action Overview:  Patient unable to stay awake. Spoke with spouse. Patient remains a full code.   Reviewed DNR/DNI and patient elects Full Code (Attempt Resuscitation)    Length of Voluntary ACP Conversation in minutes:  <16 minutes (Non-Billable)    Teresa Boeck, RN

## 2024-04-02 NOTE — PROGRESS NOTES
Consult received. Discussed with ER. Massive edema and hyperkalemia with EKG changes. Insulin, albuterol, bicarb and calcium given. Repeating insulin and albuterol. Ordered bumex 3 mg. Ordered bumex drip. Ask to put Gagnon cath. If no urine output or no improvement in sr potassium then will do emergent hemodialysis.     Thanks     Jim Tyler

## 2024-04-02 NOTE — PROGRESS NOTES
Shift assessment was completed (see flow sheet). Pt is A/O-disoriented at times. Pt complains of Pain to L ankle (no evidence of injury).   Pt  scheduled for vascath placement in IR this morning.   Telephone consent obtained from Spouse-Stanford.     Respirations are even, unlabored, with diminished/ expiratory wheeze sounds. On 2L O2 via NC. SpO2 96%.    Scheduled medications to follow- \  Infusing:  Bumex, NaHCO3(see MAR).     Call light within reach. Bed in lowest position. Bed alarm on.     Patient is not able to demonstrated the ability to move from a reclining position to an upright position within the recliner. however patient is alert, oriented and able to provide informed consent    9:51 AM  Message Sent to dr Martinez to inform of Line placement. Orders to follow.

## 2024-04-02 NOTE — DISCHARGE INSTRUCTIONS
Heart Failure Resources:  Heart Failure Interactive Workbook:  Go to [email or web address]/[email or web address]=086730 for a Free Heart Failure Interactive Workbook provided by The American Heart Association. This interactive workbook will provide information on Healthier Living with Heart Failure. Please copy and paste link into search bar. Use your mouse to scroll through the pages.    HF [place] osiel:   Heart Failure Free smart phone osiel available for iPhone and Android download. Use your phone to track sodium intake, fluid intake, symptoms, and weight.     Low Sodium Diet / Recipes:  Go to www.[employer].SocialCrunch website for “renal” diet which is Low Sodium! [employer] is a dialysis company, but this website offers free seasonal cookbooks. Each quarter, they will release 25-30 new recipes with a breakdown of calories, sodium, and glucose. You can also go to [employer]em[employer]/recipes website for free recipes.     Discharge Instruction Video:  Scan the QR code below with your camera and click the canva.com link to open the video and watch educational information on Heart Failure and Medications from one of our nurses.   [email or web address]_JRk/[email or web address]/edit    Home Exercise Program:   Identification of Green/Yellow/Red zones:  You should be able to identify when you feel good (green zone), if you have 1-2 symptoms of HF (yellow zone), or if you are in need of medical attention (red zone).  In your CHF education folder you were provided a “stop light tool” to outline this information.     We want to you to rate your exertion levels:    Our therapy team has discussed means of identification with you such as the \"Julian scale.\"  The Julian rating scale ranges from 6 to 20, where 6 means \"no exertion at all\" and 20 means \"maximal exertion.\" The goal is to use this to gauge how much effort it is taking for you to do your normal daily tasks.   You should be able to recognize when too much exertion is

## 2024-04-02 NOTE — PLAN OF CARE
Problem: Chronic Conditions and Co-morbidities  Goal: Patient's chronic conditions and co-morbidity symptoms are monitored and maintained or improved  Outcome: Progressing   Check electrolyte levels as ordered and treat per physician orders  Problem: Respiratory - Adult  Goal: Achieves optimal ventilation and oxygenation  Outcome: Progressing   Pt started on O2 at 2 l for O2 sat dropping into the 80's  Problem: Cardiovascular - Adult  Goal: Maintains optimal cardiac output and hemodynamic stability  Outcome: Progressing  Pt heart rate improved from 50's into the 60's as K level improves.

## 2024-04-02 NOTE — PROGRESS NOTES
AM labs drawn. No suction was used to obtain blod sample. Diuril 500 mg given IVP slowly. Pt took Lokelma at 0320.

## 2024-04-02 NOTE — ED PROVIDER NOTES
[facility] ED  EMERGENCY DEPARTMENT ENCOUNTER        Patient Name: Amber Fuentes  MRN: [id number]  Birthdate 1945  Date of evaluation: 4/1/2024  Provider: Radha Hastings MD  PCP: Lucila Tejeda MD  Note Started: 10:32 PM EDT 4/1/24    I independently examined and evaluated Amber Fuentes. I personally saw the patient and performed a substantive portion of the visit including all aspects of the medical decision making.  I made/approved the management plan and take responsibility for the patient management.  I am the primary physician of record.    CHIEF COMPLAINT  illness       HISTORY OF PRESENT ILLNESS  History from : Patient    Limitations to history : None    In brief, Amber Fuentes is a 78 y.o. female  has a past medical history of Anemia, Backache, unspecified (03/11/2008), CAD, multiple vessel (01/06/2020), Chronic kidney disease (CKD) stage G3b/A1, moderately decreased glomerular filtration rate (GFR) between 30-44 mL/min/1.73 square meter and albuminuria creatinine ratio less than 30 mg/g ([facility]) (04/16/2021), Chronic systolic CHF (congestive heart failure) ([facility]) (12/11/2019), COPD (chronic obstructive pulmonary disease) ([facility]), Depressive disorder, not elsewhere classified (11/08/2007), Essential hypertension, benign (11/08/2007), Gout (08/11/2011), History of blood transfusion, Hyperlipidemia associated with type 2 diabetes mellitus ([facility]) (12/14/2015), Major depressive disorder, recurrent episode, moderate ([facility]) (12/14/2015), Osteoarthritis, hand (02/27/2012), Osteoarthrosis, unspecified whether generalized or localized, lower leg (11/08/2007), Osteopenia (08/05/2015), Other and unspecified hyperlipidemia (11/08/2007), Pain in joint, lower leg (01/29/2007), Pneumonia, Rotator cuff tendinitis (10/06/2017), Tear of medial cartilage or meniscus of knee, current (01/29/2007), Type 2 diabetes mellitus with microalbuminuria, without long-term current use of insulin ([facility])  activity: Yes     Partners: Male   Other Topics Concern    Not on file   Social History Narrative    Not on file     Social Determinants of Health     Financial Resource Strain: Low Risk  (2/28/2023)    Overall Financial Resource Strain (CARDIA)     Difficulty of Paying Living Expenses: Not hard at all   Food Insecurity: No Food Insecurity (2/7/2024)    Hunger Vital Sign     Worried About Running Out of Food in the Last Year: Never true     Ran Out of Food in the Last Year: Never true   Transportation Needs: No Transportation Needs (2/7/2024)    PRAPARE - Transportation     Lack of Transportation (Medical): No     Lack of Transportation (Non-Medical): No   Physical Activity: Sufficiently Active (1/31/2023)    Exercise Vital Sign     Days of Exercise per Week: 4 days     Minutes of Exercise per Session: 40 min   Stress: Not on file   Social Connections: Not on file   Intimate Partner Violence: Not on file   Housing Stability: Low Risk  (2/7/2024)    Housing Stability Vital Sign     Unable to Pay for Housing in the Last Year: No     Number of Places Lived in the Last Year: 1     Unstable Housing in the Last Year: No       Differential diagnosis includes but is not limited to: CHF, electrolyte abnormality, kidney dysfunction, liver dysfunction, flu, COVID, thyroid disease, constipation    WORKUP INTERPRETATION:  ED Course as of 04/01/24 2347   Mon Apr 01, 2024 2122 Significant hyperkalemia to 7.5.  There was significant delays related to lab.  Insulin, dextrose, calcium gluconate, sodium bicarb, albuterol, and Lokelma has been ordered.  Did speak to nurse and she is aware that these need to be given immediately [ER]   2124 No other significant electrolyte abnormalities.  Patient does have evidence of significant kidney dysfunction.  Will discuss with nephrology given we would like to give Lasix for diuresis but patient has significant kidney dysfunction. [ER]   2124 Liver function testing shows mildly elevated

## 2024-04-02 NOTE — CONSULTS
Pt has had multiple readmissions over the past year and overall prognosis is poor. Writer spoke with pt's nurse at bedside. Pt in ICU and resting at this time. Per bedside nurse requests for writer to reach out to pt's spouse for goc conversation. Writer attempted to reach pt's spouse,Stanford, via TC without success, and no VM options.     Writer spoke with pt's daughter,Sandra, via TC and she will pass the PC contact phone #  to pt's spouse r/t goc conversation. PC following peripherally.

## 2024-04-02 NOTE — CONSULTS
Reason for referral and CC:feels bad    HISTORY OF PRESENT ILLNESS: 77 yo female with chronic advanced kidney disease and CHF presented for feeling ill.  She admits to progressive weight gain and edema.  She was found to be in acute on chronic kidney disease with gross volume overload and severe hyperkalemia that did not respond very well to short-term treatment.  She had Vas-Cath placed this morning and will be starting CRRT.  Her blood pressure is low and her antihypertensives have been held.      Past Medical History:   Diagnosis Date    Anemia     Backache, unspecified 03/11/2008    CAD, multiple vessel 01/06/2020    Chronic kidney disease (CKD) stage G3b/A1, moderately decreased glomerular filtration rate (GFR) between 30-44 mL/min/1.73 square meter and albuminuria creatinine ratio less than 30 mg/g ([facility]) 04/16/2021    Chronic systolic CHF (congestive heart failure) ([facility]) 12/11/2019    COPD (chronic obstructive pulmonary disease) ([facility])     Depressive disorder, not elsewhere classified 11/08/2007    Essential hypertension, benign 11/08/2007    Gout 08/11/2011    History of blood transfusion     Hyperlipidemia associated with type 2 diabetes mellitus ([facility]) 12/14/2015    Major depressive disorder, recurrent episode, moderate ([facility]) 12/14/2015    Osteoarthritis, hand 02/27/2012    Osteoarthrosis, unspecified whether generalized or localized, lower leg 11/08/2007    Osteopenia 08/05/2015    Other and unspecified hyperlipidemia 11/08/2007    Pain in joint, lower leg 01/29/2007    Pneumonia     Rotator cuff tendinitis 10/06/2017    Tear of medial cartilage or meniscus of knee, current 01/29/2007    Type 2 diabetes mellitus with microalbuminuria, without long-term current use of insulin ([facility]) 05/05/2017    Type 2 diabetes mellitus without complication ([facility]) 11/08/2007    Type II or unspecified type diabetes mellitus without mention of complication, not stated as uncontrolled 11/08/2007    foot exam 7/16/08 normal

## 2024-04-02 NOTE — CARE COORDINATION
04/02/24 1442   Service Assessment   Patient Orientation Alert and Oriented  (Patient wakes up and can talk with CM but goes right back to sleep. Per spouse she sleeps a lot during the day.)   Cognition Alert  (Patient wakes up and can talk with CM but goes right back to sleep. Per spouse she sleeps a lot during the day.)   History Provided By Spouse   Primary Caregiver Self  (If unstable needs spouse to lead her to wherever she is going. Can handle other ADL's.)   Support Systems Spouse/Significant Other   Patient's Healthcare Decision Maker is: Legal Next of Kin   PCP Verified by CM Yes   Last Visit to PCP Within last 6 months   Prior Functional Level Assistance with the following:;Mobility;Cooking  (If pt is unsteady the spouse will lead her to wherever she needs to go. She can take care of the rest. Spouse does the cooking.)   Current Functional Level Mobility;Assistance with the following:;Cooking  (If pt is unsteady the spouse will lead her to wherever she needs to go. She can take care of the rest. Spouse does the cooking.)   Can patient return to prior living arrangement Yes   Ability to make needs known: Good   Family able to assist with home care needs: Yes   Would you like for me to discuss the discharge plan with any other family members/significant others, and if so, who? No   Financial Resources Medicare   Community Resources None   Discharge Planning   Type of Residence Trailer/Mobile Home   Living Arrangements Spouse/Significant Other   Current Services Prior To Admission Oxygen Therapy;Durable Medical Equipment  ([employer] 2L)   Current DME Prior to Arrival Oxygen Therapy (Comment);Walker;Cane   Potential Assistance Needed Skilled Nursing Facility   DME Ordered? No   Potential Assistance Purchasing Medications No   Type of Home Care Services None   Patient expects to be discharged to: Trailer/mobile home   Follow Up Appointment: Best Day/Time    (self or spouse)   One/Two Story Residence One story  kept falling asleep during conversation. Spoke with spouse on the phone for assessment questions. The patient has 02 with [employer] 2L x 24 hrs. The patient uses a cane to walk. The patient can go to the bathroom and she can take care of her own personal care however if she is unsteady the spouse will lead the patient where ever she needs to go. Spouse does the cooking. The spouse reports frequent falls related to being unsteady and tripping over things. CM will discuss SNF with the patient. Will need OT/PT recommendations.     The Plan for Transition of Care is related to the following treatment goals of Hyperkalemia [E87.5]  Anasarca [R60.1]  Bradycardia [R00.1]  Pleural effusion [J90]  Other ascites [R18.8]  Acute renal failure, unspecified acute renal failure type (HCC) [N17.9]  Pneumonia of right lower lobe due to infectious organism [J18.9]    IF APPLICABLE: The Patient and/or patient representative Amber and her family were provided with a choice of provider and agrees with the discharge plan. Freedom of choice list with basic dialogue that supports the patient's individualized plan of care/goals and shares the quality data associated with the providers was provided to:     Patient Representative Name:       The Patient and/or Patient Representative Agree with the Discharge Plan?      Teresa Boeck, RN  Case Management Department  Ph: [phone]

## 2024-04-02 NOTE — BRIEF OP NOTE
Brief Postoperative Note    Patient: Amber Fuentes  YOB: 1945  MRN: [id number]      Pre-operative Diagnosis: Hyperkalemia    Post-operative Diagnosis: Same    Procedure: Temporary HD catheter placement    Anesthesia: Local    Surgeons/Assistants: Henry Laughlin DO    Estimated Blood Loss: less than 50     Complications: None    Specimens: Was Not Obtained    Findings:   Patent right IJ vein.  Successful placement of a temporary HD catheter, ready for immediate use.       Henry Laughlin DO  4/2/2024, 8:44 AM  Interventional Radiology  [phone] ([phone])

## 2024-04-03 PROBLEM — I50.21 ACUTE SYSTOLIC CONGESTIVE HEART FAILURE (HCC): Status: ACTIVE | Noted: 2024-04-03

## 2024-04-03 PROBLEM — J90 PLEURAL EFFUSION: Status: ACTIVE | Noted: 2024-04-03

## 2024-04-03 PROBLEM — R60.1 ANASARCA: Status: ACTIVE | Noted: 2024-04-03

## 2024-04-03 LAB
ALBUMIN SERPL-MCNC: 2.3 G/DL (ref 3.4–5)
ALBUMIN SERPL-MCNC: 2.4 G/DL (ref 3.4–5)
ALBUMIN SERPL-MCNC: 2.5 G/DL (ref 3.4–5)
ALBUMIN SERPL-MCNC: 2.6 G/DL (ref 3.4–5)
ALBUMIN/GLOB SERPL: 1 {RATIO} (ref 1.1–2.2)
ALBUMIN/GLOB SERPL: 1.1 {RATIO} (ref 1.1–2.2)
ALP SERPL-CCNC: 170 U/L (ref 40–129)
ALP SERPL-CCNC: 179 U/L (ref 40–129)
ALP SERPL-CCNC: 189 U/L (ref 40–129)
ALP SERPL-CCNC: 193 U/L (ref 40–129)
ALT SERPL-CCNC: 16 U/L (ref 10–40)
ALT SERPL-CCNC: 18 U/L (ref 10–40)
ANION GAP SERPL CALCULATED.3IONS-SCNC: 2 MMOL/L (ref 3–16)
ANION GAP SERPL CALCULATED.3IONS-SCNC: 4 MMOL/L (ref 3–16)
ANION GAP SERPL CALCULATED.3IONS-SCNC: 5 MMOL/L (ref 3–16)
ANION GAP SERPL CALCULATED.3IONS-SCNC: 5 MMOL/L (ref 3–16)
ANION GAP SERPL CALCULATED.3IONS-SCNC: 6 MMOL/L (ref 3–16)
APTT BLD: 126.9 SEC (ref 22.1–36.4)
APTT BLD: >180 SEC (ref 22.7–35.9)
AST SERPL-CCNC: 18 U/L (ref 15–37)
AST SERPL-CCNC: 23 U/L (ref 15–37)
AST SERPL-CCNC: 24 U/L (ref 15–37)
AST SERPL-CCNC: 27 U/L (ref 15–37)
BASOPHILS # BLD: 0.1 K/UL (ref 0–0.2)
BASOPHILS NFR BLD: 0.8 %
BILIRUB SERPL-MCNC: 0.9 MG/DL (ref 0–1)
BILIRUB SERPL-MCNC: 0.9 MG/DL (ref 0–1)
BILIRUB SERPL-MCNC: 1.1 MG/DL (ref 0–1)
BILIRUB SERPL-MCNC: 1.1 MG/DL (ref 0–1)
BUN SERPL-MCNC: 11 MG/DL (ref 7–20)
BUN SERPL-MCNC: 11 MG/DL (ref 7–20)
BUN SERPL-MCNC: 21 MG/DL (ref 7–20)
BUN SERPL-MCNC: 21 MG/DL (ref 7–20)
BUN SERPL-MCNC: 8 MG/DL (ref 7–20)
CA-I BLD-SCNC: 1.01 MMOL/L (ref 1.12–1.32)
CA-I BLD-SCNC: 1.07 MMOL/L (ref 1.12–1.32)
CA-I BLD-SCNC: 1.09 MMOL/L (ref 1.12–1.32)
CA-I BLD-SCNC: 1.1 MMOL/L (ref 1.12–1.32)
CALCIUM SERPL-MCNC: 7 MG/DL (ref 8.3–10.6)
CALCIUM SERPL-MCNC: 7.1 MG/DL (ref 8.3–10.6)
CALCIUM SERPL-MCNC: 7.1 MG/DL (ref 8.3–10.6)
CALCIUM SERPL-MCNC: 7.3 MG/DL (ref 8.3–10.6)
CALCIUM SERPL-MCNC: 7.5 MG/DL (ref 8.3–10.6)
CHLORIDE SERPL-SCNC: 102 MMOL/L (ref 99–110)
CHLORIDE SERPL-SCNC: 104 MMOL/L (ref 99–110)
CHLORIDE SERPL-SCNC: 104 MMOL/L (ref 99–110)
CO2 SERPL-SCNC: 28 MMOL/L (ref 21–32)
CO2 SERPL-SCNC: 29 MMOL/L (ref 21–32)
CO2 SERPL-SCNC: 31 MMOL/L (ref 21–32)
CREAT SERPL-MCNC: 0.8 MG/DL (ref 0.6–1.2)
CREAT SERPL-MCNC: 1.2 MG/DL (ref 0.6–1.2)
CREAT SERPL-MCNC: 1.2 MG/DL (ref 0.6–1.2)
CREAT SERPL-MCNC: <0.5 MG/DL (ref 0.6–1.2)
CREAT SERPL-MCNC: <0.5 MG/DL (ref 0.6–1.2)
DEPRECATED RDW RBC AUTO: 18.8 % (ref 12.4–15.4)
EOSINOPHIL # BLD: 0.2 K/UL (ref 0–0.6)
EOSINOPHIL NFR BLD: 2.8 %
GFR SERPLBLD CREATININE-BSD FMLA CKD-EPI: 46 ML/MIN/{1.73_M2}
GFR SERPLBLD CREATININE-BSD FMLA CKD-EPI: 46 ML/MIN/{1.73_M2}
GFR SERPLBLD CREATININE-BSD FMLA CKD-EPI: 75 ML/MIN/{1.73_M2}
GFR SERPLBLD CREATININE-BSD FMLA CKD-EPI: >90 ML/MIN/{1.73_M2}
GFR SERPLBLD CREATININE-BSD FMLA CKD-EPI: >90 ML/MIN/{1.73_M2}
GLUCOSE BLD-MCNC: 124 MG/DL (ref 70–99)
GLUCOSE BLD-MCNC: 134 MG/DL (ref 70–99)
GLUCOSE BLD-MCNC: 177 MG/DL (ref 70–99)
GLUCOSE BLD-MCNC: 179 MG/DL (ref 70–99)
GLUCOSE SERPL-MCNC: 117 MG/DL (ref 70–99)
GLUCOSE SERPL-MCNC: 127 MG/DL (ref 70–99)
GLUCOSE SERPL-MCNC: 127 MG/DL (ref 70–99)
GLUCOSE SERPL-MCNC: 144 MG/DL (ref 70–99)
GLUCOSE SERPL-MCNC: 164 MG/DL (ref 70–99)
HCT VFR BLD AUTO: 24.8 % (ref 36–48)
HGB BLD-MCNC: 7.8 G/DL (ref 12–16)
LYMPHOCYTES # BLD: 1 K/UL (ref 1–5.1)
LYMPHOCYTES NFR BLD: 15.9 %
MAGNESIUM SERPL-MCNC: 2.1 MG/DL (ref 1.8–2.4)
MAGNESIUM SERPL-MCNC: 2.1 MG/DL (ref 1.8–2.4)
MAGNESIUM SERPL-MCNC: 2.2 MG/DL (ref 1.8–2.4)
MCH RBC QN AUTO: 27.7 PG (ref 26–34)
MCHC RBC AUTO-ENTMCNC: 31.3 G/DL (ref 31–36)
MCV RBC AUTO: 88.3 FL (ref 80–100)
MONOCYTES # BLD: 0.5 K/UL (ref 0–1.3)
MONOCYTES NFR BLD: 8.6 %
NEUTROPHILS # BLD: 4.5 K/UL (ref 1.7–7.7)
NEUTROPHILS NFR BLD: 71.9 %
PERFORMED ON: ABNORMAL
PH BLDV: 7.36 [PH] (ref 7.35–7.45)
PH BLDV: 7.37 [PH] (ref 7.35–7.45)
PH BLDV: 7.4 [PH] (ref 7.35–7.45)
PH BLDV: 7.4 [PH] (ref 7.35–7.45)
PHOSPHATE SERPL-MCNC: 1.2 MG/DL (ref 2.5–4.9)
PHOSPHATE SERPL-MCNC: 1.3 MG/DL (ref 2.5–4.9)
PHOSPHATE SERPL-MCNC: 2.2 MG/DL (ref 2.5–4.9)
PLATELET # BLD AUTO: 103 K/UL (ref 135–450)
PMV BLD AUTO: 7.9 FL (ref 5–10.5)
POTASSIUM SERPL-SCNC: 4.2 MMOL/L (ref 3.5–5.1)
POTASSIUM SERPL-SCNC: 4.3 MMOL/L (ref 3.5–5.1)
POTASSIUM SERPL-SCNC: 4.4 MMOL/L (ref 3.5–5.1)
PROT SERPL-MCNC: 4.4 G/DL (ref 6.4–8.2)
PROT SERPL-MCNC: 4.8 G/DL (ref 6.4–8.2)
PROT SERPL-MCNC: 4.8 G/DL (ref 6.4–8.2)
PROT SERPL-MCNC: 4.9 G/DL (ref 6.4–8.2)
RBC # BLD AUTO: 2.8 M/UL (ref 4–5.2)
SODIUM SERPL-SCNC: 135 MMOL/L (ref 136–145)
SODIUM SERPL-SCNC: 135 MMOL/L (ref 136–145)
SODIUM SERPL-SCNC: 136 MMOL/L (ref 136–145)
SODIUM SERPL-SCNC: 138 MMOL/L (ref 136–145)
SODIUM SERPL-SCNC: 138 MMOL/L (ref 136–145)
WBC # BLD AUTO: 6.3 K/UL (ref 4–11)

## 2024-04-03 PROCEDURE — 2580000003 HC RX 258: Performed by: INTERNAL MEDICINE

## 2024-04-03 PROCEDURE — 6370000000 HC RX 637 (ALT 250 FOR IP): Performed by: INTERNAL MEDICINE

## 2024-04-03 PROCEDURE — 84100 ASSAY OF PHOSPHORUS: CPT

## 2024-04-03 PROCEDURE — 2500000003 HC RX 250 WO HCPCS: Performed by: INTERNAL MEDICINE

## 2024-04-03 PROCEDURE — 99233 SBSQ HOSP IP/OBS HIGH 50: CPT | Performed by: INTERNAL MEDICINE

## 2024-04-03 PROCEDURE — 80053 COMPREHEN METABOLIC PANEL: CPT

## 2024-04-03 PROCEDURE — 82330 ASSAY OF CALCIUM: CPT

## 2024-04-03 PROCEDURE — 6360000002 HC RX W HCPCS: Performed by: INTERNAL MEDICINE

## 2024-04-03 PROCEDURE — 2000000000 HC ICU R&B

## 2024-04-03 PROCEDURE — 99291 CRITICAL CARE FIRST HOUR: CPT | Performed by: INTERNAL MEDICINE

## 2024-04-03 PROCEDURE — 85025 COMPLETE CBC W/AUTO DIFF WBC: CPT

## 2024-04-03 PROCEDURE — 90945 DIALYSIS ONE EVALUATION: CPT

## 2024-04-03 PROCEDURE — 85730 THROMBOPLASTIN TIME PARTIAL: CPT

## 2024-04-03 PROCEDURE — 83735 ASSAY OF MAGNESIUM: CPT

## 2024-04-03 PROCEDURE — 36592 COLLECT BLOOD FROM PICC: CPT

## 2024-04-03 PROCEDURE — 2700000000 HC OXYGEN THERAPY PER DAY

## 2024-04-03 PROCEDURE — 36415 COLL VENOUS BLD VENIPUNCTURE: CPT

## 2024-04-03 PROCEDURE — 94761 N-INVAS EAR/PLS OXIMETRY MLT: CPT

## 2024-04-03 RX ORDER — MECOBALAMIN 5000 MCG
10 TABLET,DISINTEGRATING ORAL NIGHTLY PRN
Status: DISPENSED | OUTPATIENT
Start: 2024-04-03 | End: 2024-04-13

## 2024-04-03 RX ORDER — METOPROLOL SUCCINATE 25 MG/1
12.5 TABLET, EXTENDED RELEASE ORAL DAILY
Status: DISCONTINUED | OUTPATIENT
Start: 2024-04-03 | End: 2024-04-14

## 2024-04-03 RX ADMIN — HEPARIN SODIUM 5000 UNITS: 5000 INJECTION INTRAVENOUS; SUBCUTANEOUS at 05:05

## 2024-04-03 RX ADMIN — Medication: at 18:00

## 2024-04-03 RX ADMIN — HEPARIN SODIUM: 1000 INJECTION INTRAVENOUS; SUBCUTANEOUS at 11:18

## 2024-04-03 RX ADMIN — CALCIUM GLUCONATE 1000 MG: 20 INJECTION, SOLUTION INTRAVENOUS at 15:54

## 2024-04-03 RX ADMIN — SODIUM PHOSPHATE, MONOBASIC, MONOHYDRATE AND SODIUM PHOSPHATE, DIBASIC, ANHYDROUS 18 MMOL: 142; 276 INJECTION, SOLUTION INTRAVENOUS at 19:41

## 2024-04-03 RX ADMIN — Medication: at 21:20

## 2024-04-03 RX ADMIN — Medication 10 MG: at 21:14

## 2024-04-03 RX ADMIN — Medication: at 00:43

## 2024-04-03 RX ADMIN — Medication: at 04:05

## 2024-04-03 RX ADMIN — Medication: at 11:04

## 2024-04-03 RX ADMIN — ACETAMINOPHEN 650 MG: 325 TABLET ORAL at 08:23

## 2024-04-03 RX ADMIN — MUPIROCIN: 20 OINTMENT TOPICAL at 19:47

## 2024-04-03 RX ADMIN — Medication: at 02:27

## 2024-04-03 RX ADMIN — Medication: at 05:03

## 2024-04-03 RX ADMIN — SODIUM BICARBONATE: 84 INJECTION, SOLUTION INTRAVENOUS at 05:20

## 2024-04-03 RX ADMIN — HEPARIN SODIUM: 1000 INJECTION INTRAVENOUS; SUBCUTANEOUS at 09:29

## 2024-04-03 RX ADMIN — ALLOPURINOL 100 MG: 100 TABLET ORAL at 07:34

## 2024-04-03 RX ADMIN — ASPIRIN 81 MG: 81 TABLET, COATED ORAL at 07:34

## 2024-04-03 RX ADMIN — MUPIROCIN: 20 OINTMENT TOPICAL at 07:33

## 2024-04-03 RX ADMIN — DESMOPRESSIN ACETATE 40 MG: 0.2 TABLET ORAL at 19:50

## 2024-04-03 RX ADMIN — HEPARIN SODIUM: 1000 INJECTION INTRAVENOUS; SUBCUTANEOUS at 19:11

## 2024-04-03 RX ADMIN — Medication: at 14:30

## 2024-04-03 RX ADMIN — Medication: at 07:38

## 2024-04-03 RX ADMIN — Medication: at 22:21

## 2024-04-03 RX ADMIN — SODIUM CHLORIDE, PRESERVATIVE FREE 10 ML: 5 INJECTION INTRAVENOUS at 19:47

## 2024-04-03 RX ADMIN — Medication: at 07:43

## 2024-04-03 RX ADMIN — CALCIUM GLUCONATE 1000 MG: 20 INJECTION, SOLUTION INTRAVENOUS at 22:09

## 2024-04-03 RX ADMIN — Medication: at 12:51

## 2024-04-03 RX ADMIN — HEPARIN SODIUM: 1000 INJECTION INTRAVENOUS; SUBCUTANEOUS at 05:33

## 2024-04-03 RX ADMIN — CALCIUM GLUCONATE 1000 MG: 20 INJECTION, SOLUTION INTRAVENOUS at 09:42

## 2024-04-03 RX ADMIN — SODIUM CHLORIDE, PRESERVATIVE FREE 10 ML: 5 INJECTION INTRAVENOUS at 09:00

## 2024-04-03 RX ADMIN — HEPARIN SODIUM: 1000 INJECTION INTRAVENOUS; SUBCUTANEOUS at 15:43

## 2024-04-03 RX ADMIN — Medication: at 10:16

## 2024-04-03 RX ADMIN — HEPARIN SODIUM: 1000 INJECTION INTRAVENOUS; SUBCUTANEOUS at 01:57

## 2024-04-03 RX ADMIN — METOPROLOL SUCCINATE 12.5 MG: 25 TABLET, EXTENDED RELEASE ORAL at 10:00

## 2024-04-03 RX ADMIN — CALCIUM GLUCONATE 1000 MG: 20 INJECTION, SOLUTION INTRAVENOUS at 05:59

## 2024-04-03 RX ADMIN — Medication: at 07:33

## 2024-04-03 ASSESSMENT — PAIN SCALES - GENERAL: PAINLEVEL_OUTOF10: 3

## 2024-04-03 ASSESSMENT — PAIN DESCRIPTION - LOCATION: LOCATION: BACK;WRIST

## 2024-04-03 NOTE — PROGRESS NOTES
Pulmonary & Critical Care Medicine ICU Progress Note    CC: CHF, hyperkalemia    Events of Last 24 hours: Tolerating CRRT. Briefly on levophed yesterday    Invasive Lines: 4/2/24 Vas cath      Recent Labs     04/02/24  0645   PHART 7.285*   EUO6ZXT 40.2   PO2ART 101.4       IV:   sodium chloride 5 mL/hr at 04/02/24 2010    sodium bicarbonate 150 mEq in dextrose 5 % 1,000 mL infusion 100 mL/hr at 04/03/24 0520    norepinephrine Stopped (04/03/24 0525)    heparin (porcine) 5,000 Units in sodium chloride 0.9 % 20 mL infusion      prismaSATE BGK 4/2.5 2,000 mL/hr at 04/03/24 0743    prismaSATE BGK 4/2.5 1,500 mL/hr at 04/03/24 0733    prismaSATE BGK 4/2.5 500 mL/hr at 04/03/24 0738    dextrose         Vitals:  /60   Pulse 71   Temp 98.5 °F (36.9 °C) (Temporal)   Resp 17   Wt 67.2 kg (148 lb 1.6 oz)   SpO2 96%   BMI 28.92 kg/m²   on RA  EXAM:  Constitutional: ill appearing. Not in distress.  Eyes: PERRL. No sclera icterus.   ENT: Normal Nose. Normal Tongue.   Neck:  Trachea is midline.   Respiratory: No accessory muscle usage. No wheezes. No rales. No Rhonchi.  Cardiovascular: Normal S1S2. No murmur. No digit cyanosis. No digit clubbing. No LE edema.   GI: Non-tender. Non-distended. Normal bowel sounds. No masses.   Skin: No rash on the exposed extremities.   Neuro: Alert. Oriented. Follows commands.  Moves all extremities.  Psych: No agitation, no anxiety.    Scheduled Meds:   sodium chloride flush  5-40 mL IntraVENous 2 times per day    [Held by provider] heparin (porcine)  5,000 Units SubCUTAneous 3 times per day    mupirocin   Each Nostril BID    allopurinol  100 mg Oral Daily    aspirin  81 mg Oral Daily    atorvastatin  40 mg Oral Nightly    insulin lispro  0-8 Units SubCUTAneous TID WC    insulin lispro  0-4 Units SubCUTAneous Nightly     PRN Meds:  sodium chloride flush, sodium chloride, ondansetron **OR** ondansetron, polyethylene glycol, acetaminophen **OR** acetaminophen, potassium chloride,  magnesium sulfate, calcium gluconate **OR** calcium gluconate **OR** calcium gluconate **OR** calcium gluconate, sodium phosphate 6 mmol in sodium chloride 0.9 % 250 mL IVPB **OR** sodium phosphate 12 mmol in sodium chloride 0.9 % 250 mL IVPB **OR** sodium phosphate 18 mmol in sodium chloride 0.9 % 500 mL IVPB **OR** sodium phosphate 24 mmol in sodium chloride 0.9 % 500 mL IVPB, glucose, dextrose bolus **OR** dextrose bolus, glucagon (rDNA), dextrose    Results:  CBC:   Recent Labs     04/01/24 1820 04/02/24 0428 04/03/24 0414   WBC 7.0 6.0 6.3   HGB 12.0 9.2* 7.8*   HCT 39.5 30.5* 24.8*   MCV 91.9 92.7 88.3    175 103*     BMP:   Recent Labs     04/02/24 0428 04/02/24 1535 04/02/24 2208 04/03/24 0413 04/03/24 0414      < > 139 138 136   K 6.2*   < > 4.6 4.4 4.4      < > 106 104 102   CO2 17*   < > 28 29 28   PHOS 5.5*  --  3.3  --   --    BUN 57*   < > 33* 21* 21*   CREATININE 2.5*   < > 1.6* 1.2 1.2    < > = values in this interval not displayed.     LIVER PROFILE:   Recent Labs     04/01/24 1820 04/01/24 2012 04/02/24 1535 04/02/24 2208 04/03/24 0414   AST  --    < > 28 25 27   ALT  --    < > 20 18 18   LIPASE 30.0  --   --   --   --    BILITOT 1.1*   < > 0.7 0.7 0.9   ALKPHOS 269*   < > 175* 169* 170*    < > = values in this interval not displayed.       Cultures:  Utine    PCT 0.13     CT ABD IMPRESSION:  1. Bilateral pleural effusions.  Consolidation in the right perihilar region. 2. Mild ascites. 3. Diffuse anasarca.      Chest imaging was reviewed by me and showed CXR 1. Moderate to large right pleural effusion with opacification underlying lung, atelectasis versus pneumonia.  2. Mild left basilar atelectasis.     ASSESSMENT:  Hyperkalemia  Hypoglycemia  Metabolic acidosis  DM2  CHF   CKD IV  Large right pleural effusion  COPD  Anasarca     PLAN:  CRRT running even - pt would probably benefit from starting volume removal today  F/u urine cx  Nephrology following  SSI  Monitor

## 2024-04-03 NOTE — PROGRESS NOTES
Blood pressure 102/77, pulse 66, temperature 98.2 °F (36.8 °C), temperature source Bladder, resp. rate 18, weight 67.2 kg (148 lb 1.6 oz), SpO2 96 %, not currently breastfeeding.    Patient is alert and Oriented x 4. Patient is saying she is hearing a antolin voice in her ear telling her things. States he was saying \"I told you I'd be back\". She said it sounds like her x husbands voice. Patient is aware that she is just hearing voices and there isn't anyone in there.     Lung sounds diminished. Abdomen soft, tender to touch on the right side. Good catheter intact, yellow clear urine noted from good. Edema continues to BUE and BLE.   Patient remains on 2L O2 via NC.     LEVOPHED infusing at a rate of 1 mcg/min.   Sodium Bicarbonate 150 mEq infusing at 100 ml/Hr.    Electronically signed by Yeimi Miller RN on 4/2/2024 at 8:09 PM

## 2024-04-03 NOTE — PROGRESS NOTES
CRRT started.     CRRT running okay at this time. Flow parameters are: PRE 1500, DIALYSATE 2000, POST 500. Fluid removal goal is EVEN per nephrology. Patient tolerating well at this time.     Electronically signed by Yeimi Miller RN on 4/2/2024 at 9:47 PM

## 2024-04-03 NOTE — PROGRESS NOTES
Department of Internal Medicine  Nephrology Progress Note        SUBJECTIVE:    We are following this patient for A/CKD and Hyperkalemia.  The patient was seen and examined; she feels well today with no CP, SOB, nausea or vomiting.    ROS: No fever or chills.  Social: No family at bedside.    Physical Exam:    VITALS:  BP (!) 126/45   Pulse 70   Temp 99.4 °F (37.4 °C) (Temporal)   Resp 26   Wt 67.2 kg (148 lb 1.6 oz)   SpO2 96%   BMI 28.92 kg/m²     General appearance: Seems comfortable, no acute distress.  Neck: Trachea midline, thyroid normal.   Lungs:  Non labored breathing, CTA to anterior auscultation.  Heart:  S1S2 normal, rub or gallop. No peripheral edema.  Abdomen: Soft, non-tender, no organomegaly.   Skin: No lesions or rashes, warm to touch.     DATA:    CBC with Differential:    Lab Results   Component Value Date/Time    WBC 6.3 04/03/2024 04:14 AM    RBC 2.80 04/03/2024 04:14 AM    HGB 7.8 04/03/2024 04:14 AM    HCT 24.8 04/03/2024 04:14 AM     04/03/2024 04:14 AM    MCV 88.3 04/03/2024 04:14 AM    MCH 27.7 04/03/2024 04:14 AM    MCHC 31.3 04/03/2024 04:14 AM    RDW 18.8 04/03/2024 04:14 AM    SEGSPCT 53.4 04/29/2015 04:14 PM    SEGSPCT 53.4 04/29/2015 04:14 PM    LYMPHOPCT 15.9 04/03/2024 04:14 AM    MONOPCT 8.6 04/03/2024 04:14 AM    EOSPCT 7.6 02/27/2012 11:08 AM    BASOPCT 0.8 04/03/2024 04:14 AM    MONOSABS 0.5 04/03/2024 04:14 AM    LYMPHSABS 1.0 04/03/2024 04:14 AM    EOSABS 0.2 04/03/2024 04:14 AM    BASOSABS 0.1 04/03/2024 04:14 AM    DIFFTYPE Auto 06/04/2011 02:30 AM     BMP:    Lab Results   Component Value Date/Time     04/03/2024 09:18 AM    K 4.2 04/03/2024 09:18 AM     04/03/2024 09:18 AM    CO2 31 04/03/2024 09:18 AM    BUN 11 04/03/2024 09:18 AM    LABALBU 2.4 04/03/2024 09:18 AM    CREATININE <0.5 04/03/2024 09:18 AM    CALCIUM 7.1 04/03/2024 09:18 AM    GFRAA 33 10/14/2022 01:29 PM    GFRAA >60 06/04/2011 02:30 AM    LABGLOM >90 04/03/2024 09:18 AM    GLUCOSE

## 2024-04-03 NOTE — PROGRESS NOTES
Heparin Syringe: CRRT  Heparin initially going at 1250 unit/hr  Ptt @0918= > 180 secs  Hold Heparin Drip for 1 hour and then restart at 1050 units/hr  Next Ptt@ 1700    PTT  Rate change  <59       Bolus 500 units heparin and increase drip by 200 units/hr  59-72.9  Increase infusion by 200 units/hr    No change  102.1-122.9 Decrease infusion by 200 units/hr  >123  Hold infusion for 1 hr and decrease infusion by 200 units/hr    200 units = 0.8 mL    Check aPTT 6 hours after initiation and 6 hours after every dose change.     When aPTT  is within target range for two consecutive times, check aPTT once daily with morning labs.

## 2024-04-03 NOTE — PROGRESS NOTES
Patient provided a COPD Educational Folder that includes the following materials:     [x]  [facility] Booklet: Managing your COPD  [x]  ALA: Getting the Most Out of Medication Delivery Devices  [x]  ALA: My COPD Action Plan  [x]  [employer] [facility]reat[employer] [facility]: [facility]   [x]  Smoking Cessation Classes  [x]  Outpatient Spiritual Care Services  [x]  [employer]agnet: Signs of COPD    PATIENT/CAREGIVER TEACHING:   Level of patient/caregiver understanding able to:   [x] Verbalize understanding   [] Demonstrate understanding       [] Teach back        [] Needs reinforcement     []  Other:     Electronically signed by Yeimi Miller RN on 4/3/2024 at 12:21 AM

## 2024-04-03 NOTE — PLAN OF CARE
Problem: Safety - Adult  Goal: Free from fall injury  Outcome: Progressing     Problem: Chronic Conditions and Co-morbidities  Goal: Patient's chronic conditions and co-morbidity symptoms are monitored and maintained or improved  Outcome: Progressing     Problem: Respiratory - Adult  Goal: Achieves optimal ventilation and oxygenation  Outcome: Progressing

## 2024-04-03 NOTE — PROGRESS NOTES
AM assessment completed. Pt awake, alert & oriented- assisted w/ breakfast. Pt c/o wrist pain- PRN tylenol given. R IJ vas cath WNL- CRRT running- keeping even. NaBicarb gtt stopped per pulmonary. AM meds administered.

## 2024-04-03 NOTE — PROGRESS NOTES
Aptt>180 heparin syringe set to zero. Spoke w/ pharmacy & will resume heparin gtt after an hour at 1,000 units/hr

## 2024-04-03 NOTE — PROGRESS NOTES
IM Progress Note    Admit Date:  4/1/2024  2    Interval history:  chronic systolic CHF with very low EF at 20 % presenting with acute CHF and renal failure with refractory hyperkalemia    Initiated on CRRT and ongoing fluid removal   Off bicarb gtt    Subjective:  Ms. Fuentes seen up in bed , feels ok today .unsure about her current critical CHF and renal failure, poor insight into her condition     Breathing stable per pt      Objective:   /60   Pulse 71   Temp 98.5 °F (36.9 °C) (Bladder)   Resp 17   Wt 67.2 kg (148 lb 1.6 oz)   SpO2 94%   BMI 28.92 kg/m²     Intake/Output Summary (Last 24 hours) at 4/3/2024 0729  Last data filed at 4/3/2024 0700  Gross per 24 hour   Intake 2731.32 ml   Output 2205 ml   Net 526.32 ml       Physical Exam:    General:  elderly female Awake, alert and fairly oriented. Appears to be not in any distress  Mucous Membranes:  Pink , anicteric  Neck: no JVD, no carotid bruit, no thyromegaly  Chest:  Clear to auscultation bilaterally minimal basilar crackles  Cardiovascular:  RRR S1S2 heard, no murmurs or gallops  Abdomen:  Soft, undistended, non tender, no organomegaly, BS present  Extremities- improving 2 + peripheral edema , 3+ leif UE edema remains . Distal pulses well felt  Right sided temp HD line   Neurological : grossly normal with gen weakness         Medications:   Scheduled Medications:    sodium chloride flush  5-40 mL IntraVENous 2 times per day    heparin (porcine)  5,000 Units SubCUTAneous 3 times per day    mupirocin   Each Nostril BID    allopurinol  100 mg Oral Daily    aspirin  81 mg Oral Daily    atorvastatin  40 mg Oral Nightly    insulin lispro  0-8 Units SubCUTAneous TID     insulin lispro  0-4 Units SubCUTAneous Nightly     I   sodium chloride 5 mL/hr at 04/02/24 2010    sodium bicarbonate 150 mEq in dextrose 5 % 1,000 mL infusion 100 mL/hr at 04/03/24 0520    norepinephrine Stopped (04/03/24 0525)    heparin (porcine) 5,000 Units in sodium chloride 0.9  Physio II annuloplasty ring is seen in the mitral position.   Mild mitral stenosis.   Mild to moderate mitral regurgitation.   Mild aortic regurgitation.   Moderate tricuspid regurgitation.   Systolic pulmonary artery pressure (SPAP) estimated at 56 mmHg (RA pressure   8 mmHg).   Mild-moderate pulmonic regurgitation present.      Compared with the prior study performed 1/23/24 (Limited Echo with 25% EF severe global hypokinesis inferior akinesis. RV reduced function), LVEF   appears slightly improved.    Assessment & Plan:         #Acute on chronic systolic CHF  #Ischemic cardiomyopathy   -pro-BNP >23k  -CXR shows cardiomegaly with pulmonary vascular congestion  Also has massive peripheral edema and refractory hyperkalemia  -limited echo 1/24 with similar EF of 25 % as before  -nephrology consulted   - initiated on CRRT for high K and ongoing fluid removal    Holding  on TOPROL, oral demadex  ,entresto for hypotension and now on low dose levo  - can try low dose toprol today once off levo       LANCE on CKD4  Hyperkalemia and metabolic acidosis  - possible cardiorenal  - holding entresto, k levels did not improve with medical mx  - given bicarb fluids   - nephrology consulted   -  on CRRT as above         #Elevated troponin  #CAD s/p previous CABG   -troponin 61-->59  -EKG with non-specific T wave changes   - last stress test with infarct only   -patient denies chest pain   -continue ASA, statin, holding BB     #T2DM  -hold oral regimen  -lantus and SSI  -monitor BG     #Gout   -continue allopurinol     # anemia- chronic sec to CKD   - monitor , h/h stable at baseline       Heparin for dvt prophylaxis     Full code  Poor overall prognosis   Palliative care can be considered    Brian Gomez MD, 4/3/2024 7:29 AM

## 2024-04-03 NOTE — PROGRESS NOTES
HEART FAILURE CARE PLAN:    Comorbidities Reviewed: Yes   Patient has a past medical history of Anemia, Backache, unspecified, CAD, multiple vessel, Chronic kidney disease (CKD) stage G3b/A1, moderately decreased glomerular filtration rate (GFR) between 30-44 mL/min/1.73 square meter and albuminuria creatinine ratio less than 30 mg/g ([facility]), Chronic systolic CHF (congestive heart failure) (HCC), COPD (chronic obstructive pulmonary disease) ([facility]), Depressive disorder, not elsewhere classified, Essential hypertension, benign, Gout, History of blood transfusion, Hyperlipidemia associated with type 2 diabetes mellitus ([facility]), Major depressive disorder, recurrent episode, moderate ([facility]), Osteoarthritis, hand, Osteoarthrosis, unspecified whether generalized or localized, lower leg, Osteopenia, Other and unspecified hyperlipidemia, Pain in joint, lower leg, Pneumonia, Rotator cuff tendinitis, Tear of medial cartilage or meniscus of knee, current, Type 2 diabetes mellitus with microalbuminuria, without long-term current use of insulin ([facility]), Type 2 diabetes mellitus without complication ([facility]), Type II or unspecified type diabetes mellitus without mention of complication, not stated as uncontrolled, and Uses LifeVest defibrillator.     Weights Reviewed: Yes   Admission weight: 59.6 kg (131 lb 6.3 oz) (chart review)   Wt Readings from Last 3 Encounters:   04/02/24 67.2 kg (148 lb 1.6 oz)   02/11/24 59.6 kg (131 lb 8 oz)   01/31/24 60.7 kg (133 lb 13.1 oz)     Intake & Output Reviewed: Yes     Intake/Output Summary (Last 24 hours) at 4/3/2024 0021  Last data filed at 4/3/2024 0000  Gross per 24 hour   Intake 3283.46 ml   Output 1629 ml   Net 1654.46 ml       ECHOCARDIOGRAM Reviewed: Yes   Patient's Ejection Fraction (EF) is less than or equal to 40%. Discuss HFrEF Guideline Directed Medical Therapy (GDMT) with Cardiologist or Hospitalist:          Medications Reviewed: Yes   SCHEDULED HOSPITAL MEDICATIONS:   sodium chloride  tablet Take 1 tablet by mouth daily    Provider, MD Blade      Diet Reviewed: Yes   ADULT DIET; Regular; 4 carb choices (60 gm/meal); Low Potassium (Less than 3000 mg/day)    Goal of Care Reviewed: Yes   Patient and/or Family's stated Goal of Care this Admission: Reduce shortness of breath, increase activity tolerance, better understand heart failure and disease management, be more comfortable, and reduce lower extremity edema prior to discharge.     Electronically signed by Yeimi Miller RN on 4/3/2024 at 12:21 AM

## 2024-04-03 NOTE — PROGRESS NOTES
Pt repositioned in bed. Pt extremities elevated on pillows. Pt refusing lunch at this time- states she's not hungry. PIV WNL, R IJ Vas Cath functioning properly- no changes within CRRT.     Pt assessment unchanged.     Yolie Laboy RN, BSN

## 2024-04-04 LAB
ALBUMIN SERPL-MCNC: 2.3 G/DL (ref 3.4–5)
ALBUMIN SERPL-MCNC: 2.4 G/DL (ref 3.4–5)
ALBUMIN SERPL-MCNC: 2.4 G/DL (ref 3.4–5)
ALBUMIN SERPL-MCNC: 2.6 G/DL (ref 3.4–5)
ALBUMIN/GLOB SERPL: 0.9 {RATIO} (ref 1.1–2.2)
ALBUMIN/GLOB SERPL: 1 {RATIO} (ref 1.1–2.2)
ALBUMIN/GLOB SERPL: 1.1 {RATIO} (ref 1.1–2.2)
ALBUMIN/GLOB SERPL: 1.2 {RATIO} (ref 1.1–2.2)
ALP SERPL-CCNC: 172 U/L (ref 40–129)
ALP SERPL-CCNC: 174 U/L (ref 40–129)
ALP SERPL-CCNC: 174 U/L (ref 40–129)
ALP SERPL-CCNC: 181 U/L (ref 40–129)
ALT SERPL-CCNC: 13 U/L (ref 10–40)
ALT SERPL-CCNC: 14 U/L (ref 10–40)
ANION GAP SERPL CALCULATED.3IONS-SCNC: 2 MMOL/L (ref 3–16)
ANION GAP SERPL CALCULATED.3IONS-SCNC: 2 MMOL/L (ref 3–16)
ANION GAP SERPL CALCULATED.3IONS-SCNC: 4 MMOL/L (ref 3–16)
ANION GAP SERPL CALCULATED.3IONS-SCNC: 5 MMOL/L (ref 3–16)
APTT BLD: 131.4 SEC (ref 22.1–36.4)
APTT BLD: 70.3 SEC (ref 22.1–36.4)
AST SERPL-CCNC: 15 U/L (ref 15–37)
AST SERPL-CCNC: 16 U/L (ref 15–37)
BASOPHILS # BLD: 0 K/UL (ref 0–0.2)
BASOPHILS NFR BLD: 0.4 %
BILIRUB SERPL-MCNC: 0.6 MG/DL (ref 0–1)
BILIRUB SERPL-MCNC: 0.8 MG/DL (ref 0–1)
BILIRUB SERPL-MCNC: 0.9 MG/DL (ref 0–1)
BILIRUB SERPL-MCNC: 1.1 MG/DL (ref 0–1)
BUN SERPL-MCNC: 7 MG/DL (ref 7–20)
BUN SERPL-MCNC: 7 MG/DL (ref 7–20)
BUN SERPL-MCNC: 8 MG/DL (ref 7–20)
BUN SERPL-MCNC: 9 MG/DL (ref 7–20)
CA-I BLD-SCNC: 1.1 MMOL/L (ref 1.12–1.32)
CA-I BLD-SCNC: 1.11 MMOL/L (ref 1.12–1.32)
CA-I BLD-SCNC: 1.11 MMOL/L (ref 1.12–1.32)
CA-I BLD-SCNC: 1.13 MMOL/L (ref 1.12–1.32)
CALCIUM SERPL-MCNC: 7.3 MG/DL (ref 8.3–10.6)
CALCIUM SERPL-MCNC: 7.4 MG/DL (ref 8.3–10.6)
CALCIUM SERPL-MCNC: 7.5 MG/DL (ref 8.3–10.6)
CALCIUM SERPL-MCNC: 7.5 MG/DL (ref 8.3–10.6)
CHLORIDE SERPL-SCNC: 103 MMOL/L (ref 99–110)
CHLORIDE SERPL-SCNC: 103 MMOL/L (ref 99–110)
CHLORIDE SERPL-SCNC: 105 MMOL/L (ref 99–110)
CHLORIDE SERPL-SCNC: 105 MMOL/L (ref 99–110)
CO2 SERPL-SCNC: 29 MMOL/L (ref 21–32)
CO2 SERPL-SCNC: 30 MMOL/L (ref 21–32)
CO2 SERPL-SCNC: 30 MMOL/L (ref 21–32)
CO2 SERPL-SCNC: 31 MMOL/L (ref 21–32)
CREAT SERPL-MCNC: 0.7 MG/DL (ref 0.6–1.2)
CREAT SERPL-MCNC: 0.9 MG/DL (ref 0.6–1.2)
DEPRECATED RDW RBC AUTO: 19.1 % (ref 12.4–15.4)
EKG ATRIAL RATE: 90 BPM
EKG DIAGNOSIS: NORMAL
EKG P AXIS: 48 DEGREES
EKG P-R INTERVAL: 122 MS
EKG Q-T INTERVAL: 370 MS
EKG QRS DURATION: 120 MS
EKG QTC CALCULATION (BAZETT): 452 MS
EKG R AXIS: 73 DEGREES
EKG T AXIS: 203 DEGREES
EKG VENTRICULAR RATE: 90 BPM
EOSINOPHIL # BLD: 0.2 K/UL (ref 0–0.6)
EOSINOPHIL NFR BLD: 3.3 %
GFR SERPLBLD CREATININE-BSD FMLA CKD-EPI: 65 ML/MIN/{1.73_M2}
GFR SERPLBLD CREATININE-BSD FMLA CKD-EPI: 88 ML/MIN/{1.73_M2}
GLUCOSE BLD-MCNC: 99 MG/DL (ref 70–99)
GLUCOSE SERPL-MCNC: 113 MG/DL (ref 70–99)
GLUCOSE SERPL-MCNC: 121 MG/DL (ref 70–99)
GLUCOSE SERPL-MCNC: 93 MG/DL (ref 70–99)
GLUCOSE SERPL-MCNC: 99 MG/DL (ref 70–99)
HCT VFR BLD AUTO: 24.5 % (ref 36–48)
HGB BLD-MCNC: 7.5 G/DL (ref 12–16)
LYMPHOCYTES # BLD: 0.9 K/UL (ref 1–5.1)
LYMPHOCYTES NFR BLD: 15.1 %
MAGNESIUM SERPL-MCNC: 2 MG/DL (ref 1.8–2.4)
MAGNESIUM SERPL-MCNC: 2.2 MG/DL (ref 1.8–2.4)
MCH RBC QN AUTO: 27.5 PG (ref 26–34)
MCHC RBC AUTO-ENTMCNC: 30.7 G/DL (ref 31–36)
MCV RBC AUTO: 89.6 FL (ref 80–100)
MONOCYTES # BLD: 0.6 K/UL (ref 0–1.3)
MONOCYTES NFR BLD: 10.3 %
NEUTROPHILS # BLD: 4.5 K/UL (ref 1.7–7.7)
NEUTROPHILS NFR BLD: 70.9 %
PERFORMED ON: NORMAL
PH BLDV: 7.28 [PH] (ref 7.35–7.45)
PH BLDV: 7.29 [PH] (ref 7.35–7.45)
PH BLDV: 7.34 [PH] (ref 7.35–7.45)
PH BLDV: 7.36 [PH] (ref 7.35–7.45)
PHOSPHATE SERPL-MCNC: 2.4 MG/DL (ref 2.5–4.9)
PHOSPHATE SERPL-MCNC: 2.6 MG/DL (ref 2.5–4.9)
PLATELET # BLD AUTO: 77 K/UL (ref 135–450)
PLATELET BLD QL SMEAR: ABNORMAL
PMV BLD AUTO: 8 FL (ref 5–10.5)
POTASSIUM SERPL-SCNC: 4.2 MMOL/L (ref 3.5–5.1)
POTASSIUM SERPL-SCNC: 4.3 MMOL/L (ref 3.5–5.1)
POTASSIUM SERPL-SCNC: 4.5 MMOL/L (ref 3.5–5.1)
POTASSIUM SERPL-SCNC: 4.6 MMOL/L (ref 3.5–5.1)
PROT SERPL-MCNC: 4.6 G/DL (ref 6.4–8.2)
PROT SERPL-MCNC: 4.8 G/DL (ref 6.4–8.2)
PROT SERPL-MCNC: 4.8 G/DL (ref 6.4–8.2)
PROT SERPL-MCNC: 4.9 G/DL (ref 6.4–8.2)
RBC # BLD AUTO: 2.73 M/UL (ref 4–5.2)
SLIDE REVIEW: ABNORMAL
SODIUM SERPL-SCNC: 137 MMOL/L (ref 136–145)
SODIUM SERPL-SCNC: 138 MMOL/L (ref 136–145)
WBC # BLD AUTO: 6.3 K/UL (ref 4–11)

## 2024-04-04 PROCEDURE — 99233 SBSQ HOSP IP/OBS HIGH 50: CPT | Performed by: INTERNAL MEDICINE

## 2024-04-04 PROCEDURE — 6370000000 HC RX 637 (ALT 250 FOR IP): Performed by: INTERNAL MEDICINE

## 2024-04-04 PROCEDURE — 2580000003 HC RX 258: Performed by: INTERNAL MEDICINE

## 2024-04-04 PROCEDURE — 99291 CRITICAL CARE FIRST HOUR: CPT | Performed by: INTERNAL MEDICINE

## 2024-04-04 PROCEDURE — 94761 N-INVAS EAR/PLS OXIMETRY MLT: CPT

## 2024-04-04 PROCEDURE — 2700000000 HC OXYGEN THERAPY PER DAY

## 2024-04-04 PROCEDURE — 2000000000 HC ICU R&B

## 2024-04-04 PROCEDURE — 82330 ASSAY OF CALCIUM: CPT

## 2024-04-04 PROCEDURE — 90945 DIALYSIS ONE EVALUATION: CPT

## 2024-04-04 PROCEDURE — 85025 COMPLETE CBC W/AUTO DIFF WBC: CPT

## 2024-04-04 PROCEDURE — 6360000002 HC RX W HCPCS: Performed by: INTERNAL MEDICINE

## 2024-04-04 PROCEDURE — 85730 THROMBOPLASTIN TIME PARTIAL: CPT

## 2024-04-04 PROCEDURE — 93005 ELECTROCARDIOGRAM TRACING: CPT | Performed by: INTERNAL MEDICINE

## 2024-04-04 PROCEDURE — 84100 ASSAY OF PHOSPHORUS: CPT

## 2024-04-04 PROCEDURE — 80053 COMPREHEN METABOLIC PANEL: CPT

## 2024-04-04 PROCEDURE — 83735 ASSAY OF MAGNESIUM: CPT

## 2024-04-04 PROCEDURE — 93010 ELECTROCARDIOGRAM REPORT: CPT | Performed by: INTERNAL MEDICINE

## 2024-04-04 PROCEDURE — 2500000003 HC RX 250 WO HCPCS: Performed by: INTERNAL MEDICINE

## 2024-04-04 RX ORDER — CALCIUM CARBONATE 500 MG/1
500 TABLET, CHEWABLE ORAL 3 TIMES DAILY PRN
Status: DISCONTINUED | OUTPATIENT
Start: 2024-04-04 | End: 2024-04-20 | Stop reason: HOSPADM

## 2024-04-04 RX ORDER — CALCIUM CARBONATE 500 MG/1
TABLET, CHEWABLE ORAL
Status: DISPENSED
Start: 2024-04-04 | End: 2024-04-04

## 2024-04-04 RX ADMIN — ALLOPURINOL 100 MG: 100 TABLET ORAL at 08:38

## 2024-04-04 RX ADMIN — HEPARIN SODIUM: 1000 INJECTION INTRAVENOUS; SUBCUTANEOUS at 07:13

## 2024-04-04 RX ADMIN — CALCIUM GLUCONATE 1000 MG: 20 INJECTION, SOLUTION INTRAVENOUS at 10:17

## 2024-04-04 RX ADMIN — HEPARIN SODIUM: 1000 INJECTION INTRAVENOUS; SUBCUTANEOUS at 12:41

## 2024-04-04 RX ADMIN — Medication: at 18:03

## 2024-04-04 RX ADMIN — DESMOPRESSIN ACETATE 40 MG: 0.2 TABLET ORAL at 21:14

## 2024-04-04 RX ADMIN — Medication: at 08:40

## 2024-04-04 RX ADMIN — ONDANSETRON 4 MG: 2 INJECTION INTRAMUSCULAR; INTRAVENOUS at 09:23

## 2024-04-04 RX ADMIN — SODIUM PHOSPHATE, MONOBASIC, MONOHYDRATE AND SODIUM PHOSPHATE, DIBASIC, ANHYDROUS 6 MMOL: 142; 276 INJECTION, SOLUTION INTRAVENOUS at 11:21

## 2024-04-04 RX ADMIN — Medication: at 11:56

## 2024-04-04 RX ADMIN — Medication: at 22:15

## 2024-04-04 RX ADMIN — Medication: at 15:16

## 2024-04-04 RX ADMIN — HEPARIN SODIUM: 1000 INJECTION INTRAVENOUS; SUBCUTANEOUS at 00:00

## 2024-04-04 RX ADMIN — CALCIUM GLUCONATE 1000 MG: 20 INJECTION, SOLUTION INTRAVENOUS at 18:09

## 2024-04-04 RX ADMIN — ASPIRIN 81 MG: 81 TABLET, COATED ORAL at 08:38

## 2024-04-04 RX ADMIN — Medication: at 15:01

## 2024-04-04 RX ADMIN — HEPARIN SODIUM: 1000 INJECTION INTRAVENOUS; SUBCUTANEOUS at 17:14

## 2024-04-04 RX ADMIN — METOPROLOL SUCCINATE 12.5 MG: 25 TABLET, EXTENDED RELEASE ORAL at 08:38

## 2024-04-04 RX ADMIN — CALCIUM GLUCONATE 1000 MG: 20 INJECTION, SOLUTION INTRAVENOUS at 05:27

## 2024-04-04 RX ADMIN — CALCIUM CARBONATE 500 MG: 500 TABLET, CHEWABLE ORAL at 08:19

## 2024-04-04 RX ADMIN — MUPIROCIN: 20 OINTMENT TOPICAL at 09:14

## 2024-04-04 RX ADMIN — HEPARIN SODIUM: 1000 INJECTION INTRAVENOUS; SUBCUTANEOUS at 12:40

## 2024-04-04 RX ADMIN — MUPIROCIN: 20 OINTMENT TOPICAL at 21:14

## 2024-04-04 ASSESSMENT — PAIN DESCRIPTION - ONSET: ONSET: GRADUAL

## 2024-04-04 ASSESSMENT — PAIN SCALES - GENERAL: PAINLEVEL_OUTOF10: 3

## 2024-04-04 ASSESSMENT — PAIN - FUNCTIONAL ASSESSMENT: PAIN_FUNCTIONAL_ASSESSMENT: ACTIVITIES ARE NOT PREVENTED

## 2024-04-04 ASSESSMENT — PAIN DESCRIPTION - PAIN TYPE: TYPE: ACUTE PAIN;CHRONIC PAIN

## 2024-04-04 ASSESSMENT — PAIN DESCRIPTION - LOCATION: LOCATION: CHEST;GENERALIZED

## 2024-04-04 ASSESSMENT — PAIN DESCRIPTION - DESCRIPTORS: DESCRIPTORS: ACHING;DULL

## 2024-04-04 ASSESSMENT — PAIN DESCRIPTION - ORIENTATION: ORIENTATION: MID

## 2024-04-04 ASSESSMENT — PAIN DESCRIPTION - FREQUENCY: FREQUENCY: CONTINUOUS

## 2024-04-04 NOTE — PROGRESS NOTES
Perfect serve sent to Dr Gomez. Pt is complaining of Chest pain- Dull in nature. With no radiating pain. Pt states she feels as though she has has heart burn as well.     Stat EKG ordered.     Awaiting further orders/ results.

## 2024-04-04 NOTE — PROGRESS NOTES
4 Eyes Skin Assessment     NAME:  Amber Fuentes  YOB: 1945  MEDICAL RECORD NUMBER:  [id number]    The patient is being assessed for  Other Shift    I agree that at least one RN has performed a thorough Head to Toe Skin Assessment on the patient. ALL assessment sites listed below have been assessed.      Areas assessed by both nurses:    Head, Face, Ears, Shoulders, Back, Chest, Arms, Elbows, Hands, Sacrum. Buttock, Coccyx, Ischium, Legs. Feet and Heels, and Under Medical Devices       Scattered Rash/ redness. Blanchable redness to coccyx.  Pola BLE.    Edema to BUE, Trunk, and BLE.   Does the Patient have a Wound? No noted wound(s)       Alek Prevention initiated by RN: Yes  Wound Care Orders initiated by RN: Yes    Pressure Injury (Stage 3,4, Unstageable, DTI, NWPT, and Complex wounds) if present, place Wound referral order by RN under : No    New Ostomies, if present place, Ostomy referral order under : No     Nurse 1 eSignature: Electronically signed by Kavitha Littlejohn RN on 4/4/24 at 11:19 AM EDT    **SHARE this note so that the co-signing nurse can place an eSignature**    Nurse 2 eSignature: Electronically signed by Marcy Giraldo RN on 4/4/24 at 6:14 PM EDT

## 2024-04-04 NOTE — PROGRESS NOTES
Shift handoff report given to Senait NOGUERA at bedside.   Pt is Awake and alert On CRRT.   IV handoff completed. 4 eyes completed.  Call light within reach, bed in lowest position, bed alarm on. End of shift checks completed.    Pt has been free of falls for duration of shift. .

## 2024-04-04 NOTE — PROGRESS NOTES
IM Progress Note    Admit Date:  4/1/2024  3    Interval history:  chronic systolic CHF with very low EF at 20 % presenting with acute CHF and renal failure with refractory hyperkalemia    Initiated on CRRT and ongoing fluid removal   Off bicarb gtt    Subjective:  Ms. Fuentes seen up in bed , feels ok today   Reports indigestion symptoms asking for tums  unsure about her current critical CHF and renal failure, poor insight into her condition   Remains on 2 L   Breathing stable per pt  Good UOP of 1.7 litre noted       Objective:   BP (!) 134/59   Pulse 87   Temp 98.7 °F (37.1 °C) (Bladder)   Resp 21   Wt 56.8 kg (125 lb 3.5 oz)   SpO2 98%   BMI 24.46 kg/m²     Intake/Output Summary (Last 24 hours) at 4/4/2024 0732  Last data filed at 4/4/2024 0700  Gross per 24 hour   Intake 1153.51 ml   Output 3170 ml   Net -2016.49 ml         Physical Exam:    General:  elderly female Awake, alert and fairly oriented. Appears to be not in any distress  Mucous Membranes:  Pink , anicteric  Neck: no JVD, no carotid bruit, no thyromegaly  Chest:  Clear to auscultation bilaterally minimal basilar crackles  Cardiovascular:  RRR S1S2 heard, no murmurs or gallops  Abdomen:  Soft, undistended, non tender, no organomegaly, BS present  Extremities- improving 2 + peripheral edema , 3+ leif UE edema remains . Distal pulses well felt  Right sided temp HD line   Neurological : grossly normal with gen weakness         Medications:   Scheduled Medications:    metoprolol succinate  12.5 mg Oral Daily    sodium chloride flush  5-40 mL IntraVENous 2 times per day    [Held by provider] heparin (porcine)  5,000 Units SubCUTAneous 3 times per day    mupirocin   Each Nostril BID    allopurinol  100 mg Oral Daily    aspirin  81 mg Oral Daily    atorvastatin  40 mg Oral Nightly    insulin lispro  0-8 Units SubCUTAneous TID WC    insulin lispro  0-4 Units SubCUTAneous Nightly     I   heparin (porcine) 5,000 Units in sodium chloride 0.9 % 20 mL  MD Jason, 4/4/2024 7:32 AM

## 2024-04-04 NOTE — PROGRESS NOTES
Blood pressure (!) 116/54, pulse 78, temperature 99.1 °F (37.3 °C), temperature source Bladder, resp. rate 19, weight 67.2 kg (148 lb 1.6 oz), SpO2 98 %, not currently breastfeeding.    CRRT running okay at this time. Flow parameters are: , DIALYSATE 1000, POST 500. Fluid removal goal is -50 per nephrology. Patient tolerating well at this time.     Patient is alert oriented x 4. Denies pain at this time. Patient repositioned for comfort. Request her home medication melatonin 10mg to help her sleep tonight. Message sent to MD. N.BRYCE PRN melatonin ordered.     Shift assessment complete. See doc flow. Nightly medications given see MAR. Patient with no complaints at this time. Call light and bedside table within easy reach.     Electronically signed by Yeimi Miller RN on 4/3/2024 at 8:30 PM

## 2024-04-04 NOTE — PROGRESS NOTES
HEART FAILURE CARE PLAN:    Comorbidities Reviewed: Yes   Patient has a past medical history of Anemia, Backache, unspecified, CAD, multiple vessel, Chronic kidney disease (CKD) stage G3b/A1, moderately decreased glomerular filtration rate (GFR) between 30-44 mL/min/1.73 square meter and albuminuria creatinine ratio less than 30 mg/g ([facility]), Chronic systolic CHF (congestive heart failure) ([facility]), COPD (chronic obstructive pulmonary disease) ([facility]), Depressive disorder, not elsewhere classified, Essential hypertension, benign, Gout, History of blood transfusion, Hyperlipidemia associated with type 2 diabetes mellitus ([facility]), Major depressive disorder, recurrent episode, moderate ([facility]), Osteoarthritis, hand, Osteoarthrosis, unspecified whether generalized or localized, lower leg, Osteopenia, Other and unspecified hyperlipidemia, Pain in joint, lower leg, Pneumonia, Rotator cuff tendinitis, Tear of medial cartilage or meniscus of knee, current, Type 2 diabetes mellitus with microalbuminuria, without long-term current use of insulin ([facility]), Type 2 diabetes mellitus without complication ([facility]), Type II or unspecified type diabetes mellitus without mention of complication, not stated as uncontrolled, and Uses LifeVest defibrillator.     Weights Reviewed: Yes   Admission weight: 59.6 kg (131 lb 6.3 oz) (chart review)   Wt Readings from Last 3 Encounters:   04/02/24 67.2 kg (148 lb 1.6 oz)   02/11/24 59.6 kg (131 lb 8 oz)   01/31/24 60.7 kg (133 lb 13.1 oz)     Intake & Output Reviewed: Yes     Intake/Output Summary (Last 24 hours) at 4/4/2024 0233  Last data filed at 4/4/2024 0000  Gross per 24 hour   Intake 1484.51 ml   Output 2460 ml   Net -975.49 ml       ECHOCARDIOGRAM Reviewed: Yes   Patient's Ejection Fraction (EF) is less than or equal to 40%. Discuss HFrEF Guideline Directed Medical Therapy (GDMT) with Cardiologist or Hospitalist:          Medications Reviewed: Yes   SCHEDULED HOSPITAL MEDICATIONS:   metoprolol succinate   Dmitriy Trotter, APRN - CNP   aspirin 81 MG EC tablet Take 1 tablet by mouth daily    Provider, MD Blade      Diet Reviewed: Yes   ADULT DIET; Regular; 4 carb choices (60 gm/meal); Low Potassium (Less than 3000 mg/day)    Goal of Care Reviewed: Yes   Patient and/or Family's stated Goal of Care this Admission: Reduce shortness of breath, increase activity tolerance, better understand heart failure and disease management, be more comfortable, and reduce lower extremity edema prior to discharge.     Electronically signed by Yeimi Miller RN on 4/4/2024 at 2:33 AM

## 2024-04-04 NOTE — PROGRESS NOTES
CRRT filter went down.   217ml blood returned to patient.   New filter replaced and up and running.  Electronically signed by Yeimi Miller RN on 4/4/2024 at 5:09 AM

## 2024-04-04 NOTE — PROGRESS NOTES
Shift assessment was completed (see flow sheet). Pt is A/O- and drowsy.  Pt complains of continued chest pain but states quality is more like heart burn and is nauseated- message to Dr Gomez.    Respirations are even, unlabored, with diminished/rhonchi sounds. On 2L O2 via NC. Pt states she feels SOB with O2 off.    Scheduled medications to follow- whole with water.     Infusing:  KVO (See MAR).    CRRT continues with Goal to remove  hourly.     Call light within reach. Bed in lowest position. Bed alarm on.     Patient is not able to demonstrated the ability to move from a reclining position to an upright position within the recliner. however patient is alert, oriented and able to provide informed consent    Dr. Gomez at the bedside to examine patient. See progress note for details.    PRN Zofran to follow after trial of TUMS(See MAR).

## 2024-04-04 NOTE — PROGRESS NOTES
Pt family member, Nacho, call in asking for updates. Family member was not on list of emergency contacts and Pt assured me she is agreeable to give out details of care upon request to Nacho.

## 2024-04-04 NOTE — PROGRESS NOTES
04/04/24 1450   Encounter Summary   Encounter Overview/Reason  Attempted Encounter  (Patient was sleeping / resting)   Last Encounter  04/04/24  ( visited and offered a silent prayer)   Assessment/Intervention/Outcome   Assessment Unable to assess   Intervention Prayer (assurance of)/[place]

## 2024-04-04 NOTE — PROGRESS NOTES
Department of Internal Medicine  Nephrology Progress Note        SUBJECTIVE:    We are following this patient for A/CKD and Hyperkalemia.  The patient was seen and examined; she was resting comfortably with no acute events noted overnight.    ROS: No fever or chills.  Social: No family at bedside.    Physical Exam:    VITALS:  BP (!) 101/40   Pulse 74   Temp 98.8 °F (37.1 °C) (Bladder)   Resp 19   Wt 56.8 kg (125 lb 3.5 oz)   SpO2 97%   BMI 24.46 kg/m²     General appearance: Seems comfortable, no acute distress.  Neck: Trachea midline, thyroid normal.   Lungs:  Non labored breathing, CTA to anterior auscultation.  Heart:  S1S2 normal, rub or gallop. No peripheral edema.  Abdomen: Soft, non-tender, no organomegaly.   Skin: No lesions or rashes, warm to touch.     DATA:    CBC with Differential:    Lab Results   Component Value Date/Time    WBC 6.3 04/04/2024 06:15 AM    RBC 2.73 04/04/2024 06:15 AM    HGB 7.5 04/04/2024 06:15 AM    HCT 24.5 04/04/2024 06:15 AM    PLT 77 04/04/2024 06:15 AM    MCV 89.6 04/04/2024 06:15 AM    MCH 27.5 04/04/2024 06:15 AM    MCHC 30.7 04/04/2024 06:15 AM    RDW 19.1 04/04/2024 06:15 AM    SEGSPCT 53.4 04/29/2015 04:14 PM    SEGSPCT 53.4 04/29/2015 04:14 PM    LYMPHOPCT 15.1 04/04/2024 06:15 AM    MONOPCT 10.3 04/04/2024 06:15 AM    EOSPCT 7.6 02/27/2012 11:08 AM    BASOPCT 0.4 04/04/2024 06:15 AM    MONOSABS 0.6 04/04/2024 06:15 AM    LYMPHSABS 0.9 04/04/2024 06:15 AM    EOSABS 0.2 04/04/2024 06:15 AM    BASOSABS 0.0 04/04/2024 06:15 AM    DIFFTYPE Auto 06/04/2011 02:30 AM     BMP:    Lab Results   Component Value Date/Time     04/04/2024 09:23 AM    K 4.3 04/04/2024 09:23 AM     04/04/2024 09:23 AM    CO2 29 04/04/2024 09:23 AM    BUN 8 04/04/2024 09:23 AM    LABALBU 2.6 04/04/2024 09:23 AM    CREATININE 0.7 04/04/2024 09:23 AM    CALCIUM 7.5 04/04/2024 09:23 AM    GFRAA 33 10/14/2022 01:29 PM    GFRAA >60 06/04/2011 02:30 AM    LABGLOM 88 04/04/2024 09:23 AM

## 2024-04-04 NOTE — PROGRESS NOTES
04/04/24 1130   Encounter Summary   Encounter Overview/Reason  Attempted Encounter  (Patient was sleeping / resting)   Last Encounter  04/04/24  ( visited and offered a silent prayer)   Assessment/Intervention/Outcome   Assessment Unable to assess

## 2024-04-04 NOTE — PROGRESS NOTES
Heparin Syringe : CRRT  Ptt@0923= 70.3 secs  Goal:  secs  Increase Heparin to 4.2ml/hr (1050 units/hr)  Ptt at 1630 today  Sherri May Pharm D 4/4/202411:23 AM  .

## 2024-04-04 NOTE — PLAN OF CARE
Problem: Safety - Adult  Goal: Free from fall injury  4/4/2024 0232 by Yeimi Miller RN  Outcome: Progressing     Problem: Chronic Conditions and Co-morbidities  Goal: Patient's chronic conditions and co-morbidity symptoms are monitored and maintained or improved  Outcome: Progressing     Problem: Respiratory - Adult  Goal: Achieves optimal ventilation and oxygenation  Outcome: Progressing

## 2024-04-04 NOTE — PROGRESS NOTES
Dr. Martinez at the bedside to examine patient. Informed of hypotension with fluid removal via CRRT. \"Ok with Systolic > 90\".  Possible stop to CRRT tomorrow. And Verbal order: if filter goes down leave off. See progress note for details.

## 2024-04-04 NOTE — PROGRESS NOTES
Pulmonary & Critical Care Medicine ICU Progress Note    CC: CHF, hyperkalemia    Events of Last 24 hours: Tolerating fluid removal with CRRT.   Increased UOP.    Invasive Lines: 4/2/24 Vas cath and a midline IV      Recent Labs     04/02/24  0645   PHART 7.285*   ZNT2SCV 40.2   PO2ART 101.4       IV:   heparin (porcine) 5,000 Units in sodium chloride 0.9 % 20 mL infusion 3.4 mL/hr at 04/04/24 0713    sodium chloride 5 mL/hr at 04/03/24 1900    norepinephrine Stopped (04/03/24 0525)    prismaSATE BGK 4/2.5 1,000 mL/hr at 04/03/24 2120    prismaSATE BGK 4/2.5 500 mL/hr at 04/03/24 2221    prismaSATE BGK 4/2.5 500 mL/hr at 04/03/24 0738    dextrose         Vitals:  BP (!) 134/59   Pulse 87   Temp 98.7 °F (37.1 °C) (Bladder)   Resp 21   Wt 56.8 kg (125 lb 3.5 oz)   SpO2 98%   BMI 24.46 kg/m²   on RA  EXAM:  Constitutional: ill appearing. Not in distress.  Eyes: PERRL. No sclera icterus.   ENT: Normal Nose. Normal Tongue.   Neck:  Trachea is midline.   Respiratory: No accessory muscle usage. No wheezes. No rales. No Rhonchi.  Cardiovascular: Normal S1S2. No murmur. No digit cyanosis. No digit clubbing. No LE edema.   GI: Non-tender. Non-distended. Normal bowel sounds. No masses.   Skin: No rash on the exposed extremities.   Neuro: Alert. Oriented. Follows commands.  Moves all extremities.  Psych: No agitation, no anxiety.    Scheduled Meds:   calcium carbonate        metoprolol succinate  12.5 mg Oral Daily    sodium chloride flush  5-40 mL IntraVENous 2 times per day    [Held by provider] heparin (porcine)  5,000 Units SubCUTAneous 3 times per day    mupirocin   Each Nostril BID    allopurinol  100 mg Oral Daily    aspirin  81 mg Oral Daily    atorvastatin  40 mg Oral Nightly    insulin lispro  0-8 Units SubCUTAneous TID WC    insulin lispro  0-4 Units SubCUTAneous Nightly     PRN Meds:  calcium carbonate, melatonin, sodium chloride flush, sodium chloride, ondansetron **OR** ondansetron, polyethylene glycol,  from thoracentesis when more stable  SQ heparin for DVT prophylaxis  Due to at least single organ failure and risk of rapid deterioration, I spent 31 minutes of Critical care time reviewing labs/films, examining patient, collaborating with other physicians. This does not include time performing critical procedures.

## 2024-04-04 NOTE — CONSULTS
[facility]  Palliative Medicine Consultation Note      Date Of Admission:4/1/2024  Date of consult: 04/04/24  Seen by PC in the past:  Yes    Recommendations:        Writer met with the pt at bedside for goc conversation and to introduce palliative/hospice options. Pt not ready for hospice and elects to remain a Full code at this time. Pt requests referral to [facility] for palliative care NP and HTP. Referral sent to April with HTP. Awaiting PT recs. Pt states she is willing to go to rehab if needed. Per spouse would like [facility] if needed as pt has been there in the past. PC will sign off unless consulted.    KADEN Webster and bedside nurse Zhanna updated on above conversation.    1. Goals of Care/Advanced Care planning/Code status: Full Code  2. Pain: managed per care team  3. SOB: currently on 2 liters O2  4. Disposition: to be decided, awaiting PT recs    Reason for Consult:         [x]  Goals of Care  [x]  Code Status Discussion/Advanced Care Planning   []  Psychosocial/Family Support  []  Symptom Management  []  Other (Specify)    Requesting Physician: Dr. Gomez    CHIEF COMPLAINT:  Hyperkalemia,Anasarca,Bradycardia, Pleural effusion    History Obtained From:  patient    History of Present Illness:         Amber Fuentes is a 78 y.o. female with PMH of (see below list) who presented with swelling in abdomen and extremities, pleural effusion, acute renal failure and Pneumonia of right lower lobe. Pt reports diarrhea for the past couple of days and BNP significantly elevated. HR improved from the 40s to the 50s. Pt states she thought she was dying,however, not ready to focus on comfort care measures. Pt agreeable to SNF for rehab if needed. Awaiting PT recs.      Subjective:         Past Medical History:        Diagnosis Date    Anemia     Backache, unspecified 03/11/2008    CAD, multiple vessel 01/06/2020    Chronic kidney disease (CKD) stage G3b/A1, moderately decreased glomerular filtration rate  RETROPERITONEAL COMPLETE   Final Result   Limited but otherwise unremarkable ultrasound of the kidneys.         IR NONTUNNELED VASCULAR CATHETER > 5 YEARS   Final Result   Successful placement of a temporary non tunneled HD catheter via the right   internal jugular vein.      Catheter is ready for immediate use.         CT ABDOMEN PELVIS WO CONTRAST Additional Contrast? None   Final Result   1. Bilateral pleural effusions.  Consolidation in the right perihilar region.   2. Mild ascites.   3. Diffuse anasarca.         XR CHEST PORTABLE   Final Result   1. Moderate to large right pleural effusion with opacification underlying   lung, atelectasis versus pneumonia.   2. Mild left basilar atelectasis.               Conclusion/Time spent:         Recommendations see above    Time spent with patient and/or family: 55 min  Time reviewing records: 10 min   Time communicating with staff: 5 min     A total of  minutes spent with the patient and family on unit greater than 50% in counseling regarding palliative care and in goals of care for the patient.    Thank you to Dr. Gomez for this consultation.

## 2024-04-04 NOTE — PROGRESS NOTES
Calcium Ionized 1.10 Replacement infusing at this time.   Phos 2.2 Replacement infusing at this time.   Please see MAR.     Electronically signed by Yeimi Miller RN on 4/3/2024 at 10:15 PM

## 2024-04-04 NOTE — PROGRESS NOTES
Call called panic aPTT 131.4, Per MAR Holding heparin infusion via CRRT for 1 hour. Then will restart rate at 3.4ml/HR aPTT reorder for 6 hours from now.

## 2024-04-04 NOTE — PROGRESS NOTES
Reassessment completed (see Flowsheet). All ICU lines remain intact, ICU monitoring continued-   Infusing:  KVO, Jaden gluconate (see MAR).    Pt is intermittently coughing.     pt remains on2L, CRRT goal to remove 50 to 100 hourly. .   Lung sounds Diminished at this time.  pt's blood pressures WDL.    Vitals:    04/04/24 1100   BP: (!) 106/43   Pulse: 78   Resp: 21   Temp: 98.8 °F (37.1 °C)   SpO2: 94%

## 2024-04-05 LAB
ALBUMIN SERPL-MCNC: 2.4 G/DL (ref 3.4–5)
ALBUMIN SERPL-MCNC: 2.5 G/DL (ref 3.4–5)
ALBUMIN/GLOB SERPL: 1 {RATIO} (ref 1.1–2.2)
ALBUMIN/GLOB SERPL: 1.1 {RATIO} (ref 1.1–2.2)
ALP SERPL-CCNC: 167 U/L (ref 40–129)
ALP SERPL-CCNC: 176 U/L (ref 40–129)
ALT SERPL-CCNC: 12 U/L (ref 10–40)
ALT SERPL-CCNC: 13 U/L (ref 10–40)
ANION GAP SERPL CALCULATED.3IONS-SCNC: 1 MMOL/L (ref 3–16)
ANION GAP SERPL CALCULATED.3IONS-SCNC: 4 MMOL/L (ref 3–16)
ANION GAP SERPL CALCULATED.3IONS-SCNC: 4 MMOL/L (ref 3–16)
APTT BLD: 33.9 SEC (ref 22.1–36.4)
APTT BLD: 66.4 SEC (ref 22.1–36.4)
AST SERPL-CCNC: 14 U/L (ref 15–37)
AST SERPL-CCNC: 16 U/L (ref 15–37)
BACTERIA BLD CULT ORG #2: NORMAL
BACTERIA BLD CULT: NORMAL
BILIRUB SERPL-MCNC: 0.7 MG/DL (ref 0–1)
BILIRUB SERPL-MCNC: 0.7 MG/DL (ref 0–1)
BUN SERPL-MCNC: 6 MG/DL (ref 7–20)
BUN SERPL-MCNC: 7 MG/DL (ref 7–20)
BUN SERPL-MCNC: 8 MG/DL (ref 7–20)
CA-I BLD-SCNC: 1.11 MMOL/L (ref 1.12–1.32)
CA-I BLD-SCNC: 1.13 MMOL/L (ref 1.12–1.32)
CALCIUM SERPL-MCNC: 7.3 MG/DL (ref 8.3–10.6)
CALCIUM SERPL-MCNC: 7.4 MG/DL (ref 8.3–10.6)
CALCIUM SERPL-MCNC: 7.6 MG/DL (ref 8.3–10.6)
CHLORIDE SERPL-SCNC: 103 MMOL/L (ref 99–110)
CHLORIDE SERPL-SCNC: 104 MMOL/L (ref 99–110)
CHLORIDE SERPL-SCNC: 107 MMOL/L (ref 99–110)
CO2 SERPL-SCNC: 29 MMOL/L (ref 21–32)
CO2 SERPL-SCNC: 29 MMOL/L (ref 21–32)
CO2 SERPL-SCNC: 31 MMOL/L (ref 21–32)
CREAT SERPL-MCNC: 0.8 MG/DL (ref 0.6–1.2)
CREAT SERPL-MCNC: 0.9 MG/DL (ref 0.6–1.2)
CREAT SERPL-MCNC: 1.2 MG/DL (ref 0.6–1.2)
DEPRECATED RDW RBC AUTO: 19.2 % (ref 12.4–15.4)
GFR SERPLBLD CREATININE-BSD FMLA CKD-EPI: 46 ML/MIN/{1.73_M2}
GFR SERPLBLD CREATININE-BSD FMLA CKD-EPI: 65 ML/MIN/{1.73_M2}
GFR SERPLBLD CREATININE-BSD FMLA CKD-EPI: 75 ML/MIN/{1.73_M2}
GLUCOSE BLD-MCNC: 135 MG/DL (ref 70–99)
GLUCOSE BLD-MCNC: 143 MG/DL (ref 70–99)
GLUCOSE BLD-MCNC: 144 MG/DL (ref 70–99)
GLUCOSE BLD-MCNC: 172 MG/DL (ref 70–99)
GLUCOSE SERPL-MCNC: 102 MG/DL (ref 70–99)
GLUCOSE SERPL-MCNC: 125 MG/DL (ref 70–99)
GLUCOSE SERPL-MCNC: 163 MG/DL (ref 70–99)
HCT VFR BLD AUTO: 23.1 % (ref 36–48)
HGB BLD-MCNC: 7 G/DL (ref 12–16)
MCH RBC QN AUTO: 27.7 PG (ref 26–34)
MCHC RBC AUTO-ENTMCNC: 30.5 G/DL (ref 31–36)
MCV RBC AUTO: 90.7 FL (ref 80–100)
PERFORMED ON: ABNORMAL
PH BLDV: 7.29 [PH] (ref 7.35–7.45)
PH BLDV: 7.31 [PH] (ref 7.35–7.45)
PLATELET # BLD AUTO: 65 K/UL (ref 135–450)
PMV BLD AUTO: 8.9 FL (ref 5–10.5)
POTASSIUM SERPL-SCNC: 4.5 MMOL/L (ref 3.5–5.1)
POTASSIUM SERPL-SCNC: 4.6 MMOL/L (ref 3.5–5.1)
POTASSIUM SERPL-SCNC: 4.6 MMOL/L (ref 3.5–5.1)
PROT SERPL-MCNC: 4.7 G/DL (ref 6.4–8.2)
PROT SERPL-MCNC: 4.9 G/DL (ref 6.4–8.2)
RBC # BLD AUTO: 2.54 M/UL (ref 4–5.2)
SODIUM SERPL-SCNC: 136 MMOL/L (ref 136–145)
SODIUM SERPL-SCNC: 137 MMOL/L (ref 136–145)
SODIUM SERPL-SCNC: 139 MMOL/L (ref 136–145)
WBC # BLD AUTO: 5.4 K/UL (ref 4–11)

## 2024-04-05 PROCEDURE — 6360000002 HC RX W HCPCS: Performed by: INTERNAL MEDICINE

## 2024-04-05 PROCEDURE — 97530 THERAPEUTIC ACTIVITIES: CPT

## 2024-04-05 PROCEDURE — 2580000003 HC RX 258: Performed by: INTERNAL MEDICINE

## 2024-04-05 PROCEDURE — 94761 N-INVAS EAR/PLS OXIMETRY MLT: CPT

## 2024-04-05 PROCEDURE — 6370000000 HC RX 637 (ALT 250 FOR IP): Performed by: INTERNAL MEDICINE

## 2024-04-05 PROCEDURE — 86706 HEP B SURFACE ANTIBODY: CPT

## 2024-04-05 PROCEDURE — 99233 SBSQ HOSP IP/OBS HIGH 50: CPT | Performed by: INTERNAL MEDICINE

## 2024-04-05 PROCEDURE — 85027 COMPLETE CBC AUTOMATED: CPT

## 2024-04-05 PROCEDURE — 2000000000 HC ICU R&B

## 2024-04-05 PROCEDURE — 80053 COMPREHEN METABOLIC PANEL: CPT

## 2024-04-05 PROCEDURE — 97166 OT EVAL MOD COMPLEX 45 MIN: CPT

## 2024-04-05 PROCEDURE — 87340 HEPATITIS B SURFACE AG IA: CPT

## 2024-04-05 PROCEDURE — 2700000000 HC OXYGEN THERAPY PER DAY

## 2024-04-05 PROCEDURE — 97110 THERAPEUTIC EXERCISES: CPT

## 2024-04-05 PROCEDURE — 86022 PLATELET ANTIBODIES: CPT

## 2024-04-05 PROCEDURE — 82330 ASSAY OF CALCIUM: CPT

## 2024-04-05 PROCEDURE — 85730 THROMBOPLASTIN TIME PARTIAL: CPT

## 2024-04-05 PROCEDURE — 97162 PT EVAL MOD COMPLEX 30 MIN: CPT

## 2024-04-05 RX ORDER — FUROSEMIDE 10 MG/ML
40 INJECTION INTRAMUSCULAR; INTRAVENOUS ONCE
Status: COMPLETED | OUTPATIENT
Start: 2024-04-05 | End: 2024-04-05

## 2024-04-05 RX ORDER — HEPARIN SODIUM 1000 [USP'U]/ML
2600 INJECTION, SOLUTION INTRAVENOUS; SUBCUTANEOUS PRN
Status: DISCONTINUED | OUTPATIENT
Start: 2024-04-05 | End: 2024-04-20 | Stop reason: HOSPADM

## 2024-04-05 RX ADMIN — HEPARIN SODIUM 5000 UNITS: 5000 INJECTION INTRAVENOUS; SUBCUTANEOUS at 14:43

## 2024-04-05 RX ADMIN — HEPARIN SODIUM: 1000 INJECTION INTRAVENOUS; SUBCUTANEOUS at 00:29

## 2024-04-05 RX ADMIN — ALLOPURINOL 100 MG: 100 TABLET ORAL at 08:28

## 2024-04-05 RX ADMIN — HEPARIN SODIUM 5000 UNITS: 5000 INJECTION INTRAVENOUS; SUBCUTANEOUS at 22:38

## 2024-04-05 RX ADMIN — HEPARIN SODIUM: 1000 INJECTION INTRAVENOUS; SUBCUTANEOUS at 01:59

## 2024-04-05 RX ADMIN — HEPARIN SODIUM 2600 UNITS: 1000 INJECTION INTRAVENOUS; SUBCUTANEOUS at 04:44

## 2024-04-05 RX ADMIN — Medication: at 01:18

## 2024-04-05 RX ADMIN — Medication: at 03:22

## 2024-04-05 RX ADMIN — METOPROLOL SUCCINATE 12.5 MG: 25 TABLET, EXTENDED RELEASE ORAL at 08:28

## 2024-04-05 RX ADMIN — MUPIROCIN: 20 OINTMENT TOPICAL at 20:09

## 2024-04-05 RX ADMIN — SODIUM CHLORIDE, PRESERVATIVE FREE 10 ML: 5 INJECTION INTRAVENOUS at 20:06

## 2024-04-05 RX ADMIN — FUROSEMIDE 40 MG: 10 INJECTION, SOLUTION INTRAVENOUS at 08:28

## 2024-04-05 RX ADMIN — DESMOPRESSIN ACETATE 40 MG: 0.2 TABLET ORAL at 20:11

## 2024-04-05 RX ADMIN — MUPIROCIN: 20 OINTMENT TOPICAL at 08:28

## 2024-04-05 RX ADMIN — ASPIRIN 81 MG: 81 TABLET, COATED ORAL at 08:28

## 2024-04-05 NOTE — PROGRESS NOTES
CRRT filter went down at this time.   217 mls blood returned to patient.    Per Dr. Crouch CRRT not restarted.     Electronically signed by Yeimi Miller RN on 4/5/2024 at 5:35 AM

## 2024-04-05 NOTE — PROGRESS NOTES
Shift assessment complete.    Patient seen awake in bed. Patient is alert and oriented x 4 at this time. Patient seen on 2 lpm o2 per NC. Patient with no c/o pain at this time. Patient with no s/s of distress noted.    Physical assessment as charted in flow sheets.    Scheduled medications given per orders.    Central line dressing is clean, dry and intact with no signs of drainage. All tubing is current and dated. IPA caps applied to all access hubs.   Peripheral IV C/D/I and functioning properly.    Gagnon intact and secure, small amount of yellow urine draining.    Patient assisted with repositioning. Patient with no current needs voiced. Patient educated on use of call light and to call out with needs, verbalized understanding. Call light and personal belongings within reach.

## 2024-04-05 NOTE — PROGRESS NOTES
potassium chloride, magnesium sulfate, calcium gluconate **OR** calcium gluconate **OR** calcium gluconate **OR** calcium gluconate, sodium phosphate 6 mmol in sodium chloride 0.9 % 250 mL IVPB **OR** sodium phosphate 12 mmol in sodium chloride 0.9 % 250 mL IVPB **OR** sodium phosphate 18 mmol in sodium chloride 0.9 % 500 mL IVPB **OR** sodium phosphate 24 mmol in sodium chloride 0.9 % 500 mL IVPB, glucose, dextrose bolus **OR** dextrose bolus, glucagon (rDNA), dextrose    Results:  CBC:   Recent Labs     04/03/24  0414 04/04/24  0615 04/05/24  0600   WBC 6.3 6.3 5.4   HGB 7.8* 7.5* 7.0*   HCT 24.8* 24.5* 23.1*   MCV 88.3 89.6 90.7   * 77* 65*       BMP:   Recent Labs     04/03/24 2121 04/04/24  0405 04/04/24  0923 04/04/24 1725 04/04/24 2131 04/05/24  0420 04/05/24  0600      < > 137   < > 138 136 139   K 4.3   < > 4.3   < > 4.6 4.5 4.6      < > 103   < > 105 103 107   CO2 29   < > 29   < > 31 29 31   PHOS 2.2*  --  2.4*  --  2.6  --   --    BUN 11   < > 8   < > 7 6* 7   CREATININE 0.8   < > 0.7   < > 0.9 0.8 0.9    < > = values in this interval not displayed.       LIVER PROFILE:   Recent Labs     04/04/24 1725 04/04/24 2131 04/05/24  0420   AST 16 15 14*   ALT 13 13 13   BILITOT 0.8 0.6 0.7   ALKPHOS 172* 174* 167*         Cultures:  Utine    PCT 0.13     CT ABD IMPRESSION:  1. Bilateral pleural effusions.  Consolidation in the right perihilar region. 2. Mild ascites. 3. Diffuse anasarca.      Chest imaging was reviewed by me and showed CXR 1. Moderate to large right pleural effusion with opacification underlying lung, atelectasis versus pneumonia.  2. Mild left basilar atelectasis.     ASSESSMENT:  Hyperkalemia  Metabolic acidosisCardiorenal syndrome  DM2  CHF   CKD IV  Large right pleural effusion  COPD  Anasarca  TMP     PLAN:  HD per nephrology, CRRT stopped  F/u urine cx  Nephrology following; received lasix today  SSI  Monitor effusion with diuresis - will possibly benefit from

## 2024-04-05 NOTE — PROGRESS NOTES
HEART FAILURE CARE PLAN:    Comorbidities Reviewed: Yes   Patient has a past medical history of Anemia, Backache, unspecified, CAD, multiple vessel, Chronic kidney disease (CKD) stage G3b/A1, moderately decreased glomerular filtration rate (GFR) between 30-44 mL/min/1.73 square meter and albuminuria creatinine ratio less than 30 mg/g ([facility]), Chronic systolic CHF (congestive heart failure) ([facility]), COPD (chronic obstructive pulmonary disease) ([facility]), Depressive disorder, not elsewhere classified, Essential hypertension, benign, Gout, History of blood transfusion, Hyperlipidemia associated with type 2 diabetes mellitus ([facility]), Major depressive disorder, recurrent episode, moderate ([facility]), Osteoarthritis, hand, Osteoarthrosis, unspecified whether generalized or localized, lower leg, Osteopenia, Other and unspecified hyperlipidemia, Pain in joint, lower leg, Pneumonia, Rotator cuff tendinitis, Tear of medial cartilage or meniscus of knee, current, Type 2 diabetes mellitus with microalbuminuria, without long-term current use of insulin ([facility]), Type 2 diabetes mellitus without complication ([facility]), Type II or unspecified type diabetes mellitus without mention of complication, not stated as uncontrolled, and Uses LifeVest defibrillator.     Weights Reviewed: Yes   Admission weight: 59.6 kg (131 lb 6.3 oz) (chart review)   Wt Readings from Last 3 Encounters:   04/04/24 56.8 kg (125 lb 3.5 oz)   02/11/24 59.6 kg (131 lb 8 oz)   01/31/24 60.7 kg (133 lb 13.1 oz)     Intake & Output Reviewed: Yes     Intake/Output Summary (Last 24 hours) at 4/4/2024 2217  Last data filed at 4/4/2024 2200  Gross per 24 hour   Intake 1095 ml   Output 3025 ml   Net -1930 ml       ECHOCARDIOGRAM Reviewed: Yes   Patient's Ejection Fraction (EF) is less than or equal to 40%. Discuss HFrEF Guideline Directed Medical Therapy (GDMT) with Cardiologist or Hospitalist:          Medications Reviewed: Yes   SCHEDULED HOSPITAL MEDICATIONS:   metoprolol succinate   Dmitriy Trotter, APRN - CNP   aspirin 81 MG EC tablet Take 1 tablet by mouth daily    Provider, MD Blade      Diet Reviewed: Yes   ADULT DIET; Regular; 4 carb choices (60 gm/meal); Low Potassium (Less than 3000 mg/day)    Goal of Care Reviewed: Yes   Patient and/or Family's stated Goal of Care this Admission: Reduce shortness of breath, increase activity tolerance, better understand heart failure and disease management, be more comfortable, and reduce lower extremity edema prior to discharge.     Electronically signed by Yeimi Miller RN on 4/4/2024 at 10:17 PM

## 2024-04-05 NOTE — PROGRESS NOTES
Blood pressure (!) 101/39, pulse 75, temperature 98.6 °F (37 °C), temperature source Bladder, resp. rate 22, weight 56.8 kg (125 lb 3.5 oz), SpO2 97 %, not currently breastfeeding.    CRRT running okay at this time. Flow parameters are: , DIALYSATE 1000, POST 500. Fluid removal goal is -50 - -100 per nephrology. Patient tolerating well at this time.     Patient resting at this time with call light in reach. Lung sounds diminished. Abdomen soft non-tender, BS Hypoactive. Edema continues to BUE and BLE.   Gagnon catheter intact.     Shift assessment complete. See doc flow. Nightly medications given see MAR. Patient with no complaints at this time. Call light and bedside table within easy reach.

## 2024-04-05 NOTE — PROGRESS NOTES
Reassessment complete.    Patient remains awake in bed with unchanged physical assessment. Patient repositioned for comfort. Patient with no current needs voiced. Call light and personal belongings within reach.

## 2024-04-05 NOTE — PROGRESS NOTES
IM Progress Note    Admit Date:  4/1/2024  4    Interval history:  chronic systolic CHF with very low EF at 20 % presenting with acute CHF and renal failure with refractory hyperkalemia    Initiated on CRRT and stopped today for filter issues  UOP remains low last 24 hrs    Subjective:  Ms. Fuentes seen up in bed , feels ok today no new issues      Remains with  poor insight into her condition   Remains on 2 L   Breathing stable per pt  CRRT filter went bad and now off CRRT   Minimal UOP at 192 ml over last 24 hrs      Objective:   BP (!) 104/46   Pulse 81   Temp 99.4 °F (37.4 °C) (Bladder)   Resp 22   Wt 70.6 kg (155 lb 10.3 oz)   SpO2 95%   BMI 30.40 kg/m²     Intake/Output Summary (Last 24 hours) at 4/5/2024 0734  Last data filed at 4/5/2024 0600  Gross per 24 hour   Intake 742 ml   Output 1566 ml   Net -824 ml         Physical Exam:    General:  elderly female Awake, alert and fairly oriented. Appears to be not in any distress  Mucous Membranes:  Pink , anicteric  Neck: no JVD, no carotid bruit, no thyromegaly  Chest:  Clear to auscultation bilaterally minimal basilar crackles  Cardiovascular:  RRR S1S2 heard, no murmurs or gallops  Abdomen:  Soft, undistended, non tender, no organomegaly, BS present  Extremities- improving 2 + peripheral edema , 3+ leif UE edema remains . Distal pulses well felt  Right sided temp HD line   Neurological : grossly normal with gen weakness         Medications:   Scheduled Medications:    metoprolol succinate  12.5 mg Oral Daily    sodium chloride flush  5-40 mL IntraVENous 2 times per day    [Held by provider] heparin (porcine)  5,000 Units SubCUTAneous 3 times per day    mupirocin   Each Nostril BID    allopurinol  100 mg Oral Daily    aspirin  81 mg Oral Daily    atorvastatin  40 mg Oral Nightly    insulin lispro  0-8 Units SubCUTAneous TID WC    insulin lispro  0-4 Units SubCUTAneous Nightly     I   heparin (porcine) 5,000 Units in sodium chloride 0.9 % 20 mL infusion 0.6  as h.h trending down with acute disease       Heparin for dvt prophylaxis     Full code  Poor overall prognosis   Palliative care can be considered    Brian Jayson Gomez MD, 4/5/2024 7:34 AM

## 2024-04-05 NOTE — PROGRESS NOTES
Inpatient Physical Therapy Evaluation & Treatment    Unit: ICU  Date:  4/5/2024  Patient Name:    Amber Fuentes  Admitting diagnosis:  Hyperkalemia [E87.5]  Anasarca [R60.1]  Bradycardia [R00.1]  Pleural effusion [J90]  Other ascites [R18.8]  Acute renal failure, unspecified acute renal failure type (HCC) [N17.9]  Pneumonia of right lower lobe due to infectious organism [J18.9]  Admit Date:  4/1/2024  Precautions/Restrictions/WB Status/ Lines/ Wounds/ Oxygen: Fall risk, Bed/chair alarm, Lines (Supplemental O2 (2L), internal catheter, and IJ right), Telemetry, Continuous pulse oximetry, and Isolation Precautions: Environmental    Pt seen for cotreatment this date due to patient safety and patient endurance    Treatment Time: 11:13-11:46  Treatment Number:  1   Timed Code Treatment Minutes: 23 minutes  Total Treatment Minutes:  33  minutes    Patient Stated Goals for Therapy: \" let's get up and walk \"          Discharge Recommendations: SNF  DME needs for discharge: Defer to facility       Therapy recommendation for EMS Transport: requires transport by cot due to pt with poor sitting balance/tolerance    Therapy recommendations for staff:   Assist of 2 for sitting EOB    History of Present Illness:   78 y.o. female with pmh of coronary disease, CKD, CHF, COPD, hypertension, hyperlipidemia, diabetes mellitus, distant history of CABG who presents with diarrhea and nausea.  +acute on chronic CHF with EF 20%, renal failure   Was on CRRT, taken off on 4/4.    Home Health S4 Level Recommendation:  NA        AM-PAC Mobility Score    AM-PAC Inpatient Mobility Raw Score : 8         Subjective  Patient lying reclined in bed with no family present.  Pt agreeable to this PT session.     Cognition    A&O x4   Able to follow 2 step commands    Pain   No  Location: NA  Rating: NA /10  Pain Medicine Status: No request made    Preadmission Environment:   Pt. Lives                                              Spouse  Home environment:

## 2024-04-05 NOTE — PROGRESS NOTES
Inpatient Occupational Therapy Evaluation and Treatment    Unit: ICU  Date:  4/5/2024  Patient Name:    Amber Fuentes  Admitting diagnosis:  Hyperkalemia [E87.5]  Anasarca [R60.1]  Bradycardia [R00.1]  Pleural effusion [J90]  Other ascites [R18.8]  Acute renal failure, unspecified acute renal failure type (HCC) [N17.9]  Pneumonia of right lower lobe due to infectious organism [J18.9]  Admit Date:  4/1/2024  Precautions/Restrictions/WB Status/ Lines/ Wounds/ Oxygen: Fall risk, Bed/chair alarm, Lines (Supplemental O2 (2L), internal catheter, and IJ right), Telemetry, Continuous pulse oximetry, and Isolation Precautions: Environmental    Pt seen for cotreatment this date due to patient safety, patient endurance, and limited functional status information    Treatment Time:  3882-2376  Treatment Number:  1  Timed Code Treatment Minutes: 25 minutes  Total Treatment Minutes:  35  minutes    Patient Goals for Therapy: \"walk\"          Discharge Recommendations: SNF  DME needs for discharge: Defer to facility       Therapy recommendations for staff:   Assist of 2 for sitting EOB    History of Present Illness: per Dr Swanson H&P 4/2/24:  \"Chief Complaint: Patient states that she is felt \"sick and nasty\" but otherwise gives no helpful history.  Patient keeps falling asleep during my interview.     Amber Fuentes is a 78 y.o. female with pmh of coronary disease, CKD, CHF, COPD, hypertension, hyperlipidemia, diabetes mellitus, distant history of CABG who presents with diarrhea and nausea to this facility in the ER.     coming in fatigued short of breath and nauseated with diarrhea for the past couple of days.  Patient's workup reveals hyperkalemia of 7.5. She has baseline kidney function with BUN 53 creatinine 2.4 GFR 20. She has had mild not uptrending troponins of 61 and 59. EKG with bradycardia and low voltage QRS. Her TSH is elevated at 8.2. She appears to have a lactic acidosis with lactic acid 2.2 pH 7.2. CT of her abdomen  DARBY Recinos on 4/5/2024 at 12:42 PM      If patient discharges from this facility prior to next visit, this note will serve as the Discharge Summary

## 2024-04-05 NOTE — PROGRESS NOTES
Reassessment complete.    Patient remains awake in bed. Patient with unchanged physical assessment. No current needs voiced. Patient assisted with repositioning. Call light within reach.

## 2024-04-05 NOTE — PROGRESS NOTES
Physician Progress Note      PATIENT:               DES WREN  [facility] #:                  [id number]  :                       1945  ADMIT DATE:       2024 4:41 PM  DISCH DATE:  RESPONDING  PROVIDER #:        Brian THOMSON MD          QUERY TEXT:    Pt with acute on chronic systolic CHF.  Noted to have ischemic cardiomyopathy,   ckd, cad, and HTN. If possible, please document in progress notes and   discharge summary the etiology of CHF, if able to be determined.    The medical record reflects the following:  Risk Factors: advanced age, cad, htn, ischemic cardiomyopathy  Clinical Indicators: acute on chronic systolic CHF, Normal left ventricular   size with mild left ventricular hypertrophy.  Left ventricular systolic function is reduced with ejection fraction  estimated at 30%.  Treatment: echo, bnp, CRRT, monitoring    Thank you,  Rosi Rowland RN,BSN,CCDS,CRCR  Options provided:  -- CHF due to Hypertensive Heart Disease  -- CHF due to Hypertensive Heart Disease and CAD  -- CHF not due to Hypertension but due to CAD  -- CHF due to Hypertensive Heart Disease and ICMP  -- CHF not due to Hypertension but due to ICMP  -- CHF due to HTN, CAD, and ICMP  -- Other - I will add my own diagnosis  -- Disagree - Not applicable / Not valid  -- Disagree - Clinically unable to determine / Unknown  -- Refer to Clinical Documentation Reviewer    PROVIDER RESPONSE TEXT:    This patient has CHF due to hypertensive heart disease and CAD.    Query created by: Rosi Rowland on 2024 2:11 PM      Electronically signed by:  Brian THOMSON MD 2024 1:08 PM

## 2024-04-05 NOTE — CARE COORDINATION
Spoke with spouse regarding rehab for the patient. The spouse says that he spoke to the patient and she is willing to go to rehab. The family would like:    1) [facility] -  out of network    2) [facility] - running benefits/reviewing patient. Not in network.    Attempted to contact spouse to advise the two facilities the family chose are out of network with the patient's insurance. Unable to leave a message.     Patient's cell phone is [phone].

## 2024-04-06 LAB
ABO + RH BLD: NORMAL
ANION GAP SERPL CALCULATED.3IONS-SCNC: 5 MMOL/L (ref 3–16)
BASOPHILS # BLD: 0 K/UL (ref 0–0.2)
BASOPHILS NFR BLD: 0.5 %
BLOOD BANK DISPENSE STATUS: NORMAL
BLOOD BANK PRODUCT CODE: NORMAL
BPU ID: NORMAL
BUN SERPL-MCNC: 16 MG/DL (ref 7–20)
CALCIUM SERPL-MCNC: 8 MG/DL (ref 8.3–10.6)
CHLORIDE SERPL-SCNC: 103 MMOL/L (ref 99–110)
CO2 SERPL-SCNC: 29 MMOL/L (ref 21–32)
CREAT SERPL-MCNC: 1.7 MG/DL (ref 0.6–1.2)
DEPRECATED RDW RBC AUTO: 18.9 % (ref 12.4–15.4)
DESCRIPTION BLOOD BANK: NORMAL
EOSINOPHIL # BLD: 0.2 K/UL (ref 0–0.6)
EOSINOPHIL NFR BLD: 4.2 %
GFR SERPLBLD CREATININE-BSD FMLA CKD-EPI: 30 ML/MIN/{1.73_M2}
GLUCOSE BLD-MCNC: 118 MG/DL (ref 70–99)
GLUCOSE BLD-MCNC: 121 MG/DL (ref 70–99)
GLUCOSE BLD-MCNC: 146 MG/DL (ref 70–99)
GLUCOSE BLD-MCNC: 156 MG/DL (ref 70–99)
GLUCOSE SERPL-MCNC: 133 MG/DL (ref 70–99)
HBV SURFACE AB SERPL IA-ACNC: <3.5 MIU/ML
HBV SURFACE AG SERPL QL IA: NORMAL
HCT VFR BLD AUTO: 22.2 % (ref 36–48)
HCT VFR BLD AUTO: 26.5 % (ref 36–48)
HEPARIN INDUCED PLATELET ANTIBODY: NEGATIVE
HGB BLD-MCNC: 6.8 G/DL (ref 12–16)
HGB BLD-MCNC: 8 G/DL (ref 12–16)
LYMPHOCYTES # BLD: 0.8 K/UL (ref 1–5.1)
LYMPHOCYTES NFR BLD: 14.1 %
MAGNESIUM SERPL-MCNC: 2.3 MG/DL (ref 1.8–2.4)
MCH RBC QN AUTO: 28.2 PG (ref 26–34)
MCHC RBC AUTO-ENTMCNC: 30.5 G/DL (ref 31–36)
MCV RBC AUTO: 92.5 FL (ref 80–100)
MONOCYTES # BLD: 0.6 K/UL (ref 0–1.3)
MONOCYTES NFR BLD: 9.9 %
NEUTROPHILS # BLD: 4.1 K/UL (ref 1.7–7.7)
NEUTROPHILS NFR BLD: 71.3 %
PERFORMED ON: ABNORMAL
PLATELET # BLD AUTO: 78 K/UL (ref 135–450)
PMV BLD AUTO: 8.7 FL (ref 5–10.5)
POTASSIUM SERPL-SCNC: 4.7 MMOL/L (ref 3.5–5.1)
RBC # BLD AUTO: 2.4 M/UL (ref 4–5.2)
SODIUM SERPL-SCNC: 137 MMOL/L (ref 136–145)
WBC # BLD AUTO: 5.7 K/UL (ref 4–11)

## 2024-04-06 PROCEDURE — 86901 BLOOD TYPING SEROLOGIC RH(D): CPT

## 2024-04-06 PROCEDURE — 80048 BASIC METABOLIC PNL TOTAL CA: CPT

## 2024-04-06 PROCEDURE — 2700000000 HC OXYGEN THERAPY PER DAY

## 2024-04-06 PROCEDURE — 99233 SBSQ HOSP IP/OBS HIGH 50: CPT | Performed by: INTERNAL MEDICINE

## 2024-04-06 PROCEDURE — 85018 HEMOGLOBIN: CPT

## 2024-04-06 PROCEDURE — 5A1D70Z PERFORMANCE OF URINARY FILTRATION, INTERMITTENT, LESS THAN 6 HOURS PER DAY: ICD-10-PCS | Performed by: INTERNAL MEDICINE

## 2024-04-06 PROCEDURE — 30233N1 TRANSFUSION OF NONAUTOLOGOUS RED BLOOD CELLS INTO PERIPHERAL VEIN, PERCUTANEOUS APPROACH: ICD-10-PCS | Performed by: INTERNAL MEDICINE

## 2024-04-06 PROCEDURE — 36430 TRANSFUSION BLD/BLD COMPNT: CPT

## 2024-04-06 PROCEDURE — 86923 COMPATIBILITY TEST ELECTRIC: CPT

## 2024-04-06 PROCEDURE — 6370000000 HC RX 637 (ALT 250 FOR IP): Performed by: INTERNAL MEDICINE

## 2024-04-06 PROCEDURE — 2000000000 HC ICU R&B

## 2024-04-06 PROCEDURE — 51702 INSERT TEMP BLADDER CATH: CPT

## 2024-04-06 PROCEDURE — 86850 RBC ANTIBODY SCREEN: CPT

## 2024-04-06 PROCEDURE — 2580000003 HC RX 258: Performed by: INTERNAL MEDICINE

## 2024-04-06 PROCEDURE — 83735 ASSAY OF MAGNESIUM: CPT

## 2024-04-06 PROCEDURE — 94761 N-INVAS EAR/PLS OXIMETRY MLT: CPT

## 2024-04-06 PROCEDURE — 6360000002 HC RX W HCPCS: Performed by: INTERNAL MEDICINE

## 2024-04-06 PROCEDURE — 85014 HEMATOCRIT: CPT

## 2024-04-06 PROCEDURE — 36415 COLL VENOUS BLD VENIPUNCTURE: CPT

## 2024-04-06 PROCEDURE — P9016 RBC LEUKOCYTES REDUCED: HCPCS

## 2024-04-06 PROCEDURE — 85025 COMPLETE CBC W/AUTO DIFF WBC: CPT

## 2024-04-06 PROCEDURE — 86900 BLOOD TYPING SEROLOGIC ABO: CPT

## 2024-04-06 RX ORDER — FUROSEMIDE 10 MG/ML
40 INJECTION INTRAMUSCULAR; INTRAVENOUS ONCE
Status: COMPLETED | OUTPATIENT
Start: 2024-04-06 | End: 2024-04-06

## 2024-04-06 RX ORDER — SODIUM CHLORIDE 9 MG/ML
INJECTION, SOLUTION INTRAVENOUS PRN
Status: DISCONTINUED | OUTPATIENT
Start: 2024-04-06 | End: 2024-04-07

## 2024-04-06 RX ADMIN — MUPIROCIN: 20 OINTMENT TOPICAL at 08:25

## 2024-04-06 RX ADMIN — HEPARIN SODIUM 5000 UNITS: 5000 INJECTION INTRAVENOUS; SUBCUTANEOUS at 22:03

## 2024-04-06 RX ADMIN — DESMOPRESSIN ACETATE 40 MG: 0.2 TABLET ORAL at 20:01

## 2024-04-06 RX ADMIN — ASPIRIN 81 MG: 81 TABLET, COATED ORAL at 08:25

## 2024-04-06 RX ADMIN — MUPIROCIN: 20 OINTMENT TOPICAL at 20:01

## 2024-04-06 RX ADMIN — SODIUM CHLORIDE, PRESERVATIVE FREE 10 ML: 5 INJECTION INTRAVENOUS at 20:01

## 2024-04-06 RX ADMIN — ALLOPURINOL 100 MG: 100 TABLET ORAL at 08:25

## 2024-04-06 RX ADMIN — Medication 10 MG: at 02:22

## 2024-04-06 RX ADMIN — HEPARIN SODIUM 5000 UNITS: 5000 INJECTION INTRAVENOUS; SUBCUTANEOUS at 05:10

## 2024-04-06 RX ADMIN — ONDANSETRON 4 MG: 4 TABLET, ORALLY DISINTEGRATING ORAL at 04:55

## 2024-04-06 RX ADMIN — FUROSEMIDE 40 MG: 10 INJECTION, SOLUTION INTRAMUSCULAR; INTRAVENOUS at 10:31

## 2024-04-06 RX ADMIN — HEPARIN SODIUM 5000 UNITS: 5000 INJECTION INTRAVENOUS; SUBCUTANEOUS at 14:38

## 2024-04-06 RX ADMIN — ACETAMINOPHEN 650 MG: 325 TABLET ORAL at 04:54

## 2024-04-06 RX ADMIN — METOPROLOL SUCCINATE 12.5 MG: 25 TABLET, EXTENDED RELEASE ORAL at 08:25

## 2024-04-06 ASSESSMENT — PAIN DESCRIPTION - LOCATION
LOCATION: GENERALIZED
LOCATION: GENERALIZED

## 2024-04-06 ASSESSMENT — PAIN DESCRIPTION - DESCRIPTORS
DESCRIPTORS: ACHING;DISCOMFORT
DESCRIPTORS: ACHING

## 2024-04-06 ASSESSMENT — PAIN - FUNCTIONAL ASSESSMENT: PAIN_FUNCTIONAL_ASSESSMENT: ACTIVITIES ARE NOT PREVENTED

## 2024-04-06 ASSESSMENT — PAIN SCALES - GENERAL
PAINLEVEL_OUTOF10: 0
PAINLEVEL_OUTOF10: 8
PAINLEVEL_OUTOF10: 7
PAINLEVEL_OUTOF10: 7

## 2024-04-06 ASSESSMENT — PAIN SCALES - WONG BAKER: WONGBAKER_NUMERICALRESPONSE: HURTS EVEN MORE

## 2024-04-06 ASSESSMENT — PAIN DESCRIPTION - FREQUENCY: FREQUENCY: CONTINUOUS

## 2024-04-06 ASSESSMENT — PAIN DESCRIPTION - ONSET: ONSET: GRADUAL

## 2024-04-06 ASSESSMENT — PAIN DESCRIPTION - PAIN TYPE: TYPE: ACUTE PAIN

## 2024-04-06 NOTE — PROGRESS NOTES
Blood consent signed by patient and placed in soft chart.     Prior to receiving transfusion of blood products, patient educated on the following:    Blood product transfusion process  Benefits of receiving blood product transfusion  Risks associated with receiving the transfusion  Signs and symptoms of complications associated with blood product transfusion  Vague, uneasy feeling  Onset of pain (especially at the IV site, back, or chest)  Chills  Flushing  Fever  Nausea  Dizziness  Rash  Itching  Dark or red urine  Instructed patient to notify RN immediately if any sign or symptom occurs

## 2024-04-06 NOTE — PROGRESS NOTES
Blood pressure (!) 131/50, pulse 79, temperature 99.3 °F (37.4 °C), temperature source Bladder, resp. rate 21, weight 70.6 kg (155 lb 10.3 oz), SpO2 99 %, not currently breastfeeding.    Patient alert and oriented. Denies any needs at this time. Side rails up x 2, bed in lowest position, wheels locked, bed alarm on, call light and bed side table in reach.    Electronically signed by Yeimi Miller RN on 4/6/2024 at 12:26 AM

## 2024-04-06 NOTE — PROGRESS NOTES
Patient resting, eyes closed, respirations witnessed as e/e. No signs of distress. No change since prior assessment.   Call light and bedside table in easy reach.

## 2024-04-06 NOTE — PROGRESS NOTES
Shift assessment complete.    Patient seen awake in bed. Patient alert and oriented x 4. Patient seen on 2 lpm o2 per NC. Patient with no c/o pain. Patient with no s/s of distress noted.    Physical assessment as charted in flow sheets.    Scheduled medications given per orders.    Peripheral IV C/D/I and functioning properly.  Central line dressing is clean, dry and intact with no signs of drainage. All tubing is current and dated. IPA caps applied to all access hubs.     Gagnon intact and secure, small amount of clear yellow urine draining.    Patient assisted with breakfast tray. Patient assisted with repositioning in bed. Patient educated on use of call light and to call out with needs, verbalized understanding. Call light and personal belongings within reach.

## 2024-04-06 NOTE — PROGRESS NOTES
HEART FAILURE CARE PLAN:    Comorbidities Reviewed: Yes   Patient has a past medical history of Anemia, Backache, unspecified, CAD, multiple vessel, Chronic kidney disease (CKD) stage G3b/A1, moderately decreased glomerular filtration rate (GFR) between 30-44 mL/min/1.73 square meter and albuminuria creatinine ratio less than 30 mg/g ([facility]), Chronic systolic CHF (congestive heart failure) (HCC), COPD (chronic obstructive pulmonary disease) ([facility]), Depressive disorder, not elsewhere classified, Essential hypertension, benign, Gout, History of blood transfusion, Hyperlipidemia associated with type 2 diabetes mellitus ([facility]), Major depressive disorder, recurrent episode, moderate ([facility]), Osteoarthritis, hand, Osteoarthrosis, unspecified whether generalized or localized, lower leg, Osteopenia, Other and unspecified hyperlipidemia, Pain in joint, lower leg, Pneumonia, Rotator cuff tendinitis, Tear of medial cartilage or meniscus of knee, current, Type 2 diabetes mellitus with microalbuminuria, without long-term current use of insulin ([facility]), Type 2 diabetes mellitus without complication ([facility]), Type II or unspecified type diabetes mellitus without mention of complication, not stated as uncontrolled, and Uses LifeVest defibrillator.     Weights Reviewed: Yes   Admission weight: 59.6 kg (131 lb 6.3 oz) (chart review)   Wt Readings from Last 3 Encounters:   04/05/24 70.6 kg (155 lb 10.3 oz)   02/11/24 59.6 kg (131 lb 8 oz)   01/31/24 60.7 kg (133 lb 13.1 oz)     Intake & Output Reviewed: Yes     Intake/Output Summary (Last 24 hours) at 4/6/2024 0027  Last data filed at 4/5/2024 1524  Gross per 24 hour   Intake 170 ml   Output 188 ml   Net -18 ml       ECHOCARDIOGRAM Reviewed: Yes   Patient's Ejection Fraction (EF) is less than or equal to 40%. Discuss HFrEF Guideline Directed Medical Therapy (GDMT) with Cardiologist or Hospitalist:          Medications Reviewed: Yes   SCHEDULED HOSPITAL MEDICATIONS:   metoprolol succinate  12.5  APRN - CNP   aspirin 81 MG EC tablet Take 1 tablet by mouth daily    Provider, MD Blade      Diet Reviewed: Yes   ADULT DIET; Regular; 4 carb choices (60 gm/meal); Low Potassium (Less than 3000 mg/day)    Goal of Care Reviewed: Yes   Patient and/or Family's stated Goal of Care this Admission: Reduce shortness of breath, increase activity tolerance, better understand heart failure and disease management, be more comfortable, and reduce lower extremity edema prior to discharge.     Electronically signed by Yeimi Milelr RN on 4/6/2024 at 12:27 AM

## 2024-04-06 NOTE — CONSENT
Informed Consent for Blood Component Transfusion Note    I have discussed with the patient the rationale for blood component transfusion; its benefits in treating or preventing fatigue, organ damage, or death; and its risk which includes mild transfusion reactions, rare risk of blood borne infection, or more serious but rare reactions. I have discussed the alternatives to transfusion, including the risk and consequences of not receiving transfusion. The patient had an opportunity to ask questions and had agreed to proceed with transfusion of blood components.    Electronically signed by Brian Gomez MD on 4/6/24 at 7:36 AM EDT

## 2024-04-06 NOTE — PROGRESS NOTES
Shift assessment complete. See doc flow. Nightly medications given see MAR. Patient with no complaints at this time. Call light and bedside table within easy reach.     Electronically signed by Yeimi Miller RN on 4/6/2024 at 12:26 AM

## 2024-04-06 NOTE — DIALYSIS
Treatment time: 3 hours  Net UF: 2200 ml     Pre weight: 71.9 kg  Post weight:70 kg  EDW: TBD kg  Crit Line Used: Yes Ending Profile: A  Refill Present: No    Access used: R IJ Vascath    Access function: Well with  ml/min     Medications or blood products given: n/a     Regular outpatient schedule: LANCE     Summary of response to treatment: Patient tolerated treatment well and without any complications. Patient remained stable throughout entire treatment and upon the dialysis RN exiting the ICU suite.     Report given to Debora Chris RN and copy of dialysis treatment record placed in chart, to be scanned into EMR.

## 2024-04-06 NOTE — PROGRESS NOTES
Department of Internal Medicine  Nephrology Progress Note        SUBJECTIVE:    We are following this patient for A/CKD and Hyperkalemia.  The patient was seen and examined; she was resting comfortably with no acute events noted overnight.    ROS: No fever or chills.  Social: No family at bedside.    Physical Exam:    VITALS:  BP (!) 107/50   Pulse 61   Temp 97.4 °F (36.3 °C) (Bladder)   Resp 16   Wt 71.9 kg (158 lb 8.2 oz)   SpO2 100%   BMI 30.96 kg/m²     General appearance: Seems comfortable, no acute distress.  Neck: Trachea midline, thyroid normal.   Lungs:  Non labored breathing, CTA to anterior auscultation.  Heart:  S1S2 normal, rub or gallop. No peripheral edema.  Abdomen: Soft, non-tender, no organomegaly.   Skin: No lesions or rashes, warm to touch.     DATA:    CBC with Differential:    Lab Results   Component Value Date/Time    WBC 5.7 04/06/2024 05:05 AM    RBC 2.40 04/06/2024 05:05 AM    HGB 6.8 04/06/2024 05:05 AM    HCT 22.2 04/06/2024 05:05 AM    PLT 78 04/06/2024 05:05 AM    MCV 92.5 04/06/2024 05:05 AM    MCH 28.2 04/06/2024 05:05 AM    MCHC 30.5 04/06/2024 05:05 AM    RDW 18.9 04/06/2024 05:05 AM    SEGSPCT 53.4 04/29/2015 04:14 PM    SEGSPCT 53.4 04/29/2015 04:14 PM    LYMPHOPCT 14.1 04/06/2024 05:05 AM    MONOPCT 9.9 04/06/2024 05:05 AM    EOSPCT 7.6 02/27/2012 11:08 AM    BASOPCT 0.5 04/06/2024 05:05 AM    MONOSABS 0.6 04/06/2024 05:05 AM    LYMPHSABS 0.8 04/06/2024 05:05 AM    EOSABS 0.2 04/06/2024 05:05 AM    BASOSABS 0.0 04/06/2024 05:05 AM    DIFFTYPE Auto 06/04/2011 02:30 AM     BMP:    Lab Results   Component Value Date/Time     04/06/2024 05:05 AM    K 4.7 04/06/2024 05:05 AM    K 4.6 04/05/2024 09:20 AM     04/06/2024 05:05 AM    CO2 29 04/06/2024 05:05 AM    BUN 16 04/06/2024 05:05 AM    LABALBU 2.4 04/05/2024 09:20 AM    CREATININE 1.7 04/06/2024 05:05 AM    CALCIUM 8.0 04/06/2024 05:05 AM    GFRAA 33 10/14/2022 01:29 PM    GFRAA >60 06/04/2011 02:30 AM    LABGLOM

## 2024-04-06 NOTE — PROGRESS NOTES
Blood administration started, patient observed for initial 15 minutes with no s/s of transfusion reaction.

## 2024-04-06 NOTE — PROGRESS NOTES
Pulmonary & Critical Care Medicine ICU Progress Note    CC: CHF, hyperkalemia    Events of Last 24 hours: no acute events    Invasive Lines: 4/2/24 Vas cath and a midline IV      No results for input(s): \"PHART\", \"MSN6HSU\", \"PO2ART\" in the last 72 hours.    IV:   sodium chloride      sodium chloride 5 mL/hr at 04/04/24 0144    dextrose         Vitals:  /61   Pulse 68   Temp 98.2 °F (36.8 °C) (Bladder)   Resp 22   Wt 71.9 kg (158 lb 8.2 oz)   SpO2 98%   BMI 30.96 kg/m²   on RA  EXAM:  Constitutional: ill appearing. Not in distress.  Eyes: PERRL. No sclera icterus.   ENT: Normal Nose. Normal Tongue.   Neck:  Trachea is midline.   Respiratory: No accessory muscle usage. No wheezes. No rales. No Rhonchi.  Cardiovascular: Normal S1S2. No murmur. No digit cyanosis. No digit clubbing. No LE edema.   GI: Non-tender. Non-distended. Normal bowel sounds. No masses.   Skin: No rash on the exposed extremities.   Neuro: Alert. Oriented. Follows commands.  Moves all extremities.  Psych: No agitation, no anxiety.    Scheduled Meds:   metoprolol succinate  12.5 mg Oral Daily    sodium chloride flush  5-40 mL IntraVENous 2 times per day    heparin (porcine)  5,000 Units SubCUTAneous 3 times per day    mupirocin   Each Nostril BID    allopurinol  100 mg Oral Daily    aspirin  81 mg Oral Daily    atorvastatin  40 mg Oral Nightly    insulin lispro  0-8 Units SubCUTAneous TID WC    insulin lispro  0-4 Units SubCUTAneous Nightly     PRN Meds:  sodium chloride, heparin (porcine), calcium carbonate, melatonin, sodium chloride flush, sodium chloride, ondansetron **OR** ondansetron, polyethylene glycol, acetaminophen **OR** acetaminophen, glucose, dextrose bolus **OR** dextrose bolus, glucagon (rDNA), dextrose    Results:  CBC:   Recent Labs     04/04/24  0615 04/05/24  0600 04/06/24  0505   WBC 6.3 5.4 5.7   HGB 7.5* 7.0* 6.8*   HCT 24.5* 23.1* 22.2*   MCV 89.6 90.7 92.5   PLT 77* 65* 78*       BMP:   Recent Labs      04/03/24 2121 04/04/24  0405 04/04/24  0923 04/04/24  1725 04/04/24  2131 04/05/24  0420 04/05/24  0600 04/05/24  0920 04/06/24  0505      < > 137   < > 138   < > 139 137 137   K 4.3   < > 4.3   < > 4.6   < > 4.6 4.6 4.7      < > 103   < > 105   < > 107 104 103   CO2 29   < > 29   < > 31   < > 31 29 29   PHOS 2.2*  --  2.4*  --  2.6  --   --   --   --    BUN 11   < > 8   < > 7   < > 7 8 16   CREATININE 0.8   < > 0.7   < > 0.9   < > 0.9 1.2 1.7*    < > = values in this interval not displayed.       LIVER PROFILE:   Recent Labs     04/04/24 2131 04/05/24 0420 04/05/24  0920   AST 15 14* 16   ALT 13 13 12   BILITOT 0.6 0.7 0.7   ALKPHOS 174* 167* 176*         Cultures:  Utine    PCT 0.13     CT ABD IMPRESSION:  1. Bilateral pleural effusions.  Consolidation in the right perihilar region. 2. Mild ascites. 3. Diffuse anasarca.      Chest imaging was reviewed by me and showed CXR 1. Moderate to large right pleural effusion with opacification underlying lung, atelectasis versus pneumonia.  2. Mild left basilar atelectasis.     ASSESSMENT:  Hyperkalemia  Metabolic acidosisCardiorenal syndrome  DM2  CHF   CKD IV  Large right pleural effusion  COPD  Anasarca  Anemia  TMP     PLAN:  HD per nephrology, CRRT stopped  F/u urine cx  Nephrology following; received lasix today  SSI  Receving 1u PRBC  Monitor effusion with diuresis - will possibly benefit from thoracentesis when more stable  SQ heparin for DVT prophylaxis  Ok to transfer to the floor

## 2024-04-06 NOTE — PROGRESS NOTES
Pt complaining of discomfort and nausea. PRN tylenol and zofran given at this time.     Electronically signed by Yeimi Miller RN on 4/6/2024 at 4:54 AM

## 2024-04-06 NOTE — PROGRESS NOTES
IM Progress Note    Admit Date:  4/1/2024  5    Interval history:  chronic systolic CHF with very low EF at 20 % presenting with acute CHF and renal failure with refractory hyperkalemia    Initiated on CRRT and stopped today for filter issues  UOP remains low last 24 hrs  at 287 ml after lasix  Drop in h.h     Subjective:  Ms. Fuentes seen up in bed , feels ok today no new issues    Denies any bleeding  Worked with PT  Remains on 2 L   Poor insight into her illness         Objective:   BP (!) 116/47   Pulse 68   Temp 98.2 °F (36.8 °C) (Bladder)   Resp 18   Wt 71.9 kg (158 lb 8.2 oz)   SpO2 99%   BMI 30.96 kg/m²     Intake/Output Summary (Last 24 hours) at 4/6/2024 0734  Last data filed at 4/6/2024 0500  Gross per 24 hour   Intake 118.29 ml   Output 287 ml   Net -168.71 ml         Physical Exam:    General:  elderly female Awake, alert and fairly oriented. Appears to be not in any distress  Mucous Membranes:  Pink , anicteric  Neck: no JVD, no carotid bruit, no thyromegaly  Chest:  Clear to auscultation bilaterally minimal basilar crackles  Cardiovascular:  RRR S1S2 heard, no murmurs or gallops  Abdomen:  Soft, undistended, non tender, no organomegaly, BS present  Extremities- improving 2 + peripheral edema , 3+ leif UE edema remains . Distal pulses well felt  Right sided temp HD line   Neurological : grossly normal with gen weakness         Medications:   Scheduled Medications:    metoprolol succinate  12.5 mg Oral Daily    sodium chloride flush  5-40 mL IntraVENous 2 times per day    heparin (porcine)  5,000 Units SubCUTAneous 3 times per day    mupirocin   Each Nostril BID    allopurinol  100 mg Oral Daily    aspirin  81 mg Oral Daily    atorvastatin  40 mg Oral Nightly    insulin lispro  0-8 Units SubCUTAneous TID WC    insulin lispro  0-4 Units SubCUTAneous Nightly     I   sodium chloride 5 mL/hr at 04/04/24 0144    dextrose       heparin (porcine), calcium carbonate, melatonin, sodium chloride flush,

## 2024-04-06 NOTE — PROGRESS NOTES
Physical /Occupational Therapy    Hold per discussion with RN. Patient with low H&H and to receive blood, and HD. Plan to re-attempt 4/7/24.    Leighann Flores, PT #[id number]   Nu Chaudhari, OTR/L #[id number]

## 2024-04-07 LAB
ANION GAP SERPL CALCULATED.3IONS-SCNC: 5 MMOL/L (ref 3–16)
BASOPHILS # BLD: 0 K/UL (ref 0–0.2)
BASOPHILS NFR BLD: 0.9 %
BUN SERPL-MCNC: 13 MG/DL (ref 7–20)
CALCIUM SERPL-MCNC: 7.9 MG/DL (ref 8.3–10.6)
CHLORIDE SERPL-SCNC: 101 MMOL/L (ref 99–110)
CO2 SERPL-SCNC: 28 MMOL/L (ref 21–32)
CREAT SERPL-MCNC: 1.6 MG/DL (ref 0.6–1.2)
DEPRECATED RDW RBC AUTO: 17.6 % (ref 12.4–15.4)
EOSINOPHIL # BLD: 0.3 K/UL (ref 0–0.6)
EOSINOPHIL NFR BLD: 5.6 %
GFR SERPLBLD CREATININE-BSD FMLA CKD-EPI: 33 ML/MIN/{1.73_M2}
GLUCOSE BLD-MCNC: 140 MG/DL (ref 70–99)
GLUCOSE BLD-MCNC: 146 MG/DL (ref 70–99)
GLUCOSE BLD-MCNC: 153 MG/DL (ref 70–99)
GLUCOSE BLD-MCNC: 98 MG/DL (ref 70–99)
GLUCOSE SERPL-MCNC: 108 MG/DL (ref 70–99)
HCT VFR BLD AUTO: 25.7 % (ref 36–48)
HGB BLD-MCNC: 8 G/DL (ref 12–16)
LYMPHOCYTES # BLD: 0.7 K/UL (ref 1–5.1)
LYMPHOCYTES NFR BLD: 14.8 %
MAGNESIUM SERPL-MCNC: 2 MG/DL (ref 1.8–2.4)
MCH RBC QN AUTO: 28.4 PG (ref 26–34)
MCHC RBC AUTO-ENTMCNC: 31.3 G/DL (ref 31–36)
MCV RBC AUTO: 90.7 FL (ref 80–100)
MONOCYTES # BLD: 0.5 K/UL (ref 0–1.3)
MONOCYTES NFR BLD: 10 %
NEUTROPHILS # BLD: 3.1 K/UL (ref 1.7–7.7)
NEUTROPHILS NFR BLD: 68.7 %
PERFORMED ON: ABNORMAL
PERFORMED ON: NORMAL
PLATELET # BLD AUTO: 43 K/UL (ref 135–450)
PMV BLD AUTO: 8.5 FL (ref 5–10.5)
POTASSIUM SERPL-SCNC: 4.3 MMOL/L (ref 3.5–5.1)
RBC # BLD AUTO: 2.83 M/UL (ref 4–5.2)
SODIUM SERPL-SCNC: 134 MMOL/L (ref 136–145)
WBC # BLD AUTO: 4.5 K/UL (ref 4–11)

## 2024-04-07 PROCEDURE — 99233 SBSQ HOSP IP/OBS HIGH 50: CPT | Performed by: INTERNAL MEDICINE

## 2024-04-07 PROCEDURE — 2700000000 HC OXYGEN THERAPY PER DAY

## 2024-04-07 PROCEDURE — 80048 BASIC METABOLIC PNL TOTAL CA: CPT

## 2024-04-07 PROCEDURE — 85025 COMPLETE CBC W/AUTO DIFF WBC: CPT

## 2024-04-07 PROCEDURE — 6360000002 HC RX W HCPCS: Performed by: INTERNAL MEDICINE

## 2024-04-07 PROCEDURE — 2580000003 HC RX 258: Performed by: INTERNAL MEDICINE

## 2024-04-07 PROCEDURE — 94761 N-INVAS EAR/PLS OXIMETRY MLT: CPT

## 2024-04-07 PROCEDURE — 2060000000 HC ICU INTERMEDIATE R&B

## 2024-04-07 PROCEDURE — 6370000000 HC RX 637 (ALT 250 FOR IP): Performed by: INTERNAL MEDICINE

## 2024-04-07 PROCEDURE — 83735 ASSAY OF MAGNESIUM: CPT

## 2024-04-07 PROCEDURE — 51702 INSERT TEMP BLADDER CATH: CPT

## 2024-04-07 RX ADMIN — DESMOPRESSIN ACETATE 40 MG: 0.2 TABLET ORAL at 20:25

## 2024-04-07 RX ADMIN — SODIUM CHLORIDE, PRESERVATIVE FREE 10 ML: 5 INJECTION INTRAVENOUS at 20:25

## 2024-04-07 RX ADMIN — HEPARIN SODIUM 5000 UNITS: 5000 INJECTION INTRAVENOUS; SUBCUTANEOUS at 20:24

## 2024-04-07 RX ADMIN — ALLOPURINOL 100 MG: 100 TABLET ORAL at 08:10

## 2024-04-07 RX ADMIN — ASPIRIN 81 MG: 81 TABLET, COATED ORAL at 08:10

## 2024-04-07 RX ADMIN — Medication 10 MG: at 00:22

## 2024-04-07 RX ADMIN — ONDANSETRON 4 MG: 2 INJECTION INTRAMUSCULAR; INTRAVENOUS at 20:37

## 2024-04-07 RX ADMIN — HEPARIN SODIUM 5000 UNITS: 5000 INJECTION INTRAVENOUS; SUBCUTANEOUS at 06:21

## 2024-04-07 RX ADMIN — Medication 10 MG: at 20:37

## 2024-04-07 NOTE — PROGRESS NOTES
Physical Therapy/Occupational Therapy    Attempted PT/OT treatment today. Pt adamantly declining therapy or mobility at this time and requesting therapists, \"Get out,\" of her room. Attempted to educate on importance of mobility, unsure of successfulness of education. Will follow up as appropriate.    No charge.    Belkis Potter, PT, DPT #[id number]  Nu Chaudhari, OTR/L #[id number]

## 2024-04-07 NOTE — PROGRESS NOTES
HEART FAILURE CARE PLAN:    Comorbidities Reviewed: Yes   Patient has a past medical history of Anemia, Backache, unspecified, CAD, multiple vessel, Chronic kidney disease (CKD) stage G3b/A1, moderately decreased glomerular filtration rate (GFR) between 30-44 mL/min/1.73 square meter and albuminuria creatinine ratio less than 30 mg/g ([facility]), Chronic systolic CHF (congestive heart failure) ([facility]), COPD (chronic obstructive pulmonary disease) ([facility]), Depressive disorder, not elsewhere classified, Essential hypertension, benign, Gout, History of blood transfusion, Hyperlipidemia associated with type 2 diabetes mellitus ([facility]), Major depressive disorder, recurrent episode, moderate ([facility]), Osteoarthritis, hand, Osteoarthrosis, unspecified whether generalized or localized, lower leg, Osteopenia, Other and unspecified hyperlipidemia, Pain in joint, lower leg, Pneumonia, Rotator cuff tendinitis, Tear of medial cartilage or meniscus of knee, current, Type 2 diabetes mellitus with microalbuminuria, without long-term current use of insulin ([facility]), Type 2 diabetes mellitus without complication ([facility]), Type II or unspecified type diabetes mellitus without mention of complication, not stated as uncontrolled, and Uses LifeVest defibrillator.     Weights Reviewed: Yes   Admission weight: 59.6 kg (131 lb 6.3 oz) (chart review)   Wt Readings from Last 3 Encounters:   04/06/24 70 kg (154 lb 5.2 oz)   02/11/24 59.6 kg (131 lb 8 oz)   01/31/24 60.7 kg (133 lb 13.1 oz)     Intake & Output Reviewed: Yes     Intake/Output Summary (Last 24 hours) at 4/6/2024 2143  Last data filed at 4/6/2024 2000  Gross per 24 hour   Intake 452.29 ml   Output 305 ml   Net 147.29 ml       ECHOCARDIOGRAM Reviewed: Yes   Patient's Ejection Fraction (EF) is less than or equal to 40%. Discuss HFrEF Guideline Directed Medical Therapy (GDMT) with Cardiologist or Hospitalist:          Medications Reviewed: Yes   SCHEDULED HOSPITAL MEDICATIONS:   metoprolol succinate

## 2024-04-07 NOTE — PROGRESS NOTES
BMP:   Recent Labs     04/04/24  0923 04/04/24  1725 04/04/24  2131 04/05/24  0420 04/05/24  0920 04/06/24  0505 04/07/24  0426      < > 138   < > 137 137 134*   K 4.3   < > 4.6   < > 4.6 4.7 4.3      < > 105   < > 104 103 101   CO2 29   < > 31   < > 29 29 28   PHOS 2.4*  --  2.6  --   --   --   --    BUN 8   < > 7   < > 8 16 13   CREATININE 0.7   < > 0.9   < > 1.2 1.7* 1.6*    < > = values in this interval not displayed.       LIVER PROFILE:   Recent Labs     04/04/24 2131 04/05/24 0420 04/05/24  0920   AST 15 14* 16   ALT 13 13 12   BILITOT 0.6 0.7 0.7   ALKPHOS 174* 167* 176*         Cultures:  Utine    PCT 0.13     CT ABD IMPRESSION:  1. Bilateral pleural effusions.  Consolidation in the right perihilar region. 2. Mild ascites. 3. Diffuse anasarca.      Chest imaging was reviewed by me and showed CXR 1. Moderate to large right pleural effusion with opacification underlying lung, atelectasis versus pneumonia.  2. Mild left basilar atelectasis.     ASSESSMENT:  Hyperkalemia  Metabolic acidosisCardiorenal syndrome  DM2  CHF   CKD IV  Large right pleural effusion  COPD  Anasarca  Anemia  TMP     PLAN:  HD per nephrology, CRRT stopped  F/u urine cx  Nephrology following  SSI  S/p 1u PRBC  Monitor effusion with diuresis - will possibly benefit from thoracentesis when more stable. CXR tomorrow  HoldSQ heparin for DVT prophylaxis  Ok to transfer to the floor

## 2024-04-07 NOTE — PROGRESS NOTES
Department of Internal Medicine  Nephrology Progress Note        SUBJECTIVE:    We are following this patient for A/CKD and Hyperkalemia.  The patient was seen and examined; she was resting comfortably with no acute events noted overnight.    ROS: No fever or chills.  Social: No family at bedside.    Physical Exam:    VITALS:  BP (!) 88/63   Pulse 73   Temp 98 °F (36.7 °C) (Bladder)   Resp 21   Ht 1.524 m (5')   Wt 68.4 kg (150 lb 12.7 oz)   SpO2 100%   BMI 29.45 kg/m²     General appearance: Seems comfortable, no acute distress.  Neck: Trachea midline, thyroid normal.   Lungs:  Non labored breathing, CTA to anterior auscultation.  Heart:  S1S2 normal, rub or gallop. No peripheral edema.  Abdomen: Soft, non-tender, no organomegaly.   Skin: No lesions or rashes, warm to touch.     DATA:    CBC with Differential:    Lab Results   Component Value Date/Time    WBC 4.5 04/07/2024 04:26 AM    RBC 2.83 04/07/2024 04:26 AM    HGB 8.0 04/07/2024 04:26 AM    HCT 25.7 04/07/2024 04:26 AM    PLT 43 04/07/2024 04:26 AM    MCV 90.7 04/07/2024 04:26 AM    MCH 28.4 04/07/2024 04:26 AM    MCHC 31.3 04/07/2024 04:26 AM    RDW 17.6 04/07/2024 04:26 AM    SEGSPCT 53.4 04/29/2015 04:14 PM    SEGSPCT 53.4 04/29/2015 04:14 PM    LYMPHOPCT 14.8 04/07/2024 04:26 AM    MONOPCT 10.0 04/07/2024 04:26 AM    EOSPCT 7.6 02/27/2012 11:08 AM    BASOPCT 0.9 04/07/2024 04:26 AM    MONOSABS 0.5 04/07/2024 04:26 AM    LYMPHSABS 0.7 04/07/2024 04:26 AM    EOSABS 0.3 04/07/2024 04:26 AM    BASOSABS 0.0 04/07/2024 04:26 AM    DIFFTYPE Auto 06/04/2011 02:30 AM     BMP:    Lab Results   Component Value Date/Time     04/07/2024 04:26 AM    K 4.3 04/07/2024 04:26 AM    K 4.6 04/05/2024 09:20 AM     04/07/2024 04:26 AM    CO2 28 04/07/2024 04:26 AM    BUN 13 04/07/2024 04:26 AM    LABALBU 2.4 04/05/2024 09:20 AM    CREATININE 1.6 04/07/2024 04:26 AM    CALCIUM 7.9 04/07/2024 04:26 AM    GFRAA 33 10/14/2022 01:29 PM    GFRAA >60 06/04/2011

## 2024-04-07 NOTE — PROGRESS NOTES
IM Progress Note    Admit Date:  4/1/2024  6    Interval history:    chronic systolic CHF with very low EF at 20 % presenting with acute CHF and renal failure with refractory hyperkalemia    Initiated on CRRT -> transitioned to HD now     Drop in h.h-> PRBC transfused        Subjective:  Ms. Fuentes seen up in bed , feels ok today no new issues  Denies any bleeding  Worked with PT  Remains on 2 L   Poor insight into her illness.     Spoke to her son at bedside .     Next HD in AM     D/W ICU RN- can transfer out of ICU          Objective:   BP (!) 89/57   Pulse 75   Temp 98.3 °F (36.8 °C) (Temporal)   Resp 16   Ht 1.524 m (5')   Wt 68.4 kg (150 lb 12.7 oz)   SpO2 100%   BMI 29.45 kg/m²     Intake/Output Summary (Last 24 hours) at 4/7/2024 1553  Last data filed at 4/7/2024 0809  Gross per 24 hour   Intake 340 ml   Output 152 ml   Net 188 ml         Physical Exam:    General:  elderly female.   Awake, alert and fairly oriented. Appears to be not in any distress  Mucous Membranes:  Pink , anicteric  Neck: no JVD, no carotid bruit, no thyromegaly  Chest:  Clear to auscultation; no wheezes, rales or rhonchi   Cardiovascular:  RRR S1S2 heard, no murmurs or gallops  Abdomen:  Soft, undistended, non tender, no organomegaly, BS present  Extremities- improving  upper and lower extremity edema- down to 2 +  Right sided temp HD line   Neurological : grossly normal with gen weakness         Medications:   Scheduled Medications:    metoprolol succinate  12.5 mg Oral Daily    sodium chloride flush  5-40 mL IntraVENous 2 times per day    heparin (porcine)  5,000 Units SubCUTAneous 3 times per day    allopurinol  100 mg Oral Daily    aspirin  81 mg Oral Daily    atorvastatin  40 mg Oral Nightly    insulin lispro  0-8 Units SubCUTAneous TID WC    insulin lispro  0-4 Units SubCUTAneous Nightly     I   sodium chloride 5 mL/hr at 04/04/24 0144    dextrose       heparin (porcine), calcium carbonate, melatonin, sodium chloride    #Gout   -continue allopurinol     # Anemia- chronic sec to CKD   - baseline around 8.5- 9.0 . Now down to 6.8  - monitored h.h->  trending down with acute disease   - S/P 1 unit PRBC transfusion       Heparin for dvt prophylaxis     Full code  ADULT DIET; Regular; 4 carb choices (60 gm/meal); Low Potassium (Less than 3000 mg/day)       Transfer to U        Yimi Muller MD, 4/7/2024 3:53 PM

## 2024-04-07 NOTE — PROGRESS NOTES
Reassessment done,patient is still awake,alert and oriented X 4,she is on oxygen via NC at 1l/min.  She denies being in pain.  She is on a regular diet and tolerates oral fluids well.  She has a good for urine output,noted to have very minimal urine  output.  Bed is at its lowest level,call light within reach,will continue to monitor patient.

## 2024-04-07 NOTE — PROGRESS NOTES
Reassessment done,patient is in bed,watching TV,on oxygen via NC at 2l/min,has diminished breath sounds bilaterally.  She is on a regular diet and needs assistance in feeding.  She has oedema of the upper and lower extremities.  She has a good in for urine output,noted to have very minimal urine output.  Bed is at its lowest level,call light within reach,will continue to monitor patient.

## 2024-04-07 NOTE — PROGRESS NOTES
Shift assessment done,patient is alert and oriented X 4.  She is on oxygen via NC at 2l/min,has diminished breath sounds bilaterally.  She is on a regular diet and tolerates oral fluids well,but requires assistance in feeding.  She has oedema of the upper and lower extremities.  She has a good in for urine output,noted to have minimal urine output.  She denies being in pain.  Bed is at its lowest level,call light within reach,will continue to monitor patient.

## 2024-04-07 NOTE — PROGRESS NOTES
Shift assessment complete.    Patient seen awake in bed. Patient is alert and oriented x 4. Patient seen on 2 lpm o2 per NC. Patient with no c/o pain. Patient with no s/s of distress noted.    Physical assessment as charted in flow sheets.    Scheduled medications given per orders.    Peripheral IV C/D/I and functioning properly.  Central line dressing is clean, dry and intact with no signs of drainage. All tubing is current and dated. IPA caps applied to all access hubs.     Gagnon intact and secure, small amount of clear yellow urine draining.    Patient assisted with breakfast tray. Patient educated on use of call light and to call out with needs, verbalized understanding. Call light and personal belongings within reach.

## 2024-04-08 ENCOUNTER — APPOINTMENT (OUTPATIENT)
Dept: GENERAL RADIOLOGY | Age: 79
End: 2024-04-08
Payer: MEDICARE

## 2024-04-08 PROBLEM — J44.9 CHRONIC OBSTRUCTIVE PULMONARY DISEASE (HCC): Status: ACTIVE | Noted: 2024-04-08

## 2024-04-08 LAB
ANION GAP SERPL CALCULATED.3IONS-SCNC: 5 MMOL/L (ref 3–16)
BASOPHILS # BLD: 0 K/UL (ref 0–0.2)
BASOPHILS NFR BLD: 0.8 %
BUN SERPL-MCNC: 26 MG/DL (ref 7–20)
CALCIUM SERPL-MCNC: 7.9 MG/DL (ref 8.3–10.6)
CHLORIDE SERPL-SCNC: 100 MMOL/L (ref 99–110)
CO2 SERPL-SCNC: 28 MMOL/L (ref 21–32)
CREAT SERPL-MCNC: 2.3 MG/DL (ref 0.6–1.2)
DEPRECATED RDW RBC AUTO: 17.6 % (ref 12.4–15.4)
EOSINOPHIL # BLD: 0.3 K/UL (ref 0–0.6)
EOSINOPHIL NFR BLD: 5.4 %
GFR SERPLBLD CREATININE-BSD FMLA CKD-EPI: 21 ML/MIN/{1.73_M2}
GLUCOSE BLD-MCNC: 100 MG/DL (ref 70–99)
GLUCOSE BLD-MCNC: 117 MG/DL (ref 70–99)
GLUCOSE BLD-MCNC: 121 MG/DL (ref 70–99)
GLUCOSE SERPL-MCNC: 106 MG/DL (ref 70–99)
HCT VFR BLD AUTO: 25.7 % (ref 36–48)
HGB BLD-MCNC: 8.1 G/DL (ref 12–16)
LYMPHOCYTES # BLD: 0.8 K/UL (ref 1–5.1)
LYMPHOCYTES NFR BLD: 15.6 %
MAGNESIUM SERPL-MCNC: 2 MG/DL (ref 1.8–2.4)
MCH RBC QN AUTO: 28.5 PG (ref 26–34)
MCHC RBC AUTO-ENTMCNC: 31.4 G/DL (ref 31–36)
MCV RBC AUTO: 90.7 FL (ref 80–100)
MONOCYTES # BLD: 0.5 K/UL (ref 0–1.3)
MONOCYTES NFR BLD: 10.7 %
NEUTROPHILS # BLD: 3.4 K/UL (ref 1.7–7.7)
NEUTROPHILS NFR BLD: 67.5 %
PERFORMED ON: ABNORMAL
PLATELET # BLD AUTO: 58 K/UL (ref 135–450)
PMV BLD AUTO: 7.9 FL (ref 5–10.5)
POTASSIUM SERPL-SCNC: 4.6 MMOL/L (ref 3.5–5.1)
RBC # BLD AUTO: 2.84 M/UL (ref 4–5.2)
SODIUM SERPL-SCNC: 133 MMOL/L (ref 136–145)
WBC # BLD AUTO: 5.1 K/UL (ref 4–11)

## 2024-04-08 PROCEDURE — 94761 N-INVAS EAR/PLS OXIMETRY MLT: CPT

## 2024-04-08 PROCEDURE — 6370000000 HC RX 637 (ALT 250 FOR IP): Performed by: INTERNAL MEDICINE

## 2024-04-08 PROCEDURE — 6360000002 HC RX W HCPCS: Performed by: INTERNAL MEDICINE

## 2024-04-08 PROCEDURE — 80048 BASIC METABOLIC PNL TOTAL CA: CPT

## 2024-04-08 PROCEDURE — 97110 THERAPEUTIC EXERCISES: CPT

## 2024-04-08 PROCEDURE — 2060000000 HC ICU INTERMEDIATE R&B

## 2024-04-08 PROCEDURE — 85025 COMPLETE CBC W/AUTO DIFF WBC: CPT

## 2024-04-08 PROCEDURE — 97530 THERAPEUTIC ACTIVITIES: CPT

## 2024-04-08 PROCEDURE — 2700000000 HC OXYGEN THERAPY PER DAY

## 2024-04-08 PROCEDURE — 2580000003 HC RX 258: Performed by: INTERNAL MEDICINE

## 2024-04-08 PROCEDURE — 83735 ASSAY OF MAGNESIUM: CPT

## 2024-04-08 PROCEDURE — 99232 SBSQ HOSP IP/OBS MODERATE 35: CPT | Performed by: INTERNAL MEDICINE

## 2024-04-08 PROCEDURE — 71045 X-RAY EXAM CHEST 1 VIEW: CPT

## 2024-04-08 RX ADMIN — SODIUM CHLORIDE, PRESERVATIVE FREE 10 ML: 5 INJECTION INTRAVENOUS at 10:12

## 2024-04-08 RX ADMIN — ASPIRIN 81 MG: 81 TABLET, COATED ORAL at 10:12

## 2024-04-08 RX ADMIN — METOPROLOL SUCCINATE 12.5 MG: 25 TABLET, EXTENDED RELEASE ORAL at 10:12

## 2024-04-08 RX ADMIN — SODIUM CHLORIDE, PRESERVATIVE FREE 10 ML: 5 INJECTION INTRAVENOUS at 21:08

## 2024-04-08 RX ADMIN — DESMOPRESSIN ACETATE 40 MG: 0.2 TABLET ORAL at 21:07

## 2024-04-08 RX ADMIN — HEPARIN SODIUM 5000 UNITS: 5000 INJECTION INTRAVENOUS; SUBCUTANEOUS at 05:59

## 2024-04-08 RX ADMIN — ONDANSETRON 4 MG: 4 TABLET, ORALLY DISINTEGRATING ORAL at 18:43

## 2024-04-08 RX ADMIN — ALLOPURINOL 100 MG: 100 TABLET ORAL at 10:12

## 2024-04-08 NOTE — CONSULTS
[facility]   HEART FAILURE PROGRAM      NAME:  Amber Fuentes  AGE: 78 y.o.   GENDER: female  : 1945  TODAY'S DATE:  2024    Subjective:     VISIT TYPE: Evaluation / Consult    ADMIT DATE: 2024    PAST MEDICAL HISTORY:      Diagnosis Date    Anemia     Backache, unspecified 2008    CAD, multiple vessel 2020    Chronic kidney disease (CKD) stage G3b/A1, moderately decreased glomerular filtration rate (GFR) between 30-44 mL/min/1.73 square meter and albuminuria creatinine ratio less than 30 mg/g ([facility]) 2021    Chronic obstructive pulmonary disease ([facility]) 2024    Chronic systolic CHF (congestive heart failure) ([facility]) 2019    COPD (chronic obstructive pulmonary disease) ([facility])     Depressive disorder, not elsewhere classified 2007    Essential hypertension, benign 2007    Gout 2011    History of blood transfusion     Hyperlipidemia associated with type 2 diabetes mellitus ([facility]) 2015    Major depressive disorder, recurrent episode, moderate ([facility]) 2015    Osteoarthritis, hand 2012    Osteoarthrosis, unspecified whether generalized or localized, lower leg 2007    Osteopenia 2015    Other and unspecified hyperlipidemia 2007    Pain in joint, lower leg 2007    Pneumonia     Rotator cuff tendinitis 10/06/2017    Tear of medial cartilage or meniscus of knee, current 2007    Type 2 diabetes mellitus with microalbuminuria, without long-term current use of insulin ([facility]) 2017    Type 2 diabetes mellitus without complication ([facility]) 2007    Type II or unspecified type diabetes mellitus without mention of complication, not stated as uncontrolled 2007    foot exam 08 normal    Uses LifeVest defibrillator 2024     HOME MEDICATIONS:  Prior to Admission medications    Medication Sig Start Date End Date Taking? Authorizing Provider   metoprolol succinate (TOPROL XL) 25 MG extended release

## 2024-04-08 NOTE — PROGRESS NOTES
Inpatient Occupational Therapy Treatment    Unit: ICU  Date:  4/8/2024  Patient Name:    Amber Fuentes  Admitting diagnosis:  Hyperkalemia [E87.5]  Anasarca [R60.1]  Bradycardia [R00.1]  Pleural effusion [J90]  Other ascites [R18.8]  Acute renal failure, unspecified acute renal failure type (HCC) [N17.9]  Pneumonia of right lower lobe due to infectious organism [J18.9]  Admit Date:  4/1/2024  Precautions/Restrictions/WB Status/ Lines/ Wounds/ Oxygen: Fall risk, Bed/chair alarm, Lines (Supplemental O2 (2L), internal catheter, and IJ right), Telemetry, Continuous pulse oximetry, and Isolation Precautions: Environmental    Pt seen for cotreatment this date due to patient safety, patient endurance, and limited functional status information    Treatment Time:  10:36 - 11:17  Treatment Number:  2  Timed Code Treatment Minutes: 41 minutes  Total Treatment Minutes: 41 minutes    Patient Goals for Therapy: \"let's get up and walk\"        Discharge Recommendations: SNF  DME needs for discharge: Defer to facility       Therapy recommendations for staff:   Assist of 1 for stand-pivot transfers with rolling walker (RW) and gait belt to/from BSC  to/from chair    History of Present Illness: per Dr Swanson H&P 4/2/24:  \"Chief Complaint: Patient states that she is felt \"sick and nasty\" but otherwise gives no helpful history.  Patient keeps falling asleep during my interview.     Amber Fuentes is a 78 y.o. female with pmh of coronary disease, CKD, CHF, COPD, hypertension, hyperlipidemia, diabetes mellitus, distant history of CABG who presents with diarrhea and nausea to this facility in the ER.     coming in fatigued short of breath and nauseated with diarrhea for the past couple of days.  Patient's workup reveals hyperkalemia of 7.5. She has baseline kidney function with BUN 53 creatinine 2.4 GFR 20. She has had mild not uptrending troponins of 61 and 59. EKG with bradycardia and low voltage QRS. Her TSH is elevated at 8.2. She

## 2024-04-08 NOTE — PROGRESS NOTES
Patient admitted to room 306 from ICU 4. Patient oriented to room, call light, bed rails, phone, lights and bathroom. Patient instructed about the schedule of the day including: vital sign frequency, lab draws, possible tests, frequency of MD and staff rounds, daily weights, I &O's and prescribed diet. Yes Telemetry box in place, patient aware of placement and reason. Bed locked, in lowest position, side rails up 2/4, call light within reach.        Recliner Assessment  Patient is not able to demonstrated the ability to move from a reclining position to an upright position within the recliner. however patient is alert, oriented and able to provide informed consent       4 Eyes Skin Assessment     NAME:  Amber Fuentes  YOB: 1945  MEDICAL RECORD NUMBER:  [id number]    The patient is being assessed for  Transfer to New Unit    I agree that at least one RN has performed a thorough Head to Toe Skin Assessment on the patient. ALL assessment sites listed below have been assessed.      Areas assessed by both nurses:    Head, Face, Ears, Shoulders, Back, Chest, Arms, Elbows, Hands, Sacrum. Buttock, Coccyx, Ischium, Legs. Feet and Heels, and Under Medical Devices     Generalized pitting edema noted. Redness to L abdominal fold. Does not appear open. Redness to sacral area that barely blanchable.        Does the Patient have a Wound? No noted wound(s)       Alek Prevention initiated by RN: No  Wound Care Orders initiated by RN: No    Pressure Injury (Stage 3,4, Unstageable, DTI, NWPT, and Complex wounds) if present, place Wound referral order by RN under : No    New Ostomies, if present place, Ostomy referral order under : No     Nurse 1 eSignature: Electronically signed by Vianey Kate RN on 4/8/24 at 5:00 PM EDT    **SHARE this note so that the co-signing nurse can place an eSignature**    Nurse 2 eSignature: Electronically signed by Ella Marcelino RN on 4/8/24 at 6:36 PM EDT

## 2024-04-08 NOTE — FLOWSHEET NOTE
04/08/24 1308   Vital Signs   Temp 97.9 °F (36.6 °C)   Temp Source Oral   Pulse 69   Heart Rate Source Monitor   Respirations 16   /61   MAP (Calculated) 81   BP Location Right lower arm   BP Method Automatic   Pain Assessment   Pain Assessment None - Denies Pain   Oxygen Therapy   SpO2 100 %   O2 Device Nasal cannula   O2 Flow Rate (L/min) 2 L/min     Pt arrived to unit in stable condition. VSS. Pt a&o x4. Denies any pain or discomfort. Continues on 2l via nc and is tolerating well. Generalized pitting edema noted. Pleasant and cooperative with care. Call light and bedside table within reach. Will continue to monitor.

## 2024-04-08 NOTE — PLAN OF CARE
Problem: Discharge Planning  Goal: Discharge to home or other facility with appropriate resources  Recent Flowsheet Documentation  Taken 4/8/2024 0445 by Yeimi Miller, RN  Discharge to home or other facility with appropriate resources: Refer to discharge planning if patient needs post-hospital services based on physician order or complex needs related to functional status, cognitive ability or social support system     Problem: Chronic Conditions and Co-morbidities  Goal: Patient's chronic conditions and co-morbidity symptoms are monitored and maintained or improved  Outcome: Progressing  Flowsheets (Taken 4/8/2024 0445 by Yeimi Miller, RN)  Care Plan - Patient's Chronic Conditions and Co-Morbidity Symptoms are Monitored and Maintained or Improved: Monitor and assess patient's chronic conditions and comorbid symptoms for stability, deterioration, or improvement     Problem: Respiratory - Adult  Goal: Achieves optimal ventilation and oxygenation  Outcome: Progressing

## 2024-04-08 NOTE — PROGRESS NOTES
IM Progress Note    Admit Date:  4/1/2024  7    Interval history:    chronic systolic CHF with very low EF at 20 % presenting with acute CHF and renal failure with refractory hyperkalemia    Initiated on CRRT -> transitioned to HD now   Drop in h.h-> PRBC transfused        Subjective:  Ms. Fuentes seen. No pain. No shortness of breath. She is feeling alright today, intermittently sleepy. She says plan is to go somewhere for physical therapy. She really wants some coffee. Still has some mild upper extremity edema. Doesn't tend to get much in her legs she says.      Objective:   BP (!) 124/47   Pulse 71   Temp 98 °F (36.7 °C) (Bladder)   Resp 16   Ht 1.524 m (5')   Wt 74.8 kg (164 lb 14.5 oz)   SpO2 100%   BMI 32.21 kg/m²     Intake/Output Summary (Last 24 hours) at 4/8/2024 0958  Last data filed at 4/8/2024 0600  Gross per 24 hour   Intake --   Output 143 ml   Net -143 ml         Physical Exam:    General:  elderly female.   Awake, alert and fairly oriented. Appears to be not in any distress  Mucous Membranes:  Pink , anicteric  Neck: no JVD, no carotid bruit, no thyromegaly  Chest:  Clear to auscultation; no wheezes, rales or rhonchi   Cardiovascular:  RRR S1S2 heard, no murmurs or gallops  Abdomen:  Soft, undistended, non tender, no organomegaly, BS present  Extremities- improving  upper and lower extremity edema- down to 2 +  Right sided temp HD line   Neurological : grossly normal with gen weakness      Medications:   Scheduled Medications:    metoprolol succinate  12.5 mg Oral Daily    sodium chloride flush  5-40 mL IntraVENous 2 times per day    [Held by provider] heparin (porcine)  5,000 Units SubCUTAneous 3 times per day    allopurinol  100 mg Oral Daily    aspirin  81 mg Oral Daily    atorvastatin  40 mg Oral Nightly    insulin lispro  0-8 Units SubCUTAneous TID WC    insulin lispro  0-4 Units SubCUTAneous Nightly     I   sodium chloride 5 mL/hr at 04/04/24 0144    dextrose       heparin (porcine),  TR w SPAP 66 mmHg.       ECHO 4/24     Normal left ventricular size with mild left ventricular hypertrophy.   Left ventricular systolic function is reduced with ejection fraction   estimated at 30%.   There is severe global with regional hypokinesis. The inferolateral and   inferior walls appear severely hypokinetic.   Average GLS is reduced at - 11.1%.   Diastolic function not assessed due to mitral valve annuloplasty ring .   Right ventricle appears mildly enlarged with reduced function.   The right atrium is mildly dilated.   An Edward Physio II annuloplasty ring is seen in the mitral position.   Mild mitral stenosis.   Mild to moderate mitral regurgitation.   Mild aortic regurgitation.   Moderate tricuspid regurgitation.   Systolic pulmonary artery pressure (SPAP) estimated at 56 mmHg (RA pressure   8 mmHg).   Mild-moderate pulmonic regurgitation present.      Compared with the prior study performed 1/23/24 (Limited Echo with 25% EF severe global hypokinesis inferior akinesis. RV reduced function), LVEF   appears slightly improved.      Assessment & Plan:      #Acute on chronic systolic CHF  #Ischemic cardiomyopathy   -pro-BNP >23k  -CXR shows cardiomegaly with pulmonary vascular congestion  Also has massive peripheral edema and refractory hyperkalemia  -limited echo 1/24 with similar EF of 25 % and repeat ECHo with slight improvement to 30 %, with severe global hypokinesis   -nephrology consulted   - initiated on CRRT for high K and had fluid removal. Transitioned to HD with continued fluid removal    - Resumed Toprol and lasix ; holding Entresto for hypotension     #LANCE on CKD4  #Hyperkalemia and metabolic acidosis  - possible cardiorenal  - holding entresto, k levels did not improve with medical mx  - given bicarb fluids   - nephrology consulted   - initiated on CRRT with fluid removal done for 72 hrs, now  switched to HD. Next HD due in AM .   Will Likely need outpt HD set up    #Elevated troponin  #CAD s/p

## 2024-04-08 NOTE — PROGRESS NOTES
HEART FAILURE CARE PLAN:    Comorbidities Reviewed: Yes   Patient has a past medical history of Anemia, Backache, unspecified, CAD, multiple vessel, Chronic kidney disease (CKD) stage G3b/A1, moderately decreased glomerular filtration rate (GFR) between 30-44 mL/min/1.73 square meter and albuminuria creatinine ratio less than 30 mg/g ([facility]), Chronic systolic CHF (congestive heart failure) (HCC), COPD (chronic obstructive pulmonary disease) ([facility]), Depressive disorder, not elsewhere classified, Essential hypertension, benign, Gout, History of blood transfusion, Hyperlipidemia associated with type 2 diabetes mellitus ([facility]), Major depressive disorder, recurrent episode, moderate ([facility]), Osteoarthritis, hand, Osteoarthrosis, unspecified whether generalized or localized, lower leg, Osteopenia, Other and unspecified hyperlipidemia, Pain in joint, lower leg, Pneumonia, Rotator cuff tendinitis, Tear of medial cartilage or meniscus of knee, current, Type 2 diabetes mellitus with microalbuminuria, without long-term current use of insulin ([facility]), Type 2 diabetes mellitus without complication ([facility]), Type II or unspecified type diabetes mellitus without mention of complication, not stated as uncontrolled, and Uses LifeVest defibrillator.     Weights Reviewed: Yes   Admission weight: 59.6 kg (131 lb 6.3 oz) (chart review)   Wt Readings from Last 3 Encounters:   04/07/24 68.4 kg (150 lb 12.7 oz)   02/11/24 59.6 kg (131 lb 8 oz)   01/31/24 60.7 kg (133 lb 13.1 oz)     Intake & Output Reviewed: Yes     Intake/Output Summary (Last 24 hours) at 4/8/2024 0234  Last data filed at 4/7/2024 2029  Gross per 24 hour   Intake 120 ml   Output 160 ml   Net -40 ml       ECHOCARDIOGRAM Reviewed: Yes   Patient's Ejection Fraction (EF) is less than or equal to 40%. Discuss HFrEF Guideline Directed Medical Therapy (GDMT) with Cardiologist or Hospitalist:          Medications Reviewed: Yes   SCHEDULED HOSPITAL MEDICATIONS:   metoprolol succinate  12.5

## 2024-04-08 NOTE — PROGRESS NOTES
CM-SR, VSS, pt remains afebrile, UO WNL. Pt is alert & oriented. She was up in the bedside chair X1 hr. Pt is tolerating PO well. She is w/out evidence of distress @ this time.

## 2024-04-08 NOTE — CARE COORDINATION
[facility] or [facility] can not take patient - not in network  CM spoke to Stanford (spouse) who is interested in [facility]. CM called referral into [email or web address] and waiting to hear back  [facility] is out of network.  [facility] is next choice. CM gave info to Rory to review.

## 2024-04-08 NOTE — PROGRESS NOTES
Inpatient Physical Therapy Treatment    Unit: ICU  Date:  4/8/2024  Patient Name:    Amber Fuentes  Admitting diagnosis:  Hyperkalemia [E87.5]  Anasarca [R60.1]  Bradycardia [R00.1]  Pleural effusion [J90]  Other ascites [R18.8]  Acute renal failure, unspecified acute renal failure type (HCC) [N17.9]  Pneumonia of right lower lobe due to infectious organism [J18.9]  Admit Date:  4/1/2024  Precautions/Restrictions/WB Status/ Lines/ Wounds/ Oxygen: Fall risk, Bed/chair alarm, Lines (Supplemental O2 (2L), internal catheter, and IJ right), Telemetry, Continuous pulse oximetry, and Isolation Precautions: Environmental    Pt seen for cotreatment this date due to patient safety and patient endurance    Treatment Time: 8992-7453  Treatment Number:  2   Timed Code Treatment Minutes: 41 minutes  Total Treatment Minutes:  41  minutes    Patient Stated Goals for Therapy: \" let's get up and walk \"          Discharge Recommendations: SNF  DME needs for discharge: Defer to facility       Therapy recommendation for EMS Transport: requires transport by cot due to pt with poor sitting balance/tolerance    Therapy recommendations for staff:   Assist of 2 for sitting EOB    History of Present Illness:   78 y.o. female with pmh of coronary disease, CKD, CHF, COPD, hypertension, hyperlipidemia, diabetes mellitus, distant history of CABG who presents with diarrhea and nausea.  +acute on chronic CHF with EF 20%, renal failure   Was on CRRT, taken off on 4/4.    Home Health S4 Level Recommendation:  NA        AM-PAC Mobility Score    AM-PAC Inpatient Mobility Raw Score : 13         Subjective  Patient lying reclined in bed with no family present.  Pt agreeable to this PT session.     Cognition    A&O x4   Able to follow 2 step commands    Pain   No  Location: NA  Rating: NA /10  Pain Medicine Status: No request made    Preadmission Environment:   Pt. Lives                                              Spouse  Home environment:                  self back down without warning   Rolling:   Mod A  - via HHA   Scooting in sitting: SBA   Scooting in supine:  Not Tested   Bridging:  Not Tested    Transfer Training     Sit to stand:   CGA   Stand to sit:   CGA   Bed to/from Chair:  Min A  with use of gait belt and rolling walker (RW)    Gait gait deferred due to safety concern; pt ambulated 0 ft.     Stair Training deferred, pt unsafe/ not appropriate to complete stairs at this time    Therapeutic Exercises Initiated  all completed bilaterally unless indicated  Supine:  N/A    Seated:  Ankle pumps: 15 reps  LAQ: 15 reps  Abduction/adduction: 15 reps    Standing:  N/A    Activity Tolerance   During therapy session noted pt with dizziness/lightheadedness upon sitting up to EOB. Vitals stable.     Pt Position BP (mmHg) HR (bpm) SpO2 (2L O2)   Comments   Supine at rest 128/51 72 95    Seated in chair  140/80 77     Standing       End of session 153/73        Positioning Needs   Pt in bed, ICU monitoring, positioned in proper neutral alignment and pressure relief provided.   Call light provided and all needs within reach  RN aware of pt position/status    Other Activities  None.    Patient/Family Education   Pt educated on role of inpatient PT, POC, importance of continued activity, calling for assist with mobility.    Assessment  Pt seen today for physical therapy Treatment, tolerated fairly well compared to last session. However, pt continues to impulsively lay back down due to dizziness, but able to transfer to chair with min A using RW. Pt limited by SOB and dizziness during session, but vitals remain stable. Will continue to progress ambulation as tolerated.       Recommending SNF upon discharge as patient functioning well below baseline, demonstrates good rehab potential and unable to return home due to burden of care beyond caregiver ability and home environment not conducive to patient recovery.    Goals :   To be met in 3 visits:  1). Independent with LE Ex x

## 2024-04-08 NOTE — PROGRESS NOTES
Nephrology Progress Note   [employer]      Sub/interval history  Patient is tired looking, response to questions  Appetite poor  Vitals and labs stable      ROS: No fever   PSFH: No visitor    Scheduled Meds:   metoprolol succinate  12.5 mg Oral Daily    sodium chloride flush  5-40 mL IntraVENous 2 times per day    [Held by provider] heparin (porcine)  5,000 Units SubCUTAneous 3 times per day    allopurinol  100 mg Oral Daily    aspirin  81 mg Oral Daily    atorvastatin  40 mg Oral Nightly    insulin lispro  0-8 Units SubCUTAneous TID WC    insulin lispro  0-4 Units SubCUTAneous Nightly     Continuous Infusions:   sodium chloride 5 mL/hr at 04/04/24 0144    dextrose       PRN Meds:.heparin (porcine), calcium carbonate, melatonin, sodium chloride flush, sodium chloride, ondansetron **OR** ondansetron, polyethylene glycol, acetaminophen **OR** acetaminophen, glucose, dextrose bolus **OR** dextrose bolus, glucagon (rDNA), dextrose    Objective/     Vitals:    04/08/24 0700 04/08/24 0800 04/08/24 0900 04/08/24 1000   BP: (!) 133/54 (!) 137/47 (!) 124/47 (!) 130/41   Pulse: 75 76 71 68   Resp: 19 17 16 19   Temp: 98 °F (36.7 °C)      TempSrc: Bladder      SpO2: 99% 100% 100% 100%   Weight:       Height:         24HR INTAKE/OUTPUT:    Intake/Output Summary (Last 24 hours) at 4/8/2024 1057  Last data filed at 4/8/2024 0600  Gross per 24 hour   Intake --   Output 143 ml   Net -143 ml     Gen: Ill-appearing  Neck: No JVD  Skin: Unremarkable  Cardiovascular:  S1, S2 without m/r/g   Respiratory: CTA B without w/r/r; respiratory effort normal  Abdomen:  soft, nt, nd,   Extremities: Trace lower extremity edema  Neuro/Psy: Lethargic    Data/  Recent Labs     04/06/24  0505 04/06/24  1539 04/07/24  0426 04/08/24  0537   WBC 5.7  --  4.5 5.1   HGB 6.8* 8.0* 8.0* 8.1*   HCT 22.2* 26.5* 25.7* 25.7*   MCV 92.5  --  90.7 90.7   PLT 78*  --  43* 58*     Recent Labs     04/06/24  0505 04/07/24  0426 04/08/24  0537    134*

## 2024-04-08 NOTE — PROGRESS NOTES
Pt c/o nausea. PRN zofran given, see MAR. Requested to be turned on her L side. Completed per her request. Call light and bedside table within reach. Will continue to monitor.

## 2024-04-08 NOTE — PLAN OF CARE
Problem: Safety - Adult  Goal: Free from fall injury  Outcome: Progressing     Problem: Chronic Conditions and Co-morbidities  Goal: Patient's chronic conditions and co-morbidity symptoms are monitored and maintained or improved  Outcome: Progressing  Flowsheets (Taken 4/7/2024 2029)  Care Plan - Patient's Chronic Conditions and Co-Morbidity Symptoms are Monitored and Maintained or Improved: Monitor and assess patient's chronic conditions and comorbid symptoms for stability, deterioration, or improvement     Problem: Respiratory - Adult  Goal: Achieves optimal ventilation and oxygenation  Outcome: Progressing     Problem: Pain  Goal: Verbalizes/displays adequate comfort level or baseline comfort level  Outcome: Progressing

## 2024-04-08 NOTE — DISCHARGE INSTR - COC
Continuity of Care Form    Patient Name: Amber Fuentes   :  1945  MRN:  [id number]    Admit date:  2024  Discharge date:  ***    Code Status Order: Full Code   Advance Directives:     Admitting Physician:  Cooper Swanson MD  PCP: Lucila Tejeda MD    Discharging Nurse: ***  Discharging Hospital Unit/Room#: 3004/3004-01  Discharging Unit Phone Number: ***    Emergency Contact:   Extended Emergency Contact Information  Primary Emergency Contact: Stanford Fuentes  Address: [address] [address] [facility]  Home Phone: [phone]  Mobile Phone: [phone]  Relation: Spouse   needed? No  Secondary Emergency Contact: Sandra De Leon  Home Phone: [phone]  Relation: Child    Past Surgical History:  Past Surgical History:   Procedure Laterality Date    APPENDECTOMY      CARDIAC SURGERY       SECTION      CHOLECYSTECTOMY      CORONARY ARTERY BYPASS GRAFT N/A 2020    CORONARY ARTERY BYPASS GRAFTING X3, INTERNAL MAMMARY ARTERY, SAPHENOUS VEIN GRAFTING, ON PUMP MITRAL VALVE RING REPAIR USING 26MM PELAEZ PHYSIO II RING, WITH LEFT ATRIAL APPENDAGE CLIP, PFO CLOSURETEE, 5 LEVEL BILATERAL INTERCOSTAL NERVE BLOCK performed by Olivia Hoffmann MD at [facility]    HYSTERECTOMY (CERVIX STATUS UNKNOWN)      total ab hyst    IR MIDLINE CATH  2024    IR MIDLINE CATH 2024 [facility]    IR NONTUNNELED VASCULAR CATHETER  2024    IR NONTUNNELED VASCULAR CATHETER 2024 [facility] SPECIAL [facility]EDURES    PARATHYROID GLAND SURGERY      explore parathyroid glands    TOTAL KNEE ARTHROPLASTY  2010    left knee       Immunization History:   Immunization History   Administered Date(s) Administered    COVID-19, MODERNA BLUE border, Primary or Immunocompromised, (age 12y+), IM, 100 mcg/0.5mL 2021, 2021    COVID-19, MODERNA Booster BLUE border, (age 18y+), IM, 50mcg/0.25mL 2021

## 2024-04-08 NOTE — PROGRESS NOTES
4 Eyes Skin Assessment     NAME:  Amber Fuentes  YOB: 1945  MEDICAL RECORD NUMBER:  [id number]    The patient is being assessed for  Other shift     I agree that at least one RN has performed a thorough Head to Toe Skin Assessment on the patient. ALL assessment sites listed below have been assessed.      Areas assessed by both nurses:    Head, Face, Ears, Shoulders, Back, Chest, Arms, Elbows, Hands, Sacrum. Buttock, Coccyx, Ischium, Legs. Feet and Heels, and Under Medical Devices     No noted wounds. Pt has rash like on abdomen and back from bed bug bites.       Does the Patient have a Wound? No noted wound(s)       Alek Prevention initiated by RN: No  Wound Care Orders initiated by RN: No    Pressure Injury (Stage 3,4, Unstageable, DTI, NWPT, and Complex wounds) if present, place Wound referral order by RN under : No    New Ostomies, if present place, Ostomy referral order under : No     Patient is able to demonstrate the ability to move from a reclining position to an upright position within the recliner.    Nurse 1 eSignature: Electronically signed by Yeimi Miller RN on 4/8/24 at 2:33 AM EDT    **SHARE this note so that the co-signing nurse can place an eSignature**    Nurse 2 eSignature: {Esignature:[id number]}

## 2024-04-08 NOTE — PROGRESS NOTES
Blood pressure (!) 132/49, pulse 71, temperature 99 °F (37.2 °C), temperature source Bladder, resp. rate 19, height 1.524 m (5'), weight 68.4 kg (150 lb 12.7 oz), SpO2 100 %, not currently breastfeeding.    Patient complaining of nausea. PRN zofran given at this time.   Shift assessment complete. See doc flow. Nightly medications given see MAR. Call light and bedside table within easy reach.     Electronically signed by Yeimi Miller RN on 4/7/2024 at 8:53 PM

## 2024-04-08 NOTE — PROGRESS NOTES
Pulmonary & Critical Care Medicine ICU Progress Note    CC: CHF, hyperkalemia    Events of Last 24 hours:   No new c/o     Invasive Lines: 4/2/24 Vas cath, midline      No results for input(s): \"PHART\", \"KGN7IDN\", \"PO2ART\" in the last 72 hours.    IV:   sodium chloride 5 mL/hr at 04/04/24 0144    dextrose         Vitals:  BP (!) 143/52   Pulse 74   Temp 98.2 °F (36.8 °C) (Bladder)   Resp 22   Ht 1.524 m (5')   Wt 74.8 kg (164 lb 14.5 oz)   SpO2 100%   BMI 32.21 kg/m²   on RA  General:  ill appearing    Resp: No crackles. No wheezing.   CV: S1, S2. + edema  GI: NT, ND, +BS  Skin: Warm and dry.    Neuro: PERRL. Alert and oriented to person and 2024    Scheduled Meds:   metoprolol succinate  12.5 mg Oral Daily    sodium chloride flush  5-40 mL IntraVENous 2 times per day    heparin (porcine)  5,000 Units SubCUTAneous 3 times per day    allopurinol  100 mg Oral Daily    aspirin  81 mg Oral Daily    atorvastatin  40 mg Oral Nightly    insulin lispro  0-8 Units SubCUTAneous TID WC    insulin lispro  0-4 Units SubCUTAneous Nightly     PRN Meds:  heparin (porcine), calcium carbonate, melatonin, sodium chloride flush, sodium chloride, ondansetron **OR** ondansetron, polyethylene glycol, acetaminophen **OR** acetaminophen, glucose, dextrose bolus **OR** dextrose bolus, glucagon (rDNA), dextrose    Results:  CBC:   Recent Labs     04/06/24  0505 04/06/24  1539 04/07/24  0426 04/08/24  0537   WBC 5.7  --  4.5 5.1   HGB 6.8* 8.0* 8.0* 8.1*   HCT 22.2* 26.5* 25.7* 25.7*   MCV 92.5  --  90.7 90.7   PLT 78*  --  43* 58*     BMP:   Recent Labs     04/06/24  0505 04/07/24  0426 04/08/24  0537    134* 133*   K 4.7 4.3 4.6    101 100   CO2 29 28 28   BUN 16 13 26*   CREATININE 1.7* 1.6* 2.3*     LIVER PROFILE:   Recent Labs     04/05/24  0920   AST 16   ALT 12   BILITOT 0.7   ALKPHOS 176*       Cultures:  4/1/2024 SARS-CoV-2 and influenza are negative  4/1/2024 blood NG  4/1/2024 urine NG       CT ABD 4/1/2024 1.

## 2024-04-08 NOTE — PROGRESS NOTES
Comprehensive Nutrition Assessment    Type and Reason for Visit:  Initial (LOS x 7 days)    Nutrition Recommendations/Plan:   Continue ADULT DIET; Regular; 4 carb choices per meal; Low Potassium diet order.   Added Nepro ONS with breakfast and dinner meals.   Monitor appetite, meal intake, and acceptance/intake of ONS.   Monitor nutrition-related labs, bowel function, and weight trends.      Malnutrition Assessment:  Malnutrition Status:  At risk for malnutrition (04/08/24 1139)    Context:  Acute Illness     Findings of the 6 clinical characteristics of malnutrition:  Energy Intake:  50% or less of estimated energy requirements for 5 or more days  Weight Loss:  No significant weight loss     Body Fat Loss:  Unable to assess     Muscle Mass Loss:  Unable to assess    Fluid Accumulation:  Moderate to Severe Extremities, Generalized (BUE/generalized + 3 pitting edema)   Strength:  Not Performed    Nutrition Assessment:    patient is nutritionally compromised AEB she presented with c/o nausea, fatigue, poor po intake, and abdominal discomfort and she is at risk for further compromise d/t poor appetite and po intake x 7 days admission, renal dysfunction with HD, altered nutrition-related labs, and cardiac dysfunction; will continue ADULT DIET; Regular; 4 carb choices; Low Potassium diet order and will Nepro with breakfast and dinner meals    Nutrition Related Findings:    patient is A & O x 4; patient presented with c/o nausea, fatigue, poor po intake, and abdominal discomfort; patient has had decreased po intake during this admission (based on limited po intake in flow sheets); BUE/generalized + 3 pitting edema noted; on med-dose SSI; + BM on 4/1/24: + HD schedule on TThSat Wound Type: None       Current Nutrition Intake & Therapies:    Average Meal Intake: 1-25%, 26-50%, % (one meal was documented as % consumed)  Average Supplements Intake: None Ordered  ADULT DIET; Regular; 4 carb choices (60

## 2024-04-09 PROBLEM — I25.83 CORONARY ARTERY DISEASE DUE TO LIPID RICH PLAQUE: Status: ACTIVE | Noted: 2020-01-06

## 2024-04-09 LAB
ALBUMIN SERPL-MCNC: 2.7 G/DL (ref 3.4–5)
ANION GAP SERPL CALCULATED.3IONS-SCNC: 3 MMOL/L (ref 3–16)
BUN SERPL-MCNC: 35 MG/DL (ref 7–20)
CALCIUM SERPL-MCNC: 8 MG/DL (ref 8.3–10.6)
CHLORIDE SERPL-SCNC: 100 MMOL/L (ref 99–110)
CO2 SERPL-SCNC: 31 MMOL/L (ref 21–32)
CREAT SERPL-MCNC: 2.5 MG/DL (ref 0.6–1.2)
DEPRECATED RDW RBC AUTO: 17.5 % (ref 12.4–15.4)
GFR SERPLBLD CREATININE-BSD FMLA CKD-EPI: 19 ML/MIN/{1.73_M2}
GLUCOSE BLD-MCNC: 112 MG/DL (ref 70–99)
GLUCOSE BLD-MCNC: 116 MG/DL (ref 70–99)
GLUCOSE BLD-MCNC: 117 MG/DL (ref 70–99)
GLUCOSE SERPL-MCNC: 132 MG/DL (ref 70–99)
HCT VFR BLD AUTO: 25.8 % (ref 36–48)
HGB BLD-MCNC: 7.9 G/DL (ref 12–16)
MCH RBC QN AUTO: 28 PG (ref 26–34)
MCHC RBC AUTO-ENTMCNC: 30.5 G/DL (ref 31–36)
MCV RBC AUTO: 91.6 FL (ref 80–100)
PERFORMED ON: ABNORMAL
PHOSPHATE SERPL-MCNC: 3.9 MG/DL (ref 2.5–4.9)
PLATELET # BLD AUTO: 75 K/UL (ref 135–450)
PMV BLD AUTO: 8.1 FL (ref 5–10.5)
POTASSIUM SERPL-SCNC: 4.6 MMOL/L (ref 3.5–5.1)
RBC # BLD AUTO: 2.81 M/UL (ref 4–5.2)
SODIUM SERPL-SCNC: 134 MMOL/L (ref 136–145)
WBC # BLD AUTO: 5.3 K/UL (ref 4–11)

## 2024-04-09 PROCEDURE — 99232 SBSQ HOSP IP/OBS MODERATE 35: CPT | Performed by: INTERNAL MEDICINE

## 2024-04-09 PROCEDURE — 6360000002 HC RX W HCPCS: Performed by: INTERNAL MEDICINE

## 2024-04-09 PROCEDURE — 6370000000 HC RX 637 (ALT 250 FOR IP): Performed by: INTERNAL MEDICINE

## 2024-04-09 PROCEDURE — 80069 RENAL FUNCTION PANEL: CPT

## 2024-04-09 PROCEDURE — 2060000000 HC ICU INTERMEDIATE R&B

## 2024-04-09 PROCEDURE — 2700000000 HC OXYGEN THERAPY PER DAY

## 2024-04-09 PROCEDURE — 2580000003 HC RX 258: Performed by: INTERNAL MEDICINE

## 2024-04-09 PROCEDURE — 85027 COMPLETE CBC AUTOMATED: CPT

## 2024-04-09 PROCEDURE — 90935 HEMODIALYSIS ONE EVALUATION: CPT

## 2024-04-09 PROCEDURE — 94761 N-INVAS EAR/PLS OXIMETRY MLT: CPT

## 2024-04-09 RX ADMIN — ASPIRIN 81 MG: 81 TABLET, COATED ORAL at 09:01

## 2024-04-09 RX ADMIN — SODIUM CHLORIDE, PRESERVATIVE FREE 10 ML: 5 INJECTION INTRAVENOUS at 21:16

## 2024-04-09 RX ADMIN — ACETAMINOPHEN 650 MG: 325 TABLET ORAL at 21:32

## 2024-04-09 RX ADMIN — DESMOPRESSIN ACETATE 40 MG: 0.2 TABLET ORAL at 21:15

## 2024-04-09 RX ADMIN — EPOETIN ALFA-EPBX 10000 UNITS: 10000 INJECTION, SOLUTION INTRAVENOUS; SUBCUTANEOUS at 11:27

## 2024-04-09 RX ADMIN — METOPROLOL SUCCINATE 12.5 MG: 25 TABLET, EXTENDED RELEASE ORAL at 09:01

## 2024-04-09 RX ADMIN — ALLOPURINOL 100 MG: 100 TABLET ORAL at 09:02

## 2024-04-09 RX ADMIN — ONDANSETRON 4 MG: 2 INJECTION INTRAMUSCULAR; INTRAVENOUS at 13:01

## 2024-04-09 RX ADMIN — SODIUM CHLORIDE, PRESERVATIVE FREE 10 ML: 5 INJECTION INTRAVENOUS at 09:05

## 2024-04-09 ASSESSMENT — PAIN DESCRIPTION - ORIENTATION: ORIENTATION: RIGHT

## 2024-04-09 ASSESSMENT — PAIN SCALES - GENERAL: PAINLEVEL_OUTOF10: 5

## 2024-04-09 ASSESSMENT — PAIN DESCRIPTION - LOCATION: LOCATION: FOOT

## 2024-04-09 ASSESSMENT — PAIN DESCRIPTION - DESCRIPTORS: DESCRIPTORS: ACHING

## 2024-04-09 NOTE — PROGRESS NOTES
Bedside report and transfer of care given to POORNIMA Plummer. Pt currently resting in bed with the call light within reach. Pt denies any other care needs at this time. Pt stable at this time.

## 2024-04-09 NOTE — PROGRESS NOTES
Reassessment completed.    Pt complains of pain to BLE. Pt denies current Chest pain. Or nausea.     Family at bedside and updated on POC>

## 2024-04-09 NOTE — FLOWSHEET NOTE
04/09/24 1059 04/09/24 1214   Vital Signs   /62 (!) 142/70   Temp 97.8 °F (36.6 °C) 97 °F (36.1 °C)   Pulse 78 88   Respirations 20 16       Treatment time:  3 hours ordered, see summary below  Net UF: 162 ml    Pre weight: 71.1 kg (standing scale)  Post weight: 70.9 kg (calculated from standing pre wt, too unstable to stand)  EDW: TBD/LANCE    Access used: RIJ NT cath  Access function: No problems    Medications or blood products given: Retacrit 69611 units IVP.    Regular outpatient schedule: TBD    Summary of response to treatment: 3 hour HD tx ordered. C/o chest pressure 1 hour into tx, Qb decreased to 300, UF decreased to 1 L.  Sx continued, UF was turned off and 100 ml NS bolus given, still symptoms continued.   Unable to complete tx due to feeling heavy pressure in center of chest and feeling like throwing up.  Tx stopped per Dr. Gavin order.  Actual tx time 1:09, RTD 1:51.      Copy of dialysis treatment record placed in chart, to be scanned into EMR.    Report called to Vianey Kate RN

## 2024-04-09 NOTE — ADT AUTH CERT
cath and a midline IV        No results for input(s): \"PHART\", \"KQQ8ZTB\", \"PO2ART\" in the last 72 hours.     IV:  Infusions Meds    sodium chloride      sodium chloride 5 mL/hr at 04/04/24 0144    dextrose              Vitals:  /84   Pulse 70   Temp 98 °F (36.7 °C) (Bladder)   Resp 20   Ht 1.524 m (5')   Wt 68.4 kg (150 lb 12.7 oz)   SpO2 94%   BMI 29.45 kg/m²   on RA  EXAM:  Constitutional: ill appearing. Not in distress.  Eyes: PERRL. No sclera icterus.   ENT: Normal Nose. Normal Tongue.   Neck:  Trachea is midline.   Respiratory: No accessory muscle usage. No wheezes. No rales. No Rhonchi.  Cardiovascular: Normal S1S2. No murmur. No digit cyanosis. No digit clubbing. No LE edema.   GI: Non-tender. Non-distended. Normal bowel sounds. No masses.   Skin: No rash on the exposed extremities.   Neuro: Alert. Oriented. Follows commands.  Moves all extremities.  Psych: No agitation, no anxiety.     Scheduled Meds:  Scheduled Medications    metoprolol succinate  12.5 mg Oral Daily    sodium chloride flush  5-40 mL IntraVENous 2 times per day    heparin (porcine)  5,000 Units SubCUTAneous 3 times per day    allopurinol  100 mg Oral Daily    aspirin  81 mg Oral Daily    atorvastatin  40 mg Oral Nightly    insulin lispro  0-8 Units SubCUTAneous TID WC    insulin lispro  0-4 Units SubCUTAneous Nightly         PRN Meds:  PRN Medications   sodium chloride, heparin (porcine), calcium carbonate, melatonin, sodium chloride flush, sodium chloride, ondansetron **OR** ondansetron, polyethylene glycol, acetaminophen **OR** acetaminophen, glucose, dextrose bolus **OR** dextrose bolus, glucagon (rDNA), dextrose        Results:  CBC:          Recent Labs     04/05/24  0600 04/06/24  0505 04/06/24  1539 04/07/24  0426   WBC 5.4 5.7  --  4.5   HGB 7.0* 6.8* 8.0* 8.0*   HCT 23.1* 22.2* 26.5* 25.7*   MCV 90.7 92.5  --  90.7   PLT 65* 78*  --  43*         BMP:             Recent Labs     04/04/24  0923 04/04/24  1724    DISPOSITION Admitted 04/01/2024 11:18:55 PM        PATIENT REFERRED TO:  No follow-up provider specified.     DISCHARGE MEDICATIONS:  Current Discharge Medication List             DISCONTINUED MEDICATIONS:  Current Discharge Medication List                   (Please note that portions of this note were completed with a voice recognition program.  Efforts were made to edit the dictations but occasionally words are mis-transcribed.)     Elyssa Gutierrez PA-C (electronically signed)       Elyssa Gutierrez PA-C  04/01/24 2238        Elyssa Gutierrez PA-C  04/02/24 0004        Elyssa Gutierrez PA-C  04/02/24 0046         Original note by Elyssa Gutierrez PA-C at 4/2/2024 00:04  Cosigned by Radha Hastings MD at 4/2/2024 13:28     Elyssa Gutierrez PA-C   4/2/2024 00:04

## 2024-04-09 NOTE — ADT AUTH CERT
Medication Administration Report  for Amber Fuentes as of 03/31/24 through 04/09/24  Legend:       Medications 03/31 04/01 04/02 04/03 04/04 04/05 04/06 04/07 04/08 04/09   0.9 % sodium chloride infusion  Freq: PRN Route: IV  PRN Comment: if patient receiving piggyback infusions and maintenance fluids are not ordered OR KVO fluids to protect IV site / prevent frequent line interruptions/ long duration  Start: 04/02/24 0019   Admin Instructions:   For piggyback infusion, administer at same rate as piggyback for a total of 25 mL. Enter 25 mL into dose field and piggyback rate into rate field of order.  If piggyback is infusing at a rate less than 100 mL/hr, enter 25 mL into dose field and 100 mL/hr into rate field of order.  For KVO fluids, enter rate of 20 mL/hr or less into rate field of order.      [id number]     88868     23605     88384      42538     74417     99560     47693     14143     177698     638620     535364     799384     410696     930906     199533     245453      337309              acetaminophen (TYLENOL) tablet 650 mg  Dose: 650 mg  Freq: EVERY 6 HOURS PRN Route: PO  PRN Reasons: Pain Mild (1-3),Fever  PRN Comment: For temp greater than 100.4 F (38 C)  Start: 04/02/24 0019   Admin Instructions:   Maximum dose of acetaminophen is 4000 mg from all sources in 24 hours.       [id number]        [id number]           Or  acetaminophen (TYLENOL) suppository 650 mg  Dose: 650 mg  Freq: EVERY 6 HOURS PRN Route: RE  PRN Reasons: Pain Mild (1-3),Fever  PRN Comment: For temp greater than 100.4 F (38 C)  Start: 04/02/24 0019   Admin Instructions:   Administer if oral route cannot be used.       21        22           allopurinol (ZYLOPRIM) tablet 100 mg  Dose: 100 mg  Freq: DAILY Route: PO  Start: 04/02/24 1045      [id number]      [id number]      288525      560247      192312      395952      645809      728552        aspirin EC tablet 81 mg  Dose: 81 mg  Freq: DAILY Route: PO  Start: 04/02/24 1045   Admin Instructions:   Do not  Other  PRN Comment: Magnesium replacement protocol  Start: 04/02/24 0919 End: 04/05/24 1438   Admin Instructions:   Mg Lab               Replacement Action  1.4-1.6 mg/dL    1 gram IVPB x 2 doses  (2 gram Total)  1.0-1.3 mg/dL   1 gram IVPB x 3 doses  (3 gram Total)  <1.0 mg/dL        CALL PHYSICIAN and1 gram IVPB x 4 doses  (4 gram Total)    Infuse at 1 gram/hr  Central Line administration preferred  Ensure Mag level checked every 6 hours  For use while patient on CRRT                norepinephrine (LEVOPHED) 16 mg in sodium chloride 0.9 % 250 mL infusion  Rate: 0.9-93.8 mL/hr Dose: 1-100 mcg/min  Freq: CONTINUOUS Route: IV  Start: 04/02/24 1645 End: 04/05/24 0747   Admin Instructions:   Vascular access recommendations:  - Step 1: Is the concentration GREATER than 16mg/250mL?  >Yes - REQUIRES  A central line  >No - Proceed to step 2  -Step 2: Concentrations LESS THAN OR EQUAL TO 16mg/250mL, central line preferred, may administer through a peripheral intraVENous catheter for emergent situations (in anticipation of central line placement) or for a short duration (less than 48 hours) if placed in a large vein, at a proximal site. If available, refer to site specific guidance for additional administration requirements/instructions.    If Titrate Infusion? is \"No\": Disregard instructions below.    If Titrate infusion? is \"Yes\":  If rate LESS than 10 mcg/min: Titrate by 2 mcg/min no faster than every 5 minutes to goal. If rate GREATER than or equal to 10 mcg/min: Titrate by 5 mcg/min no faster than every 5 minutes to goal.  When approaching therapeutic goal or weaning off, smaller titration increments of 1 mcg/min no faster than every 5 minutes may be used to maintain goal.    If patient is not at goal for >15 minutes, titrate infusion by 10 mcg/min every 2 minutes for a max of 10 minutes and contact the provider for further instructions/orders.    Restarting the infusion:  If the infusion has been stopped for less than

## 2024-04-09 NOTE — PLAN OF CARE
Problem: Discharge Planning  Goal: Discharge to home or other facility with appropriate resources  Outcome: Progressing     Problem: Safety - Adult  Goal: Free from fall injury  Outcome: Progressing     Problem: Chronic Conditions and Co-morbidities  Goal: Patient's chronic conditions and co-morbidity symptoms are monitored and maintained or improved  4/8/2024 2155 by Nidhi Dunne RN  Outcome: Progressing  4/8/2024 1427 by Skinny Carbajal RN  Outcome: Progressing  Flowsheets (Taken 4/8/2024 0445 by Yeimi Miller, RN)  Care Plan - Patient's Chronic Conditions and Co-Morbidity Symptoms are Monitored and Maintained or Improved: Monitor and assess patient's chronic conditions and comorbid symptoms for stability, deterioration, or improvement     Problem: Respiratory - Adult  Goal: Achieves optimal ventilation and oxygenation  4/8/2024 2155 by Nidih Dunne RN  Outcome: Progressing  4/8/2024 1427 by Skinny Carbajal RN  Outcome: Progressing     Problem: Cardiovascular - Adult  Goal: Maintains optimal cardiac output and hemodynamic stability  Outcome: Progressing  Goal: Absence of cardiac dysrhythmias or at baseline  Outcome: Progressing     Problem: Pain  Goal: Verbalizes/displays adequate comfort level or baseline comfort level  Outcome: Progressing     Problem: Nutrition Deficit:  Goal: Optimize nutritional status  Outcome: Progressing

## 2024-04-09 NOTE — PROGRESS NOTES
IM Progress Note    Admit Date:  4/1/2024  8    Interval history:    chronic systolic CHF with very low EF at 20 % presenting with acute CHF and renal failure with refractory hyperkalemia    Initiated on CRRT -> transitioned to HD now   Drop in h.h-> PRBC transfused        Subjective:  Ms. Fuentes seen. No pain. No shortness of breath. She is feeling better today     Objective:   BP (!) 117/57   Pulse 78   Temp 97.8 °F (36.6 °C) (Oral)   Resp 16   Ht 1.524 m (5')   Wt 74 kg (163 lb 2.3 oz)   SpO2 99%   BMI 31.86 kg/m²     Intake/Output Summary (Last 24 hours) at 4/9/2024 0826  Last data filed at 4/8/2024 1232  Gross per 24 hour   Intake 222 ml   Output 185 ml   Net 37 ml         Physical Exam:    General:  elderly female.   Awake, alert and fairly oriented. Appears to be not in any distress  Mucous Membranes:  Pink , anicteric  Neck: no JVD, no carotid bruit, no thyromegaly  Chest:  Clear to auscultation; no wheezes, rales or rhonchi   Cardiovascular:  RRR S1S2 heard, no murmurs or gallops  Abdomen:  Soft, undistended, non tender, no organomegaly, BS present  Extremities- improving  upper and lower extremity edema- down to 2 +  Right sided temp HD line   Neurological : grossly normal with gen weakness      Medications:   Scheduled Medications:    epoetin reed-epbx  10,000 Units IntraVENous Once per day on Tue Thu Sat    metoprolol succinate  12.5 mg Oral Daily    sodium chloride flush  5-40 mL IntraVENous 2 times per day    [Held by provider] heparin (porcine)  5,000 Units SubCUTAneous 3 times per day    allopurinol  100 mg Oral Daily    aspirin  81 mg Oral Daily    atorvastatin  40 mg Oral Nightly    insulin lispro  0-8 Units SubCUTAneous TID WC    insulin lispro  0-4 Units SubCUTAneous Nightly     I   sodium chloride 5 mL/hr at 04/04/24 0144    dextrose       heparin (porcine), calcium carbonate, melatonin, sodium chloride flush, sodium chloride, ondansetron **OR** ondansetron, polyethylene glycol,  is severe global with regional hypokinesis. The inferolateral and   inferior walls appear severely hypokinetic.   Average GLS is reduced at - 11.1%.   Diastolic function not assessed due to mitral valve annuloplasty ring .   Right ventricle appears mildly enlarged with reduced function.   The right atrium is mildly dilated.   An Edward Physio II annuloplasty ring is seen in the mitral position.   Mild mitral stenosis.   Mild to moderate mitral regurgitation.   Mild aortic regurgitation.   Moderate tricuspid regurgitation.   Systolic pulmonary artery pressure (SPAP) estimated at 56 mmHg (RA pressure   8 mmHg).   Mild-moderate pulmonic regurgitation present.      Compared with the prior study performed 1/23/24 (Limited Echo with 25% EF severe global hypokinesis inferior akinesis. RV reduced function), LVEF   appears slightly improved.      Assessment & Plan:      #Acute on chronic systolic CHF  #Ischemic cardiomyopathy   -pro-BNP >23k  -CXR shows cardiomegaly with pulmonary vascular congestion  Also has massive peripheral edema and refractory hyperkalemia  -limited echo 1/24 with similar EF of 25 % and repeat ECHo with slight improvement to 30 %, with severe global hypokinesis   -nephrology consulted   - initiated on CRRT for high K and had fluid removal. Transitioned to HD with continued fluid removal    - Resumed Toprol ; holding Entresto,SGLT, aldactone 2 to LANCE     #LANCE on CKD4  #Hyperkalemia and metabolic acidosis  - possible cardiorenal  - holding entresto, k levels did not improve with medical mx  - given bicarb fluids   - nephrology consulted   - initiated on CRRT with fluid removal done for 72 hrs, now  switched to HD. Next HD due today   Will Likely need outpt HD set up    #Elevated troponin  #CAD s/p previous CABG   - troponin 61-->59  - EKG with non-specific T wave changes   - last stress test with infarct only   - patient denies chest pain   - continue ASA, statin, resumed low dose  BB     #T2DM  -stopped

## 2024-04-09 NOTE — FLOWSHEET NOTE
04/08/24 2003   Vital Signs   Temp 98 °F (36.7 °C)   Temp Source Oral   Pulse 73   Heart Rate Source Monitor   Respirations 16   /77   MAP (Calculated) 90   BP Method Automatic   Patient Position Semi fowlers   Oxygen Therapy   SpO2 96 %   O2 Device Nasal cannula   O2 Flow Rate (L/min) 2 L/min     PM Assessment completed. Scheduled medications given per MAR, On 2L of oxygen, A&O X4, Vital Signs completed and Charted, Patient denies any further needs at this time. Call light within reach, Reminded patient to call RN if she needs anything.

## 2024-04-09 NOTE — ADT AUTH CERT
Administration Detail   Ref # Action Time Dose Rate Route Site Duration Comments / Reason User   1 New Bag 04/02/24 1632(s)  04/02/24 1632(a)  04/02/24 1632(r)   5 mL/hr IntraVENous      Littlejohn, Allysen   2 Stopped 04/02/24 1633(s)  04/02/24 1633(a)  04/02/24 1710(r)   0 mL/hr IntraVENous      Littlejohn, Allysen   3 Rate/Dose Verify 04/02/24 1800(s)  04/02/24 1800(a)  04/02/24 1800(r)   5 mL/hr IntraVENous      Littlejohn, Allysen   4 Rate/Dose Verify 04/02/24 2010(s)  04/02/24 2010(a)  04/02/24 2010(r)   5 mL/hr IntraVENous      Bowling, Yeimi   5 Rate/Dose Verify 04/03/24 0800(s)  04/03/24 0800(a)  04/03/24 0839(r)   5 mL/hr IntraVENous      Laboy, Yolie   6 Rate/Dose Verify 04/03/24 0900(s)  04/03/24 0900(a)  04/03/24 0907(r)   5 mL/hr IntraVENous      Laboy, Yolie   7 Stopped 04/03/24 0942(s)  04/03/24 0942(a)  04/03/24 1005(r)   0 mL/hr IntraVENous      Laboy, Yolie   8 Rate/Dose Verify 04/03/24 1100(s)  04/03/24 1100(a)  04/03/24 1107(r)   5 mL/hr IntraVENous      Laboy, Yolie   9 Rate/Dose Verify 04/03/24 1200(s)  04/03/24 1200(a)  04/03/24 1309(r)   5 mL/hr IntraVENous      Laboy, Yolie   10 Rate/Dose Verify 04/03/24 1309(s)  04/03/24 1309(a)  04/03/24 1309(r)   5 mL/hr IntraVENous      Laboy, Yolie   11 Rate/Dose Verify 04/03/24 1400(s)  04/03/24 1400(a)  04/03/24 1519(r)   5 mL/hr IntraVENous      Laboy, Yolie   12 Rate/Dose Verify 04/03/24 1500(s)  04/03/24 1500(a)  04/03/24 1519(r)   5 mL/hr IntraVENous      Laboy, Yolie   13 Stopped 04/03/24 1554(s)  04/03/24 1554(a)  04/03/24 1608(r)   0 mL/hr IntraVENous      Laboy, Yolie   14 Rate/Dose Verify 04/03/24 1700(s)  04/03/24 1700(a)  04/03/24 1711(r)   5 mL/hr IntraVENous      Laboy, Yolie   15 Rate/Dose Verify 04/03/24 1800(s)  04/03/24 1800(a)  04/03/24 1803(r)   5 mL/hr IntraVENous      Laboy, Yolie   16 Rate/Dose Verify 04/03/24 1900(s)  04/03/24 1900(a)  04/03/24 1915(r)   5 mL/hr IntraVENous      Laboy, Yolie   17 Paused 04/03/24 1941(s)  04/03/24   IntraVENous     Heparin in CRRT machine Bowling, Yeimi / Brandyberry, Vane   326 New Bag 04/03/24 0157(s)  04/03/24 0157(a)  04/03/24 0157(r)     IntraVENous      Bowling, Yeimi / Fide Sen   327 New Bag 04/03/24 0533(s)  04/03/24 0533(a)  04/03/24 0535(r)     IntraVENous      Bowling, Yeimi / Brandyberry, Vane   328 Canceled Entry 04/02/24 1645(s)  04/02/24 1637(a)  04/02/24 1637(r)     IntraVENous      Littlejohn, Allysen   329 New Bag 04/02/24 1714(s)  04/02/24 1714(a)  04/02/24 1714(r) 2 mcg/min 1.9 mL/hr IntraVENous     MAP 60, SYS 96 Littlejohn, Allysen   330 Rate/Dose Change 04/02/24 1759(s)  04/02/24 1759(a)  04/02/24 1800(r) 1 mcg/min 0.9 mL/hr IntraVENous      Littlejohn, Allysen   331 Rate/Dose Verify 04/02/24 1800(s)  04/02/24 1800(a)  04/02/24 1800(r) 1 mcg/min 0.9 mL/hr IntraVENous     [Action automatically changed] Littlejohn, Allysen     Rate/Dose Change 04/02/24 1800(a)  04/02/24 1800(r) 1 mcg/min 0.9 mL/hr IntraVENous      Littlejohn, Allysen   332 Rate/Dose Verify 04/02/24 2010(s)  04/02/24 2010(a)  04/02/24 2010(r) 1 mcg/min 0.9 mL/hr IntraVENous      Bowling, Yeimi   333 Stopped 04/03/24 0525(s)  04/03/24 0525(a)  04/03/24 0526(r) 0 mcg/min 0 mL/hr IntraVENous      Bowling, Yeimi   334 New Bag 04/02/24 0945(s)  04/02/24 1054(a)  04/02/24 1056(r)   500 mL/hr Dialysis      Eron, Allysen   335 New Bag 04/02/24 0945(s)  04/02/24 1052(a)  04/02/24 1056(r)   1,500 mL/hr Dialysis      Eron, Allysen   336 New Bag 04/02/24 1456(s)  04/02/24 1456(a)  04/02/24 1456(r)   1,500 mL/hr Dialysis      Littlejohn, Allysen   337 New Bag 04/02/24 1806(s)  04/02/24 1806(a)  04/02/24 1852(r)   1,500 mL/hr Dialysis      Littlejohn, Allysen     Incomplete 04/02/24 1806(a)  04/02/24 1851(r)   1,500 mL/hr Dialysis      Littlejohn, Allysen   338 New Bag 04/02/24 0945(s)  04/02/24 1050(a)  04/02/24 1051(r)   2,000 mL/hr Dialysis      Littlejohn, Allysen   339 New Bag 04/02/24 1406(s)  04/02/24 1406(a)  04/02/24 1406(r)   2,000 mL/hr Dialysis      Eron

## 2024-04-09 NOTE — PLAN OF CARE
HOSPITAL MEDICATIONS:   [START ON 4/9/2024] epoetin reed-epbx  10,000 Units IntraVENous Once per day on Tue Thu Sat    metoprolol succinate  12.5 mg Oral Daily    sodium chloride flush  5-40 mL IntraVENous 2 times per day    [Held by provider] heparin (porcine)  5,000 Units SubCUTAneous 3 times per day    allopurinol  100 mg Oral Daily    aspirin  81 mg Oral Daily    atorvastatin  40 mg Oral Nightly    insulin lispro  0-8 Units SubCUTAneous TID     insulin lispro  0-4 Units SubCUTAneous Nightly     HOME MEDICATIONS:  Prior to Admission medications    Medication Sig Start Date End Date Taking? Authorizing Provider   metoprolol succinate (TOPROL XL) 25 MG extended release tablet TAKE THREE TABLETS BY MOUTH DAILY 2/23/24   Luclia Tejeda MD   sacubitril-valsartan (ENTRESTO) 24-26 MG per tablet Take 0.5 tablets by mouth 2 times daily Hold until follow-up appointment  Patient taking differently: Take 0.5 tablets by mouth 2 times daily Hold until follow-up appointment- Pt states she takes it once a day. 2/11/24   Debora Da Silva DO   allopurinol (ZYLOPRIM) 300 MG tablet Take 0.5 tablets by mouth daily TAKE 1 TABLET BY MOUTH DAILY 1/31/24   Brian Gomez MD   insulin glargine (LANTUS SOLOSTAR) 100 UNIT/ML injection pen Inject 10 Units into the skin nightly  Patient taking differently: Inject 10 Units into the skin 2 times daily 16 in the AM and 10 in the PM. 1/31/24   Brian Gomez MD   isosorbide dinitrate (ISORDIL) 10 MG tablet Take 1 tablet by mouth in the morning and at bedtime 1/31/24   Brian Gomez MD   furosemide (LASIX) 40 MG tablet Take 0.5 tablets by mouth daily  Patient taking differently: Take 0.5 tablets by mouth daily Every other day. 1/31/24   Brian Gomez MD   atorvastatin (LIPITOR) 40 MG tablet TAKE ONE TABLET BY MOUTH DAILY 4/20/23   Saundra Foley, APRN - CNP   diclofenac sodium (VOLTAREN) 1 % GEL Apply 2 g topically in the morning and at bedtime  Patient  taking differently: Apply 2 g topically in the morning and at bedtime Pt states she has never used this. 2/28/23   Dmitriy Trotter, APRN - CNP   aspirin 81 MG EC tablet Take 1 tablet by mouth daily    Provider, MD Blade      Diet Reviewed: Yes   ADULT DIET; Regular; 4 carb choices (60 gm/meal); Low Potassium (Less than 3000 mg/day)  ADULT ORAL NUTRITION SUPPLEMENT; Breakfast, Dinner; Renal Oral Supplement    Goal of Care Reviewed: Yes   Patient and/or Family's stated Goal of Care this Admission: Reduce shortness of breath, increase activity tolerance, better understand heart failure and disease management, be more comfortable, and reduce lower extremity edema prior to discharge.     Electronically signed by Nidhi Dunne RN on 4/8/2024 at 9:57 PM

## 2024-04-09 NOTE — PROGRESS NOTES
Physician Progress Note      PATIENT:               DES WREN  CSN #:                  [id number]  :                       1945  ADMIT DATE:       2024 4:41 PM  DISCH DATE:  RESPONDING  PROVIDER #:        Isidro Tinajero MD          QUERY TEXT:    Pt with documentation of LANCE on CKD.  The nephrology consult states, \"LANCE   likely from ATN in the setting of CKD III/IV with a baseline creatinine of 1.8   to 2.2 mg/dl\".  If possible, please document in progress notes and discharge   summary:    The medical record reflects the following:  Risk Factors: age, htn, CKD, dm  Clinical Indicators: LANCE likely from ATN in the setting of CKD III/IV with a   baseline creatinine of 1.8 to 2.2 mg/d  Treatment: nephrology consult, HD, serial labs    Thank you,  Rosi Rowland RN,BSN,CCDS,CRCR  Options provided:  -- ATN confirmed  -- Defer to nephrology consultant documentation regarding ATN  -- Other - I will add my own diagnosis  -- Disagree - Not applicable / Not valid  -- Disagree - Clinically unable to determine / Unknown  -- Refer to Clinical Documentation Reviewer    PROVIDER RESPONSE TEXT:    I defer to nephrology consultant regarding documentation of ATN.    Query created by: Rosi Rowland on 2024 11:48 AM      Electronically signed by:  Isidro Tinajero MD 2024 12:13 PM

## 2024-04-09 NOTE — PROGRESS NOTES
Pt returned from HD> Pt having chest pain- pressure quality, no radiation, no change. Dr Gavin aware and at bedside- Verbal Orders to HD nurse.     Vitals stable.     Per Dr Gavin monitor.

## 2024-04-09 NOTE — PROGRESS NOTES
Nephrology Progress Note   [facility]      Sub/interval history  Seen and examined on HD on 4/9 with net UF of 160 mL.  45 minutes into dialysis, patient reported of feeling heaviness on her chest.  At that time blood flow rate was decreased to 300 mL/min from 350 and UF goal decreased from 2 L to 1 L.  Subsequently 100 mL of normal saline was given and UF was turned off completely but the patient's symptoms continued and dialysis was stopped in 1 hour 9 minutes.    ROS: No fever   PSFH: No visitor    Scheduled Meds:   epoetin reed-epbx  10,000 Units IntraVENous Once per day on Tue Thu Sat    metoprolol succinate  12.5 mg Oral Daily    sodium chloride flush  5-40 mL IntraVENous 2 times per day    [Held by provider] heparin (porcine)  5,000 Units SubCUTAneous 3 times per day    allopurinol  100 mg Oral Daily    aspirin  81 mg Oral Daily    atorvastatin  40 mg Oral Nightly    insulin lispro  0-8 Units SubCUTAneous TID WC    insulin lispro  0-4 Units SubCUTAneous Nightly     Continuous Infusions:   sodium chloride 5 mL/hr at 04/04/24 0144    dextrose       PRN Meds:.heparin (porcine), calcium carbonate, melatonin, sodium chloride flush, sodium chloride, ondansetron **OR** ondansetron, polyethylene glycol, acetaminophen **OR** acetaminophen, glucose, dextrose bolus **OR** dextrose bolus, glucagon (rDNA), dextrose    Objective/     Vitals:    04/09/24 0901 04/09/24 1059 04/09/24 1214 04/09/24 1300   BP: (!) 152/81 127/62 (!) 142/70 (!) 143/68   Pulse: 80 78 88 80   Resp:  20 16 17   Temp:  97.8 °F (36.6 °C) 97 °F (36.1 °C) 97.6 °F (36.4 °C)   TempSrc:       SpO2:    99%   Weight:  71.1 kg (156 lb 12 oz) 70.9 kg (156 lb 4.9 oz)    Height:         24HR INTAKE/OUTPUT:    Intake/Output Summary (Last 24 hours) at 4/9/2024 1455  Last data filed at 4/9/2024 1021  Gross per 24 hour   Intake 180 ml   Output 500 ml   Net -320 ml       Gen: Ill-appearing  Neck: No JVD  Skin: Unremarkable  Cardiovascular:  S1, S2 without

## 2024-04-09 NOTE — PROGRESS NOTES
Shift handoff report given to Nidhi NOGUERA at bedside.   Pt is Awake and alert  IV handoff completed. 4 eyes to follow.  Call light within reach, bed in lowest position, bed alarm on. End of shift checks completed.    Pt agreeable to stay at this time.     Pt has been free of falls for duration of shift. .

## 2024-04-09 NOTE — CARE COORDINATION
Reviewed chart, met with pt bedside. [facility] and will not do one time contract. Printed and reviewed SNF with HD on site. CC already denied due to insurance. Pt states to try [facility] for Audra. Pt also chooses [facility], spoke with Audra and referral given. Pt states to \"go down list\" if we get more denials. Called  [facility] with update. Reviewed facilities that we have tried and gotten denials from. [facility] states that he has been dealing with bed bugs. He discussed that he is staying away as he is still trying to treat the bed bugs. Discussed getting her placed from the hospital into a facility that can care for her needs and he agrees. Will continue to monitor.     1345 - Per Katherine at [facility], they are OON with Amrita.    1615 - [facility] for ref update with Audra at [facility].    1645 - Spoke with Audra who states they can clinically accept but is worried about HD to dx for HD. To have HD covered in house dx needs to say \"likely end stage renal\" or \"end stage renal\". Without this, she would need community HD facility and they would not want to pay for that and can not accept with the community HD due to cost. States most facilities with in house HD will not be able to accept with this dx and pt would need community chair. Advised I will speak RN today and we will follow nephro tomorrow and will have CM update Audra tomorrow. States they currently have 6 HD referrals, have 25 beds and can go to 30 HD patients in house.

## 2024-04-09 NOTE — ADT AUTH CERT
20.1 (H) 12.4 - 15.4 %     Platelets 228 135 - 450 K/uL     MPV 8.8 5.0 - 10.5 fL     Neutrophils % 70.6 %     Lymphocytes % 17.5 %     Monocytes % 8.2 %     Eosinophils % 2.4 %     Basophils % 1.3 %     Neutrophils Absolute 4.9 1.7 - 7.7 K/uL     Lymphocytes Absolute 1.2 1.0 - 5.1 K/uL     Monocytes Absolute 0.6 0.0 - 1.3 K/uL     Eosinophils Absolute 0.2 0.0 - 0.6 K/uL     Basophils Absolute 0.1 0.0 - 0.2 K/uL   CMP w/ Reflex to MG   Result Value Ref Range     Sodium 139 136 - 145 mmol/L     Chloride 108 99 - 110 mmol/L     CO2 17 (L) 21 - 32 mmol/L     Anion Gap 14 3 - 16     Glucose 98 70 - 99 mg/dL     BUN 53 (H) 7 - 20 mg/dL     Creatinine 2.4 (H) 0.6 - 1.2 mg/dL     Est, Glom Filt Rate 20 (A) >60     Calcium 8.4 8.3 - 10.6 mg/dL     Total Protein 6.9 6.4 - 8.2 g/dL     Albumin 3.3 (L) 3.4 - 5.0 g/dL     Albumin/Globulin Ratio 0.9 (L) 1.1 - 2.2     Total Bilirubin 1.1 (H) 0.0 - 1.0 mg/dL     Alkaline Phosphatase 269 (H) 40 - 129 U/L   Troponin   Result Value Ref Range     Troponin, High Sensitivity 61 (H) 0 - 14 ng/L   Troponin   Result Value Ref Range     Troponin, High Sensitivity 59 (H) 0 - 14 ng/L   Urinalysis with Reflex to Culture     Specimen: Urine   Result Value Ref Range     Color, UA Yellow Straw/Yellow     Clarity, UA Clear Clear     Glucose, Ur Negative Negative mg/dL     Bilirubin Urine SMALL (A) Negative     Ketones, Urine Negative Negative mg/dL     Specific Gravity, UA >=1.030 1.005 - 1.030     Blood, Urine Negative Negative     pH, UA 5.5 5.0 - 8.0     Protein, UA >=300 (A) Negative mg/dL     Urobilinogen, Urine 1.0 <2.0 E.U./dL     Nitrite, Urine Negative Negative     Leukocyte Esterase, Urine Negative Negative     Microscopic Examination YES       Urine Type NotGiven       Urine Reflex to Culture Yes     Lipase   Result Value Ref Range     Lipase 30.0 13.0 - 60.0 U/L   Blood gas, venous   Result Value Ref Range     pH, Nicholas 7.220 (L) 7.350 - 7.450     pCO2, Nicholas 39.0 (L) 40.0 - 50.0 mmHg      (4/2/2024)     PRAPARE - Transportation      Lack of Transportation (Medical): No      Lack of Transportation (Non-Medical): No   Physical Activity: Sufficiently Active (1/31/2023)     Exercise Vital Sign      Days of Exercise per Week: 4 days      Minutes of Exercise per Session: 40 min   Stress: Not on file   Social Connections: Not on file   Intimate Partner Violence: Not on file   Housing Stability: Low Risk  (4/2/2024)     Housing Stability Vital Sign      Unable to Pay for Housing in the Last Year: No      Number of Places Lived in the Last Year: 1      Unstable Housing in the Last Year: No            Family History:   Family History             Problem Relation Age of Onset    Cancer Mother 77         colon    Diabetes Mother      Other Father           gun shot wound    Hypertension Sister      Diabetes Sister      High Blood Pressure Sister      High Cholesterol Sister      Cancer Brother 64         mets everywhere    Cancer Brother 66         colon    Diabetes Sister      Other Sister           bipolar               REVIEW OF SYSTEMS:    All other 12 ROS were negative except in HPI.        PHYSICAL EXAM:    Vitals:    BP (!) 120/52   Pulse 56   Temp (!) 96.6 °F (35.9 °C) (Axillary)   Resp 15   Wt 73.3 kg (161 lb 9.6 oz)   SpO2 100%   BMI 31.56 kg/m²   No intake/output data recorded.  I/O this shift:  In: -   Out: 117 [Urine:117]     Physical Exam:  Gen: Resting in bed, NAD.    HEENT: MMM, OP clear.  CV: RRR no m/r/g.  No S3.  Lungs: CTA-B  Abd: S/NT +BS  Ext: No edema, no cyanosis  Skin: Warm.  No rashes appreciated.  : No TTP over bladder, nondistended.  Neuro: Alert and oriented x 3, nonfocal.  Joints: No erythema noted over joints.     DATA:    CBC with Differential:          Lab Results   Component Value Date/Time     WBC 7.0 04/01/2024 06:20 PM     RBC 4.30 04/01/2024 06:20 PM     HGB 12.0 04/01/2024 06:20 PM     HCT 39.5 04/01/2024 06:20 PM      04/01/2024 06:20 PM     MCV 91.9

## 2024-04-09 NOTE — PROGRESS NOTES
Shift assessment was completed (see flow sheet). Pt A/O, denies any pain or other needs at this time.     Respirations are even, unlabored, with clear sounds. On 2L O2 via NC.    Scheduled medications to follow- whole with water.     Infusing:  N/A (See MAR),    Pt to go to HD today.  Call light within reach. Bed in lowest position. Bed alarm on.     Patient is able to demonstrate the ability to move from a reclining position to an upright position within the recliner.    Dr. RENAE at the bedside to examine patient. Pt having dizziness with sitting at bedside. Order for Orthostatic BPS. See progress note for details.

## 2024-04-10 LAB
GLUCOSE BLD-MCNC: 147 MG/DL (ref 70–99)
GLUCOSE BLD-MCNC: 160 MG/DL (ref 70–99)
GLUCOSE BLD-MCNC: 163 MG/DL (ref 70–99)
GLUCOSE BLD-MCNC: 94 MG/DL (ref 70–99)
PERFORMED ON: ABNORMAL
PERFORMED ON: NORMAL

## 2024-04-10 PROCEDURE — 99232 SBSQ HOSP IP/OBS MODERATE 35: CPT | Performed by: INTERNAL MEDICINE

## 2024-04-10 PROCEDURE — 6370000000 HC RX 637 (ALT 250 FOR IP): Performed by: INTERNAL MEDICINE

## 2024-04-10 PROCEDURE — 86705 HEP B CORE ANTIBODY IGM: CPT

## 2024-04-10 PROCEDURE — 94761 N-INVAS EAR/PLS OXIMETRY MLT: CPT

## 2024-04-10 PROCEDURE — 97530 THERAPEUTIC ACTIVITIES: CPT

## 2024-04-10 PROCEDURE — 6360000002 HC RX W HCPCS: Performed by: INTERNAL MEDICINE

## 2024-04-10 PROCEDURE — 2060000000 HC ICU INTERMEDIATE R&B

## 2024-04-10 PROCEDURE — 2580000003 HC RX 258: Performed by: INTERNAL MEDICINE

## 2024-04-10 PROCEDURE — 97110 THERAPEUTIC EXERCISES: CPT

## 2024-04-10 PROCEDURE — 2700000000 HC OXYGEN THERAPY PER DAY

## 2024-04-10 RX ORDER — FUROSEMIDE 10 MG/ML
40 INJECTION INTRAMUSCULAR; INTRAVENOUS 2 TIMES DAILY
Status: DISCONTINUED | OUTPATIENT
Start: 2024-04-10 | End: 2024-04-13

## 2024-04-10 RX ORDER — FUROSEMIDE 10 MG/ML
40 INJECTION INTRAMUSCULAR; INTRAVENOUS ONCE
Status: COMPLETED | OUTPATIENT
Start: 2024-04-10 | End: 2024-04-10

## 2024-04-10 RX ADMIN — FUROSEMIDE 40 MG: 10 INJECTION, SOLUTION INTRAMUSCULAR; INTRAVENOUS at 17:17

## 2024-04-10 RX ADMIN — ASPIRIN 81 MG: 81 TABLET, COATED ORAL at 08:25

## 2024-04-10 RX ADMIN — METOPROLOL SUCCINATE 12.5 MG: 25 TABLET, EXTENDED RELEASE ORAL at 08:25

## 2024-04-10 RX ADMIN — SODIUM CHLORIDE, PRESERVATIVE FREE 10 ML: 5 INJECTION INTRAVENOUS at 08:26

## 2024-04-10 RX ADMIN — FUROSEMIDE 40 MG: 10 INJECTION, SOLUTION INTRAMUSCULAR; INTRAVENOUS at 13:53

## 2024-04-10 RX ADMIN — DESMOPRESSIN ACETATE 40 MG: 0.2 TABLET ORAL at 21:16

## 2024-04-10 RX ADMIN — ALLOPURINOL 100 MG: 100 TABLET ORAL at 08:25

## 2024-04-10 RX ADMIN — SODIUM CHLORIDE, PRESERVATIVE FREE 10 ML: 5 INJECTION INTRAVENOUS at 21:16

## 2024-04-10 NOTE — PROGRESS NOTES
Bedside report and transfer of care given to POONRIMA Martins. Pt currently resting in bed with the call light within reach. Pt denies any other care needs at this time. Pt stable at this time.

## 2024-04-10 NOTE — PROGRESS NOTES
polyethylene glycol, acetaminophen **OR** acetaminophen, glucose, dextrose bolus **OR** dextrose bolus, glucagon (rDNA), dextrose    Lab Data:  Recent Labs     04/08/24  0537 04/09/24  1127   WBC 5.1 5.3   HGB 8.1* 7.9*   HCT 25.7* 25.8*   MCV 90.7 91.6   PLT 58* 75*       Recent Labs     04/08/24  0537 04/09/24  1127   * 134*   K 4.6 4.6    100   CO2 28 31   PHOS  --  3.9   BUN 26* 35*   CREATININE 2.3* 2.5*       No results for input(s): \"CKTOTAL\", \"CKMB\", \"CKMBINDEX\", \"TROPONINI\" in the last 72 hours.    Coagulation:   Lab Results   Component Value Date/Time    INR 1.24 04/01/2024 06:20 PM    APTT 33.9 04/05/2024 06:00 AM     Cardiac markers:   Lab Results   Component Value Date/Time    TROPONINI 0.02 06/03/2022 06:41 PM         Lab Results   Component Value Date    ALT 12 04/05/2024    AST 16 04/05/2024    ALKPHOS 176 (H) 04/05/2024    BILITOT 0.7 04/05/2024       Lab Results   Component Value Date    INR 1.24 (H) 04/01/2024    INR 1.04 07/23/2020    INR 0.99 07/06/2020    PROTIME 15.6 (H) 04/01/2024    PROTIME 12.1 07/23/2020    PROTIME 11.5 07/06/2020         Cultures     Results       Procedure Component Value Units Date/Time    Culture, Urine [[id number]] Collected: 04/01/24 2230    Order Status: Completed Specimen: Urine, clean catch Updated: 04/02/24 1825     Urine Culture, Routine No growth at 18 to 36 hours    Narrative:      ORDER#: [id number]                          ORDERED BY: ARISTEO PINTO  SOURCE: Urine Clean Catch                  COLLECTED:  04/01/24 22:30  ANTIBIOTICS AT [facility].:                      RECEIVED :  04/01/24 23:01    Blood Culture 2 [[id number]] Collected: 04/01/24 2100    Order Status: Completed Specimen: Blood Updated: 04/05/24 2115     Culture, Blood 2 No Growth after 4 days of incubation.    Narrative:      ORDER#: [id number]                          ORDERED BY: ARISTEO PINTO  SOURCE: Blood Hand, Right                  COLLECTED:  04/01/24 21:00  ANTIBIOTICS AT [facility]ACH[facility].:     severe global with regional hypokinesis. The inferolateral and   inferior walls appear severely hypokinetic.   Average GLS is reduced at - 11.1%.   Diastolic function not assessed due to mitral valve annuloplasty ring .   Right ventricle appears mildly enlarged with reduced function.   The right atrium is mildly dilated.   An Edward Physio II annuloplasty ring is seen in the mitral position.   Mild mitral stenosis.   Mild to moderate mitral regurgitation.   Mild aortic regurgitation.   Moderate tricuspid regurgitation.   Systolic pulmonary artery pressure (SPAP) estimated at 56 mmHg (RA pressure   8 mmHg).   Mild-moderate pulmonic regurgitation present.      Compared with the prior study performed 1/23/24 (Limited Echo with 25% EF severe global hypokinesis inferior akinesis. RV reduced function), LVEF   appears slightly improved.      Assessment & Plan:    #Acute on chronic systolic CHF  #Ischemic cardiomyopathy   -pro-BNP >23k  -CXR shows cardiomegaly with pulmonary vascular congestion  Also has massive peripheral edema and refractory hyperkalemia  -limited echo 1/24 with similar EF of 25 % and repeat ECHo with slight improvement to 30 %, with severe global hypokinesis   -nephrology consulted   - initiated on CRRT for high K and had fluid removal. Transitioned to HD with continued fluid removal    - Resumed Toprol ; holding Entresto,SGLT, aldactone 2 to LANCE     #LANCE on CKD4  #Hyperkalemia and metabolic acidosis  - possible cardiorenal  - holding entresto, k levels did not improve with medical mx  - given bicarb fluids   - nephrology consulted   - initiated on CRRT with fluid removal done for 72 hrs, now  switched to HD. Next HD due today   Will Likely need outpt HD set up    #Elevated troponin  #CAD s/p previous CABG   - troponin 61-->59  - EKG with non-specific T wave changes   - last stress test with infarct only   - patient denies chest pain   - continue ASA, statin, resumed low dose  BB     #T2DM  -stopped oral

## 2024-04-10 NOTE — PROGRESS NOTES
HEART FAILURE CARE PLAN:    Comorbidities Reviewed: Yes   Patient has a past medical history of Anemia, Backache, unspecified, CAD, multiple vessel, Chronic kidney disease (CKD) stage G3b/A1, moderately decreased glomerular filtration rate (GFR) between 30-44 mL/min/1.73 square meter and albuminuria creatinine ratio less than 30 mg/g ([facility]), Chronic obstructive pulmonary disease ([facility]), Chronic systolic CHF (congestive heart failure) ([facility]), COPD (chronic obstructive pulmonary disease) ([facility]), Depressive disorder, not elsewhere classified, Essential hypertension, benign, Gout, History of blood transfusion, Hyperlipidemia associated with type 2 diabetes mellitus ([facility]), Major depressive disorder, recurrent episode, moderate ([facility]), Osteoarthritis, hand, Osteoarthrosis, unspecified whether generalized or localized, lower leg, Osteopenia, Other and unspecified hyperlipidemia, Pain in joint, lower leg, Pneumonia, Rotator cuff tendinitis, Tear of medial cartilage or meniscus of knee, current, Type 2 diabetes mellitus with microalbuminuria, without long-term current use of insulin ([facility]), Type 2 diabetes mellitus without complication ([facility]), Type II or unspecified type diabetes mellitus without mention of complication, not stated as uncontrolled, and Uses LifeVest defibrillator.     Weights Reviewed: Yes   Admission weight: 59.6 kg (131 lb 6.3 oz) (chart review)   Wt Readings from Last 3 Encounters:   04/10/24 67.9 kg (149 lb 11.1 oz)   02/11/24 59.6 kg (131 lb 8 oz)   01/31/24 60.7 kg (133 lb 13.1 oz)     Intake & Output Reviewed: Yes     Intake/Output Summary (Last 24 hours) at 4/10/2024 1009  Last data filed at 4/10/2024 0832  Gross per 24 hour   Intake 400 ml   Output 500 ml   Net -100 ml       ECHOCARDIOGRAM Reviewed: Yes   Patient's Ejection Fraction (EF) is less than or equal to 40%. Discuss HFrEF Guideline Directed Medical Therapy (GDMT) with Cardiologist or Hospitalist:          Medications Reviewed: Yes   SCHEDULED  HOSPITAL MEDICATIONS:   epoetin reed-epbx  10,000 Units IntraVENous Once per day on Tue Thu Sat    metoprolol succinate  12.5 mg Oral Daily    sodium chloride flush  5-40 mL IntraVENous 2 times per day    [Held by provider] heparin (porcine)  5,000 Units SubCUTAneous 3 times per day    allopurinol  100 mg Oral Daily    aspirin  81 mg Oral Daily    atorvastatin  40 mg Oral Nightly    insulin lispro  0-8 Units SubCUTAneous TID WC    insulin lispro  0-4 Units SubCUTAneous Nightly     HOME MEDICATIONS:  Prior to Admission medications    Medication Sig Start Date End Date Taking? Authorizing Provider   metoprolol succinate (TOPROL XL) 25 MG extended release tablet TAKE THREE TABLETS BY MOUTH DAILY 2/23/24   Lucila Tejeda MD   sacubitril-valsartan (ENTRESTO) 24-26 MG per tablet Take 0.5 tablets by mouth 2 times daily Hold until follow-up appointment  Patient taking differently: Take 0.5 tablets by mouth 2 times daily Hold until follow-up appointment- Pt states she takes it once a day. 2/11/24   Debora Da Silva DO   allopurinol (ZYLOPRIM) 300 MG tablet Take 0.5 tablets by mouth daily TAKE 1 TABLET BY MOUTH DAILY 1/31/24   Brian Gomez MD   insulin glargine (LANTUS SOLOSTAR) 100 UNIT/ML injection pen Inject 10 Units into the skin nightly  Patient taking differently: Inject 10 Units into the skin 2 times daily 16 in the AM and 10 in the PM. 1/31/24   Brian Gomez MD   isosorbide dinitrate (ISORDIL) 10 MG tablet Take 1 tablet by mouth in the morning and at bedtime 1/31/24   Brian Gomez MD   furosemide (LASIX) 40 MG tablet Take 0.5 tablets by mouth daily  Patient taking differently: Take 0.5 tablets by mouth daily Every other day. 1/31/24   Brian Gomez MD   atorvastatin (LIPITOR) 40 MG tablet TAKE ONE TABLET BY MOUTH DAILY 4/20/23   Saundra Foley, APRN - CNP   diclofenac sodium (VOLTAREN) 1 % GEL Apply 2 g topically in the morning and at bedtime  Patient taking differently: Apply

## 2024-04-10 NOTE — PROGRESS NOTES
Inpatient Physical Therapy Treatment    Unit: PCU  Date:  4/10/2024  Patient Name:    Amber Fuentes  Admitting diagnosis:  Hyperkalemia [E87.5]  Anasarca [R60.1]  Bradycardia [R00.1]  Pleural effusion [J90]  Other ascites [R18.8]  Acute renal failure, unspecified acute renal failure type (HCC) [N17.9]  Pneumonia of right lower lobe due to infectious organism [J18.9]  Admit Date:  4/1/2024  Precautions/Restrictions/WB Status/ Lines/ Wounds/ Oxygen: Fall risk, Bed/chair alarm, Lines (Supplemental O2 (2L), internal catheter, and IJ right), Telemetry, Continuous pulse oximetry, and Isolation Precautions: Environmental    Pt seen for cotreatment this date due to patient safety, patient endurance, and acute illness/injury    Treatment Time: 11:40 - 12:03  Treatment Number:  3   Timed Code Treatment Minutes: 23 minutes  Total Treatment Minutes:  23 minutes    Patient Stated Goals for Therapy: \" get better\"          Discharge Recommendations: SNF  DME needs for discharge: Defer to facility       Therapy recommendation for EMS Transport: requires transport by cot due to pt with poor sitting balance/tolerance    Therapy recommendations for staff:   Assist of 2 for sitting EOB - watch for syncope    History of Present Illness:   78 y.o. female with pmh of coronary disease, CKD, CHF, COPD, hypertension, hyperlipidemia, diabetes mellitus, distant history of CABG who presents with diarrhea and nausea.  +acute on chronic CHF with EF 20%, renal failure   Was on CRRT, taken off on 4/4.    Home Health S4 Level Recommendation:  NA        AM-PAC Mobility Score    AM-PAC Inpatient Mobility Raw Score : 8         Subjective  Patient lying reclined in bed with no family present.  Pt agreeable to this PT session.     Cognition    A&O x4   Able to follow 2 step commands    Pain   No  Location: NA  Rating: NA /10  Pain Medicine Status: No request made    Preadmission Environment:   Pt. Lives

## 2024-04-10 NOTE — FLOWSHEET NOTE
04/09/24 2000   Vital Signs   Temp 98.3 °F (36.8 °C)   Temp Source Oral   Pulse 82   Heart Rate Source Monitor   Respirations 18   /73   MAP (Calculated) 95   BP Method Automatic   Patient Position Semi fowlers   Oxygen Therapy   SpO2 99 %   O2 Device Nasal cannula   O2 Flow Rate (L/min) 2 L/min     PM Assessment completed. Scheduled medications given per MAR, On 2L of oxygen, A&O X4, Vital Signs completed and Charted, Patient denies any further needs at this time. Call light within reach, Reminded patient to call RN if she needs anything.

## 2024-04-10 NOTE — CONSULTS
[facility]  Palliative Medicine Consultation Note      Date Of Admission:4/1/2024  Date of consult: 04/10/24  Seen by PC in the past:  Yes    Recommendations:        Introduced palliative care and had a voluntary discussion about advance care planning. Palliative care consult ordered to discuss goals of care and end stage disease process. Discussion had at the bedside with patient regarding her disease process and prognosis. Patient recently started on HD after being admitted to [facility] due to hyperkalemia. Patient is currently followed by [facility].  Code status discussed and patient wishes to remain a full code at this time. Discussion had regarding Hospice services as patient is eligible for Hospice are. Patient made statement \"my kids would never forgive me for giving up.\" Discussed Hospice care further and she is open to the idea of Hospice but wants to try doing rehab at [facility] and see how her dialysis goes. Patient agreeable to allow [facility] to continue to follow.,       1. Goals of Care/Advanced Care planning/Code status: Full code  2. Pain: Denies any pain at this time  3. SOB: Denies shortness of breath but has push of speech and mildly labored respirations  4. Disposition: [facility] for rehab and HD    Reason for Consult:         [x]  Goals of Care  []  Code Status Discussion/Advanced Care Planning   []  Psychosocial/Family Support  []  Symptom Management  [x]  Other (End Stage Disease)    Requesting Physician: Kyree Lynch CNP    CHIEF COMPLAINT:  Not feeling well    History Obtained From:  patient, EMR    History of Present Illness:         Amber Fuentes is a 78 y.o. female with PMH of coronary disease, CKD, CHF, COPD, hypertension, hyperlipidemia, diabetes mellitus, history of CABG in 2020 who presented to [facility] ED with complaints of not feeling well. Patient had been experiencing fatigue, diarrhea and nausea prior to coming in.  Patient was found to have

## 2024-04-10 NOTE — CARE COORDINATION
Reviewed chart, spoke with Dr. RENAE, met with pt bedside. Updated her that we have a facility that can medically accept her if nephro can change her dx for why she needs HD. Discussed [facility] in [place]. Pt is tearful but understands her current living situation is not conducive to getting better. She also agrees her  is not in good enough health to provide the care she needs and the transportation to HD. Called Dr. Gavin's office, spoke with Sarah and  for him about the dx for HD. Will continue to monitor.     830 - Spoke with Dr. Gavin who states pt has LANCE and not end stage renal. LANCE is the reason why pt needs HD. LVM for Sarah at [facility]. Updated Dr. RENAE. Called Eldon from [facility] with referral based off conversation with Audra from [facility] yesterday about dx code for HD. Will continue to monitor.     845 - Spoke with Audra who states they are able to accept pt due to dx and reimbursement for HD.    1400 - Spoke with Eldon at [facility], they can medically accept pt. Faxed info to get HD set up. Eldon returns call stating he needs [facility] Core Antibody. Sent perfect serve to Dr. RENAE making him aware. Pt, daughter and granddaughter updated.

## 2024-04-10 NOTE — PROGRESS NOTES
Inpatient Occupational Therapy Treatment    Unit: PCU  Date:  4/10/2024  Patient Name:    Amber Fuentes  Admitting diagnosis:  Hyperkalemia [E87.5]  Anasarca [R60.1]  Bradycardia [R00.1]  Pleural effusion [J90]  Other ascites [R18.8]  Acute renal failure, unspecified acute renal failure type (HCC) [N17.9]  Pneumonia of right lower lobe due to infectious organism [J18.9]  Admit Date:  4/1/2024  Precautions/Restrictions/WB Status/ Lines/ Wounds/ Oxygen: Fall risk, Bed/chair alarm, Lines (Supplemental O2 (2L), internal catheter, and IJ right), Telemetry, Continuous pulse oximetry, and Isolation Precautions: Environmental    Pt seen for cotreatment this date due to patient safety, patient endurance, and limited functional status information    Treatment Time:  11:40-12:03  Treatment Number:  3  Timed Code Treatment Minutes: 23 minutes  Total Treatment Minutes: 23 minutes    Patient Goals for Therapy: \"get out of bed\"        Discharge Recommendations: SNF  DME needs for discharge: Defer to facility       Therapy recommendations for staff:   Assist of 2 for transfers with use of KEELEY STEDY to/from chair if medically appropriate.     History of Present Illness: per Dr Swanson H&P 4/2/24:    Amber Fuentes is a 78 y.o. female with pmh of coronary disease, CKD, CHF, COPD, hypertension, hyperlipidemia, diabetes mellitus, distant history of CABG who presents with diarrhea and nausea to this facility in the ER.    chronic systolic CHF with very low EF at 20 % presenting with acute CHF and renal failure with refractory hyperkalemia     Initiated on CRRT -> transitioned to HD now   Drop in h.h-> PRBC transfused        Home Health S4 Level Recommendation:  NA    AM-PAC Score: AM-PAC Inpatient Daily Activity Raw Score: 14     Subjective:  Patient lying reclined in bed with no family present.   Pt agreeable to this OT session.     Cognition:    A&O x4  Able to follow 1 step commands    Pain:   No  Location: n/a  Rating: NA /10  Pain

## 2024-04-10 NOTE — PROGRESS NOTES
Nephrology Progress Note   [facility]      Sub/interval history  Feels tired, intermittently feels heavy on her chest    HD on 4/9 with net UF of 160 mL.  45 minutes into dialysis, patient reported of feeling heaviness on her chest.  At that time blood flow rate was decreased to 300 mL/min from 350 and UF goal decreased from 2 L to 1 L.  Subsequently 100 mL of normal saline was given and UF was turned off completely but the patient's symptoms continued and dialysis was stopped in 1 hour 9 minutes.    Last 24-hour urine output be 500 ml charted     ROS: No fever   PSFH: No visitor    Scheduled Meds:   epoetin reed-epbx  10,000 Units IntraVENous Once per day on Tue Thu Sat    metoprolol succinate  12.5 mg Oral Daily    sodium chloride flush  5-40 mL IntraVENous 2 times per day    [Held by provider] heparin (porcine)  5,000 Units SubCUTAneous 3 times per day    allopurinol  100 mg Oral Daily    aspirin  81 mg Oral Daily    atorvastatin  40 mg Oral Nightly    insulin lispro  0-8 Units SubCUTAneous TID WC    insulin lispro  0-4 Units SubCUTAneous Nightly     Continuous Infusions:   sodium chloride 5 mL/hr at 04/04/24 0144    dextrose       PRN Meds:.heparin (porcine), calcium carbonate, melatonin, sodium chloride flush, sodium chloride, ondansetron **OR** ondansetron, polyethylene glycol, acetaminophen **OR** acetaminophen, glucose, dextrose bolus **OR** dextrose bolus, glucagon (rDNA), dextrose    Objective/     Vitals:    04/09/24 2352 04/10/24 0357 04/10/24 0709 04/10/24 1120   BP: 128/71 (!) 111/58 (!) 112/57 117/63   Pulse: 80 80 77 81   Resp: 18 18 18 18   Temp: 98.4 °F (36.9 °C) 97.9 °F (36.6 °C) 97.6 °F (36.4 °C) 97.7 °F (36.5 °C)   TempSrc: Oral Oral Oral Oral   SpO2: 99% 99% 98% 99%   Weight:  67.9 kg (149 lb 11.1 oz)     Height:         24HR INTAKE/OUTPUT:    Intake/Output Summary (Last 24 hours) at 4/10/2024 1608  Last data filed at 4/10/2024 1340  Gross per 24 hour   Intake 580 ml   Output --   Net

## 2024-04-10 NOTE — FLOWSHEET NOTE
04/10/24 0709   Vital Signs   Temp 97.6 °F (36.4 °C)   Temp Source Oral   Pulse 77   Heart Rate Source Monitor   Respirations 18   BP (!) 112/57   MAP (Calculated) 75   BP Location Left upper arm   BP Method Automatic   Patient Position Semi fowlers   Pain Assessment   Pain Assessment None - Denies Pain   Oxygen Therapy   SpO2 98 %   O2 Device Nasal cannula   O2 Flow Rate (L/min) 2 L/min     AM assessment completed. No complaints of pain or discomfort voiced. No signs of symptoms of distress noted. Patient tolerated medications well. Respirations easy and even. Bed in lowest position, bed alarm in place and functioning properly, SR up x 2 and bed in low position. Call light within reach.     Bedside Mobility Assessment Tool (BMAT):     Assessment Level 1- Sit and Shake    1. From a semi-reclined position, ask patient to sit up and rotate to a seated position at the side of the bed. Can use the bedrail.    2. Ask patient to reach out and grab your hand and shake making sure patient reaches across his/her midline.   Pass- Patient is able to come to a seated position, maintain core strength. Maintains seated balance while reaching across midline. Move on to Assessment Level 2.     Assessment Level 2- Stretch and Point   1. With patient in seated position at the side of the bed, have patient place both feet on the floor (or stool) with knees no higher than hips.    2. Ask patient to stretch one leg and straighten the knee, then bend the ankle/flex and point the toes. If appropriate, repeat with the other leg.   Pass- Patient is able to demonstrate appropriate quad strength on intended weight bearing limb(s). Move onto Assessment Level 3.     Assessment Level 3- Stand   1. Ask patient to elevate off the bed or chair (seated to standing) using an assistive device (cane, bedrail).    2. Patient should be able to raise buttocks off be and hold for a count of five. May repeat once.   Pass- Patient maintains standing

## 2024-04-11 LAB
ALBUMIN SERPL-MCNC: 2.9 G/DL (ref 3.4–5)
ANION GAP SERPL CALCULATED.3IONS-SCNC: 7 MMOL/L (ref 3–16)
BUN SERPL-MCNC: 34 MG/DL (ref 7–20)
CALCIUM SERPL-MCNC: 8.5 MG/DL (ref 8.3–10.6)
CHLORIDE SERPL-SCNC: 98 MMOL/L (ref 99–110)
CO2 SERPL-SCNC: 29 MMOL/L (ref 21–32)
CREAT SERPL-MCNC: 1.8 MG/DL (ref 0.6–1.2)
DEPRECATED RDW RBC AUTO: 17.2 % (ref 12.4–15.4)
GFR SERPLBLD CREATININE-BSD FMLA CKD-EPI: 28 ML/MIN/{1.73_M2}
GLUCOSE BLD-MCNC: 116 MG/DL (ref 70–99)
GLUCOSE BLD-MCNC: 117 MG/DL (ref 70–99)
GLUCOSE BLD-MCNC: 146 MG/DL (ref 70–99)
GLUCOSE BLD-MCNC: 186 MG/DL (ref 70–99)
GLUCOSE SERPL-MCNC: 120 MG/DL (ref 70–99)
HBV CORE IGM SERPL QL IA: NORMAL
HCT VFR BLD AUTO: 25.6 % (ref 36–48)
HGB BLD-MCNC: 8.2 G/DL (ref 12–16)
MCH RBC QN AUTO: 28.5 PG (ref 26–34)
MCHC RBC AUTO-ENTMCNC: 31.9 G/DL (ref 31–36)
MCV RBC AUTO: 89.3 FL (ref 80–100)
PERFORMED ON: ABNORMAL
PHOSPHATE SERPL-MCNC: 4 MG/DL (ref 2.5–4.9)
PLATELET # BLD AUTO: 113 K/UL (ref 135–450)
PMV BLD AUTO: 8.3 FL (ref 5–10.5)
POTASSIUM SERPL-SCNC: 4.7 MMOL/L (ref 3.5–5.1)
RBC # BLD AUTO: 2.87 M/UL (ref 4–5.2)
SODIUM SERPL-SCNC: 134 MMOL/L (ref 136–145)
TROPONIN, HIGH SENSITIVITY: 83 NG/L (ref 0–14)
TROPONIN, HIGH SENSITIVITY: 83 NG/L (ref 0–14)
WBC # BLD AUTO: 7 K/UL (ref 4–11)

## 2024-04-11 PROCEDURE — 99232 SBSQ HOSP IP/OBS MODERATE 35: CPT | Performed by: INTERNAL MEDICINE

## 2024-04-11 PROCEDURE — 85027 COMPLETE CBC AUTOMATED: CPT

## 2024-04-11 PROCEDURE — 2580000003 HC RX 258: Performed by: INTERNAL MEDICINE

## 2024-04-11 PROCEDURE — 2000000000 HC ICU R&B

## 2024-04-11 PROCEDURE — 6370000000 HC RX 637 (ALT 250 FOR IP): Performed by: INTERNAL MEDICINE

## 2024-04-11 PROCEDURE — 80069 RENAL FUNCTION PANEL: CPT

## 2024-04-11 PROCEDURE — 93005 ELECTROCARDIOGRAM TRACING: CPT | Performed by: INTERNAL MEDICINE

## 2024-04-11 PROCEDURE — 36556 INSERT NON-TUNNEL CV CATH: CPT

## 2024-04-11 PROCEDURE — 94761 N-INVAS EAR/PLS OXIMETRY MLT: CPT

## 2024-04-11 PROCEDURE — 84484 ASSAY OF TROPONIN QUANT: CPT

## 2024-04-11 PROCEDURE — 36415 COLL VENOUS BLD VENIPUNCTURE: CPT

## 2024-04-11 PROCEDURE — 6370000000 HC RX 637 (ALT 250 FOR IP)

## 2024-04-11 PROCEDURE — 6360000002 HC RX W HCPCS: Performed by: INTERNAL MEDICINE

## 2024-04-11 PROCEDURE — 2500000003 HC RX 250 WO HCPCS: Performed by: INTERNAL MEDICINE

## 2024-04-11 PROCEDURE — 2700000000 HC OXYGEN THERAPY PER DAY

## 2024-04-11 PROCEDURE — 90935 HEMODIALYSIS ONE EVALUATION: CPT

## 2024-04-11 RX ORDER — NITROGLYCERIN 0.4 MG/1
TABLET SUBLINGUAL
Status: COMPLETED
Start: 2024-04-11 | End: 2024-04-11

## 2024-04-11 RX ORDER — DILTIAZEM HYDROCHLORIDE 5 MG/ML
10 INJECTION INTRAVENOUS ONCE
Status: DISCONTINUED | OUTPATIENT
Start: 2024-04-11 | End: 2024-04-13

## 2024-04-11 RX ORDER — NITROGLYCERIN 0.4 MG/1
0.4 TABLET SUBLINGUAL EVERY 5 MIN PRN
Status: DISCONTINUED | OUTPATIENT
Start: 2024-04-11 | End: 2024-04-20

## 2024-04-11 RX ADMIN — NITROGLYCERIN 0.4 MG: 0.4 TABLET SUBLINGUAL at 19:10

## 2024-04-11 RX ADMIN — ACETAMINOPHEN 650 MG: 325 TABLET ORAL at 07:44

## 2024-04-11 RX ADMIN — SODIUM CHLORIDE, PRESERVATIVE FREE 10 ML: 5 INJECTION INTRAVENOUS at 09:36

## 2024-04-11 RX ADMIN — SODIUM CHLORIDE, PRESERVATIVE FREE 10 ML: 5 INJECTION INTRAVENOUS at 21:55

## 2024-04-11 RX ADMIN — ONDANSETRON 4 MG: 4 TABLET, ORALLY DISINTEGRATING ORAL at 15:14

## 2024-04-11 RX ADMIN — FUROSEMIDE 40 MG: 10 INJECTION, SOLUTION INTRAMUSCULAR; INTRAVENOUS at 09:00

## 2024-04-11 RX ADMIN — ASPIRIN 81 MG: 81 TABLET, COATED ORAL at 09:00

## 2024-04-11 RX ADMIN — ALLOPURINOL 100 MG: 100 TABLET ORAL at 09:00

## 2024-04-11 RX ADMIN — DILTIAZEM HYDROCHLORIDE 5 MG/HR: 5 INJECTION, SOLUTION INTRAVENOUS at 19:48

## 2024-04-11 RX ADMIN — METOPROLOL SUCCINATE 12.5 MG: 25 TABLET, EXTENDED RELEASE ORAL at 09:00

## 2024-04-11 RX ADMIN — DESMOPRESSIN ACETATE 40 MG: 0.2 TABLET ORAL at 21:55

## 2024-04-11 ASSESSMENT — PAIN SCALES - GENERAL
PAINLEVEL_OUTOF10: 4
PAINLEVEL_OUTOF10: 8
PAINLEVEL_OUTOF10: 2
PAINLEVEL_OUTOF10: 5
PAINLEVEL_OUTOF10: 2

## 2024-04-11 ASSESSMENT — PAIN DESCRIPTION - LOCATION
LOCATION: ANKLE
LOCATION: CHEST
LOCATION: ABDOMEN;CHEST

## 2024-04-11 ASSESSMENT — PAIN DESCRIPTION - ORIENTATION
ORIENTATION: MID
ORIENTATION: RIGHT

## 2024-04-11 ASSESSMENT — PAIN DESCRIPTION - DESCRIPTORS
DESCRIPTORS: PRESSURE

## 2024-04-11 NOTE — PROGRESS NOTES
Treatment time: 3 hours  Net UF: 542 ml     Pre weight: 71.5 kg  Post weight:71.0 kg    Access used: RTDC    Access function: well with  ml/min     Medications or blood products given: heparin dwells     Regular outpatient schedule: tts     Summary of response to treatment: Patient tolerated treatment poorly. See epic note     Report given to POORNIMA gallardo and copy of dialysis treatment record faxed to ICU, to be scanned into EMR.

## 2024-04-11 NOTE — PLAN OF CARE
HEART FAILURE CARE PLAN:    Comorbidities Reviewed: Yes   Patient has a past medical history of Anemia, Backache, unspecified, CAD, multiple vessel, Chronic kidney disease (CKD) stage G3b/A1, moderately decreased glomerular filtration rate (GFR) between 30-44 mL/min/1.73 square meter and albuminuria creatinine ratio less than 30 mg/g ([facility]), Chronic obstructive pulmonary disease (HCC), Chronic systolic CHF (congestive heart failure) ([facility]), COPD (chronic obstructive pulmonary disease) ([facility]), Depressive disorder, not elsewhere classified, Essential hypertension, benign, Gout, History of blood transfusion, Hyperlipidemia associated with type 2 diabetes mellitus ([facility]), Major depressive disorder, recurrent episode, moderate ([facility]), Osteoarthritis, hand, Osteoarthrosis, unspecified whether generalized or localized, lower leg, Osteopenia, Other and unspecified hyperlipidemia, Pain in joint, lower leg, Pneumonia, Rotator cuff tendinitis, Tear of medial cartilage or meniscus of knee, current, Type 2 diabetes mellitus with microalbuminuria, without long-term current use of insulin ([facility]), Type 2 diabetes mellitus without complication ([facility]), Type II or unspecified type diabetes mellitus without mention of complication, not stated as uncontrolled, and Uses LifeVest defibrillator.     Weights Reviewed: Yes   Admission weight: 59.6 kg (131 lb 6.3 oz) (chart review)   Wt Readings from Last 3 Encounters:   04/10/24 67.9 kg (149 lb 11.1 oz)   02/11/24 59.6 kg (131 lb 8 oz)   01/31/24 60.7 kg (133 lb 13.1 oz)     Intake & Output Reviewed: Yes     Intake/Output Summary (Last 24 hours) at 4/10/2024 2150  Last data filed at 4/10/2024 2043  Gross per 24 hour   Intake 520 ml   Output 350 ml   Net 170 ml       ECHOCARDIOGRAM Reviewed: Yes   Patient's Ejection Fraction (EF) is less than or equal to 40%. Discuss HFrEF Guideline Directed Medical Therapy (GDMT) with Cardiologist or Hospitalist:          Medications Reviewed: Yes   SCHEDULED

## 2024-04-11 NOTE — PROGRESS NOTES
Monitor Tech called this RN and stated patient converted from NSR to Afib RVR. Rates in the 130's, /44, . Patient is complaining of chest pain. Dialysis Nurse stated they are stopping the dialysis early. Dr Muller made aware.     Dr Muller gave new orders of EKG stat, troponin stat, and nitro sublingual.     EKG confirmed Afib RVR.     Rapid response called by dialysis nurses due to patients active chest pain and BP systolic in the 80's.     Dr Muller at bedside, ordered to give nitro Sublingual. After 5 min patient stated pain went from 8/10 chest pain to 2/10. Dr Muller ordered for the patient to be transferred to ICU.     Per dialysis RN the patient was given 1200ml of fluids throughout dialysis treatment including the bolus due to the low blood pressure. And through out dialysis a total of 500 ml was taken off. Oxygen was increased to 4 liter NC from 2 Liter NC.       Patient transferred to ICU and handoff report given to Clover Cifuentes RN.

## 2024-04-11 NOTE — PROGRESS NOTES
IM Progress Note    Admit Date:  4/1/2024  10    Interval history:    chronic systolic CHF with very low EF at 20 % presenting with acute CHF and renal failure with refractory hyperkalemia    Initiated on CRRT -> transitioned to HD now   Drop in h.h-> PRBC transfused        Subjective:  Ms. Fuentes seen. No pain. No shortness of breath. She is feeling better today     Objective:   /62   Pulse 94   Temp 98.2 °F (36.8 °C) (Oral)   Resp 18   Ht 1.524 m (5')   Wt 71.5 kg (157 lb 10.1 oz)   SpO2 98%   BMI 30.78 kg/m²     Intake/Output Summary (Last 24 hours) at 4/11/2024 0807  Last data filed at 4/10/2024 2234  Gross per 24 hour   Intake 400 ml   Output 450 ml   Net -50 ml         Physical Exam:    General:  elderly female.   Awake, alert and fairly oriented. Appears to be not in any distress  Mucous Membranes:  Pink , anicteric  Neck: no JVD, no carotid bruit, no thyromegaly  Chest:  Clear to auscultation; no wheezes, rales or rhonchi   Cardiovascular:  RRR S1S2 heard, no murmurs or gallops  Abdomen:  Soft, undistended, non tender, no organomegaly, BS present  Extremities- improving  upper and lower extremity edema- down to 2 +  Right sided temp HD line   Neurological : grossly normal with gen weakness      Medications:   Scheduled Medications:    furosemide  40 mg IntraVENous BID    epoetin reed-epbx  10,000 Units IntraVENous Once per day on Tue Thu Sat    metoprolol succinate  12.5 mg Oral Daily    sodium chloride flush  5-40 mL IntraVENous 2 times per day    [Held by provider] heparin (porcine)  5,000 Units SubCUTAneous 3 times per day    allopurinol  100 mg Oral Daily    aspirin  81 mg Oral Daily    atorvastatin  40 mg Oral Nightly    insulin lispro  0-8 Units SubCUTAneous TID WC    insulin lispro  0-4 Units SubCUTAneous Nightly     I   sodium chloride 5 mL/hr at 04/04/24 0144    dextrose       heparin (porcine), calcium carbonate, melatonin, sodium chloride flush, sodium chloride, ondansetron **OR**  30%.   There is severe global with regional hypokinesis. The inferolateral and   inferior walls appear severely hypokinetic.   Average GLS is reduced at - 11.1%.   Diastolic function not assessed due to mitral valve annuloplasty ring .   Right ventricle appears mildly enlarged with reduced function.   The right atrium is mildly dilated.   An Edward Physio II annuloplasty ring is seen in the mitral position.   Mild mitral stenosis.   Mild to moderate mitral regurgitation.   Mild aortic regurgitation.   Moderate tricuspid regurgitation.   Systolic pulmonary artery pressure (SPAP) estimated at 56 mmHg (RA pressure   8 mmHg).   Mild-moderate pulmonic regurgitation present.      Compared with the prior study performed 1/23/24 (Limited Echo with 25% EF severe global hypokinesis inferior akinesis. RV reduced function), LVEF   appears slightly improved.      Assessment & Plan:    #Acute on chronic systolic CHF  #Ischemic cardiomyopathy   -pro-BNP >23k  -CXR shows cardiomegaly with pulmonary vascular congestion  Also has massive peripheral edema and refractory hyperkalemia  -limited echo 1/24 with similar EF of 25 % and repeat ECHo with slight improvement to 30 %, with severe global hypokinesis   -nephrology consulted   - initiated on CRRT for high K and had fluid removal. Transitioned to HD with continued fluid removal    - Resumed Toprol ; holding Entresto,SGLT, aldactone 2 to LANCE     #LANCE on CKD4  #Hyperkalemia and metabolic acidosis  - possible cardiorenal  - holding entresto, k levels did not improve with medical mx  - given bicarb fluids   - nephrology consulted   - initiated on CRRT with fluid removal done for 72 hrs, now  switched to HD. Next HD due today   Will Likely need outpt HD set up    #Elevated troponin  #CAD s/p previous CABG   - troponin 61-->59  - EKG with non-specific T wave changes   - last stress test with infarct only   - patient denies chest pain   - continue ASA, statin, resumed low dose  BB

## 2024-04-11 NOTE — PROGRESS NOTES
Rapid response     Pt with severe CMP, chr sys CHF. New HD .   LANCE on CKD , S/P CRRT .     At HD today - C/O CP, tele- new a fib .   Hd stopped    BP low- 400ml fluid bolus given    S/L NTG given. CP down from 8-> 2.     Pt appears ill, dyspneic .     Transfer to ICU

## 2024-04-11 NOTE — PROGRESS NOTES
1849: primary RN at bedside. Informed dialysis staff that [facility] had reported rapid transition to a-fib.     1850: tx ended with 23 min left.  Pt was c/o chest pain/pressure. Blood returned w/o complication. Lumens flushed, heparanized and capped.      1855: BP 91/49.     1857: RT at bedside to do urgent EKG per Dr. Muller. Results were positive for rapid a-fib with RVR.     1900: BP 85/56,     1903: BP 96/43, . Pt became minimally responsive. Upon trying to talk to pt, she was more confused than earlier in tx. Pt had difficulty keeping her eyes open and responding to basic questions.    1905: Rapid response called.  Venous lumen opened back up and 400ml NS given.     1908: BP 88/53, . Part of rapid team at bedside.     1909: BP 94/41, . Rest of the rapid team at bedside along with Dr. Muller.    1910: Nitro given by primary RN.    1912: BP 88/48,     1915: Orders from Dr. Muller to admit pt to ICU. Venous lumen re-heparanized and capped.    1917: pt left unit with rapid team.     1920: Dr. Gavin aware of situation

## 2024-04-11 NOTE — CARE COORDINATION
Reviewed chart, met with pt and family at bedside. Plan remains for pt to go to [facility] at DC. Waiting on Hep B Core lab to come back to finish HD setup, still \"in process\". Will continue to monitor.     11:15 Spoke with Eldon at [facility], precert is back. We are still waiting on Hep B Core.     1400 - Hep B core back, faxed to Eldon at [facility]. Called and spoke with Eldon who states pt will have HD at facility on MWF. Pt to have HD today. He is asking if pt can be DC Saturday to start HD at facility on Monday. Perfect serve sent to Dr. KOURTNEY Colón also states precert is back and is good for 7 days. LVM for Dr. Gavin for updated DC plan. Pt updated as well. She asks that I call family when there is a tentative DC day in place. Will continue to monitor.     1500- Spoke with Dr. Gavin on unit. He states pt will need tunnel cath before DC. States she can get HD again tomorrow then DC after that. [place], , updated. LVM for daughter Sandra as well. Will continue to monitor.

## 2024-04-11 NOTE — PROGRESS NOTES
Pt reansfered to ICU room 7. A/O x 4. Chest pain improved since nitro given during rapid, pain currently 2/10.  Afib RVR -160s. BP 98/87. Cardizem gtt started at ordered rate of 5mg/hr. Dr Muller was at bedside and gave verbal order to hold off on Cardizem bolus and levophed gtt to see how pt tolerates cardizem gtt.

## 2024-04-11 NOTE — PLAN OF CARE
Problem: Discharge Planning  Goal: Discharge to home or other facility with appropriate resources  4/10/2024 2149 by Nidhi Dunne RN  Outcome: Progressing  4/10/2024 1008 by Lakshmi Stone RN  Outcome: Progressing     Problem: Safety - Adult  Goal: Free from fall injury  4/10/2024 2149 by Nidhi Dunne RN  Outcome: Progressing  4/10/2024 1008 by Lakshmi Stone RN  Outcome: Progressing     Problem: Chronic Conditions and Co-morbidities  Goal: Patient's chronic conditions and co-morbidity symptoms are monitored and maintained or improved  4/10/2024 2149 by Nidhi Dunne RN  Outcome: Progressing  4/10/2024 1008 by Lakshmi Stone RN  Outcome: Progressing     Problem: Respiratory - Adult  Goal: Achieves optimal ventilation and oxygenation  4/10/2024 2149 by Nidhi Dunne RN  Outcome: Progressing  4/10/2024 1008 by Lakshmi Stone RN  Outcome: Progressing     Problem: Cardiovascular - Adult  Goal: Maintains optimal cardiac output and hemodynamic stability  4/10/2024 2149 by Nidhi Dunne RN  Outcome: Progressing  4/10/2024 1008 by Lakshmi Stone RN  Outcome: Progressing  Goal: Absence of cardiac dysrhythmias or at baseline  4/10/2024 2149 by Nidhi Dunne RN  Outcome: Progressing  4/10/2024 1008 by Lakshmi Stone RN  Outcome: Progressing     Problem: Pain  Goal: Verbalizes/displays adequate comfort level or baseline comfort level  4/10/2024 2149 by Nidhi Dunne RN  Outcome: Progressing  4/10/2024 1008 by Lakshmi Stone RN  Outcome: Progressing     Problem: Nutrition Deficit:  Goal: Optimize nutritional status  4/10/2024 2149 by Nidhi Dunne RN  Outcome: Progressing  4/10/2024 1008 by Lakshmi Stone RN  Outcome: Progressing

## 2024-04-11 NOTE — PROGRESS NOTES
Patient urinated in her bed two times, she stated she did not realize she had to go until it was too late, purewick placed at this time

## 2024-04-11 NOTE — PLAN OF CARE
Problem: Discharge Planning  Goal: Discharge to home or other facility with appropriate resources  4/11/2024 0754 by Lakshmi Knowles RN  Outcome: Progressing  4/10/2024 2149 by Nidhi Dunne RN  Outcome: Progressing     Problem: Safety - Adult  Goal: Free from fall injury  4/11/2024 0754 by Lakshmi Knowles RN  Outcome: Progressing  4/10/2024 2149 by Nidhi Dunne RN  Outcome: Progressing     Problem: Chronic Conditions and Co-morbidities  Goal: Patient's chronic conditions and co-morbidity symptoms are monitored and maintained or improved  4/11/2024 0754 by Lakshmi Knowles RN  Outcome: Progressing  4/10/2024 2149 by Nidhi Dunne RN  Outcome: Progressing     Problem: Respiratory - Adult  Goal: Achieves optimal ventilation and oxygenation  4/11/2024 0754 by Lakshmi Knowles RN  Outcome: Progressing  4/10/2024 2149 by Nidhi Dunne RN  Outcome: Progressing     Problem: Cardiovascular - Adult  Goal: Maintains optimal cardiac output and hemodynamic stability  4/11/2024 0754 by Lakshmi Knowles RN  Outcome: Progressing  4/10/2024 2149 by Nidhi Dunne RN  Outcome: Progressing  Goal: Absence of cardiac dysrhythmias or at baseline  4/11/2024 0754 by Lakshmi Knowles RN  Outcome: Progressing  4/10/2024 2149 by Nidhi Dunne RN  Outcome: Progressing     Problem: Pain  Goal: Verbalizes/displays adequate comfort level or baseline comfort level  4/11/2024 0754 by Lakshmi Knowles RN  Outcome: Progressing  4/10/2024 2149 by Nidhi Dunne RN  Outcome: Progressing     Problem: Nutrition Deficit:  Goal: Optimize nutritional status  4/11/2024 0754 by Lakshmi Knowles RN  Outcome: Progressing  4/10/2024 2149 by Nidhi Dunne RN  Outcome: Progressing

## 2024-04-11 NOTE — FLOWSHEET NOTE
04/10/24 2040   Vital Signs   Temp 98.1 °F (36.7 °C)   Temp Source Oral   Pulse 80   Heart Rate Source Monitor   Respirations 17   /73   MAP (Calculated) 87   BP Location Left upper arm   BP Method Automatic   Patient Position Semi fowlers   Oxygen Therapy   SpO2 100 %   O2 Device Nasal cannula   O2 Flow Rate (L/min) 2 L/min     PM Assessment completed. Scheduled medications given per MAR, On 2L of oxygen, A&O X4, Vital Signs completed and Charted, Patient denies any further needs at this time. Call light within reach, Reminded patient to call RN if she needs anything.

## 2024-04-11 NOTE — PROGRESS NOTES
AM assessment completed. Scheduled medications given per MAR. VSS 2 liter NC, A/O x4. Patient denies any needs at this time. Call light in reach, will monitor, bed alarm on.

## 2024-04-11 NOTE — PROGRESS NOTES
Nephrology Progress Note   [facility].IFTTT      Sub/interval history  Feels tired, sleeping    HD on 4/9 with net UF of 160 mL.  45 minutes into dialysis, patient reported of feeling heaviness on her chest.  At that time blood flow rate was decreased to 300 mL/min from 350 and UF goal decreased from 2 L to 1 L.  Subsequently 100 mL of normal saline was given and UF was turned off completely but the patient's symptoms continued and dialysis was stopped in 1 hour 9 minutes.    Last 24-hour urine output be 450 ml charted     ROS: No fever   PSFH: No visitor    Scheduled Meds:   furosemide  40 mg IntraVENous BID    epoetin reed-epbx  10,000 Units IntraVENous Once per day on Tue Thu Sat    metoprolol succinate  12.5 mg Oral Daily    sodium chloride flush  5-40 mL IntraVENous 2 times per day    [Held by provider] heparin (porcine)  5,000 Units SubCUTAneous 3 times per day    allopurinol  100 mg Oral Daily    aspirin  81 mg Oral Daily    atorvastatin  40 mg Oral Nightly    insulin lispro  0-8 Units SubCUTAneous TID WC    insulin lispro  0-4 Units SubCUTAneous Nightly     Continuous Infusions:   sodium chloride 5 mL/hr at 04/04/24 0144    dextrose       PRN Meds:.heparin (porcine), calcium carbonate, melatonin, sodium chloride flush, sodium chloride, ondansetron **OR** ondansetron, polyethylene glycol, acetaminophen **OR** acetaminophen, glucose, dextrose bolus **OR** dextrose bolus, glucagon (rDNA), dextrose    Objective/     Vitals:    04/11/24 1000 04/11/24 1415 04/11/24 1556 04/11/24 1608   BP: 107/65 115/64 (!) 97/50 (!) 111/50   Pulse: 84 78 78 76   Resp: 16 16 16 16   Temp: 98.2 °F (36.8 °C) 98 °F (36.7 °C) 98 °F (36.7 °C)    TempSrc: Oral Oral     SpO2: 95% 98%     Weight:   71.5 kg (157 lb 10.1 oz)    Height:         24HR INTAKE/OUTPUT:    Intake/Output Summary (Last 24 hours) at 4/11/2024 1634  Last data filed at 4/11/2024 1426  Gross per 24 hour   Intake 300 ml   Output 250 ml   Net 50 ml       Gen:  Ill-appearing  Neck: No JVD  Skin: Unremarkable  Cardiovascular:  S1, S2 without m/r/g   Respiratory: CTA B without w/r/r; respiratory effort normal  Abdomen:  soft, nt, nd,   Extremities: Trace lower extremity edema  Neuro/Psy: Lethargic    Data/  Recent Labs     04/09/24  1127 04/11/24  0612   WBC 5.3 7.0   HGB 7.9* 8.2*   HCT 25.8* 25.6*   MCV 91.6 89.3   PLT 75* 113*       Recent Labs     04/09/24  1127 04/11/24  0612   * 134*   K 4.6 4.7    98*   CO2 31 29   GLUCOSE 132* 120*   PHOS 3.9 4.0   BUN 35* 34*   CREATININE 2.5* 1.8*   LABGLOM 19* 28*         Assessment/    -LANCE likely from ATN in the setting of CKD III/IV with a baseline creatinine of 1.8 to 2.2 mg/dl   Patient is oliguric lethargic despite improved creatinine on 4/11      - HTN: Blood pressure within acceptable range.     - Anemia: Stable hemoglobin, monitor.    -Thrombocytopenia, heparin has been stopped, platelet count increased on 4/8 to 58 K; 75 K on 4/9    -Chest pain per primary team/cardiology    Plan/   -Continue HD on TTS schedule   Patient has been accepted for outpatient dialysis, will order tunneled dialysis catheter for 4/12    Anticipating renal recovery soon  -Serial labs  -Lasix 40 mg IV twice daily, hold for systolic blood pressure less than 100  -Retacrit 10,000 unit with HD  -Renal dose medications    Jarred Gavin MD  Office: [phone]  Fax:    [phone]  [facility]

## 2024-04-11 NOTE — SIGNIFICANT EVENT
Rapid Response:  CMU had already notified primary RN of rhythm change to Afib RVR while in dialysis. EKG ordered and completed.  BP decreased and pt c/o chest pain-HD RN called RRT overhead- good response of staff.  Transfer to ICU.

## 2024-04-12 PROBLEM — I48.91 ATRIAL FIBRILLATION WITH RVR (HCC): Status: ACTIVE | Noted: 2024-04-12

## 2024-04-12 PROBLEM — E43 SEVERE PROTEIN-CALORIE MALNUTRITION (HCC): Status: ACTIVE | Noted: 2024-04-12

## 2024-04-12 LAB
ALBUMIN SERPL-MCNC: 2.5 G/DL (ref 3.4–5)
ANION GAP SERPL CALCULATED.3IONS-SCNC: 4 MMOL/L (ref 3–16)
BUN SERPL-MCNC: 21 MG/DL (ref 7–20)
CALCIUM SERPL-MCNC: 8 MG/DL (ref 8.3–10.6)
CHLORIDE SERPL-SCNC: 98 MMOL/L (ref 99–110)
CO2 SERPL-SCNC: 32 MMOL/L (ref 21–32)
CREAT SERPL-MCNC: 1.4 MG/DL (ref 0.6–1.2)
DEPRECATED RDW RBC AUTO: 17.6 % (ref 12.4–15.4)
EKG ATRIAL RATE: 119 BPM
EKG ATRIAL RATE: 241 BPM
EKG DIAGNOSIS: NORMAL
EKG DIAGNOSIS: NORMAL
EKG Q-T INTERVAL: 326 MS
EKG Q-T INTERVAL: 368 MS
EKG QRS DURATION: 116 MS
EKG QRS DURATION: 94 MS
EKG QTC CALCULATION (BAZETT): 414 MS
EKG QTC CALCULATION (BAZETT): 481 MS
EKG R AXIS: 69 DEGREES
EKG R AXIS: 99 DEGREES
EKG T AXIS: 191 DEGREES
EKG T AXIS: 214 DEGREES
EKG VENTRICULAR RATE: 131 BPM
EKG VENTRICULAR RATE: 76 BPM
GFR SERPLBLD CREATININE-BSD FMLA CKD-EPI: 38 ML/MIN/{1.73_M2}
GLUCOSE BLD-MCNC: 114 MG/DL (ref 70–99)
GLUCOSE BLD-MCNC: 179 MG/DL (ref 70–99)
GLUCOSE BLD-MCNC: 211 MG/DL (ref 70–99)
GLUCOSE BLD-MCNC: 78 MG/DL (ref 70–99)
GLUCOSE SERPL-MCNC: 101 MG/DL (ref 70–99)
HCT VFR BLD AUTO: 24.4 % (ref 36–48)
HGB BLD-MCNC: 7.6 G/DL (ref 12–16)
INR PPP: 1.17 (ref 0.85–1.15)
MCH RBC QN AUTO: 28.3 PG (ref 26–34)
MCHC RBC AUTO-ENTMCNC: 31.3 G/DL (ref 31–36)
MCV RBC AUTO: 90.4 FL (ref 80–100)
PERFORMED ON: ABNORMAL
PERFORMED ON: NORMAL
PHOSPHATE SERPL-MCNC: 3.5 MG/DL (ref 2.5–4.9)
PLATELET # BLD AUTO: 122 K/UL (ref 135–450)
PMV BLD AUTO: 8.6 FL (ref 5–10.5)
POTASSIUM SERPL-SCNC: 4.1 MMOL/L (ref 3.5–5.1)
PROTHROMBIN TIME: 15.2 SEC (ref 11.9–14.9)
RBC # BLD AUTO: 2.7 M/UL (ref 4–5.2)
SODIUM SERPL-SCNC: 134 MMOL/L (ref 136–145)
WBC # BLD AUTO: 4.6 K/UL (ref 4–11)

## 2024-04-12 PROCEDURE — 80069 RENAL FUNCTION PANEL: CPT

## 2024-04-12 PROCEDURE — 6370000000 HC RX 637 (ALT 250 FOR IP): Performed by: INTERNAL MEDICINE

## 2024-04-12 PROCEDURE — 99233 SBSQ HOSP IP/OBS HIGH 50: CPT | Performed by: INTERNAL MEDICINE

## 2024-04-12 PROCEDURE — 93005 ELECTROCARDIOGRAM TRACING: CPT | Performed by: STUDENT IN AN ORGANIZED HEALTH CARE EDUCATION/TRAINING PROGRAM

## 2024-04-12 PROCEDURE — 2580000003 HC RX 258: Performed by: INTERNAL MEDICINE

## 2024-04-12 PROCEDURE — 85610 PROTHROMBIN TIME: CPT

## 2024-04-12 PROCEDURE — 6370000000 HC RX 637 (ALT 250 FOR IP): Performed by: STUDENT IN AN ORGANIZED HEALTH CARE EDUCATION/TRAINING PROGRAM

## 2024-04-12 PROCEDURE — 99222 1ST HOSP IP/OBS MODERATE 55: CPT | Performed by: STUDENT IN AN ORGANIZED HEALTH CARE EDUCATION/TRAINING PROGRAM

## 2024-04-12 PROCEDURE — 2000000000 HC ICU R&B

## 2024-04-12 PROCEDURE — 2700000000 HC OXYGEN THERAPY PER DAY

## 2024-04-12 PROCEDURE — 6360000002 HC RX W HCPCS: Performed by: INTERNAL MEDICINE

## 2024-04-12 PROCEDURE — 94761 N-INVAS EAR/PLS OXIMETRY MLT: CPT

## 2024-04-12 PROCEDURE — 93010 ELECTROCARDIOGRAM REPORT: CPT | Performed by: INTERNAL MEDICINE

## 2024-04-12 PROCEDURE — 85027 COMPLETE CBC AUTOMATED: CPT

## 2024-04-12 RX ORDER — AMIODARONE HYDROCHLORIDE 200 MG/1
200 TABLET ORAL DAILY
Status: DISCONTINUED | OUTPATIENT
Start: 2024-04-19 | End: 2024-04-20

## 2024-04-12 RX ORDER — AMIODARONE HYDROCHLORIDE 200 MG/1
200 TABLET ORAL 2 TIMES DAILY
Status: ACTIVE | OUTPATIENT
Start: 2024-04-12 | End: 2024-04-19

## 2024-04-12 RX ORDER — HYDRALAZINE HYDROCHLORIDE 10 MG/1
10 TABLET, FILM COATED ORAL EVERY 8 HOURS SCHEDULED
Status: DISCONTINUED | OUTPATIENT
Start: 2024-04-12 | End: 2024-04-20 | Stop reason: HOSPADM

## 2024-04-12 RX ADMIN — NITROGLYCERIN 0.4 MG: 0.4 TABLET SUBLINGUAL at 06:46

## 2024-04-12 RX ADMIN — SODIUM CHLORIDE, PRESERVATIVE FREE 10 ML: 5 INJECTION INTRAVENOUS at 20:19

## 2024-04-12 RX ADMIN — FUROSEMIDE 40 MG: 10 INJECTION, SOLUTION INTRAMUSCULAR; INTRAVENOUS at 19:10

## 2024-04-12 RX ADMIN — ALLOPURINOL 100 MG: 100 TABLET ORAL at 11:23

## 2024-04-12 RX ADMIN — FUROSEMIDE 40 MG: 10 INJECTION, SOLUTION INTRAMUSCULAR; INTRAVENOUS at 11:24

## 2024-04-12 RX ADMIN — AMIODARONE HYDROCHLORIDE 200 MG: 200 TABLET ORAL at 11:23

## 2024-04-12 RX ADMIN — DESMOPRESSIN ACETATE 40 MG: 0.2 TABLET ORAL at 20:19

## 2024-04-12 RX ADMIN — AMIODARONE HYDROCHLORIDE 200 MG: 200 TABLET ORAL at 20:19

## 2024-04-12 RX ADMIN — ACETAMINOPHEN 650 MG: 325 TABLET ORAL at 11:23

## 2024-04-12 RX ADMIN — MUPIROCIN: 20 OINTMENT TOPICAL at 22:27

## 2024-04-12 RX ADMIN — ASPIRIN 81 MG: 81 TABLET, COATED ORAL at 11:23

## 2024-04-12 ASSESSMENT — PAIN DESCRIPTION - DESCRIPTORS
DESCRIPTORS: ACHING
DESCRIPTORS: PRESSURE

## 2024-04-12 ASSESSMENT — PAIN SCALES - GENERAL: PAINLEVEL_OUTOF10: 1

## 2024-04-12 ASSESSMENT — PAIN DESCRIPTION - ORIENTATION
ORIENTATION: RIGHT
ORIENTATION: MID

## 2024-04-12 ASSESSMENT — PAIN DESCRIPTION - LOCATION
LOCATION: CHEST
LOCATION: FOOT

## 2024-04-12 NOTE — FLOWSHEET NOTE
04/12/24 0129   Vitals   Pulse 78   Respirations 19   Cardiac Rhythm Sinus rhythm     Pt converted from afib to SR on tele monitor. HR in the 70s. Cardizem gtt turned down from 5 to 2.5mg/hr per MAR.

## 2024-04-12 NOTE — ADT AUTH CERT
Consult Notes    Consults by Edenilson Kelly DO at 4/12/2024  9:13 AM    Author: Edenilson Kelly DO Specialty: Cardiology, Internal Medicine Author Type: Physician   Filed: 4/12/2024 10:20 AM Date of Service: 4/12/2024  9:13 AM Status: Addendum   : Edenilson Kelly DO (Physician)   Related Notes: Original Note by Edenilson Kelly DO (Physician) filed at 4/12/2024 10:18 AM   Consult Orders   1. Consult to Cardiology [[id number]] ordered by Claudia Can PA at 02/07/24 1532     Expand All Collapse All          CARDIOLOGY CONSULTATION           Patient Name:   Amber Fuentes  Date of admission:          4/1/2024  4:41 PM  Admission Dx:   Hyperkalemia [E87.5]  Anasarca [R60.1]  Bradycardia [R00.1]  Pleural effusion [J90]  Other ascites [R18.8]  Acute renal failure, unspecified acute renal failure type (HCC) [N17.9]  Pneumonia of right lower lobe due to infectious organism [J18.9]  Requesting Physician: Cooper Swanson MD  Primary Care physician: Lucila Tejeda MD     Reason for Consultation/Chief Complaint: \"new onset Afib\"      History of Present Illness:      Amber Fuentes is a 78 y.o. patient with pmhx of CAD s/p CABG and PCI, severe ischemic cardiomyopathy, HFrEF, PFO s/p closure, MR s/p repair, hypertension, hyperlipidemia, diabetes mellitus and chronic kidney disease who presented to the hospital on 4/1/24 with cc of \"feeling unwell past 2 days. Swelling in abdomen and extremities, and diarrhea.\" Patient was found to be in decompensated HF, renal failure with refractory hyperkalemia, and anemia requiring PRBC transfusion. Yesterday, during HD developed chest pain with new onset Afib. She was given nitro which improved her chest pain from 8 to 2. Received IVF bolus for low BP. Patient appeared ill per documentation and was transferred to the ICU. No chest pain since out of Afib RVR. Biggest complaint is right foot pain.      ECG  Atrial fibrillation with rapid ventricular response,   04/02/2024 01:13 AM     K 6.6 04/02/2024 01:13 AM     K 7.5 04/01/2024 08:50 PM      04/02/2024 01:13 AM     CO2 17 04/02/2024 01:13 AM     BUN 57 04/02/2024 01:13 AM     LABALBU 3.2 04/01/2024 08:50 PM     CREATININE 2.4 04/02/2024 01:13 AM     CALCIUM 8.0 04/02/2024 01:13 AM     GFRAA 33 10/14/2022 01:29 PM     GFRAA >60 06/04/2011 02:30 AM     LABGLOM 20 04/02/2024 01:13 AM     GLUCOSE 102 04/02/2024 01:13 AM     GLUCOSE 128 02/27/2012 11:08 AM         IMPRESSION/RECOMMENDATIONS:        - Hyperkalemia: metabolic acidosis + poor perfusion. Medically treated. Potassium is getting better. Will start gentle bicarb drip. Started bumex drip. Will give diuril.May need RRT.      - CKD IV: Most likely diabetic nephropathy and cardiorenal syndrome. baseline sr cr 1.8-2.4. Renal function stable.     - Metabolic acidosis: due to diarrhea + poor perfusion. Started gentle bicarb drip.      - DMII: Hypoglycemic. Started  on D5 (with bicarb)     - CAD     - Chronic Systolic CHF: Edema present. Started on bumex drip. Good is not draining well. Change good cath. Urine color light yellow.      Discussed with ER and primary team. Critical time sent 34 minutes.     Thank you for allowing me to participate in the care of this patient.  I will continue to follow along.  Please call with questions.     Jim Tyler MD, MD,   4/2/2024            Vitals (last 3 days)    Date/Time Temp Pulse Resp BP MAP (mmHg) SpO2 O2 Device [facility]   04/12/24 1400 -- 87 18 118/54 Abnormal  72 94 % Nasal cannula CC   04/12/24 1300 -- 87 30 122/55 Abnormal  75 94 % Nasal cannula CC   04/12/24 1200 97.7 °F (36.5 °C) 87 17 132/55 Abnormal  77 95 % Nasal cannula CC   04/12/24 1100 -- 84 23 121/54 Abnormal  74 100 % Nasal cannula CC   04/12/24 1000 -- 75 25 116/53 Abnormal  72 100 % Nasal cannula CC   04/12/24 0900 97.7 °F (36.5 °C) 76 24 115/52 Abnormal  69 100 % Nasal cannula CC   04/12/24 0800 -- 74 25 110/52 Abnormal  68 100 % -- CC   04/12/24

## 2024-04-12 NOTE — PROGRESS NOTES
HEART FAILURE CARE PLAN:    Comorbidities Reviewed: Yes   Patient has a past medical history of Anemia, Backache, unspecified, CAD, multiple vessel, Chronic kidney disease (CKD) stage G3b/A1, moderately decreased glomerular filtration rate (GFR) between 30-44 mL/min/1.73 square meter and albuminuria creatinine ratio less than 30 mg/g ([facility]), Chronic obstructive pulmonary disease (HCC), Chronic systolic CHF (congestive heart failure) ([facility]), COPD (chronic obstructive pulmonary disease) ([facility]), Depressive disorder, not elsewhere classified, Essential hypertension, benign, Gout, History of blood transfusion, Hyperlipidemia associated with type 2 diabetes mellitus ([facility]), Major depressive disorder, recurrent episode, moderate ([facility]), Osteoarthritis, hand, Osteoarthrosis, unspecified whether generalized or localized, lower leg, Osteopenia, Other and unspecified hyperlipidemia, Pain in joint, lower leg, Pneumonia, Rotator cuff tendinitis, Tear of medial cartilage or meniscus of knee, current, Type 2 diabetes mellitus with microalbuminuria, without long-term current use of insulin ([facility]), Type 2 diabetes mellitus without complication ([facility]), Type II or unspecified type diabetes mellitus without mention of complication, not stated as uncontrolled, and Uses LifeVest defibrillator.     Weights Reviewed: Yes   Admission weight: 59.6 kg (131 lb 6.3 oz) (chart review)   Wt Readings from Last 3 Encounters:   04/12/24 70.8 kg (156 lb)   02/11/24 59.6 kg (131 lb 8 oz)   01/31/24 60.7 kg (133 lb 13.1 oz)     Intake & Output Reviewed: Yes     Intake/Output Summary (Last 24 hours) at 4/12/2024 2107  Last data filed at 4/12/2024 0553  Gross per 24 hour   Intake 136.15 ml   Output 100 ml   Net 36.15 ml       ECHOCARDIOGRAM Reviewed: Yes   Patient's Ejection Fraction (EF) is less than or equal to 40%. Discuss HFrEF Guideline Directed Medical Therapy (GDMT) with Cardiologist or Hospitalist:          Medications Reviewed: Yes   SCHEDULED HOSPITAL  MEDICATIONS:   mupirocin   Each Nostril BID    amiodarone  200 mg Oral BID    Followed by    [START ON 4/19/2024] amiodarone  200 mg Oral Daily    hydrALAZINE  10 mg Oral 3 times per day    dilTIAZem  10 mg IntraVENous Once    furosemide  40 mg IntraVENous BID    epoetin reed-epbx  10,000 Units IntraVENous Once per day on Tue Thu Sat    [Held by provider] metoprolol succinate  12.5 mg Oral Daily    sodium chloride flush  5-40 mL IntraVENous 2 times per day    [Held by provider] heparin (porcine)  5,000 Units SubCUTAneous 3 times per day    allopurinol  100 mg Oral Daily    aspirin  81 mg Oral Daily    atorvastatin  40 mg Oral Nightly    insulin lispro  0-8 Units SubCUTAneous TID WC    insulin lispro  0-4 Units SubCUTAneous Nightly     HOME MEDICATIONS:  Prior to Admission medications    Medication Sig Start Date End Date Taking? Authorizing Provider   metoprolol succinate (TOPROL XL) 25 MG extended release tablet TAKE THREE TABLETS BY MOUTH DAILY 2/23/24   Lucila Tejeda MD   sacubitril-valsartan (ENTRESTO) 24-26 MG per tablet Take 0.5 tablets by mouth 2 times daily Hold until follow-up appointment  Patient taking differently: Take 0.5 tablets by mouth 2 times daily Hold until follow-up appointment- Pt states she takes it once a day. 2/11/24   Debora Da Silva DO   allopurinol (ZYLOPRIM) 300 MG tablet Take 0.5 tablets by mouth daily TAKE 1 TABLET BY MOUTH DAILY 1/31/24   Brian Gomez MD   insulin glargine (LANTUS SOLOSTAR) 100 UNIT/ML injection pen Inject 10 Units into the skin nightly  Patient taking differently: Inject 10 Units into the skin 2 times daily 16 in the AM and 10 in the PM. 1/31/24   Brian Gomez MD   isosorbide dinitrate (ISORDIL) 10 MG tablet Take 1 tablet by mouth in the morning and at bedtime 1/31/24   Brian Gomez MD   furosemide (LASIX) 40 MG tablet Take 0.5 tablets by mouth daily  Patient taking differently: Take 0.5 tablets by mouth daily Every other day.

## 2024-04-12 NOTE — PROGRESS NOTES
IM Progress Note    Admit Date:  4/1/2024  11    Interval history:    chronic systolic CHF with very low EF at 20 % presenting with acute CHF and renal failure with refractory hyperkalemia    Initiated on CRRT -> transitioned to HD now   Drop in h.h-> PRBC transfused        Subjective:  Ms. Fuentes was transferred to the ICU last night after a rapid response was called.  Patient had chest pain and went into rapid A-fib.  She was minimally responsive.  This morning she is back in sinus rhythm and her mentation is normal.  Has had intermittent complaints of chest pain.  Has known CAD.  Has known severe ischemic cardiomyopathy.  Prior PFO closure.  Status post mitral regurg repair.  She has CKD.    Objective:   BP (!) 122/55   Pulse 87   Temp 97.7 °F (36.5 °C) (Temporal)   Resp 30   Ht 1.524 m (5')   Wt 70.8 kg (156 lb)   SpO2 94%   BMI 30.47 kg/m²     Intake/Output Summary (Last 24 hours) at 4/12/2024 1341  Last data filed at 4/12/2024 0553  Gross per 24 hour   Intake 316.15 ml   Output 100 ml   Net 216.15 ml         Physical Exam:    General:  elderly female.   Awake, alert and fairly oriented. Appears to be not in any distress  Mucous Membranes:  Pink , anicteric  Neck: no JVD, no carotid bruit, no thyromegaly  Chest:  Clear to auscultation; no wheezes, rales or rhonchi   Cardiovascular:  RRR S1S2 heard, no murmurs or gallops-ECG today shows accelerated junctional rhythm.  ECG yesterday showed A-fib.  Abdomen:  Soft, undistended, non tender, no organomegaly, BS present  Extremities- improving  upper and lower extremity edema- down to 2 +  Right sided temp HD line   Neurological : grossly normal with gen weakness      Medications:   Scheduled Medications:    mupirocin   Each Nostril BID    amiodarone  200 mg Oral BID    Followed by    [START ON 4/19/2024] amiodarone  200 mg Oral Daily    hydrALAZINE  10 mg Oral 3 times per day    dilTIAZem  10 mg IntraVENous Once    furosemide  40 mg IntraVENous BID    epoetin

## 2024-04-12 NOTE — CARE COORDINATION
Met with family per request of Garcia NOGUERA. The family is requesting a Palliative Care Consult.    Son and two other family members are at bedside. CM asked if the family knows which hospice they would prefer. The son asked that CM contact his sister Amber.    CM contacted Amber to discuss hospice choices. CM named 4-5 different hospice agencies. Amber did not have an idea. Amber also stated she does not need to be present for the hospice talk as long as her brother is at the hospital. Amber would also like to speak to Garcia RN. Message given to Garcia.     CM spoke with patient and son about hospice. The son stated [facility] is a good one.     CM spoke to Brittney/AMELIA who can meet with the family between 1:00 and 2:00 today.    CM spoke to Dr. Victor to advise the patient would like to be made a DNR. Dr. Vcitor will talk to the family during rounds this morning.     [facility] had an inpatient bed but patient does not know what she wants to do. Hospice will follow-up with the patient tomorrow.

## 2024-04-12 NOTE — ADT AUTH CERT
Medication Administration Report  for Amber Fuentes as of 04/01/24 through 04/03/24  Legend:       Medications 04/01/24 04/02/24 04/03/24   0.9 % sodium chloride infusion  Freq: PRN Route: IV  PRN Comment: if patient receiving piggyback infusions and maintenance fluids are not ordered OR KVO fluids to protect IV site / prevent frequent line interruptions/ long duration  Start: 04/02/24 0019   Admin Instructions:   For piggyback infusion, administer at same rate as piggyback for a total of 25 mL. Enter 25 mL into dose field and piggyback rate into rate field of order.  If piggyback is infusing at a rate less than 100 mL/hr, enter 25 mL into dose field and 100 mL/hr into rate field of order.  For KVO fluids, enter rate of 20 mL/hr or less into rate field of order.     [id number]   30124   03756     [id number]        33885   26428   42744     83743   25630   [id number]     [id number]   [id number]   [id number]     [id number]   [id number]   [id number]     [id number]           acetaminophen (TYLENOL) tablet 650 mg  Dose: 650 mg  Freq: EVERY 6 HOURS PRN Route: PO  PRN Reasons: Pain Mild (1-3),Fever  PRN Comment: For temp greater than 100.4 F (38 C)  Start: 04/02/24 0019   Admin Instructions:   Maximum dose of acetaminophen is 4000 mg from all sources in 24 hours.      [id number]          Or  acetaminophen (TYLENOL) suppository 650 mg  Dose: 650 mg  Freq: EVERY 6 HOURS PRN Route: RE  PRN Reasons: Pain Mild (1-3),Fever  PRN Comment: For temp greater than 100.4 F (38 C)  Start: 04/02/24 0019   Admin Instructions:   Administer if oral route cannot be used.      20          allopurinol (ZYLOPRIM) tablet 100 mg  Dose: 100 mg  Freq: DAILY Route: PO  Start: 04/02/24 1045     [id number]        [id number]          aspirin EC tablet 81 mg  Dose: 81 mg  Freq: DAILY Route: PO  Start: 04/02/24 1045   Admin Instructions:   Do not crush or break.     [id number]        [id number]          atorvastatin (LIPITOR) tablet 40 mg  Dose: 40 mg  Freq: NIGHTLY Route: PO  Start: 04/02/24 2100     [id number]           Oral     Reason: Other Kavitha Littlejohn   325 Held 04/01/24 2130(s)  04/01/24 2211(a)  04/01/24 2212(r) 0 g   Oral     cannot stay awake long enough to drink anything at this time.  Reason: Other Yulisa Del Cid     (s)=scheduled time  (a)=action time  (r)=recorded time     There is too much information to fit in the report. To view medications before 04/01/24, use the MAR activity.       Jump to the MAR

## 2024-04-12 NOTE — PROGRESS NOTES
No changes in assessment at this time. Family at bedside, update given. No concerns noted at this time. Call light in reach

## 2024-04-12 NOTE — PROGRESS NOTES
Comprehensive Nutrition Assessment    Type and Reason for Visit:  Reassess    Nutrition Recommendations/Plan:   Continue ADULT DIET; Regular; 4 carb choices per meal; Low Potassium diet order.   Added Glucerna ONS with lunch and dinner meals.   Monitor appetite, meal intake, and acceptance/intake of ONS.   Monitor nutrition-related labs, bowel function, and weight trends.      Malnutrition Assessment:  Malnutrition Status:  Severe malnutrition (04/12/24 1110)    Context:  Acute Illness     Findings of the 6 clinical characteristics of malnutrition:  Energy Intake:  50% or less of estimated energy requirements for 5 or more days  Weight Loss:  No significant weight loss     Body Fat Loss:  No significant body fat loss     Muscle Mass Loss:  Moderate muscle mass loss Temples (temporalis), Clavicles (pectoralis & deltoids), Hand (interosseous) (mild muscle loss in interosseous regions)  Fluid Accumulation:  No significant fluid accumulation Extremities, Generalized (BUE/generalized + 3 pitting edema)   Strength:  Not Performed    Nutrition Assessment:    patient is slightly improved from a nutritional standpoint AEB patient consumed % of her breakfast on 4/11/24, however, she remains at risk for further compromise d/t she typically eats one meal per day, she has expressed dislike for the meals in the hospital, constipation, and altered nutrition-related labs; will continue ADULT DIET; Regular; 4 carb choices per meal; Low Potassium diet order and will order Glucerna with lunch and dinner meals    Nutrition Related Findings:    patient is A & O x 4; patient's son was present in the room today for f/u assessment; it is documented in flow sheets that patient consumed % of her breakfast and lunch on 4/11/24, however, patient's son reported that patient did not consume any of her lunch on 4/11/24; patient also did not consume her dinner on 4/11/24; she prefers oatmeal and cream of wheat or toast with

## 2024-04-12 NOTE — PLAN OF CARE
HEART FAILURE CARE PLAN:    Comorbidities Reviewed: Yes   Patient has a past medical history of Anemia, Backache, unspecified, CAD, multiple vessel, Chronic kidney disease (CKD) stage G3b/A1, moderately decreased glomerular filtration rate (GFR) between 30-44 mL/min/1.73 square meter and albuminuria creatinine ratio less than 30 mg/g ([facility]), Chronic obstructive pulmonary disease ([facility]), Chronic systolic CHF (congestive heart failure) ([facility]), COPD (chronic obstructive pulmonary disease) ([facility]), Depressive disorder, not elsewhere classified, Essential hypertension, benign, Gout, History of blood transfusion, Hyperlipidemia associated with type 2 diabetes mellitus ([facility]), Major depressive disorder, recurrent episode, moderate ([facility]), Osteoarthritis, hand, Osteoarthrosis, unspecified whether generalized or localized, lower leg, Osteopenia, Other and unspecified hyperlipidemia, Pain in joint, lower leg, Pneumonia, Rotator cuff tendinitis, Tear of medial cartilage or meniscus of knee, current, Type 2 diabetes mellitus with microalbuminuria, without long-term current use of insulin ([facility]), Type 2 diabetes mellitus without complication ([facility]), Type II or unspecified type diabetes mellitus without mention of complication, not stated as uncontrolled, and Uses LifeVest defibrillator.     Weights Reviewed: Yes   Admission weight: 59.6 kg (131 lb 6.3 oz) (chart review)   Wt Readings from Last 3 Encounters:   04/11/24 69.9 kg (154 lb 1.6 oz)   02/11/24 59.6 kg (131 lb 8 oz)   01/31/24 60.7 kg (133 lb 13.1 oz)     Intake & Output Reviewed: Yes     Intake/Output Summary (Last 24 hours) at 4/12/2024 0038  Last data filed at 4/11/2024 2337  Gross per 24 hour   Intake 417.15 ml   Output --   Net 417.15 ml       ECHOCARDIOGRAM Reviewed: Yes   Patient's Ejection Fraction (EF) is less than or equal to 40%. Discuss HFrEF Guideline Directed Medical Therapy (GDMT) with Cardiologist or Hospitalist:          Medications Reviewed: Yes   SCHEDULED

## 2024-04-12 NOTE — PROGRESS NOTES
Occupational Therapy and Physical Therapy    Attempted treatment this afternoon. RN requesting hold at this time. Family is meeting with hospice. OT/PT will follow up later this weekend if appropriate.       Sirisha Mccarthy, OTR/L #[id number]

## 2024-04-12 NOTE — PROGRESS NOTES
8:09pm:  Paged nephrology.    10:32 PM Dr Pollock called back and I updated him on pt situation. Per Dr. Pollock, continue with current medication to control afib rvr, pt to remain NPO at MN for pos tunneled vas cath in AM, do not give evening dose of lasix d/t soft BP, and nephrology will plan to assess pt in AM.

## 2024-04-12 NOTE — FLOWSHEET NOTE
04/12/24 0400   Vitals   Pulse 73   Respirations 16   BP (!) 96/47   MAP (Calculated) 63   MAP (mmHg) (!) 54   Cardiac Rhythm Sinus rhythm   Pain Assessment   Pain Assessment None - Denies Pain   Oxygen Therapy   SpO2 99 %   O2 Device Nasal cannula   O2 Flow Rate (L/min) 2 L/min     Reassessment completed, see flow sheet. BL lungs diminished.  Pt continues to be SR with HR remaining in 70s. BP remains soft 96/47 and MAP 63. Cardizem gtt stopped at this time. All ICU Lines and monitoring remain in place. Bed locked in lowest position. Call light within reach.

## 2024-04-12 NOTE — PROGRESS NOTES
Tunneled dialysis catheter order acknowledged. Due to ICU status, events overnight and new cardiology consult, will plan to tunnel patient once out of ICU and is medically stable. POORNIMA Zelaya made aware. Will cancel present order to avoid errors in notifications of future orders. Please reorder when patient is appropriate.   Sandra Ga RN

## 2024-04-12 NOTE — PROGRESS NOTES
Shift assessment completed as documented on flowsheet. VSS at this time. Pt denies pain or discomfort. Call light within reach. Monitoring closely. PT currently NPO for possible tunneled dialysis cath.

## 2024-04-12 NOTE — CARE COORDINATION
Called and updated Eldon with [facility] about current patient status. He will pend referral and can accept when medically stable.

## 2024-04-12 NOTE — PROGRESS NOTES
4 Eyes Skin Assessment     NAME:  Amber Fuentes  YOB: 1945  MEDICAL RECORD NUMBER:  [id number]    The patient is being assessed for  Transfer to New Unit    I agree that at least one RN has performed a thorough Head to Toe Skin Assessment on the patient. ALL assessment sites listed below have been assessed.      Areas assessed by both nurses:    Head, Face, Ears, Shoulders, Back, Chest, Arms, Elbows, Hands, Sacrum. Buttock, Coccyx, Ischium, Legs. Feet and Heels, and Under Medical Devices    Scattered bruising and blanchable redness to sacrum     Does the Patient have a Wound? No noted wound(s)       Alek Prevention initiated by RN: No  Wound Care Orders initiated by RN: No    Pressure Injury (Stage 3,4, Unstageable, DTI, NWPT, and Complex wounds) if present, place Wound referral order by RN under : No    New Ostomies, if present place, Ostomy referral order under : No     Nurse 1 eSignature: Electronically signed by Vane Avery RN on 4/12/24 at 12:53 AM EDT    **SHARE this note so that the co-signing nurse can place an eSignature**    Nurse 2 eSignature: Electronically signed by Tanvir Arreaga RN on 4/12/24 at 6:38 AM EDT

## 2024-04-12 NOTE — PROGRESS NOTES
Nephrology Progress Note   [email or web address]      Sub/interval history  Patient is more awake alert today  She is requested hospice discussion, which is underway    HD on 4/11 with net UF of 542 mL, postweight 71 kg; treatment was complicated by chest pain/pressure along with A-fib RVR and hypotension.  Rapid response was called and patient was transferred to ICU    HD on 4/9 with net UF of 160 mL.  45 minutes into dialysis, patient reported of feeling heaviness on her chest.  At that time blood flow rate was decreased to 300 mL/min from 350 and UF goal decreased from 2 L to 1 L.  Subsequently 100 mL of normal saline was given and UF was turned off completely but the patient's symptoms continued and dialysis was stopped in 1 hour 9 minutes.    Last 24-hour urine output be 100 ml charted     ROS: No fever   PSFH: No visitor    Scheduled Meds:   mupirocin   Each Nostril BID    amiodarone  200 mg Oral BID    Followed by    [START ON 4/19/2024] amiodarone  200 mg Oral Daily    hydrALAZINE  10 mg Oral 3 times per day    dilTIAZem  10 mg IntraVENous Once    furosemide  40 mg IntraVENous BID    epoetin reed-epbx  10,000 Units IntraVENous Once per day on Tue Thu Sat    [Held by provider] metoprolol succinate  12.5 mg Oral Daily    sodium chloride flush  5-40 mL IntraVENous 2 times per day    [Held by provider] heparin (porcine)  5,000 Units SubCUTAneous 3 times per day    allopurinol  100 mg Oral Daily    aspirin  81 mg Oral Daily    atorvastatin  40 mg Oral Nightly    insulin lispro  0-8 Units SubCUTAneous TID WC    insulin lispro  0-4 Units SubCUTAneous Nightly     Continuous Infusions:   sodium chloride 5 mL/hr at 04/04/24 0144    dextrose       PRN Meds:.nitroGLYCERIN, heparin (porcine), calcium carbonate, melatonin, sodium chloride flush, sodium chloride, ondansetron **OR** ondansetron, polyethylene glycol, acetaminophen **OR** acetaminophen, glucose, dextrose bolus **OR** dextrose bolus, glucagon (rDNA),

## 2024-04-12 NOTE — CONSULTS
Pulmonary & Critical Care Medicine ICU Progress Note  CC: Initially admitted to ICU 4/1/24 for CHF with gross volume overload & severe hyperkalemia    Events of Last 24 hours:   Was planning a tunneled vas cath and then anticipating discharge; however, pt was transferred to ICU after rapid response called last evening.  Pt had became minimally responsive after going into new AFIB & RVR during dialysis associated with chest pain. She converted back to NSR with Cardizem gtt overnight.  Chest pain.      Invasive Lines: 4/2/24 R Vas cath, R midline 4/2/24, peripheral     MV:  none     / / /   No results for input(s): \"PHART\", \"TXD2LNL\", \"PO2ART\" in the last 72 hours.    IV:   norepinephrine      dilTIAZem 125 mg in sodium chloride 0.9 % 125 mL infusion Stopped (04/12/24 0412)    sodium chloride 5 mL/hr at 04/04/24 0144    dextrose       EXAM:  BP (!) 115/48   Pulse 71   Temp 96.9 °F (36.1 °C) (Temporal)   Resp 18   Ht 1.524 m (5')   Wt 70.8 kg (156 lb)   SpO2 90%   BMI 30.47 kg/m²  on 2 L  Constitutional:  No acute distress   HEENT:  No scleral icterus  Neck:  No tracheal deviation present.    Cardiovascular:  S1S2  Pulmonary/Chest:  No accessory muscle usage   Abdominal:  non distended  Neuro: awake and alert   [Bertram Victor MD on 4/12/24 at 8:10 PM EDT] *        PRN Meds:  nitroGLYCERIN, heparin (porcine), calcium carbonate, melatonin, sodium chloride flush, sodium chloride, ondansetron **OR** ondansetron, polyethylene glycol, acetaminophen **OR** acetaminophen, glucose, dextrose bolus **OR** dextrose bolus, glucagon (rDNA), dextrose    Results:  CBC:   Recent Labs     04/09/24  1127 04/11/24  0612 04/12/24  0501   WBC 5.3 7.0 4.6   HGB 7.9* 8.2* 7.6*   HCT 25.8* 25.6* 24.4*   MCV 91.6 89.3 90.4   PLT 75* 113* 122*     BMP:   Recent Labs     04/09/24  1127 04/11/24  0612 04/12/24  0501   * 134* 134*   K 4.6 4.7 4.1    98* 98*   CO2 31 29 32   PHOS 3.9 4.0 3.5   BUN 35* 34* 21*   CREATININE 2.5*  data.  I independently performed the above physical exam and contributed to the patient's history and plan of care.  Bertram Victor MD on 4/12/24 at 8:11 PM EDT    I spent a total of 14 minutes on this encounter; I contributed to the patient's history, plan of care and updated this encounter note as needed.   Paola Weber PA-C on 4/12/24 at 8:13 AM EDT

## 2024-04-12 NOTE — ADT AUTH CERT
Patient Demographics    Name Patient ID SSN Gender Identity Birth Date   Amber Fuentes [id number]  Female [birth year] (78 yrs)     Address Phone Email Employer    [address]  [facility] [place] [address] [phone] (H)  [phone] (M) -- OTHER-[address] Race Occupation Emp Status    BROWN White (non-) -- Retired      Reg Status PCP Date Last Verified Next Review Date    Verified Lucila Tejeda MD  [phone] 04/01/24 05/01/24      Admission Date Discharge Date Admitting Provider     04/01/24 -- Cooper Swanson MD       Marital Status Shinto       Non-Buddhist        Emergency Contact 1 Emergency Contact 2 Emergency Contact 3   Stanford Fuentes (2)  [address]  [facility] [address]  [facility]  [phone] (H)  [phone] (M) Sandra De Leon (C)  [phone] (H) Saundra Fuentes (D)  [phone] (M)     Physician Progress Notes  Notes from 04/09/24 through 04/12/24  Progress Notes by Lucila Tejeda MD at 4/12/2024  1:41 PM    Author: Lucila Tejeda MD Service: Internal Medicine Author Type: Physician   Filed: 4/12/2024  1:46 PM Date of Service: 4/12/2024  1:41 PM Status: Signed   : Lucila Tejeda MD (Physician)   Expand All Collapse All  IM Progress Note     Admit Date:  4/1/2024  11     Interval history:    chronic systolic CHF with very low EF at 20 % presenting with acute CHF and renal failure with refractory hyperkalemia     Initiated on CRRT -> transitioned to HD now   Drop in h.h-> PRBC transfused         Subjective:  Ms. Fuentes was transferred to the ICU last night after a rapid response was called.  Patient had chest pain and went into rapid A-fib.  She was minimally responsive.  This morning she is back in sinus rhythm and her mentation is normal.  Has had intermittent complaints of chest pain.  Has known CAD.  Has known severe ischemic cardiomyopathy.  Prior PFO closure.  Status post

## 2024-04-12 NOTE — PROGRESS NOTES
2046: Called cardiology consult for new afib and paged on call cardiologist.  2112: Dr Peck called back and spoke on phone. Pt HR currently 130-140s, BP 90s/60s.  Dr Peck gave telephone orders to keep cardizem gtt at current rate of 7.5mg/hr and not to titrate up until SBP remains > 90. Also gave instruction to not give previous ordered 10mg cardizem bolus as BP are soft.

## 2024-04-12 NOTE — CONSULTS
CARDIOLOGY CONSULTATION        Patient Name: Amber Fuentes  Date of admission: 4/1/2024  4:41 PM  Admission Dx: Hyperkalemia [E87.5]  Anasarca [R60.1]  Bradycardia [R00.1]  Pleural effusion [J90]  Other ascites [R18.8]  Acute renal failure, unspecified acute renal failure type (HCC) [N17.9]  Pneumonia of right lower lobe due to infectious organism [J18.9]  Requesting Physician: Cooper Swanson MD  Primary Care physician: Lucila Tejeda MD    Reason for Consultation/Chief Complaint: \"new onset Afib\"     History of Present Illness:     Amber Fuentes is a 78 y.o. patient with pmhx of CAD s/p CABG and PCI, severe ischemic cardiomyopathy, HFrEF, PFO s/p closure, MR s/p repair, hypertension, hyperlipidemia, diabetes mellitus and chronic kidney disease who presented to the hospital on 4/1/24 with cc of \"feeling unwell past 2 days. Swelling in abdomen and extremities, and diarrhea.\" Patient was found to be in decompensated HF, renal failure with refractory hyperkalemia, and anemia requiring PRBC transfusion. Yesterday, during HD developed chest pain with new onset Afib. She was given nitro which improved her chest pain from 8 to 2. Received IVF bolus for low BP. Patient appeared ill per documentation and was transferred to the ICU. No chest pain since out of Afib RVR. Biggest complaint is right foot pain.     ECG  Atrial fibrillation with rapid ventricular response, Marked ST abnormality, possible lateral subendocardial injury. Questionable ST elevation in inferior leads. Troponin 83-83. (Chronic trop leak to the 130s). Hgb 7.6. PLT 122K (up from 43K). Afib with rates 130-150s.     She was started on diltiazem gtt with spontaneous conversion to NSR at 0121. Remains in NSR with rates 70s.     HF GDMT prior to admission. Entresto 24-26 if able, toprol 75 mg, isordil 10 bid, lipitor 40, baby aspirin   Lifevest?  GDMT has been held.     Discussion with family at bedside and patient. They want to talk to

## 2024-04-12 NOTE — ADT AUTH CERT
Medication Administration Report  for Amber Fuentes as of 04/10/24 through 04/12/24  Legend:       Medications 04/10/24 04/11/24 04/12/24   0.9 % sodium chloride infusion  Freq: PRN Route: IV  PRN Comment: if patient receiving piggyback infusions and maintenance fluids are not ordered OR KVO fluids to protect IV site / prevent frequent line interruptions/ long duration  Start: 04/02/24 0019   Admin Instructions:   For piggyback infusion, administer at same rate as piggyback for a total of 25 mL. Enter 25 mL into dose field and piggyback rate into rate field of order.  If piggyback is infusing at a rate less than 100 mL/hr, enter 25 mL into dose field and 100 mL/hr into rate field of order.  For KVO fluids, enter rate of 20 mL/hr or less into rate field of order.          acetaminophen (TYLENOL) tablet 650 mg  Dose: 650 mg  Freq: EVERY 6 HOURS PRN Route: PO  PRN Reasons: Pain Mild (1-3),Fever  PRN Comment: For temp greater than 100.4 F (38 C)  Start: 04/02/24 0019   Admin Instructions:   Maximum dose of acetaminophen is 4000 mg from all sources in 24 hours.     27802        59695          Or  acetaminophen (TYLENOL) suppository 650 mg  Dose: 650 mg  Freq: EVERY 6 HOURS PRN Route: RE  PRN Reasons: Pain Mild (1-3),Fever  PRN Comment: For temp greater than 100.4 F (38 C)  Start: 04/02/24 0019   Admin Instructions:   Administer if oral route cannot be used.     3        4          allopurinol (ZYLOPRIM) tablet 100 mg  Dose: 100 mg  Freq: DAILY Route: PO  Start: 04/02/24 1045    09769        04113        52825           amiodarone (CORDARONE) tablet 200 mg  Dose: 200 mg  Freq: 2 TIMES DAILY Route: PO  Start: 04/12/24 1030 End: 04/19/24 0859      49669   2100         aspirin EC tablet 81 mg  Dose: 81 mg  Freq: DAILY Route: PO  Start: 04/02/24 1045   Admin Instructions:   Do not crush or break.    30644        [id number]        [id number]          atorvastatin (LIPITOR) tablet 40 mg  Dose: 40 mg  Freq: NIGHTLY Route: PO  Start:  Restarted 04/04/24 1904(s)  04/04/24 1904(a)  04/04/24 1905(r)   3.4 mL/hr IntraVENous      Kavitha Littlejohn / Yeimi Miller   126 New Bag 04/05/24 0029(s)  04/05/24 0029(a)  04/05/24 0030(r)     IntraVENous      Bowmaria alejandra, Yeimi / Fatimah Vasquez   127 Stopped 04/05/24 0147(s)  04/05/24 0147(a)  04/05/24 0147(r)   0 mL/hr IntraVENous      Bowling, Yeimi   128 New Bag 04/04/24 1501(s)  04/04/24 1501(a)  04/04/24 1501(r)   500 mL/hr Dialysis      Kavitha Littlejohn   129 New Bag 04/05/24 0118(s)  04/05/24 0118(a)  04/05/24 0118(r)   500 mL/hr Dialysis      BowYeimi bess   130 New Bag 04/04/24 0840(s)  04/04/24 0840(a)  04/04/24 0840(r)   500 mL/hr Dialysis      Kavitha Littlejohn   131 New Bag 04/04/24 1803(s)  04/04/24 1803(a)  04/04/24 1803(r)   500 mL/hr Dialysis      Eron Allkarri   132 New Bag 04/04/24 1156(s)  04/04/24 1156(a)  04/04/24 1156(r)   1,000 mL/hr Dialysis      Kavitha Littlejohn   133 New Bag 04/04/24 1516(s)  04/04/24 1516(a)  04/04/24 1517(r)   1,000 mL/hr Dialysis      Eron Allsunshineen   134 New Bag 04/04/24 2215(s)  04/04/24 2215(a)  04/04/24 2215(r)   1,000 mL/hr Dialysis      BowYeimi bess   135 New Bag 04/05/24 0322(s)  04/05/24 0322(a)  04/05/24 0322(r)   1,000 mL/hr Dialysis      Bowmaria alejandra, Yeimi   136 See Alternative 04/04/24 0141(s)  04/04/24 0230(a)  04/04/24 0230(r)     IntraVENous      Bowmaria alejandra, Yeimi   137 New Bag 04/04/24 1121(s)  04/04/24 1121(a)  04/04/24 1121(r) 6 mmol 125 mL/hr IntraVENous   120 Minutes  Kavitha Littlejohn   138 Rate/Dose Verify 04/04/24 1200(s)  04/04/24 1200(a)  04/04/24 1200(r)   125 mL/hr IntraVENous      Kavitha Littlejohn   139 Rate/Dose Verify 04/04/24 1302(s)  04/04/24 1302(a)  04/04/24 1303(r)   125 mL/hr IntraVENous      Kavitha Littlejohn   140 Stopped 04/04/24 1321(s)  04/04/24 1321(a)  04/04/24 1411(r)   0 mL/hr IntraVENous      Kavitha Littlejohn     Due (Stopped) 04/04/24 1321(s)  04/04/24 1121(r)     IntraVENous      Kavitha Littlejohn   141 See Alternative 04/04/24 0141(s)  04/04/24

## 2024-04-13 LAB
ALBUMIN SERPL-MCNC: 2.6 G/DL (ref 3.4–5)
ANION GAP SERPL CALCULATED.3IONS-SCNC: 5 MMOL/L (ref 3–16)
BASOPHILS # BLD: 0.1 K/UL (ref 0–0.2)
BASOPHILS NFR BLD: 1.1 %
BUN SERPL-MCNC: 31 MG/DL (ref 7–20)
CALCIUM SERPL-MCNC: 8.3 MG/DL (ref 8.3–10.6)
CHLORIDE SERPL-SCNC: 98 MMOL/L (ref 99–110)
CO2 SERPL-SCNC: 32 MMOL/L (ref 21–32)
CREAT SERPL-MCNC: 1.8 MG/DL (ref 0.6–1.2)
DEPRECATED RDW RBC AUTO: 17.3 % (ref 12.4–15.4)
EOSINOPHIL # BLD: 0.2 K/UL (ref 0–0.6)
EOSINOPHIL NFR BLD: 3.1 %
GFR SERPLBLD CREATININE-BSD FMLA CKD-EPI: 28 ML/MIN/{1.73_M2}
GLUCOSE BLD-MCNC: 110 MG/DL (ref 70–99)
GLUCOSE BLD-MCNC: 154 MG/DL (ref 70–99)
GLUCOSE BLD-MCNC: 192 MG/DL (ref 70–99)
GLUCOSE BLD-MCNC: 200 MG/DL (ref 70–99)
GLUCOSE SERPL-MCNC: 144 MG/DL (ref 70–99)
HCT VFR BLD AUTO: 25.1 % (ref 36–48)
HGB BLD-MCNC: 8 G/DL (ref 12–16)
LYMPHOCYTES # BLD: 0.7 K/UL (ref 1–5.1)
LYMPHOCYTES NFR BLD: 12.1 %
MCH RBC QN AUTO: 28.7 PG (ref 26–34)
MCHC RBC AUTO-ENTMCNC: 31.9 G/DL (ref 31–36)
MCV RBC AUTO: 90 FL (ref 80–100)
MONOCYTES # BLD: 0.8 K/UL (ref 0–1.3)
MONOCYTES NFR BLD: 13 %
NEUTROPHILS # BLD: 4.4 K/UL (ref 1.7–7.7)
NEUTROPHILS NFR BLD: 70.7 %
PERFORMED ON: ABNORMAL
PHOSPHATE SERPL-MCNC: 4.1 MG/DL (ref 2.5–4.9)
PLATELET # BLD AUTO: 185 K/UL (ref 135–450)
PMV BLD AUTO: 8.5 FL (ref 5–10.5)
POTASSIUM SERPL-SCNC: 4.9 MMOL/L (ref 3.5–5.1)
RBC # BLD AUTO: 2.79 M/UL (ref 4–5.2)
SODIUM SERPL-SCNC: 135 MMOL/L (ref 136–145)
WBC # BLD AUTO: 6.2 K/UL (ref 4–11)

## 2024-04-13 PROCEDURE — 2700000000 HC OXYGEN THERAPY PER DAY

## 2024-04-13 PROCEDURE — 6360000002 HC RX W HCPCS: Performed by: INTERNAL MEDICINE

## 2024-04-13 PROCEDURE — 94761 N-INVAS EAR/PLS OXIMETRY MLT: CPT

## 2024-04-13 PROCEDURE — 2580000003 HC RX 258: Performed by: INTERNAL MEDICINE

## 2024-04-13 PROCEDURE — 99222 1ST HOSP IP/OBS MODERATE 55: CPT | Performed by: INTERNAL MEDICINE

## 2024-04-13 PROCEDURE — 2000000000 HC ICU R&B

## 2024-04-13 PROCEDURE — 6370000000 HC RX 637 (ALT 250 FOR IP): Performed by: INTERNAL MEDICINE

## 2024-04-13 PROCEDURE — 80069 RENAL FUNCTION PANEL: CPT

## 2024-04-13 PROCEDURE — 99233 SBSQ HOSP IP/OBS HIGH 50: CPT | Performed by: INTERNAL MEDICINE

## 2024-04-13 PROCEDURE — 6370000000 HC RX 637 (ALT 250 FOR IP): Performed by: STUDENT IN AN ORGANIZED HEALTH CARE EDUCATION/TRAINING PROGRAM

## 2024-04-13 PROCEDURE — 85025 COMPLETE CBC W/AUTO DIFF WBC: CPT

## 2024-04-13 RX ORDER — FUROSEMIDE 10 MG/ML
80 INJECTION INTRAMUSCULAR; INTRAVENOUS 2 TIMES DAILY
Status: DISCONTINUED | OUTPATIENT
Start: 2024-04-13 | End: 2024-04-17

## 2024-04-13 RX ADMIN — POLYETHYLENE GLYCOL 3350 17 G: 17 POWDER, FOR SOLUTION ORAL at 10:51

## 2024-04-13 RX ADMIN — EPOETIN ALFA-EPBX 10000 UNITS: 10000 INJECTION, SOLUTION INTRAVENOUS; SUBCUTANEOUS at 17:03

## 2024-04-13 RX ADMIN — AMIODARONE HYDROCHLORIDE 200 MG: 200 TABLET ORAL at 20:36

## 2024-04-13 RX ADMIN — HYDRALAZINE HYDROCHLORIDE 10 MG: 10 TABLET, FILM COATED ORAL at 20:36

## 2024-04-13 RX ADMIN — DESMOPRESSIN ACETATE 40 MG: 0.2 TABLET ORAL at 20:36

## 2024-04-13 RX ADMIN — HYDRALAZINE HYDROCHLORIDE 10 MG: 10 TABLET, FILM COATED ORAL at 14:17

## 2024-04-13 RX ADMIN — SODIUM CHLORIDE, PRESERVATIVE FREE 10 ML: 5 INJECTION INTRAVENOUS at 09:35

## 2024-04-13 RX ADMIN — ALLOPURINOL 100 MG: 100 TABLET ORAL at 09:34

## 2024-04-13 RX ADMIN — HYDRALAZINE HYDROCHLORIDE 10 MG: 10 TABLET, FILM COATED ORAL at 07:08

## 2024-04-13 RX ADMIN — FUROSEMIDE 80 MG: 10 INJECTION, SOLUTION INTRAMUSCULAR; INTRAVENOUS at 15:02

## 2024-04-13 RX ADMIN — MUPIROCIN: 20 OINTMENT TOPICAL at 09:34

## 2024-04-13 RX ADMIN — MUPIROCIN: 20 OINTMENT TOPICAL at 20:39

## 2024-04-13 RX ADMIN — ASPIRIN 81 MG: 81 TABLET, COATED ORAL at 09:34

## 2024-04-13 RX ADMIN — FUROSEMIDE 40 MG: 10 INJECTION, SOLUTION INTRAMUSCULAR; INTRAVENOUS at 09:34

## 2024-04-13 RX ADMIN — AMIODARONE HYDROCHLORIDE 200 MG: 200 TABLET ORAL at 09:34

## 2024-04-13 RX ADMIN — SODIUM CHLORIDE, PRESERVATIVE FREE 10 ML: 5 INJECTION INTRAVENOUS at 20:36

## 2024-04-13 ASSESSMENT — PAIN SCALES - GENERAL
PAINLEVEL_OUTOF10: 0

## 2024-04-13 ASSESSMENT — PAIN SCALES - WONG BAKER
WONGBAKER_NUMERICALRESPONSE: NO HURT

## 2024-04-13 NOTE — PROGRESS NOTES
VSS; Afebrile. No gtts. No changes to assesment. Family updated on code status change per patients wishes. Weaned to 1L NC. Report given to Vane NOGUERA. All questions answered.

## 2024-04-13 NOTE — PROGRESS NOTES
Family at bedside. VSS; Afebrile. Turns self. Reassessment unchanged from this AM, see chart for more details.

## 2024-04-13 NOTE — PROGRESS NOTES
Reassessment completed, see flow sheet. BL lungs diminished.  Pt continues to be SR, no c/o chest pain.   All ICU Lines and monitoring remain in place. Bed locked in lowest position. Call light within reach.

## 2024-04-13 NOTE — PLAN OF CARE
HEART FAILURE CARE PLAN:    Comorbidities Reviewed: Yes   Patient has a past medical history of Anemia, Backache, unspecified, CAD, multiple vessel, Chronic kidney disease (CKD) stage G3b/A1, moderately decreased glomerular filtration rate (GFR) between 30-44 mL/min/1.73 square meter and albuminuria creatinine ratio less than 30 mg/g ([facility]), Chronic obstructive pulmonary disease (HCC), Chronic systolic CHF (congestive heart failure) ([facility]), COPD (chronic obstructive pulmonary disease) ([facility]), Depressive disorder, not elsewhere classified, Essential hypertension, benign, Gout, History of blood transfusion, Hyperlipidemia associated with type 2 diabetes mellitus ([facility]), Major depressive disorder, recurrent episode, moderate ([facility]), Osteoarthritis, hand, Osteoarthrosis, unspecified whether generalized or localized, lower leg, Osteopenia, Other and unspecified hyperlipidemia, Pain in joint, lower leg, Pneumonia, Rotator cuff tendinitis, Tear of medial cartilage or meniscus of knee, current, Type 2 diabetes mellitus with microalbuminuria, without long-term current use of insulin ([facility]), Type 2 diabetes mellitus without complication ([facility]), Type II or unspecified type diabetes mellitus without mention of complication, not stated as uncontrolled, and Uses LifeVest defibrillator.     Weights Reviewed: Yes   Admission weight: 59.6 kg (131 lb 6.3 oz) (chart review)   Wt Readings from Last 3 Encounters:   04/12/24 70.8 kg (156 lb)   02/11/24 59.6 kg (131 lb 8 oz)   01/31/24 60.7 kg (133 lb 13.1 oz)     Intake & Output Reviewed: Yes     Intake/Output Summary (Last 24 hours) at 4/12/2024 2142  Last data filed at 4/12/2024 2006  Gross per 24 hour   Intake 136.15 ml   Output 150 ml   Net -13.85 ml       ECHOCARDIOGRAM Reviewed: Yes   Patient's Ejection Fraction (EF) is less than or equal to 40%. Discuss HFrEF Guideline Directed Medical Therapy (GDMT) with Cardiologist or Hospitalist:          Medications Reviewed: Yes   SCHEDULED  day. 1/31/24   Brian Gomez MD   atorvastatin (LIPITOR) 40 MG tablet TAKE ONE TABLET BY MOUTH DAILY 4/20/23   Saundra Foley APRN - CNP   diclofenac sodium (VOLTAREN) 1 % GEL Apply 2 g topically in the morning and at bedtime  Patient taking differently: Apply 2 g topically in the morning and at bedtime Pt states she has never used this. 2/28/23   Dmitriy Trotter, APRN - CNP   aspirin 81 MG EC tablet Take 1 tablet by mouth daily    Provider, MD Blade      Diet Reviewed: Yes   ADULT DIET; Regular; 4 carb choices (60 gm/meal); Low Potassium (Less than 3000 mg/day)  ADULT ORAL NUTRITION SUPPLEMENT; Lunch, Dinner; Diabetic Oral Supplement    Goal of Care Reviewed: Yes   Patient and/or Family's stated Goal of Care this Admission: Reduce shortness of breath, increase activity tolerance, better understand heart failure and disease management, be more comfortable, and reduce lower extremity edema prior to discharge.     Electronically signed by Vane Avery RN on 4/12/2024 at 9:42 PM

## 2024-04-13 NOTE — PROGRESS NOTES
Pulmonary & Critical Care Medicine ICU Progress Note    CC: Chest pain and A-fib RVR during dialysis    Events of Last 24 hours:  No new complaints    Vascular lines: IV: Temporary Vas-Cath    MV: None     / / /   No results for input(s): \"PHART\", \"UUK0XOH\", \"PO2ART\" in the last 72 hours.    IV:   sodium chloride 5 mL/hr at 24 0144    dextrose         Vitals:  Blood pressure 129/63, pulse 84, temperature 98.1 °F (36.7 °C), temperature source Oral, resp. rate 23, height 1.524 m (5'), weight 71.3 kg (157 lb 3.2 oz), SpO2 100 %, not currently breastfeeding.  on room air  Temp  Av.9 °F (36.6 °C)  Min: 97.5 °F (36.4 °C)  Max: 98.3 °F (36.8 °C)    Intake/Output Summary (Last 24 hours) at 2024 0739  Last data filed at 2024 0505  Gross per 24 hour   Intake 120 ml   Output 250 ml   Net -130 ml     PE:  General:  ill appearing    Resp: No crackles. No wheezing.   CV: S1, S2. + edema  GI: NT, ND, +BS  Skin: Warm and dry.    Neuro: PERRL. Alert and oriented to person and place    Scheduled Meds:   mupirocin   Each Nostril BID    amiodarone  200 mg Oral BID    Followed by    [START ON 2024] amiodarone  200 mg Oral Daily    hydrALAZINE  10 mg Oral 3 times per day    dilTIAZem  10 mg IntraVENous Once    furosemide  40 mg IntraVENous BID    epoetin reed-epbx  10,000 Units IntraVENous Once per day on  Sat    [Held by provider] metoprolol succinate  12.5 mg Oral Daily    sodium chloride flush  5-40 mL IntraVENous 2 times per day    [Held by provider] heparin (porcine)  5,000 Units SubCUTAneous 3 times per day    allopurinol  100 mg Oral Daily    aspirin  81 mg Oral Daily    atorvastatin  40 mg Oral Nightly    insulin lispro  0-8 Units SubCUTAneous TID WC    insulin lispro  0-4 Units SubCUTAneous Nightly       Data:  CBC:   Recent Labs     24  0612 24  0501 24  0505   WBC 7.0 4.6 6.2   HGB 8.2* 7.6* 8.0*   HCT 25.6* 24.4* 25.1*   MCV 89.3 90.4 90.0   * 122* 185     BMP:

## 2024-04-13 NOTE — PROGRESS NOTES
CARDIOLOGY PROGRESS NOTE      Patient Name: Amber Fuentes  Date of admission: 4/1/2024  4:41 PM  Admission Dx: Hyperkalemia [E87.5]  Anasarca [R60.1]  Bradycardia [R00.1]  Pleural effusion [J90]  Other ascites [R18.8]  Acute renal failure, unspecified acute renal failure type (HCC) [N17.9]  Pneumonia of right lower lobe due to infectious organism [J18.9]  Reason for Consult:  New onset AFIB  Requesting Physician: Cooper Swanson MD  Primary Care physician: Lucila Tejeda MD    Subjective:     Amber Fuentes is a 78 y.o. woman with CAD s/p CABG and PCI, severe ischemic cardiomyopathy, HFrEF, PFO s/p closure, MR s/p repair, hypertension, hyperlipidemia, diabetes mellitus and chronic kidney disease who presented to the hospital on 4/1/24 with volume overload and diarrhea.  Patient was found to be in decompensated HF, renal failure with refractory hyperkalemia, and anemia requiring PRBC transfusion.     Interval History:  Two days ago during HD, patient developed chest pain with new onset Afib. She was given nitro which improved her chest pain from 8 to 2. Received IVF bolus for low BP. Patient appeared ill per documentation and was transferred to the ICU. No chest pain since out of Afib RVR. Biggest complaint is right foot pain.      ECG  Atrial fibrillation with rapid ventricular response, Marked ST abnormality, possible lateral subendocardial injury. Questionable ST elevation in inferior leads. Troponin 83-83. (Chronic trop leak to the 130s). Hgb 7.6. PLT 122K (up from 43K). Afib with rates 130-150s.      She was started on diltiazem gtt with spontaneous conversion to NSR at 0121. Remains in NSR with rates 70s.      Dr Kelly discussed with patient and family at bedside yesterday. They want to talk to palliative medicine about change in code status and possible hospice.     Subjective:  Patient stable overnight with family at bedside.  No chest pain and worsening SOB.  Awaiting more family to

## 2024-04-13 NOTE — PLAN OF CARE
Problem: Cardiovascular - Adult  Goal: Maintains optimal cardiac output and hemodynamic stability  Outcome: Progressing  Goal: Absence of cardiac dysrhythmias or at baseline  Outcome: Progressing

## 2024-04-13 NOTE — PLAN OF CARE
Problem: Safety - Adult  Goal: Free from fall injury  4/13/2024 1734 by Mallory Paige, RN  Outcome: Progressing  Flowsheets (Taken 4/13/2024 1734)  Free From Fall Injury:   Based on caregiver fall risk screen, instruct family/caregiver to ask for assistance with transferring infant if caregiver noted to have fall risk factors   Instruct family/caregiver on patient safety  4/13/2024 1544 by Garcia Mcmanus, RN  Outcome: Progressing

## 2024-04-13 NOTE — FLOWSHEET NOTE
04/12/24 2200   Vitals   Pulse 81   BP (!) 105/49   MAP (Calculated) 68   MAP (mmHg) 65   Oxygen Therapy   SpO2 99 %     10 mg PO Hydralazine is scheduled at this time but pt BP have been soft and most recent is 105/49 MAP 65. I talked with Dulce Pharmacist on phone who agreed it was questionable to give dose with DBP < 60. Dr Freed messaged via [employer], gave order to hold this dose.

## 2024-04-13 NOTE — PROGRESS NOTES
HEART FAILURE CARE PLAN:    Comorbidities Reviewed: Yes   Patient has a past medical history of Anemia, Backache, unspecified, CAD, multiple vessel, Chronic kidney disease (CKD) stage G3b/A1, moderately decreased glomerular filtration rate (GFR) between 30-44 mL/min/1.73 square meter and albuminuria creatinine ratio less than 30 mg/g ([facility]), Chronic obstructive pulmonary disease (HCC), Chronic systolic CHF (congestive heart failure) (formerly [facility]), COPD (chronic obstructive pulmonary disease) (formerly [facility]), Depressive disorder, not elsewhere classified, Essential hypertension, benign, Gout, History of blood transfusion, Hyperlipidemia associated with type 2 diabetes mellitus ([facility]), Major depressive disorder, recurrent episode, moderate ([facility]), Osteoarthritis, hand, Osteoarthrosis, unspecified whether generalized or localized, lower leg, Osteopenia, Other and unspecified hyperlipidemia, Pain in joint, lower leg, Pneumonia, Rotator cuff tendinitis, Tear of medial cartilage or meniscus of knee, current, Type 2 diabetes mellitus with microalbuminuria, without long-term current use of insulin (formerly [facility]), Type 2 diabetes mellitus without complication (formerly [facility]), Type II or unspecified type diabetes mellitus without mention of complication, not stated as uncontrolled, and Uses LifeVest defibrillator.     Weights Reviewed: Yes   Admission weight: 59.6 kg (131 lb 6.3 oz) (chart review)   Wt Readings from Last 3 Encounters:   04/13/24 71.3 kg (157 lb 3.2 oz)   02/11/24 59.6 kg (131 lb 8 oz)   01/31/24 60.7 kg (133 lb 13.1 oz)     Intake & Output Reviewed: Yes     Intake/Output Summary (Last 24 hours) at 4/13/2024 1542  Last data filed at 4/13/2024 1400  Gross per 24 hour   Intake 582 ml   Output 350 ml   Net 232 ml       ECHOCARDIOGRAM Reviewed: Yes   Patient's Ejection Fraction (EF) is less than or equal to 40%. Discuss HFrEF Guideline Directed Medical Therapy (GDMT) with Cardiologist or Hospitalist:          Medications Reviewed: Yes   SCHEDULED

## 2024-04-13 NOTE — PROGRESS NOTES
Progress Note    Admit Date:  4/1/2024  12    Interval history:    chronic systolic CHF with very low EF at 20 % presenting with acute CHF and renal failure with refractory hyperkalemia    Initiated on CRRT -> transitioned to HD now   Drop in h.h-> PRBC transfused        Subjective:  Ms. Fuentes was transferred to the ICU last night after a rapid response was called.  Patient had chest pain and went into rapid A-fib.  She was minimally responsive.  This morning she is back in sinus rhythm and her mentation is normal.  Has had intermittent complaints of chest pain.  Has known CAD.  Has known severe ischemic cardiomyopathy.  Prior PFO closure.  Status post mitral regurg repair.  She has CKD.    4/13-patient and family were considering hospice and palliative care yesterday.  Had a long discussion with them and at the present time we would like to put this on hold and try dialysis again if needed.  Making a little bit of urine.  Creatinine has not worsened much.  No chest pain.    Objective:   BP (!) 124/53   Pulse 86   Temp 98.1 °F (36.7 °C) (Oral)   Resp 19   Ht 1.524 m (5')   Wt 71.3 kg (157 lb 3.2 oz)   SpO2 100%   BMI 30.70 kg/m²     Intake/Output Summary (Last 24 hours) at 4/13/2024 0940  Last data filed at 4/13/2024 0505  Gross per 24 hour   Intake 120 ml   Output 250 ml   Net -130 ml         Physical Exam:    General:  elderly female.   Awake, alert and fairly oriented. Appears to be not in any distress  Mucous Membranes:  Pink , anicteric  Neck: no JVD, no carotid bruit, no thyromegaly  Chest:  Clear to auscultation; no wheezes, rales or rhonchi   Cardiovascular:  RRR S1S2 heard, no murmurs or gallops-ECG today shows accelerated junctional rhythm.  ECG yesterday showed A-fib.  Abdomen:  Soft, undistended, non tender, no organomegaly, BS present  Extremities- improving  upper and lower extremity edema- down to 2 +  Right sided temp HD line   Neurological : grossly normal with gen

## 2024-04-13 NOTE — PROGRESS NOTES
Pt had stated to writer that she does not \"want anyone jumping on her chest or being on life support\". Pt stated she did not see the point of it or HD with as sick as she is. Pt oriented x4. Notified pulmonology at this time.

## 2024-04-13 NOTE — PROGRESS NOTES
Nephrology Progress Note   [email or web address]      Sub/interval history  Resting, tired and bit confused     HD on 4/11 with net UF of 542 mL, postweight 71 kg; treatment was complicated by chest pain/pressure along with A-fib RVR and hypotension.  Rapid response was called and patient was transferred to ICU    HD on 4/9 with net UF of 160 mL.  45 minutes into dialysis, patient reported of feeling heaviness on her chest.  At that time blood flow rate was decreased to 300 mL/min from 350 and UF goal decreased from 2 L to 1 L.  Subsequently 100 mL of normal saline was given and UF was turned off completely but the patient's symptoms continued and dialysis was stopped in 1 hour 9 minutes.    Last 24-hour urine output be 250 ml charted     ROS: No fever   PSFH: + visitors    Scheduled Meds:   mupirocin   Each Nostril BID    amiodarone  200 mg Oral BID    Followed by    [START ON 4/19/2024] amiodarone  200 mg Oral Daily    hydrALAZINE  10 mg Oral 3 times per day    furosemide  40 mg IntraVENous BID    epoetin reed-epbx  10,000 Units IntraVENous Once per day on Tue Thu Sat    [Held by provider] metoprolol succinate  12.5 mg Oral Daily    sodium chloride flush  5-40 mL IntraVENous 2 times per day    [Held by provider] heparin (porcine)  5,000 Units SubCUTAneous 3 times per day    allopurinol  100 mg Oral Daily    aspirin  81 mg Oral Daily    atorvastatin  40 mg Oral Nightly    insulin lispro  0-8 Units SubCUTAneous TID WC    insulin lispro  0-4 Units SubCUTAneous Nightly     Continuous Infusions:   sodium chloride 5 mL/hr at 04/04/24 0144    dextrose       PRN Meds:.nitroGLYCERIN, heparin (porcine), calcium carbonate, melatonin, sodium chloride flush, sodium chloride, ondansetron **OR** ondansetron, polyethylene glycol, acetaminophen **OR** acetaminophen, glucose, dextrose bolus **OR** dextrose bolus, glucagon (rDNA), dextrose    Objective/     Vitals:    04/13/24 1200 04/13/24 1300 04/13/24 1332 04/13/24 1400   BP: (!)  116/94 (!) 112/51 (!) 118/51 (!) 105/92   Pulse: 83 81 80 83   Resp: 24 22 25 28   Temp:       TempSrc:       SpO2: 99% 99% 99% 99%   Weight:       Height:         24HR INTAKE/OUTPUT:    Intake/Output Summary (Last 24 hours) at 4/13/2024 1441  Last data filed at 4/13/2024 1200  Gross per 24 hour   Intake 360 ml   Output 250 ml   Net 110 ml       Gen: Ill-appearing  Neck: No JVD  Skin: Unremarkable  Cardiovascular:  S1, S2 without m/r/g   Respiratory: CTA B without w/r/r; respiratory effort normal  Abdomen:  soft, nt, nd,   Extremities: Trace lower extremity edema  Neuro/Psy: Awake alert oriented x 3    Data/  Recent Labs     04/11/24  0612 04/12/24  0501 04/13/24  0505   WBC 7.0 4.6 6.2   HGB 8.2* 7.6* 8.0*   HCT 25.6* 24.4* 25.1*   MCV 89.3 90.4 90.0   * 122* 185       Recent Labs     04/11/24  0612 04/12/24  0501 04/13/24  0505   * 134* 135*   K 4.7 4.1 4.9   CL 98* 98* 98*   CO2 29 32 32   GLUCOSE 120* 101* 144*   PHOS 4.0 3.5 4.1   BUN 34* 21* 31*   CREATININE 1.8* 1.4* 1.8*   LABGLOM 28* 38* 28*         Assessment/    -LANCE likely from ATN in the setting of CKD III/IV with a baseline creatinine of 1.8 to 2.2 mg/dl   Patient is oliguric lethargic despite improved creatinine on 4/11      - HTN: Blood pressure within acceptable range.     - Anemia: Stable hemoglobin, monitor.    -Thrombocytopenia, heparin has been stopped, platelet count increased on 4/8 to 58 K; 75 K on 4/9    -Chest pain per primary team/cardiology    -A-fib RVR per cardiology, now back to NSR    Plan/   -monitor clinically for dialysis needs in a daily basis  -Serial labs  -increase Lasix 80 mg IV twice daily, hold for systolic blood pressure less than 100  -Retacrit 10,000 unit with HD  -Renal dose medications    Jarred Gavin MD  Office: [phone]  Fax:    [phone]  [facility]

## 2024-04-13 NOTE — PROGRESS NOTES
4 Eyes Skin Assessment     NAME:  Amber Fuentes  YOB: 1945  MEDICAL RECORD NUMBER:  [id number]    The patient is being assessed for  Other Low Alek Scale     I agree that at least one RN has performed a thorough Head to Toe Skin Assessment on the patient. ALL assessment sites listed below have been assessed.      Areas assessed by both nurses:    Head, Face, Ears, Shoulders, Back, Chest, Arms, Elbows, Hands, Sacrum. Buttock, Coccyx, Ischium, Legs. Feet and Heels, and Under Medical Devices      Scattered bruising and redness to coccyx      Does the Patient have a Wound? No noted wound(s)       Alek Prevention initiated by RN: Yes  Wound Care Orders initiated by RN: No    Pressure Injury (Stage 3,4, Unstageable, DTI, NWPT, and Complex wounds) if present, place Wound referral order by RN under : No    New Ostomies, if present place, Ostomy referral order under : No     Nurse 1 eSignature: Electronically signed by Vane Avery RN on 4/12/24 at 10:23 PM EDT    **SHARE this note so that the co-signing nurse can place an eSignature**    Nurse 2 eSignature: Electronically signed by Fatimah Vasquez RN on 4/12/24 at 10:29 PM EDT

## 2024-04-13 NOTE — PROGRESS NOTES
Shift assessment, completed, see flow sheet. Pt is alert and oriented x 4.      SR 82, /55, SpO2 100% on 2L NC. Respirations are easy, even, and unlabored. Bilateral lung sounds diminished. R midline, R vas cath, PIV, WNL and SL    External catheter  WNL with small amount of yellow urine, per pt she produces minimal amount of urine d/t being a dialysis pt.     Call light within reach. Bed in lowest position. Bed alarm on.

## 2024-04-14 LAB
ANION GAP SERPL CALCULATED.3IONS-SCNC: 6 MMOL/L (ref 3–16)
BASOPHILS # BLD: 0.1 K/UL (ref 0–0.2)
BASOPHILS NFR BLD: 0.9 %
BUN SERPL-MCNC: 37 MG/DL (ref 7–20)
CALCIUM SERPL-MCNC: 8.7 MG/DL (ref 8.3–10.6)
CHLORIDE SERPL-SCNC: 97 MMOL/L (ref 99–110)
CO2 SERPL-SCNC: 31 MMOL/L (ref 21–32)
CREAT SERPL-MCNC: 1.9 MG/DL (ref 0.6–1.2)
DEPRECATED RDW RBC AUTO: 17.6 % (ref 12.4–15.4)
EOSINOPHIL # BLD: 0.2 K/UL (ref 0–0.6)
EOSINOPHIL NFR BLD: 2.7 %
GFR SERPLBLD CREATININE-BSD FMLA CKD-EPI: 27 ML/MIN/{1.73_M2}
GLUCOSE BLD-MCNC: 120 MG/DL (ref 70–99)
GLUCOSE BLD-MCNC: 130 MG/DL (ref 70–99)
GLUCOSE BLD-MCNC: 185 MG/DL (ref 70–99)
GLUCOSE BLD-MCNC: 188 MG/DL (ref 70–99)
GLUCOSE SERPL-MCNC: 124 MG/DL (ref 70–99)
HCT VFR BLD AUTO: 24.7 % (ref 36–48)
HGB BLD-MCNC: 8 G/DL (ref 12–16)
LYMPHOCYTES # BLD: 0.8 K/UL (ref 1–5.1)
LYMPHOCYTES NFR BLD: 11.7 %
MCH RBC QN AUTO: 28.8 PG (ref 26–34)
MCHC RBC AUTO-ENTMCNC: 32.3 G/DL (ref 31–36)
MCV RBC AUTO: 89 FL (ref 80–100)
MONOCYTES # BLD: 0.7 K/UL (ref 0–1.3)
MONOCYTES NFR BLD: 10.9 %
NEUTROPHILS # BLD: 5 K/UL (ref 1.7–7.7)
NEUTROPHILS NFR BLD: 73.8 %
PERFORMED ON: ABNORMAL
PLATELET # BLD AUTO: 210 K/UL (ref 135–450)
PMV BLD AUTO: 7.8 FL (ref 5–10.5)
POTASSIUM SERPL-SCNC: 4.8 MMOL/L (ref 3.5–5.1)
RBC # BLD AUTO: 2.77 M/UL (ref 4–5.2)
SODIUM SERPL-SCNC: 134 MMOL/L (ref 136–145)
WBC # BLD AUTO: 6.8 K/UL (ref 4–11)

## 2024-04-14 PROCEDURE — 6370000000 HC RX 637 (ALT 250 FOR IP): Performed by: INTERNAL MEDICINE

## 2024-04-14 PROCEDURE — 86022 PLATELET ANTIBODIES: CPT

## 2024-04-14 PROCEDURE — 99233 SBSQ HOSP IP/OBS HIGH 50: CPT | Performed by: INTERNAL MEDICINE

## 2024-04-14 PROCEDURE — 6360000002 HC RX W HCPCS: Performed by: INTERNAL MEDICINE

## 2024-04-14 PROCEDURE — 2580000003 HC RX 258: Performed by: INTERNAL MEDICINE

## 2024-04-14 PROCEDURE — 97110 THERAPEUTIC EXERCISES: CPT

## 2024-04-14 PROCEDURE — 36415 COLL VENOUS BLD VENIPUNCTURE: CPT

## 2024-04-14 PROCEDURE — 6370000000 HC RX 637 (ALT 250 FOR IP): Performed by: STUDENT IN AN ORGANIZED HEALTH CARE EDUCATION/TRAINING PROGRAM

## 2024-04-14 PROCEDURE — 97535 SELF CARE MNGMENT TRAINING: CPT

## 2024-04-14 PROCEDURE — 2700000000 HC OXYGEN THERAPY PER DAY

## 2024-04-14 PROCEDURE — 2060000000 HC ICU INTERMEDIATE R&B

## 2024-04-14 PROCEDURE — 97530 THERAPEUTIC ACTIVITIES: CPT

## 2024-04-14 PROCEDURE — 94761 N-INVAS EAR/PLS OXIMETRY MLT: CPT

## 2024-04-14 PROCEDURE — 99232 SBSQ HOSP IP/OBS MODERATE 35: CPT | Performed by: INTERNAL MEDICINE

## 2024-04-14 PROCEDURE — 80048 BASIC METABOLIC PNL TOTAL CA: CPT

## 2024-04-14 PROCEDURE — 85025 COMPLETE CBC W/AUTO DIFF WBC: CPT

## 2024-04-14 RX ORDER — HEPARIN SODIUM 5000 [USP'U]/ML
5000 INJECTION, SOLUTION INTRAVENOUS; SUBCUTANEOUS EVERY 8 HOURS SCHEDULED
Status: CANCELLED | OUTPATIENT
Start: 2024-04-14

## 2024-04-14 RX ADMIN — ASPIRIN 81 MG: 81 TABLET, COATED ORAL at 07:30

## 2024-04-14 RX ADMIN — POLYETHYLENE GLYCOL 3350 17 G: 17 POWDER, FOR SOLUTION ORAL at 07:29

## 2024-04-14 RX ADMIN — ALLOPURINOL 100 MG: 100 TABLET ORAL at 07:31

## 2024-04-14 RX ADMIN — AMIODARONE HYDROCHLORIDE 200 MG: 200 TABLET ORAL at 20:26

## 2024-04-14 RX ADMIN — DESMOPRESSIN ACETATE 40 MG: 0.2 TABLET ORAL at 20:26

## 2024-04-14 RX ADMIN — MUPIROCIN: 20 OINTMENT TOPICAL at 07:29

## 2024-04-14 RX ADMIN — METOPROLOL TARTRATE 25 MG: 25 TABLET, FILM COATED ORAL at 20:26

## 2024-04-14 RX ADMIN — FUROSEMIDE 80 MG: 10 INJECTION, SOLUTION INTRAMUSCULAR; INTRAVENOUS at 19:39

## 2024-04-14 RX ADMIN — HEPARIN SODIUM 5000 UNITS: 5000 INJECTION INTRAVENOUS; SUBCUTANEOUS at 22:22

## 2024-04-14 RX ADMIN — METOPROLOL TARTRATE 25 MG: 25 TABLET, FILM COATED ORAL at 07:37

## 2024-04-14 RX ADMIN — HYDRALAZINE HYDROCHLORIDE 10 MG: 10 TABLET, FILM COATED ORAL at 05:30

## 2024-04-14 RX ADMIN — HEPARIN SODIUM 5000 UNITS: 5000 INJECTION INTRAVENOUS; SUBCUTANEOUS at 15:05

## 2024-04-14 RX ADMIN — SODIUM CHLORIDE, PRESERVATIVE FREE 10 ML: 5 INJECTION INTRAVENOUS at 07:29

## 2024-04-14 RX ADMIN — AMIODARONE HYDROCHLORIDE 200 MG: 200 TABLET ORAL at 07:30

## 2024-04-14 RX ADMIN — HYDRALAZINE HYDROCHLORIDE 10 MG: 10 TABLET, FILM COATED ORAL at 22:24

## 2024-04-14 RX ADMIN — FUROSEMIDE 80 MG: 10 INJECTION, SOLUTION INTRAMUSCULAR; INTRAVENOUS at 07:34

## 2024-04-14 RX ADMIN — HYDRALAZINE HYDROCHLORIDE 10 MG: 10 TABLET, FILM COATED ORAL at 15:05

## 2024-04-14 ASSESSMENT — PAIN SCALES - WONG BAKER

## 2024-04-14 ASSESSMENT — PAIN SCALES - GENERAL
PAINLEVEL_OUTOF10: 0
PAINLEVEL_OUTOF10: 0

## 2024-04-14 NOTE — PROGRESS NOTES
Inpatient Physical Therapy Treatment    Unit: ICU  Date:  4/14/2024  Patient Name:    Amber Fuentes  Admitting diagnosis:  Hyperkalemia [E87.5]  Anasarca [R60.1]  Bradycardia [R00.1]  Pleural effusion [J90]  Other ascites [R18.8]  Acute renal failure, unspecified acute renal failure type (HCC) [N17.9]  Pneumonia of right lower lobe due to infectious organism [J18.9]  Admit Date:  4/1/2024  Precautions/Restrictions/WB Status/ Lines/ Wounds/ Oxygen: Fall risk, Bed/chair alarm, Lines (Supplemental O2 (1L), external catheter, and IJ right), Telemetry, and Continuous pulse oximetry    Pt seen for cotreatment this date due to patient safety, patient endurance, acute illness/injury, staff recommendation, and need for the assistance of 2 skilled therapists    Treatment Time: 0838-0931  Treatment Number:  4   Timed Code Treatment Minutes: 53 minutes  Total Treatment Minutes:  53 minutes    Patient Stated Goals for Therapy: \"to use the commode\"          Discharge Recommendations: SNF (pending decision for hospice)  DME needs for discharge: Defer to facility       Therapy recommendation for EMS Transport: requires transport by cot due to pt with poor sitting balance/tolerance    Therapy recommendations for staff:   Assist of 2 for sitting EOB - watch for syncope    History of Present Illness:   78 y.o. female with pmh of coronary disease, CKD, CHF, COPD, hypertension, hyperlipidemia, diabetes mellitus, distant history of CABG who presents with diarrhea and nausea.  +acute on chronic CHF with EF 20%, renal failure   Was on CRRT, taken off on 4/4.    Home Health S4 Level Recommendation:  NA        AM-PAC Mobility Score    AM-PAC Inpatient Mobility Raw Score : 6          Subjective  Patient lying reclined in bed with no family present.  RN cleared for therapy. Pt agreeable to this PT session.     Cognition    A&O x4   Able to follow 2 step commands    Pain   No  Location: NA  Rating: NA /10  Pain Medicine Status: No request  no improvements in symptoms, pt returned to supine safely and symptoms began to resolve with supine rest.     Recommending SNF upon discharge as patient functioning well below baseline, demonstrates good rehab potential and unable to return home due to burden of care beyond caregiver ability and home environment not conducive to patient recovery.    Goals :   To be met in 3 visits:  1). Independent with LE Ex x 10 reps  2). Sit to/from stand: Min A  - ongoing, not met 4/14  3). Bed to chair: Min A  - ongoing, not met 4/14  4). CHF goal: Pt will meet 2/4 CHF goals (see addendum at end of note for details)  5). Tolerate sitting EOB x 5 minutes with SBA - ongoing, pt sitting ~5 min with CGA    To be met in 6 visits:  1).  Supine to/from sit: SBA  2).  Sit to/from stand: SBA  3).  Bed to chair: SBA  4).  Gait: Ambulate 50 ft.  with CGA and use of LRAD (least restrictive assistive device)  5).  Tolerate B LE exercises 3 sets of 10-15 reps    Rehabilitation Potential: Good  Strengths for achieving goals include:   Pt motivated, PLOF, and Pt cooperative   Barriers to achieving goals include:    No Barriers    Plan    To be seen 3-5 x / week  while in acute care setting for therapeutic exercises, bed mobility, transfers, progressive gait training, balance training, and family/patient education.    Signature: Catia Gutiérrez PT     If patient discharges from this facility prior to next visit, this note will serve as the Discharge Summary.    CHF Education  Deferred Therapy Heart Failure Education this date.   Pt inappropriate for education at this time due to :  []Cognition   [x]Medical Status   [x]Readiness to learn  []Refusal   []Language barrier  []Decreased vision/hearing    []No family present   Should this change during treatment, education will be initiated. If family/ pt's support system is present at subsequent therapy session, will attempt to initiate education.

## 2024-04-14 NOTE — PROGRESS NOTES
4 Eyes Skin Assessment     NAME:  Amber Fuentes  YOB: 1945  MEDICAL RECORD NUMBER:  [id number]    The patient is being assessed for  Other Alek scale low    I agree that at least one RN has performed a thorough Head to Toe Skin Assessment on the patient. ALL assessment sites listed below have been assessed.      Areas assessed by both nurses:    Head, Face, Ears, Shoulders, Back, Chest, Arms, Elbows, Hands, Sacrum. Buttock, Coccyx, Ischium, Legs. Feet and Heels, and Under Medical Devices    Scattered bruising and redness to coccyx         Does the Patient have a Wound? No noted wound(s)       Alek Prevention initiated by RN: Yes  Wound Care Orders initiated by RN: No    Pressure Injury (Stage 3,4, Unstageable, DTI, NWPT, and Complex wounds) if present, place Wound referral order by RN under : No    New Ostomies, if present place, Ostomy referral order under : No     Nurse 1 eSignature: Electronically signed by Vane Avery RN on 4/14/24 at 2:07 AM EDT    **SHARE this note so that the co-signing nurse can place an eSignature**    Nurse 2 eSignature: Electronically signed by Shira Santillan RN on 4/14/24 at 2:37 AM EDT

## 2024-04-14 NOTE — FLOWSHEET NOTE
04/14/24 1311   Vital Signs   Temp 97.8 °F (36.6 °C)   Temp Source Oral   Pulse 78   Heart Rate Source Monitor   Respirations 22   BP (!) 134/53   MAP (Calculated) 80   MAP (mmHg) 72   BP Location Left upper arm   BP Method Automatic   Patient Position Semi fowlers   Oxygen Therapy   SpO2 99 %   O2 Device Nasal cannula   O2 Flow Rate (L/min) 1.5 L/min     Patient arrived to room 302 from ICU. Vitals are stable. Patient resting comfortably at this time with call light within reach.

## 2024-04-14 NOTE — PROGRESS NOTES
HEART FAILURE CARE PLAN:    Comorbidities Reviewed: Yes   Patient has a past medical history of Anemia, Backache, unspecified, CAD, multiple vessel, Chronic kidney disease (CKD) stage G3b/A1, moderately decreased glomerular filtration rate (GFR) between 30-44 mL/min/1.73 square meter and albuminuria creatinine ratio less than 30 mg/g ([facility]), Chronic obstructive pulmonary disease (HCC), Chronic systolic CHF (congestive heart failure) ([facility]), COPD (chronic obstructive pulmonary disease) ([facility]), Depressive disorder, not elsewhere classified, Essential hypertension, benign, Gout, History of blood transfusion, Hyperlipidemia associated with type 2 diabetes mellitus ([facility]), Major depressive disorder, recurrent episode, moderate ([facility]), Osteoarthritis, hand, Osteoarthrosis, unspecified whether generalized or localized, lower leg, Osteopenia, Other and unspecified hyperlipidemia, Pain in joint, lower leg, Pneumonia, Rotator cuff tendinitis, Tear of medial cartilage or meniscus of knee, current, Type 2 diabetes mellitus with microalbuminuria, without long-term current use of insulin ([facility]), Type 2 diabetes mellitus without complication ([facility]), Type II or unspecified type diabetes mellitus without mention of complication, not stated as uncontrolled, and Uses LifeVest defibrillator.     Weights Reviewed: Yes   Admission weight: 59.6 kg (131 lb 6.3 oz) (chart review)   Wt Readings from Last 3 Encounters:   04/14/24 71.5 kg (157 lb 9.6 oz)   02/11/24 59.6 kg (131 lb 8 oz)   01/31/24 60.7 kg (133 lb 13.1 oz)     Intake & Output Reviewed: Yes     Intake/Output Summary (Last 24 hours) at 4/14/2024 1244  Last data filed at 4/14/2024 1000  Gross per 24 hour   Intake 430 ml   Output 550 ml   Net -120 ml       ECHOCARDIOGRAM Reviewed: Yes   Patient's Ejection Fraction (EF) is less than or equal to 40%. Discuss HFrEF Guideline Directed Medical Therapy (GDMT) with Cardiologist or Hospitalist:          Medications Reviewed: Yes   SCHEDULED  ONE TABLET BY MOUTH DAILY 4/20/23   Saundra Foley, APRN - CNP   diclofenac sodium (VOLTAREN) 1 % GEL Apply 2 g topically in the morning and at bedtime  Patient taking differently: Apply 2 g topically in the morning and at bedtime Pt states she has never used this. 2/28/23   Dmitriy Trotter APRN - CNP   aspirin 81 MG EC tablet Take 1 tablet by mouth daily    Provider, MD Blade      Diet Reviewed: Yes   ADULT DIET; Regular; 4 carb choices (60 gm/meal); Low Potassium (Less than 3000 mg/day)  ADULT ORAL NUTRITION SUPPLEMENT; Lunch, Dinner; Diabetic Oral Supplement    Goal of Care Reviewed: Yes   Patient and/or Family's stated Goal of Care this Admission: Reduce shortness of breath, increase activity tolerance, better understand heart failure and disease management, be more comfortable, and reduce lower extremity edema prior to discharge.     Electronically signed by Garcia Mcmanus RN on 4/14/2024 at 12:44 PM

## 2024-04-14 NOTE — PROGRESS NOTES
4 Eyes Skin Assessment     NAME:  Amber Fuentes  YOB: 1945  MEDICAL RECORD NUMBER:  [id number]    The patient is being assessed for  Transfer to New Unit    I agree that at least one RN has performed a thorough Head to Toe Skin Assessment on the patient. ALL assessment sites listed below have been assessed.      Areas assessed by both nurses:    Head, Face, Ears, Shoulders, Back, Chest, Arms, Elbows, Hands, Sacrum. Buttock, Coccyx, Ischium, Legs. Feet and Heels, and Under Medical Devices     Blanchable and Non blanchable redness to coccyx and buttocks.        Does the Patient have a Wound? No noted wound(s)       Alek Prevention initiated by RN: Yes  Wound Care Orders initiated by RN: No    Pressure Injury (Stage 3,4, Unstageable, DTI, NWPT, and Complex wounds) if present, place Wound referral order by RN under : No    New Ostomies, if present place, Ostomy referral order under : No     Nurse 1 eSignature: Electronically signed by Garcia Mcmanus RN on 4/14/24 at 12:47 PM EDT    **SHARE this note so that the co-signing nurse can place an eSignature**    Nurse 2 eSignature: Electronically signed by Sirisha Curry RN on 4/14/24 at 4:30 PM EDT

## 2024-04-14 NOTE — PROGRESS NOTES
A&Ox4. Follows commands, rass 0. See flowsheets for additional assessment information. Small BM, patient requests to hold the enema at this time. will continue to monitor.

## 2024-04-14 NOTE — PROGRESS NOTES
When writer attempted to do 4 eyes assessment for transfer Patient was found to be on a bed pan. Skin was assessed. There was some lines but there did not appear to be permanent marks from bed pan. Patient cleaned up. Pillow placed under bottom.

## 2024-04-14 NOTE — PROGRESS NOTES
Inpatient Occupational Therapy Treatment    Unit: PCU  Date:  4/14/2024  Patient Name:    Amber Fuentes  Admitting diagnosis:  Hyperkalemia [E87.5]  Anasarca [R60.1]  Bradycardia [R00.1]  Pleural effusion [J90]  Other ascites [R18.8]  Acute renal failure, unspecified acute renal failure type (HCC) [N17.9]  Pneumonia of right lower lobe due to infectious organism [J18.9]  Admit Date:  4/1/2024  Precautions/Restrictions/WB Status/ Lines/ Wounds/ Oxygen: Fall risk, Bed/chair alarm, Lines (Supplemental O2 (1L), external catheter, and IJ right), Telemetry, and Continuous pulse oximetry    Pt seen for cotreatment this date due to patient safety, patient endurance, and limited functional status information    Treatment Time:  386-518  Treatment Number:  4  Timed Code Treatment Minutes: 53 minutes  Total Treatment Minutes:  53 minutes    Patient Goals for Therapy: \"get on the pot\"      Discharge Recommendations: SNF pending decision for hospice  DME needs for discharge: Defer to facility       Therapy recommendations for staff:   Assist of 2 for sitting EOB if medically appropriate.     History of Present Illness: per Dr Swanson H&P 4/2/24:    Amber Fuentes is a 78 y.o. female with pmh of coronary disease, CKD, CHF, COPD, hypertension, hyperlipidemia, diabetes mellitus, distant history of CABG who presents with diarrhea and nausea to this facility in the ER.    chronic systolic CHF with very low EF at 20 % presenting with acute CHF and renal failure with refractory hyperkalemia     Initiated on CRRT -> transitioned to HD now   Drop in h.h-> PRBC transfused        Home Health S4 Level Recommendation:  NA    AM-PAC Score: AM-PAC Inpatient Daily Activity Raw Score: 15     Subjective:  Patient lying reclined in bed with no family present.   Pt agreeable to this OT session.     Cognition:    A&O x4  Able to follow 2 step commands    Pain:   No  Location: n/a  Rating: NA /10  Pain Medicine Status: No request made    Preadmission

## 2024-04-14 NOTE — PLAN OF CARE
HEART FAILURE CARE PLAN:    Comorbidities Reviewed: Yes   Patient has a past medical history of Anemia, Backache, unspecified, CAD, multiple vessel, Chronic kidney disease (CKD) stage G3b/A1, moderately decreased glomerular filtration rate (GFR) between 30-44 mL/min/1.73 square meter and albuminuria creatinine ratio less than 30 mg/g ([facility]), Chronic obstructive pulmonary disease (HCC), Chronic systolic CHF (congestive heart failure) ([facility]), COPD (chronic obstructive pulmonary disease) ([facility]), Depressive disorder, not elsewhere classified, Essential hypertension, benign, Gout, History of blood transfusion, Hyperlipidemia associated with type 2 diabetes mellitus ([facility]), Major depressive disorder, recurrent episode, moderate ([facility]), Osteoarthritis, hand, Osteoarthrosis, unspecified whether generalized or localized, lower leg, Osteopenia, Other and unspecified hyperlipidemia, Pain in joint, lower leg, Pneumonia, Rotator cuff tendinitis, Tear of medial cartilage or meniscus of knee, current, Type 2 diabetes mellitus with microalbuminuria, without long-term current use of insulin ([facility]), Type 2 diabetes mellitus without complication ([facility]), Type II or unspecified type diabetes mellitus without mention of complication, not stated as uncontrolled, and Uses LifeVest defibrillator.     Weights Reviewed: Yes   Admission weight: 59.6 kg (131 lb 6.3 oz) (chart review)   Wt Readings from Last 3 Encounters:   04/13/24 71.3 kg (157 lb 3.2 oz)   02/11/24 59.6 kg (131 lb 8 oz)   01/31/24 60.7 kg (133 lb 13.1 oz)     Intake & Output Reviewed: Yes     Intake/Output Summary (Last 24 hours) at 4/13/2024 2133  Last data filed at 4/13/2024 2000  Gross per 24 hour   Intake 672 ml   Output 450 ml   Net 222 ml       ECHOCARDIOGRAM Reviewed: Yes   Patient's Ejection Fraction (EF) is less than or equal to 40%. Discuss HFrEF Guideline Directed Medical Therapy (GDMT) with Cardiologist or Hospitalist:          Medications Reviewed: Yes   SCHEDULED

## 2024-04-14 NOTE — PROGRESS NOTES
Shift assessment, completed, see flow sheet. Pt is alert and oriented x 4.     SR 83, /56, SpO2 98% on 1L nc. Respirations are easy, even, and unlabored. Bilateral lung sounds diminished. R midline, R vas cath, PIV, WNL and SL.    Bowel sounds hypoactive, pt remains constipated. Attempted BM on bedpan but was unsuccessful.       Pt with episode of urine incontinence, cleaned and repositioned. External catheter in place.    Call light within reach. Bed in lowest position. Bed alarm on.

## 2024-04-14 NOTE — PROGRESS NOTES
Bedside report and transfer of care given to POORNIMA Barker. Pt currently resting in bed with the call light within reach. Pt denies any other care needs at this time. Pt stable at this time.

## 2024-04-14 NOTE — PROGRESS NOTES
CARDIOLOGY PROGRESS NOTE      Patient Name: Abmer Fuentes  Date of admission: 4/1/2024  4:41 PM  Admission Dx: Hyperkalemia [E87.5]  Anasarca [R60.1]  Bradycardia [R00.1]  Pleural effusion [J90]  Other ascites [R18.8]  Acute renal failure, unspecified acute renal failure type (HCC) [N17.9]  Pneumonia of right lower lobe due to infectious organism [J18.9]  Reason for Consult:  New onset AFIB  Requesting Physician: Cooper Swanson MD  Primary Care physician: Lucila Tejeda MD    Subjective:     Amber Fuentes is a 78 y.o. woman with CAD s/p CABG and PCI, severe ischemic cardiomyopathy, HFrEF, PFO s/p closure, MR s/p repair, hypertension, hyperlipidemia, diabetes mellitus and chronic kidney disease who presented to the hospital on 4/1/24 with volume overload and diarrhea.  Patient was found to be in decompensated HF, renal failure with refractory hyperkalemia, and anemia requiring PRBC transfusion.      Interval History:  Last week during HD, patient developed chest pain with new onset Afib. She was given nitro which improved her chest pain from 8 to 2. Received IVF bolus for low BP. Patient appeared ill per documentation and was transferred to the ICU. No chest pain since out of Afib RVR. Biggest complaint is right foot pain.   ECG  Atrial fibrillation with rapid ventricular response, Marked ST abnormality, possible lateral subendocardial injury. Questionable ST elevation in inferior leads. Troponin 83-83. (Chronic trop leak to the 130s). Hgb 7.6. PLT 122K (up from 43K). Afib with rates 130-150s.      She was started on diltiazem gtt with spontaneous conversion to NSR at 0121. Remains in NSR with rates 70s.      Dr Kelly discussed with patient and family at bedside two days ago. They want to talk to palliative medicine about change in code status and possible hospice.      Subjective:  Patient stable overnight and complaining about constipation this morning.  No chest pain and worsening SOB.  She

## 2024-04-14 NOTE — PROGRESS NOTES
Reassessment completed, see flow sheet. BL lungs diminished.  Pt continues to be SR. VSS    All ICU Lines and monitoring remain in place. Bed locked in lowest position. Call light within reach.

## 2024-04-14 NOTE — PROGRESS NOTES
Progress Note    Admit Date:  4/1/2024  13    Interval history:    chronic systolic CHF with very low EF at 20 % presenting with acute CHF and renal failure with refractory hyperkalemia    Initiated on CRRT -> transitioned to HD now   Drop in h.h-> PRBC transfused        Subjective:  Ms. Fuentes was transferred to the ICU last night after a rapid response was called.  Patient had chest pain and went into rapid A-fib.  She was minimally responsive.  This morning she is back in sinus rhythm and her mentation is normal.  Has had intermittent complaints of chest pain.  Has known CAD.  Has known severe ischemic cardiomyopathy.  Prior PFO closure.  Status post mitral regurg repair.  She has CKD.    4/13-patient and family were considering hospice and palliative care yesterday.  Had a long discussion with them and at the present time we would like to put this on hold and try dialysis again if needed.  Making a little bit of urine.  Creatinine has not worsened much.  No chest pain.    414- awake and alert. Urine output is 550 ml. No chest pain. She is constipated. She has a good appetite. She had a run of SVT -6 beats with HR of 109.    Objective:   /61   Pulse 88   Temp 98.1 °F (36.7 °C)   Resp 30   Ht 1.524 m (5')   Wt 71.5 kg (157 lb 9.6 oz)   SpO2 99%   BMI 30.78 kg/m²     Intake/Output Summary (Last 24 hours) at 4/14/2024 0828  Last data filed at 4/14/2024 0500  Gross per 24 hour   Intake 552 ml   Output 550 ml   Net 2 ml         Physical Exam:    General:  elderly female.   Awake, alert and fairly oriented. Appears to be not in any distress  Mucous Membranes:  Pink , anicteric  Neck: no JVD, no carotid bruit, no thyromegaly  Chest:  Clear to auscultation; no wheezes, rales or rhonchi   Cardiovascular:  RRR S1S2 heard, Murmur in SR no episodes of a fib.  Abdomen:  Soft, undistended, non tender, no organomegaly, BS present  Extremities- improving  upper and lower extremity edema- down to 2 +  Right sided  performed 1/23/24 (Limited Echo with 25% EF severe global hypokinesis inferior akinesis. RV reduced function), LVEF   appears slightly improved.      Assessment & Plan:    #Acute on chronic systolic CHF  #Ischemic cardiomyopathy   # New onset atrial fibrillation resolved. In SR at present.   # Accelerated junctional rhythm  # Subendocardial ischemia  -pro-BNP >23k  -CXR shows cardiomegaly with pulmonary vascular congestion  Also has massive peripheral edema and refractory hyperkalemia  -limited echo 1/24 with similar EF of 25 % and repeat ECHo with slight improvement to 30 %, with severe global hypokinesis   -nephrology consulted   - initiated on CRRT for high K and had fluid removal. Transitioned to HD with continued fluid removal    -She was on goal-directed medical therapy including Toprol and Entresto.  Went into A-fib.  Started on amiodarone.  Cardiology consultation obtained.  High risk for anticoagulation.  Started on amiodarone.  Started on hydralazine for afterload reduction.  No beta-blocker at present.  Unable to uptitrate goal-directed medical therapy due to multiple comorbidities.  Continue aspirin.  No plans for ischemia workup.  -dose of Lasix increased by nephrology      [id number] Score for Atrial Fibrillation Stroke Risk   Risk   Factors  Component Value   C CHF Yes 1   H HTN Yes 1   A2 Age >= 75 Yes,  (78 y.o.) 2   D DM Yes 1   S2 Prior Stroke/TIA No 0   V Vascular Disease No 0   A Age 65-74 No,  (78 y.o.) 0   Sc Sex female 1    [id number]  Score  6   Score last updated 4/12/24 1:45 PM EDT    Click here for a link to the UpToDate guideline \"Atrial Fibrillation: Anticoagulation therapy to prevent embolization    Disclaimer: Risk Score calculation is dependent on accuracy of patient problem list and past encounter diagnosis. Vasc     #LANCE on CKD4-possible cardiorenal syndrome.  #Hyperkalemia and metabolic acidosis  - possible cardiorenal  - holding entresto, k levels did not improve with medical

## 2024-04-14 NOTE — PROGRESS NOTES
Nephrology Progress Note   [email or web address]      Sub/interval history  Feels all right no new complaints  Reports that she has been urinating more    HD on 4/11 with net UF of 542 mL, postweight 71 kg; treatment was complicated by chest pain/pressure along with A-fib RVR and hypotension.  Rapid response was called and patient was transferred to ICU    HD on 4/9 with net UF of 160 mL.  45 minutes into dialysis, patient reported of feeling heaviness on her chest.  At that time blood flow rate was decreased to 300 mL/min from 350 and UF goal decreased from 2 L to 1 L.  Subsequently 100 mL of normal saline was given and UF was turned off completely but the patient's symptoms continued and dialysis was stopped in 1 hour 9 minutes.    Last 24-hour urine output be 550 ml charted     ROS: No fever   PSFH: + visitors    Scheduled Meds:   metoprolol tartrate  25 mg Oral BID    furosemide  80 mg IntraVENous BID    mupirocin   Each Nostril BID    amiodarone  200 mg Oral BID    Followed by    [START ON 4/19/2024] amiodarone  200 mg Oral Daily    hydrALAZINE  10 mg Oral 3 times per day    epoetin reed-epbx  10,000 Units IntraVENous Once per day on Tue Thu Sat    sodium chloride flush  5-40 mL IntraVENous 2 times per day    heparin (porcine)  5,000 Units SubCUTAneous 3 times per day    allopurinol  100 mg Oral Daily    aspirin  81 mg Oral Daily    atorvastatin  40 mg Oral Nightly    insulin lispro  0-8 Units SubCUTAneous TID WC    insulin lispro  0-4 Units SubCUTAneous Nightly     Continuous Infusions:   sodium chloride 5 mL/hr at 04/04/24 0144    dextrose       PRN Meds:.nitroGLYCERIN, heparin (porcine), calcium carbonate, sodium chloride flush, sodium chloride, ondansetron **OR** ondansetron, polyethylene glycol, acetaminophen **OR** acetaminophen, glucose, dextrose bolus **OR** dextrose bolus, glucagon (rDNA), dextrose    Objective/     Vitals:    04/14/24 0900 04/14/24 1000 04/14/24 1101 04/14/24 1311   BP: 114/64 (!)

## 2024-04-14 NOTE — CONSULTS
Palliative Care Chart Review  and Check in Note:     NAME:  Amber Fuentes  Admit Date: 4/1/2024  Hospital Day:  Hospital Day: 14   Current Code status: DNR-CCA    Palliative care is continuing to following Ms. Fuentes for symptom management,  and goals of care discussion as needed. Patient's chart reviewed today 4/14/24.        The following are the currently established goals/code status, and Symptom management.     Goals of care: allegra Jordan with [facility] met with pt and pt's son yesterday and pt not ready to stop HD at this time. Not ready for comfort care measures.    Code status: DNR-CCA    Discharge plan: PT rec is SNF. Plan continues [facility] when medically ready. Will require pre-cert. CM following.      Stefani Anand RN  04/14/24  3:49 PM

## 2024-04-14 NOTE — PROGRESS NOTES
Pulmonary & Critical Care Medicine ICU Progress Note    CC: Chest pain and A-fib RVR during dialysis    Events of Last 24 hours:  No new complaints    Vascular lines: IV: Temporary Vas-Cath    MV: None     / / /   No results for input(s): \"PHART\", \"ZXR4XVI\", \"PO2ART\" in the last 72 hours.    IV:   sodium chloride 5 mL/hr at 24 0144    dextrose         Vitals:  Blood pressure (!) 131/58, pulse 87, temperature 98.1 °F (36.7 °C), resp. rate 26, height 1.524 m (5'), weight 71.5 kg (157 lb 9.6 oz), SpO2 98 %, not currently breastfeeding.  on room air  Temp  Av.7 °F (36.5 °C)  Min: 96.9 °F (36.1 °C)  Max: 98.3 °F (36.8 °C)    Intake/Output Summary (Last 24 hours) at 2024 0746  Last data filed at 2024 0500  Gross per 24 hour   Intake 552 ml   Output 550 ml   Net 2 ml     PE:  General:  ill appearing    Resp: No crackles. No wheezing.   CV: S1, S2. + edema  GI: NT, ND, +BS  Skin: Warm and dry.    Neuro: PERRL. Alert and oriented to person and place    Scheduled Meds:   metoprolol tartrate  25 mg Oral BID    furosemide  80 mg IntraVENous BID    mupirocin   Each Nostril BID    amiodarone  200 mg Oral BID    Followed by    [START ON 2024] amiodarone  200 mg Oral Daily    hydrALAZINE  10 mg Oral 3 times per day    epoetin reed-epbx  10,000 Units IntraVENous Once per day on Tue Thu Sat    sodium chloride flush  5-40 mL IntraVENous 2 times per day    [Held by provider] heparin (porcine)  5,000 Units SubCUTAneous 3 times per day    allopurinol  100 mg Oral Daily    aspirin  81 mg Oral Daily    atorvastatin  40 mg Oral Nightly    insulin lispro  0-8 Units SubCUTAneous TID WC    insulin lispro  0-4 Units SubCUTAneous Nightly       Data:  CBC:   Recent Labs     24  0501 24  0505 24  0538   WBC 4.6 6.2 6.8   HGB 7.6* 8.0* 8.0*   HCT 24.4* 25.1* 24.7*   MCV 90.4 90.0 89.0   * 185 210     BMP:   Recent Labs     24  0501 24  0505 24  0538   * 135* 134*   K 4.1 4.9  4.8   CL 98* 98* 97*   CO2 32 32 31   PHOS 3.5 4.1  --    BUN 21* 31* 37*   CREATININE 1.4* 1.8* 1.9*     LIVER PROFILE: No results for input(s): \"AST\", \"ALT\", \"LIPASE\", \"AMYLASE\", \"ALB\", \"BILIDIR\", \"BILITOT\", \"ALKPHOS\" in the last 72 hours.    Micro:   4/1/2024 SARS-CoV-2 and influenza are negative  4/1/2024 blood NG  4/1/2024 urine NG     Imaging: Chest imaging was reviewed by me and showed:  CT ABD 4/1/2024 1. Bilateral pleural effusions.  Consolidation in the right perihilar region. 2. Mild ascites. 3. Diffuse anasarca.      CXR 4/8/2024 right pleural effusion, improved     Echo: EF 30%.  Severe global with regional hypokinesis.  Severely hypokinetic inferolateral and inferior serra.  Edward Physio II annuloplasty ring is seen in the mitral position.  Mild MS.  Mild-moderate MR.  Mild AR.  Moderate TR.  SPAP 56      ASSESSMENT:  Hypoxemia   Cardiorenal syndrome  Acute on chronic systolic CHF   AFIB RVR  LANCE on CKD IV with refractory hyperkalemia  Last hemodialysis was 4/11/2024, almost a complete session prior to being stopped for AFIB RVR with chest pain   Right pleural effusion  Anasarca  Acute on chronic anemia  Thrombocytopenia, recovered, need to confirm not related to heparin  DM2  CAD s/p CABG  Former smoker      PLAN:  Supplemental O2 to maintain SaO2 >92%; wean as tolerated   HD on hold per nephrology, monitoring for adequacy of renal function  Amiodarone per cardiology  SSI   Prophylaxis: SCDs (HIT panel was sent)  DNR-CCA per her request.  Plan is to continue other medical care  I will not plan to follow once transferred out of ICU.  Please call with any questions or concerns: [phone] (service).     CHF, AFIB and kidney failure make the patient higher risk for morbidity and mortality & results in need for ongoing testing and treatment.

## 2024-04-14 NOTE — FLOWSHEET NOTE
04/14/24 1734   Vital Signs   Temp 98 °F (36.7 °C)   Temp Source Oral   Pulse 75   Heart Rate Source Monitor   Respirations 20   BP (!) 122/50   MAP (Calculated) 74   BP Location Left upper arm   BP Method Automatic   Patient Position Semi fowlers   Oxygen Therapy   SpO2 100 %   O2 Device Nasal cannula   O2 Flow Rate (L/min) 2 L/min     Patient remains stable at this ti me and denies further needs. Call light within reach

## 2024-04-15 PROBLEM — I48.0 PAF (PAROXYSMAL ATRIAL FIBRILLATION) (HCC): Status: ACTIVE | Noted: 2024-04-15

## 2024-04-15 LAB
ANION GAP SERPL CALCULATED.3IONS-SCNC: 7 MMOL/L (ref 3–16)
BASOPHILS # BLD: 0.1 K/UL (ref 0–0.2)
BASOPHILS NFR BLD: 0.7 %
BUN SERPL-MCNC: 41 MG/DL (ref 7–20)
CALCIUM SERPL-MCNC: 8.9 MG/DL (ref 8.3–10.6)
CHLORIDE SERPL-SCNC: 95 MMOL/L (ref 99–110)
CO2 SERPL-SCNC: 30 MMOL/L (ref 21–32)
CREAT SERPL-MCNC: 2 MG/DL (ref 0.6–1.2)
DEPRECATED RDW RBC AUTO: 17.8 % (ref 12.4–15.4)
EOSINOPHIL # BLD: 0.1 K/UL (ref 0–0.6)
EOSINOPHIL NFR BLD: 1.2 %
GFR SERPLBLD CREATININE-BSD FMLA CKD-EPI: 25 ML/MIN/{1.73_M2}
GLUCOSE BLD-MCNC: 114 MG/DL (ref 70–99)
GLUCOSE BLD-MCNC: 115 MG/DL (ref 70–99)
GLUCOSE BLD-MCNC: 229 MG/DL (ref 70–99)
GLUCOSE BLD-MCNC: 234 MG/DL (ref 70–99)
GLUCOSE SERPL-MCNC: 118 MG/DL (ref 70–99)
HCT VFR BLD AUTO: 24.6 % (ref 36–48)
HEPARIN INDUCED PLATELET ANTIBODY: NEGATIVE
HGB BLD-MCNC: 7.9 G/DL (ref 12–16)
LYMPHOCYTES # BLD: 0.8 K/UL (ref 1–5.1)
LYMPHOCYTES NFR BLD: 8.2 %
MCH RBC QN AUTO: 28.5 PG (ref 26–34)
MCHC RBC AUTO-ENTMCNC: 32.1 G/DL (ref 31–36)
MCV RBC AUTO: 88.8 FL (ref 80–100)
MONOCYTES # BLD: 0.9 K/UL (ref 0–1.3)
MONOCYTES NFR BLD: 9.8 %
NEUTROPHILS # BLD: 7.7 K/UL (ref 1.7–7.7)
NEUTROPHILS NFR BLD: 80.1 %
PERFORMED ON: ABNORMAL
PLATELET # BLD AUTO: 228 K/UL (ref 135–450)
PMV BLD AUTO: 7.9 FL (ref 5–10.5)
POTASSIUM SERPL-SCNC: 4.8 MMOL/L (ref 3.5–5.1)
RBC # BLD AUTO: 2.77 M/UL (ref 4–5.2)
SODIUM SERPL-SCNC: 132 MMOL/L (ref 136–145)
WBC # BLD AUTO: 9.6 K/UL (ref 4–11)

## 2024-04-15 PROCEDURE — 80048 BASIC METABOLIC PNL TOTAL CA: CPT

## 2024-04-15 PROCEDURE — 6360000002 HC RX W HCPCS: Performed by: INTERNAL MEDICINE

## 2024-04-15 PROCEDURE — 6370000000 HC RX 637 (ALT 250 FOR IP): Performed by: INTERNAL MEDICINE

## 2024-04-15 PROCEDURE — 99232 SBSQ HOSP IP/OBS MODERATE 35: CPT | Performed by: INTERNAL MEDICINE

## 2024-04-15 PROCEDURE — 99233 SBSQ HOSP IP/OBS HIGH 50: CPT | Performed by: INTERNAL MEDICINE

## 2024-04-15 PROCEDURE — 2580000003 HC RX 258: Performed by: INTERNAL MEDICINE

## 2024-04-15 PROCEDURE — 90935 HEMODIALYSIS ONE EVALUATION: CPT

## 2024-04-15 PROCEDURE — 94761 N-INVAS EAR/PLS OXIMETRY MLT: CPT

## 2024-04-15 PROCEDURE — 2700000000 HC OXYGEN THERAPY PER DAY

## 2024-04-15 PROCEDURE — 6370000000 HC RX 637 (ALT 250 FOR IP): Performed by: STUDENT IN AN ORGANIZED HEALTH CARE EDUCATION/TRAINING PROGRAM

## 2024-04-15 PROCEDURE — 2060000000 HC ICU INTERMEDIATE R&B

## 2024-04-15 PROCEDURE — 85025 COMPLETE CBC W/AUTO DIFF WBC: CPT

## 2024-04-15 RX ORDER — MIDODRINE HYDROCHLORIDE 5 MG/1
5 TABLET ORAL ONCE
Status: COMPLETED | OUTPATIENT
Start: 2024-04-15 | End: 2024-04-15

## 2024-04-15 RX ADMIN — INSULIN LISPRO 2 UNITS: 100 INJECTION, SOLUTION INTRAVENOUS; SUBCUTANEOUS at 11:33

## 2024-04-15 RX ADMIN — HYDRALAZINE HYDROCHLORIDE 10 MG: 10 TABLET, FILM COATED ORAL at 22:29

## 2024-04-15 RX ADMIN — HYDRALAZINE HYDROCHLORIDE 10 MG: 10 TABLET, FILM COATED ORAL at 06:01

## 2024-04-15 RX ADMIN — METOPROLOL TARTRATE 25 MG: 25 TABLET, FILM COATED ORAL at 20:11

## 2024-04-15 RX ADMIN — HEPARIN SODIUM 5000 UNITS: 5000 INJECTION INTRAVENOUS; SUBCUTANEOUS at 06:01

## 2024-04-15 RX ADMIN — DESMOPRESSIN ACETATE 40 MG: 0.2 TABLET ORAL at 20:11

## 2024-04-15 RX ADMIN — AMIODARONE HYDROCHLORIDE 200 MG: 200 TABLET ORAL at 20:11

## 2024-04-15 RX ADMIN — ALLOPURINOL 100 MG: 100 TABLET ORAL at 09:18

## 2024-04-15 RX ADMIN — SODIUM CHLORIDE, PRESERVATIVE FREE 10 ML: 5 INJECTION INTRAVENOUS at 09:23

## 2024-04-15 RX ADMIN — AMIODARONE HYDROCHLORIDE 200 MG: 200 TABLET ORAL at 09:18

## 2024-04-15 RX ADMIN — MIDODRINE HYDROCHLORIDE 5 MG: 5 TABLET ORAL at 16:24

## 2024-04-15 RX ADMIN — HEPARIN SODIUM 5000 UNITS: 5000 INJECTION INTRAVENOUS; SUBCUTANEOUS at 22:29

## 2024-04-15 RX ADMIN — SODIUM CHLORIDE, PRESERVATIVE FREE 10 ML: 5 INJECTION INTRAVENOUS at 20:13

## 2024-04-15 RX ADMIN — METOPROLOL TARTRATE 25 MG: 25 TABLET, FILM COATED ORAL at 09:18

## 2024-04-15 RX ADMIN — ACETAMINOPHEN 650 MG: 325 TABLET ORAL at 12:46

## 2024-04-15 RX ADMIN — ASPIRIN 81 MG: 81 TABLET, COATED ORAL at 09:18

## 2024-04-15 ASSESSMENT — PAIN SCALES - WONG BAKER

## 2024-04-15 NOTE — DIALYSIS
Treatment time: 2.5 hours  Net UF: 343 ml     Pre weight: 69.9 kg  Post weight:69. kg  EDW: TBD kg  Crit Line Used: Yes Ending Profile: B  Refill Present: No     Access used: R Vascath     Access function: well with  ml/min. Decreased from 350 ml/min due to chest pressure      Medications or blood products given: Heparin Dwells     Regular outpatient schedule: TTS     Summary of response to treatment: Patient tolerated treatment fair with c/o chest pressure. Dr. Wakefield notified. UFR decreased to 320 and BFR decreased to 300.  Patient remained stable throughout entire treatment and upon the exiting the dialysis suite via transport.     Report given to Ella Marcelino RN and copy of dialysis treatment record placed in chart, to be scanned into EMR.

## 2024-04-15 NOTE — PROGRESS NOTES
Midodrine given. Patient more awake and talkative. No further needs at this time.     Ella Marcelino RN

## 2024-04-15 NOTE — PLAN OF CARE
HEART FAILURE CARE PLAN:    Comorbidities Reviewed: Yes   Patient has a past medical history of Anemia, Backache, unspecified, CAD, multiple vessel, Chronic kidney disease (CKD) stage G3b/A1, moderately decreased glomerular filtration rate (GFR) between 30-44 mL/min/1.73 square meter and albuminuria creatinine ratio less than 30 mg/g ([facility]), Chronic obstructive pulmonary disease (HCC), Chronic systolic CHF (congestive heart failure) ([facility]), COPD (chronic obstructive pulmonary disease) ([facility]), Depressive disorder, not elsewhere classified, Essential hypertension, benign, Gout, History of blood transfusion, Hyperlipidemia associated with type 2 diabetes mellitus ([facility]), Major depressive disorder, recurrent episode, moderate ([facility]), Osteoarthritis, hand, Osteoarthrosis, unspecified whether generalized or localized, lower leg, Osteopenia, Other and unspecified hyperlipidemia, Pain in joint, lower leg, Pneumonia, Rotator cuff tendinitis, Tear of medial cartilage or meniscus of knee, current, Type 2 diabetes mellitus with microalbuminuria, without long-term current use of insulin ([facility]), Type 2 diabetes mellitus without complication ([facility]), Type II or unspecified type diabetes mellitus without mention of complication, not stated as uncontrolled, and Uses LifeVest defibrillator.     Weights Reviewed: Yes   Admission weight: 59.6 kg (131 lb 6.3 oz) (chart review)   Wt Readings from Last 3 Encounters:   04/14/24 71.5 kg (157 lb 9.6 oz)   02/11/24 59.6 kg (131 lb 8 oz)   01/31/24 60.7 kg (133 lb 13.1 oz)     Intake & Output Reviewed: Yes     Intake/Output Summary (Last 24 hours) at 4/15/2024 0331  Last data filed at 4/15/2024 0305  Gross per 24 hour   Intake 298 ml   Output 50 ml   Net 248 ml       ECHOCARDIOGRAM Reviewed: Yes   Patient's Ejection Fraction (EF) is less than or equal to 40%. Discuss HFrEF Guideline Directed Medical Therapy (GDMT) with Cardiologist or Hospitalist:          Medications Reviewed: Yes   SCHEDULED

## 2024-04-15 NOTE — CARE COORDINATION
Reviewed chart, met with pt and family bedside. Plan remains for [facility] [place]. [facility] is back and is good until Thursday 4/18/2024. HD is set up for MWD at facility. Plan for HD IP today to see how pt tolerates treatment. Will continue to monitor.

## 2024-04-15 NOTE — PLAN OF CARE
Problem: Discharge Planning  Goal: Discharge to home or other facility with appropriate resources  Outcome: Progressing  Flowsheets (Taken 4/14/2024 1311 by Sirisha Curry, RN)  Discharge to home or other facility with appropriate resources: Identify barriers to discharge with patient and caregiver     Problem: Safety - Adult  Goal: Free from fall injury  Outcome: Progressing

## 2024-04-15 NOTE — FLOWSHEET NOTE
04/14/24 1925   Vital Signs   Temp 98 °F (36.7 °C)   Temp Source Oral   Pulse 79   Heart Rate Source Monitor   Respirations 20   BP (!) 121/51   MAP (Calculated) 74   BP Location Left upper arm   BP Method Automatic   Patient Position Semi fowlers   Pain Assessment   Pain Assessment None - Denies Pain   Oxygen Therapy   SpO2 100 %   O2 Device Nasal cannula   O2 Flow Rate (L/min) 2 L/min     Shift assessment completed- see flow sheet  Alert and oriented, on 2L NC.  Pt denies any needs or wants at this time  Daughter at bedside.  Call light and bedside table within reach  Care continues

## 2024-04-15 NOTE — PROGRESS NOTES
https://www.fda.gov/media/305925/download  Provider Fact Sheet: https://www.fda.gov/media/890133/download  EUA: https://www.fda.gov/media/786070/download  IFU: https://www.fda.gov/media/579379/download    Methodology:  RT-PCR          INFLUENZA A NOT DETECTED     INFLUENZA B NOT DETECTED    COVID-19 & Influenza Combo [[id number]]     Order Status: Canceled Specimen: Nasopharyngeal Swab             Results in Past 30 Days  Result Component Current Result Ref Range Previous Result Ref Range   Troponin, High Sensitivity 83 (H) (4/11/2024) 0 - 14 ng/L 83 (H) (4/11/2024) 0 - 14 ng/L        Radiology      XR CHEST 1 VIEW   Final Result   1. Interval placement of dual lumen right IJ central venous catheter with   catheter tip at superior cavoatrial junction.   2. Improved right pleural effusion with still small pleural effusion   persisting.   3. Improved aeration to the right lung with still some patchy/hazy airspace   opacities to the right lung.   4. New mild streaky left basilar airspace opacities, likely atelectasis.         IR MIDLINE CATH   Final Result      US RETROPERITONEAL COMPLETE   Final Result   Limited but otherwise unremarkable ultrasound of the kidneys.         IR NONTUNNELED VASCULAR CATHETER > 5 YEARS   Final Result   Successful placement of a temporary non tunneled HD catheter via the right   internal jugular vein.      Catheter is ready for immediate use.         CT ABDOMEN PELVIS WO CONTRAST Additional Contrast? None   Final Result   1. Bilateral pleural effusions.  Consolidation in the right perihilar region.   2. Mild ascites.   3. Diffuse anasarca.         XR CHEST PORTABLE   Final Result   1. Moderate to large right pleural effusion with opacification underlying   lung, atelectasis versus pneumonia.   2. Mild left basilar atelectasis.           ECHO 1/24        This is a limited study for LVEF.   Left ventricular systolic function is reduced with ejection fraction   estimated at 25% +/- 5%. Global  hypokinesis   -nephrology consulted   - initiated on CRRT for high K and had fluid removal. Transitioned to HD with continued fluid removal    -She was on goal-directed medical therapy including Toprol and Entresto.  Went into A-fib.  Started on amiodarone.  Cardiology consultation obtained.  High risk for anticoagulation.  Started on amiodarone.  Started on hydralazine for afterload reduction.  No beta-blocker at present.  Unable to uptitrate goal-directed medical therapy due to multiple comorbidities.  Continue aspirin.  No plans for ischemia workup.  -dose of Lasix increased by nephrology  [id number] Score for Atrial Fibrillation Stroke Risk   Risk   Factors  Component Value   C CHF Yes 1   H HTN Yes 1   A2 Age >= 75 Yes,  (78 y.o.) 2   D DM Yes 1   S2 Prior Stroke/TIA No 0   V Vascular Disease No 0   A Age 65-74 No,  (78 y.o.) 0   Sc Sex female 1    [id number]  Score  6   Score last updated 4/12/24 1:45 PM EDT    Click here for a link to the UpToDate guideline \"Atrial Fibrillation: Anticoagulation therapy to prevent embolization    Disclaimer: Risk Score calculation is dependent on accuracy of patient problem list and past encounter diagnosis. Vasc     #LANCE on CKD4-possible cardiorenal syndrome.  #Hyperkalemia and metabolic acidosis  - possible cardiorenal  - holding entresto, k levels did not improve with medical mx  - given bicarb fluids   - nephrology consulted   - initiated on CRRT with fluid removal done for 72 hrs, now  switched to HD.  Did not receive dialysis over weekend.   Had some issues during dialysis the last time.  Plan is to retry dialysis today, this is ongoing but currently it appears as though she may not tolerate it.     #Elevated troponin  #CAD s/p previous CABG   - troponin 61-->59  - EKG with non-specific T wave changes   - last stress test with infarct only   - patient denies chest pain   - continue ASA, statin, resumed low dose BB     #T2DM  -stopped oral regimen  -lantus and

## 2024-04-15 NOTE — FLOWSHEET NOTE
04/15/24 1549   Vital Signs   Temp 97.5 °F (36.4 °C)   Temp Source Oral   Pulse 71   Heart Rate Source Monitor   Respirations 18   BP (!) 99/39   MAP (Calculated) 59   BP Location Left upper arm   BP Method Automatic   Patient Position Semi fowlers   Pain Assessment   Pain Assessment None - Denies Pain   Oxygen Therapy   SpO2 99 %   O2 Device Nasal cannula   O2 Flow Rate (L/min) 2 L/min     Vitals and reassessment completed, no significant changes. Patient taken to dialysis at this time. BP re-check 111/56. PS sent to hospitalist for midodrine orders, order placed for one time dose, to be given. Dialysis RN given brief report. Patient lethargic, but stable. Family left in room. No further needs at this time.     Ella Marcelino, RN

## 2024-04-15 NOTE — PROGRESS NOTES
Nephrology Progress Note   [email or web address]      Sub/interval history  Feels all right no new complaints  Scr up to 2.  Family waiting for dialysis today to see how she tolerates it.  She has edema.  Incontinent so full urine volume was not measured and only 100 cc recorded     HD on 4/11 with net UF of 542 mL, postweight 71 kg; treatment was complicated by chest pain/pressure along with A-fib RVR and hypotension.  Rapid response was called and patient was transferred to ICU    HD on 4/9 with net UF of 160 mL.  45 minutes into dialysis, patient reported of feeling heaviness on her chest.  At that time blood flow rate was decreased to 300 mL/min from 350 and UF goal decreased from 2 L to 1 L.  Subsequently 100 mL of normal saline was given and UF was turned off completely but the patient's symptoms continued and dialysis was stopped in 1 hour 9 minutes.    Last 24-hour urine output be 100 ml charted     ROS: No fever, edema    PSFH: + visitors    Scheduled Meds:   metoprolol tartrate  25 mg Oral BID    furosemide  80 mg IntraVENous BID    mupirocin   Each Nostril BID    amiodarone  200 mg Oral BID    Followed by    [START ON 4/19/2024] amiodarone  200 mg Oral Daily    hydrALAZINE  10 mg Oral 3 times per day    epoetin reed-epbx  10,000 Units IntraVENous Once per day on Tue Thu Sat    sodium chloride flush  5-40 mL IntraVENous 2 times per day    heparin (porcine)  5,000 Units SubCUTAneous 3 times per day    allopurinol  100 mg Oral Daily    aspirin  81 mg Oral Daily    atorvastatin  40 mg Oral Nightly    insulin lispro  0-8 Units SubCUTAneous TID     insulin lispro  0-4 Units SubCUTAneous Nightly     Continuous Infusions:   sodium chloride 5 mL/hr at 04/04/24 0144    dextrose       PRN Meds:.nitroGLYCERIN, heparin (porcine), calcium carbonate, sodium chloride flush, sodium chloride, ondansetron **OR** ondansetron, polyethylene glycol, acetaminophen **OR** acetaminophen, glucose, dextrose bolus **OR** dextrose

## 2024-04-15 NOTE — PROGRESS NOTES
Bedside report and transfer of care given to POORNIMA Barker. Pt currently resting in bed with the call light within reach. Pt denies any other care needs at this time. Pt stable at this time.    Ella Marcelino RN

## 2024-04-15 NOTE — FLOWSHEET NOTE
04/15/24 1112   Vital Signs   Temp (!) 96.4 °F (35.8 °C)   Temp Source Oral   Pulse 64   Heart Rate Source Monitor   Respirations 19   BP (!) 105/43   MAP (Calculated) 64   BP Location Left upper arm   BP Method Automatic   Patient Position Semi fowlers   Pain Assessment   Pain Assessment None - Denies Pain   Oxygen Therapy   SpO2 100 %   O2 Device Nasal cannula   O2 Flow Rate (L/min) 2 L/min     Vitals and reassessment completed. No significant changes. Patient is cold and temp low, heated blankets provided. Dialysis today. No further needs at this time.     Ella Marcelino RN

## 2024-04-15 NOTE — PROGRESS NOTES
[facility] [place] Daily Progress Note      Admit Date:  4/1/2024    Subjective:  Ms. Fuentes is being seen today for f/u afib RVR. She is sleepy today but denies specific complaints. She felt palpitations with afib 2 days ago but none since then.     Objective:   BP (!) 126/51   Pulse 79   Temp 98.3 °F (36.8 °C) (Oral)   Resp 18   Ht 1.524 m (5')   Wt 69.9 kg (154 lb 1.6 oz)   SpO2 100%   BMI 30.10 kg/m²     Intake/Output Summary (Last 24 hours) at 4/15/2024 0744  Last data filed at 4/15/2024 0604  Gross per 24 hour   Intake 298 ml   Output 100 ml   Net 198 ml       TELEMETRY: NSR 69; earlier 19 beat  bpm (asymptomatic)     Physical Exam:  General:  Awake, alert, NAD  Skin:  Warm and dry  Neck:  JVD none  Chest:  Clear to auscultation, respiration easy  Cardiovascular:  RRR S1S2  Abdomen:  Soft NT  Extremities:  no edema    Medications:    metoprolol tartrate  25 mg Oral BID    furosemide  80 mg IntraVENous BID    mupirocin   Each Nostril BID    amiodarone  200 mg Oral BID    Followed by    [START ON 4/19/2024] amiodarone  200 mg Oral Daily    hydrALAZINE  10 mg Oral 3 times per day    epoetin reed-epbx  10,000 Units IntraVENous Once per day on Tue Thu Sat    sodium chloride flush  5-40 mL IntraVENous 2 times per day    heparin (porcine)  5,000 Units SubCUTAneous 3 times per day    allopurinol  100 mg Oral Daily    aspirin  81 mg Oral Daily    atorvastatin  40 mg Oral Nightly    insulin lispro  0-8 Units SubCUTAneous TID WC    insulin lispro  0-4 Units SubCUTAneous Nightly      sodium chloride 5 mL/hr at 04/04/24 0144    dextrose       nitroGLYCERIN, heparin (porcine), calcium carbonate, sodium chloride flush, sodium chloride, ondansetron **OR** ondansetron, polyethylene glycol, acetaminophen **OR** acetaminophen, glucose, dextrose bolus **OR** dextrose bolus, glucagon (rDNA), dextrose    Lab Data:  CBC:   Recent Labs     04/13/24  0505 04/14/24  0538 04/15/24  0607   WBC 6.2 6.8 9.6   HGB  and patient/family do not desire    3       HTN:   -continue hydralazine 10 TID, metoprolol 25 BID    -controlled    4.      MR s/p MV repair in 2020     5.      PFO s/p closure 2022    6.      ICM:   -ECHO 4/2/24 EF 30%; +WMA; MV ring; mild MS; mild-mod MR/ mild AI; mod TR; pulm HTN 56mmHg   -if tolerates HD and improves clinically will try to improved GDMT for LV dysfunction;   -just starting HD and unable to use SGLT2i, hold ARNI/ACE-I with lower BP on HD    7.       Acute on chronic systolic CHF:   -see #6 above   -continue IV lasix 80 BID    -starting HD    8.       LANCE:   -likely ATN in setting of CKD   -started HD and not tolerating well overall   -HD today for 2 hour   -family and patient plan on hospice if not able to tolerate HD better    Note acute on chronic systolic CHF, ICM, CAD, and PAF RVR increase her morbidity and mortality requiring medical tx    Patient Active Problem List    Diagnosis Date Noted    Essential hypertension, benign 11/08/2007    Chronic systolic CHF (congestive heart failure) (HCC) 07/13/2022    Pulmonary nodules/lesions, multiple 07/13/2022    Acute on chronic combined systolic (congestive) and diastolic (congestive) heart failure ([facility]) 06/03/2022    S/P mitral valve repair     Osteoarthrosis involving lower leg 11/08/2007    Severe protein-calorie malnutrition (HCC) 04/12/2024    Atrial fibrillation with RVR ([facility]) 04/12/2024    Chronic obstructive pulmonary disease ([facility]) 04/08/2024    Anasarca 04/03/2024    Pleural effusion 04/03/2024    Acute systolic congestive heart failure (HCC) 04/03/2024    Hyperkalemia 04/01/2024    Metabolic acidosis, normal anion gap (NAG) 04/01/2024    Bradycardia 04/01/2024    COVID-19 02/09/2024    Acute kidney injury superimposed on CKD ([facility]) 02/09/2024    Uses LifeVest defibrillator 02/08/2024    NSVT (nonsustained ventricular tachycardia) ([facility]) 02/08/2024    CKD (chronic kidney disease) 04/16/2021    S/P angioplasty with stent 07/23/2020

## 2024-04-15 NOTE — FLOWSHEET NOTE
04/15/24 0918   Vital Signs   Pulse 80   BP (!) 117/46   MAP (Calculated) 70     Vitals completed this AM. Assessment completed. Medications given per MAR, lasix held at this time due to BP. Possible dialysis today. No further needs at this time. Family at bedside.     Ella Marcelino RN

## 2024-04-15 NOTE — PLAN OF CARE
Problem: Discharge Planning  Goal: Discharge to home or other facility with appropriate resources  4/15/2024 0931 by Ella Marcelino RN  Outcome: Progressing  Flowsheets (Taken 4/15/2024 0924)  Discharge to home or other facility with appropriate resources:   Identify barriers to discharge with patient and caregiver   Arrange for needed discharge resources and transportation as appropriate   Identify discharge learning needs (meds, wound care, etc)  4/14/2024 2249 by Mj Lisa RN  Outcome: Progressing  Flowsheets (Taken 4/14/2024 1311 by Sirisha Curry, RN)  Discharge to home or other facility with appropriate resources: Identify barriers to discharge with patient and caregiver     Problem: Safety - Adult  Goal: Free from fall injury  4/15/2024 0931 by Ella Marcelnio RN  Outcome: Progressing  4/14/2024 2249 by Mj Lisa RN  Outcome: Progressing     Problem: Chronic Conditions and Co-morbidities  Goal: Patient's chronic conditions and co-morbidity symptoms are monitored and maintained or improved  Outcome: Progressing  Flowsheets (Taken 4/15/2024 0924)  Care Plan - Patient's Chronic Conditions and Co-Morbidity Symptoms are Monitored and Maintained or Improved: Monitor and assess patient's chronic conditions and comorbid symptoms for stability, deterioration, or improvement     Problem: Respiratory - Adult  Goal: Achieves optimal ventilation and oxygenation  Outcome: Progressing  Flowsheets (Taken 4/15/2024 0924)  Achieves optimal ventilation and oxygenation: Assess for changes in respiratory status     Problem: Cardiovascular - Adult  Goal: Maintains optimal cardiac output and hemodynamic stability  Outcome: Progressing  Flowsheets (Taken 4/15/2024 0924)  Maintains optimal cardiac output and hemodynamic stability: Monitor blood pressure and heart rate  Goal: Absence of cardiac dysrhythmias or at baseline  Outcome: Progressing  Flowsheets (Taken 4/15/2024 0924)  Absence of cardiac dysrhythmias or at

## 2024-04-16 LAB
ALBUMIN SERPL-MCNC: 2.5 G/DL (ref 3.4–5)
ALP SERPL-CCNC: 269 U/L (ref 40–129)
ALT SERPL-CCNC: 14 U/L (ref 10–40)
ANION GAP SERPL CALCULATED.3IONS-SCNC: 11 MMOL/L (ref 3–16)
AST SERPL-CCNC: 22 U/L (ref 15–37)
BILIRUB DIRECT SERPL-MCNC: 0.4 MG/DL (ref 0–0.3)
BILIRUB INDIRECT SERPL-MCNC: 0.3 MG/DL (ref 0–1)
BILIRUB SERPL-MCNC: 0.7 MG/DL (ref 0–1)
BUN SERPL-MCNC: 32 MG/DL (ref 7–20)
CALCIUM SERPL-MCNC: 8.6 MG/DL (ref 8.3–10.6)
CHLORIDE SERPL-SCNC: 95 MMOL/L (ref 99–110)
CO2 SERPL-SCNC: 27 MMOL/L (ref 21–32)
CREAT SERPL-MCNC: 1.8 MG/DL (ref 0.6–1.2)
GFR SERPLBLD CREATININE-BSD FMLA CKD-EPI: 28 ML/MIN/{1.73_M2}
GLUCOSE BLD-MCNC: 129 MG/DL (ref 70–99)
GLUCOSE BLD-MCNC: 154 MG/DL (ref 70–99)
GLUCOSE BLD-MCNC: 178 MG/DL (ref 70–99)
GLUCOSE BLD-MCNC: 253 MG/DL (ref 70–99)
GLUCOSE SERPL-MCNC: 121 MG/DL (ref 70–99)
PERFORMED ON: ABNORMAL
POTASSIUM SERPL-SCNC: 4.7 MMOL/L (ref 3.5–5.1)
PROT SERPL-MCNC: 5.7 G/DL (ref 6.4–8.2)
SODIUM SERPL-SCNC: 133 MMOL/L (ref 136–145)

## 2024-04-16 PROCEDURE — 36415 COLL VENOUS BLD VENIPUNCTURE: CPT

## 2024-04-16 PROCEDURE — 2700000000 HC OXYGEN THERAPY PER DAY

## 2024-04-16 PROCEDURE — 6370000000 HC RX 637 (ALT 250 FOR IP): Performed by: STUDENT IN AN ORGANIZED HEALTH CARE EDUCATION/TRAINING PROGRAM

## 2024-04-16 PROCEDURE — 99232 SBSQ HOSP IP/OBS MODERATE 35: CPT | Performed by: INTERNAL MEDICINE

## 2024-04-16 PROCEDURE — 6370000000 HC RX 637 (ALT 250 FOR IP): Performed by: INTERNAL MEDICINE

## 2024-04-16 PROCEDURE — 80076 HEPATIC FUNCTION PANEL: CPT

## 2024-04-16 PROCEDURE — 80048 BASIC METABOLIC PNL TOTAL CA: CPT

## 2024-04-16 PROCEDURE — 99233 SBSQ HOSP IP/OBS HIGH 50: CPT | Performed by: INTERNAL MEDICINE

## 2024-04-16 PROCEDURE — 6360000002 HC RX W HCPCS: Performed by: INTERNAL MEDICINE

## 2024-04-16 PROCEDURE — 2580000003 HC RX 258: Performed by: INTERNAL MEDICINE

## 2024-04-16 PROCEDURE — 2060000000 HC ICU INTERMEDIATE R&B

## 2024-04-16 PROCEDURE — 94761 N-INVAS EAR/PLS OXIMETRY MLT: CPT

## 2024-04-16 RX ORDER — DOCUSATE SODIUM 100 MG/1
200 CAPSULE, LIQUID FILLED ORAL 2 TIMES DAILY
Status: DISCONTINUED | OUTPATIENT
Start: 2024-04-16 | End: 2024-04-20

## 2024-04-16 RX ADMIN — HYDRALAZINE HYDROCHLORIDE 10 MG: 10 TABLET, FILM COATED ORAL at 05:50

## 2024-04-16 RX ADMIN — HYDRALAZINE HYDROCHLORIDE 10 MG: 10 TABLET, FILM COATED ORAL at 22:51

## 2024-04-16 RX ADMIN — AMIODARONE HYDROCHLORIDE 200 MG: 200 TABLET ORAL at 19:42

## 2024-04-16 RX ADMIN — SODIUM CHLORIDE, PRESERVATIVE FREE 10 ML: 5 INJECTION INTRAVENOUS at 07:55

## 2024-04-16 RX ADMIN — METOPROLOL TARTRATE 25 MG: 25 TABLET, FILM COATED ORAL at 07:52

## 2024-04-16 RX ADMIN — HEPARIN SODIUM 5000 UNITS: 5000 INJECTION INTRAVENOUS; SUBCUTANEOUS at 05:51

## 2024-04-16 RX ADMIN — METOPROLOL TARTRATE 25 MG: 25 TABLET, FILM COATED ORAL at 19:42

## 2024-04-16 RX ADMIN — HEPARIN SODIUM 5000 UNITS: 5000 INJECTION INTRAVENOUS; SUBCUTANEOUS at 22:51

## 2024-04-16 RX ADMIN — DOCUSATE SODIUM 200 MG: 100 CAPSULE, LIQUID FILLED ORAL at 19:42

## 2024-04-16 RX ADMIN — SODIUM CHLORIDE, PRESERVATIVE FREE 10 ML: 5 INJECTION INTRAVENOUS at 19:46

## 2024-04-16 RX ADMIN — ALLOPURINOL 100 MG: 100 TABLET ORAL at 07:53

## 2024-04-16 RX ADMIN — DOCUSATE SODIUM 200 MG: 100 CAPSULE, LIQUID FILLED ORAL at 07:53

## 2024-04-16 RX ADMIN — DESMOPRESSIN ACETATE 40 MG: 0.2 TABLET ORAL at 19:43

## 2024-04-16 RX ADMIN — DOCUSATE SODIUM 200 MG: 100 CAPSULE, LIQUID FILLED ORAL at 02:08

## 2024-04-16 RX ADMIN — CALCIUM CARBONATE 500 MG: 500 TABLET, CHEWABLE ORAL at 07:54

## 2024-04-16 RX ADMIN — ASPIRIN 81 MG: 81 TABLET, COATED ORAL at 07:53

## 2024-04-16 RX ADMIN — AMIODARONE HYDROCHLORIDE 200 MG: 200 TABLET ORAL at 07:53

## 2024-04-16 ASSESSMENT — PAIN SCALES - WONG BAKER
WONGBAKER_NUMERICALRESPONSE: NO HURT

## 2024-04-16 NOTE — PROGRESS NOTES
[facility] [place] Daily Progress Note      Admit Date:  4/1/2024    Subjective:  Ms. Fuentes is being seen today for f/u afib RVR. She feels fine today and denies specific complaints. She felt palpitations with afib 2 days ago but none since then. Soap nisa enema given with success overnight.    Objective:   BP (!) 125/46   Pulse 72   Temp 96.9 °F (36.1 °C) (Axillary)   Resp 18   Ht 1.524 m (5')   Wt 65.8 kg (145 lb 1.6 oz)   SpO2 100%   BMI 28.34 kg/m²     Intake/Output Summary (Last 24 hours) at 4/16/2024 0713  Last data filed at 4/16/2024 0432  Gross per 24 hour   Intake 300 ml   Output 650 ml   Net -350 ml       TELEMETRY: NSR 64; 6 beat run SVT with aberrancy    Physical Exam:  General:  Awake, alert, NAD  Skin:  Warm and dry  Neck:  JVD none  Chest:  Clear to auscultation, respiration easy  Cardiovascular:  RRR S1S2  Abdomen:  Soft NT  Extremities:  no edema    Medications:    docusate sodium  200 mg Oral BID    metoprolol tartrate  25 mg Oral BID    furosemide  80 mg IntraVENous BID    mupirocin   Each Nostril BID    amiodarone  200 mg Oral BID    Followed by    [START ON 4/19/2024] amiodarone  200 mg Oral Daily    hydrALAZINE  10 mg Oral 3 times per day    epoetin reed-epbx  10,000 Units IntraVENous Once per day on Tue Thu Sat    sodium chloride flush  5-40 mL IntraVENous 2 times per day    heparin (porcine)  5,000 Units SubCUTAneous 3 times per day    allopurinol  100 mg Oral Daily    aspirin  81 mg Oral Daily    atorvastatin  40 mg Oral Nightly    insulin lispro  0-8 Units SubCUTAneous TID WC    insulin lispro  0-4 Units SubCUTAneous Nightly      sodium chloride 5 mL/hr at 04/04/24 0144    dextrose       nitroGLYCERIN, heparin (porcine), calcium carbonate, sodium chloride flush, sodium chloride, ondansetron **OR** ondansetron, polyethylene glycol, acetaminophen **OR** acetaminophen, glucose, dextrose bolus **OR** dextrose bolus, glucagon (rDNA), dextrose    Lab Data:  CBC:   Recent Labs      Uses LifeVest defibrillator 02/08/2024    NSVT (nonsustained ventricular tachycardia) ([facility]) 02/08/2024    CKD (chronic kidney disease) 04/16/2021    S/P angioplasty with stent 07/23/2020    Coronary artery disease involving native heart without angina pectoris 01/06/2020    Ischemic cardiomyopathy 01/06/2020    Acute on chronic systolic CHF (congestive heart failure) ([facility]) 12/11/2019    Nonrheumatic mitral valve regurgitation 12/06/2019    Other restrictive cardiomyopathy ([facility]) 11/13/2019    Type 2 diabetes mellitus with microalbuminuria, without long-term current use of insulin ([facility]) 05/05/2017    Primary insomnia 11/23/2016    Hyperlipidemia associated with type 2 diabetes mellitus ([facility]) 12/14/2015    Major depressive disorder, recurrent episode, moderate ([facility]) 12/14/2015    Osteopenia 08/05/2015    Osteoarthritis, hand 02/27/2012    Gout 08/11/2011       Rory Santamaria MD, MD 4/16/2024 7:13 AM

## 2024-04-16 NOTE — PROGRESS NOTES
Patient stating that she hasn't had a BM for 16 days. Glycolax is already ordered PRN, so MD notified and asked if we could try something different. Awaiting orders.

## 2024-04-16 NOTE — PLAN OF CARE
Problem: Discharge Planning  Goal: Discharge to home or other facility with appropriate resources  4/16/2024 0716 by Ella Marcelino RN  Outcome: Progressing  4/15/2024 2331 by Mj Lisa RN  Outcome: Progressing     Problem: Safety - Adult  Goal: Free from fall injury  4/16/2024 0716 by Ella Marcelino RN  Outcome: Progressing  4/15/2024 2331 by Mj Lisa RN  Outcome: Progressing     Problem: Chronic Conditions and Co-morbidities  Goal: Patient's chronic conditions and co-morbidity symptoms are monitored and maintained or improved  Outcome: Progressing     Problem: Respiratory - Adult  Goal: Achieves optimal ventilation and oxygenation  Outcome: Progressing     Problem: Cardiovascular - Adult  Goal: Maintains optimal cardiac output and hemodynamic stability  Outcome: Progressing  Goal: Absence of cardiac dysrhythmias or at baseline  Outcome: Progressing     Problem: Pain  Goal: Verbalizes/displays adequate comfort level or baseline comfort level  Outcome: Progressing     Problem: Nutrition Deficit:  Goal: Optimize nutritional status  Outcome: Progressing     Problem: Skin/Tissue Integrity  Goal: Absence of new skin breakdown  Description: 1.  Monitor for areas of redness and/or skin breakdown  2.  Assess vascular access sites hourly  3.  Every 4-6 hours minimum:  Change oxygen saturation probe site  4.  Every 4-6 hours:  If on nasal continuous positive airway pressure, respiratory therapy assess nares and determine need for appliance change or resting period.  Outcome: Progressing

## 2024-04-16 NOTE — PROGRESS NOTES
Soap nisa enema and colace ordered. Pt tolerated well. Large amounts of stool came out of rectum during enema.

## 2024-04-16 NOTE — FLOWSHEET NOTE
04/16/24 0658   Vital Signs   Temp 96.9 °F (36.1 °C)   Temp Source Axillary   Pulse 72   Heart Rate Source Monitor   Respirations 18   BP (!) 125/46   MAP (Calculated) 72   BP Location Left upper arm   BP Method Automatic   Patient Position Semi fowlers   Oxygen Therapy   SpO2 100 %   O2 Device Nasal cannula   O2 Flow Rate (L/min) 2 L/min     Vitals completed this AM. Assessment completed and medications given per MAR without complication. Patient requesting something for heartburn, tums given at this time. No further needs, possible DC today.    Ella Marcelino RN

## 2024-04-16 NOTE — PROGRESS NOTES
Nephrology Progress Note   [email or web address]      Sub/interval history  Feels all right no new complaints  HD did not go well.  Had chest pain.  Still with temporary line as disposition is unclear.    HD on 4/11 with net UF of 542 mL, postweight 71 kg; treatment was complicated by chest pain/pressure along with A-fib RVR and hypotension.  Rapid response was called and patient was transferred to ICU    HD on 4/9 with net UF of 160 mL.  45 minutes into dialysis, patient reported of feeling heaviness on her chest.  At that time blood flow rate was decreased to 300 mL/min from 350 and UF goal decreased from 2 L to 1 L.  Subsequently 100 mL of normal saline was given and UF was turned off completely but the patient's symptoms continued and dialysis was stopped in 1 hour 9 minutes.    Last 24-hour urine output be much better     ROS: No fever, edema    PSFH: No visitors    Scheduled Meds:   docusate sodium  200 mg Oral BID    metoprolol tartrate  25 mg Oral BID    [Held by provider] furosemide  80 mg IntraVENous BID    mupirocin   Each Nostril BID    amiodarone  200 mg Oral BID    Followed by    [START ON 4/19/2024] amiodarone  200 mg Oral Daily    hydrALAZINE  10 mg Oral 3 times per day    epoetin reed-epbx  10,000 Units IntraVENous Once per day on Tue Thu Sat    sodium chloride flush  5-40 mL IntraVENous 2 times per day    heparin (porcine)  5,000 Units SubCUTAneous 3 times per day    allopurinol  100 mg Oral Daily    aspirin  81 mg Oral Daily    atorvastatin  40 mg Oral Nightly    insulin lispro  0-8 Units SubCUTAneous TID WC    insulin lispro  0-4 Units SubCUTAneous Nightly     Continuous Infusions:   sodium chloride 5 mL/hr at 04/04/24 0144    dextrose       PRN Meds:.nitroGLYCERIN, heparin (porcine), calcium carbonate, sodium chloride flush, sodium chloride, ondansetron **OR** ondansetron, polyethylene glycol, acetaminophen **OR** acetaminophen, glucose, dextrose bolus **OR** dextrose bolus, glucagon (rDNA),

## 2024-04-16 NOTE — PROGRESS NOTES
IM Progress Note    Admit Date:  4/1/2024  15    Interval history:    chronic systolic CHF with very low EF at 20 % presenting with acute CHF and renal failure with refractory hyperkalemia    Initiated on CRRT -> transitioned to HD now   Drop in h.h-> PRBC transfused        Subjective:  Ms. Fuentes smiles, swats me away, wants to go back to sleep, won't really tell me how HD went yesterday. Per notes she did note chest heaviness however.     Objective:   BP (!) 107/51   Pulse 69   Temp (!) 96.6 °F (35.9 °C) (Axillary)   Resp 18   Ht 1.524 m (5')   Wt 65.8 kg (145 lb 1.6 oz)   SpO2 95%   BMI 28.34 kg/m²     Intake/Output Summary (Last 24 hours) at 4/16/2024 1336  Last data filed at 4/16/2024 0935  Gross per 24 hour   Intake 180 ml   Output 850 ml   Net -670 ml         Physical Exam:    General:  elderly female.   Awake, alert and fairly oriented. Appears to be not in any distress  Mucous Membranes:  Pink , anicteric  Neck: no JVD, no carotid bruit, no thyromegaly  Chest:  Clear to auscultation; no wheezes, rales or rhonchi   Cardiovascular:  RRR S1S2 heard, Murmur in SR no episodes of a fib.  Abdomen:  Soft, undistended, non tender, no organomegaly, BS present  Extremities- improving  upper and lower extremity edema- down to 2 +  Right sided temp HD line   Neurological : grossly normal with gen weakness      Medications:   Scheduled Medications:    docusate sodium  200 mg Oral BID    metoprolol tartrate  25 mg Oral BID    [Held by provider] furosemide  80 mg IntraVENous BID    mupirocin   Each Nostril BID    amiodarone  200 mg Oral BID    Followed by    [START ON 4/19/2024] amiodarone  200 mg Oral Daily    hydrALAZINE  10 mg Oral 3 times per day    epoetin reed-epbx  10,000 Units IntraVENous Once per day on Tue Thu Sat    sodium chloride flush  5-40 mL IntraVENous 2 times per day    heparin (porcine)  5,000 Units SubCUTAneous 3 times per day    allopurinol  100 mg Oral Daily    aspirin  81 mg Oral Daily

## 2024-04-16 NOTE — PLAN OF CARE
Problem: Discharge Planning  Goal: Discharge to home or other facility with appropriate resources  4/16/2024 1955 by Mj Lisa RN  Outcome: Progressing  Flowsheets (Taken 4/16/2024 0800 by Ella Marcelino RN)  Discharge to home or other facility with appropriate resources:   Identify barriers to discharge with patient and caregiver   Arrange for needed discharge resources and transportation as appropriate   Identify discharge learning needs (meds, wound care, etc)  4/16/2024 0716 by Ella Marcelino RN  Outcome: Progressing     Problem: Safety - Adult  Goal: Free from fall injury  4/16/2024 1955 by Mj Lisa RN  Outcome: Progressing  4/16/2024 0716 by Ella Marcelino RN  Outcome: Progressing     Problem: Chronic Conditions and Co-morbidities  Goal: Patient's chronic conditions and co-morbidity symptoms are monitored and maintained or improved  Recent Flowsheet Documentation  Taken 4/16/2024 0800 by Ella Marcelino RN  Care Plan - Patient's Chronic Conditions and Co-Morbidity Symptoms are Monitored and Maintained or Improved: Monitor and assess patient's chronic conditions and comorbid symptoms for stability, deterioration, or improvement  4/16/2024 0716 by Ella Marcelino RN  Outcome: Progressing     Problem: Respiratory - Adult  Goal: Achieves optimal ventilation and oxygenation  Recent Flowsheet Documentation  Taken 4/16/2024 0800 by Ella Marcelino RN  Achieves optimal ventilation and oxygenation: Assess for changes in respiratory status  4/16/2024 0716 by Ella Marcelino RN  Outcome: Progressing     Problem: Cardiovascular - Adult  Goal: Maintains optimal cardiac output and hemodynamic stability  Recent Flowsheet Documentation  Taken 4/16/2024 0800 by Ella Marcelino RN  Maintains optimal cardiac output and hemodynamic stability: Monitor blood pressure and heart rate  4/16/2024 0716 by Ella Marcelino RN  Outcome: Progressing  Goal: Absence of cardiac dysrhythmias or at baseline  Recent Flowsheet  Co-Morbidity Symptoms are Monitored and Maintained or Improved: Monitor and assess patient's chronic conditions and comorbid symptoms for stability, deterioration, or improvement  4/16/2024 0716 by Ella Marcelino RN  Outcome: Progressing     Problem: Respiratory - Adult  Goal: Achieves optimal ventilation and oxygenation  Recent Flowsheet Documentation  Taken 4/16/2024 0800 by Ella Marcelino RN  Achieves optimal ventilation and oxygenation: Assess for changes in respiratory status  4/16/2024 0716 by Ella Marcelino RN  Outcome: Progressing     Problem: Cardiovascular - Adult  Goal: Maintains optimal cardiac output and hemodynamic stability  Recent Flowsheet Documentation  Taken 4/16/2024 0800 by Ella Marcelino RN  Maintains optimal cardiac output and hemodynamic stability: Monitor blood pressure and heart rate  4/16/2024 0716 by Ella Marcelino RN  Outcome: Progressing  Goal: Absence of cardiac dysrhythmias or at baseline  Recent Flowsheet Documentation  Taken 4/16/2024 0800 by Ella Marcelino RN  Absence of cardiac dysrhythmias or at baseline: Monitor cardiac rate and rhythm  4/16/2024 0716 by Ella Marcelino RN  Outcome: Progressing     Problem: Pain  Goal: Verbalizes/displays adequate comfort level or baseline comfort level  4/16/2024 0716 by Ella Marcelino RN  Outcome: Progressing     Problem: Nutrition Deficit:  Goal: Optimize nutritional status  4/16/2024 0716 by Ella Marcelino RN  Outcome: Progressing     Problem: Skin/Tissue Integrity  Goal: Absence of new skin breakdown  Description: 1.  Monitor for areas of redness and/or skin breakdown  2.  Assess vascular access sites hourly  3.  Every 4-6 hours minimum:  Change oxygen saturation probe site  4.  Every 4-6 hours:  If on nasal continuous positive airway pressure, respiratory therapy assess nares and determine need for appliance change or resting period.  4/16/2024 0716 by Ella Marcelino RN  Outcome: Progressing

## 2024-04-16 NOTE — FLOWSHEET NOTE
04/16/24 1521   Vital Signs   Temp 97.5 °F (36.4 °C)   Temp Source Oral   Pulse 69   Heart Rate Source Monitor   Respirations 17   BP (!) 116/54   MAP (Calculated) 75   BP Location Left upper arm   BP Method Automatic   Patient Position Semi fowlers   Pain Assessment   Pain Assessment None - Denies Pain   Oxygen Therapy   SpO2 100 %   O2 Device Nasal cannula   O2 Flow Rate (L/min) 2 L/min     Vitals and reassessment completed. No significant changes. Patient repositioned.     Ella Marcelino RN

## 2024-04-16 NOTE — FLOWSHEET NOTE
04/16/24 1132   Vital Signs   Temp (!) 96.6 °F (35.9 °C)   Temp Source Axillary   Pulse 69   Heart Rate Source Monitor   Respirations 18   BP (!) 107/51   MAP (Calculated) 70   BP Location Left upper arm   BP Method Automatic   Patient Position Semi fowlers   Oxygen Therapy   SpO2 95 %   O2 Device Nasal cannula   O2 Flow Rate (L/min) 2 L/min     Vitals and reassessment completed. No significant changes.     Ella Marcelino RN

## 2024-04-16 NOTE — PLAN OF CARE
Problem: Discharge Planning  Goal: Discharge to home or other facility with appropriate resources  Outcome: Progressing     Problem: Safety - Adult  Goal: Free from fall injury  Outcome: Progressing      normal

## 2024-04-17 LAB
ALBUMIN SERPL-MCNC: 2.4 G/DL (ref 3.4–5)
ANION GAP SERPL CALCULATED.3IONS-SCNC: 9 MMOL/L (ref 3–16)
BUN SERPL-MCNC: 42 MG/DL (ref 7–20)
CALCIUM SERPL-MCNC: 8.5 MG/DL (ref 8.3–10.6)
CHLORIDE SERPL-SCNC: 93 MMOL/L (ref 99–110)
CO2 SERPL-SCNC: 28 MMOL/L (ref 21–32)
CREAT SERPL-MCNC: 2 MG/DL (ref 0.6–1.2)
GFR SERPLBLD CREATININE-BSD FMLA CKD-EPI: 25 ML/MIN/{1.73_M2}
GLUCOSE BLD-MCNC: 127 MG/DL (ref 70–99)
GLUCOSE BLD-MCNC: 153 MG/DL (ref 70–99)
GLUCOSE BLD-MCNC: 178 MG/DL (ref 70–99)
GLUCOSE BLD-MCNC: 244 MG/DL (ref 70–99)
GLUCOSE SERPL-MCNC: 124 MG/DL (ref 70–99)
PERFORMED ON: ABNORMAL
PHOSPHATE SERPL-MCNC: 4.3 MG/DL (ref 2.5–4.9)
POTASSIUM SERPL-SCNC: 4.8 MMOL/L (ref 3.5–5.1)
POTASSIUM SERPL-SCNC: 4.8 MMOL/L (ref 3.5–5.1)
SODIUM SERPL-SCNC: 130 MMOL/L (ref 136–145)

## 2024-04-17 PROCEDURE — 94761 N-INVAS EAR/PLS OXIMETRY MLT: CPT

## 2024-04-17 PROCEDURE — 6370000000 HC RX 637 (ALT 250 FOR IP): Performed by: INTERNAL MEDICINE

## 2024-04-17 PROCEDURE — 6360000002 HC RX W HCPCS: Performed by: INTERNAL MEDICINE

## 2024-04-17 PROCEDURE — 2580000003 HC RX 258: Performed by: INTERNAL MEDICINE

## 2024-04-17 PROCEDURE — 97112 NEUROMUSCULAR REEDUCATION: CPT

## 2024-04-17 PROCEDURE — 2060000000 HC ICU INTERMEDIATE R&B

## 2024-04-17 PROCEDURE — 2700000000 HC OXYGEN THERAPY PER DAY

## 2024-04-17 PROCEDURE — 80069 RENAL FUNCTION PANEL: CPT

## 2024-04-17 PROCEDURE — 6370000000 HC RX 637 (ALT 250 FOR IP): Performed by: STUDENT IN AN ORGANIZED HEALTH CARE EDUCATION/TRAINING PROGRAM

## 2024-04-17 PROCEDURE — 97530 THERAPEUTIC ACTIVITIES: CPT

## 2024-04-17 PROCEDURE — 99232 SBSQ HOSP IP/OBS MODERATE 35: CPT | Performed by: INTERNAL MEDICINE

## 2024-04-17 PROCEDURE — 36415 COLL VENOUS BLD VENIPUNCTURE: CPT

## 2024-04-17 RX ORDER — TORSEMIDE 20 MG/1
80 TABLET ORAL DAILY
Status: DISCONTINUED | OUTPATIENT
Start: 2024-04-17 | End: 2024-04-18

## 2024-04-17 RX ORDER — POLYETHYLENE GLYCOL 3350 17 G/17G
17 POWDER, FOR SOLUTION ORAL ONCE
Status: COMPLETED | OUTPATIENT
Start: 2024-04-17 | End: 2024-04-17

## 2024-04-17 RX ORDER — POLYETHYLENE GLYCOL 3350 17 G/17G
17 POWDER, FOR SOLUTION ORAL 2 TIMES DAILY
Status: DISCONTINUED | OUTPATIENT
Start: 2024-04-17 | End: 2024-04-20

## 2024-04-17 RX ORDER — DICYCLOMINE HCL 20 MG
20 TABLET ORAL 4 TIMES DAILY PRN
Status: DISCONTINUED | OUTPATIENT
Start: 2024-04-17 | End: 2024-04-20

## 2024-04-17 RX ADMIN — DICYCLOMINE HYDROCHLORIDE 20 MG: 20 TABLET ORAL at 11:33

## 2024-04-17 RX ADMIN — INSULIN LISPRO 2 UNITS: 100 INJECTION, SOLUTION INTRAVENOUS; SUBCUTANEOUS at 11:35

## 2024-04-17 RX ADMIN — DESMOPRESSIN ACETATE 40 MG: 0.2 TABLET ORAL at 21:12

## 2024-04-17 RX ADMIN — DOCUSATE SODIUM 200 MG: 100 CAPSULE, LIQUID FILLED ORAL at 21:12

## 2024-04-17 RX ADMIN — ALLOPURINOL 100 MG: 100 TABLET ORAL at 08:42

## 2024-04-17 RX ADMIN — TORSEMIDE 80 MG: 20 TABLET ORAL at 13:41

## 2024-04-17 RX ADMIN — SODIUM CHLORIDE, PRESERVATIVE FREE 10 ML: 5 INJECTION INTRAVENOUS at 09:03

## 2024-04-17 RX ADMIN — HEPARIN SODIUM 5000 UNITS: 5000 INJECTION INTRAVENOUS; SUBCUTANEOUS at 13:41

## 2024-04-17 RX ADMIN — ACETAMINOPHEN 650 MG: 325 TABLET ORAL at 05:38

## 2024-04-17 RX ADMIN — HEPARIN SODIUM 5000 UNITS: 5000 INJECTION INTRAVENOUS; SUBCUTANEOUS at 21:12

## 2024-04-17 RX ADMIN — HEPARIN SODIUM 5000 UNITS: 5000 INJECTION INTRAVENOUS; SUBCUTANEOUS at 05:38

## 2024-04-17 RX ADMIN — AMIODARONE HYDROCHLORIDE 200 MG: 200 TABLET ORAL at 21:11

## 2024-04-17 RX ADMIN — SODIUM CHLORIDE, PRESERVATIVE FREE 10 ML: 5 INJECTION INTRAVENOUS at 21:13

## 2024-04-17 RX ADMIN — ASPIRIN 81 MG: 81 TABLET, COATED ORAL at 08:42

## 2024-04-17 RX ADMIN — POLYETHYLENE GLYCOL 3350 17 G: 17 POWDER, FOR SOLUTION ORAL at 11:34

## 2024-04-17 RX ADMIN — METOPROLOL TARTRATE 25 MG: 25 TABLET, FILM COATED ORAL at 08:42

## 2024-04-17 RX ADMIN — DOCUSATE SODIUM 200 MG: 100 CAPSULE, LIQUID FILLED ORAL at 08:42

## 2024-04-17 RX ADMIN — AMIODARONE HYDROCHLORIDE 200 MG: 200 TABLET ORAL at 08:41

## 2024-04-17 ASSESSMENT — PAIN DESCRIPTION - LOCATION
LOCATION: ABDOMEN
LOCATION: ABDOMEN

## 2024-04-17 ASSESSMENT — PAIN SCALES - WONG BAKER
WONGBAKER_NUMERICALRESPONSE: NO HURT

## 2024-04-17 ASSESSMENT — PAIN SCALES - GENERAL
PAINLEVEL_OUTOF10: 7
PAINLEVEL_OUTOF10: 5

## 2024-04-17 ASSESSMENT — PAIN DESCRIPTION - ORIENTATION: ORIENTATION: LOWER

## 2024-04-17 ASSESSMENT — PAIN DESCRIPTION - DESCRIPTORS: DESCRIPTORS: ACHING

## 2024-04-17 ASSESSMENT — PAIN - FUNCTIONAL ASSESSMENT: PAIN_FUNCTIONAL_ASSESSMENT: ACTIVITIES ARE NOT PREVENTED

## 2024-04-17 NOTE — CARE COORDINATION
Reviewed chart, met with pt bedside, RN and therapy present. Pt alert then blankly stares and does not respond to questions. Plan to hold HD today as pt is not tolerating. Precert back to [facility], good through Thursday. Will continue to monitor.

## 2024-04-17 NOTE — PROGRESS NOTES
Inpatient Occupational Therapy Treatment    Unit: PCU  Date:  4/17/2024  Patient Name:    Amber Fuentes  Admitting diagnosis:  Hyperkalemia [E87.5]  Anasarca [R60.1]  Bradycardia [R00.1]  Pleural effusion [J90]  Other ascites [R18.8]  Acute renal failure, unspecified acute renal failure type (HCC) [N17.9]  Pneumonia of right lower lobe due to infectious organism [J18.9]  Admit Date:  4/1/2024  Precautions/Restrictions/WB Status/ Lines/ Wounds/ Oxygen: Fall risk, Bed/chair alarm, Lines (Supplemental O2 (1L), external catheter, and IJ right), Telemetry, and Continuous pulse oximetry      Treatment Time:  9:00-9:28  Treatment Number:  5  Timed Code Treatment Minutes: 28 minutes  Total Treatment Minutes:  28 minutes    Patient Goals for Therapy: \"my stomach hurts\"      Discharge Recommendations: SNF (family agreeable)  DME needs for discharge: Defer to facility       Therapy recommendations for staff:   Assist of 2 for sitting EOB if medically appropriate.     History of Present Illness: per Dr Swanson H&P 4/2/24:    Amber Fuentes is a 78 y.o. female with pmh of coronary disease, CKD, CHF, COPD, hypertension, hyperlipidemia, diabetes mellitus, distant history of CABG who presents with diarrhea and nausea to this facility in the ER.    chronic systolic CHF with very low EF at 20 % presenting with acute CHF and renal failure with refractory hyperkalemia     Initiated on CRRT -> transitioned to HD now   Drop in h.h-> PRBC transfused        Home Health S4 Level Recommendation:  NA    AM-PAC Score: AM-PAC Inpatient Daily Activity Raw Score: 15     Subjective:  Patient lying reclined in bed with no family present.   Pt agreeable to this OT session.     Cognition:    A&O x4  Able to follow 2 step commands    Pain:   Yes  Location: belly, per RN pt contipated  Rating: moderate /10  Pain Medicine Status: RN notified    Preadmission Environment:   Pt. Lives                                              Spouse  Home environment:

## 2024-04-17 NOTE — PROGRESS NOTES
updated 4/12/24 1:45 PM EDT    Click here for a link to the UpToDate guideline \"Atrial Fibrillation: Anticoagulation therapy to prevent embolization    Disclaimer: Risk Score calculation is dependent on accuracy of patient problem list and past encounter diagnosis. Vasc     #LANCE on CKD4-possible cardiorenal syndrome.  #Hyperkalemia and metabolic acidosis  - possible cardiorenal  - holding entresto, k levels did not improve with medical mx  - given bicarb fluids   - nephrology consulted   - initiated on CRRT with fluid removal done for 72 hrs, now switched to HD.  Did not receive dialysis over weekend.   Had some issues during dialysis the last time.    - Tried HD again 4/15. She did report some chest heaviness. The flow rate also had to be reduced. The patient had continued symptoms and HD was stopped after one hour.   - May be able to stay off HD, monitoring    #Elevated troponin  #CAD s/p previous CABG   - troponin 61-->59  - EKG with non-specific T wave changes   - last stress test with infarct only   - patient denies chest pain   - continue ASA, statin, resumed low dose BB     #T2DM  -stopped oral regimen  -lantus and SSI  -monitor BG     #Gout   -continue allopurinol     # Anemia- chronic sec to CKD   - baseline around 8.5- 9.0 . Now down to 6.8  - monitored h.h->  trending down with acute disease   - S/P 1 unit PRBC transfusion     Constipation  -soap suds enema given  -scheduled miralax      DVT ppx: Resumed Heparin     DNR-CCA   ADULT DIET; Regular; 4 carb choices (60 gm/meal); Low Potassium (Less than 3000 mg/day)  ADULT ORAL NUTRITION SUPPLEMENT; Lunch, Dinner; Diabetic Oral Supplement    Debora Da Silva DO, 4/17/2024 11:43 AM

## 2024-04-17 NOTE — FLOWSHEET NOTE
04/16/24 1914   Vital Signs   Temp 98.1 °F (36.7 °C)   Temp Source Oral   Pulse 73   Heart Rate Source Monitor   Respirations 16   BP (!) 120/43   MAP (Calculated) 69   BP Location Left upper arm   BP Method Automatic   Patient Position Semi fowlers   Pain Assessment   Pain Assessment None - Denies Pain   Oxygen Therapy   SpO2 99 %   O2 Device Nasal cannula   O2 Flow Rate (L/min) 2 L/min     Shift assessment completed- see flow sheet.   Nightly meds given per order- see mar  VSS. Pt alert and oriented x4 sitting up in bed watching TV  Denies any needs or wants at this time.  Call light and bedside table within reach  Care continues

## 2024-04-17 NOTE — PROGRESS NOTES
Nephrology Progress Note   [email or web address]      Sub/interval history  Feels all right no new complaints  HD did not go well.  Had chest pain.  Still with temporary line as disposition is unclear.    HD on 4/11 with net UF of 542 mL, postweight 71 kg; treatment was complicated by chest pain/pressure along with A-fib RVR and hypotension.  Rapid response was called and patient was transferred to ICU    HD on 4/9 with net UF of 160 mL.  45 minutes into dialysis, patient reported of feeling heaviness on her chest.  At that time blood flow rate was decreased to 300 mL/min from 350 and UF goal decreased from 2 L to 1 L.  Subsequently 100 mL of normal saline was given and UF was turned off completely but the patient's symptoms continued and dialysis was stopped in 1 hour 9 minutes.    Last 24-hour urine output be much better     ROS: No fever, edema    PSFH: No visitors    Scheduled Meds:   polyethylene glycol  17 g Oral BID    torsemide  80 mg Oral Daily    docusate sodium  200 mg Oral BID    epoetin reed-epbx  10,000 Units IntraVENous Once per day on Mon Wed Fri    metoprolol tartrate  25 mg Oral BID    amiodarone  200 mg Oral BID    Followed by    [START ON 4/19/2024] amiodarone  200 mg Oral Daily    hydrALAZINE  10 mg Oral 3 times per day    sodium chloride flush  5-40 mL IntraVENous 2 times per day    heparin (porcine)  5,000 Units SubCUTAneous 3 times per day    allopurinol  100 mg Oral Daily    aspirin  81 mg Oral Daily    atorvastatin  40 mg Oral Nightly    insulin lispro  0-8 Units SubCUTAneous TID WC    insulin lispro  0-4 Units SubCUTAneous Nightly     Continuous Infusions:   sodium chloride 5 mL/hr at 04/04/24 0144    dextrose       PRN Meds:.dicyclomine, nitroGLYCERIN, heparin (porcine), calcium carbonate, sodium chloride flush, sodium chloride, ondansetron **OR** ondansetron, acetaminophen **OR** acetaminophen, glucose, dextrose bolus **OR** dextrose bolus, glucagon (rDNA), dextrose    Objective/  be worse.      Jill Wakefield MD  Office: [phone]  Fax:    [phone]  [facility]

## 2024-04-17 NOTE — PROGRESS NOTES
AM assessment completed. Scheduled medications given per MAR. VSS 2 liter NC, A/O x4, At the time of waking up the patient she was confused to situation and place, although after reorienting her and returning patient was A/O x4. While physical therapy was working with patient PT expressed she fell back while sitting up in bed. Although when the patient was talked to she would respond. /53, This RN went into the room to talk to the patient she would drift off but when saying her name she responded \"what the hell do you want\" MD made aware, no new order. patient does not appear in distress and denies any needs at this time. Call light in reach, will monitor, bed alarm on.

## 2024-04-18 LAB
ALBUMIN SERPL-MCNC: 2.5 G/DL (ref 3.4–5)
ANION GAP SERPL CALCULATED.3IONS-SCNC: 8 MMOL/L (ref 3–16)
BUN SERPL-MCNC: 52 MG/DL (ref 7–20)
CALCIUM SERPL-MCNC: 8.5 MG/DL (ref 8.3–10.6)
CHLORIDE SERPL-SCNC: 95 MMOL/L (ref 99–110)
CO2 SERPL-SCNC: 30 MMOL/L (ref 21–32)
CREAT SERPL-MCNC: 2.4 MG/DL (ref 0.6–1.2)
GFR SERPLBLD CREATININE-BSD FMLA CKD-EPI: 20 ML/MIN/{1.73_M2}
GLUCOSE BLD-MCNC: 128 MG/DL (ref 70–99)
GLUCOSE BLD-MCNC: 150 MG/DL (ref 70–99)
GLUCOSE BLD-MCNC: 167 MG/DL (ref 70–99)
GLUCOSE BLD-MCNC: 190 MG/DL (ref 70–99)
GLUCOSE BLD-MCNC: 432 MG/DL (ref 70–99)
GLUCOSE SERPL-MCNC: 128 MG/DL (ref 70–99)
PERFORMED ON: ABNORMAL
PHOSPHATE SERPL-MCNC: 6.2 MG/DL (ref 2.5–4.9)
POTASSIUM SERPL-SCNC: 5.6 MMOL/L (ref 3.5–5.1)
POTASSIUM SERPL-SCNC: 5.6 MMOL/L (ref 3.5–5.1)
SODIUM SERPL-SCNC: 133 MMOL/L (ref 136–145)

## 2024-04-18 PROCEDURE — 99233 SBSQ HOSP IP/OBS HIGH 50: CPT | Performed by: INTERNAL MEDICINE

## 2024-04-18 PROCEDURE — 6370000000 HC RX 637 (ALT 250 FOR IP): Performed by: HOSPITALIST

## 2024-04-18 PROCEDURE — 6370000000 HC RX 637 (ALT 250 FOR IP): Performed by: INTERNAL MEDICINE

## 2024-04-18 PROCEDURE — 2580000003 HC RX 258: Performed by: INTERNAL MEDICINE

## 2024-04-18 PROCEDURE — 2700000000 HC OXYGEN THERAPY PER DAY

## 2024-04-18 PROCEDURE — 6360000002 HC RX W HCPCS: Performed by: INTERNAL MEDICINE

## 2024-04-18 PROCEDURE — 6370000000 HC RX 637 (ALT 250 FOR IP): Performed by: STUDENT IN AN ORGANIZED HEALTH CARE EDUCATION/TRAINING PROGRAM

## 2024-04-18 PROCEDURE — 94761 N-INVAS EAR/PLS OXIMETRY MLT: CPT

## 2024-04-18 PROCEDURE — 80069 RENAL FUNCTION PANEL: CPT

## 2024-04-18 PROCEDURE — 2060000000 HC ICU INTERMEDIATE R&B

## 2024-04-18 PROCEDURE — 36415 COLL VENOUS BLD VENIPUNCTURE: CPT

## 2024-04-18 RX ORDER — LANOLIN ALCOHOL/MO/W.PET/CERES
3 CREAM (GRAM) TOPICAL NIGHTLY
Status: DISCONTINUED | OUTPATIENT
Start: 2024-04-18 | End: 2024-04-20

## 2024-04-18 RX ADMIN — ONDANSETRON 4 MG: 2 INJECTION INTRAMUSCULAR; INTRAVENOUS at 17:14

## 2024-04-18 RX ADMIN — DOCUSATE SODIUM 200 MG: 100 CAPSULE, LIQUID FILLED ORAL at 09:12

## 2024-04-18 RX ADMIN — DESMOPRESSIN ACETATE 40 MG: 0.2 TABLET ORAL at 21:47

## 2024-04-18 RX ADMIN — AMIODARONE HYDROCHLORIDE 200 MG: 200 TABLET ORAL at 09:11

## 2024-04-18 RX ADMIN — DICYCLOMINE HYDROCHLORIDE 20 MG: 20 TABLET ORAL at 14:55

## 2024-04-18 RX ADMIN — METOPROLOL TARTRATE 25 MG: 25 TABLET, FILM COATED ORAL at 09:11

## 2024-04-18 RX ADMIN — ALLOPURINOL 100 MG: 100 TABLET ORAL at 09:11

## 2024-04-18 RX ADMIN — SODIUM ZIRCONIUM CYCLOSILICATE 10 G: 10 POWDER, FOR SUSPENSION ORAL at 09:09

## 2024-04-18 RX ADMIN — HEPARIN SODIUM 5000 UNITS: 5000 INJECTION INTRAVENOUS; SUBCUTANEOUS at 14:55

## 2024-04-18 RX ADMIN — METOPROLOL TARTRATE 25 MG: 25 TABLET, FILM COATED ORAL at 21:47

## 2024-04-18 RX ADMIN — SODIUM CHLORIDE, PRESERVATIVE FREE 10 ML: 5 INJECTION INTRAVENOUS at 09:38

## 2024-04-18 RX ADMIN — TORSEMIDE 80 MG: 20 TABLET ORAL at 09:11

## 2024-04-18 RX ADMIN — Medication 3 MG: at 01:14

## 2024-04-18 RX ADMIN — Medication 3 MG: at 21:48

## 2024-04-18 RX ADMIN — DOCUSATE SODIUM 200 MG: 100 CAPSULE, LIQUID FILLED ORAL at 21:47

## 2024-04-18 RX ADMIN — POLYETHYLENE GLYCOL 3350 17 G: 17 POWDER, FOR SOLUTION ORAL at 21:48

## 2024-04-18 RX ADMIN — HEPARIN SODIUM 5000 UNITS: 5000 INJECTION INTRAVENOUS; SUBCUTANEOUS at 05:52

## 2024-04-18 RX ADMIN — SODIUM CHLORIDE, PRESERVATIVE FREE 10 ML: 5 INJECTION INTRAVENOUS at 21:48

## 2024-04-18 RX ADMIN — ASPIRIN 81 MG: 81 TABLET, COATED ORAL at 09:11

## 2024-04-18 RX ADMIN — HEPARIN SODIUM 5000 UNITS: 5000 INJECTION INTRAVENOUS; SUBCUTANEOUS at 21:48

## 2024-04-18 RX ADMIN — SODIUM ZIRCONIUM CYCLOSILICATE 10 G: 10 POWDER, FOR SUSPENSION ORAL at 14:55

## 2024-04-18 RX ADMIN — POLYETHYLENE GLYCOL 3350 17 G: 17 POWDER, FOR SOLUTION ORAL at 09:09

## 2024-04-18 ASSESSMENT — PAIN SCALES - WONG BAKER

## 2024-04-18 NOTE — PLAN OF CARE
Problem: Respiratory - Adult  Goal: Achieves optimal ventilation and oxygenation  4/17/2024 2244 by Yeimi Lopez, RN  Outcome: Progressing  4/17/2024 0934 by Lakshmi Knowles, RN  Outcome: Progressing

## 2024-04-18 NOTE — ADT AUTH CERT
Vitals (last 3 days)    Date/Time Temp Pulse Resp BP MAP (mmHg) SpO2 O2 Device [facility]   04/18/24 1445 97.2 °F (36.2 °C) 60 16 108/47 Abnormal  -- 100 % Nasal cannula HD   04/18/24 1045 98.3 °F (36.8 °C) 69 16 130/66 -- 95 % None (Room air) HD   04/18/24 0900 97.8 °F (36.6 °C) 70 16 123/49 Abnormal  -- 100 % Nasal cannula HD   04/18/24 0545 -- 66 -- 118/58 Abnormal  -- -- -- CW   04/18/24 0421 97.2 °F (36.2 °C) 67 18 144/59 Abnormal  -- 100 % Nasal cannula RM   04/17/24 2323 96.9 °F (36.1 °C) 71 18 129/55 Abnormal  -- 100 % Nasal cannula RM   04/17/24 2100 -- 67 -- 116/50 Abnormal  -- -- -- CW   04/17/24 1923 96.9 °F (36.1 °C) 66 16 119/46 Abnormal  -- 100 % Nasal cannula RM   04/17/24 1430 98 °F (36.7 °C) 62 16 100/54 Abnormal  -- 100 % Nasal cannula HD   04/17/24 1030 97.8 °F (36.6 °C) 54 16 112/44 Abnormal  -- 99 % Nasal cannula HD   04/17/24 0709 97 °F (36.1 °C) 67 18 137/45 Abnormal  -- 98 % Nasal cannula DM   04/17/24 0545 -- -- -- 115/48 Abnormal  -- -- -- JT   04/17/24 0401 97.9 °F (36.6 °C) 72 18 122/51 Abnormal  -- 96 % Nasal cannula RM   04/17/24 0016 97 °F (36.1 °C) 69 18 127/50 Abnormal  -- 100 % Nasal cannula JT   04/16/24 1914 98.1 °F (36.7 °C) 73 16 120/43 Abnormal  -- 99 % Nasal cannula RM   04/16/24 1521 97.5 °F (36.4 °C) 69 17 116/54 Abnormal  -- 100 % Nasal cannula CR   04/16/24 1343 -- -- -- 113/46 Abnormal  -- -- -- CR   04/16/24 1132 96.6 °F (35.9 °C) Abnormal  69 18 107/51 Abnormal  -- 95 % Nasal cannula    04/16/24 0800 -- 71 -- -- -- -- -- CR   04/16/24 0658 96.9 °F (36.1 °C) 72 18 125/46 Abnormal  -- 100 % Nasal cannula    04/16/24 0550 -- -- -- 113/52 Abnormal  -- -- -- T   04/16/24 0432 98.3 °F (36.8 °C) 71 18 113/52 Abnormal  -- 97 % Nasal cannula    04/16/24 0004 98 °F (36.7 °C) 70 18 119/49 Abnormal  -- 100 % Nasal cannula    04/15/24 2229 -- -- -- 112/52 Abnormal  -- -- --    04/15/24 2011 -- 75 -- 127/73 -- -- --    04/15/24 1926 97.1 °F (36.2 °C) 75 18 127/73 -- 100 %  Line = 10 mL/lumen    For viscous solutions (i.e. blood components, parenteral nutrition, contrast media, or after obtaining blood sample) use:  Peripheral IV = 10 mL  Midline or Central Line = 20 mL/lumen    [id number]   [id number]       [id number]   [id number]       62   [id number]         Completed Medications   Medications 04/10/24 04/11/24 04/12/24   furosemide (LASIX) injection 40 mg  Dose: 40 mg  Freq: ONCE Route: IV  Start: 04/10/24 1415 End: 04/10/24 1353    [id number]            Discontinued Medications   Medications 04/10/24 04/11/24 04/12/24   dilTIAZem 125 mg in sodium chloride 0.9 % 125 mL infusion  Rate: 2.5-15 mL/hr Dose: 2.5-15 mg/hr  Freq: CONTINUOUS Route: IV  Start: 04/11/24 2000 End: 04/12/24 1144   Admin Instructions:   If Titrate Infusion? is \"No\": Disregard instructions below.    If Titrate infusion? is \"Yes\":  Titrate in increments of 2.5 mg/hr no more frequently than every 30 minutes to goal of therapy.   Order specific questions:        [id number]   [id number]   [id number]     [id number]        [id number]   [id number]   [id number]     [id number]   [id number]         dilTIAZem injection 10 mg  Dose: 10 mg  Freq: ONCE Route: IV  Start: 04/11/24 2000 End: 04/13/24 0745   Admin Instructions:   Administer over at least 2 minutes.     (8722) [C]76           epoetin reed-epbx (RETACRIT) injection 10,000 Units  Dose: 10,000 Units  Freq: Once per day on Tue Thu Sat Route: IV  Start: 04/09/24 0800 End: 04/16/24 1520   Admin Instructions:   Start or continue erythropoietin reed-epbx (RETACRIT) therapy only if the hemoglobin is less than 10 g/dL.     (5352) [C]77           furosemide (LASIX) injection 40 mg  Dose: 40 mg  Freq: 2 TIMES DAILY Route: IV  Start: 04/10/24 1800 End: 04/13/24 1443   Admin Instructions:   Hold for systolic blood pressure less than 100    [id number]        [id number]   (5416) [C]81       [id number]   [id number]         melatonin disintegrating tablet 10 mg  Dose: 10 mg  Freq: NIGHTLY PRN Route: PO  PRN Comment: as needed for sleep  Start: 04/03/24

## 2024-04-18 NOTE — PROGRESS NOTES
Comprehensive Nutrition Assessment    Type and Reason for Visit:  Reassess    Nutrition Recommendations/Plan:   Continue ADULT DIET; Regular; 4 carb choices per meal; Low Potassium diet order.   Continue Glucerna ONS with lunch and dinner meals.   Monitor appetite, meal intake, and acceptance/intake of ONS.   Monitor nutrition-related labs, bowel function, and weight trends.      Malnutrition Assessment:  Malnutrition Status:  Severe malnutrition (04/18/24 1225)    Context:  Acute Illness     Findings of the 6 clinical characteristics of malnutrition:  Energy Intake:  50% or less of estimated energy requirements for 5 or more days  Weight Loss:  No significant weight loss     Body Fat Loss:  No significant body fat loss     Muscle Mass Loss:  Moderate muscle mass loss Temples (temporalis), Clavicles (pectoralis & deltoids), Hand (interosseous)  Fluid Accumulation:  No significant fluid accumulation Extremities, Generalized (BUE/generalized + 3 pitting edema)   Strength:  Not Performed    Nutrition Assessment:    patient continues to improve from a nutritional standpoint AEB improved appetite and po intake during admission, however, she remains at risk for further compromise d/t altered nutrition-related labs and weight loss during admission; will continue ADULT DIET; Regular; 4 carb choices per meal; Low Potassium diet order + Glucerna ONS with lunch and dinner meals    Nutrition Related Findings:    patient is A & O x 4; patient's appetite and po intake is improved; + BM on 4/18/24; HD was attempted on 4/17/24 but patient did not tolerate it so it was held; patient declined PT/OT this am Wound Type: None       Current Nutrition Intake & Therapies:    Average Meal Intake: 26-50%, 51-75%  Average Supplements Intake: Unable to assess  ADULT DIET; Regular; 4 carb choices (60 gm/meal); Low Potassium (Less than 3000 mg/day)  ADULT ORAL NUTRITION SUPPLEMENT; Lunch, Dinner; Diabetic Oral Supplement    Anthropometric

## 2024-04-18 NOTE — PLAN OF CARE
Problem: Discharge Planning  Goal: Discharge to home or other facility with appropriate resources  Outcome: Progressing  Flowsheets (Taken 4/17/2024 2104 by Yeimi Lopez, RN)  Discharge to home or other facility with appropriate resources: Identify barriers to discharge with patient and caregiver     Problem: Safety - Adult  Goal: Free from fall injury  Outcome: Progressing  Flowsheets (Taken 4/13/2024 1734 by Mallory Paige, RN)  Free From Fall Injury:   Based on caregiver fall risk screen, instruct family/caregiver to ask for assistance with transferring infant if caregiver noted to have fall risk factors   Instruct family/caregiver on patient safety     Problem: Chronic Conditions and Co-morbidities  Goal: Patient's chronic conditions and co-morbidity symptoms are monitored and maintained or improved  Outcome: Progressing  Flowsheets (Taken 4/17/2024 2104 by Yeimi Lopez, RN)  Care Plan - Patient's Chronic Conditions and Co-Morbidity Symptoms are Monitored and Maintained or Improved: Monitor and assess patient's chronic conditions and comorbid symptoms for stability, deterioration, or improvement     Problem: Respiratory - Adult  Goal: Achieves optimal ventilation and oxygenation  4/18/2024 0933 by Lakshmi Knowles RN  Outcome: Progressing  4/17/2024 2244 by Yeimi Lopez RN  Outcome: Progressing     Problem: Cardiovascular - Adult  Goal: Maintains optimal cardiac output and hemodynamic stability  Outcome: Progressing  Flowsheets (Taken 4/17/2024 2104 by Yeimi Lopez, RN)  Maintains optimal cardiac output and hemodynamic stability: Monitor blood pressure and heart rate  Goal: Absence of cardiac dysrhythmias or at baseline  Outcome: Progressing  Flowsheets (Taken 4/17/2024 2104 by Yeimi Lopez, RN)  Absence of cardiac dysrhythmias or at baseline: Monitor cardiac rate and rhythm     Problem: Pain  Goal: Verbalizes/displays adequate comfort level or baseline comfort level  4/18/2024 0933 by

## 2024-04-18 NOTE — ADT AUTH CERT
Patient Demographics    Name Patient ID SSN Gender Identity Birth Date   Amber Fuentes [id number]  Female [birth year] (78 yrs)     Address Phone Email Employer    [address]  [facility] [id number] [phone] (H)  [phone] (M) -- OTHER-[address] Race Occupation Emp Status    BROWN White (non-) -- Retired      Reg Status PCP Date Last Verified Next Review Date    Verified Lucila Tejeda MD  [phone] 04/01/24 05/01/24      Admission Date Discharge Date Admitting Provider     04/01/24 -- Cooper Swanson MD       Marital Status Anabaptist       Non-Uatsdin        Emergency Contact 1 Emergency Contact 2 Emergency Contact 3   Stanford Gina (2)  [address]  [facility] [address]  [facility]  [phone] (H)  [phone] (M) Sandra De Leon (C)  [phone] (H) Saundra Fuentes (D)  [phone] (M)     Physician Progress Notes  Notes from 04/15/24 through 04/18/24  Progress Notes by Estefania Wakefield MD at 4/18/2024 11:34 AM    Author: Estefania Wakefield MD Service: Nephrology Author Type: Physician   Filed: 4/18/2024 11:38 AM Date of Service: 4/18/2024 11:34 AM Status: Signed   : Estefania Wakefield MD (Physician)   Expand All Collapse All                                     Nephrology Progress Note   [email or web address]        Sub/interval history  Feels all right no new complaints  Discussed dialysis limitations and hope that we can medically manage kidney failure but labs are worsening and potassium is high today.       ROS: No fever, edema    PSFH: Granddaughter at the bedside      Scheduled Meds:  Scheduled Medications    melatonin  3 mg Oral Nightly    sodium zirconium cyclosilicate  10 g Oral Once    polyethylene glycol  17 g Oral BID    torsemide  80 mg Oral Daily    docusate sodium  200 mg Oral BID    epoetin reed-epbx  10,000 Units IntraVENous Once per day on Mon Wed Fri    metoprolol tartrate  25 mg Oral BID     01/06/2020    Ischemic cardiomyopathy 01/06/2020    Acute on chronic systolic CHF (congestive heart failure) ([facility]) 12/11/2019    Nonrheumatic mitral valve regurgitation 12/06/2019    Other restrictive cardiomyopathy ([facility]) 11/13/2019    Type 2 diabetes mellitus with microalbuminuria, without long-term current use of insulin ([facility]) 05/05/2017    Primary insomnia 11/23/2016    Hyperlipidemia associated with type 2 diabetes mellitus ([facility]) 12/14/2015    Major depressive disorder, recurrent episode, moderate ([facility]) 12/14/2015    Osteopenia 08/05/2015    Osteoarthritis, hand 02/27/2012    Gout 08/11/2011         Rory Santamaria MD, MD 4/16/2024 7:13 AM          Electronically signed by Rory Santamaria MD on 4/16/2024 11:57 AM     Progress Notes by Debora Da Silva DO at 4/15/2024  7:26 AM    Author: Debora Da Silva DO Service: Internal Medicine Author Type: Physician   Filed: 4/15/2024  6:24 PM Date of Service: 4/15/2024  7:26 AM Status: Signed   : Debora Da Silva DO (Physician)   Expand All Collapse All  IM Progress Note     Admit Date:  4/1/2024  14     Interval history:    chronic systolic CHF with very low EF at 20 % presenting with acute CHF and renal failure with refractory hyperkalemia     Initiated on CRRT -> transitioned to HD now   Drop in h.h-> PRBC transfused         Subjective:  Ms. Fuentes seen during HD. BP has been too low to tolerate and discussing with the HD nurse. She is supposed to have 2.5 hours. She complains she has pain all over, feels sore in arms, legs, generally feels unwell.      Objective:   BP (!) 126/51   Pulse 79   Temp 98.3 °F (36.8 °C) (Oral)   Resp 18   Ht 1.524 m (5')   Wt 69.9 kg (154 lb 1.6 oz)   SpO2 100%   BMI 30.10 kg/m²     Intake/Output Summary (Last 24 hours) at 4/15/2024 0726  Last data filed at 4/15/2024 0604      Gross per 24 hour   Intake 298 ml   Output 100 ml   Net 198 ml            Physical Exam:     General:  elderly female.   Awake, alert and fairly oriented.

## 2024-04-18 NOTE — PROGRESS NOTES
Blood pressure (!) 116/50, pulse 67, temperature 96.9 °F (36.1 °C), temperature source Axillary, resp. rate 16, height 1.524 m (5'), weight 68.1 kg (150 lb 1.6 oz), SpO2 100 %, not currently breastfeeding.    Shift assessment completed see flow sheet. Patient in bed alert and oriented x4 Patient on 3L, showing no signs of distress. Evening medications given per order. Held metoprolol and hydralazine due to patient having soft bp and heart rate.Patient has no other needs at this time. Standard safety measures in place.

## 2024-04-18 NOTE — PLAN OF CARE
HEART FAILURE CARE PLAN:    Comorbidities Reviewed: Yes   Patient has a past medical history of Anemia, Backache, unspecified, CAD, multiple vessel, Chronic kidney disease (CKD) stage G3b/A1, moderately decreased glomerular filtration rate (GFR) between 30-44 mL/min/1.73 square meter and albuminuria creatinine ratio less than 30 mg/g ([facility]), Chronic obstructive pulmonary disease ([facility]), Chronic systolic CHF (congestive heart failure) ([facility]), COPD (chronic obstructive pulmonary disease) ([facility]), Depressive disorder, not elsewhere classified, Essential hypertension, benign, Gout, History of blood transfusion, Hyperlipidemia associated with type 2 diabetes mellitus ([facility]), Major depressive disorder, recurrent episode, moderate ([facility]), Osteoarthritis, hand, Osteoarthrosis, unspecified whether generalized or localized, lower leg, Osteopenia, Other and unspecified hyperlipidemia, Pain in joint, lower leg, Pneumonia, Rotator cuff tendinitis, Tear of medial cartilage or meniscus of knee, current, Type 2 diabetes mellitus with microalbuminuria, without long-term current use of insulin ([facility]), Type 2 diabetes mellitus without complication ([facility]), Type II or unspecified type diabetes mellitus without mention of complication, not stated as uncontrolled, and Uses LifeVest defibrillator.     Weights Reviewed: Yes   Admission weight: 59.6 kg (131 lb 6.3 oz) (chart review)   Wt Readings from Last 3 Encounters:   04/17/24 68.1 kg (150 lb 1.6 oz)   02/11/24 59.6 kg (131 lb 8 oz)   01/31/24 60.7 kg (133 lb 13.1 oz)     Intake & Output Reviewed: Yes     Intake/Output Summary (Last 24 hours) at 4/17/2024 2248  Last data filed at 4/17/2024 2106  Gross per 24 hour   Intake 614 ml   Output 400 ml   Net 214 ml       ECHOCARDIOGRAM Reviewed: Yes   Patient's Ejection Fraction (EF) is less than or equal to 40%. Discuss HFrEF Guideline Directed Medical Therapy (GDMT) with Cardiologist or Hospitalist:          Medications Reviewed: Yes   SCHEDULED

## 2024-04-18 NOTE — PROGRESS NOTES
Nephrology Progress Note   [email or web address]      Sub/interval history  Feels all right no new complaints  Discussed dialysis limitations and hope that we can medically manage kidney failure but labs are worsening and potassium is high today.      ROS: No fever, edema    PSFH: Granddaughter at the bedside     Scheduled Meds:   melatonin  3 mg Oral Nightly    sodium zirconium cyclosilicate  10 g Oral Once    polyethylene glycol  17 g Oral BID    torsemide  80 mg Oral Daily    docusate sodium  200 mg Oral BID    epoetin reed-epbx  10,000 Units IntraVENous Once per day on Mon Wed Fri    metoprolol tartrate  25 mg Oral BID    amiodarone  200 mg Oral BID    Followed by    [START ON 4/19/2024] amiodarone  200 mg Oral Daily    hydrALAZINE  10 mg Oral 3 times per day    sodium chloride flush  5-40 mL IntraVENous 2 times per day    heparin (porcine)  5,000 Units SubCUTAneous 3 times per day    allopurinol  100 mg Oral Daily    aspirin  81 mg Oral Daily    atorvastatin  40 mg Oral Nightly    insulin lispro  0-8 Units SubCUTAneous TID WC    insulin lispro  0-4 Units SubCUTAneous Nightly     Continuous Infusions:   sodium chloride 5 mL/hr at 04/04/24 0144    dextrose       PRN Meds:.dicyclomine, nitroGLYCERIN, heparin (porcine), calcium carbonate, sodium chloride flush, sodium chloride, ondansetron **OR** ondansetron, acetaminophen **OR** acetaminophen, glucose, dextrose bolus **OR** dextrose bolus, glucagon (rDNA), dextrose    Objective/     Vitals:    04/18/24 0421 04/18/24 0545 04/18/24 0900 04/18/24 1045   BP: (!) 144/59 (!) 118/58 (!) 123/49 130/66   Pulse: 67 66 70 69   Resp: 18  16 16   Temp: 97.2 °F (36.2 °C)  97.8 °F (36.6 °C) 98.3 °F (36.8 °C)   TempSrc: Axillary  Oral Oral   SpO2: 100%  100% 95%   Weight: 67.2 kg (148 lb 1.6 oz)      Height:         24HR INTAKE/OUTPUT:    Intake/Output Summary (Last 24 hours) at 4/18/2024 1134  Last data filed at 4/18/2024 0421  Gross per 24 hour   Intake 494 ml   Output 150 ml

## 2024-04-18 NOTE — PROGRESS NOTES
Hand off report given to POORNIMA Martins.   Patient is stable showing no signs of distress and has no current needs at this time.   Call light is in reach and bed is in lowest position.    Care is transferred at this time.

## 2024-04-18 NOTE — PROGRESS NOTES
Occupational Therapy/Physical Therapy    Attempted to see pt this AM; pt declined participation in OT/PT services, stating she only wanted to be repositioned in bed. Assisted pt with repositioning toward R side.  Will recheck pt as schedule allows and pt tolerates.    Kyra Heath, OTR/L #[id number]   Brianna Rodgers, PT, DPT

## 2024-04-18 NOTE — PROGRESS NOTES
AM assessment completed. Scheduled medications given per MAR. VSS 2 liter NC, A/O x4. Patient does not appear in distress and denies any needs at this time. Call light in reach, will monitor, bed alarm on, family at bedside.

## 2024-04-19 LAB
ANION GAP SERPL CALCULATED.3IONS-SCNC: 14 MMOL/L (ref 3–16)
BUN SERPL-MCNC: 61 MG/DL (ref 7–20)
CALCIUM SERPL-MCNC: 8.6 MG/DL (ref 8.3–10.6)
CHLORIDE SERPL-SCNC: 92 MMOL/L (ref 99–110)
CO2 SERPL-SCNC: 23 MMOL/L (ref 21–32)
CREAT SERPL-MCNC: 2.8 MG/DL (ref 0.6–1.2)
GFR SERPLBLD CREATININE-BSD FMLA CKD-EPI: 17 ML/MIN/{1.73_M2}
GLUCOSE BLD-MCNC: 158 MG/DL (ref 70–99)
GLUCOSE BLD-MCNC: 170 MG/DL (ref 70–99)
GLUCOSE BLD-MCNC: 173 MG/DL (ref 70–99)
GLUCOSE BLD-MCNC: 212 MG/DL (ref 70–99)
GLUCOSE SERPL-MCNC: 130 MG/DL (ref 70–99)
PERFORMED ON: ABNORMAL
POTASSIUM SERPL-SCNC: 5.7 MMOL/L (ref 3.5–5.1)
POTASSIUM SERPL-SCNC: 5.8 MMOL/L (ref 3.5–5.1)
SODIUM SERPL-SCNC: 129 MMOL/L (ref 136–145)

## 2024-04-19 PROCEDURE — 6370000000 HC RX 637 (ALT 250 FOR IP): Performed by: STUDENT IN AN ORGANIZED HEALTH CARE EDUCATION/TRAINING PROGRAM

## 2024-04-19 PROCEDURE — 6370000000 HC RX 637 (ALT 250 FOR IP): Performed by: HOSPITALIST

## 2024-04-19 PROCEDURE — 6370000000 HC RX 637 (ALT 250 FOR IP): Performed by: INTERNAL MEDICINE

## 2024-04-19 PROCEDURE — 2580000003 HC RX 258: Performed by: INTERNAL MEDICINE

## 2024-04-19 PROCEDURE — 94761 N-INVAS EAR/PLS OXIMETRY MLT: CPT

## 2024-04-19 PROCEDURE — 84132 ASSAY OF SERUM POTASSIUM: CPT

## 2024-04-19 PROCEDURE — 2060000000 HC ICU INTERMEDIATE R&B

## 2024-04-19 PROCEDURE — 6360000002 HC RX W HCPCS: Performed by: INTERNAL MEDICINE

## 2024-04-19 PROCEDURE — 2700000000 HC OXYGEN THERAPY PER DAY

## 2024-04-19 PROCEDURE — 36415 COLL VENOUS BLD VENIPUNCTURE: CPT

## 2024-04-19 PROCEDURE — 80048 BASIC METABOLIC PNL TOTAL CA: CPT

## 2024-04-19 PROCEDURE — 99233 SBSQ HOSP IP/OBS HIGH 50: CPT | Performed by: INTERNAL MEDICINE

## 2024-04-19 RX ADMIN — DESMOPRESSIN ACETATE 40 MG: 0.2 TABLET ORAL at 20:28

## 2024-04-19 RX ADMIN — ALLOPURINOL 100 MG: 100 TABLET ORAL at 08:17

## 2024-04-19 RX ADMIN — HEPARIN SODIUM 5000 UNITS: 5000 INJECTION INTRAVENOUS; SUBCUTANEOUS at 15:11

## 2024-04-19 RX ADMIN — AMIODARONE HYDROCHLORIDE 200 MG: 200 TABLET ORAL at 08:17

## 2024-04-19 RX ADMIN — Medication 3 MG: at 20:28

## 2024-04-19 RX ADMIN — POLYETHYLENE GLYCOL 3350 17 G: 17 POWDER, FOR SOLUTION ORAL at 20:28

## 2024-04-19 RX ADMIN — HEPARIN SODIUM 5000 UNITS: 5000 INJECTION INTRAVENOUS; SUBCUTANEOUS at 05:53

## 2024-04-19 RX ADMIN — HEPARIN SODIUM 5000 UNITS: 5000 INJECTION INTRAVENOUS; SUBCUTANEOUS at 20:27

## 2024-04-19 RX ADMIN — DOCUSATE SODIUM 200 MG: 100 CAPSULE, LIQUID FILLED ORAL at 20:28

## 2024-04-19 RX ADMIN — SODIUM ZIRCONIUM CYCLOSILICATE 10 G: 10 POWDER, FOR SUSPENSION ORAL at 15:20

## 2024-04-19 RX ADMIN — SODIUM CHLORIDE, PRESERVATIVE FREE 10 ML: 5 INJECTION INTRAVENOUS at 20:28

## 2024-04-19 RX ADMIN — METOPROLOL TARTRATE 25 MG: 25 TABLET, FILM COATED ORAL at 08:16

## 2024-04-19 RX ADMIN — SODIUM CHLORIDE, PRESERVATIVE FREE 10 ML: 5 INJECTION INTRAVENOUS at 08:18

## 2024-04-19 RX ADMIN — METOPROLOL TARTRATE 25 MG: 25 TABLET, FILM COATED ORAL at 20:27

## 2024-04-19 RX ADMIN — HYDRALAZINE HYDROCHLORIDE 10 MG: 10 TABLET, FILM COATED ORAL at 05:53

## 2024-04-19 RX ADMIN — ASPIRIN 81 MG: 81 TABLET, COATED ORAL at 08:16

## 2024-04-19 RX ADMIN — DOCUSATE SODIUM 200 MG: 100 CAPSULE, LIQUID FILLED ORAL at 08:16

## 2024-04-19 RX ADMIN — POLYETHYLENE GLYCOL 3350 17 G: 17 POWDER, FOR SOLUTION ORAL at 08:15

## 2024-04-19 ASSESSMENT — PAIN SCALES - WONG BAKER
WONGBAKER_NUMERICALRESPONSE: NO HURT

## 2024-04-19 ASSESSMENT — PAIN SCALES - GENERAL
PAINLEVEL_OUTOF10: 3

## 2024-04-19 NOTE — PROGRESS NOTES
Shift report given to nurse Mendes  at bedside. Patient care handed off in stable condition at this time. Sandra Beltran RN

## 2024-04-19 NOTE — PROGRESS NOTES
Perfect served doctor to have contact made to hospice representative Samson at [phone] to do a contract bed. Sandra Beltran RN

## 2024-04-19 NOTE — PROGRESS NOTES
Nephrology Progress Note   [email or web address]      Sub/interval history    Kidney function is worse  K is elevated despite lokelma   More somnolent   BP stable     ROS: No fever, edema    PSFH: No family at the bedside     Scheduled Meds:   melatonin  3 mg Oral Nightly    polyethylene glycol  17 g Oral BID    docusate sodium  200 mg Oral BID    epoetin reed-epbx  10,000 Units IntraVENous Once per day on Mon Wed Fri    metoprolol tartrate  25 mg Oral BID    amiodarone  200 mg Oral Daily    hydrALAZINE  10 mg Oral 3 times per day    sodium chloride flush  5-40 mL IntraVENous 2 times per day    heparin (porcine)  5,000 Units SubCUTAneous 3 times per day    allopurinol  100 mg Oral Daily    aspirin  81 mg Oral Daily    atorvastatin  40 mg Oral Nightly    insulin lispro  0-8 Units SubCUTAneous TID WC    insulin lispro  0-4 Units SubCUTAneous Nightly     Continuous Infusions:   sodium chloride 5 mL/hr at 04/04/24 0144    dextrose       PRN Meds:.dicyclomine, nitroGLYCERIN, heparin (porcine), calcium carbonate, sodium chloride flush, sodium chloride, ondansetron **OR** ondansetron, acetaminophen **OR** acetaminophen, glucose, dextrose bolus **OR** dextrose bolus, glucagon (rDNA), dextrose    Objective/     Vitals:    04/19/24 0351 04/19/24 0717 04/19/24 0816 04/19/24 1132   BP: (!) 132/58 119/64 119/64 90/60   Pulse: 64 61 61 60   Resp: 17 16  16   Temp: 98 °F (36.7 °C) 97.1 °F (36.2 °C)  97.6 °F (36.4 °C)   TempSrc: Oral Oral  Oral   SpO2: 99% 99%  100%   Weight: 67.2 kg (148 lb 1.6 oz)      Height:         24HR INTAKE/OUTPUT:    Intake/Output Summary (Last 24 hours) at 4/19/2024 1411  Last data filed at 4/19/2024 0800  Gross per 24 hour   Intake 75 ml   Output 400 ml   Net -325 ml       Gen: Ill-appearing  Neck: No JVD  Skin: Unremarkable  Cardiovascular:  S1, S2 without m/r/g   Respiratory: CTA B without w/r/r; respiratory effort normal  Abdomen:  soft, nt, nd,   Extremities: + extremity edema  Neuro/Psy: Somnolent,

## 2024-04-19 NOTE — CONSULTS
Palliative Care Chart Review  and Check in Note:     NAME:  Amber Fuentes  Admit Date: 4/1/2024  Hospital Day:  Hospital Day: 19   Current Code status: DNR-CCA    Palliative care is continuing to following Ms. Fuentes for symptom management,  and goals of care discussion as needed. Patient's chart reviewed today 4/19/24.        The following are the currently established goals/code status, and Symptom management.     Goals of care: Writer met with the pt at bedside. Pt states she does not want to move forward with HD as she is not tolerating dialysis well related to cardiac deterioration over the next week to months if on HD. Pt requests referral to [facility]. Referral called to Erin with [facility], awaiting meeting time. Per  pt would be appropriate for IPU.    Code status: DNR-CCA    Discharge plan: Writer spoke with Anurag from [facility] and pre-cert still pending. Per Eldon will need financials from spouse if pt admits under hospice as would need pending medicaid for room and board. Writer spoke with pt's spouse,Stanford, to update him on above plan and he states will go to the bank to request above paperwork. Following.    Writer received message from Angelic with [facility] and meeting has been arranged for 1830 today with pt and pt's spouse.    KADEN Lemons aware of above plan.      Stefani Anand RN  04/19/24  1:32 PM

## 2024-04-19 NOTE — PLAN OF CARE
Problem: Discharge Planning  Goal: Discharge to home or other facility with appropriate resources  4/18/2024 2213 by Danika Odom RN  Outcome: Progressing  4/18/2024 0933 by Lakshmi Knowles RN  Outcome: Progressing  Flowsheets (Taken 4/17/2024 2104 by Yeimi Lopez, RN)  Discharge to home or other facility with appropriate resources: Identify barriers to discharge with patient and caregiver     Problem: Safety - Adult  Goal: Free from fall injury  4/18/2024 2213 by Danika Odom RN  Outcome: Progressing  4/18/2024 0933 by Lakshmi Knowles RN  Outcome: Progressing  Flowsheets (Taken 4/13/2024 1734 by Mallory Paige, RN)  Free From Fall Injury:   Based on caregiver fall risk screen, instruct family/caregiver to ask for assistance with transferring infant if caregiver noted to have fall risk factors   Instruct family/caregiver on patient safety     Problem: Chronic Conditions and Co-morbidities  Goal: Patient's chronic conditions and co-morbidity symptoms are monitored and maintained or improved  4/18/2024 2213 by Danika Odom RN  Outcome: Progressing  4/18/2024 0933 by Lakshmi Knowles RN  Outcome: Progressing  Flowsheets (Taken 4/17/2024 2104 by Yeimi Lopez, RN)  Care Plan - Patient's Chronic Conditions and Co-Morbidity Symptoms are Monitored and Maintained or Improved: Monitor and assess patient's chronic conditions and comorbid symptoms for stability, deterioration, or improvement     Problem: Respiratory - Adult  Goal: Achieves optimal ventilation and oxygenation  4/18/2024 2213 by Danika Odom RN  Outcome: Progressing  4/18/2024 0933 by Lakshmi Knowles RN  Outcome: Progressing     Problem: Cardiovascular - Adult  Goal: Maintains optimal cardiac output and hemodynamic stability  4/18/2024 2213 by Danika Odom RN  Outcome: Progressing  4/18/2024 0933 by Lakshmi Knowles RN  Outcome: Progressing  Flowsheets (Taken 4/17/2024 2104 by Yeimi Lopez, RN)  Maintains optimal

## 2024-04-19 NOTE — PROGRESS NOTES
IM Progress Note    Admit Date:  4/1/2024  17    Interval history:    chronic systolic CHF with very low EF at 20 % presenting with acute CHF and renal failure with refractory hyperkalemia    Initiated on CRRT -> transitioned to HD now   Drop in h.h-> PRBC transfused     Subjective:  Ms. Fuentes says she knows her renal function is not better. She is not wanting to discuss further when seen.     Objective:   BP (!) 118/46   Pulse 62   Temp 97.8 °F (36.6 °C) (Oral)   Resp 16   Ht 1.524 m (5')   Wt 67.2 kg (148 lb 1.6 oz)   SpO2 100%   BMI 28.92 kg/m²     Intake/Output Summary (Last 24 hours) at 4/18/2024 2137  Last data filed at 4/18/2024 1743  Gross per 24 hour   Intake 120 ml   Output 350 ml   Net -230 ml         Physical Exam:    General:  elderly female.   Awake, alert and fairly oriented. Appears to be not in any distress  Mucous Membranes:  Pink , anicteric  Neck: no JVD, no carotid bruit, no thyromegaly  Chest:  Clear to auscultation; no wheezes, rales or rhonchi   Cardiovascular:  RRR S1S2 heard, Murmur in SR  Abdomen:  Soft, undistended, non tender, no organomegaly, BS present  Extremities- improving  upper and lower extremity edema  Neurological : grossly normal with gen weakness      Medications:   Scheduled Medications:    melatonin  3 mg Oral Nightly    polyethylene glycol  17 g Oral BID    docusate sodium  200 mg Oral BID    epoetin reed-epbx  10,000 Units IntraVENous Once per day on Mon Wed Fri    metoprolol tartrate  25 mg Oral BID    amiodarone  200 mg Oral BID    Followed by    [START ON 4/19/2024] amiodarone  200 mg Oral Daily    hydrALAZINE  10 mg Oral 3 times per day    sodium chloride flush  5-40 mL IntraVENous 2 times per day    heparin (porcine)  5,000 Units SubCUTAneous 3 times per day    allopurinol  100 mg Oral Daily    aspirin  81 mg Oral Daily    atorvastatin  40 mg Oral Nightly    insulin lispro  0-8 Units SubCUTAneous TID WC    insulin lispro  0-4 Units SubCUTAneous Nightly      I   sodium chloride 5 mL/hr at 04/04/24 0144    dextrose       dicyclomine, nitroGLYCERIN, heparin (porcine), calcium carbonate, sodium chloride flush, sodium chloride, ondansetron **OR** ondansetron, acetaminophen **OR** acetaminophen, glucose, dextrose bolus **OR** dextrose bolus, glucagon (rDNA), dextrose    Lab Data:  No results for input(s): \"WBC\", \"HGB\", \"HCT\", \"MCV\", \"PLT\" in the last 72 hours.    Recent Labs     04/16/24  0634 04/17/24  0527 04/18/24  0427   * 130* 133*   K 4.7 4.8  4.8 5.6*  5.6*   CL 95* 93* 95*   CO2 27 28 30   PHOS  --  4.3 6.2*   BUN 32* 42* 52*   CREATININE 1.8* 2.0* 2.4*       No results for input(s): \"CKTOTAL\", \"CKMB\", \"CKMBINDEX\", \"TROPONINI\" in the last 72 hours.    Coagulation:   Lab Results   Component Value Date/Time    INR 1.17 04/12/2024 05:01 AM    APTT 33.9 04/05/2024 06:00 AM     Cardiac markers:   Lab Results   Component Value Date/Time    TROPONINI 0.02 06/03/2022 06:41 PM         Lab Results   Component Value Date    ALT 14 04/16/2024    AST 22 04/16/2024    ALKPHOS 269 (H) 04/16/2024    BILITOT 0.7 04/16/2024       Lab Results   Component Value Date    INR 1.17 (H) 04/12/2024    INR 1.24 (H) 04/01/2024    INR 1.04 07/23/2020    PROTIME 15.2 (H) 04/12/2024    PROTIME 15.6 (H) 04/01/2024    PROTIME 12.1 07/23/2020         Cultures     Results       ** No results found for the last 336 hours. **            Results in Past 30 Days  Result Component Current Result Ref Range Previous Result Ref Range   Troponin, High Sensitivity 83 (H) (4/11/2024) 0 - 14 ng/L 83 (H) (4/11/2024) 0 - 14 ng/L        Radiology      XR CHEST 1 VIEW   Final Result   1. Interval placement of dual lumen right IJ central venous catheter with   catheter tip at superior cavoatrial junction.   2. Improved right pleural effusion with still small pleural effusion   persisting.   3. Improved aeration to the right lung with still some patchy/hazy airspace   opacities to the right lung.   4. New

## 2024-04-19 NOTE — PLAN OF CARE
HEART FAILURE CARE PLAN:    Comorbidities Reviewed: Yes   Patient has a past medical history of Anemia, Backache, unspecified, CAD, multiple vessel, Chronic kidney disease (CKD) stage G3b/A1, moderately decreased glomerular filtration rate (GFR) between 30-44 mL/min/1.73 square meter and albuminuria creatinine ratio less than 30 mg/g ([facility]), Chronic obstructive pulmonary disease ([facility]), Chronic systolic CHF (congestive heart failure) ([facility]), COPD (chronic obstructive pulmonary disease) ([facility]), Depressive disorder, not elsewhere classified, Essential hypertension, benign, Gout, History of blood transfusion, Hyperlipidemia associated with type 2 diabetes mellitus ([facility]), Major depressive disorder, recurrent episode, moderate ([facility]), Osteoarthritis, hand, Osteoarthrosis, unspecified whether generalized or localized, lower leg, Osteopenia, Other and unspecified hyperlipidemia, Pain in joint, lower leg, Pneumonia, Rotator cuff tendinitis, Tear of medial cartilage or meniscus of knee, current, Type 2 diabetes mellitus with microalbuminuria, without long-term current use of insulin ([facility]), Type 2 diabetes mellitus without complication ([facility]), Type II or unspecified type diabetes mellitus without mention of complication, not stated as uncontrolled, and Uses LifeVest defibrillator.     Weights Reviewed: Yes   Admission weight: 59.6 kg (131 lb 6.3 oz) (chart review)   Wt Readings from Last 3 Encounters:   04/18/24 67.2 kg (148 lb 1.6 oz)   02/11/24 59.6 kg (131 lb 8 oz)   01/31/24 60.7 kg (133 lb 13.1 oz)     Intake & Output Reviewed: Yes     Intake/Output Summary (Last 24 hours) at 4/18/2024 1257  Last data filed at 4/18/2024 1743  Gross per 24 hour   Intake 120 ml   Output 350 ml   Net -230 ml       ECHOCARDIOGRAM Reviewed: Yes   Patient's Ejection Fraction (EF) is less than or equal to 40%. Discuss HFrEF Guideline Directed Medical Therapy (GDMT) with Cardiologist or Hospitalist:          Medications Reviewed: Yes   SCHEDULED

## 2024-04-19 NOTE — FLOWSHEET NOTE
04/18/24 1922   Vital Signs   Temp 97.8 °F (36.6 °C)   Temp Source Oral   Pulse 62   Heart Rate Source Monitor   Respirations 16   BP (!) 118/46   MAP (Calculated) 70   BP Location Left upper arm   BP Method Automatic   Patient Position Semi fowlers   Oxygen Therapy   SpO2 100 %   O2 Device Nasal cannula   O2 Flow Rate (L/min) 2 L/min

## 2024-04-19 NOTE — FLOWSHEET NOTE
04/19/24 1500   Vital Signs   Temp (!) 94.5 °F (34.7 °C)   Temp Source Axillary   Pulse 56   Heart Rate Source Monitor   Respirations 16   BP 94/62   MAP (Calculated) 73   BP Location Left upper arm   BP Method Automatic   Patient Position Semi dieudonnewlers   Pain Assessment   Pain Assessment 0-10   Pain Level 3   Patient's Stated Pain Goal 0 - No pain   Oxygen Therapy   SpO2 100 %   Pulse Oximetry Type Continuous   Pulse Oximeter Device Location Finger   O2 Device Nasal cannula   O2 Flow Rate (L/min) 4 L/min     Bearhugger initiated for low temp, no additional needs at this time. Sandra Beltran, RN

## 2024-04-19 NOTE — PROGRESS NOTES
IM Progress Note    Admit Date:  4/1/2024  18    Interval history:    chronic systolic CHF with very low EF at 20 % presenting with acute CHF and renal failure with refractory hyperkalemia    Initiated on CRRT -> transitioned to HD now   Drop in h.h-> PRBC transfused     Subjective:  Ms. Fuentes spoke with her nurse today and says she would prefer to discuss hospice rather than undergo further dialysis. She is feeling comfortable when seen.     Objective:   BP 94/62   Pulse 56   Temp 97.2 °F (36.2 °C) (Oral) Comment: bearhugger in place  Resp 16   Ht 1.524 m (5')   Wt 67.2 kg (148 lb 1.6 oz)   SpO2 100%   BMI 28.92 kg/m²     Intake/Output Summary (Last 24 hours) at 4/19/2024 1729  Last data filed at 4/19/2024 1132  Gross per 24 hour   Intake 75 ml   Output 850 ml   Net -775 ml         Physical Exam:    General:  elderly female.   Awake, alert and fairly oriented. Appears to be not in any distress  Mucous Membranes:  Pink , anicteric  Neck: no JVD, no carotid bruit, no thyromegaly  Chest:  Clear to auscultation; no wheezes, rales or rhonchi   Cardiovascular:  RRR S1S2 heard, Murmur in SR  Abdomen:  Soft, undistended, non tender, no organomegaly, BS present  Extremities- improving  upper and lower extremity edema  Neurological : grossly normal with gen weakness      Medications:   Scheduled Medications:    melatonin  3 mg Oral Nightly    polyethylene glycol  17 g Oral BID    docusate sodium  200 mg Oral BID    epoetin reed-epbx  10,000 Units IntraVENous Once per day on Mon Wed Fri    metoprolol tartrate  25 mg Oral BID    amiodarone  200 mg Oral Daily    hydrALAZINE  10 mg Oral 3 times per day    sodium chloride flush  5-40 mL IntraVENous 2 times per day    heparin (porcine)  5,000 Units SubCUTAneous 3 times per day    allopurinol  100 mg Oral Daily    aspirin  81 mg Oral Daily    atorvastatin  40 mg Oral Nightly    insulin lispro  0-8 Units SubCUTAneous TID WC    insulin lispro  0-4 Units SubCUTAneous Nightly  function.   The right atrium is mildly dilated.   An Edward Physio II annuloplasty ring is seen in the mitral position.   Mild mitral stenosis.   Mild to moderate mitral regurgitation.   Mild aortic regurgitation.   Moderate tricuspid regurgitation.   Systolic pulmonary artery pressure (SPAP) estimated at 56 mmHg (RA pressure   8 mmHg).   Mild-moderate pulmonic regurgitation present.      Compared with the prior study performed 1/23/24 (Limited Echo with 25% EF severe global hypokinesis inferior akinesis. RV reduced function), LVEF   appears slightly improved.      Assessment & Plan:    #Acute on chronic systolic CHF  #Ischemic cardiomyopathy   # New onset atrial fibrillation resolved. In SR at present.   # Accelerated junctional rhythm  # Subendocardial ischemia  -pro-BNP >23k  -CXR shows cardiomegaly with pulmonary vascular congestion  Also has massive peripheral edema and refractory hyperkalemia  -limited echo 1/24 with similar EF of 25 % and repeat ECHo with slight improvement to 30 %, with severe global hypokinesis   -nephrology consulted   - initiated on CRRT for high K and had fluid removal. Transitioned to HD with continued fluid removal    -She was on goal-directed medical therapy including Toprol and Entresto.  Went into A-fib.  Started on amiodarone.  Cardiology consultation obtained.   High risk for anticoagulation.  Started on amiodarone.    Unable to uptitrate goal-directed medical therapy due to multiple comorbidities.   Continue aspirin.   No plans for ischemia workup.  -dose of Lasix increased by nephrology  [id number] Score for Atrial Fibrillation Stroke Risk   Risk   Factors  Component Value   C CHF Yes 1   H HTN Yes 1   A2 Age >= 75 Yes,  (78 y.o.) 2   D DM Yes 1   S2 Prior Stroke/TIA No 0   V Vascular Disease No 0   A Age 65-74 No,  (78 y.o.) 0   Sc Sex female 1    [id number]  Score  6   Score last updated 4/12/24 1:45 PM EDT    Click here for a link to the UpToDate guideline \"Atrial

## 2024-04-19 NOTE — PROGRESS NOTES
Weaned patient to 3L of O2 as patient has been 100% on pulse ox, temp oral at 97.2 at this time, patient resting with eyes closed. Sandra Beltran RN

## 2024-04-19 NOTE — PROGRESS NOTES
Shift assessment complete, morning medications given, patient resting with no complaints of pain, will continue to monitor. Sandra Beltran RN

## 2024-04-19 NOTE — CARE COORDINATION
Reviewed chart, met with pt bedside. Plan remains to DC to [facility]. If not tolerating/not needing HD, pt can go to closer facility. Will continue to monitor.     1515 - Per Stefani RN, [facility] having family meeting today at 1830. Per Jonah PCU Manager, pt's  allow to come to unit with known bed bug infestation. Sandra NOGUERA aware.

## 2024-04-19 NOTE — PROGRESS NOTES
Physician Progress Note      PATIENT:               DES WREN  [facility] #:                  [id number]  :                       1945  ADMIT DATE:       2024 4:41 PM  DISCH DATE:  RESPONDING  PROVIDER #:        Debora Da Silva DO          QUERY TEXT:    Patient with dietician consult and progress note on  and  states meets   criteria for severe malnutrition. If possible, please document in progress   notes and discharge summary if you are evaluating and /or treating any of the   following:    The medical record reflects the following:  Risk Factors:   inadequate protein-energy intake, impaired respiratory   function, renal dysfunction, endocrine dysfuntion  Clinical Indicators: evidenced by poor intake prior to admission, intake   0-25%, moderate muscle loss, mild muscle loss, lab values, constipation,   dialysis  Treatment: Continue ADULT DIET; Regular; 4 carb choices per meal; Low   Potassium diet order.  Continue Glucerna ONS with lunch and dinner meals.  Monitor appetite, meal intake, and acceptance/intake of ONS.  Monitor nutrition-related labs, bowel function, and weight trends.    ASPEN Criteria:    https://aspenjournals.onlinelibrary.montejo.com/doi/full/10.1177/[email or web address]  5    Thank you,  Rosi Rowland RN,BSN,CCDS,CRCR  Options provided:  -- Protein calorie malnutrition severe  -- Other - I will add my own diagnosis  -- Disagree - Not applicable / Not valid  -- Disagree - Clinically unable to determine / Unknown  -- Refer to Clinical Documentation Reviewer    PROVIDER RESPONSE TEXT:    This patient has severe protein calorie malnutrition.    Query created by: Rosi Rowland on 2024 1:33 PM      Electronically signed by:  Debora Da Silva DO 2024 11:00 AM

## 2024-04-19 NOTE — PLAN OF CARE
HOSPITAL MEDICATIONS:   melatonin  3 mg Oral Nightly    polyethylene glycol  17 g Oral BID    docusate sodium  200 mg Oral BID    epoetin reed-epbx  10,000 Units IntraVENous Once per day on Mon Wed Fri    metoprolol tartrate  25 mg Oral BID    amiodarone  200 mg Oral Daily    hydrALAZINE  10 mg Oral 3 times per day    sodium chloride flush  5-40 mL IntraVENous 2 times per day    heparin (porcine)  5,000 Units SubCUTAneous 3 times per day    allopurinol  100 mg Oral Daily    aspirin  81 mg Oral Daily    atorvastatin  40 mg Oral Nightly    insulin lispro  0-8 Units SubCUTAneous TID     insulin lispro  0-4 Units SubCUTAneous Nightly     HOME MEDICATIONS:  Prior to Admission medications    Medication Sig Start Date End Date Taking? Authorizing Provider   metoprolol succinate (TOPROL XL) 25 MG extended release tablet TAKE THREE TABLETS BY MOUTH DAILY 2/23/24   Lucila Tejeda MD   sacubitril-valsartan (ENTRESTO) 24-26 MG per tablet Take 0.5 tablets by mouth 2 times daily Hold until follow-up appointment  Patient taking differently: Take 0.5 tablets by mouth 2 times daily Hold until follow-up appointment- Pt states she takes it once a day. 2/11/24   Debora Da Silva DO   allopurinol (ZYLOPRIM) 300 MG tablet Take 0.5 tablets by mouth daily TAKE 1 TABLET BY MOUTH DAILY 1/31/24   Brian Gomez MD   insulin glargine (LANTUS SOLOSTAR) 100 UNIT/ML injection pen Inject 10 Units into the skin nightly  Patient taking differently: Inject 10 Units into the skin 2 times daily 16 in the AM and 10 in the PM. 1/31/24   Brian Gomez MD   isosorbide dinitrate (ISORDIL) 10 MG tablet Take 1 tablet by mouth in the morning and at bedtime 1/31/24   Brian Gomez MD   furosemide (LASIX) 40 MG tablet Take 0.5 tablets by mouth daily  Patient taking differently: Take 0.5 tablets by mouth daily Every other day. 1/31/24   Brian Gomez MD   atorvastatin (LIPITOR) 40 MG tablet TAKE ONE TABLET BY MOUTH DAILY  4/20/23   Saundra Foley, APRN - CNP   diclofenac sodium (VOLTAREN) 1 % GEL Apply 2 g topically in the morning and at bedtime  Patient taking differently: Apply 2 g topically in the morning and at bedtime Pt states she has never used this. 2/28/23   Dmitriy Trotter, APRN - CNP   aspirin 81 MG EC tablet Take 1 tablet by mouth daily    Provider, MD Blade      Diet Reviewed: Yes   ADULT DIET; Regular; 4 carb choices (60 gm/meal); Low Potassium (Less than 3000 mg/day)  ADULT ORAL NUTRITION SUPPLEMENT; Lunch, Dinner; Diabetic Oral Supplement    Goal of Care Reviewed: Yes   Patient and/or Family's stated Goal of Care this Admission:   , Reduce shortness of breath, increase activity tolerance, better understand heart failure and disease management, be more comfortable, and reduce lower extremity edema prior to discharge.     Electronically signed by Sandra Beltran RN on 4/19/2024 at 11:05 AM

## 2024-04-19 NOTE — PROGRESS NOTES
Redraw on potassium level is 5.7, lokelma planned for today per nephrology Dr. Wakefield. Sandra Beltran, RN

## 2024-04-20 ENCOUNTER — HOSPITAL ENCOUNTER (INPATIENT)
Age: 79
LOS: 3 days | Discharge: HOSPICE | DRG: 951 | End: 2024-04-23
Attending: INTERNAL MEDICINE | Admitting: INTERNAL MEDICINE
Payer: OTHER MISCELLANEOUS

## 2024-04-20 VITALS
DIASTOLIC BLOOD PRESSURE: 64 MMHG | WEIGHT: 150.3 LBS | SYSTOLIC BLOOD PRESSURE: 124 MMHG | RESPIRATION RATE: 20 BRPM | OXYGEN SATURATION: 97 % | TEMPERATURE: 96.8 F | BODY MASS INDEX: 29.51 KG/M2 | HEART RATE: 61 BPM | HEIGHT: 60 IN

## 2024-04-20 DIAGNOSIS — Z51.5 ADMISSION FOR HOSPICE CARE: Primary | ICD-10-CM

## 2024-04-20 LAB
ANION GAP SERPL CALCULATED.3IONS-SCNC: 8 MMOL/L (ref 3–16)
BUN SERPL-MCNC: 66 MG/DL (ref 7–20)
CALCIUM SERPL-MCNC: 8.6 MG/DL (ref 8.3–10.6)
CHLORIDE SERPL-SCNC: 89 MMOL/L (ref 99–110)
CO2 SERPL-SCNC: 29 MMOL/L (ref 21–32)
CREAT SERPL-MCNC: 3.1 MG/DL (ref 0.6–1.2)
GFR SERPLBLD CREATININE-BSD FMLA CKD-EPI: 15 ML/MIN/{1.73_M2}
GLUCOSE BLD-MCNC: 100 MG/DL (ref 70–99)
GLUCOSE BLD-MCNC: 156 MG/DL (ref 70–99)
GLUCOSE BLD-MCNC: 160 MG/DL (ref 70–99)
GLUCOSE BLD-MCNC: 176 MG/DL (ref 70–99)
GLUCOSE SERPL-MCNC: 97 MG/DL (ref 70–99)
PERFORMED ON: ABNORMAL
POTASSIUM SERPL-SCNC: 5.6 MMOL/L (ref 3.5–5.1)
SODIUM SERPL-SCNC: 126 MMOL/L (ref 136–145)

## 2024-04-20 PROCEDURE — 6370000000 HC RX 637 (ALT 250 FOR IP): Performed by: INTERNAL MEDICINE

## 2024-04-20 PROCEDURE — 2580000003 HC RX 258: Performed by: INTERNAL MEDICINE

## 2024-04-20 PROCEDURE — 2700000000 HC OXYGEN THERAPY PER DAY

## 2024-04-20 PROCEDURE — 99233 SBSQ HOSP IP/OBS HIGH 50: CPT | Performed by: INTERNAL MEDICINE

## 2024-04-20 PROCEDURE — 80048 BASIC METABOLIC PNL TOTAL CA: CPT

## 2024-04-20 PROCEDURE — 94761 N-INVAS EAR/PLS OXIMETRY MLT: CPT

## 2024-04-20 PROCEDURE — 6360000002 HC RX W HCPCS: Performed by: INTERNAL MEDICINE

## 2024-04-20 PROCEDURE — 36415 COLL VENOUS BLD VENIPUNCTURE: CPT

## 2024-04-20 PROCEDURE — 6370000000 HC RX 637 (ALT 250 FOR IP): Performed by: STUDENT IN AN ORGANIZED HEALTH CARE EDUCATION/TRAINING PROGRAM

## 2024-04-20 PROCEDURE — 1250000000 HC SEMI PRIVATE HOSPICE R&B

## 2024-04-20 RX ORDER — POLYETHYLENE GLYCOL 3350 17 G/17G
17 POWDER, FOR SOLUTION ORAL 2 TIMES DAILY
Status: CANCELLED | OUTPATIENT
Start: 2024-04-20

## 2024-04-20 RX ORDER — NITROGLYCERIN 0.4 MG/1
0.4 TABLET SUBLINGUAL EVERY 5 MIN PRN
Status: DISCONTINUED | OUTPATIENT
Start: 2024-04-20 | End: 2024-04-23 | Stop reason: HOSPADM

## 2024-04-20 RX ORDER — SODIUM CHLORIDE 0.9 % (FLUSH) 0.9 %
5-40 SYRINGE (ML) INJECTION EVERY 12 HOURS SCHEDULED
Status: CANCELLED | OUTPATIENT
Start: 2024-04-20

## 2024-04-20 RX ORDER — AMIODARONE HYDROCHLORIDE 200 MG/1
200 TABLET ORAL DAILY
Status: DISCONTINUED | OUTPATIENT
Start: 2024-04-21 | End: 2024-04-23 | Stop reason: HOSPADM

## 2024-04-20 RX ORDER — ATROPINE SULFATE 10 MG/ML
2 SOLUTION/ DROPS OPHTHALMIC EVERY 4 HOURS PRN
Status: CANCELLED | OUTPATIENT
Start: 2024-04-20

## 2024-04-20 RX ORDER — LORAZEPAM 2 MG/ML
1 CONCENTRATE ORAL EVERY 4 HOURS PRN
Status: DISCONTINUED | OUTPATIENT
Start: 2024-04-20 | End: 2024-04-23 | Stop reason: HOSPADM

## 2024-04-20 RX ORDER — MORPHINE SULFATE 20 MG/ML
5 SOLUTION ORAL EVERY 4 HOURS PRN
Status: CANCELLED | OUTPATIENT
Start: 2024-04-20

## 2024-04-20 RX ORDER — SODIUM CHLORIDE 0.9 % (FLUSH) 0.9 %
5-40 SYRINGE (ML) INJECTION PRN
Status: CANCELLED | OUTPATIENT
Start: 2024-04-20

## 2024-04-20 RX ORDER — LORAZEPAM 2 MG/ML
1 CONCENTRATE ORAL EVERY 4 HOURS PRN
Status: CANCELLED | OUTPATIENT
Start: 2024-04-20

## 2024-04-20 RX ORDER — ALLOPURINOL 100 MG/1
100 TABLET ORAL DAILY
Status: DISCONTINUED | OUTPATIENT
Start: 2024-04-21 | End: 2024-04-23 | Stop reason: HOSPADM

## 2024-04-20 RX ORDER — MORPHINE SULFATE 20 MG/ML
5 SOLUTION ORAL EVERY 4 HOURS PRN
Status: DISCONTINUED | OUTPATIENT
Start: 2024-04-20 | End: 2024-04-20

## 2024-04-20 RX ORDER — ATROPINE SULFATE 10 MG/ML
2 SOLUTION/ DROPS OPHTHALMIC EVERY 4 HOURS PRN
Status: DISCONTINUED | OUTPATIENT
Start: 2024-04-20 | End: 2024-04-20

## 2024-04-20 RX ORDER — LORAZEPAM 2 MG/ML
1 CONCENTRATE ORAL EVERY 4 HOURS PRN
Status: DISCONTINUED | OUTPATIENT
Start: 2024-04-20 | End: 2024-04-20

## 2024-04-20 RX ORDER — DICYCLOMINE HCL 20 MG
20 TABLET ORAL 4 TIMES DAILY PRN
Status: CANCELLED | OUTPATIENT
Start: 2024-04-20

## 2024-04-20 RX ORDER — POLYETHYLENE GLYCOL 3350 17 G/17G
17 POWDER, FOR SOLUTION ORAL 2 TIMES DAILY
Status: DISCONTINUED | OUTPATIENT
Start: 2024-04-20 | End: 2024-04-23 | Stop reason: HOSPADM

## 2024-04-20 RX ORDER — ONDANSETRON 2 MG/ML
4 INJECTION INTRAMUSCULAR; INTRAVENOUS EVERY 6 HOURS PRN
Status: CANCELLED | OUTPATIENT
Start: 2024-04-20

## 2024-04-20 RX ORDER — DICYCLOMINE HCL 20 MG
20 TABLET ORAL 4 TIMES DAILY PRN
Status: DISCONTINUED | OUTPATIENT
Start: 2024-04-20 | End: 2024-04-23 | Stop reason: HOSPADM

## 2024-04-20 RX ORDER — DOCUSATE SODIUM 100 MG/1
200 CAPSULE, LIQUID FILLED ORAL 2 TIMES DAILY
Status: DISCONTINUED | OUTPATIENT
Start: 2024-04-20 | End: 2024-04-23 | Stop reason: HOSPADM

## 2024-04-20 RX ORDER — LANOLIN ALCOHOL/MO/W.PET/CERES
3 CREAM (GRAM) TOPICAL NIGHTLY
Status: CANCELLED | OUTPATIENT
Start: 2024-04-20

## 2024-04-20 RX ORDER — LANOLIN ALCOHOL/MO/W.PET/CERES
3 CREAM (GRAM) TOPICAL NIGHTLY
Status: DISCONTINUED | OUTPATIENT
Start: 2024-04-20 | End: 2024-04-23 | Stop reason: HOSPADM

## 2024-04-20 RX ORDER — DOCUSATE SODIUM 100 MG/1
200 CAPSULE, LIQUID FILLED ORAL 2 TIMES DAILY
Status: CANCELLED | OUTPATIENT
Start: 2024-04-20

## 2024-04-20 RX ORDER — ATROPINE SULFATE 10 MG/ML
2 SOLUTION/ DROPS OPHTHALMIC EVERY 4 HOURS PRN
Status: DISCONTINUED | OUTPATIENT
Start: 2024-04-20 | End: 2024-04-23 | Stop reason: HOSPADM

## 2024-04-20 RX ORDER — ONDANSETRON 4 MG/1
4 TABLET, ORALLY DISINTEGRATING ORAL EVERY 8 HOURS PRN
Status: DISCONTINUED | OUTPATIENT
Start: 2024-04-20 | End: 2024-04-23 | Stop reason: HOSPADM

## 2024-04-20 RX ORDER — SODIUM CHLORIDE 0.9 % (FLUSH) 0.9 %
5-40 SYRINGE (ML) INJECTION PRN
Status: DISCONTINUED | OUTPATIENT
Start: 2024-04-20 | End: 2024-04-23 | Stop reason: HOSPADM

## 2024-04-20 RX ORDER — ONDANSETRON 2 MG/ML
4 INJECTION INTRAMUSCULAR; INTRAVENOUS EVERY 6 HOURS PRN
Status: DISCONTINUED | OUTPATIENT
Start: 2024-04-20 | End: 2024-04-23 | Stop reason: HOSPADM

## 2024-04-20 RX ORDER — ACETAMINOPHEN 650 MG/1
650 SUPPOSITORY RECTAL EVERY 6 HOURS PRN
Status: DISCONTINUED | OUTPATIENT
Start: 2024-04-20 | End: 2024-04-23 | Stop reason: HOSPADM

## 2024-04-20 RX ORDER — MORPHINE SULFATE 20 MG/ML
5 SOLUTION ORAL EVERY 4 HOURS PRN
Status: DISCONTINUED | OUTPATIENT
Start: 2024-04-20 | End: 2024-04-23 | Stop reason: HOSPADM

## 2024-04-20 RX ORDER — SODIUM CHLORIDE 9 MG/ML
INJECTION, SOLUTION INTRAVENOUS PRN
Status: DISCONTINUED | OUTPATIENT
Start: 2024-04-20 | End: 2024-04-23 | Stop reason: HOSPADM

## 2024-04-20 RX ORDER — ACETAMINOPHEN 325 MG/1
650 TABLET ORAL EVERY 6 HOURS PRN
Status: CANCELLED | OUTPATIENT
Start: 2024-04-20

## 2024-04-20 RX ORDER — AMIODARONE HYDROCHLORIDE 200 MG/1
200 TABLET ORAL DAILY
Status: CANCELLED | OUTPATIENT
Start: 2024-04-21

## 2024-04-20 RX ORDER — NITROGLYCERIN 0.4 MG/1
0.4 TABLET SUBLINGUAL EVERY 5 MIN PRN
Status: CANCELLED | OUTPATIENT
Start: 2024-04-20

## 2024-04-20 RX ORDER — SODIUM CHLORIDE 9 MG/ML
INJECTION, SOLUTION INTRAVENOUS PRN
Status: CANCELLED | OUTPATIENT
Start: 2024-04-20

## 2024-04-20 RX ORDER — ACETAMINOPHEN 325 MG/1
650 TABLET ORAL EVERY 6 HOURS PRN
Status: DISCONTINUED | OUTPATIENT
Start: 2024-04-20 | End: 2024-04-23 | Stop reason: HOSPADM

## 2024-04-20 RX ORDER — SODIUM CHLORIDE 0.9 % (FLUSH) 0.9 %
5-40 SYRINGE (ML) INJECTION EVERY 12 HOURS SCHEDULED
Status: DISCONTINUED | OUTPATIENT
Start: 2024-04-20 | End: 2024-04-23 | Stop reason: HOSPADM

## 2024-04-20 RX ORDER — ACETAMINOPHEN 650 MG/1
650 SUPPOSITORY RECTAL EVERY 6 HOURS PRN
Status: CANCELLED | OUTPATIENT
Start: 2024-04-20

## 2024-04-20 RX ORDER — ALLOPURINOL 100 MG/1
100 TABLET ORAL DAILY
Status: CANCELLED | OUTPATIENT
Start: 2024-04-21

## 2024-04-20 RX ORDER — ONDANSETRON 4 MG/1
4 TABLET, ORALLY DISINTEGRATING ORAL EVERY 8 HOURS PRN
Status: CANCELLED | OUTPATIENT
Start: 2024-04-20

## 2024-04-20 RX ADMIN — Medication 3 MG: at 20:00

## 2024-04-20 RX ADMIN — POLYETHYLENE GLYCOL 3350 17 G: 17 POWDER, FOR SOLUTION ORAL at 09:10

## 2024-04-20 RX ADMIN — METOPROLOL TARTRATE 25 MG: 25 TABLET, FILM COATED ORAL at 09:10

## 2024-04-20 RX ADMIN — DOCUSATE SODIUM 200 MG: 100 CAPSULE, LIQUID FILLED ORAL at 20:00

## 2024-04-20 RX ADMIN — METOPROLOL TARTRATE 25 MG: 25 TABLET, FILM COATED ORAL at 20:00

## 2024-04-20 RX ADMIN — ALLOPURINOL 100 MG: 100 TABLET ORAL at 09:10

## 2024-04-20 RX ADMIN — ONDANSETRON 4 MG: 2 INJECTION INTRAMUSCULAR; INTRAVENOUS at 04:23

## 2024-04-20 RX ADMIN — AMIODARONE HYDROCHLORIDE 200 MG: 200 TABLET ORAL at 09:11

## 2024-04-20 RX ADMIN — ONDANSETRON 4 MG: 2 INJECTION INTRAMUSCULAR; INTRAVENOUS at 13:01

## 2024-04-20 RX ADMIN — HEPARIN SODIUM 5000 UNITS: 5000 INJECTION INTRAVENOUS; SUBCUTANEOUS at 05:18

## 2024-04-20 RX ADMIN — SODIUM CHLORIDE, PRESERVATIVE FREE 10 ML: 5 INJECTION INTRAVENOUS at 10:23

## 2024-04-20 RX ADMIN — SODIUM ZIRCONIUM CYCLOSILICATE 10 G: 10 POWDER, FOR SUSPENSION ORAL at 17:24

## 2024-04-20 RX ADMIN — DOCUSATE SODIUM 200 MG: 100 CAPSULE, LIQUID FILLED ORAL at 09:10

## 2024-04-20 RX ADMIN — Medication 10 ML: at 20:00

## 2024-04-20 RX ADMIN — ASPIRIN 81 MG: 81 TABLET, COATED ORAL at 09:10

## 2024-04-20 ASSESSMENT — PAIN SCALES - WONG BAKER: WONGBAKER_NUMERICALRESPONSE: NO HURT

## 2024-04-20 ASSESSMENT — PAIN SCALES - GENERAL: PAINLEVEL_OUTOF10: 0

## 2024-04-20 NOTE — PLAN OF CARE
Problem: Discharge Planning  Goal: Discharge to home or other facility with appropriate resources  4/20/2024 1940 by Cinthya Fabian, RN  Outcome: Progressing     Problem: Safety - Adult  Goal: Free from fall injury  4/20/2024 1940 by Cinthya Fabian RN  Outcome: Progressing     Problem: Skin/Tissue Integrity  Goal: Absence of new skin breakdown  Description: 1.  Monitor for areas of redness and/or skin breakdown  2.  Assess vascular access sites hourly  3.  Every 4-6 hours minimum:  Change oxygen saturation probe site  4.  Every 4-6 hours:  If on nasal continuous positive airway pressure, respiratory therapy assess nares and determine need for appliance change or resting period.  4/20/2024 1940 by Cinthya Fabian, RN  Outcome: Progressing     Problem: Pain  Goal: Verbalizes/displays adequate comfort level or baseline comfort level  4/20/2024 1940 by Cinthya Fabian, RN  Outcome: Progressing

## 2024-04-20 NOTE — PROGRESS NOTES
HEART FAILURE CARE PLAN:    Comorbidities Reviewed: Yes   Patient has a past medical history of Anemia, Backache, unspecified, CAD, multiple vessel, Chronic kidney disease (CKD) stage G3b/A1, moderately decreased glomerular filtration rate (GFR) between 30-44 mL/min/1.73 square meter and albuminuria creatinine ratio less than 30 mg/g ([facility]), Chronic obstructive pulmonary disease (HCC), Chronic systolic CHF (congestive heart failure) ([facility]), COPD (chronic obstructive pulmonary disease) ([facility]), Depressive disorder, not elsewhere classified, Essential hypertension, benign, Gout, History of blood transfusion, Hyperlipidemia associated with type 2 diabetes mellitus ([facility]), Major depressive disorder, recurrent episode, moderate ([facility]), Osteoarthritis, hand, Osteoarthrosis, unspecified whether generalized or localized, lower leg, Osteopenia, Other and unspecified hyperlipidemia, Pain in joint, lower leg, Pneumonia, Rotator cuff tendinitis, Tear of medial cartilage or meniscus of knee, current, Type 2 diabetes mellitus with microalbuminuria, without long-term current use of insulin ([facility]), Type 2 diabetes mellitus without complication ([facility]), Type II or unspecified type diabetes mellitus without mention of complication, not stated as uncontrolled, and Uses LifeVest defibrillator.     Weights Reviewed: Yes   Admission weight: 59.6 kg (131 lb 6.3 oz) (chart review)   Wt Readings from Last 3 Encounters:   04/19/24 67.2 kg (148 lb 1.6 oz)   02/11/24 59.6 kg (131 lb 8 oz)   01/31/24 60.7 kg (133 lb 13.1 oz)     Intake & Output Reviewed: Yes     Intake/Output Summary (Last 24 hours) at 4/20/2024 0022  Last data filed at 4/19/2024 2028  Gross per 24 hour   Intake 315 ml   Output 650 ml   Net -335 ml       ECHOCARDIOGRAM Reviewed: Yes   Patient's Ejection Fraction (EF) is less than or equal to 40%. Discuss HFrEF Guideline Directed Medical Therapy (GDMT) with Cardiologist or Hospitalist:          Medications Reviewed: Yes   SCHEDULED

## 2024-04-20 NOTE — PROGRESS NOTES
Nephrology Progress Note   [facility]      Sub/interval history    Kidney function is worse  K is elevated but a bit better with lokelma   Awake today  BP stable     ROS: No fever, edema    PSFH: No family at the bedside, discussed no plans for dialysis and moving towards hospice.      Scheduled Meds:      Continuous Infusions:      PRN Meds:.    Objective/     Vitals:    04/19/24 1948 04/19/24 2347 04/20/24 0427 04/20/24 0715   BP: (!) 131/52 (!) 112/47 (!) 116/49 124/64   Pulse: 59 59 64 61   Resp: 16 18 20 20   Temp: 97 °F (36.1 °C) 96.9 °F (36.1 °C) 98 °F (36.7 °C) 96.8 °F (36 °C)   TempSrc: Oral Oral Oral Axillary   SpO2: 100% 98% 97% 97%   Weight:   68.2 kg (150 lb 4.8 oz)    Height:         24HR INTAKE/OUTPUT:    Intake/Output Summary (Last 24 hours) at 4/20/2024 1153  Last data filed at 4/20/2024 0519  Gross per 24 hour   Intake 240 ml   Output 200 ml   Net 40 ml       Gen: Ill-appearing  Neck: No JVD  Skin: Unremarkable  Cardiovascular:  S1, S2 without m/r/g   Respiratory: CTA B without w/r/r; respiratory effort normal  Abdomen:  soft, nt, nd,   Extremities: + extremity edema  Neuro/Psy: Somnolent, flat     Data/  No results for input(s): \"WBC\", \"HGB\", \"HCT\", \"MCV\", \"PLT\" in the last 72 hours.    Recent Labs     04/18/24  0427 04/19/24  0507 04/19/24  0946 04/20/24  0506   * 129*  --  126*   K 5.6*  5.6* 5.8* 5.7* 5.6*   CL 95* 92*  --  89*   CO2 30 23  --  29   GLUCOSE 128* 130*  --  97   PHOS 6.2*  --   --   --    BUN 52* 61*  --  66*   CREATININE 2.4* 2.8*  --  3.1*   LABGLOM 20* 17*  --  15*         Assessment/    -LANCE likely from ATN in the setting of CKD III/IV with a baseline creatinine of 1.8 to 2.2 mg/dl   Started on dialysis, not tolerating that well      - HTN: Blood pressure within acceptable range.     - Anemia: Stable hemoglobin, monitor.    -Thrombocytopenia, heparin has been stopped, platelet count increased on 4/8 to 58 K; 75 K on 4/9    -Chest pain per primary

## 2024-04-20 NOTE — CARE COORDINATION
Palliative Update:    Call placed to [facility] ([facility]) [phone] to confirm plan.  Pt will partner with hospice here per family request. [facility] nurse will be here today to follow up. Dr. Da Silva notified via perfect serve, [place] -readmit under hospice orders requested.

## 2024-04-20 NOTE — DISCHARGE SUMMARY
96.8 °F (36 °C) (Axillary)   Resp 20   Ht 1.524 m (5')   Wt 68.2 kg (150 lb 4.8 oz)   SpO2 97%   BMI 29.35 kg/m²   General appearance:  NAD  HEENT:   Normal cephalic, atraumatic, moist mucous membranes, no oropharyngeal erythema or exudate  Neck: Supple, trachea midline, no anterior cervical or SC LAD  Heart:: +Murmur  Lungs:  Normal respiratory effort. Clear to auscultation bilaterally, no wheeze, rales or rhonchi.  Abdomen: Soft, non-tender, non-distended, bowel sounds present, no masses  Musculoskeletal:  No clubbing, no cyanosis, significant edema, worsening  Skin: No lesion or masses  Neurologic:  Neurovascularly intact without any focal sensory/motor deficits. Cranial nerves: II-XII intact, grossly non-focal.  Psychiatric:  Alert and oriented    Labs: For convenience and continuity at follow-up the following most recent labs are provided:    Lab Results   Component Value Date/Time    WBC 9.6 04/15/2024 06:07 AM    HGB 7.9 04/15/2024 06:07 AM    HCT 24.6 04/15/2024 06:07 AM    MCV 88.8 04/15/2024 06:07 AM     04/15/2024 06:07 AM     04/20/2024 05:06 AM    K 5.6 04/20/2024 05:06 AM    CL 89 04/20/2024 05:06 AM    CO2 29 04/20/2024 05:06 AM    BUN 66 04/20/2024 05:06 AM    CREATININE 3.1 04/20/2024 05:06 AM    CALCIUM 8.6 04/20/2024 05:06 AM    PHOS 6.2 04/18/2024 04:27 AM    ALKPHOS 269 04/16/2024 06:34 AM    ALT 14 04/16/2024 06:34 AM    AST 22 04/16/2024 06:34 AM    BILITOT 0.7 04/16/2024 06:34 AM    BILIDIR 0.4 04/16/2024 06:34 AM    LDLCALC 69 10/20/2023 11:15 AM    TRIG 106 10/20/2023 11:15 AM     Lab Results   Component Value Date    INR 1.17 (H) 04/12/2024    INR 1.24 (H) 04/01/2024    INR 1.04 07/23/2020       Radiology:  XR CHEST 1 VIEW    Result Date: 4/8/2024  EXAMINATION: ONE X-RAY VIEW OF THE CHEST 4/8/2024 3:51 am COMPARISON: 04/01/2024. HISTORY: ORDERING SYSTEM PROVIDED HISTORY:  [employer] TECHNOLOGIST PROVIDED HISTORY: Reason for Exam:  [employer] FINDINGS: Overlying items external

## 2024-04-20 NOTE — PLAN OF CARE
Problem: Discharge Planning  Goal: Discharge to home or other facility with appropriate resources  Outcome: Progressing     Problem: Safety - Adult  Goal: Free from fall injury  Outcome: Progressing     Problem: Respiratory - Adult  Goal: Achieves optimal ventilation and oxygenation  4/20/2024 0023 by Cinthya Fabian RN  Outcome: Progressing     Problem: Cardiovascular - Adult  Goal: Maintains optimal cardiac output and hemodynamic stability  Outcome: Progressing  Goal: Absence of cardiac dysrhythmias or at baseline  Outcome: Progressing     Problem: Pain  Goal: Verbalizes/displays adequate comfort level or baseline comfort level  Outcome: Progressing     Problem: Skin/Tissue Integrity  Goal: Absence of new skin breakdown  Description: 1.  Monitor for areas of redness and/or skin breakdown  2.  Assess vascular access sites hourly  3.  Every 4-6 hours minimum:  Change oxygen saturation probe site  4.  Every 4-6 hours:  If on nasal continuous positive airway pressure, respiratory therapy assess nares and determine need for appliance change or resting period.  Outcome: Progressing

## 2024-04-20 NOTE — PROGRESS NOTES
Resting quietly with respirations WNL on 3L NC.  Call in easy reach.  Bed alarm on for safety.  Continue to monitor closely.  Cinthya Fabian RN

## 2024-04-20 NOTE — PLAN OF CARE
Problem: Discharge Planning  Goal: Discharge to home or other facility with appropriate resources  4/20/2024 0736 by Lakshmi Knowles RN  Outcome: Progressing  4/20/2024 0023 by Cinthya Fabian RN  Outcome: Progressing     Problem: Safety - Adult  Goal: Free from fall injury  4/20/2024 0736 by Lakshmi Knowles RN  Outcome: Progressing  4/20/2024 0023 by Cinthya Fabian RN  Outcome: Progressing     Problem: Chronic Conditions and Co-morbidities  Goal: Patient's chronic conditions and co-morbidity symptoms are monitored and maintained or improved  Outcome: Progressing     Problem: Respiratory - Adult  Goal: Achieves optimal ventilation and oxygenation  4/20/2024 0736 by Lakshmi Knowles RN  Outcome: Progressing  4/20/2024 0023 by Cinthya Fabian RN  Outcome: Progressing     Problem: Cardiovascular - Adult  Goal: Maintains optimal cardiac output and hemodynamic stability  4/20/2024 0736 by Lakshmi Knowles RN  Outcome: Progressing  4/20/2024 0023 by Cinthya Fabian RN  Outcome: Progressing  Goal: Absence of cardiac dysrhythmias or at baseline  4/20/2024 0736 by Lakshmi Knowles RN  Outcome: Progressing  4/20/2024 0023 by Cinthya Fabian RN  Outcome: Progressing     Problem: Pain  Goal: Verbalizes/displays adequate comfort level or baseline comfort level  4/20/2024 0736 by Lakshmi Knowles RN  Outcome: Progressing  4/20/2024 0023 by Cinthya Fabian RN  Outcome: Progressing     Problem: Nutrition Deficit:  Goal: Optimize nutritional status  Outcome: Progressing     Problem: Skin/Tissue Integrity  Goal: Absence of new skin breakdown  Description: 1.  Monitor for areas of redness and/or skin breakdown  2.  Assess vascular access sites hourly  3.  Every 4-6 hours minimum:  Change oxygen saturation probe site  4.  Every 4-6 hours:  If on nasal continuous positive airway pressure, respiratory therapy assess nares and determine need for appliance change or resting period.  4/20/2024 0736

## 2024-04-21 PROBLEM — Z51.5 ADMISSION FOR HOSPICE CARE: Status: ACTIVE | Noted: 2024-04-21

## 2024-04-21 LAB
GLUCOSE BLD-MCNC: 154 MG/DL (ref 70–99)
GLUCOSE BLD-MCNC: 91 MG/DL (ref 70–99)
PERFORMED ON: ABNORMAL
PERFORMED ON: NORMAL

## 2024-04-21 PROCEDURE — 2700000000 HC OXYGEN THERAPY PER DAY

## 2024-04-21 PROCEDURE — 94761 N-INVAS EAR/PLS OXIMETRY MLT: CPT

## 2024-04-21 PROCEDURE — 99232 SBSQ HOSP IP/OBS MODERATE 35: CPT | Performed by: INTERNAL MEDICINE

## 2024-04-21 PROCEDURE — 6370000000 HC RX 637 (ALT 250 FOR IP): Performed by: INTERNAL MEDICINE

## 2024-04-21 PROCEDURE — 2580000003 HC RX 258: Performed by: INTERNAL MEDICINE

## 2024-04-21 PROCEDURE — 1250000000 HC SEMI PRIVATE HOSPICE R&B

## 2024-04-21 RX ORDER — TORSEMIDE 100 MG/1
100 TABLET ORAL DAILY
Status: DISCONTINUED | OUTPATIENT
Start: 2024-04-21 | End: 2024-04-23 | Stop reason: HOSPADM

## 2024-04-21 RX ADMIN — DICYCLOMINE HYDROCHLORIDE 20 MG: 20 TABLET ORAL at 07:52

## 2024-04-21 RX ADMIN — METOPROLOL TARTRATE 25 MG: 25 TABLET, FILM COATED ORAL at 07:52

## 2024-04-21 RX ADMIN — ACETAMINOPHEN 650 MG: 325 TABLET ORAL at 07:52

## 2024-04-21 RX ADMIN — ALLOPURINOL 100 MG: 100 TABLET ORAL at 07:52

## 2024-04-21 RX ADMIN — AMIODARONE HYDROCHLORIDE 200 MG: 200 TABLET ORAL at 07:52

## 2024-04-21 RX ADMIN — Medication 3 MG: at 20:35

## 2024-04-21 RX ADMIN — SODIUM ZIRCONIUM CYCLOSILICATE 10 G: 10 POWDER, FOR SUSPENSION ORAL at 07:52

## 2024-04-21 RX ADMIN — Medication 10 ML: at 20:36

## 2024-04-21 RX ADMIN — DOCUSATE SODIUM 200 MG: 100 CAPSULE, LIQUID FILLED ORAL at 20:35

## 2024-04-21 RX ADMIN — METOPROLOL TARTRATE 25 MG: 25 TABLET, FILM COATED ORAL at 20:35

## 2024-04-21 RX ADMIN — Medication 5 MG: at 22:35

## 2024-04-21 RX ADMIN — ACETAMINOPHEN 650 MG: 325 TABLET ORAL at 14:25

## 2024-04-21 RX ADMIN — POLYETHYLENE GLYCOL 3350 17 G: 17 POWDER, FOR SOLUTION ORAL at 20:35

## 2024-04-21 RX ADMIN — Medication 5 MG: at 14:16

## 2024-04-21 RX ADMIN — TORSEMIDE 100 MG: 100 TABLET ORAL at 13:18

## 2024-04-21 ASSESSMENT — PAIN DESCRIPTION - LOCATION
LOCATION: BUTTOCKS

## 2024-04-21 ASSESSMENT — PAIN SCALES - GENERAL
PAINLEVEL_OUTOF10: 0
PAINLEVEL_OUTOF10: 5
PAINLEVEL_OUTOF10: 6
PAINLEVEL_OUTOF10: 8

## 2024-04-21 ASSESSMENT — PAIN DESCRIPTION - ORIENTATION: ORIENTATION: MID

## 2024-04-21 ASSESSMENT — PAIN DESCRIPTION - DESCRIPTORS: DESCRIPTORS: ACHING

## 2024-04-21 ASSESSMENT — PAIN SCALES - WONG BAKER: WONGBAKER_NUMERICALRESPONSE: NO HURT

## 2024-04-21 NOTE — PLAN OF CARE
Problem: Discharge Planning  Goal: Discharge to home or other facility with appropriate resources  4/21/2024 0722 by Lakshmi Knowles RN  Outcome: Progressing  4/20/2024 1940 by Cinthya Fabian RN  Outcome: Progressing     Problem: Safety - Adult  Goal: Free from fall injury  4/21/2024 0722 by Lakshmi Knowles RN  Outcome: Progressing  4/20/2024 1940 by Cinthya Fabian RN  Outcome: Progressing     Problem: Skin/Tissue Integrity  Goal: Absence of new skin breakdown  Description: 1.  Monitor for areas of redness and/or skin breakdown  2.  Assess vascular access sites hourly  3.  Every 4-6 hours minimum:  Change oxygen saturation probe site  4.  Every 4-6 hours:  If on nasal continuous positive airway pressure, respiratory therapy assess nares and determine need for appliance change or resting period.  4/21/2024 0722 by Lakshmi Knowles, RN  Outcome: Progressing  4/20/2024 1940 by Cinthya Fabian RN  Outcome: Progressing     Problem: Pain  Goal: Verbalizes/displays adequate comfort level or baseline comfort level  4/21/2024 0722 by Lakhsmi Knowles RN  Outcome: Progressing  4/20/2024 1940 by Cinthya Fabian RN  Outcome: Progressing

## 2024-04-21 NOTE — H&P
Other Sister         bipolar       REVIEW OF SYSTEMS:   Pertinent positives as noted in the HPI. All other systems reviewed and negative.    PHYSICAL EXAM:    BP (!) 120/49   Pulse 59   Temp 97.5 °F (36.4 °C) (Oral)   Resp 18   Ht 1.524 m (5')   Wt 68.8 kg (151 lb 9.6 oz)   SpO2 99%   BMI 29.61 kg/m²     General appearance: No apparent distress, appears stated age and cooperative. Appears very fatigued.   HEENT: Normal cephalic, atraumatic without obvious deformity. Pupils equal, round, and reactive to light. Extra ocular muscles intact. Conjunctivae/corneas clear.  Neck: Supple, with full range of motion. No jugular venous distention. Trachea midline.  Respiratory:  Normal respiratory effort. Clear to auscultation, bilaterally without Rales/Wheezes/Rhonchi.  Cardiovascular: Regular rate and rhythm with normal S1/S2 without murmurs, rubs or gallops.  Abdomen: Soft, non-tender, non-distended with normal bowel sounds.  Musculoskelatal: Significant edema throughout  Skin: Skin color, texture, turgor normal.  No rashes or lesions.  Neurologic:  Neurovascularly intact without any focal sensory/motor deficits. Cranial nerves: II-XII intact, grossly non-focal.  Psychiatric: Alert and oriented, thought content appropriate, normal insight    Labs:     No results for input(s): \"WBC\", \"HGB\", \"HCT\", \"PLT\" in the last 72 hours.  Recent Labs     04/19/24  0507 04/19/24  0946 04/20/24  0506   *  --  126*   K 5.8* 5.7* 5.6*   CL 92*  --  89*   CO2 23  --  29   BUN 61*  --  66*   CREATININE 2.8*  --  3.1*   CALCIUM 8.6  --  8.6     No results for input(s): \"AST\", \"ALT\", \"BILIDIR\", \"BILITOT\", \"ALKPHOS\" in the last 72 hours.  No results for input(s): \"INR\" in the last 72 hours.  No results for input(s): \"CKTOTAL\", \"TROPONINI\" in the last 72 hours.    Urinalysis:      Lab Results   Component Value Date/Time    NITRU Negative 04/01/2024 10:30 PM    WBCUA 10-20 04/01/2024 10:30 PM    BACTERIA 3+ 04/01/2024 10:30 PM

## 2024-04-22 PROCEDURE — 94761 N-INVAS EAR/PLS OXIMETRY MLT: CPT

## 2024-04-22 PROCEDURE — 99232 SBSQ HOSP IP/OBS MODERATE 35: CPT | Performed by: INTERNAL MEDICINE

## 2024-04-22 PROCEDURE — 2580000003 HC RX 258: Performed by: INTERNAL MEDICINE

## 2024-04-22 PROCEDURE — 6370000000 HC RX 637 (ALT 250 FOR IP): Performed by: INTERNAL MEDICINE

## 2024-04-22 PROCEDURE — 2700000000 HC OXYGEN THERAPY PER DAY

## 2024-04-22 PROCEDURE — 1250000000 HC SEMI PRIVATE HOSPICE R&B

## 2024-04-22 RX ORDER — PSEUDOEPHEDRINE HCL 30 MG
200 TABLET ORAL 2 TIMES DAILY
DISCHARGE
Start: 2024-04-22

## 2024-04-22 RX ORDER — TORSEMIDE 100 MG/1
100 TABLET ORAL DAILY
DISCHARGE
Start: 2024-04-23

## 2024-04-22 RX ORDER — MORPHINE SULFATE 20 MG/ML
5 SOLUTION ORAL EVERY 4 HOURS PRN
Status: SHIPPED | OUTPATIENT
Start: 2024-04-22 | End: 2024-04-25

## 2024-04-22 RX ORDER — LORAZEPAM 2 MG/ML
1 CONCENTRATE ORAL EVERY 4 HOURS PRN
Status: SHIPPED | OUTPATIENT
Start: 2024-04-22 | End: 2024-05-06

## 2024-04-22 RX ORDER — ATROPINE SULFATE 10 MG/ML
2 SOLUTION/ DROPS OPHTHALMIC EVERY 4 HOURS PRN
DISCHARGE
Start: 2024-04-22

## 2024-04-22 RX ORDER — DICYCLOMINE HCL 20 MG
20 TABLET ORAL 4 TIMES DAILY PRN
DISCHARGE
Start: 2024-04-22

## 2024-04-22 RX ORDER — LANOLIN ALCOHOL/MO/W.PET/CERES
3 CREAM (GRAM) TOPICAL NIGHTLY
DISCHARGE
Start: 2024-04-22

## 2024-04-22 RX ORDER — AMIODARONE HYDROCHLORIDE 200 MG/1
200 TABLET ORAL DAILY
DISCHARGE
Start: 2024-04-23

## 2024-04-22 RX ORDER — POLYETHYLENE GLYCOL 3350 17 G/17G
17 POWDER, FOR SOLUTION ORAL 2 TIMES DAILY
DISCHARGE
Start: 2024-04-22

## 2024-04-22 RX ADMIN — Medication 5 MG: at 08:40

## 2024-04-22 RX ADMIN — DOCUSATE SODIUM 200 MG: 100 CAPSULE, LIQUID FILLED ORAL at 20:47

## 2024-04-22 RX ADMIN — METOPROLOL TARTRATE 25 MG: 25 TABLET, FILM COATED ORAL at 08:41

## 2024-04-22 RX ADMIN — Medication 10 ML: at 20:47

## 2024-04-22 RX ADMIN — AMIODARONE HYDROCHLORIDE 200 MG: 200 TABLET ORAL at 08:41

## 2024-04-22 RX ADMIN — Medication 5 MG: at 17:36

## 2024-04-22 RX ADMIN — DOCUSATE SODIUM 200 MG: 100 CAPSULE, LIQUID FILLED ORAL at 08:41

## 2024-04-22 RX ADMIN — ALLOPURINOL 100 MG: 100 TABLET ORAL at 08:41

## 2024-04-22 RX ADMIN — Medication 10 ML: at 08:44

## 2024-04-22 RX ADMIN — POLYETHYLENE GLYCOL 3350 17 G: 17 POWDER, FOR SOLUTION ORAL at 20:47

## 2024-04-22 RX ADMIN — TORSEMIDE 100 MG: 100 TABLET ORAL at 08:41

## 2024-04-22 ASSESSMENT — PAIN DESCRIPTION - DESCRIPTORS
DESCRIPTORS: ACHING
DESCRIPTORS: ACHING

## 2024-04-22 ASSESSMENT — PAIN DESCRIPTION - LOCATION
LOCATION: ABDOMEN
LOCATION: BACK

## 2024-04-22 ASSESSMENT — PAIN SCALES - GENERAL
PAINLEVEL_OUTOF10: 0
PAINLEVEL_OUTOF10: 1
PAINLEVEL_OUTOF10: 7
PAINLEVEL_OUTOF10: 7

## 2024-04-22 ASSESSMENT — PAIN - FUNCTIONAL ASSESSMENT
PAIN_FUNCTIONAL_ASSESSMENT: ACTIVITIES ARE NOT PREVENTED
PAIN_FUNCTIONAL_ASSESSMENT: PREVENTS OR INTERFERES SOME ACTIVE ACTIVITIES AND ADLS

## 2024-04-22 ASSESSMENT — PAIN DESCRIPTION - ORIENTATION
ORIENTATION: MID
ORIENTATION: MID

## 2024-04-22 ASSESSMENT — PAIN SCALES - WONG BAKER
WONGBAKER_NUMERICALRESPONSE: NO HURT
WONGBAKER_NUMERICALRESPONSE: NO HURT

## 2024-04-22 NOTE — DISCHARGE INSTR - COC
Continuity of Care Form    Patient Name: Amber Fuentes   :  1945  MRN:  [id number]    Admit date:  2024  Discharge date:  24      Code Status Order: DNR-CC   Advance Directives:     Admitting Physician:  Debora Da Silva DO  PCP: Lucila Tejeda MD    Discharging Nurse: Sandra RN  Discharging Hospital Unit/Room#: /0302-01  Discharging Unit Phone Number: [phone]    Emergency Contact:   Extended Emergency Contact Information  Primary Emergency Contact: Stanford Fuentes  Address: [address]  Home Phone: [phone]  Mobile Phone: [phone]  Relation: Spouse   needed? No  Secondary Emergency Contact: Sandra De Leon  Home Phone: [phone]  Relation: Child    Past Surgical History:  Past Surgical History:   Procedure Laterality Date    APPENDECTOMY      CARDIAC SURGERY       SECTION      CHOLECYSTECTOMY      CORONARY ARTERY BYPASS GRAFT N/A 2020    CORONARY ARTERY BYPASS GRAFTING X3, INTERNAL MAMMARY ARTERY, SAPHENOUS VEIN GRAFTING, ON PUMP MITRAL VALVE RING REPAIR USING 26MM PELAEZ PHYSIO II RING, WITH LEFT ATRIAL APPENDAGE CLIP, PFO CLOSURETEE, 5 LEVEL BILATERAL INTERCOSTAL NERVE BLOCK performed by Olivia Hoffmann MD at [facility]    HYSTERECTOMY (CERVIX STATUS UNKNOWN)      total ab hyst    IR MIDLINE CATH  2024    IR MIDLINE CATH 2024 [facility]    IR NONTUNNELED VASCULAR CATHETER  2024    IR NONTUNNELED VASCULAR CATHETER 2024 [facility] SPECIAL PROCEDURES    PARATHYROID GLAND SURGERY      explore parathyroid glands    TOTAL KNEE ARTHROPLASTY  2010    left knee       Immunization History:   Immunization History   Administered Date(s) Administered    COVID-19, MODERNA BLUE border, Primary or Immunocompromised, (age 12y+), IM, 100 mcg/0.5mL 2021, 2021    COVID-19, MODERNA Booster BLUE border, (age 18y+), IM, 50mcg/0.25mL 2021

## 2024-04-22 NOTE — CARE COORDINATION
CM spoke to [email or web address] and there has been no referral called in for patient. She will review and call CM back. However, there is no hospice payor. CM reached out to Vane TAM in public benefits to see if she has started medicaid application. 2nd choice is [facility]    Spoke to Kayla art[facility] who said that patient's  brought a lot of financial paperwork into office on Friday to apply for Medicaid but unsure who was going to help apply for medicaid    Patient is discharging to [facility] unit. D/C order noted. No CM needs for discharge.

## 2024-04-22 NOTE — CONSULTS
Palliative Care Chart Review  and Check in Note:     NAME:  Amber Fuentes  Admit Date: 4/20/2024  Hospital Day:  Hospital Day: 3   Current Code status: DNR-CC    Palliative care is continuing to following Ms. Fuentes for symptom management,  and goals of care discussion as needed. Patient's chart reviewed today 4/22/24.        The following are the currently established goals/code status, and Symptom management.     Goals of care: Writer spoke with pt's spouse via TC and he is agreeable with plan for [facility] today at dc.     Code status: DNR-CC    Discharge plan: plan continues for pt to dc to [facility] today. Leighann with [facility] is here to arrange transport, awaiting pickup time. DNR paperwork signed by  and in paper chart. No other dc needs voiced or identified.    Per Kayla with [facility] transport has been arranged for 9 pm pickup.    KADEN Hernandez updated on above plan.    Stefani Anand RN  04/22/24  2:05 PM

## 2024-04-22 NOTE — DISCHARGE SUMMARY
tablet  polyethylene glycol 17 g packet  torsemide 100 MG tablet           Discharged in stable condition to inpatient hospice unit     Follow Up:  Follow up with hospice provider     Yimi Muller MD   4/23/2024

## 2024-04-22 NOTE — FLOWSHEET NOTE
04/21/24 1955   Vital Signs   Temp 96.9 °F (36.1 °C)   Temp Source Oral   Pulse 57   Heart Rate Source Monitor   Respirations 18   BP (!) 116/47   MAP (Calculated) 70   BP Location Left upper arm   BP Method Automatic   Patient Position Semi fowlers   Oxygen Therapy   SpO2 95 %   O2 Device Nasal cannula   O2 Flow Rate (L/min) 3 L/min     PM Assessment completed. Scheduled medications given per MAR, On 3L of oxygen, A&O X4, Vital Signs completed and Charted, Patient denies any further needs at this time. Call light within reach, Reminded patient to call RN if she needs anything.

## 2024-04-22 NOTE — DISCHARGE INSTRUCTIONS
Heart Failure Resources:  Heart Failure Interactive Workbook:  Go to https://My Healthy WorlditalDissolve.Apprenda/publication/?v=857267 for a Free Heart Failure Interactive Workbook provided by The American Heart Association. This interactive workbook will provide information on Healthier Living with Heart Failure. Please copy and paste link into search bar. Use your mouse to scroll through the pages.    HF [place]yenne osiel:   Heart Failure Free smart phone osiel available for iPhone and Android download. Use your phone to track sodium intake, fluid intake, symptoms, and weight.     Low Sodium Diet / Recipes:  Go to www.[employer] website for “renal” diet which is Low Sodium! [employer] is a dialysis company, but this website offers free seasonal cookbooks. Each quarter, they will release 25-30 new recipes with a breakdown of calories, sodium, and glucose. You can also go to wwwJANZZ/recipes website for free recipes.     Discharge Instruction Video:  Scan the QR code below with your camera and click the canva.com link to open the video and watch educational information on Heart Failure and Medications from one of our nurses.   https://www.[employer]/design/DAFZnsH_JRk/1EfdkpsXQPOgmSTiyL0irr/edit    Home Exercise Program:   Identification of Green/Yellow/Red zones:  You should be able to identify when you feel good (green zone), if you have 1-2 symptoms of HF (yellow zone), or if you are in need of medical attention (red zone).  In your CHF education folder you were provided a “stop light tool” to outline this information.     We want to you to rate your exertion levels:    Our therapy team has discussed means of identification with you such as the \"Julian scale.\"  The Julian rating scale ranges from 6 to 20, where 6 means \"no exertion at all\" and 20 means \"maximal exertion.\" The goal is to use this to gauge how much effort it is taking for you to do your normal daily tasks.   You should be able to recognize when too much exertion is

## 2024-04-22 NOTE — PLAN OF CARE
HEART FAILURE CARE PLAN:    Comorbidities Reviewed: Yes   Patient has a past medical history of Anemia, Backache, unspecified, CAD, multiple vessel, Chronic kidney disease (CKD) stage G3b/A1, moderately decreased glomerular filtration rate (GFR) between 30-44 mL/min/1.73 square meter and albuminuria creatinine ratio less than 30 mg/g ([facility]), Chronic obstructive pulmonary disease ([facility]), Chronic systolic CHF (congestive heart failure) ([facility]), COPD (chronic obstructive pulmonary disease) ([facility]), Depressive disorder, not elsewhere classified, Essential hypertension, benign, Gout, History of blood transfusion, Hyperlipidemia associated with type 2 diabetes mellitus ([facility]), Major depressive disorder, recurrent episode, moderate ([facility]), Osteoarthritis, hand, Osteoarthrosis, unspecified whether generalized or localized, lower leg, Osteopenia, Other and unspecified hyperlipidemia, Pain in joint, lower leg, Pneumonia, Rotator cuff tendinitis, Tear of medial cartilage or meniscus of knee, current, Type 2 diabetes mellitus with microalbuminuria, without long-term current use of insulin ([facility]), Type 2 diabetes mellitus without complication ([facility]), Type II or unspecified type diabetes mellitus without mention of complication, not stated as uncontrolled, and Uses LifeVest defibrillator.     Weights Reviewed: Yes   Admission weight: 68.2 kg (150 lb 4.8 oz)   Wt Readings from Last 3 Encounters:   04/21/24 68.8 kg (151 lb 9.6 oz)   04/20/24 68.2 kg (150 lb 4.8 oz)   02/11/24 59.6 kg (131 lb 8 oz)     Intake & Output Reviewed: Yes   No intake or output data in the 24 hours ending 04/21/24 8071    ECHOCARDIOGRAM Reviewed: Yes   Patient's Ejection Fraction (EF) is less than or equal to 40%. Discuss HFrEF Guideline Directed Medical Therapy (GDMT) with Cardiologist or Hospitalist:          Medications Reviewed: Yes   SCHEDULED HOSPITAL MEDICATIONS:   torsemide  100 mg Oral Daily    allopurinol  100 mg Oral Daily    amiodarone  200 mg Oral Daily

## 2024-04-23 VITALS
RESPIRATION RATE: 18 BRPM | HEIGHT: 60 IN | HEART RATE: 53 BPM | OXYGEN SATURATION: 98 % | BODY MASS INDEX: 29.76 KG/M2 | WEIGHT: 151.6 LBS | TEMPERATURE: 97.8 F | SYSTOLIC BLOOD PRESSURE: 115 MMHG | DIASTOLIC BLOOD PRESSURE: 50 MMHG

## 2024-04-23 PROBLEM — I42.9 CARDIOMYOPATHY (HCC): Status: ACTIVE | Noted: 2019-11-13

## 2024-04-23 PROCEDURE — 99238 HOSP IP/OBS DSCHRG MGMT 30/<: CPT | Performed by: INTERNAL MEDICINE

## 2024-04-23 PROCEDURE — 6370000000 HC RX 637 (ALT 250 FOR IP): Performed by: INTERNAL MEDICINE

## 2024-04-23 PROCEDURE — 94761 N-INVAS EAR/PLS OXIMETRY MLT: CPT

## 2024-04-23 PROCEDURE — 2700000000 HC OXYGEN THERAPY PER DAY

## 2024-04-23 PROCEDURE — 2580000003 HC RX 258: Performed by: INTERNAL MEDICINE

## 2024-04-23 RX ADMIN — Medication 10 ML: at 08:32

## 2024-04-23 RX ADMIN — TORSEMIDE 100 MG: 100 TABLET ORAL at 08:31

## 2024-04-23 RX ADMIN — Medication 5 MG: at 12:23

## 2024-04-23 RX ADMIN — Medication 5 MG: at 08:20

## 2024-04-23 RX ADMIN — ALLOPURINOL 100 MG: 100 TABLET ORAL at 08:22

## 2024-04-23 RX ADMIN — DOCUSATE SODIUM 200 MG: 100 CAPSULE, LIQUID FILLED ORAL at 08:22

## 2024-04-23 RX ADMIN — AMIODARONE HYDROCHLORIDE 200 MG: 200 TABLET ORAL at 08:22

## 2024-04-23 RX ADMIN — POLYETHYLENE GLYCOL 3350 17 G: 17 POWDER, FOR SOLUTION ORAL at 08:22

## 2024-04-23 RX ADMIN — METOPROLOL TARTRATE 25 MG: 25 TABLET, FILM COATED ORAL at 08:22

## 2024-04-23 ASSESSMENT — PAIN SCALES - WONG BAKER
WONGBAKER_NUMERICALRESPONSE: NO HURT

## 2024-04-23 ASSESSMENT — PAIN DESCRIPTION - LOCATION
LOCATION: COCCYX
LOCATION: COCCYX

## 2024-04-23 ASSESSMENT — PAIN SCALES - GENERAL
PAINLEVEL_OUTOF10: 7
PAINLEVEL_OUTOF10: 10
PAINLEVEL_OUTOF10: 9
PAINLEVEL_OUTOF10: 10

## 2024-04-23 ASSESSMENT — PAIN DESCRIPTION - ORIENTATION
ORIENTATION: MID
ORIENTATION: MID

## 2024-04-23 ASSESSMENT — PAIN DESCRIPTION - DIRECTION
RADIATING_TOWARDS: NO
RADIATING_TOWARDS: NON

## 2024-04-23 ASSESSMENT — PAIN DESCRIPTION - DESCRIPTORS
DESCRIPTORS: ACHING;BURNING
DESCRIPTORS: ACHING;BURNING

## 2024-04-23 ASSESSMENT — PAIN DESCRIPTION - PAIN TYPE
TYPE: ACUTE PAIN
TYPE: ACUTE PAIN

## 2024-04-23 ASSESSMENT — PAIN DESCRIPTION - FREQUENCY
FREQUENCY: CONTINUOUS
FREQUENCY: CONTINUOUS

## 2024-04-23 ASSESSMENT — PAIN DESCRIPTION - ONSET
ONSET: ON-GOING
ONSET: ON-GOING

## 2024-04-23 NOTE — FLOWSHEET NOTE
04/22/24 1918   Vital Signs   Temp 97.8 °F (36.6 °C)   Temp Source Axillary   Pulse 57   Heart Rate Source Monitor   Respirations 18   BP (!) 115/42   MAP (Calculated) 66   BP Location Left upper arm   BP Method Automatic   Patient Position Semi fowlers   Oxygen Therapy   SpO2 98 %   O2 Device Nasal cannula   O2 Flow Rate (L/min) 3 L/min     PM Assessment completed. Scheduled medications given per MAR, On 3L of oxygen, A&O X4, Vital Signs completed and Charted, Patient denies any further needs at this time. Call light within reach, family is at bedside, Reminded patient and family to call RN if they need anything.

## 2024-04-23 NOTE — ACP (ADVANCE CARE PLANNING)
Advance Care Planning     Advance Care Planning Inpatient Note  [facility] Department    Today's Date: 4/23/2024  Unit[facility]    Received request from IDT Member and family.  Upon review of chart and communication with care team, the patient is intermittently able to have conversation. Patient, Spouse, and Child/Children was/were present in the room during visit.    Goals of ACP Conversation:  Clarify proxy decision making in the event patient is unable to make her own medical decisions.      Health Care Decision Makers:       Primary Decision Maker: HaleySandra - Child - [phone]    Primary Decision Maker: HuaNikolai - Child - [phone]    Primary Decision Maker: Bunny Sequeira - Child  Summary:  Verified Healthcare Decision Maker  Updated Healthcare Decision Maker  Documented Next of Kin, per patient report    The patients , Stanford Fuentes, states he no longer want to be patient's primary proxy decision maker for medical decision making when patient is unable.  He would like to defer to his three adult children, who are all in agreement.  Patient was able to open her eyes and seemed to be consistent that she wanted her children to make decisions on her behalf.    Advance Care Planning Documents (Patient Wishes):  A document was created stating 's wishes.  This was signed by spouse and 3 children and 2 witnesses.      Assessment:   explained to family that without a legal health care power of , there is a law in [place] that states who is the legal proxy decision maker in the event patient is unable to make her own medical decisions.  This would fall on the spouse, followed by the \"majority\" of adult children.  The spouse can resign his role, in which case, the next in line would be all of the patients adult children with equal decision making ability.  All family members agreed this was their wishes and patient indicated the same.  She would not be able in her current

## 2024-04-23 NOTE — PROGRESS NOTES
Nephrology Progress Note   [facility]      Sub/interval history    Kidney function is worse  K is elevated but a bit better with lokelma   Awake today  BP stable     ROS: No fever, edema    PSFH: Family at bedside     Scheduled Meds:   torsemide  100 mg Oral Daily    allopurinol  100 mg Oral Daily    amiodarone  200 mg Oral Daily    docusate sodium  200 mg Oral BID    melatonin  3 mg Oral Nightly    metoprolol tartrate  25 mg Oral BID    polyethylene glycol  17 g Oral BID    sodium chloride flush  5-40 mL IntraVENous 2 times per day    sodium zirconium cyclosilicate  10 g Oral Daily       Continuous Infusions:   sodium chloride         PRN Meds:.sodium chloride, acetaminophen **OR** acetaminophen, atropine, dicyclomine, LORazepam, morphine 20MG/ML, nitroGLYCERIN, ondansetron **OR** ondansetron, sodium chloride flush    Objective/     Vitals:    04/20/24 1917 04/21/24 0002 04/21/24 0445 04/21/24 0700   BP: (!) 114/48 (!) 119/55 (!) 118/44 (!) 120/49   Pulse: 56 53 59 59   Resp: 16 18 18 18   Temp: 97 °F (36.1 °C) 96.8 °F (36 °C) 97.5 °F (36.4 °C) 97.5 °F (36.4 °C)   TempSrc: Oral Oral Oral Oral   SpO2: 98% 100% 97% 99%   Weight:   68.8 kg (151 lb 9.6 oz)    Height:         24HR INTAKE/OUTPUT:    Intake/Output Summary (Last 24 hours) at 4/21/2024 1225  Last data filed at 4/20/2024 2000  Gross per 24 hour   Intake 180 ml   Output 200 ml   Net -20 ml       Gen: Ill-appearing  Neck: No JVD  Skin: Unremarkable  Cardiovascular:  S1, S2 without m/r/g   Respiratory: CTA B without w/r/r; respiratory effort normal  Abdomen:  soft, nt, nd,   Extremities: + extremity edema  Neuro/Psy: Somnolent, flat     Data/  No results for input(s): \"WBC\", \"HGB\", \"HCT\", \"MCV\", \"PLT\" in the last 72 hours.    Recent Labs     04/19/24  0507 04/19/24  0946 04/20/24  0506   *  --  126*   K 5.8* 5.7* 5.6*   CL 92*  --  89*   CO2 23  --  29   GLUCOSE 130*  --  97   BUN 61*  --  66*   CREATININE 2.8*  --  3.1*   LABGLOM 17*  --  15* 
AM assessment completed. Scheduled medications given per MAR. VSS 2 liter NC, A/O x4. Patient stated some pain on bottom, turned patient on side with pillow support she stated that helped with the pain tylenol given per MAR. Spoke with son mery and updated him this morning. He asked if we could get his mom up into the chair. I offered to patient that we could get her up in a chair and she stated she did not want to. He stated a couple family members will visit today. Patient denies any needs at this time. Call light in reach, will monitor, bed alarm on.     
AM assessment completed. Scheduled medications given per MAR. VSS 3 liter NC, A/O x4. Patient denies any needs at this time. Call light in reach, will monitor, bed alarm on.     
Family came to nurse station and stated that they wanted to refuse the patient being [facility]. It was not until shift change that I was informed that patient is \"leaving at 2100\". There is no documentation supporting that a transport time was set, and I was never informed in person. Family states they are very confused on the plan, as am I. Family stated that they will not let her leave [facility], they will barricade the doors if necessary. Tushar number was found in paper chart and called to update that the patient wasn't coming. Tushar provided me with the number for the transport company, and they were called and cancelled. All per patients family request.   Jen Reed RN    
HEART FAILURE CARE PLAN:    Comorbidities Reviewed: Yes   Patient has a past medical history of Anemia, Backache, unspecified, CAD, multiple vessel, Chronic kidney disease (CKD) stage G3b/A1, moderately decreased glomerular filtration rate (GFR) between 30-44 mL/min/1.73 square meter and albuminuria creatinine ratio less than 30 mg/g ([facility]), Chronic obstructive pulmonary disease ([facility]), Chronic systolic CHF (congestive heart failure) ([facility]), COPD (chronic obstructive pulmonary disease) ([facility]), Depressive disorder, not elsewhere classified, Essential hypertension, benign, Gout, History of blood transfusion, Hyperlipidemia associated with type 2 diabetes mellitus ([facility]), Major depressive disorder, recurrent episode, moderate ([facility]), Osteoarthritis, hand, Osteoarthrosis, unspecified whether generalized or localized, lower leg, Osteopenia, Other and unspecified hyperlipidemia, Pain in joint, lower leg, Pneumonia, Rotator cuff tendinitis, Tear of medial cartilage or meniscus of knee, current, Type 2 diabetes mellitus with microalbuminuria, without long-term current use of insulin ([facility]), Type 2 diabetes mellitus without complication ([facility]), Type II or unspecified type diabetes mellitus without mention of complication, not stated as uncontrolled, and Uses LifeVest defibrillator.     Weights Reviewed: Yes   Admission weight: 68.2 kg (150 lb 4.8 oz)   Wt Readings from Last 3 Encounters:   04/20/24 68.2 kg (150 lb 4.8 oz)   04/20/24 68.2 kg (150 lb 4.8 oz)   02/11/24 59.6 kg (131 lb 8 oz)     Intake & Output Reviewed: Yes     Intake/Output Summary (Last 24 hours) at 4/20/2024 1938  Last data filed at 4/20/2024 1700  Gross per 24 hour   Intake 120 ml   Output 200 ml   Net -80 ml       ECHOCARDIOGRAM Reviewed: Yes   Patient's Ejection Fraction (EF) is less than or equal to 40%. Discuss HFrEF Guideline Directed Medical Therapy (GDMT) with Cardiologist or Hospitalist:          Medications Reviewed: Yes   SCHEDULED HOSPITAL 
Handoff report given to POORNIMA Mendes. Care transferred.   
Handoff report given to POORNIMA Yun. Care transferred.   
Nursing handoff to POORNIMA Martins.  Cinthya Fabian RN  
Patient educated on discharge instructions as well as new medications use, dosage, administration and possible side effects.  Patient verified knowledge. IV removed without difficulty and dry dressing in place. Telemetry monitor removed and returned to CMU. Pt left facility in stable condition to Home with all of their personal belongings. . Sandra Beltran RN      
Patients daughter Sandra is requesting for the patient not to be transferred facilities. Sandra does not think the patient will be able to tolerate the ride.  Jen Reed RN    
Report given to nurse Gutierrez at [facility], family followed patient during discharge. Sandra Beltran RN    
Resting quietly with respirations easy/even on 3L NC.  Call in easy reach.  Bed alarm on for safety.  Continue to monitor closely.  Cinthya Fabian RN  
Shift assessment complete, morning medications given, patient resting with  complaints of pain 9 out of 10 on scale, PO morphine given per MAR, daughter at bedside, case management to speak to son today concerning plan for discharge, patient is A/O at this time, will continue to monitor. Sandra Beltran RN      
[facility] Progress Note    Daily Progress Note for 2024 9:35 AM /0302-01  Amber Fuentes : 1945 Age: 78 y.o. Sex: female  Length of Stay:  2    Interval History:      CC: F/U hospice care    Subjective:       Felt cold, has on florian hugger. No nausea. No desire to eat. No pain complaints or apparent dyspnea at this time.    Awake, partially oriented to person. No distress     Objective:     Vitals:    24 1955 24 2356 24 0412 24 0840   BP: (!) 116/47 (!) 107/50 (!) 118/48 (!) 118/48   Pulse: 57 54 57 61   Resp: 18 18 18 16   Temp: 96.9 °F (36.1 °C) 97.3 °F (36.3 °C) 97.7 °F (36.5 °C)    TempSrc: Oral Oral Axillary    SpO2: 95% 99% 98% 97%   Weight:       Height:            No intake or output data in the 24 hours ending 24 0935    Body mass index is 29.61 kg/m².    Physical Exam:  General: Cooperative, pleasant/ chronically Ill appearing, on  Nasal cannula.   Awake, no distress. Oriented X1   HEENT:  Head: normocephalic,atraumatic, anicteric sclera, clear conjunctiva  Neck: Normal size, Jugular venous pulsations: normal  Respiratory: unlabored breathing  Heart: Regular rate and rhythm  Abdomen: soft, nondistended, nontender, normoactive bowel sounds,  Neurological/Psych: Alert, no distress  Skin: No obvious rashes    Extremities:  Significant edema bilaterally    Scheduled Medications:  torsemide, 100 mg, Daily  allopurinol, 100 mg, Daily  amiodarone, 200 mg, Daily  docusate sodium, 200 mg, BID  melatonin, 3 mg, Nightly  metoprolol tartrate, 25 mg, BID  polyethylene glycol, 17 g, BID  sodium chloride flush, 5-40 mL, 2 times per day        PRN Medications:  sodium chloride, , PRN  acetaminophen, 650 mg, Q6H PRN   Or  acetaminophen, 650 mg, Q6H PRN  atropine, 2 drop, Q4H PRN  dicyclomine, 20 mg, 4x Daily PRN  LORazepam, 1 mg, Q4H PRN  morphine 20MG/ML, 5 mg, Q4H PRN  nitroGLYCERIN, 0.4 mg, Q5 Min PRN  ondansetron, 4 mg, Q8H PRN   Or  ondansetron, 
0.4 mg, Q5 Min PRN  ondansetron, 4 mg, Q8H PRN   Or  ondansetron, 4 mg, Q6H PRN  sodium chloride flush, 5-40 mL, PRN          Data Review:      Laboratory Data Reviewed:    CBC:  Lab Results   Component Value Date    WBC 9.6 04/15/2024    HGB 7.9 (L) 04/15/2024    HCT 24.6 (L) 04/15/2024    MCV 88.8 04/15/2024     04/15/2024         Basic Metabolic Panel  Lab Results   Component Value Date/Time     04/20/2024 05:06 AM    CL 89 04/20/2024 05:06 AM    CO2 29 04/20/2024 05:06 AM    GLUCOSE 97 04/20/2024 05:06 AM    GLUCOSE 128 02/27/2012 11:08 AM    BUN 66 04/20/2024 05:06 AM    CREATININE 3.1 04/20/2024 05:06 AM       HEPATIC PANEL   Lab Results   Component Value Date/Time    AST 22 04/16/2024 06:34 AM    ALT 14 04/16/2024 06:34 AM       Lab Results   Component Value Date    TROPONINI 0.02 (H) 06/03/2022       Lab Results   Component Value Date/Time    INR 1.17 04/12/2024 05:01 AM    INR 1.24 04/01/2024 06:20 PM    INR 1.04 07/23/2020 07:32 AM       Test Review:        Radiology reviewed:     No orders to display       Lab Results   Component Value Date    LABA1C 7.0 02/07/2024      Body mass index is 29.61 kg/m².       Impression/Plan:        ASSESSMENT/PLAN:     Ischemic cardiomyopathy  Renal failure - ESRD unable to tolerate dialysis  Hyperkalemia  Hyponatremia  Atrial Fibrillation  Pleural Effusions  Severe protein calorie malnutrition     Admit to inpatient hospice at [facility],  inpatient facilities within reach of patient's family  Hospice consulted  Hospice nurse assisting at the bedside with care  Torsemide ordered for comfort measures     Code Status: DNR-CC    Management discussed with RN,     Electronically signed by: Debora Da Silva DO, 4/21/2024 10:57 AM

## 2024-04-23 NOTE — CONSULTS
Pt's children have requested [facility] and would like to revoke [facility] hospice. Writer spoke with pt's spouse,Stanford, and he is in agreement with above plan of [facility] [facility]. Per spouse he would like pt's children to help make end of life decisions at this time. Spiritual care consulted to meet with the family.    Writer received VM from Lucrecia at [facility] and she states cannot accept the pt without long term payor unless family is able to write a check for $2500 prior to admission. Writer spoke with pt's son Nikolai and daughter,Sandra, related to above information and family in agreement for the pt to go to [facility] today if bed available. Referral called to Brittney with [facility] and she will be here at 11:00 AM to meet with the family.    Writer spoke with Brittney from [facility] and she states family has signed consents for hospice services and [facility] will be here between [phone] today to transport pt to their [facility] in [place].     Writer spoke with Sandra,bedside nurse and she is aware of above pickup time.    Message sent to KADEN Hernandez related to above plan.

## 2024-04-23 NOTE — FLOWSHEET NOTE
04/23/24 1215   Vital Signs   Temp 97.8 °F (36.6 °C)   Temp Source Oral   Pulse 53   Heart Rate Source Monitor   Respirations 18   BP (!) 115/50   MAP (Calculated) 72   BP Location Left upper arm   BP Method Automatic   Patient Position Semi fowlers   Pain Assessment   Pain Assessment 0-10   Pain Level 7   Arthur-Baker Pain Rating 0   Patient's Stated Pain Goal 0 - No pain   Pain Location Coccyx   Pain Orientation Mid   Pain Descriptors Aching;Burning   Functional Pain Assessment Activities are not prevented   Pain Type Acute pain   Pain Radiating Towards no   Pain Frequency Continuous   Pain Onset On-going   Non-Pharmaceutical Pain Intervention(s) Family support;Emotional support   Oxygen Therapy   SpO2 98 %   Pulse Oximetry Type Continuous   Pulse Oximeter Device Mode Continuous   Pulse Oximeter Device Location Finger   O2 Device Nasal cannula   O2 Flow Rate (L/min) 3 L/min     Family at bedside of patient, no additional needs at this time,  set for 2-3pm today for [facility]. Sandra Beltran RN

## 2024-04-23 NOTE — PLAN OF CARE
Problem: Discharge Planning  Goal: Discharge to home or other facility with appropriate resources  Outcome: Progressing     Problem: Safety - Adult  Goal: Free from fall injury  4/22/2024 2250 by Nidhi Dunne, RN  Outcome: Progressing  4/22/2024 0900 by Jen Reed, RN  Outcome: Progressing     Problem: Skin/Tissue Integrity  Goal: Absence of new skin breakdown  Description: 1.  Monitor for areas of redness and/or skin breakdown  2.  Assess vascular access sites hourly  3.  Every 4-6 hours minimum:  Change oxygen saturation probe site  4.  Every 4-6 hours:  If on nasal continuous positive airway pressure, respiratory therapy assess nares and determine need for appliance change or resting period.  Outcome: Progressing     Problem: Pain  Goal: Verbalizes/displays adequate comfort level or baseline comfort level  Outcome: Progressing     Problem: Chronic Conditions and Co-morbidities  Goal: Patient's chronic conditions and co-morbidity symptoms are monitored and maintained or improved  Outcome: Progressing

## 2024-04-23 NOTE — PLAN OF CARE
HEART FAILURE CARE PLAN:    Comorbidities Reviewed: Yes   Patient has a past medical history of Anemia, Backache, unspecified, CAD, multiple vessel, Chronic kidney disease (CKD) stage G3b/A1, moderately decreased glomerular filtration rate (GFR) between 30-44 mL/min/1.73 square meter and albuminuria creatinine ratio less than 30 mg/g ([facility]), Chronic obstructive pulmonary disease ([facility]), Chronic systolic CHF (congestive heart failure) ([facility]), COPD (chronic obstructive pulmonary disease) ([facility]), Depressive disorder, not elsewhere classified, Essential hypertension, benign, Gout, History of blood transfusion, Hyperlipidemia associated with type 2 diabetes mellitus ([facility]), Major depressive disorder, recurrent episode, moderate ([facility]), Osteoarthritis, hand, Osteoarthrosis, unspecified whether generalized or localized, lower leg, Osteopenia, Other and unspecified hyperlipidemia, Pain in joint, lower leg, Pneumonia, Rotator cuff tendinitis, Tear of medial cartilage or meniscus of knee, current, Type 2 diabetes mellitus with microalbuminuria, without long-term current use of insulin ([facility]), Type 2 diabetes mellitus without complication ([facility]), Type II or unspecified type diabetes mellitus without mention of complication, not stated as uncontrolled, and Uses LifeVest defibrillator.     Weights Reviewed: Yes   Admission weight: 68.2 kg (150 lb 4.8 oz)   Wt Readings from Last 3 Encounters:   04/21/24 68.8 kg (151 lb 9.6 oz)   04/20/24 68.2 kg (150 lb 4.8 oz)   02/11/24 59.6 kg (131 lb 8 oz)     Intake & Output Reviewed: Yes   No intake or output data in the 24 hours ending 04/22/24 3421    ECHOCARDIOGRAM Reviewed: Yes   Patient's Ejection Fraction (EF) is less than or equal to 40%. Discuss HFrEF Guideline Directed Medical Therapy (GDMT) with Cardiologist or Hospitalist:          Medications Reviewed: Yes   SCHEDULED HOSPITAL MEDICATIONS:   torsemide  100 mg Oral Daily    allopurinol  100 mg Oral Daily    amiodarone  200 mg Oral Daily

## (undated) DEVICE — SUTURE NONABSORBABLE MONOFILAMENT 4-0 RB-1 36 IN BLU PROLENE 8557H

## (undated) DEVICE — VESSEL LOOPS,MAXI, BLUE: Brand: DEVON

## (undated) DEVICE — STERILE LATEX POWDER-FREE SURGICAL GLOVESWITH NITRILE COATING: Brand: PROTEXIS

## (undated) DEVICE — KIT CATH 18GA L6IN FEM ART LN W/ INTEGR SUT WNG 0.25X17

## (undated) DEVICE — KIT BLWR MISTER 5P 15L W/ TBNG SET IRRIG MIST TO IMPROVE

## (undated) DEVICE — SUTURE PERMA-HAND SZ 2 L60IN NONABSORBABLE BLK SILK BRAID SA8H

## (undated) DEVICE — CANNULA PERF 7FR L5.5IN AORT ROOT RADPQ STD TIP W/ VENT LN

## (undated) DEVICE — SUTURE ETHBND EXCEL SZ 0 L18IN NONABSORBABLE GRN L36MM CT-1 CX21D

## (undated) DEVICE — EVERGRIP INSERT SET 61MM: Brand: FOGARTY EVERGRIP

## (undated) DEVICE — CANNULA VES L25IN RADPQ BODY W  1 W VLV 3MM BLNT TIP DLP

## (undated) DEVICE — SYSTEM VES HARV ENDOSCP VASOVIEW HEMOPRO

## (undated) DEVICE — 3M™ STERI-STRIP™ REINFORCED ADHESIVE SKIN CLOSURES, R1549, 1/2 IN X 2 IN (12 MM X 50 MM), 6 STRIPS/ENVELOPE: Brand: 3M™ STERI-STRIP™

## (undated) DEVICE — SUTURE ETHBND EXCEL SZ 5 L30IN NONABSORBABLE GRN L40MM V-37 MB66G

## (undated) DEVICE — 3M™ STERI-DRAPE™ INSTRUMENT POUCH 1018: Brand: STERI-DRAPE™

## (undated) DEVICE — Z DISCONTINUED USE 2516375 APPLICATOR MEDICATED 3 CC CLR STRL CHLORAPREP

## (undated) DEVICE — SUTURE VCRL SZ 3-0 L27IN ABSRB UD L26MM SH 1/2 CIR J416H

## (undated) DEVICE — 3M™ TEGADERM™ TRANSPARENT FILM DRESSING FRAME STYLE, 1626W, 4 IN X 4-3/4 IN (10 CM X 12 CM), 50/CT 4CT/CASE: Brand: 3M™ TEGADERM™

## (undated) DEVICE — TEMP PACING WIRE: Brand: MYO/WIRE

## (undated) DEVICE — CANNULA 1 PC TRI STG VEN RET POLYVI CHL S STL FLEX 29 FR SGL

## (undated) DEVICE — SUTURE ETHBND EXCEL SZ 2-0 L36IN NONABSORBABLE GRN SH-2 X559H

## (undated) DEVICE — [HIGH FLOW INSUFFLATOR,  DO NOT USE IF PACKAGE IS DAMAGED,  KEEP DRY,  KEEP AWAY FROM SUNLIGHT,  PROTECT FROM HEAT AND RADIOACTIVE SOURCES.]: Brand: PNEUMOSURE

## (undated) DEVICE — 3M™ TEGADERM™ TRANSPARENT FILM DRESSING FRAME STYLE, 1624W, 2-3/8 IN X 2-3/4 IN (6 CM X 7 CM), 100/CT 4CT/CASE: Brand: 3M™ TEGADERM™

## (undated) DEVICE — CHLORAPREP 26ML ORANGE

## (undated) DEVICE — CATHETER THOR L21IN DIA28FR R ANG SFT RADPQ STRP SIL

## (undated) DEVICE — CANNULA PERF AD 20FR L8.5IN ART 3/8IN CONN NVENT MTL TIP

## (undated) DEVICE — AGENT HEMSTAT W3XL4IN OXIDIZED REGENERATED CELOS ABSRB FOR

## (undated) DEVICE — CATHETER THORACENTHESIS 9 FRX20 IN EYES TOP

## (undated) DEVICE — PUNCH AORT DIA4MM LNG HNDL

## (undated) DEVICE — SUTURE PDS II SZ 0 L36IN ABSRB VLT L36MM CT-1 1/2 CIR Z346H

## (undated) DEVICE — BLADE SAW W10XL54MM FOR PRI REPEAT STRNOTMY

## (undated) DEVICE — SUTURE NONABSORBABLE MONOFILAMENT 7-0 BV-1 1X24 IN PROLENE 8702H

## (undated) DEVICE — COVER TRNSDUC W6XL96IN POLY TELESCOPICALLY FLD W/ ATTCH FRM

## (undated) DEVICE — Device

## (undated) DEVICE — Z INACTIVE USE 2641838 CLIP INT L ORNG TI TRNSVRS GRV CHEVRON SHP W/ PRECIS TIP TO

## (undated) DEVICE — KIT,ANTI FOG,W/SPONGE & FLUID,SOFT PACK: Brand: MEDLINE

## (undated) DEVICE — SUTURE MCRYL + SZ 4-0 L18IN ABSRB UD L19MM PS-2 3/8 CIR MCP496G

## (undated) DEVICE — GOWN SIRUS NONREIN XL W/TWL: Brand: MEDLINE INDUSTRIES, INC.

## (undated) DEVICE — SUTURE SZ 7 L18IN NONABSORBABLE SIL CCS L48MM 1/2 CIR STRNM M655G

## (undated) DEVICE — WAX SURG 2.5GM HEMSTAT BNE BEESWAX PARAFFIN ISO PALMITATE

## (undated) DEVICE — COR-KNOT MINI® COMBO KITBASE PACKAGE TYPE - KITEACH STERILE PACKAGE KIT CONTAINS (2) SINGLE PATIENT USE COR-KNOT MINI® DEVICES AND (12) COR-KNOT® QUICK LOADS®.: Brand: COR-KNOT MINI®

## (undated) DEVICE — SUTURE PROL SZ 6-0 L24IN NONABSORBABLE BLU L13MM C-1 3/8 8726H

## (undated) DEVICE — COR-KNOT® QUICK LOAD® SINGLES: Brand: COR-KNOT® QUICK LOAD®

## (undated) DEVICE — ADHESIVE SKIN CLSR 0.7ML TOP DERMBND ADV

## (undated) DEVICE — PACK PROCEDURE SURG OPN HRT A BASIC

## (undated) DEVICE — MEDI-VAC NON-CONDUCTIVE SUCTION TUBING: Brand: CARDINAL HEALTH

## (undated) DEVICE — SUTURE ETHBND 2-0 L30IN NONABSORBABLE GRN WHT V-5 L17IN 1/2 10X52